# Patient Record
Sex: FEMALE | Race: BLACK OR AFRICAN AMERICAN | NOT HISPANIC OR LATINO | Employment: OTHER | URBAN - METROPOLITAN AREA
[De-identification: names, ages, dates, MRNs, and addresses within clinical notes are randomized per-mention and may not be internally consistent; named-entity substitution may affect disease eponyms.]

---

## 2017-01-06 ENCOUNTER — OFFICE VISIT (OUTPATIENT)
Dept: ORTHOPEDICS | Facility: CLINIC | Age: 67
End: 2017-01-06
Payer: MEDICARE

## 2017-01-06 ENCOUNTER — HOSPITAL ENCOUNTER (OUTPATIENT)
Dept: RADIOLOGY | Facility: HOSPITAL | Age: 67
Discharge: HOME | End: 2017-01-06
Attending: ORTHOPAEDIC SURGERY
Payer: MEDICARE

## 2017-01-06 VITALS
SYSTOLIC BLOOD PRESSURE: 131 MMHG | WEIGHT: 198 LBS | BODY MASS INDEX: 31.82 KG/M2 | HEIGHT: 66 IN | DIASTOLIC BLOOD PRESSURE: 78 MMHG | HEART RATE: 78 BPM | RESPIRATION RATE: 16 BRPM

## 2017-01-06 DIAGNOSIS — M25.562 ACUTE PAIN OF LEFT KNEE: ICD-10-CM

## 2017-01-06 DIAGNOSIS — M25.562 ACUTE PAIN OF LEFT KNEE: Primary | ICD-10-CM

## 2017-01-06 PROCEDURE — 1159F MED LIST DOCD IN RCRD: CPT | Mod: S$GLB,,, | Performed by: PHYSICIAN ASSISTANT

## 2017-01-06 PROCEDURE — 73560 X-RAY EXAM OF KNEE 1 OR 2: CPT | Mod: TC,59,RT

## 2017-01-06 PROCEDURE — 73562 X-RAY EXAM OF KNEE 3: CPT | Mod: 26,LT,, | Performed by: RADIOLOGY

## 2017-01-06 PROCEDURE — 99213 OFFICE O/P EST LOW 20 MIN: CPT | Mod: S$GLB,,, | Performed by: PHYSICIAN ASSISTANT

## 2017-01-06 PROCEDURE — 99999 PR PBB SHADOW E&M-EST. PATIENT-LVL IV: CPT | Mod: PBBFAC,,, | Performed by: PHYSICIAN ASSISTANT

## 2017-01-06 PROCEDURE — 73560 X-RAY EXAM OF KNEE 1 OR 2: CPT | Mod: 26,59,, | Performed by: RADIOLOGY

## 2017-01-06 PROCEDURE — 3078F DIAST BP <80 MM HG: CPT | Mod: S$GLB,,, | Performed by: PHYSICIAN ASSISTANT

## 2017-01-06 PROCEDURE — 3075F SYST BP GE 130 - 139MM HG: CPT | Mod: S$GLB,,, | Performed by: PHYSICIAN ASSISTANT

## 2017-01-08 NOTE — PROGRESS NOTES
Subjective:      Patient ID: Soila Chávez is a 66 y.o. female.    Chief Complaint: Pain of the Left Knee and Follow-up (right knee)    Pain   Pertinent negatives include no chest pain, chills, coughing, fever or rash.       Patient is a 66 year old female who presents to clinic with chief complaint of posterior left knee pain x 2-3 weeks. Pain increased at night and with twisting a certain way. Pateint has tried rest ice Aleve, Mobic knee brace, OTC rubs with out complete relief of symptoms. Patient denied locking catching popping or cracking.     Review of Systems   Constitution: Negative for chills and fever.   Cardiovascular: Negative for chest pain.   Respiratory: Negative for cough and shortness of breath.    Skin: Negative for color change, dry skin, itching, nail changes, poor wound healing and rash.   Musculoskeletal:        Left knee pain   Neurological: Negative for dizziness.   Psychiatric/Behavioral: Negative for altered mental status. The patient is not nervous/anxious.    All other systems reviewed and are negative.        Objective:      General    Constitutional: She is oriented to person, place, and time. She appears well-developed and well-nourished. No distress.   HENT:   Head: Atraumatic.   Eyes: Conjunctivae are normal.   Cardiovascular: Normal rate.    Pulmonary/Chest: Effort normal.   Neurological: She is alert and oriented to person, place, and time.   Psychiatric: She has a normal mood and affect. Her behavior is normal.         Right Ankle/Foot Exam     Inspection   Erythema: absent  Bruising: Foot - absent        Left Knee Exam     Inspection   Swelling: absent  Bruising: absent    Tenderness   The patient is experiencing no tenderness.         Range of Motion   The patient has normal left knee ROM.    Tests   Meniscus   Demetria:  Medial - negative Lateral - negative  Stability Lachman: normal (-1 to 2mm) PCL-Posterior Drawer: normal (0 to 2mm)  MCL - Valgus: normal (0 to 2mm)  LCL  - Varus: normal (0 to 2mm)    Muscle Strength   Left Lower Extremity   Quadriceps:  5/5   Hamstrin/5       RADS: no fracture or dislocation, degenerative changes noted.     Assessment:       Encounter Diagnosis   Name Primary?    Acute pain of left knee Yes          Plan:       Discussed treatment options with patient. Patient would like to try Aleve x 2 weeks then return to her daily Mobic rest and ice . She is to return to clinic as needed,

## 2017-01-27 ENCOUNTER — DOCUMENTATION ONLY (OUTPATIENT)
Dept: ORTHOPEDICS | Facility: CLINIC | Age: 67
End: 2017-01-27

## 2017-01-27 ENCOUNTER — OFFICE VISIT (OUTPATIENT)
Dept: ORTHOPEDICS | Facility: CLINIC | Age: 67
End: 2017-01-27
Payer: MEDICARE

## 2017-01-27 VITALS
DIASTOLIC BLOOD PRESSURE: 71 MMHG | SYSTOLIC BLOOD PRESSURE: 112 MMHG | RESPIRATION RATE: 18 BRPM | HEART RATE: 76 BPM | WEIGHT: 195.63 LBS | BODY MASS INDEX: 31.44 KG/M2 | HEIGHT: 66 IN

## 2017-01-27 DIAGNOSIS — S86.892A SHIN SPLINTS, LEFT, INITIAL ENCOUNTER: Primary | ICD-10-CM

## 2017-01-27 PROCEDURE — 3074F SYST BP LT 130 MM HG: CPT | Mod: S$GLB,,, | Performed by: PHYSICIAN ASSISTANT

## 2017-01-27 PROCEDURE — 3078F DIAST BP <80 MM HG: CPT | Mod: S$GLB,,, | Performed by: PHYSICIAN ASSISTANT

## 2017-01-27 PROCEDURE — 99213 OFFICE O/P EST LOW 20 MIN: CPT | Mod: S$GLB,,, | Performed by: PHYSICIAN ASSISTANT

## 2017-01-27 PROCEDURE — 1159F MED LIST DOCD IN RCRD: CPT | Mod: S$GLB,,, | Performed by: PHYSICIAN ASSISTANT

## 2017-01-27 PROCEDURE — 99999 PR PBB SHADOW E&M-EST. PATIENT-LVL IV: CPT | Mod: PBBFAC,,, | Performed by: PHYSICIAN ASSISTANT

## 2017-01-30 ENCOUNTER — TELEPHONE (OUTPATIENT)
Dept: OTOLARYNGOLOGY | Facility: CLINIC | Age: 67
End: 2017-01-30

## 2017-01-30 NOTE — TELEPHONE ENCOUNTER
----- Message from Isela Savage sent at 1/30/2017  8:47 AM CST -----  Pt would like to schedule an appointment with Dr. Puente. She was here on Friday to schedule but Ms. Harris was at lunch, so she told me to just let you know. Thanks

## 2017-01-30 NOTE — PROGRESS NOTES
Subjective:      Patient ID: Soila Chávez is a 66 y.o. female.    Chief Complaint: Follow-up (left knee)    HPI    Patient is a 66 year old female who presents to clinic with chief complaint of a-traumatic intermittent anterior left shin pain, burning, x 1 week.  Pain increased after day of standing.  Patient has taken NSAID without relief. Patient denied numbness or tenderness or increased pain with walking or weightbearing.       Review of Systems   Constitution: Negative for chills and fever.   Cardiovascular: Negative for chest pain.   Respiratory: Negative for cough and shortness of breath.    Skin: Negative for color change, dry skin, itching, nail changes, poor wound healing and rash.   Musculoskeletal:        Left shin pain.    Neurological: Negative for dizziness.   Psychiatric/Behavioral: Negative for altered mental status. The patient is not nervous/anxious.    All other systems reviewed and are negative.        Objective:      General    Constitutional: She is oriented to person, place, and time. She appears well-developed and well-nourished. No distress.   HENT:   Head: Atraumatic.   Eyes: Conjunctivae are normal.   Cardiovascular: Normal rate.    Pulmonary/Chest: Effort normal.   Neurological: She is alert and oriented to person, place, and time.   Psychiatric: She has a normal mood and affect. Her behavior is normal.         Left Ankle/Foot Exam     Inspection  Deformity: absent  Erythema: absent  Bruising: Ankle - absent Foot - absent  Effusion: Foot - absent    Range of Motion   The patient has normal left ankle ROM.   Ankle Joint  Left ankle dorsiflexion: increased pain.     Muscle Strength   The patient has normal left ankle strength.    Comments:  Full range of motion of knee.    Compartments are soft.                  Assessment:       Encounter Diagnosis   Name Primary?    Shin splints, left, initial encounter Yes          Plan:       Discussed treatment plan with patient. Order for PT  placed. Patient is to make appointment herself, She is to return to clinic as needed.

## 2017-01-30 NOTE — TELEPHONE ENCOUNTER
Patient will see DR. LOPEZ for ear clean ing only. She was scheduled incorrectly for Cochlear implant, patient does not wear hearing aid

## 2017-02-07 ENCOUNTER — CLINICAL SUPPORT (OUTPATIENT)
Dept: AUDIOLOGY | Facility: CLINIC | Age: 67
End: 2017-02-07
Payer: MEDICARE

## 2017-02-07 ENCOUNTER — OFFICE VISIT (OUTPATIENT)
Dept: OTOLARYNGOLOGY | Facility: CLINIC | Age: 67
End: 2017-02-07
Payer: MEDICARE

## 2017-02-07 VITALS — BODY MASS INDEX: 31.31 KG/M2 | DIASTOLIC BLOOD PRESSURE: 65 MMHG | SYSTOLIC BLOOD PRESSURE: 127 MMHG | WEIGHT: 194 LBS

## 2017-02-07 DIAGNOSIS — H90.5 UNILATERAL SENSORINEURAL HEARING LOSS: Primary | ICD-10-CM

## 2017-02-07 DIAGNOSIS — H90.3 SENSORINEURAL HEARING LOSS (SNHL) OF BOTH EARS: ICD-10-CM

## 2017-02-07 DIAGNOSIS — H61.23 BILATERAL IMPACTED CERUMEN: ICD-10-CM

## 2017-02-07 DIAGNOSIS — H93.8X3 SENSATION OF FULLNESS IN BOTH EARS: Primary | ICD-10-CM

## 2017-02-07 PROCEDURE — 99213 OFFICE O/P EST LOW 20 MIN: CPT | Mod: 25,S$GLB,, | Performed by: OTOLARYNGOLOGY

## 2017-02-07 PROCEDURE — G0268 REMOVAL OF IMPACTED WAX MD: HCPCS | Mod: S$GLB,,, | Performed by: OTOLARYNGOLOGY

## 2017-02-07 PROCEDURE — 3074F SYST BP LT 130 MM HG: CPT | Mod: S$GLB,,, | Performed by: OTOLARYNGOLOGY

## 2017-02-07 PROCEDURE — 92557 COMPREHENSIVE HEARING TEST: CPT | Mod: S$GLB,,, | Performed by: AUDIOLOGIST

## 2017-02-07 PROCEDURE — 99999 PR PBB SHADOW E&M-EST. PATIENT-LVL I: CPT | Mod: PBBFAC,,,

## 2017-02-07 PROCEDURE — 99999 PR PBB SHADOW E&M-EST. PATIENT-LVL III: CPT | Mod: PBBFAC,,, | Performed by: OTOLARYNGOLOGY

## 2017-02-07 PROCEDURE — 3078F DIAST BP <80 MM HG: CPT | Mod: S$GLB,,, | Performed by: OTOLARYNGOLOGY

## 2017-02-07 RX ORDER — MULTIVITAMIN
1 TABLET ORAL DAILY
COMMUNITY
End: 2022-04-20

## 2017-02-07 RX ORDER — CETIRIZINE HYDROCHLORIDE 10 MG/1
10 TABLET ORAL
COMMUNITY
End: 2018-04-18

## 2017-02-07 RX ORDER — LOSARTAN POTASSIUM 100 MG/1
100 TABLET ORAL
COMMUNITY
Start: 2014-12-30 | End: 2017-03-07 | Stop reason: ALTCHOICE

## 2017-02-07 RX ORDER — OMEPRAZOLE 20 MG/1
20 CAPSULE, DELAYED RELEASE ORAL
COMMUNITY
End: 2017-03-07 | Stop reason: ALTCHOICE

## 2017-02-07 RX ORDER — SERTRALINE HYDROCHLORIDE 50 MG/1
50 TABLET, FILM COATED ORAL
COMMUNITY
End: 2017-02-13

## 2017-02-07 RX ORDER — ALPRAZOLAM 0.25 MG/1
0.25 TABLET ORAL
COMMUNITY
Start: 2014-12-30 | End: 2017-03-07 | Stop reason: ALTCHOICE

## 2017-02-07 RX ORDER — METAXALONE 800 MG/1
800 TABLET ORAL
COMMUNITY
End: 2017-02-13

## 2017-02-07 RX ORDER — HYDROQUINONE 40 MG/G
CREAM TOPICAL
COMMUNITY
Start: 2014-06-17 | End: 2017-08-23

## 2017-02-07 RX ORDER — IBUPROFEN 200 MG
1 CAPSULE ORAL
COMMUNITY
Start: 2014-06-17 | End: 2020-02-12

## 2017-02-07 RX ORDER — ASPIRIN 81 MG/1
81 TABLET ORAL NIGHTLY
COMMUNITY

## 2017-02-07 RX ORDER — OMEGA-3 FATTY ACIDS 1000 MG
1 CAPSULE ORAL NIGHTLY
COMMUNITY
End: 2022-04-20

## 2017-02-07 RX ORDER — ASCORBIC ACID 1000 MG
2 TABLET ORAL
COMMUNITY
Start: 2009-07-20 | End: 2017-08-23

## 2017-02-07 RX ORDER — POLYETHYLENE GLYCOL 3350 17 G/17G
17 POWDER, FOR SOLUTION ORAL
COMMUNITY
End: 2017-08-23

## 2017-02-07 NOTE — PROGRESS NOTES
Subjective:       Patient ID: Soila Chávez is a 66 y.o. female.    Chief Complaint: Ear Fullness and Hearing Loss    Ear Fullness    There is pain in both ears. This is a recurrent problem. The current episode started 1 to 4 weeks ago. The problem occurs constantly. The problem has been unchanged. There has been no fever. The patient is experiencing no pain. Associated symptoms include hearing loss. Pertinent negatives include no coughing, diarrhea, ear discharge, headaches, neck pain, rash, rhinorrhea or vomiting. Her past medical history is significant for hearing loss. There is no history of a chronic ear infection or a tympanostomy tube. chronic allergic rhinitis   Hearing Loss:   Chronicity:  New  Onset:  More than 1 year ago  Progression since onset:  Unchanged  Frequency:  Constantly  Severity:  Mild  Hearing loss characteristics:  Mild, muffled, trouble hearing TV, worse background noise and ear fullness/pressureNo dizziness, no ear pain, no fever, no headaches and no rhinorrhea.  Aggravated by:  Noise  Treatments tried:  NothingNo dizziness, no ear surgery, no ear tubes, no ear infections and no recent URI.   chronic allergic rhinitis    Review of Systems   Constitutional: Negative for activity change, appetite change and fever.   HENT: Positive for hearing loss. Negative for congestion, ear discharge, ear pain, nosebleeds, postnasal drip, rhinorrhea and sneezing.         New onset Bilateral ear fullness.   Eyes: Negative for redness and visual disturbance.   Respiratory: Negative for apnea, cough, shortness of breath and wheezing.    Cardiovascular: Negative for chest pain and palpitations.   Gastrointestinal: Negative for diarrhea and vomiting.   Genitourinary: Negative for difficulty urinating and frequency.   Musculoskeletal: Negative for arthralgias, back pain, gait problem and neck pain.   Skin: Negative for color change and rash.   Neurological: Negative for dizziness, speech difficulty,  weakness and headaches.   Hematological: Negative for adenopathy. Does not bruise/bleed easily.   Psychiatric/Behavioral: Negative for agitation and behavioral problems.       Objective:        Constitutional:   Vital signs are normal. She appears well-developed and well-nourished. She is active. Normal speech.      Head:  Normocephalic and atraumatic. Salivary glands normal.  Facial strength is normal.      Ears:    Right Ear: Decreased hearing is noted.   Left Ear: Decreased hearing is noted.   PROCEDURE NOTE:  Ceruminosis is noted in both EACs.  Wax was removed by manual debridement and suctioning utilizing the assistance of binocular microscopy revealing EACs and TMs WNL. The patient tolerated this procedure without difficulty. The subjective decrease noted in hearing pre-cleaning was resolved post-cleaning.      Nose:  Nose normal including turbinates, nasal mucosa, sinuses and nasal septum.     Mouth/Throat  Oropharynx clear and moist without lesions or asymmetry and normal uvula midline.     Neck:  Neck normal without thyromegaly masses, asymmetry, normal tracheal structure, crepitus, and tenderness, thyroid normal, trachea normal, phonation normal and full range of motion with neck supple.     Psychiatric:   She has a normal mood and affect. Her speech is normal and behavior is normal.     Neurological:   She has neurological normal, alert and oriented.     Skin:   No abrasions, lacerations, lesions, or rashes.       As a result of this patients history and examination findings, a comprehensive audiogram was ordered to determine the level of hearing/hearing loss.          Assessment:       1. Sensation of fullness in both ears    2. Bilateral impacted cerumen    3. Sensorineural hearing loss (SNHL) of both ears        Plan:       1. Hearing conservation strongly recommended.  2. Trial of amplification is not currently indicated.  3. Re-check of hearing after 71 years of age.  4. F/U with PCP as per schedule.

## 2017-02-07 NOTE — MR AVS SNAPSHOT
[facility] - Otorhinolaryngology  [address]  [facility] [address]  Phone: [phone]  Fax: [phone]                  Soila Chávez   2017 3:30 PM   Office Visit    Description:  Female : 1950   Provider:  Adriano Guzmán MD   Department:  [facility] - Otorhin[facility]ology           Reason for Visit     Ear Fullness     Hearing Loss           Diagnoses this Visit        Comments    Sensation of fullness in both ears    -  Primary     Bilateral impacted cerumen         Sensorineural hearing loss (SNHL) of both ears                To Do List           Future Appointments        Provider Department Dept Phone    2017 3:30 PM MD Jag Ramirez [facility] Otorhinolaryngology [phone]    2017 8:00 AM Alphonse Richard OD [facility] - Optometry [phone]      Goals (5 Years of Data)     None      [facility] On Call     [facility] On Call Nurse [facility] -  Assistance  Registered nurses in the [facility] On Call Center provide clinical advisement, health education, appointment booking, and other advisory services.  Call for this free service at [phone].             Medications           Message regarding Medications     Verify the changes and/or additions to your medication regime listed below are the same as discussed with your clinician today.  If any of these changes or additions are incorrect, please notify your healthcare provider.             Verify that the below list of medications is an accurate representation of the medications you are currently taking.  If none reported, the list may be blank. If incorrect, please contact your healthcare provider. Carry this list with you in case of emergency.           Current Medications     alprazolam (XANAX) 0.25 MG tablet Take 1 tablet (0.25 mg total) by mouth 3 (three) times daily as needed for Anxiety.    alprazolam (XANAX) 0.25 MG tablet Take 0.25 mg by mouth.    alprazolam (XANAX) 0.5 MG tablet      antiox#[id number] 280-10-2 mg Cap Take 2 capsules by mouth.    ascorbic acid (VITAMIN C) 100 MG tablet Take 100 mg by mouth once daily.    aspirin (ECOTRIN) 81 MG EC tablet Take 81 mg by mouth.    b complex vitamins tablet Take 1 tablet by mouth once daily.    benzonatate (TESSALON PERLES) 100 MG capsule Take by mouth. 1 Capsule Oral Three times a day    calcium carbonate-vitamin D3 500 mg(1,250mg) -125 unit per tablet Take 1 tablet by mouth.    cetirizine (ZYRTEC) 10 MG tablet TAKE 1 TABLET BY MOUTH DAILY    cetirizine (ZYRTEC) 10 MG tablet Take 10 mg by mouth.    fluticasone (FLONASE) 50 mcg/actuation nasal spray 2 sprays by Each Nare route once daily.    GAVILYTE-G 236-22.74-6.74 gram suspension     ginkgo biloba 40 mg Tab Take 1 tablet by mouth once daily.    ginkgo biloba 40 mg Tab Take 2 capsules by mouth.    hydroquinone 4 % Crea Apple 2 x day to darkened lesions.    hydroquinone 4 % cream 4 %. 1 Cream Topical Twice a day     ibuprofen (ADVIL,MOTRIN) 800 MG tablet Take 1 tablet (800 mg total) by mouth every 6 (six) hours as needed for Pain.    losartan (COZAAR) 100 MG tablet Take 100 mg by mouth.    losartan-hydrochlorothiazide 100-25 mg (HYZAAR) 100-25 mg per tablet Take 1 tablet by mouth every morning.    metaxalone (SKELAXIN) 800 MG tablet Take 800 mg by mouth.    multivitamin (THERAGRAN) per tablet Take 1 tablet by mouth.    omega-3 fatty acids 1,000 mg Cap Take 1 g by mouth.    omeprazole (PRILOSEC) 20 MG capsule Take by mouth. 1 - 2 Capsule, Delayed Release(E.C.) Oral OTC PRN    omeprazole (PRILOSEC) 20 MG capsule Take 20 mg by mouth.    polyethylene glycol (GLYCOLAX) 17 gram PwPk Take 17 g by mouth.    sertraline (ZOLOFT) 50 MG tablet Take 50 mg by mouth.           Clinical Reference Information           Your Vitals Were     BP Weight BMI          127/65 (BP Location: Right arm, Patient Position: Sitting, BP Method: Automatic) 88 kg (194 lb 0.1 oz) 31.31 kg/m2        Blood Pressure           Most Recent Value    BP  127/65      Allergies as of 2/7/2017     Lotensin [Benazepril]    Penicillins      Immunizations Administered on Date of Encounter - 2/7/2017     None      [facility] Sign-Up     Activating your [facility] account is as easy as 1-2-3!     1) Visit [email or web address], select Sign Up Now, enter this activation code and your date of birth, then select Next.  [email or web address]  Expires: 3/24/2017  2:06 PM      2) Create a username and password to use when you visit [facility] in the future and select a security question in case you lose your password and select Next.    3) Enter your e-mail address and click Sign Up!    Additional Information  If you have questions, please e-mail [email or web address] or call [phone] to talk to our [facility] staff. Remember, [facility] is NOT to be used for urgent needs. For medical emergencies, dial 911.         Language Assistance Services     ATTENTION: Language assistance services are available, free of charge. Please call [phone].      ATENCIÓN: Si habla español, tiene a rubio disposición servicios gratuitos de asistencia lingüística. Llame al [phone].     DOROTHY Ý: N?u b?n nói Ti?ng Vi?t, có các d?ch v? h? tr? ngôn ng? mi?n phí dành cho b?n. G?i s? [phone].         [facility] complies with applicable Federal civil rights laws and does not discriminate on the basis of race, color, national origin, age, disability, or sex.

## 2017-02-13 ENCOUNTER — OFFICE VISIT (OUTPATIENT)
Dept: OPTOMETRY | Facility: CLINIC | Age: 67
End: 2017-02-13
Payer: MEDICARE

## 2017-02-13 DIAGNOSIS — H02.9 EYELID LESION: Primary | ICD-10-CM

## 2017-02-13 PROCEDURE — 99999 PR PBB SHADOW E&M-EST. PATIENT-LVL III: CPT | Mod: PBBFAC,,, | Performed by: OPTOMETRIST

## 2017-02-13 PROCEDURE — 99499 UNLISTED E&M SERVICE: CPT | Mod: S$GLB,,, | Performed by: OPTOMETRIST

## 2017-02-13 RX ORDER — CHOLECALCIFEROL (VITAMIN D3) 125 MCG
CAPSULE ORAL
COMMUNITY
End: 2017-03-07 | Stop reason: ALTCHOICE

## 2017-02-13 NOTE — PROGRESS NOTES
HPI     Eye Problem    Additional comments: Cyst OS           Comments   Cyst/bump OS   PARRISH 07/18/2016 Dr. Pardo  Ms. Cm is here today to have consult for cyst OS.  Referral from Dr. Hua in dermatology  Last notes:  Affected locations: left eye  Duration: 5 years  Signs / symptoms: irritated and burning  Timing: constant  Treatments tried: Another physician attempted surgical removal several   years ago@ [facility], which helped, but lesion recurerd.  Improvement on treatment: mild  (+)Itch, (-)tear, (+)burn, (+)Dryness.  (-) OTC Drops   Pt sts very itchy and irritated tries hot compresses with little relief   and would like it removed.         Last edited by Inez Bang MA on 2/13/2017  8:45 AM. (History)            Assessment /Plan     For exam results, see Encounter Report.    Eyelid lesion  -Pt not examined, only here for excision of lesion  -Referral [facility]    RTC as directed OMD?

## 2017-02-16 ENCOUNTER — HOSPITAL ENCOUNTER (EMERGENCY)
Facility: HOSPITAL | Age: 67
Discharge: HOME | End: 2017-02-16
Attending: EMERGENCY MEDICINE
Payer: MEDICARE

## 2017-02-16 VITALS
OXYGEN SATURATION: 99 % | HEIGHT: 66 IN | BODY MASS INDEX: 30.53 KG/M2 | SYSTOLIC BLOOD PRESSURE: 127 MMHG | DIASTOLIC BLOOD PRESSURE: 79 MMHG | HEART RATE: 90 BPM | RESPIRATION RATE: 18 BRPM | WEIGHT: 190 LBS | TEMPERATURE: 99 F

## 2017-02-16 DIAGNOSIS — M25.569 KNEE PAIN: Primary | ICD-10-CM

## 2017-02-16 DIAGNOSIS — M54.50 ACUTE LEFT-SIDED LOW BACK PAIN WITHOUT SCIATICA: ICD-10-CM
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
## 2017-02-16 PROCEDURE — 99284 EMERGENCY DEPT VISIT MOD MDM: CPT

## 2017-02-16 PROCEDURE — 99283 EMERGENCY DEPT VISIT LOW MDM: CPT | Mod: ,,, | Performed by: PHYSICIAN ASSISTANT

## 2017-02-16 PROCEDURE — 25000003 PHARM REV CODE 250: Performed by: PHYSICIAN ASSISTANT

## 2017-02-16 RX ORDER — NAPROXEN 500 MG/1
500 TABLET ORAL
Status: COMPLETED | OUTPATIENT
Start: 2017-02-16 | End: 2017-02-16

## 2017-02-16 RX ADMIN — NAPROXEN 500 MG: 500 TABLET ORAL at 01:02

## 2017-02-16 NOTE — ED TRIAGE NOTES
+fall this morning around 9am, denies LOC. Reports slip and fall while walking up stairs. CC of left knee pain and lower back pain. No swelling or deformities noted

## 2017-02-16 NOTE — ED PROVIDER NOTES
Encounter Date: 2/16/2017    SCRIBE #1 NOTE: I, Kal Manriquez, am scribing for, and in the presence of,  Dr. Perze. I have scribed the following portions of the note - the APC attestation.       History     Chief Complaint   Patient presents with    Fall     trip and fall going up stair, c/o head pain, no LOC, back pain and left leg pain. c-collar noted per EMS, pt denies neck pain at this time.      Review of patient's allergies indicates:   Allergen Reactions    Lotensin [benazepril] Swelling and Other (See Comments)    Penicillins Rash and Other (See Comments)     HPI Comments: 66-year-old -American female with past medical history of hypertension, anxiety, allergies presents to the ED complaining of left knee pain and lower back pain after a fall at 9:00 this morning.  She was walking up the stairs when she fell backwards into a door and then landed on the ground.  She did strike her head but denies any loss of consciousness and denies any current headache.  She's complaining of a 9/10 left knee pain and 7/10 left-sided lower back pain.  She has not taken any over-the-counter pain medication prior to arrival.  She is able to ambulate unassisted.  She denies any past surgical history of the back or left knee.  She denies fever, chills, chest pain, shortness breath, abdominal pain, dysuria, hematuria, numbness, weakness, changes in vision, changes in speech, changes in gait, bowel or bladder incontinence, saddle paresthesias.  She socially drinks alcohol and denies tobacco or drug use.    The history is provided by the patient.     Past Medical History   Diagnosis Date    Allergy     Anxiety     Cataract     Hypertension     Joint pain      Past Medical History Pertinent Negatives   Diagnosis Date Noted    Amblyopia 7/18/2016    Arthritis 7/18/2016    Diabetes mellitus 7/18/2016    Diabetic retinopathy 7/18/2016    Glaucoma 7/18/2016    Macular degeneration 7/18/2016    Retinal detachment  7/18/2016    Sickle cell anemia 7/18/2016    Sickle cell trait 7/18/2016    Strabismus 7/18/2016    Uveitis 7/18/2016     Past Surgical History   Procedure Laterality Date    Hysterectomy       PERIPARTUM CHITO    Cyst removal Left      cyst removal OS lid      Family History   Problem Relation Age of Onset    Breast cancer Sister     Colon cancer Neg Hx     Ovarian cancer Neg Hx     Melanoma Neg Hx      Social History   Substance Use Topics    Smoking status: Never Smoker    Smokeless tobacco: Never Used    Alcohol use Yes     Review of Systems   Constitutional: Negative for chills and fever.   HENT: Negative for congestion, rhinorrhea and sore throat.    Eyes: Negative for photophobia and visual disturbance.   Respiratory: Negative for shortness of breath.    Cardiovascular: Negative for chest pain.   Gastrointestinal: Negative for abdominal pain, constipation, diarrhea, nausea and vomiting.        No bowel incontinence   Genitourinary: Negative for dysuria, hematuria, vaginal bleeding and vaginal discharge.        No urinary incontinence   Musculoskeletal: Positive for arthralgias, back pain and joint swelling. Negative for neck pain and neck stiffness.   Skin: Negative for rash and wound.   Neurological: Negative for dizziness, syncope, weakness, light-headedness, numbness and headaches.        No saddle paresthesias.    Psychiatric/Behavioral: Negative for confusion.       Physical Exam   Initial Vitals   BP Pulse Resp Temp SpO2   02/16/17 1108 02/16/17 1108 02/16/17 1108 02/16/17 1108 02/16/17 1108   127/79 90 18 99.4 °F (37.4 °C) 99 %     Physical Exam    Nursing note and vitals reviewed.  Constitutional: She appears well-developed and well-nourished. She is not diaphoretic. No distress.   HENT:   Head: Normocephalic and atraumatic.   Neck: Normal range of motion. Neck supple. No spinous process tenderness and no muscular tenderness present. Normal range of motion present. No rigidity.    Cardiovascular: Normal rate, regular rhythm and normal heart sounds. Exam reveals no gallop and no friction rub.    No murmur heard.  Pulmonary/Chest: Breath sounds normal. She has no wheezes. She has no rhonchi. She has no rales.   Abdominal: Soft. Bowel sounds are normal. There is no tenderness. There is no rebound and no guarding.   Musculoskeletal:        Left hip: She exhibits normal range of motion, normal strength, no tenderness and no bony tenderness.        Left knee: She exhibits decreased range of motion, swelling and bony tenderness. She exhibits no effusion, no ecchymosis, no deformity and no laceration. Tenderness found.        Left ankle: She exhibits normal range of motion and no swelling. No tenderness.        Cervical back: She exhibits no tenderness and no bony tenderness.        Thoracic back: She exhibits no tenderness and no bony tenderness.        Lumbar back: She exhibits tenderness and bony tenderness.        Left upper leg: She exhibits no tenderness and no bony tenderness.        Left lower leg: She exhibits no tenderness and no bony tenderness.        Left foot: There is normal range of motion, no tenderness and no bony tenderness.   Bilateral pedal pulses 2+. Midline L spine tenderness. No bruising, rashes, abrasions noted. Normal sensation of bilateral LE.    Neurological: She is alert and oriented to person, place, and time. She has normal strength. No sensory deficit. Gait normal. GCS eye subscore is 4. GCS verbal subscore is 5. GCS motor subscore is 6.   Skin: Skin is warm and dry. No rash noted. No erythema.   Psychiatric: She has a normal mood and affect.         ED Course   Procedures  Labs Reviewed - No data to display     Imaging Results         X-Ray Lumbar Spine Ap And Lateral (Final result) Result time:  02/16/17 13:59:05    Final result by Stan Chamorro MD (02/16/17 13:59:05)    Impression:     Unremarkable two view examination of lumbar spine.      Electronically  signed by: Dr. Stan Chamorro MD  Date:     02/16/17  Time:    13:59     Narrative:    Two view examination of the lumbar spine demonstrates  that all of the lumbar vertebral bodies are of normal size and stature with maintenance of intervertebral disk space height .  Pedicles are intact.  Paravertebral soft tissues are unremarkable.            X-Ray Knee 3 View Left (Final result) Result time:  02/16/17 13:59:35    Final result by Stan Chamorro MD (02/16/17 13:59:35)    Impression:     No acute fracture identified.Mild patellofemoral degenerative change      Electronically signed by: Dr. Stan Chamorro MD  Date:     02/16/17  Time:    13:59     Narrative:    Comparison: None.    Findings:3 view examination.Patellofemoral degenerative change The alignment is within normal limits.  No displaced fractures identified.  No evidence of lytic or blastic lesions.Soft tissues are unremarkable. Joint spaces are unremarkable.                 Medical Decision Making:   History:   Old Medical Records: I decided to obtain old medical records.  Clinical Tests:   Radiological Study: Ordered and Reviewed       APC / Resident Notes:   66-year-old -American female with past medical history of hypertension, anxiety, allergies presents to the ED complaining of left knee pain and lower back pain after a fall at 9:00 this morning.  Vital signs stable.  Regular rate and rhythm without murmurs.  Lungs are clear to auscultation bilaterally without wheezes.  Abdomen is soft and nontender to palpation.  There is midline L-spine tenderness noted.  No rashes, bruising, abrasions.  Left-sided knee tenderness to palpation with decreased range of motion.  No obvious deformities.  No bony tenderness of the left femur, left hip, left tib-fib.  Bilateral pedal pulses 2+.  Normal strength and sensation of bilateral lower extremities.  I do not suspect cauda equina.  Will obtain x-rays of the left knee and L-spine for further  evaluation.    She was given naproxen in the ED.    X-ray of the left knee does not show any acute fracture or dislocation.  Mild patellofemoral degenerative changes noted.  X-ray of the L-spine with no fractures or dislocations identified.    I do not feel patient needs any further labs or imaging at this time.  Stable for discharge.    Left knee placed in Ace wrap prior to discharge.  She was discharged without any prescriptions and will continue her at home naproxen.  She will follow up with her PCP and orthopedist.  All of the patient's questions were answered.  I reviewed the patient's chart and imaging and discussed the case with my supervising physician.          Scribe Attestation:   Scribe #1: I performed the above scribed service and the documentation accurately describes the services I performed. I attest to the accuracy of the note.    Attending Attestation:     Physician Attestation Statement for NP/PA:   I discussed this assessment and plan of this patient with the NP/PA, but I did not personally examine the patient. The face to face encounter was performed by the NP/PA.    Other NP/PA Attestation Additions:    History of Present Illness: Fall         Physician Attestation for Scribe:  Physician Attestation Statement for Scribe #1: I, Dr. Perez, reviewed documentation, as scribed by Kal Manriquez in my presence, and it is both accurate and complete.                 ED Course     Clinical Impression:   The primary encounter diagnosis was Knee pain. Diagnoses of Fall and Acute left-sided low back pain without sciatica were also pertinent to this visit.    Disposition:   Disposition: Discharged  Condition: Stable       Tessie Anderson PA-C  02/16/17 [id number]

## 2017-02-16 NOTE — ED AVS SNAPSHOT
[facility]  [address]  [facility] [address]               Soila Chávez   2017  1:04 PM   ED    Description:  Female : 1950   Department:  [facility]           Your Care was Coordinated By:     Provider Role From To    Janee Perez MD Attending Provider [phone] --    Tessie Anderson PA-C Physician Assistant [phone] --      Reason for Visit     Fall           Diagnoses this Visit        Comments    Knee pain    -  Primary     Fall         Acute left-sided low back pain without sciatica           ED Disposition     ED Disposition Condition Comment    Discharge             To Do List           Follow-up Information     Schedule an appointment as soon as possible for a visit with Ingris Nj MD.    Specialty:  Internal Medicine    Contact information:    [address]  [facility] [address]  [phone]        [facility]     [facility] -  Assistance  Registered nurses in the [facility] provide clinical advisement, health education, appointment booking, and other advisory services.  Call for this free service at [phone].             Medications           Message regarding Medications     Verify the changes and/or additions to your medication regime listed below are the same as discussed with your clinician today.  If any of these changes or additions are incorrect, please notify your healthcare provider.        These medications were administered today        Dose Freq    naproxen tablet 500 mg 500 mg ED 1 Time    Sig: Take 1 tablet (500 mg total) by mouth ED 1 Time.    Class: Normal    Route: Oral    Cosign for Ordering: Required by Janee Perez MD           Verify that the below list of medications is an accurate representation of the medications you are currently taking.  If none reported, the list may be blank. If incorrect, please contact your healthcare  provider. Carry this list with you in case of emergency.           Current Medications     alprazolam (XANAX) 0.25 MG tablet Take 1 tablet (0.25 mg total) by mouth 3 (three) times daily as needed for Anxiety.    alprazolam (XANAX) 0.25 MG tablet Take 0.25 mg by mouth.    cetirizine (ZYRTEC) 10 MG tablet TAKE 1 TABLET BY MOUTH DAILY    fluticasone (FLONASE) 50 mcg/actuation nasal spray 2 sprays by Each Nare route once daily.    losartan-hydrochlorothiazide 100-25 mg (HYZAAR) 100-25 mg per tablet Take 1 tablet by mouth every morning.    alprazolam (XANAX) 0.5 MG tablet     antiox#10-om3-dha-epa-lut-zeax 280-10-2 mg Cap Take 2 capsules by mouth.    ascorbic acid (VITAMIN C) 100 MG tablet Take 100 mg by mouth once daily.    aspirin (ECOTRIN) 81 MG EC tablet Take 81 mg by mouth.    b complex vitamins tablet Take 1 tablet by mouth once daily.    benzonatate (TESSALON PERLES) 100 MG capsule Take by mouth. 1 Capsule Oral Three times a day    calcium carbonate-vitamin D3 500 mg(1,250mg) -125 unit per tablet Take 1 tablet by mouth.    cetirizine (ZYRTEC) 10 MG tablet Take 10 mg by mouth.    GAVILYTE-G 236-22.74-6.74 gram suspension     ginkgo biloba 40 mg Tab Take 1 tablet by mouth once daily.    ginkgo biloba 40 mg Tab Take 2 capsules by mouth.    hydroquinone 4 % Crea Apple 2 x day to darkened lesions.    hydroquinone 4 % cream 4 %. 1 Cream Topical Twice a day     losartan (COZAAR) 100 MG tablet Take 100 mg by mouth.    multivitamin (THERAGRAN) per tablet Take 1 tablet by mouth.    naproxen sodium (ALEVE) 220 mg Cap Take by mouth.    omega-3 fatty acids 1,000 mg Cap Take 1 g by mouth.    omeprazole (PRILOSEC) 20 MG capsule Take by mouth. 1 - 2 Capsule, Delayed Release(E.C.) Oral OTC PRN    omeprazole (PRILOSEC) 20 MG capsule Take 20 mg by mouth.    polyethylene glycol (GLYCOLAX) 17 gram PwPk Take 17 g by mouth.           Clinical Reference Information           Your Vitals Were     BP Pulse Temp Resp Height Weight    127/79  "90 99.4 °F (37.4 °C) 18 5' 6" (1.676 m) 86.2 kg (190 lb)    SpO2 BMI             99% 30.67 kg/m2         Allergies as of 2/16/2017        Reactions    Lotensin [Benazepril] Swelling, Other (See Comments)    Penicillins Rash, Other (See Comments)      Immunizations Administered on Date of Encounter - 2/16/2017     None      ED Micro, Lab, POCT     None      ED Imaging Orders     Start Ordered       Status Ordering Provider    02/16/17 1330 02/16/17 1329  X-Ray Knee 3 View Left  1 time imaging      Final result     02/16/17 1329 02/16/17 1329  X-Ray Lumbar Spine Ap And Lateral  1 time imaging      Final result         Discharge Instructions       Follow up with your PCP. Continue at home aleve as needed for pain. Return to the ED for any new or worsening symptoms, including those listed below.        Back Pain (Acute or Chronic)    Back pain is one of the most common problems. The good news is that most people feel better in 1 to 2 weeks, and most of the rest in 1 to 2 months. Most people can remain active.  People experience and describe pain differently; not everyone is the same.  · The pain can be sharp, stabbing, shooting, aching, cramping or burning.  · Movement, standing, bending, lifting, sitting, or walking may worsen pain.  · It can be localized to one spot or area, or it can be more generalized.  · It can spread or radiate upwards, to the front, or go down your arms or legs (sciatica).  · It can cause muscle spasm.  Most of the time, mechanical problems with the muscles or spine cause the pain. Mechanical problems are usually caused by an injury to the muscles or ligaments. While illness can cause back pain, it is usually not caused by a serious illness. Mechanical problems include:   · Physical activity such as sports, exercise, work, or normal activity  · Overexertion, lifting, pushing, pulling incorrectly or too aggressively  · Sudden twisting, bending, or stretching from an accident, or accidental " movement  · Poor posture  · Stretching or moving wrong, without noticing pain at the time  · Poor coordination, lack of regular exercise (check with your doctor about this)  · Spinal disc disease or arthritis  · Stress  Pain can also be related to pregnancy, or illness like appendicitis, bladder or kidney infections, pelvic infections, and many other things.  Acute back pain usually gets better in 1 to 2 weeks. Back pain related to disk disease, arthritis in the spinal joints or spinal stenosis (narrowing of the spinal canal) can become chronic and last for months or years.  Unless you had a physical injury (for example, a car accident or fall) X-rays are usually not needed for the initial evaluation of back pain. If pain continues and does not respond to medical treatment, X-rays and other tests may be needed.  Home care  Try these home care recommendations:  · When in bed, try to find a position of comfort. A firm mattress is best. Try lying flat on your back with pillows under your knees. You can also try lying on your side with your knees bent up towards your chest and a pillow between your knees.  · At first, do not try to stretch out the sore spots. If there is a strain, it is not like the good soreness you get after exercising without an injury. In this case, stretching may make it worse.  · Avoid prolong sitting, long car rides, or travel. This puts more stress on the lower back than standing or walking.  · During the first 24 to 72 hours after an acute injury or flare up of chronic back pain, apply an ice pack to the painful area for 20 minutes and then remove it for 20 minutes. Do this over a period of 60 to 90 minutes or several times a day. This will reduce swelling and pain. Wrap the ice pack in a thin towel or plastic to protect your skin.  · You can start with ice, then switch to heat. Heat (hot shower, hot bath, or heating pad) reduces pain and works well for muscle spasms. Heat can be applied to the  painful area for 20 minutes then remove it for 20 minutes. Do this over a period of 60 to 90 minutes or several times a day. Do not sleep on a heating pad. It can lead to skin burns or tissue damage.  · You can alternate ice and heat therapy. Talk with your doctor about the best treatment for your back pain.  · Therapeutic massage can help relax the back muscles without stretching them.  · Be aware of safe lifting methods and do not lift anything without stretching first.  Medicines  Talk to your doctor before using medicine, especially if you have other medical problems or are taking other medicines.  · You may use over-the-counter medicine as directed on the bottle to control pain, unless another pain medicine was prescribed. If you have chronic conditions like diabetes, liver or kidney disease, stomach ulcers, or gastrointestinal bleeding, or are taking blood thinners, talk to your doctor before taking any medicine.  · Be careful if you are given a prescription medicines, narcotics, or medicine for muscle spasms. They can cause drowsiness, affect your coordination, reflexes, and judgement. Do not drive or operate heavy machinery.  Follow-up care  Follow up with your healthcare provider, or as advised.   A radiologist will review any X-rays that were taken. Your provide will notify you of any new findings that may affect your care.  Call 911  Call emergency services if any of the following occur:  · Trouble breathing  · Confusion  · Very drowsy or trouble awakening  · Fainting or loss of consciousness  · Rapid or very slow heart rate  · Loss of bowel or bladder control  When to seek medical advice  Call your healthcare provider right away if any of these occur:   · Pain becomes worse or spreads to your legs  · Weakness or numbness in one or both legs  · Numbness in the groin or genital area  Date Last Reviewed: 7/1/2016  © 3837-5678 [employer]. [address] [place] [address], [place] [address]. All rights  reserved. This information is not intended as a substitute for professional medical care. Always follow your healthcare professional's instructions.        Mechanical Fall  You have had a fall today. It appears that the cause is what we call mechanical. That means that you slipped, tripped or lost your balance. If your fall had been due to fainting or a seizure, other tests might be required.  It is normal to feel sore and tight in your muscles and back the next day, and not just the muscles you injured at first. Remember, all the parts of your body are connected, so while initially one area hurts, the next day another may hurt. Also, when you injure yourself, it causes inflammation, which then causes the muscles to tighten up and hurt more. After the initial worsening, it should gradually improve over the next few days. However, report more severe pain.  Even without a definite head injury, you can still get a concussion from your head suddenly jerking forward, backward or sideways when falling. Concussions and even bleeding can still occur, especially if you have had a recent injury or take blood thinner medicine. It is not unusual to have a mild headache and feel tired and even nauseous or dizzy.   Home care  1. Rest today and resume your normal activities when you are feeling back to normal.  2. If you were injured during the fall, follow the advice from your doctor regarding care of your injury.  3. At first, do not try to stretch out the sore spots. If there is a strain, stretching may make it worse.  Massage may help relax the muscles without stretching them.  4. You can use an ice pack or cold compress on and off to the sore spots 10 to 20 at a time, as often as you feel comfortable. This may help reduce the inflammation, swelling and pain.  5. If you have any scrapes or abrasions, they usually heal within 10 days. It is important to keep the abrasions clean while they initially start to heal. However, an  infection may occur even with proper care, so watch for early signs of infection.  Medications  · Talk to your doctor before taking new medicines, especially if you have other medical problems or are taking other medicines.  · If you need anything for pain, you can take acetaminophen or ibuprofen, unless you were given a different pain medicine to use. Talk with your doctor before using these medicines if you have chronic liver or kidney disease, or ever had a stomach ulcer or gastrointestinal bleeding, or are taking blood thinner medicines.  · Be careful if you are given prescription pain medicines, narcotics, or medicine for muscle spasm. They can make you sleepy, dizzy and can affect your coordination, reflexes and judgment. Do not drive or do work where you can injure yourself when taking them.  Fall prevention  · Was there anything that caused your fall that can be fixed, removed, or replaced?  · Make your home safe by keeping walkways clear of objects you may trip over.  · Use non-slip pads under rugs. Don't use small area rugs or throw rugs.  · Do not walk in poorly lit areas.  · Do not stand on chairs or wobbly ladders.  · Use caution when reaching overhead or looking upward. This position can cause a loss of balance.  · Be sure your shoes fit properly, have non-slip bottoms and are in good condition.  · Be cautious when going up and down curbs, and walking on uneven sidewalks.  · If your balance is poor, consider using a cane or walker.  · Stay as active as you can. Balance, flexibility, strength, and endurance all come from exercise. They all play a role in preventing falls.  · If you have pets, know where they are before you stand up or walk so you don't trip over them.  · Limit alcohol intake. Alcohol can cause balance problems and increase the risk of falls.  · Use night lights.  Follow-up  Follow up with your doctor or as advised by our staff. If X-rays or CT scans were done, you will be notified if  there is a change in the reading, especially if it affects treatment.  Call 911  Call 911 if any of these occur:  · Trouble breathing  · Confused or difficulty arousing  · Fainting or loss of consciousness  · Rapid or very slow heart rate  · Seizure  · Difficulty with speech or vision, weakness of an arm or leg  · Difficulty walking or talking, loss of balance, numbness or weakness in one side of your body, facial droop  When to seek medical advice  Call your healthcare provider right away if any of these occur:  · Repeated mechanical falls, or unexplained falls  · Dizziness  · Severe headache  · Blood in vomit, stools (black or red color)  Date Last Reviewed: 11/5/2015  © 5153-2730 [employer]. [address] [place] [address], [place] [address]. All rights reserved. This information is not intended as a substitute for professional medical care. Always follow your healthcare professional's instructions.        Reducing Knee Pain and Swelling    Many treatments can help reduce pain and swelling in your knee. Your healthcare provider or physical therapist may suggest one or more of the following treatments:  · Icing your knee helps reduce swelling. You may be asked to ice your knee once a day or more. Apply ice for about 15 to 20 minutes at a time, with at least 40 minutes between sessions. Always keep a towel between the ice and your skin.   · Keeping your leg raised above your heart helps excess fluid flow out of your knee joint. This reduces swelling.  · Compression means wrapping an elastic bandage or neoprene sleeve snugly around your knees. This keeps fluid from collecting in your knee joint.  · Electrical stimulation, done by a physical therapist or , can help reduce excess fluid in your knee joint.  · Anti-inflammatory medicines may be prescribed by your healthcare provider. You may take pills or receive injections in your knee.  · Isometric (ruth ann) exercises strengthen the muscles  that support your knee joint. They also help reduce excess fluid in your knee.  · Massage helps fluid drain away from your knee.  Date Last Reviewed: 10/13/2015  © 1602-8885 [employer]. [address] [place] [address], [place] [address]. All rights reserved. This information is not intended as a substitute for professional medical care. Always follow your healthcare professional's instructions.            Your Scheduled Appointments     Mar 20, 2017  1:00 PM CDT   Consult with Jazzmine Kaminski MD   [facility] - Ophthalmology ([facility] )    [address]  [place] [address]   [phone]              [facility] Sign-Up     Activating your [facility] account is as easy as 1-2-3!     1) Visit [email or web address], select Sign Up Now, enter this activation code and your date of birth, then select Next.  [email or web address]  Expires: 3/24/2017  2:06 PM      2) Create a username and password to use when you visit [facility] in the future and select a security question in case you lose your password and select Next.    3) Enter your e-mail address and click Sign Up!    Additional Information  If you have questions, please e-mail [email or web address] or call [phone] to talk to our [facility] staff. Remember, [facility] is NOT to be used for urgent needs. For medical emergencies, dial 911.          [facility] complies with applicable Federal civil rights laws and does not discriminate on the basis of race, color, national origin, age, disability, or sex.        Language Assistance Services     ATTENTION: Language assistance services are available, free of charge. Please call [phone].      ATENCIÓN: Si lucitala meharn, tiene a rubio disposición servicios gratuitos de asistencia lingüística. Llame al [phone].     CHÚ Ý: N?u b?n nói Ti?ng Vi?t, có các d?ch v? h? tr? ngôn ng? mi?n phí dành cho b?n. G?i s? [phone].

## 2017-03-07 ENCOUNTER — OFFICE VISIT (OUTPATIENT)
Dept: INTERNAL MEDICINE | Facility: CLINIC | Age: 67
End: 2017-03-07
Payer: MEDICARE

## 2017-03-07 VITALS
HEIGHT: 66 IN | DIASTOLIC BLOOD PRESSURE: 80 MMHG | SYSTOLIC BLOOD PRESSURE: 122 MMHG | BODY MASS INDEX: 31.29 KG/M2 | WEIGHT: 194.69 LBS | HEART RATE: 82 BPM

## 2017-03-07 DIAGNOSIS — M25.562 ACUTE PAIN OF LEFT KNEE: ICD-10-CM

## 2017-03-07 DIAGNOSIS — Z13.9 SCREENING: ICD-10-CM

## 2017-03-07 DIAGNOSIS — I10 ESSENTIAL HYPERTENSION: ICD-10-CM

## 2017-03-07 DIAGNOSIS — F41.9 ANXIETY: Primary | ICD-10-CM

## 2017-03-07 PROCEDURE — 3079F DIAST BP 80-89 MM HG: CPT | Mod: S$GLB,,, | Performed by: INTERNAL MEDICINE

## 2017-03-07 PROCEDURE — 1160F RVW MEDS BY RX/DR IN RCRD: CPT | Mod: S$GLB,,, | Performed by: INTERNAL MEDICINE

## 2017-03-07 PROCEDURE — 3074F SYST BP LT 130 MM HG: CPT | Mod: S$GLB,,, | Performed by: INTERNAL MEDICINE

## 2017-03-07 PROCEDURE — 99999 PR PBB SHADOW E&M-EST. PATIENT-LVL III: CPT | Mod: PBBFAC,,, | Performed by: INTERNAL MEDICINE

## 2017-03-07 PROCEDURE — 99499 UNLISTED E&M SERVICE: CPT | Mod: S$PBB,,, | Performed by: INTERNAL MEDICINE

## 2017-03-07 PROCEDURE — 99214 OFFICE O/P EST MOD 30 MIN: CPT | Mod: S$GLB,,, | Performed by: INTERNAL MEDICINE

## 2017-03-07 RX ORDER — LOSARTAN POTASSIUM AND HYDROCHLOROTHIAZIDE 25; 100 MG/1; MG/1
1 TABLET ORAL EVERY MORNING
Qty: 90 TABLET | Refills: 3 | Status: SHIPPED | OUTPATIENT
Start: 2017-03-07 | End: 2017-08-23

## 2017-03-07 RX ORDER — ALPRAZOLAM 0.25 MG/1
0.25 TABLET ORAL 3 TIMES DAILY PRN
Qty: 90 TABLET | Refills: 0 | Status: SHIPPED | OUTPATIENT
Start: 2017-03-07 | End: 2017-09-20 | Stop reason: SDUPTHER

## 2017-03-07 NOTE — MR AVS SNAPSHOT
[facility]  [address]  [place] [address]  Phone: [phone]  Fax: [phone]                  Soila Chávez   3/7/2017 3:00 PM   Office Visit    Description:  Female : 1950   Provider:  Ingris Francisco MD   Department:  [facility]           Reason for Visit     Fall           Diagnoses this Visit        Comments    Anxiety    -  Primary     Essential hypertension         Screening         Acute pain of left knee                To Do List           Future Appointments        Provider Department Dept Phone    3/20/2017 1:00 PM Jazzmine Kaminski MD [facility] - Ophthalmology [phone]      Goals (5 Years of Data)     None      Follow-Up and Disposition     Return in about 6 months (around 2017).       These Medications        Disp Refills Start End    losartan-hydrochlorothiazide 100-25 mg (HYZAAR) 100-25 mg per tablet 90 tablet 3 3/7/2017     Take 1 tablet by mouth every morning. - Oral    [facility] [address] [place], [place] [address]ADRIAN ACUNA [facility]  AT [facility] [address] Ph #: [phone]       alprazolam (XANAX) 0.25 MG tablet 90 tablet 0 3/7/2017 2017    Take 1 tablet (0.25 mg total) by mouth 3 (three) times daily as needed for Anxiety. - Oral    Pharmacy: [facility] [employer] [facility] [id number]  [address]  [address]  AT [facility] [address] Ph #: [phone]         [facility]     [facility] On Call Nurse Care Line -  Assistance  Registered nurses in the [facility] On Call Center provide clinical advisement, health education, appointment booking, and other advisory services.  Call for this free service at [phone].             Medications           Message regarding Medications     Verify the changes and/or additions to your medication regime listed below are the same as discussed with your clinician today.  If any of these changes or additions are incorrect, please notify your healthcare  provider.        START taking these NEW medications        Refills    alprazolam (XANAX) 0.25 MG tablet 0    Sig: Take 1 tablet (0.25 mg total) by mouth 3 (three) times daily as needed for Anxiety.    Class: Print    Route: Oral      STOP taking these medications     naproxen sodium (ALEVE) 220 mg Cap Take by mouth.    losartan (COZAAR) 100 MG tablet Take 100 mg by mouth.    omeprazole (PRILOSEC) 20 MG capsule Take by mouth. 1 - 2 Capsule, Delayed Release(E.C.) Oral OTC PRN    omeprazole (PRILOSEC) 20 MG capsule Take 20 mg by mouth.           Verify that the below list of medications is an accurate representation of the medications you are currently taking.  If none reported, the list may be blank. If incorrect, please contact your healthcare provider. Carry this list with you in case of emergency.           Current Medications     antiox#10-om3-dha-epa-lut-zeax 280-10-2 mg Cap Take 2 capsules by mouth.    ascorbic acid (VITAMIN C) 100 MG tablet Take 100 mg by mouth once daily.    aspirin (ECOTRIN) 81 MG EC tablet Take 81 mg by mouth.    b complex vitamins tablet Take 1 tablet by mouth once daily.    benzonatate (TESSALON PERLES) 100 MG capsule Take by mouth. 1 Capsule Oral Three times a day    calcium carbonate-vitamin D3 500 mg(1,250mg) -125 unit per tablet Take 1 tablet by mouth.    cetirizine (ZYRTEC) 10 MG tablet TAKE 1 TABLET BY MOUTH DAILY    cetirizine (ZYRTEC) 10 MG tablet Take 10 mg by mouth.    fluticasone (FLONASE) 50 mcg/actuation nasal spray 2 sprays by Each Nare route once daily.    GAVILYTE-G 236-22.74-6.74 gram suspension     ginkgo biloba 40 mg Tab Take 1 tablet by mouth once daily.    ginkgo biloba 40 mg Tab Take 2 capsules by mouth.    hydroquinone 4 % Crea Apple 2 x day to darkened lesions.    hydroquinone 4 % cream 4 %. 1 Cream Topical Twice a day     losartan-hydrochlorothiazide 100-25 mg (HYZAAR) 100-25 mg per tablet Take 1 tablet by mouth every morning.    multivitamin (THERAGRAN) per tablet  "Take 1 tablet by mouth.    omega-3 fatty acids 1,000 mg Cap Take 1 g by mouth.    polyethylene glycol (GLYCOLAX) 17 gram PwPk Take 17 g by mouth.    alprazolam (XANAX) 0.25 MG tablet Take 1 tablet (0.25 mg total) by mouth 3 (three) times daily as needed for Anxiety.           Clinical Reference Information           Your Vitals Were     BP Pulse Height Weight BMI    122/80 82 5' 6" (1.676 m) 88.3 kg (194 lb 10.7 oz) 31.42 kg/m2      Blood Pressure          Most Recent Value    BP  122/80      Allergies as of 3/7/2017     Lotensin [Benazepril]    Penicillins      Immunizations Administered on Date of Encounter - 3/7/2017     None      Orders Placed During Today's Visit     Future Labs/Procedures Expected by Expires    CBC auto differential  3/7/2017 3/7/2018    Comprehensive metabolic panel  3/7/2017 5/6/2018    Hepatitis C antibody  3/7/2017 3/7/2018    Lipid panel  3/7/2017 5/6/2018    TSH  3/7/2017 5/6/2018    Uric acid  3/7/2017 5/6/2018      [facility] Sign-Up     Activating your [facility] account is as easy as 1-2-3!     1) Visit [email or web address], select Sign Up Now, enter this activation code and your date of birth, then select Next.  [email or web address]  Expires: 3/24/2017  2:06 PM      2) Create a username and password to use when you visit [facility] in the future and select a security question in case you lose your password and select Next.    3) Enter your e-mail address and click Sign Up!    Additional Information  If you have questions, please e-mail [email or web address] or call [phone] to talk to our [facility] staff. Remember, [facility] is NOT to be used for urgent needs. For medical emergencies, dial 911.         Language Assistance Services     ATTENTION: Language assistance services are available, free of charge. Please call [phone].      ATENCIÓN: Si habla español, tiene a rubio disposición servicios gratuitos de asistencia lingüística. Llame al [phone].     DOROTHY Ý: N?u b?n nói Ti?ng " Vi?t, có các d?ch v? h? tr? ngôn ng? mi?n phí dành cho b?n. G?i s? [phone].         [facility] complies with applicable Federal civil rights laws and does not discriminate on the basis of race, color, national origin, age, disability, or sex.

## 2017-03-16 ENCOUNTER — LAB VISIT (OUTPATIENT)
Dept: LAB | Facility: HOSPITAL | Age: 67
End: 2017-03-16
Attending: INTERNAL MEDICINE
Payer: MEDICARE

## 2017-03-16 ENCOUNTER — TELEPHONE (OUTPATIENT)
Dept: OPHTHALMOLOGY | Facility: CLINIC | Age: 67
End: 2017-03-16

## 2017-03-16 DIAGNOSIS — Z13.9 SCREENING: ICD-10-CM

## 2017-03-16 DIAGNOSIS — I10 ESSENTIAL HYPERTENSION: ICD-10-CM

## 2017-03-16 LAB
ALBUMIN SERPL BCP-MCNC: 3.7 G/DL
ALP SERPL-CCNC: 72 U/L
ALT SERPL W/O P-5'-P-CCNC: 16 U/L
ANION GAP SERPL CALC-SCNC: 10 MMOL/L
AST SERPL-CCNC: 27 U/L
BASOPHILS # BLD AUTO: 0.03 K/UL
BASOPHILS NFR BLD: 0.4 %
BILIRUB SERPL-MCNC: 0.3 MG/DL
BUN SERPL-MCNC: 16 MG/DL
CALCIUM SERPL-MCNC: 9.7 MG/DL
CHLORIDE SERPL-SCNC: 106 MMOL/L
CHOLEST/HDLC SERPL: 3.5 {RATIO}
CO2 SERPL-SCNC: 27 MMOL/L
CREAT SERPL-MCNC: 0.8 MG/DL
DIFFERENTIAL METHOD: ABNORMAL
EOSINOPHIL # BLD AUTO: 0.2 K/UL
EOSINOPHIL NFR BLD: 3 %
ERYTHROCYTE [DISTWIDTH] IN BLOOD BY AUTOMATED COUNT: 16.5 %
EST. GFR  (AFRICAN AMERICAN): >60 ML/MIN/1.73 M^2
EST. GFR  (NON AFRICAN AMERICAN): >60 ML/MIN/1.73 M^2
GLUCOSE SERPL-MCNC: 83 MG/DL
HCT VFR BLD AUTO: 39.5 %
HCV AB SERPL QL IA: NEGATIVE
HDL/CHOLESTEROL RATIO: 28.6 %
HDLC SERPL-MCNC: 213 MG/DL
HDLC SERPL-MCNC: 61 MG/DL
HGB BLD-MCNC: 12.9 G/DL
LDLC SERPL CALC-MCNC: 136.8 MG/DL
LYMPHOCYTES # BLD AUTO: 2.2 K/UL
LYMPHOCYTES NFR BLD: 31.8 %
MCH RBC QN AUTO: 26.1 PG
MCHC RBC AUTO-ENTMCNC: 32.7 %
MCV RBC AUTO: 80 FL
MONOCYTES # BLD AUTO: 0.5 K/UL
MONOCYTES NFR BLD: 6.6 %
NEUTROPHILS # BLD AUTO: 4 K/UL
NEUTROPHILS NFR BLD: 57.9 %
NONHDLC SERPL-MCNC: 152 MG/DL
PLATELET # BLD AUTO: 230 K/UL
PMV BLD AUTO: 11.1 FL
POTASSIUM SERPL-SCNC: 3.8 MMOL/L
PROT SERPL-MCNC: 7.7 G/DL
RBC # BLD AUTO: 4.94 M/UL
SODIUM SERPL-SCNC: 143 MMOL/L
TRIGL SERPL-MCNC: 76 MG/DL
TSH SERPL DL<=0.005 MIU/L-ACNC: 1.58 UIU/ML
URATE SERPL-MCNC: 5.2 MG/DL
WBC # BLD AUTO: 6.94 K/UL

## 2017-03-16 PROCEDURE — 80061 LIPID PANEL: CPT

## 2017-03-16 PROCEDURE — 86803 HEPATITIS C AB TEST: CPT

## 2017-03-16 PROCEDURE — 85025 COMPLETE CBC W/AUTO DIFF WBC: CPT

## 2017-03-16 PROCEDURE — 80053 COMPREHEN METABOLIC PANEL: CPT

## 2017-03-16 PROCEDURE — 36415 COLL VENOUS BLD VENIPUNCTURE: CPT

## 2017-03-16 PROCEDURE — 84550 ASSAY OF BLOOD/URIC ACID: CPT

## 2017-03-16 PROCEDURE — 84443 ASSAY THYROID STIM HORMONE: CPT

## 2017-04-18 ENCOUNTER — INITIAL CONSULT (OUTPATIENT)
Dept: OPHTHALMOLOGY | Facility: CLINIC | Age: 67
End: 2017-04-18
Payer: MEDICARE

## 2017-04-18 DIAGNOSIS — H02.9 LESION OF EYELID: Primary | ICD-10-CM

## 2017-04-18 PROCEDURE — 92014 COMPRE OPH EXAM EST PT 1/>: CPT | Mod: S$GLB,,, | Performed by: OPHTHALMOLOGY

## 2017-04-18 PROCEDURE — 92285 EXTERNAL OCULAR PHOTOGRAPHY: CPT | Mod: S$GLB,,, | Performed by: OPHTHALMOLOGY

## 2017-04-18 PROCEDURE — 99999 PR PBB SHADOW E&M-EST. PATIENT-LVL II: CPT | Mod: PBBFAC,,, | Performed by: OPHTHALMOLOGY

## 2017-04-18 NOTE — PROGRESS NOTES
HPI     Referred by   Patient here for Cyst on corner of eye. Pt states the cyst itchy and   burning sometimes.  Pt states had cyst [place] in [place].  No pain. No vision problems.  Warm compress.         Last edited by Lorraine Echeverria MA on 4/18/2017  9:11 AM.         Assessment /Plan     For exam results, see Encounter Report.    Lesion of eyelid  -     External Photography - OU - Both Eyes      Patient with left lateral canthal cyst growing over last several years. Patient feels occasional pain within the lesion.      Informed consent obtained after extensive risks/benefits/alternatives were discussed with the patient including but not limited to pain, bleeding, infection, ocular injury, loss of the eye, asymmetry, need for revision in future, scarring.  Alternatives such as waiting were discussed.  All questions were answered.      Return for surgery

## 2017-06-02 ENCOUNTER — TELEPHONE (OUTPATIENT)
Dept: OPHTHALMOLOGY | Facility: CLINIC | Age: 67
End: 2017-06-02

## 2017-06-12 ENCOUNTER — TELEPHONE (OUTPATIENT)
Dept: INTERNAL MEDICINE | Facility: CLINIC | Age: 67
End: 2017-06-12

## 2017-06-12 NOTE — TELEPHONE ENCOUNTER
----- Message from Shima Germain sent at 6/12/2017 10:41 AM CDT -----  Contact: self/[phone]  Pt called in regards to getting epp orders put into the system. Her appointment is on 9-11-17.        Please advise

## 2017-07-26 ENCOUNTER — PROCEDURE VISIT (OUTPATIENT)
Dept: OPHTHALMOLOGY | Facility: CLINIC | Age: 67
End: 2017-07-26
Payer: MEDICARE

## 2017-07-26 VITALS — SYSTOLIC BLOOD PRESSURE: 134 MMHG | HEART RATE: 77 BPM | DIASTOLIC BLOOD PRESSURE: 84 MMHG

## 2017-07-26 DIAGNOSIS — H02.9 LESION OF LEFT EYELID: Primary | ICD-10-CM

## 2017-07-26 PROCEDURE — 11440 EXC FACE-MM B9+MARG 0.5 CM/<: CPT | Mod: LT,S$GLB,, | Performed by: OPHTHALMOLOGY

## 2017-07-26 PROCEDURE — 99499 UNLISTED E&M SERVICE: CPT | Mod: S$GLB,,, | Performed by: OPHTHALMOLOGY

## 2017-07-26 PROCEDURE — 88305 TISSUE EXAM BY PATHOLOGIST: CPT | Performed by: PATHOLOGY

## 2017-07-26 NOTE — PROGRESS NOTES
HPI     Eye Problem    Additional comments: Cyst removal OS            Comments   Mrs. Chávez is here for a left lateral canthal cyst removal which has   been growing over last several years. Having some discomfort today, from   the lesion. Uses AT occasionally.   No pain.        Last edited by WADE Choe on 7/26/2017 11:17 AM. (History)            Assessment /Plan     For exam results, see Encounter Report.    Lesion of left eyelid  -     Tissue Specimen To Pathology, Ophthalmology    Other orders  -     tobramycin-dexamethasone 0.3-0.1% (TOBRADEX) 0.3-0.1 % Oint; Place into the left eye every evening. Place on wound at night  Dispense: 3.5 g; Refill: 0        Procedure Note    Attending: Jazzmine Kaminski  Resident: Lisa Carlos  Pre-op Dx: Eyelid lesion left  Post-op Dx: same  Local: 2% lidocaine with epinephrine with 4% NaHCO3 and 0.5% marcaine with vitrase  Specimens:  Left lid lesion  Complications: None  Blood Loss: minimal    The patient was prepped and draped in the usual sterile manner for ophthalmic plastic surgery.  A corneal shield was placed in the left palpebral fissure. 1 cc of local anesthesia was given to the left eyelid.  Hafsa scissors were used to unroof the skin and wall of the cyst. The tissue was sent to pathology.  High-temp cautery was used to obtain hemostasis at the base of the lesion. The corneal shield  was removed. Tobradex ointment was applied  to the wound. The patient tolerated the procedure well. There were no complications.     Return to clinic PRN    Post op instructions were given to patient both written and verbally.    I have reviewed and concur with the resident's history, physical, assessment, and plan.  I have personally interviewed and examined the patient.

## 2017-08-11 ENCOUNTER — TELEPHONE (OUTPATIENT)
Dept: OBSTETRICS AND GYNECOLOGY | Facility: CLINIC | Age: 67
End: 2017-08-11

## 2017-08-11 NOTE — TELEPHONE ENCOUNTER
----- Message from Thi Echeverria sent at 8/11/2017 10:38 AM CDT -----  Contact: self  Pt needing a call back, she have a possible yeast infection, pt use [employer] [facility] [employer] [facility] [email or web address], she can be reached at [phone].

## 2017-08-23 ENCOUNTER — OFFICE VISIT (OUTPATIENT)
Dept: OBSTETRICS AND GYNECOLOGY | Facility: CLINIC | Age: 67
End: 2017-08-23
Payer: MEDICARE

## 2017-08-23 VITALS
SYSTOLIC BLOOD PRESSURE: 140 MMHG | DIASTOLIC BLOOD PRESSURE: 70 MMHG | BODY MASS INDEX: 30.07 KG/M2 | WEIGHT: 186.31 LBS

## 2017-08-23 DIAGNOSIS — N95.9 MENOPAUSAL AND PERIMENOPAUSAL DISORDER: ICD-10-CM

## 2017-08-23 DIAGNOSIS — B37.31 YEAST VAGINITIS: Primary | ICD-10-CM

## 2017-08-23 PROCEDURE — 99999 PR PBB SHADOW E&M-EST. PATIENT-LVL II: CPT | Mod: PBBFAC,,, | Performed by: OBSTETRICS & GYNECOLOGY

## 2017-08-23 PROCEDURE — 87660 TRICHOMONAS VAGIN DIR PROBE: CPT

## 2017-08-23 PROCEDURE — 3008F BODY MASS INDEX DOCD: CPT | Mod: S$GLB,,, | Performed by: OBSTETRICS & GYNECOLOGY

## 2017-08-23 PROCEDURE — 3077F SYST BP >= 140 MM HG: CPT | Mod: S$GLB,,, | Performed by: OBSTETRICS & GYNECOLOGY

## 2017-08-23 PROCEDURE — 3078F DIAST BP <80 MM HG: CPT | Mod: S$GLB,,, | Performed by: OBSTETRICS & GYNECOLOGY

## 2017-08-23 PROCEDURE — 87480 CANDIDA DNA DIR PROBE: CPT

## 2017-08-23 PROCEDURE — 99213 OFFICE O/P EST LOW 20 MIN: CPT | Mod: S$GLB,,, | Performed by: OBSTETRICS & GYNECOLOGY

## 2017-08-23 PROCEDURE — 1159F MED LIST DOCD IN RCRD: CPT | Mod: S$GLB,,, | Performed by: OBSTETRICS & GYNECOLOGY

## 2017-08-23 PROCEDURE — 1126F AMNT PAIN NOTED NONE PRSNT: CPT | Mod: S$GLB,,, | Performed by: OBSTETRICS & GYNECOLOGY

## 2017-08-23 RX ORDER — IBUPROFEN 800 MG/1
TABLET ORAL
COMMUNITY
Start: 2017-08-07 | End: 2017-08-23

## 2017-08-23 RX ORDER — CLOTRIMAZOLE AND BETAMETHASONE DIPROPIONATE 10; .64 MG/G; MG/G
CREAM TOPICAL 2 TIMES DAILY
Qty: 15 G | Refills: 3 | Status: SHIPPED | OUTPATIENT
Start: 2017-08-23 | End: 2017-08-30

## 2017-08-23 NOTE — PROGRESS NOTES
"HISTORY OF PRESENT ILLNESS:    Soila Chávez is a 67 y.o. female, , No LMP recorded. Patient has had a hysterectomy.,  presents for a problem visit, complaining of  VULVAR ITCHING AND PERIANAL ITCHING. USED MONISTAT CREAM EXTERNALLY VULVA WITH IMPROVEMENT AND ADVISED SHE CAN ALSO USE PERIANALLY.  IN EVENT OF RECURRENCE LOTRISONE MAY GIVE FASTER RELIEF;  AFFIRM SUBMITTED TO VERIFY YEAST INFECTION.   DUE ROUTINE VISIT AND MAMMO NEXT YEAR    LAST VISIT 2016:   NEW PATIENT, ESTAB CARE.  PAP NOT INDICATED.  RECOMMENDATIONS.  IN PAST, PAP SMEARS NL  MAMMO HAS BEEN ORDERED AND NEEDS SCHEDULING.  NO HOT FLUSHES OR GYN C/O.   HAS "BUMP" ON LEFT BUTTOCK PAST YEAR, NOT SYMPTOMATIC OR CHANGING.  REASSURED RE SKIN TAG.  COUNSELED RE VAGINAL MOISTURIZERS AND LUBRICANTS. REMOTE HX OF TRICHOMONAS.    RECENT NL BMD      Past Medical History:   Diagnosis Date    Allergy     Anxiety     Cataract     Hypertension     Joint pain        Past Surgical History:   Procedure Laterality Date    CYST REMOVAL Left     cyst removal OS lid     HYSTERECTOMY      PERIPARTUM CHITO       MEDICATIONS AND ALLERGIES:      Current Outpatient Prescriptions:     antiox#10-om3-dha-epa-lut-zeax 280-10-2 mg Cap, Take 2 capsules by mouth., Disp: , Rfl:     ascorbic acid (VITAMIN C) 100 MG tablet, Take 100 mg by mouth once daily., Disp: , Rfl:     aspirin (ECOTRIN) 81 MG EC tablet, Take 81 mg by mouth., Disp: , Rfl:     b complex vitamins tablet, Take 1 tablet by mouth once daily., Disp: , Rfl:     calcium carbonate-vitamin D3 500 mg(1,250mg) -125 unit per tablet, Take 1 tablet by mouth., Disp: , Rfl:     cetirizine (ZYRTEC) 10 MG tablet, TAKE 1 TABLET BY MOUTH DAILY, Disp: 30 tablet, Rfl: 11    cetirizine (ZYRTEC) 10 MG tablet, Take 10 mg by mouth., Disp: , Rfl:     fluticasone (FLONASE) 50 mcg/actuation nasal spray, 2 sprays by Each Nare route once daily., Disp: 3 Bottle, Rfl: 4    multivitamin (THERAGRAN) per tablet, Take 1 tablet by " mouth., Disp: , Rfl:     omega-3 fatty acids 1,000 mg Cap, Take 1 g by mouth., Disp: , Rfl:     alprazolam (XANAX) 0.25 MG tablet, Take 1 tablet (0.25 mg total) by mouth 3 (three) times daily as needed for Anxiety., Disp: 90 tablet, Rfl: 0    clotrimazole-betamethasone 1-0.05% (LOTRISONE) cream, Apply topically 2 (two) times daily., Disp: 15 g, Rfl: 3    Review of patient's allergies indicates:   Allergen Reactions    Lotensin [benazepril] Swelling and Other (See Comments)    Penicillins Rash and Other (See Comments)       Family History   Problem Relation Age of Onset    Breast cancer Sister     Colon cancer Neg Hx     Ovarian cancer Neg Hx     Melanoma Neg Hx        Social History     Social History    Marital status:      Spouse name: N/A    Number of children: N/A    Years of education: N/A     Occupational History    Not on file.     Social History Main Topics    Smoking status: Never Smoker    Smokeless tobacco: Never Used    Alcohol use Yes    Drug use: No    Sexual activity: Not on file     Other Topics Concern    Not on file     Social History Narrative    June 2016    The patient reports that she lives alone normally, however her daughter recently moved in with her    She has a 43-year-old daughter, Saima    And a 26-year-old daughterChris, who is a schoolteacher for 6 in seventh grade in [employer]    She is retired from working as a  in the school system    She now works about 5-15 hours a week for [employer], which she enjoys.       COMPREHENSIVE GYN HISTORY:  PAP History: Denies abnormal Paps.  Infection History: Denies STDs. Denies PID.  Benign History: Denies uterine fibroids. Denies ovarian cysts. Denies endometriosis. Denies other conditions.  Cancer History: Denies cervical cancer. Denies uterine cancer or hyperplasia. Denies ovarian cancer. Denies vulvar cancer or pre-cancer. Denies vaginal cancer or pre-cancer. Denies breast cancer. Denies colon  cancer.    ROS:  GENERAL: No weight changes. No swelling. No fatigue. No fever.  CARDIOVASCULAR: No chest pain. No shortness of breath. No leg cramps.   NEUROLOGICAL: No headaches. No vision changes.  BREASTS: No pain. No lumps. No discharge.  ABDOMEN: No pain. No nausea. No vomiting. No diarrhea. No constipation.  REPRODUCTIVE: No abnormal bleeding.   VULVA: No pain. No lesions. No itching.  VAGINA: No relaxation. No itching. No odor. No discharge. No lesions.  URINARY: No incontinence. No nocturia. No frequency. No dysuria.    BP (!) 140/70   Wt 84.5 kg (186 lb 4.6 oz)   BMI 30.07 kg/m²     PE:  APPEARANCE: Well nourished, well developed, in no acute distress.  ABDOMEN: Soft. No tenderness or masses. No hepatosplenomegaly. No hernias.  BREASTS, FUNDOSCOPIC, RECTAL DEFERRED  PELVIC: External female genitalia without lesions.  PERIANAL ERYTHEMA, APPEARS YEAST.  Female hair distribution. Adequate perineal body, Normal urethral meatus. Vagina moist and well rugated without lesions or discharge.  No significant cystocele or rectocele present. CervixAND UTERUS SURGICALLY ABSENT. Adnexa without masses or tenderness.  EXTREMITIES: No edema    PROCEDURES:    DIAGNOSIS:  1. Yeast vaginitis  Vaginosis Screen by DNA Probe   2. Menopausal and perimenopausal disorder         LABS AND TESTS ORDERED:    MEDICATIONS PRESCRIBED:    COUNSELING:  The patient was counseled on steps that she can take to avoid vulvar irritation and reduce contact with topical allergens:    Clothing and Laundry  ? Wear all-white cotton underwear.   ? Do not wear pantyhose (wear thigh high or knee high hose instead).   ? Wear loose-fitting pants or skirts.   ? Remove wet bathing suits and exercise clothing promptly.   ? Use hypoallergenic, dermatologically approved detergent such as Tide Free, or All Free and Clear.  ? Double-rinse underwear and any other clothing that comes into contact with the vulva.   ? Do not use fabric softener on  undergarments.  Hygiene  ? Use soft, white, unscented toilet paper.   ? Use lukewarm or cool sitz baths to relieve burning and irritation.   ? Avoid getting shampoo on the vulvar area.   ? Do not use bubble bath, feminine hygiene products, or any perfumed creams or soaps.   ? Wash the vulva with cool to lukewarm water only.  ? Rinse the vulva with water after urination.  ? Urinate before the bladder is full.   ? Prevent constipation by adding fiber to your diet (if necessary, use a psyllium product such as Metamucil) and drinking at least 8 glasses of water daily.  ? Use unscented menstrual pads and tampons. Do not wear panty liners daily.  Sexual intercourse  ? Use a lubricant that is water soluble, e.g., Astroglide or KY and unscented.  ? Ask your physician for a prescription for a topical anesthestic, e.g., Lidocaine gel 5%. (This may sting for the first 3-5 minutes after application.)  ? Apply ice or a frozen blue gel pack (lunch box size) wrapped in one layer of a hand towel to relieve burning after intercourse.   ? Urinate (to prevent infection) and rinse vulva with cool water after sexual intercourse.  ? Do not use contraceptive creams or spermicides.  Physical Activities  ? Avoid exercises that put direct pressure on the vulva such as bicycle riding and horseback riding.  ? Limit intense exercises that create a lot of friction in the vulvar area (try lower intensity exercises such as walking).  ? Use a frozen gel pack wrapped in a towel to relieve symptoms after exercise.   ? Enroll in an exercise class such as yoga to learn stretching and relaxation exercises.  ? Don't swim in highly chlorinated pools.   ? Avoid the use of hot tubs.      FOLLOW-UP with me routine visit.

## 2017-08-24 LAB
CANDIDA RRNA VAG QL PROBE: NEGATIVE
G VAGINALIS RRNA GENITAL QL PROBE: NEGATIVE
T VAGINALIS RRNA GENITAL QL PROBE: NEGATIVE

## 2017-09-02 ENCOUNTER — HOSPITAL ENCOUNTER (EMERGENCY)
Facility: HOSPITAL | Age: 67
Discharge: HOME | End: 2017-09-02
Attending: FAMILY MEDICINE | Admitting: FAMILY MEDICINE
Payer: MEDICARE

## 2017-09-02 VITALS
SYSTOLIC BLOOD PRESSURE: 176 MMHG | BODY MASS INDEX: 29.73 KG/M2 | HEART RATE: 70 BPM | TEMPERATURE: 98 F | RESPIRATION RATE: 18 BRPM | WEIGHT: 185 LBS | HEIGHT: 66 IN | DIASTOLIC BLOOD PRESSURE: 90 MMHG | OXYGEN SATURATION: 100 %

## 2017-09-02 DIAGNOSIS — S09.8XXA BLUNT HEAD INJURY, INITIAL ENCOUNTER: ICD-10-CM

## 2017-09-02 PROCEDURE — 99284 EMERGENCY DEPT VISIT MOD MDM: CPT

## 2017-09-02 PROCEDURE — 99285 EMERGENCY DEPT VISIT HI MDM: CPT | Mod: ,,, | Performed by: FAMILY MEDICINE

## 2017-09-02 RX ORDER — BUTALBITAL, ACETAMINOPHEN AND CAFFEINE 50; 325; 40 MG/1; MG/1; MG/1
2 TABLET ORAL EVERY 6 HOURS PRN
Qty: 20 TABLET | Refills: 0 | Status: SHIPPED | OUTPATIENT
Start: 2017-09-02 | End: 2018-04-18

## 2017-09-02 NOTE — ED NOTES
"Pt stated "i got hit in the forehead with a door when I went to go get a MRI on Thursday aftrenoon. Pt denies loc, n/v, dizziness "i didn't have any of that but it got worser when I got home". And now having headaches. No swelling or deformity or signs of trauma noted to site. Pt aaox4, perrla, neuro intact.  Pt also stated "i called my  who sent me for the mris and he told me to come to the er and have it all documented, so im doing what my  wants me to do. I had 3 mris done that day my legs, my hand and my head and they put this collar on my neck and that's got me hurting too, its causing me headaches too".   "

## 2017-09-02 NOTE — ED PROVIDER NOTES
Encounter Date: 9/2/2017    SCRIBE #1 NOTE: I, Shae Galan, am scribing for, and in the presence of,  Dr. Rogers. I have scribed the following portions of the note - Other sections scribed: HPI,ROS,Physical Exam.       History     Chief Complaint   Patient presents with    Head Injury     thrus hit in head with door while at mri, having pain, my  told me to come check     Time patient was seen by the provider: 11:29 AM      The patient is a 67 y.o. female with hx of: HTN that presents to the ED with a complaint of headache and neck pain. Pt is s/p a head injury two days ago where she was hit in the head with a door but she did not fall. Immediately after the injury she felt dizzy and developed a knot on her forehead. She is now complaining of multiple pounding headaches and neck pain. Pt has taken aleve for the pain. She drove herself to the ED and denies problems with coordination and depth perception. Pt denies hx of bleeding in her brain, LOC and vomiting.      The history is provided by the patient and medical records.     Review of patient's allergies indicates:   Allergen Reactions    Lotensin [benazepril] Swelling and Other (See Comments)    Penicillins Rash and Other (See Comments)     Past Medical History:   Diagnosis Date    Allergy     Anxiety     Cataract     Hypertension     Joint pain      Past Surgical History:   Procedure Laterality Date    CYST REMOVAL Left     cyst removal OS lid     HYSTERECTOMY      PERIPARTUM CHITO     Family History   Problem Relation Age of Onset    Breast cancer Sister     Colon cancer Neg Hx     Ovarian cancer Neg Hx     Melanoma Neg Hx      Social History   Substance Use Topics    Smoking status: Never Smoker    Smokeless tobacco: Never Used    Alcohol use Yes     Review of Systems   Constitutional: Negative for chills and fever.   Gastrointestinal: Negative for nausea and vomiting.   Musculoskeletal: Positive for neck pain.   Neurological: Positive  for dizziness and headaches.       Physical Exam     Initial Vitals [09/02/17 1037]   BP Pulse Resp Temp SpO2   (!) 157/79 99 18 98.1 °F (36.7 °C) 100 %      MAP       105         Physical Exam    Nursing note and vitals reviewed.  Constitutional: She appears well-developed and well-nourished. She is not diaphoretic. No distress.   HENT:   Small ecchymosis and soft tissue swelling of her forehead to just right of the midline consistent with area of trauma.    Mouth/Throat: no intraoral lesion noted   Eyes: EOM are normal.   Fundi benign   Neck: Neck supple.   General posterior tenderness which is at pt's baseline with no acute deformities   Cardiovascular: Normal rate, regular rhythm and normal heart sounds. Exam reveals no gallop and no friction rub.    No murmur heard.  Pulmonary/Chest: Breath sounds normal. No respiratory distress. She has no wheezes. She has no rhonchi. She has no rales.   Abdominal: Soft. She exhibits no distension. There is no tenderness. There is no rebound and no guarding.   Neurological: She is alert and oriented to person, place, and time. She has normal strength. No cranial nerve deficit or sensory deficit.         ED Course   Procedures  Labs Reviewed - No data to display          Medical Decision Making:   History:   Old Medical Records: I decided to obtain old medical records.  Clinical Tests:   Radiological Study: Reviewed and Ordered  ED Management:  Normal head CT.  Patient stable for discharge with Fioricet for pain control            Scribe Attestation:   Scribe #1: I performed the above scribed service and the documentation accurately describes the services I performed. I attest to the accuracy of the note.    Attending Attestation:           Physician Attestation for Scribe:  Physician Attestation Statement for Scribe #1: I, Dr. Rogers , reviewed documentation, as scribed by Shae Galan in my presence, and it is both accurate and complete.                 ED Course      Clinical  Impression:   The encounter diagnosis was Blunt head injury, initial encounter.    Disposition:   Disposition: Discharged  Condition: Stable                        Albino Rogers MD  09/02/17 0224

## 2017-09-02 NOTE — DISCHARGE INSTRUCTIONS
Your brain scan is normal.  No damage to your skull or brain related to your recent head trauma.      We will write you a prescription for Fioricet to help control your headaches.  See your PCP for recheck if symptoms not resolving w/n one week.

## 2017-09-06 ENCOUNTER — TELEPHONE (OUTPATIENT)
Dept: INTERNAL MEDICINE | Facility: CLINIC | Age: 67
End: 2017-09-06

## 2017-09-06 PROBLEM — Z13.9 SCREENING: Status: RESOLVED | Noted: 2017-03-07 | Resolved: 2017-09-06

## 2017-09-06 NOTE — TELEPHONE ENCOUNTER
----- Message from Shima Germain sent at 9/6/2017  2:03 PM CDT -----  Contact: self/[phone]  Pt called in regards to getting epp orders put into the system. Her appointment is on 9-11-17.        Please advise

## 2017-09-06 NOTE — TELEPHONE ENCOUNTER
Please call patient  Is she taking losartan-HCTZ 100-25 mg tablet once daily?  I do not think she needs labs before appointment

## 2017-09-06 NOTE — TELEPHONE ENCOUNTER
Left message for patient to call office.  See message from Dr. Francisco asking if patient is taking Losartan and also stated that she does not believe patient will need labs at this time.

## 2017-09-07 NOTE — TELEPHONE ENCOUNTER
----- Message from Balaji Shipman sent at 9/7/2017 10:01 AM CDT -----  Contact: self/ [phone] cell  Type: Returning a call    Who left a message? Shawna    When did the practice call? 09/06/2017    Comments: Pt returned call and is waiting on a call back.  Pt states that she does take the losartan-HCTZ 100-25 mg tablet once daily.  Please call and advise.    Thank you

## 2017-09-11 ENCOUNTER — TELEPHONE (OUTPATIENT)
Dept: INTERNAL MEDICINE | Facility: CLINIC | Age: 67
End: 2017-09-11

## 2017-09-11 RX ORDER — LOSARTAN POTASSIUM AND HYDROCHLOROTHIAZIDE 25; 100 MG/1; MG/1
1 TABLET ORAL DAILY
Qty: 90 TABLET | Refills: 3 | Status: SHIPPED | OUTPATIENT
Start: 2017-09-11 | End: 2018-09-27 | Stop reason: SDUPTHER

## 2017-09-12 ENCOUNTER — NURSE TRIAGE (OUTPATIENT)
Dept: ADMINISTRATIVE | Facility: CLINIC | Age: 67
End: 2017-09-12

## 2017-09-12 NOTE — TELEPHONE ENCOUNTER
"    Reason for Disposition   SEVERE chest pain    Answer Assessment - Initial Assessment Questions  1. LOCATION: "Where does it hurt?"       CP - strap placed in area for test 8/30  2. RADIATION: "Does the pain go anywhere else?" (e.g., into neck, jaw, arms, back)     No   3. ONSET: "When did the chest pain begin?" (Minutes, hours or days)       Off and on - getting worse   4. PATTERN "Does the pain come and go, or has it been constant since it started?"  "Does it get worse with exertion?"      Getting more severe   5. DURATION: "How long does it last" (e.g., seconds, minutes, hours)     Lasting every so often   6. SEVERITY: "How bad is the pain?"  (e.g., Scale 1-10; mild, moderate, or severe)     - MILD (1-3): doesn't interfere with normal activities      - MODERATE (4-7): interferes with normal activities or awakens from sleep     - SEVERE (8-10): excruciating pain, unable to do any normal activities       9  7. CARDIAC RISK FACTORS: "Do you have any history of heart problems or risk factors for heart disease?" (e.g., prior heart attack, angina; high blood pressure, diabetes, being overweight, high cholesterol, smoking, or strong family history of heart disease)      heart dz   8. PULMONARY RISK FACTORS: "Do you have any history of lung disease?"  (e.g., blood clots in lung, asthma, emphysema, birth control pills)     No   9. CAUSE: "What do you think is causing the chest pain?"     Strap too tight   10. OTHER SYMPTOMS: "Do you have any other symptoms?" (e.g., dizziness, nausea, vomiting, sweating, fever, difficulty breathing, cough)   sinus pblm    Protocols used: ST CHEST PAIN-A-AH  declined ambulance for CP. rec ED for pain rated 9 increasing in intensity. Pt states that she wants to get in to see her PCP sooner. office message sent to PCP. Call back with questions.     "

## 2017-09-13 ENCOUNTER — TELEPHONE (OUTPATIENT)
Dept: INTERNAL MEDICINE | Facility: CLINIC | Age: 67
End: 2017-09-13

## 2017-09-13 NOTE — TELEPHONE ENCOUNTER
I am unable to reach the patient.  She has an appointment scheduled to see me on September 20.  Can you try to get a hold of her to ask if that's okay?

## 2017-09-13 NOTE — TELEPHONE ENCOUNTER
----- Message from Larry Camilo sent at 9/13/2017  1:09 PM CDT -----  Contact: Self [phone]  The above pt is requesting a call back regarding the message the Triage nurse send to you on yesterday concerning her chest pain, advice    Thanks

## 2017-09-14 NOTE — TELEPHONE ENCOUNTER
"Spoke to patient  Wants a sooner appointment  Her "physical" is 09-  She is having a problem  Doesn't want to have a UC  Wants to keep 09-20 appointment and complain about it.  "

## 2017-09-20 ENCOUNTER — OFFICE VISIT (OUTPATIENT)
Dept: INTERNAL MEDICINE | Facility: CLINIC | Age: 67
End: 2017-09-20
Payer: MEDICARE

## 2017-09-20 VITALS
BODY MASS INDEX: 29.97 KG/M2 | DIASTOLIC BLOOD PRESSURE: 71 MMHG | WEIGHT: 186.5 LBS | HEIGHT: 66 IN | SYSTOLIC BLOOD PRESSURE: 142 MMHG | HEART RATE: 79 BPM

## 2017-09-20 DIAGNOSIS — I10 ESSENTIAL HYPERTENSION: ICD-10-CM

## 2017-09-20 DIAGNOSIS — Z01.419 ROUTINE GYNECOLOGICAL EXAMINATION: ICD-10-CM

## 2017-09-20 DIAGNOSIS — Z12.31 ENCOUNTER FOR SCREENING MAMMOGRAM FOR BREAST CANCER: ICD-10-CM

## 2017-09-20 DIAGNOSIS — Z23 NEEDS FLU SHOT: ICD-10-CM

## 2017-09-20 DIAGNOSIS — K21.9 GASTROESOPHAGEAL REFLUX DISEASE WITHOUT ESOPHAGITIS: ICD-10-CM

## 2017-09-20 DIAGNOSIS — Z23 NEED FOR VACCINATION WITH 13-POLYVALENT PNEUMOCOCCAL CONJUGATE VACCINE: ICD-10-CM

## 2017-09-20 DIAGNOSIS — Z00.00 ANNUAL PHYSICAL EXAM: Primary | ICD-10-CM

## 2017-09-20 PROCEDURE — 90670 PCV13 VACCINE IM: CPT | Mod: S$GLB,,, | Performed by: INTERNAL MEDICINE

## 2017-09-20 PROCEDURE — 99397 PER PM REEVAL EST PAT 65+ YR: CPT | Mod: S$GLB,,, | Performed by: INTERNAL MEDICINE

## 2017-09-20 PROCEDURE — 99499 UNLISTED E&M SERVICE: CPT | Mod: S$PBB,,, | Performed by: INTERNAL MEDICINE

## 2017-09-20 PROCEDURE — 99999 PR PBB SHADOW E&M-EST. PATIENT-LVL IV: CPT | Mod: PBBFAC,,, | Performed by: INTERNAL MEDICINE

## 2017-09-20 PROCEDURE — G0009 ADMIN PNEUMOCOCCAL VACCINE: HCPCS | Mod: S$GLB,,, | Performed by: INTERNAL MEDICINE

## 2017-09-20 RX ORDER — ALPRAZOLAM 0.25 MG/1
0.25 TABLET ORAL 3 TIMES DAILY PRN
Qty: 90 TABLET | Refills: 0 | Status: SHIPPED | OUTPATIENT
Start: 2017-09-20 | End: 2018-02-10

## 2017-09-20 RX ORDER — FLUTICASONE PROPIONATE 50 MCG
2 SPRAY, SUSPENSION (ML) NASAL DAILY
Qty: 3 BOTTLE | Refills: 4 | Status: SHIPPED | OUTPATIENT
Start: 2017-09-20 | End: 2018-10-17 | Stop reason: SDUPTHER

## 2017-09-20 RX ORDER — OMEPRAZOLE 20 MG/1
20 CAPSULE, DELAYED RELEASE ORAL 2 TIMES DAILY PRN
Qty: 180 CAPSULE | Refills: 3 | Status: SHIPPED | OUTPATIENT
Start: 2017-09-20 | End: 2020-02-12

## 2017-09-20 RX ORDER — LOSARTAN POTASSIUM AND HYDROCHLOROTHIAZIDE 25; 100 MG/1; MG/1
1 TABLET ORAL DAILY
Qty: 90 TABLET | Refills: 3 | Status: SHIPPED | OUTPATIENT
Start: 2017-09-20 | End: 2018-09-20

## 2017-09-20 NOTE — PROGRESS NOTES
Pt. refused flu shot today after 15 minute discussion on safety and need for this vaccine. She states that she will think about it.

## 2017-09-20 NOTE — PATIENT INSTRUCTIONS
Health Maintenance       Date Due Completion Date    TETANUS VACCINE 04/10/1969 ---    Zoster Vaccine 04/10/2011 ---    Pneumococcal (65+) (1 of 2 - PCV13) 04/10/2016 ---    Influenza Vaccine 08/01/2017 11/4/2014    Mammogram 09/14/2018 9/14/2016    Colonoscopy 04/14/2019 4/14/2009    DEXA SCAN 07/07/2019 7/7/2016    Lipid Panel 03/16/2022 3/16/2017

## 2017-09-22 NOTE — PROGRESS NOTES
Subjective:       Patient ID: Soila Chávez is a 67 y.o. female.    Chief Complaint: Annual Exam   wellness  Entire chart reviewed, PMx, FHx, and Social History updated and reviewed.    HPI  Review of Systems   Constitutional: Negative for activity change, appetite change, chills, fatigue, fever and unexpected weight change.   HENT: Negative for dental problem, hearing loss, tinnitus, trouble swallowing and voice change.    Eyes: Negative for visual disturbance.   Respiratory: Negative for cough, chest tightness, shortness of breath and wheezing.    Cardiovascular: Negative for chest pain, palpitations and leg swelling.   Gastrointestinal: Negative for abdominal pain, blood in stool, constipation, diarrhea and nausea.   Genitourinary: Negative for difficulty urinating, dysuria, frequency and urgency.   Musculoskeletal: Negative for arthralgias, back pain, gait problem, joint swelling, myalgias, neck pain and neck stiffness.   Skin: Negative for rash.   Neurological: Negative for dizziness, tremors, speech difficulty, weakness, light-headedness and headaches.   Psychiatric/Behavioral: Negative for dysphoric mood and sleep disturbance. The patient is not nervous/anxious.        Objective:      Physical Exam   Constitutional: She is oriented to person, place, and time. She appears well-developed and well-nourished. No distress.   HENT:   Head: Normocephalic and atraumatic.   Right Ear: External ear normal.   Left Ear: External ear normal.   Nose: Nose normal.   Mouth/Throat: Oropharynx is clear and moist. No oropharyngeal exudate.   Eyes: Conjunctivae and EOM are normal. Pupils are equal, round, and reactive to light. Right eye exhibits no discharge. No scleral icterus.   Neck: Normal range of motion and full passive range of motion without pain. Neck supple. No spinous process tenderness and no muscular tenderness present. Carotid bruit is not present. No thyromegaly present.   Cardiovascular: Normal rate,  regular rhythm, S1 normal, S2 normal, normal heart sounds and intact distal pulses.  Exam reveals no gallop and no friction rub.    No murmur heard.  Pulmonary/Chest: Effort normal and breath sounds normal. No respiratory distress. She has no wheezes. She has no rales. She exhibits no tenderness.   Abdominal: Soft. Bowel sounds are normal. She exhibits no distension and no mass. There is no tenderness. There is no rebound and no guarding.   Genitourinary: Pelvic exam was performed with patient supine. Uterus is not deviated, not enlarged, not fixed and not tender. Cervix exhibits no motion tenderness, no discharge and no friability. Right adnexum displays no mass, no tenderness and no fullness. Left adnexum displays no mass, no tenderness and no fullness.   Musculoskeletal: Normal range of motion. She exhibits no edema or tenderness.   Lymphadenopathy:        Head (right side): No submental and no submandibular adenopathy present.        Head (left side): No submental and no submandibular adenopathy present.     She has no cervical adenopathy.        Right cervical: No superficial cervical, no deep cervical and no posterior cervical adenopathy present.       Left cervical: No superficial cervical, no deep cervical and no posterior cervical adenopathy present.        Right axillary: No pectoral and no lateral adenopathy present.        Left axillary: No pectoral and no lateral adenopathy present.       Right: No supraclavicular adenopathy present.        Left: No supraclavicular adenopathy present.   Neurological: She is alert and oriented to person, place, and time. She has normal reflexes. No cranial nerve deficit. She exhibits normal muscle tone. Coordination normal.   Skin: Skin is warm and dry. No rash noted.   Psychiatric: She has a normal mood and affect. Her behavior is normal. Her mood appears not anxious. Her speech is not rapid and/or pressured. She is not agitated. She does not exhibit a depressed mood.    Normal behavior, thought content, insight and judgement.       Assessment:       1. Annual physical exam    2. Needs flu shot    3. Essential hypertension    4. Gastroesophageal reflux disease without esophagitis    5. Routine gynecological examination    6. Encounter for screening mammogram for breast cancer    7. Need for vaccination with 13-polyvalent pneumococcal conjugate vaccine    8. BMI 30.0-30.9,adult        Plan:   Soila was seen today for annual exam.    Diagnoses and all orders for this visit:    Annual physical exam  -     CBC auto differential; Future  -     Comprehensive metabolic panel; Future  -     Uric acid; Future  -     Lipid panel; Future  -     TSH; Future  -     Hemoglobin A1c; Future    Needs flu shot    Essential hypertension.  Her blood pressure today was 142/71.  She is encouraged to monitor her blood pressure at least once weekly.    Gastroesophageal reflux disease without esophagitis.  She is taking a PPI.    Routine gynecological examination  -     Ambulatory Referral to Obstetrics / Gynecology    Encounter for screening mammogram for breast cancer  -     Mammo Digital Screening Bilat with CAD; Future    Need for vaccination with 13-polyvalent pneumococcal conjugate vaccine  -     (In Office Administered) Pneumococcal Conjugate Vaccine (13 Valent) (IM)    BMI 30.0-30.9,adult  -     Hemoglobin A1c; Future    Other orders  -     omeprazole (PRILOSEC) 20 MG capsule; Take 1 capsule (20 mg total) by mouth 2 (two) times daily as needed.  -     losartan-hydrochlorothiazide 100-25 mg (HYZAAR) 100-25 mg per tablet; Take 1 tablet by mouth once daily.  -     alprazolam (XANAX) 0.25 MG tablet; Take 1 tablet (0.25 mg total) by mouth 3 (three) times daily as needed for Anxiety.  -     fluticasone (FLONASE) 50 mcg/actuation nasal spray; 2 sprays by Each Nare route once daily.    Return in about 1 year (around 9/20/2018) for for PE.

## 2017-09-28 ENCOUNTER — LAB VISIT (OUTPATIENT)
Dept: LAB | Facility: HOSPITAL | Age: 67
End: 2017-09-28
Attending: INTERNAL MEDICINE
Payer: MEDICARE

## 2017-09-28 DIAGNOSIS — Z00.00 ANNUAL PHYSICAL EXAM: ICD-10-CM

## 2017-09-28 LAB
ALBUMIN SERPL BCP-MCNC: 3.4 G/DL
ALP SERPL-CCNC: 62 U/L
ALT SERPL W/O P-5'-P-CCNC: 13 U/L
ANION GAP SERPL CALC-SCNC: 7 MMOL/L
AST SERPL-CCNC: 25 U/L
BASOPHILS # BLD AUTO: 0.1 K/UL
BASOPHILS NFR BLD: 1.1 %
BILIRUB SERPL-MCNC: 0.3 MG/DL
BUN SERPL-MCNC: 11 MG/DL
CALCIUM SERPL-MCNC: 9.5 MG/DL
CHLORIDE SERPL-SCNC: 105 MMOL/L
CHOLEST SERPL-MCNC: 206 MG/DL
CHOLEST/HDLC SERPL: 4.2 {RATIO}
CO2 SERPL-SCNC: 31 MMOL/L
CREAT SERPL-MCNC: 0.8 MG/DL
DIFFERENTIAL METHOD: ABNORMAL
EOSINOPHIL # BLD AUTO: 0.2 K/UL
EOSINOPHIL NFR BLD: 2.6 %
ERYTHROCYTE [DISTWIDTH] IN BLOOD BY AUTOMATED COUNT: 14.5 %
EST. GFR  (AFRICAN AMERICAN): >60 ML/MIN/1.73 M^2
EST. GFR  (NON AFRICAN AMERICAN): >60 ML/MIN/1.73 M^2
ESTIMATED AVG GLUCOSE: 105 MG/DL
GLUCOSE SERPL-MCNC: 84 MG/DL
HBA1C MFR BLD HPLC: 5.3 %
HCT VFR BLD AUTO: 39.7 %
HDLC SERPL-MCNC: 49 MG/DL
HDLC SERPL: 23.8 %
HGB BLD-MCNC: 13 G/DL
LDLC SERPL CALC-MCNC: 139.4 MG/DL
LYMPHOCYTES # BLD AUTO: 5 K/UL
LYMPHOCYTES NFR BLD: 53.8 %
MCH RBC QN AUTO: 27.5 PG
MCHC RBC AUTO-ENTMCNC: 32.7 G/DL
MCV RBC AUTO: 84 FL
MONOCYTES # BLD AUTO: 0.7 K/UL
MONOCYTES NFR BLD: 7.2 %
NEUTROPHILS # BLD AUTO: 3.3 K/UL
NEUTROPHILS NFR BLD: 35.3 %
NONHDLC SERPL-MCNC: 157 MG/DL
PLATELET # BLD AUTO: 237 K/UL
PMV BLD AUTO: 9.6 FL
POTASSIUM SERPL-SCNC: 3.7 MMOL/L
PROT SERPL-MCNC: 7.7 G/DL
RBC # BLD AUTO: 4.73 M/UL
SODIUM SERPL-SCNC: 143 MMOL/L
TRIGL SERPL-MCNC: 88 MG/DL
TSH SERPL DL<=0.005 MIU/L-ACNC: 1.76 UIU/ML
URATE SERPL-MCNC: 6.4 MG/DL
WBC # BLD AUTO: 9.27 K/UL

## 2017-09-28 PROCEDURE — 84550 ASSAY OF BLOOD/URIC ACID: CPT

## 2017-09-28 PROCEDURE — 85025 COMPLETE CBC W/AUTO DIFF WBC: CPT

## 2017-09-28 PROCEDURE — 83036 HEMOGLOBIN GLYCOSYLATED A1C: CPT

## 2017-09-28 PROCEDURE — 80061 LIPID PANEL: CPT

## 2017-09-28 PROCEDURE — 36415 COLL VENOUS BLD VENIPUNCTURE: CPT

## 2017-09-28 PROCEDURE — 80053 COMPREHEN METABOLIC PANEL: CPT

## 2017-09-28 PROCEDURE — 84443 ASSAY THYROID STIM HORMONE: CPT

## 2017-10-26 ENCOUNTER — HOSPITAL ENCOUNTER (OUTPATIENT)
Dept: RADIOLOGY | Facility: HOSPITAL | Age: 67
Discharge: HOME | End: 2017-10-26
Attending: INTERNAL MEDICINE
Payer: MEDICARE

## 2017-10-26 VITALS — WEIGHT: 186 LBS | HEIGHT: 66 IN | BODY MASS INDEX: 29.89 KG/M2

## 2017-10-26 DIAGNOSIS — Z12.31 ENCOUNTER FOR SCREENING MAMMOGRAM FOR BREAST CANCER: ICD-10-CM

## 2017-10-26 PROCEDURE — 77067 SCR MAMMO BI INCL CAD: CPT | Mod: TC

## 2017-10-26 PROCEDURE — 77063 BREAST TOMOSYNTHESIS BI: CPT | Mod: 26,,, | Performed by: RADIOLOGY

## 2017-10-26 PROCEDURE — 77067 SCR MAMMO BI INCL CAD: CPT | Mod: 26,,, | Performed by: RADIOLOGY

## 2018-01-10 RX ORDER — CETIRIZINE HYDROCHLORIDE 10 MG/1
TABLET ORAL
Qty: 30 TABLET | Refills: 0 | Status: SHIPPED | OUTPATIENT
Start: 2018-01-10 | End: 2018-03-30 | Stop reason: SDUPTHER

## 2018-01-24 ENCOUNTER — TELEPHONE (OUTPATIENT)
Dept: INTERNAL MEDICINE | Facility: CLINIC | Age: 68
End: 2018-01-24

## 2018-01-24 NOTE — TELEPHONE ENCOUNTER
----- Message from Lupe Tellez sent at 1/24/2018  8:55 AM CST -----  Contact: Self  X   1st Request  _  2nd Request  _  3rd Request        Who: CARY LINDSAY [[id number]]    Why: Requesting a call back in regards to being seen as a new pt. Pt was a former pt of Dr. Parson and wants to establish care with her.    What Number to Call Back: [phone]    When to Expect a call back: (Within 24 hours)    Please return the call at earliest convenience. Thanks!

## 2018-01-24 NOTE — TELEPHONE ENCOUNTER
appt has been scheduled. Pt demonstrated verbal understanding of information and had no further questions or concerns at this time.

## 2018-02-09 ENCOUNTER — NURSE TRIAGE (OUTPATIENT)
Dept: ADMINISTRATIVE | Facility: CLINIC | Age: 68
End: 2018-02-09

## 2018-02-09 NOTE — TELEPHONE ENCOUNTER
Reason for Disposition   Patient wants to be seen    Protocols used: ST COMMON COLD-A-[facility]    Soila was calling for an appointment and was cold transferred by scheduling to OOC line.  Soila has multiple symptoms to include sob after taking her zoloft only.  Right upper chest pain that comes and goes.  This is mild and what she terms sinus.  Attempted to find appointment for today.  None available.  Offered appointment for tomorrow.  States she wants to be seen today.  Please contact Soila to advise.  She can be reached at [phone].

## 2018-02-10 ENCOUNTER — OFFICE VISIT (OUTPATIENT)
Dept: URGENT CARE | Facility: CLINIC | Age: 68
End: 2018-02-10
Payer: MEDICARE

## 2018-02-10 VITALS
TEMPERATURE: 99 F | SYSTOLIC BLOOD PRESSURE: 127 MMHG | OXYGEN SATURATION: 99 % | HEART RATE: 69 BPM | WEIGHT: 174 LBS | BODY MASS INDEX: 27.97 KG/M2 | RESPIRATION RATE: 18 BRPM | DIASTOLIC BLOOD PRESSURE: 78 MMHG | HEIGHT: 66 IN

## 2018-02-10 DIAGNOSIS — M94.0 COSTOCHONDRITIS, ACUTE: Primary | ICD-10-CM

## 2018-02-10 DIAGNOSIS — M94.0 COSTOCHONDRITIS, ACUTE: ICD-10-CM

## 2018-02-10 PROCEDURE — 3008F BODY MASS INDEX DOCD: CPT | Mod: S$GLB,,, | Performed by: INTERNAL MEDICINE

## 2018-02-10 PROCEDURE — 96372 THER/PROPH/DIAG INJ SC/IM: CPT | Mod: S$GLB,,, | Performed by: INTERNAL MEDICINE

## 2018-02-10 PROCEDURE — 1159F MED LIST DOCD IN RCRD: CPT | Mod: S$GLB,,, | Performed by: INTERNAL MEDICINE

## 2018-02-10 PROCEDURE — 99213 OFFICE O/P EST LOW 20 MIN: CPT | Mod: 25,S$GLB,, | Performed by: INTERNAL MEDICINE

## 2018-02-10 RX ORDER — NAPROXEN 500 MG/1
500 TABLET ORAL 2 TIMES DAILY WITH MEALS
Qty: 20 TABLET | Refills: 0 | Status: SHIPPED | OUTPATIENT
Start: 2018-02-11 | End: 2018-02-10 | Stop reason: SDUPTHER

## 2018-02-10 RX ORDER — NAPROXEN 500 MG/1
TABLET ORAL
Qty: 20 TABLET | Refills: 0 | Status: SHIPPED | OUTPATIENT
Start: 2018-02-10 | End: 2018-10-30

## 2018-02-10 RX ORDER — ALPRAZOLAM 0.25 MG/1
0.5 TABLET ORAL 2 TIMES DAILY PRN
COMMUNITY
End: 2019-07-25

## 2018-02-10 RX ORDER — SERTRALINE HYDROCHLORIDE 50 MG/1
50 TABLET, FILM COATED ORAL DAILY
COMMUNITY
End: 2018-11-21

## 2018-02-10 RX ORDER — KETOROLAC TROMETHAMINE 30 MG/ML
30 INJECTION, SOLUTION INTRAMUSCULAR; INTRAVENOUS
Status: COMPLETED | OUTPATIENT
Start: 2018-02-10 | End: 2018-02-10

## 2018-02-10 RX ADMIN — KETOROLAC TROMETHAMINE 30 MG: 30 INJECTION, SOLUTION INTRAMUSCULAR; INTRAVENOUS at 11:02

## 2018-02-10 NOTE — PROGRESS NOTES
"Subjective:       Patient ID: Soila Chávez is a 67 y.o. female.    Vitals:  height is 5' 6" (1.676 m) and weight is 78.9 kg (174 lb). Her temperature is 98.8 °F (37.1 °C). Her blood pressure is 127/78 and her pulse is 69. Her respiration is 18 and oxygen saturation is 99%.     Chief Complaint: URI    URI    This is a new problem. The current episode started in the past 7 days. The problem has been unchanged. There has been no fever. Associated symptoms include congestion, coughing, rhinorrhea, sinus pain and a sore throat. Pertinent negatives include no abdominal pain, chest pain, ear pain, headaches, nausea or wheezing.     Review of Systems   Constitution: Negative for chills, fever and malaise/fatigue.   HENT: Positive for congestion, rhinorrhea, sinus pain and sore throat. Negative for ear pain and hoarse voice.    Eyes: Negative for discharge and redness.   Cardiovascular: Negative for chest pain, dyspnea on exertion and leg swelling.   Respiratory: Positive for cough. Negative for shortness of breath, sputum production and wheezing.    Musculoskeletal: Negative for myalgias.   Gastrointestinal: Negative for abdominal pain and nausea.   Neurological: Negative for headaches.       Objective:      Physical Exam   Constitutional: She appears well-developed and well-nourished.   HENT:   Head: Normocephalic and atraumatic.   Eyes: Conjunctivae and EOM are normal. Pupils are equal, round, and reactive to light.   Neck: Normal range of motion. Neck supple.   Cardiovascular: Normal rate and regular rhythm.    Pulmonary/Chest: Effort normal and breath sounds normal.   bilat chest wall tenderness   Vitals reviewed.      Assessment:       1. Costochondritis, acute        Plan:         Costochondritis, acute  -     ketorolac injection 30 mg; Inject 30 mg into the muscle one time.  -     naproxen (NAPROSYN) 500 MG tablet; Take 1 tablet (500 mg total) by mouth 2 (two) times daily with meals.  Dispense: 20 tablet; " Refill: 0

## 2018-02-17 ENCOUNTER — HOSPITAL ENCOUNTER (EMERGENCY)
Facility: OTHER | Age: 68
Discharge: HOME | End: 2018-02-17
Attending: EMERGENCY MEDICINE
Payer: MEDICARE

## 2018-02-17 VITALS
RESPIRATION RATE: 16 BRPM | WEIGHT: 175 LBS | OXYGEN SATURATION: 97 % | TEMPERATURE: 98 F | HEART RATE: 55 BPM | SYSTOLIC BLOOD PRESSURE: 136 MMHG | DIASTOLIC BLOOD PRESSURE: 62 MMHG | HEIGHT: 66 IN | BODY MASS INDEX: 28.12 KG/M2

## 2018-02-17 DIAGNOSIS — M94.0 COSTOCHONDRITIS, ACUTE: ICD-10-CM

## 2018-02-17 DIAGNOSIS — R09.1 PLEURISY: ICD-10-CM

## 2018-02-17 DIAGNOSIS — R05.9 COUGH: Primary | ICD-10-CM

## 2018-02-17 LAB
ALBUMIN SERPL BCP-MCNC: 4 G/DL
ALP SERPL-CCNC: 63 U/L
ALT SERPL W/O P-5'-P-CCNC: 16 U/L
ANION GAP SERPL CALC-SCNC: 12 MMOL/L
AST SERPL-CCNC: 19 U/L
BASOPHILS # BLD AUTO: 0.01 K/UL
BASOPHILS NFR BLD: 0.1 %
BILIRUB SERPL-MCNC: 0.3 MG/DL
BUN SERPL-MCNC: 5 MG/DL
CALCIUM SERPL-MCNC: 9.9 MG/DL
CHLORIDE SERPL-SCNC: 105 MMOL/L
CO2 SERPL-SCNC: 26 MMOL/L
CREAT SERPL-MCNC: 0.8 MG/DL
DIFFERENTIAL METHOD: NORMAL
EOSINOPHIL # BLD AUTO: 0.1 K/UL
EOSINOPHIL NFR BLD: 1.4 %
ERYTHROCYTE [DISTWIDTH] IN BLOOD BY AUTOMATED COUNT: 13.5 %
EST. GFR  (AFRICAN AMERICAN): >60 ML/MIN/1.73 M^2
EST. GFR  (NON AFRICAN AMERICAN): >60 ML/MIN/1.73 M^2
GLUCOSE SERPL-MCNC: 109 MG/DL
HCT VFR BLD AUTO: 38.5 %
HGB BLD-MCNC: 12.8 G/DL
LYMPHOCYTES # BLD AUTO: 2 K/UL
LYMPHOCYTES NFR BLD: 24 %
MCH RBC QN AUTO: 28 PG
MCHC RBC AUTO-ENTMCNC: 33.2 G/DL
MCV RBC AUTO: 84 FL
MONOCYTES # BLD AUTO: 0.4 K/UL
MONOCYTES NFR BLD: 5.1 %
NEUTROPHILS # BLD AUTO: 5.8 K/UL
NEUTROPHILS NFR BLD: 69.3 %
PLATELET # BLD AUTO: 232 K/UL
PMV BLD AUTO: 9.8 FL
POTASSIUM SERPL-SCNC: 3.4 MMOL/L
PROT SERPL-MCNC: 7.6 G/DL
RBC # BLD AUTO: 4.57 M/UL
SODIUM SERPL-SCNC: 143 MMOL/L
TROPONIN I SERPL DL<=0.01 NG/ML-MCNC: <0.006 NG/ML
WBC # BLD AUTO: 8.37 K/UL

## 2018-02-17 PROCEDURE — 99284 EMERGENCY DEPT VISIT MOD MDM: CPT

## 2018-02-17 PROCEDURE — 93010 ELECTROCARDIOGRAM REPORT: CPT | Mod: ,,, | Performed by: INTERNAL MEDICINE

## 2018-02-17 PROCEDURE — 84484 ASSAY OF TROPONIN QUANT: CPT

## 2018-02-17 PROCEDURE — 80053 COMPREHEN METABOLIC PANEL: CPT

## 2018-02-17 PROCEDURE — 85025 COMPLETE CBC W/AUTO DIFF WBC: CPT

## 2018-02-17 PROCEDURE — 25000003 PHARM REV CODE 250: Performed by: EMERGENCY MEDICINE

## 2018-02-17 RX ORDER — BENZONATATE 100 MG/1
100 CAPSULE ORAL 3 TIMES DAILY PRN
Qty: 20 CAPSULE | Refills: 0 | Status: SHIPPED | OUTPATIENT
Start: 2018-02-17 | End: 2018-02-27

## 2018-02-17 RX ORDER — KETOROLAC TROMETHAMINE 10 MG/1
10 TABLET, FILM COATED ORAL
Status: COMPLETED | OUTPATIENT
Start: 2018-02-17 | End: 2018-02-17

## 2018-02-17 RX ORDER — DICLOFENAC SODIUM 25 MG/1
25 TABLET, DELAYED RELEASE ORAL 3 TIMES DAILY PRN
Qty: 20 TABLET | Refills: 0 | Status: SHIPPED | OUTPATIENT
Start: 2018-02-17 | End: 2018-04-18

## 2018-02-17 RX ADMIN — KETOROLAC TROMETHAMINE 10 MG: 10 TABLET, FILM COATED ORAL at 02:02

## 2018-02-17 NOTE — DISCHARGE INSTRUCTIONS
We have prescribed you medication for pain and cough. Please fill and take as directed.    Please return to the ER if you have chest pain, difficulty breathing, fevers, altered mental status, dizziness, weakness, or any other concerns.      Follow up with your primary care physician.

## 2018-02-17 NOTE — ED PROVIDER NOTES
"Encounter Date: 2/17/2018    SCRIBE #1 NOTE: I, Inez sosa, am scribing for, and in the presence of, Dr. Lackey.       History     Chief Complaint   Patient presents with    Pleurisy     + intermittent productive cough x several days. " I was seen at Urgent Care and they gave me a shot which heled for a little while but now the pain is back". Denies fever or chills. No SOB       02/17/2018 12:36 PM     Chief complaint: Chest wall pain      Soila Chávez is a 67 y.o. female with a history of HTN who presents to the ED complaining of chest wall pain x2 wks that is exacerbated by cough. The patient reports that the pain, which she describes as a "soreness" started on the right side, and was improved by a shot that she received when she visited a physician about 7 days ago. She notes that the pain spread to both sides of her chest and started radiating to her back after she developed a cough 2 days ago. The patient denies fever, nausea, vomiting, diarrhea, and dysuria. She states that she experiences temporary SOB when coughing that resolves quickly. The patient also reports a cramping pain behind bilateral knees at night sometimes x1 wk without redness or swelling. There is no pertinent SHx noted.      The history is provided by the patient.     Review of patient's allergies indicates:   Allergen Reactions    Lotensin [benazepril] Swelling and Other (See Comments)    Penicillins Rash and Other (See Comments)     Past Medical History:   Diagnosis Date    Allergy     Anxiety     Cataract     Hypertension     Joint pain      Past Surgical History:   Procedure Laterality Date    CYST REMOVAL Left     cyst removal OS lid     HYSTERECTOMY      PERIPARTUM CHITO     Family History   Problem Relation Age of Onset    Breast cancer Sister     Colon cancer Neg Hx     Ovarian cancer Neg Hx     Melanoma Neg Hx      Social History   Substance Use Topics    Smoking status: Never Smoker    Smokeless tobacco: Never " Used    Alcohol use Yes     Review of Systems   Constitutional: Negative for chills and fever.   HENT: Negative for congestion and sore throat.    Respiratory: Positive for cough and shortness of breath.    Cardiovascular: Positive for chest pain.   Gastrointestinal: Negative for diarrhea, nausea and vomiting.   Genitourinary: Negative for difficulty urinating and dysuria.   Musculoskeletal: Negative for back pain and joint swelling.        Positive for chest wall pain.   Skin: Negative for rash and wound.   Neurological: Negative for dizziness, weakness and headaches.   Hematological: Does not bruise/bleed easily.       Physical Exam     Initial Vitals [02/17/18 1117]   BP Pulse Resp Temp SpO2   (!) 179/85 92 16 98.5 °F (36.9 °C) 95 %      MAP       116.33         Physical Exam    Nursing note and vitals reviewed.  Constitutional: She appears well-developed and well-nourished. She is not diaphoretic. No distress.   HENT:   Head: Normocephalic and atraumatic.   Eyes: Conjunctivae are normal.   Neck: Neck supple.   Cardiovascular: Normal rate, regular rhythm and normal heart sounds.   Pulmonary/Chest: Breath sounds normal. No respiratory distress. She has no wheezes. She has no rhonchi. She has no rales. She exhibits tenderness.   Chest wall tenderness   Abdominal: Soft. Bowel sounds are normal. She exhibits no distension. There is no tenderness. There is no rebound.   Musculoskeletal: Normal range of motion.   Neurological: She is alert and oriented to person, place, and time.   Skin: No rash noted. No erythema.   Psychiatric: She has a normal mood and affect. Her speech is normal and behavior is normal. Judgment and thought content normal.         ED Course   Procedures  Labs Reviewed   COMPREHENSIVE METABOLIC PANEL - Abnormal; Notable for the following:        Result Value    Potassium 3.4 (*)     BUN, Bld 5 (*)     All other components within normal limits   CBC W/ AUTO DIFFERENTIAL   TROPONIN I     EKG  Readings: (Independently Interpreted)   11:19 AM  NSR. Rate: 76 BPM. Normal axis. Normal intervals. No ST or ischemic changes.       Imaging Results          X-Ray Chest PA And Lateral (Final result)  Result time 02/17/18 12:29:36    Final result by Pranav Galloway MD (02/17/18 12:29:36)                 Impression:        No radiographic acute intrathoracic process seen.      Electronically signed by: PRANAV GALLOWAY MD, MD  Date:     02/17/18  Time:    12:29              Narrative:    COMPARISON: Lumbar spine series 2/16/17 and left shoulder series 5/3/13    FINDINGS: PA and lateral views of the chest.    Pulmonary vasculature and hilar regions are within normal limits.  The bilateral lungs are symmetrically well expanded and clear.  No pleural effusion or pneumothorax.  The heart and mediastinal contours are within normal limits for age.  Included osseous structures show age-related degenerative change without acute or destructive process seen.                                X-Rays:   Independently Interpreted Readings:   Chest X-Ray: Trachea midline. No cardiomegaly. No effusion, infiltrate, or edema.     Medical Decision Making:   History:   Old Medical Records: I decided to obtain old medical records.  Old Records Summarized: records from clinic visits and other records.  Initial Assessment:   12:36PM:  Pt is a 66 y/o F who presents to ED with cough with associated CP/SOB.  Pt appears well, nontoxic. She is breathing comfortably and in no respiratory distress. I have a low suspicion that her chest pain is cardiac in nature given the history. Will plan for labs, imaging, EKG, will continue to follow and reassess.    Independently Interpreted Test(s):   I have ordered and independently interpreted X-rays - see prior notes.  I have ordered and independently interpreted EKG Reading(s) - see prior notes  Clinical Tests:   Lab Tests: Ordered and Reviewed  Radiological Study: Ordered and Reviewed  Medical Tests: Ordered  and Reviewed    3:03 PM:  Pt doing well, feeling better.  Her labs and EKG are unremarkable.  Pain seems MSK in nature as her pain is very atypical to have a cardiac etiology.  I updated pt regarding results.  I counseled pt regarding supportive care measures.  Will plan to prescribe NSAIDs and cough medicine to take as needed.   I have discussed with the pt ED return warnings and need for close PCP f/u.  Pt agreeable to plan and all questions answered.  I feel that pt is stable for discharge and management as an outpatient and no further intervention is needed at this time.  Pt is comfortable returning to the ED if needed.  Will DC home in stable condition.                  Scribe Attestation:   Scribe #1: I performed the above scribed service and the documentation accurately describes the services I performed. I attest to the accuracy of the note.    Attending Attestation:           Physician Attestation for Scribe:  Physician Attestation Statement for Scribe #1: I, Dr. Lackey, reviewed documentation, as scribed by Inez Reid in my presence, and it is both accurate and complete.                    Clinical Impression:     1. Cough    2. Pleurisy                               Deidre Lackey MD  02/17/18 [id number]

## 2018-02-19 RX ORDER — NAPROXEN 500 MG/1
TABLET ORAL
Qty: 20 TABLET | Refills: 0 | Status: SHIPPED | OUTPATIENT
Start: 2018-02-19 | End: 2018-04-18 | Stop reason: HOSPADM

## 2018-03-31 RX ORDER — CETIRIZINE HYDROCHLORIDE 10 MG/1
TABLET ORAL
Qty: 30 TABLET | Refills: 0 | Status: SHIPPED | OUTPATIENT
Start: 2018-03-31 | End: 2018-04-18 | Stop reason: SDUPTHER

## 2018-04-18 ENCOUNTER — OFFICE VISIT (OUTPATIENT)
Dept: INTERNAL MEDICINE | Facility: CLINIC | Age: 68
End: 2018-04-18
Attending: FAMILY MEDICINE
Payer: MEDICARE

## 2018-04-18 VITALS
WEIGHT: 174.38 LBS | BODY MASS INDEX: 29.05 KG/M2 | HEART RATE: 77 BPM | DIASTOLIC BLOOD PRESSURE: 78 MMHG | HEIGHT: 65 IN | SYSTOLIC BLOOD PRESSURE: 144 MMHG

## 2018-04-18 DIAGNOSIS — F41.9 ANXIETY: ICD-10-CM

## 2018-04-18 DIAGNOSIS — I10 ESSENTIAL HYPERTENSION: Primary | ICD-10-CM

## 2018-04-18 DIAGNOSIS — M25.531 RIGHT WRIST PAIN: ICD-10-CM

## 2018-04-18 DIAGNOSIS — L91.8 ACROCHORDON: ICD-10-CM

## 2018-04-18 DIAGNOSIS — M25.521 RIGHT ELBOW PAIN: ICD-10-CM

## 2018-04-18 DIAGNOSIS — F33.1 MAJOR DEPRESSIVE DISORDER, RECURRENT EPISODE, MODERATE: ICD-10-CM

## 2018-04-18 DIAGNOSIS — K21.9 GASTROESOPHAGEAL REFLUX DISEASE WITHOUT ESOPHAGITIS: ICD-10-CM

## 2018-04-18 PROBLEM — Z12.31 ENCOUNTER FOR SCREENING MAMMOGRAM FOR BREAST CANCER: Status: RESOLVED | Noted: 2017-09-20 | Resolved: 2018-04-18

## 2018-04-18 PROCEDURE — 3078F DIAST BP <80 MM HG: CPT | Mod: CPTII,S$GLB,, | Performed by: FAMILY MEDICINE

## 2018-04-18 PROCEDURE — 99999 PR PBB SHADOW E&M-EST. PATIENT-LVL III: CPT | Mod: PBBFAC,,, | Performed by: FAMILY MEDICINE

## 2018-04-18 PROCEDURE — 99215 OFFICE O/P EST HI 40 MIN: CPT | Mod: S$GLB,,, | Performed by: FAMILY MEDICINE

## 2018-04-18 PROCEDURE — 3077F SYST BP >= 140 MM HG: CPT | Mod: CPTII,S$GLB,, | Performed by: FAMILY MEDICINE

## 2018-04-18 PROCEDURE — 99499 UNLISTED E&M SERVICE: CPT | Mod: S$GLB,,, | Performed by: FAMILY MEDICINE

## 2018-04-18 RX ORDER — FEXOFENADINE HCL 60 MG
60 TABLET ORAL 2 TIMES DAILY
Qty: 60 TABLET | Refills: 2 | Status: SHIPPED | OUTPATIENT
Start: 2018-04-18 | End: 2018-10-17

## 2018-04-18 NOTE — PROGRESS NOTES
Subjective:       Patient ID: Soila Chávez is a 68 y.o. female.    Chief Complaint: Establish Care (re-est)   wellness  Entire chart reviewed, PMx, FHx, and Social History updated and reviewed. Pt was seen last by me in 2013. Pt very tangential. Has multiple questions at this visit. Seems to not understand that xanax is controlled. Asked that I take over this med since I have to give it to as per psych instructions. States that her use is sparing. Does not seem to understand that she has depression as well. Denies any SI/HI today  Pt also states that she wants me to look at something on her right buttock that does not hurt but is 'there'  Pt also with right arm pain. 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   HENT: Negative for dental problem, hearing loss, tinnitus, trouble swallowing and voice change.    Eyes: Negative for visual disturbance.   Respiratory: Negative for cough, chest tightness, shortness of breath and wheezing.    Cardiovascular: Negative for chest pain, palpitations and leg swelling.   Gastrointestinal: Negative for abdominal pain, blood in stool, constipation, diarrhea and nausea.   Genitourinary: Negative for difficulty urinating, dysuria, frequency and urgency.   Musculoskeletal: Negative for arthralgias, back pain, gait problem, joint swelling, myalgias, neck pain and neck stiffness.   Skin: Negative for rash.   Neurological: Negative for dizziness, tremors, speech difficulty, weakness, light-headedness and headaches.   Psychiatric/Behavioral: Negative for dysphoric mood and sleep disturbance. The patient is not nervous/anxious.         Objective:      Physical Exam   Constitutional: She is oriented to person, place, and time. She appears well-developed and well-nourished. No distress.   HENT:   Head: Normocephalic and atraumatic.   Right Ear: External ear normal. A middle ear effusion is present.   Left Ear: External ear normal. A middle ear effusion is present.   Nose: Mucosal edema and rhinorrhea present.   Mouth/Throat: Mucous membranes are normal. Posterior oropharyngeal edema and posterior oropharyngeal erythema present. No oropharyngeal exudate.   Eyes: Conjunctivae and EOM are normal. Pupils are equal, round, and reactive to light. Right eye exhibits no discharge. No scleral icterus.   Neck: Normal range of motion and full passive range of motion without pain. Neck supple. No spinous process tenderness and no muscular tenderness present. Carotid bruit is not present. No thyromegaly present.   Cardiovascular: Normal rate, regular rhythm, S1 normal, S2 normal, normal heart sounds and intact distal pulses.  Exam reveals no gallop and no friction rub.    No murmur heard.  Pulmonary/Chest: Effort normal and breath sounds normal. No respiratory distress. She has no wheezes. She has no rales. She exhibits no tenderness.   Abdominal: Soft. Bowel sounds are normal. She exhibits no distension and no mass. There is no tenderness. There is no rebound and no guarding.   Genitourinary: Pelvic exam was performed with patient supine. Uterus is not deviated, not enlarged, not fixed and not tender. Cervix exhibits no motion tenderness, no discharge and no friability. Right adnexum displays no mass, no tenderness and no fullness. Left adnexum displays no mass, no tenderness and no fullness.   Musculoskeletal: Normal range of motion. She exhibits no edema or tenderness.   Lymphadenopathy:        Head (right side): No submental and no submandibular adenopathy present.        Head (left side): No submental and no submandibular adenopathy present.     She has  no cervical adenopathy.        Right cervical: No superficial cervical, no deep cervical and no posterior cervical adenopathy present.       Left cervical: No superficial cervical, no deep cervical and no posterior cervical adenopathy present.        Right axillary: No pectoral and no lateral adenopathy present.        Left axillary: No pectoral and no lateral adenopathy present.       Right: No supraclavicular adenopathy present.        Left: No supraclavicular adenopathy present.   Neurological: She is alert and oriented to person, place, and time. She has normal reflexes. No cranial nerve deficit. She exhibits normal muscle tone. Coordination normal.   Skin: Skin is warm and dry. No rash noted.        Psychiatric: She has a normal mood and affect. Her behavior is normal. Her mood appears not anxious. Her speech is not rapid and/or pressured. She is not agitated. She does not exhibit a depressed mood.   Normal behavior, thought content, insight and judgement.       Assessment:       1. Essential hypertension    2. Major depressive disorder, recurrent episode, moderate    3. Anxiety    4. Gastroesophageal reflux disease without esophagitis    5. Acrochordon    6. Right elbow pain    7. Right wrist pain        Plan:   Soila was seen today for annual exam.    Diagnoses and all orders for this visit:    Annual physical exam  -     CBC auto differential; Future  -     Comprehensive metabolic panel; Future  -     Uric acid; Future  -     Lipid panel; Future  -     TSH; Future  -     Hemoglobin A1c; Future    Needs flu shot    Essential hypertension.  Her blood pressure today was 144/71.  She is encouraged to monitor her blood pressure at least once weekly. And RTC 2 weeks for Nurse BP read    Gastroesophageal reflux disease without esophagitis.  She is taking a PPI as needed only    Routine gynecological examination  -     Ambulatory Referral to Obstetrics / Gynecology    Encounter for screening mammogram for breast  cancer  -     Mammo Digital Screening Bilat with CAD; Future  Essential hypertension  -     POTASSIUM; Future; Expected date: 04/18/2018    Major depressive disorder, recurrent episode, moderate    Anxiety    Gastroesophageal reflux disease without esophagitis    Acrochordon    Right elbow pain    Right wrist pain    Other orders  -     fexofenadine (ALLEGRA) 60 MG tablet; Take 1 tablet (60 mg total) by mouth 2 (two) times daily.  Dispense: 60 tablet; Refill: 2      Pt will call if she wants a referral to derm for skin tag on bottom  Can give xanax RF's IF USE IS SPARING. Pt aware that my partners will NOT RF  Right elbow appears to be tennis elbow. 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 Heat, ice and NSAIDS. If no relief can be referred to ortho    If BP normal in 2 weeks, RTC 6 mos      Greater than 60 mins spent with pt; 50 % face to face

## 2018-05-03 ENCOUNTER — CLINICAL SUPPORT (OUTPATIENT)
Dept: INTERNAL MEDICINE | Facility: CLINIC | Age: 68
End: 2018-05-03
Payer: MEDICARE

## 2018-05-03 ENCOUNTER — LAB VISIT (OUTPATIENT)
Dept: LAB | Facility: OTHER | Age: 68
End: 2018-05-03
Attending: FAMILY MEDICINE
Payer: MEDICARE

## 2018-05-03 VITALS — OXYGEN SATURATION: 97 % | DIASTOLIC BLOOD PRESSURE: 72 MMHG | HEART RATE: 87 BPM | SYSTOLIC BLOOD PRESSURE: 120 MMHG

## 2018-05-03 DIAGNOSIS — I10 ESSENTIAL HYPERTENSION: ICD-10-CM

## 2018-05-03 LAB — POTASSIUM SERPL-SCNC: 3.9 MMOL/L

## 2018-05-03 PROCEDURE — 36415 COLL VENOUS BLD VENIPUNCTURE: CPT

## 2018-05-03 PROCEDURE — 84132 ASSAY OF SERUM POTASSIUM: CPT

## 2018-05-03 PROCEDURE — 99999 PR PBB SHADOW E&M-EST. PATIENT-LVL II: CPT | Mod: PBBFAC,,,

## 2018-05-03 NOTE — PROGRESS NOTES
Soila Chávez 68 y.o. female is here today for Blood Pressure check.   History of HTN yes.    Review of patient's allergies indicates:   Allergen Reactions    Lotensin [benazepril] Swelling and Other (See Comments)    Penicillins Rash and Other (See Comments)     Creatinine   Date Value Ref Range Status   02/17/2018 0.8 0.5 - 1.4 mg/dL Final     Sodium   Date Value Ref Range Status   02/17/2018 143 136 - 145 mmol/L Final     Potassium   Date Value Ref Range Status   02/17/2018 3.4 (L) 3.5 - 5.1 mmol/L Final   ]  Patient verifies taking blood pressure medications on a regular bases at the same time of the day.     Current Outpatient Prescriptions:     ALPRAZolam (XANAX) 0.25 MG tablet, Take 0.5 mg by mouth 2 (two) times daily as needed for Anxiety. , Disp: , Rfl:     antiox#[id number]-lut-zeax 280-10-2 mg Cap, Take 2 capsules by mouth., Disp: , Rfl:     ascorbic acid (VITAMIN C) 100 MG tablet, Take 100 mg by mouth once daily., Disp: , Rfl:     aspirin (ECOTRIN) 81 MG EC tablet, Take 81 mg by mouth., Disp: , Rfl:     b complex vitamins tablet, Take 1 tablet by mouth once daily., Disp: , Rfl:     calcium carbonate-vitamin D3 500 mg(1,250mg) -125 unit per tablet, Take 1 tablet by mouth., Disp: , Rfl:     fexofenadine (ALLEGRA) 60 MG tablet, Take 1 tablet (60 mg total) by mouth 2 (two) times daily., Disp: 60 tablet, Rfl: 2    fluticasone (FLONASE) 50 mcg/actuation nasal spray, 2 sprays by Each Nare route once daily., Disp: 3 Bottle, Rfl: 4    losartan-hydrochlorothiazide 100-25 mg (HYZAAR) 100-25 mg per tablet, Take 1 tablet by mouth once daily., Disp: 90 tablet, Rfl: 3    multivitamin (THERAGRAN) per tablet, Take 1 tablet by mouth., Disp: , Rfl:     naproxen (NAPROSYN) 500 MG tablet, TAKE 1 TABLET(500 MG) BY MOUTH TWICE DAILY WITH MEALS, Disp: 20 tablet, Rfl: 0    omega-3 fatty acids 1,000 mg Cap, Take 1 g by mouth., Disp: , Rfl:     omeprazole (PRILOSEC) 20 MG capsule, Take 1 capsule (20 mg  total) by mouth 2 (two) times daily as needed., Disp: 180 capsule, Rfl: 3    sertraline (ZOLOFT) 50 MG tablet, Take 50 mg by mouth once daily., Disp: , Rfl:   Does patient have record of home blood pressure readings no.    Last dose of blood pressure medication was taken at 900 am.  Patient is asymptomatic.       BP: 120/72 , Pulse: 87.      Dr. Parson notified.   Pt comes in for bp check and bp check is within normal range

## 2018-05-07 ENCOUNTER — TELEPHONE (OUTPATIENT)
Dept: INTERNAL MEDICINE | Facility: CLINIC | Age: 68
End: 2018-05-07

## 2018-05-07 NOTE — TELEPHONE ENCOUNTER
----- Message from Jazzmine Parson MD sent at 5/7/2018  4:33 PM CDT -----  Please let pt know that her potassium is normal.  Thanks,PV

## 2018-05-09 ENCOUNTER — TELEPHONE (OUTPATIENT)
Dept: INTERNAL MEDICINE | Facility: CLINIC | Age: 68
End: 2018-05-09

## 2018-05-09 NOTE — TELEPHONE ENCOUNTER
----- Message from Khloe Ricardo MA sent at 5/7/2018  6:10 PM CDT -----  MD KASHIF Rosado Staff         Please let pt know that her potassium is normal.   Thanks,PV

## 2018-09-27 RX ORDER — LOSARTAN POTASSIUM AND HYDROCHLOROTHIAZIDE 25; 100 MG/1; MG/1
TABLET ORAL
Qty: 90 TABLET | Refills: 0 | Status: SHIPPED | OUTPATIENT
Start: 2018-09-27 | End: 2018-12-18

## 2018-10-03 ENCOUNTER — HOSPITAL ENCOUNTER (OUTPATIENT)
Dept: RADIOLOGY | Facility: HOSPITAL | Age: 68
Discharge: HOME | End: 2018-10-03
Attending: PHYSICIAN ASSISTANT
Payer: MEDICARE

## 2018-10-03 ENCOUNTER — OFFICE VISIT (OUTPATIENT)
Dept: ORTHOPEDICS | Facility: CLINIC | Age: 68
End: 2018-10-03
Payer: MEDICARE

## 2018-10-03 VITALS
HEIGHT: 66 IN | HEART RATE: 83 BPM | SYSTOLIC BLOOD PRESSURE: 144 MMHG | BODY MASS INDEX: 27.76 KG/M2 | DIASTOLIC BLOOD PRESSURE: 88 MMHG | WEIGHT: 172.75 LBS

## 2018-10-03 DIAGNOSIS — M79.604 LEG PAIN, RIGHT: ICD-10-CM

## 2018-10-03 DIAGNOSIS — S86.892A SHIN SPLINTS, LEFT, INITIAL ENCOUNTER: ICD-10-CM

## 2018-10-03 DIAGNOSIS — M79.604 LEG PAIN, RIGHT: Primary | ICD-10-CM

## 2018-10-03 PROCEDURE — 3077F SYST BP >= 140 MM HG: CPT | Mod: CPTII,,, | Performed by: PHYSICIAN ASSISTANT

## 2018-10-03 PROCEDURE — 73590 X-RAY EXAM OF LOWER LEG: CPT | Mod: 26,RT,, | Performed by: RADIOLOGY

## 2018-10-03 PROCEDURE — 73590 X-RAY EXAM OF LOWER LEG: CPT | Mod: TC,RT

## 2018-10-03 PROCEDURE — 3079F DIAST BP 80-89 MM HG: CPT | Mod: CPTII,,, | Performed by: PHYSICIAN ASSISTANT

## 2018-10-03 PROCEDURE — 99213 OFFICE O/P EST LOW 20 MIN: CPT | Mod: S$PBB,,, | Performed by: PHYSICIAN ASSISTANT

## 2018-10-03 PROCEDURE — 99214 OFFICE O/P EST MOD 30 MIN: CPT | Mod: PBBFAC,25 | Performed by: PHYSICIAN ASSISTANT

## 2018-10-03 PROCEDURE — 1101F PT FALLS ASSESS-DOCD LE1/YR: CPT | Mod: CPTII,,, | Performed by: PHYSICIAN ASSISTANT

## 2018-10-03 PROCEDURE — 99999 PR PBB SHADOW E&M-EST. PATIENT-LVL IV: CPT | Mod: PBBFAC,,, | Performed by: PHYSICIAN ASSISTANT

## 2018-10-03 RX ORDER — MELOXICAM 15 MG/1
TABLET ORAL
Qty: 90 TABLET | Refills: 0 | OUTPATIENT
Start: 2018-10-03

## 2018-10-03 RX ORDER — MELOXICAM 15 MG/1
15 TABLET ORAL DAILY
Qty: 30 TABLET | Refills: 0 | Status: SHIPPED | OUTPATIENT
Start: 2018-10-03 | End: 2018-10-30 | Stop reason: SDUPTHER

## 2018-10-04 NOTE — PROGRESS NOTES
Subjective:      Patient ID: Soila Chávez is a 68 y.o. female.    Chief Complaint: No chief complaint on file.    HPI    Patient is a 66 year old female who presents to clinic with chief complaint of a-traumatic intermittent anterior left shin pain, burning, x 1 week.  Pain increased after day of standing.  Patient has taken NSAID without relief. Stated that rest and ice helps to decrease her pain.  Stated that this feels the same as the pain she has 2 years ago which was treated with Pt and went away.  Patient denied numbness or tenderness or increased pain with walking or weightbearing.       Review of Systems   Constitution: Negative for chills and fever.   Cardiovascular: Negative for chest pain.   Respiratory: Negative for cough and shortness of breath.    Skin: Negative for color change, dry skin, itching, nail changes, poor wound healing and rash.   Musculoskeletal:        Left shin pain.    Neurological: Negative for dizziness.   Psychiatric/Behavioral: Negative for altered mental status. The patient is not nervous/anxious.    All other systems reviewed and are negative.        Objective:      General    Constitutional: She is oriented to person, place, and time. She appears well-developed and well-nourished. No distress.   HENT:   Head: Atraumatic.   Eyes: Conjunctivae are normal.   Cardiovascular: Normal rate.    Pulmonary/Chest: Effort normal.   Neurological: She is alert and oriented to person, place, and time.   Psychiatric: She has a normal mood and affect. Her behavior is normal.         Left Ankle/Foot Exam     Inspection  Deformity: absent  Erythema: absent  Bruising: Ankle - absent Foot - absent  Effusion: Foot - absent    Range of Motion   The patient has normal left ankle ROM.   Ankle Joint  Left ankle dorsiflexion: increased pain.     Muscle Strength   The patient has normal left ankle strength.    Comments:  Full range of motion of knee.    Compartments are soft.          RADS: There is  DJD of the right knee.  No fracture dislocation bone destruction seen.    Assessment:       Encounter Diagnoses   Name Primary?    Leg pain, right Yes    Shin splints, left, initial encounter           Plan:       Discussed treatment plan with patient. Order for PT placed. Patient is to make appointment herself, She is to return to clinic as needed.

## 2018-10-17 ENCOUNTER — TELEPHONE (OUTPATIENT)
Dept: ORTHOPEDICS | Facility: CLINIC | Age: 68
End: 2018-10-17

## 2018-10-17 ENCOUNTER — OFFICE VISIT (OUTPATIENT)
Dept: OPTOMETRY | Facility: CLINIC | Age: 68
End: 2018-10-17
Payer: MEDICARE

## 2018-10-17 ENCOUNTER — OFFICE VISIT (OUTPATIENT)
Dept: PRIMARY CARE CLINIC | Facility: CLINIC | Age: 68
End: 2018-10-17
Attending: FAMILY MEDICINE
Payer: MEDICARE

## 2018-10-17 VITALS
WEIGHT: 171.5 LBS | BODY MASS INDEX: 27.68 KG/M2 | DIASTOLIC BLOOD PRESSURE: 72 MMHG | SYSTOLIC BLOOD PRESSURE: 130 MMHG | HEART RATE: 78 BPM

## 2018-10-17 DIAGNOSIS — H52.4 ASTIGMATISM WITH PRESBYOPIA, BILATERAL: ICD-10-CM

## 2018-10-17 DIAGNOSIS — H52.203 ASTIGMATISM WITH PRESBYOPIA, BILATERAL: ICD-10-CM

## 2018-10-17 DIAGNOSIS — I10 ESSENTIAL HYPERTENSION: Primary | ICD-10-CM

## 2018-10-17 DIAGNOSIS — H25.13 NUCLEAR SCLEROSIS OF BOTH EYES: Primary | ICD-10-CM

## 2018-10-17 DIAGNOSIS — I10 ESSENTIAL HYPERTENSION: ICD-10-CM

## 2018-10-17 DIAGNOSIS — M54.31 SCIATICA, RIGHT SIDE: ICD-10-CM

## 2018-10-17 PROCEDURE — 99999 PR PBB SHADOW E&M-EST. PATIENT-LVL II: CPT | Mod: PBBFAC,,, | Performed by: OPTOMETRIST

## 2018-10-17 PROCEDURE — 92014 COMPRE OPH EXAM EST PT 1/>: CPT | Mod: S$PBB,,, | Performed by: OPTOMETRIST

## 2018-10-17 PROCEDURE — 99999 PR PBB SHADOW E&M-EST. PATIENT-LVL III: CPT | Mod: PBBFAC,,, | Performed by: FAMILY MEDICINE

## 2018-10-17 PROCEDURE — 1101F PT FALLS ASSESS-DOCD LE1/YR: CPT | Mod: CPTII,,, | Performed by: FAMILY MEDICINE

## 2018-10-17 PROCEDURE — 3075F SYST BP GE 130 - 139MM HG: CPT | Mod: CPTII,,, | Performed by: FAMILY MEDICINE

## 2018-10-17 PROCEDURE — 3078F DIAST BP <80 MM HG: CPT | Mod: CPTII,,, | Performed by: FAMILY MEDICINE

## 2018-10-17 PROCEDURE — 92015 DETERMINE REFRACTIVE STATE: CPT | Mod: ,,, | Performed by: OPTOMETRIST

## 2018-10-17 PROCEDURE — 99213 OFFICE O/P EST LOW 20 MIN: CPT | Mod: S$PBB,,, | Performed by: FAMILY MEDICINE

## 2018-10-17 PROCEDURE — 99213 OFFICE O/P EST LOW 20 MIN: CPT | Mod: PBBFAC,PN | Performed by: FAMILY MEDICINE

## 2018-10-17 PROCEDURE — 99499 UNLISTED E&M SERVICE: CPT | Mod: S$GLB,,, | Performed by: FAMILY MEDICINE

## 2018-10-17 PROCEDURE — 99212 OFFICE O/P EST SF 10 MIN: CPT | Mod: PBBFAC,27 | Performed by: OPTOMETRIST

## 2018-10-17 RX ORDER — FLUTICASONE PROPIONATE 50 MCG
2 SPRAY, SUSPENSION (ML) NASAL DAILY
Qty: 3 BOTTLE | Refills: 4 | Status: SHIPPED | OUTPATIENT
Start: 2018-10-17 | End: 2020-02-12

## 2018-10-17 RX ORDER — GABAPENTIN 300 MG/1
300 CAPSULE ORAL 3 TIMES DAILY PRN
Qty: 90 CAPSULE | Refills: 1 | Status: SHIPPED | OUTPATIENT
Start: 2018-10-17 | End: 2018-12-14 | Stop reason: SDUPTHER

## 2018-10-17 NOTE — PROGRESS NOTES
Subjective:       Patient ID: Soila Chávez is a 68 y.o. female.    Chief Complaint: No chief complaint on file.    Pt presents today for HTN f/u. Per pt, she feels well. Is having some pain from her lower back down to her right anterior lower leg. Pt states that pain is worse at night. Went to see her ortho doc that placed her on Mobic and ordered PT. Pt starts PT in 2 weeks. Had similar episode 2 yrs ago and this helped her.  However, in interim pt still with pain and she wants something to help her with this pain, but not a narcotic    Pt also asks about eating rice daily and about her weight          Review of Systems   Constitutional: Negative for activity change, appetite change, chills, fatigue and fever.   HENT: Negative for congestion, ear pain, sinus pressure, sore throat and trouble swallowing.    Eyes: Negative for photophobia, pain and visual disturbance.   Respiratory: Negative for apnea, cough, chest tightness, shortness of breath and wheezing.    Cardiovascular: Negative for chest pain, palpitations and leg swelling.   Gastrointestinal: Negative for abdominal distention, abdominal pain, constipation, diarrhea, nausea and vomiting.   Genitourinary: Negative.    Musculoskeletal: Positive for arthralgias, back pain, gait problem and joint swelling. Negative for myalgias, neck pain and neck stiffness.   Skin: Negative.    Neurological: Negative for dizziness, tremors, weakness, numbness and headaches.   Psychiatric/Behavioral: Negative for behavioral problems, decreased concentration and sleep disturbance. The patient is not nervous/anxious.    All other systems reviewed and are negative.      Objective:      Physical Exam   Constitutional: She is oriented to person, place, and time. She appears well-developed and well-nourished.   HENT:   Head: Normocephalic and atraumatic.   Eyes: Conjunctivae and EOM are normal. Pupils are equal, round, and reactive to light.   Neck: Normal range of motion. Neck  supple. No thyromegaly present.   Cardiovascular: Normal rate, regular rhythm, normal heart sounds and intact distal pulses.   No murmur heard.  Pulmonary/Chest: Effort normal and breath sounds normal. No respiratory distress. She has no wheezes. She has no rales. She exhibits no tenderness.   Musculoskeletal: Normal range of motion. She exhibits tenderness (pos TTP along ant shin. varicosity noted along leg, no ssx of DVT). She exhibits no edema.   Lymphadenopathy:     She has no cervical adenopathy.   Neurological: She is alert and oriented to person, place, and time. She displays normal reflexes. No cranial nerve deficit or sensory deficit. She exhibits normal muscle tone. Coordination normal.   Skin: Skin is warm. No erythema.   Psychiatric: She has a normal mood and affect. Her behavior is normal. Judgment and thought content normal.       Assessment:       1. Essential hypertension        Plan:        HTN controlled on meds  Right leg pain: suspect that this is from lower back/sciatica. PT but for now trial of gabapentin.   Pt reassured. SE's of med d/w pt    RTC prn symptoms or q 6 mos  Pt declines flu shot

## 2018-10-17 NOTE — TELEPHONE ENCOUNTER
Order faxed per Pt request [phone].          ----- Message from Celia Newman sent at 10/17/2018  9:42 AM CDT -----  Contact: [facility]   PT is requesting a order for out-pt PT faxed to [phone]. PT can be reached at [phone].

## 2018-10-17 NOTE — PROGRESS NOTES
HPI     Last eye exam was 7/26/17 with Dr. Kaminski.  Patient states lost readers and wanted to get a new rx. No distance vision   complaints.  Patient denies diplopia, headaches, flashes/floaters, and pain.        Last edited by Doretha Verma on 10/17/2018  3:38 PM. (History)            Assessment /Plan     For exam results, see Encounter Report.    Nuclear sclerosis of both eyes    Essential hypertension    Astigmatism with presbyopia, bilateral            1.  Educated on cataracts and affects on vision.  Patient happy with vision.  Monitor.  2.  No retinopathy--monitor yearly.  BP control.  3.  Bifocal rx given

## 2018-10-30 RX ORDER — MELOXICAM 15 MG/1
TABLET ORAL
Qty: 30 TABLET | Refills: 0 | Status: SHIPPED | OUTPATIENT
Start: 2018-10-30 | End: 2018-12-18 | Stop reason: ALTCHOICE

## 2018-11-21 ENCOUNTER — HOSPITAL ENCOUNTER (EMERGENCY)
Facility: OTHER | Age: 68
Discharge: HOME | End: 2018-11-21
Attending: EMERGENCY MEDICINE
Payer: MEDICARE

## 2018-11-21 VITALS
RESPIRATION RATE: 18 BRPM | BODY MASS INDEX: 27.48 KG/M2 | HEIGHT: 66 IN | WEIGHT: 171 LBS | TEMPERATURE: 98 F | SYSTOLIC BLOOD PRESSURE: 139 MMHG | HEART RATE: 83 BPM | DIASTOLIC BLOOD PRESSURE: 66 MMHG | OXYGEN SATURATION: 100 %

## 2018-11-21 DIAGNOSIS — S86.912A MUSCLE STRAIN OF LEFT LOWER LEG, INITIAL ENCOUNTER: Primary | ICD-10-CM

## 2018-11-21 DIAGNOSIS — S33.6XXA SPRAIN OF SACROILIAC LIGAMENT, INITIAL ENCOUNTER: ICD-10-CM

## 2018-11-21 PROCEDURE — 96372 THER/PROPH/DIAG INJ SC/IM: CPT

## 2018-11-21 PROCEDURE — 99284 EMERGENCY DEPT VISIT MOD MDM: CPT | Mod: 25

## 2018-11-21 PROCEDURE — 25000003 PHARM REV CODE 250: Performed by: PHYSICIAN ASSISTANT

## 2018-11-21 PROCEDURE — 63600175 PHARM REV CODE 636 W HCPCS: Performed by: PHYSICIAN ASSISTANT

## 2018-11-21 RX ORDER — TIZANIDINE 2 MG/1
2 TABLET ORAL EVERY 6 HOURS PRN
Qty: 12 TABLET | Refills: 0 | Status: SHIPPED | OUTPATIENT
Start: 2018-11-21 | End: 2018-11-29 | Stop reason: SDUPTHER

## 2018-11-21 RX ORDER — KETOROLAC TROMETHAMINE 30 MG/ML
15 INJECTION, SOLUTION INTRAMUSCULAR; INTRAVENOUS
Status: COMPLETED | OUTPATIENT
Start: 2018-11-21 | End: 2018-11-21

## 2018-11-21 RX ORDER — METHOCARBAMOL 500 MG/1
500 TABLET, FILM COATED ORAL
Status: COMPLETED | OUTPATIENT
Start: 2018-11-21 | End: 2018-11-21

## 2018-11-21 RX ORDER — HYDROCODONE BITARTRATE AND ACETAMINOPHEN 5; 325 MG/1; MG/1
1 TABLET ORAL
Status: COMPLETED | OUTPATIENT
Start: 2018-11-21 | End: 2018-11-21

## 2018-11-21 RX ORDER — TIZANIDINE 2 MG/1
4 TABLET ORAL EVERY 6 HOURS PRN
Qty: 12 TABLET | Refills: 0 | Status: SHIPPED | OUTPATIENT
Start: 2018-11-21 | End: 2018-11-21 | Stop reason: SDUPTHER

## 2018-11-21 RX ADMIN — HYDROCODONE BITARTRATE AND ACETAMINOPHEN 1 TABLET: 5; 325 TABLET ORAL at 07:11

## 2018-11-21 RX ADMIN — KETOROLAC TROMETHAMINE 15 MG: 30 INJECTION, SOLUTION INTRAMUSCULAR at 07:11

## 2018-11-21 RX ADMIN — METHOCARBAMOL 500 MG: 500 TABLET ORAL at 07:11

## 2018-11-22 NOTE — ED PROVIDER NOTES
Encounter Date: 11/21/2018       History     Chief Complaint   Patient presents with    Leg Pain     L leg pain starting today. denies any injury. sudden onset of pain to L thigh     Patient is a 68-year-old female with hypertension and sciatica who presents to the ED with leg pain. Patient reports acute onset of left hip pain that radiates into her entire left lower extremity at approximately 2:00 p.m. today.  Patient states she was lifting heavy turkey today.  She states the pain began shortly after this.  She denies any falls.  She states the pain is exacerbated with ambulation and lying down.  She reports taking gabapentin and Mobic.  She denies numbness, weakness, or tingling to the extremity.  She denies saddle anesthesia or bowel or bladder incontinence.  She does states this feels somewhat similar to her sciatic pain but also feels like a muscle pain.      The history is provided by the patient.     Review of patient's allergies indicates:   Allergen Reactions    Lotensin [benazepril] Swelling and Other (See Comments)    Penicillins Rash and Other (See Comments)     Past Medical History:   Diagnosis Date    Allergy     Anxiety     Cataract     Hypertension     Joint pain      Past Surgical History:   Procedure Laterality Date    HYSTERECTOMY      PERIPARTUM CHITO     Family History   Problem Relation Age of Onset    Breast cancer Sister     Colon cancer Neg Hx     Ovarian cancer Neg Hx     Melanoma Neg Hx      Social History     Tobacco Use    Smoking status: Never Smoker    Smokeless tobacco: Never Used   Substance Use Topics    Alcohol use: Yes    Drug use: No     Review of Systems   Constitutional: Negative for chills and fever.   HENT: Negative for congestion and sore throat.    Eyes: Negative for pain.   Respiratory: Negative for shortness of breath.    Cardiovascular: Negative for chest pain.   Gastrointestinal: Negative for abdominal pain, diarrhea, nausea and vomiting.   Genitourinary:  Negative for dysuria.   Musculoskeletal:        Left hip and left leg pain   Skin: Negative for rash.   Neurological: Negative for weakness, numbness and headaches.       Physical Exam     Initial Vitals [11/21/18 1820]   BP Pulse Resp Temp SpO2   136/83 97 18 98.7 °F (37.1 °C) 99 %      MAP       --         Physical Exam    Constitutional: Vital signs are normal. She is cooperative.   Patient ambulates with an antalgic gait   HENT:   Head: Normocephalic and atraumatic.   Eyes: EOM are normal. Pupils are equal, round, and reactive to light.   Neck: Normal range of motion. Neck supple.   Cardiovascular: Normal rate, regular rhythm and intact distal pulses.   Pulmonary/Chest: Breath sounds normal. She has no wheezes. She has no rhonchi. She has no rales.   Abdominal: Soft. Bowel sounds are normal.   Musculoskeletal:   Unable to lie patient down perform straight leg raise secondary to pain.  No CT L midline tenderness, crepitus, step-off, or deformities.  There is pain overlying the left SI joint.  No left lower extremity bony tenderness or deformities. No crepitus noted.  Left lower extremity compartments are soft and compressible   Neurological: She is alert and oriented to person, place, and time. GCS eye subscore is 4. GCS verbal subscore is 5. GCS motor subscore is 6.   Strength 5/5 to bilateral lower extremities   Skin: Skin is warm and dry. No rash noted.         ED Course   Procedures  Labs Reviewed - No data to display       Imaging Results    None          Medical Decision Making:   Initial Assessment:   Urgent evaluation of a 68 y.o. Female presenting to the emergency department complaining of left hip/leg pain. Patient is afebrile, nontoxic appearing and hemodynamically stable.  Patient with nontraumatic leg pain.  Worse with ambulation.  Patient does admit this occurred a few hours after lifting heavy turkeys.  No midline spine tenderness on exam patient does have some overlying tenderness to the left SI  joint.  Will obtain x-ray, provide analgesics and reassess.  No signs of cellulitis.  I do not suspect DVT.  Patient's back pain is likely a musculoskeletal strain.  There are no signs of saddle anesthesia, incontinence, neurologic deficits, fevers, trauma or midline tenderness on history or physical to suggest cauda equina, infectious process, fracture or subluxation.   ED Management:  X-ray reveals no acute displaced fracture or dislocation.  There are age-related mild degenerative changes.  Patient reports some improvement after receiving Norco, Robaxin and IM Toradol.  Patient will be sent home with a muscle relaxer.  Patient has gabapentin and Mobic at home.  She is advised on symptomatic care, to massage the area and apply heat.  She is advised follow up with primary care provider return here for new worsening symptoms.  She has no other complaints this time is stable for discharge.                      Clinical Impression:     1. Muscle strain of left lower leg, initial encounter    2. Sprain of sacroiliac ligament, initial encounter                               Prakash Ramos PA-C  11/21/18 2049

## 2018-11-22 NOTE — ED NOTES
"Left leg pain from hip down into thigh muscle and upper calf muscle. Pt noticed the pain after walking around the grocery store. She was carrying 2 turkeys and states it just came on her. She is in therapy for the other leg, and this just "came on her"  LOC: Pt is awake alert and aware of environment, oriented X3 and speaking appropriately  Appearance: Pt is in no acute distress, Pt is well groomed and clean  Skin: skin is warm and dry with normal turgor, mucus membranes are moist and pink, skin is intact with no bruising or breakdown  Muskuloskeletal: Pt moves all extremities well, there is no obvious swelling or deformities noted, pulses are intact. Pain to left leg from hip to mid calf. Describes pain as sore  Respiratory: Airway is open and patent, respirations are spontaneous and even.  Cardiac:no edema and cap refill is <3sec  Neuro: Pt follows commands easily and has no obvious deficits  "

## 2018-11-26 ENCOUNTER — TELEPHONE (OUTPATIENT)
Dept: ORTHOPEDICS | Facility: CLINIC | Age: 68
End: 2018-11-26

## 2018-11-26 NOTE — TELEPHONE ENCOUNTER
Date of initial AF diagnosis: 2007  Paroxsymal, persistent OR chronic: paroxsymal  Symptoms, frequency and duration: palpitations, REICH, occ'l dizziness  LA size (date): 24 ml/m2 (9/2014 ECHO)  LVEF (date): 66% (ECHO 9/2014)  CHADSVASCs score: 3 (age,gender,HTN)  Dates of ablations: none  Past chest and abdominal surgeries /dates: none  Prior AA and reason for discontinuing:  Sotalol - current  Currently on anticoagulation?: warfarin    Maintaining SR on sotalol  Continue sotalol 120 mg bid  Follow up in 6 mos   Notified pt. Called and Informed patient of appointment on 11/29/18 at 3:30 pm. Patient states verbal understanding and has no further questions.

## 2018-11-26 NOTE — TELEPHONE ENCOUNTER
----- Message from Irving Allen sent at 11/26/2018  3:37 PM CST -----  Contact: patient  Please call pt at [phone]    Patient is having severe left leg pain and swelling  Requesting an appt with this provider only    Thank you

## 2018-11-28 ENCOUNTER — TELEPHONE (OUTPATIENT)
Dept: ORTHOPEDICS | Facility: CLINIC | Age: 68
End: 2018-11-28

## 2018-11-28 NOTE — TELEPHONE ENCOUNTER
Called and Informed patient of appointment on 11/29/18 at 7:00 am. Per EDWAR Cruz. Patient states verbal understanding and has no further questions.

## 2018-11-28 NOTE — TELEPHONE ENCOUNTER
Saw that this patient was put on Dr Hernandez's schedule. Called the patient to confirm that she needed to see a hand specialist and she states that she needs to see someone for her leg pain sooner than the available appt for Adrianna Solares.     I informed the patient that she was put on Dr Beck Hernandez's schedule but he does not see leg cases at all, just upper extremity (elbow to fingertip). Due to the scheduling error, I told the patient that I would search for a solution for her and then myself or someone else would call her back sometime today with a potential solution.

## 2018-11-29 ENCOUNTER — OFFICE VISIT (OUTPATIENT)
Dept: ORTHOPEDICS | Facility: CLINIC | Age: 68
End: 2018-11-29
Payer: MEDICARE

## 2018-11-29 VITALS — HEIGHT: 66 IN | BODY MASS INDEX: 27.49 KG/M2 | WEIGHT: 171.06 LBS

## 2018-11-29 DIAGNOSIS — M54.50 ACUTE LEFT-SIDED LOW BACK PAIN WITHOUT SCIATICA: Primary | ICD-10-CM

## 2018-11-29 PROCEDURE — 99999 PR PBB SHADOW E&M-EST. PATIENT-LVL III: CPT | Mod: PBBFAC,,, | Performed by: PHYSICIAN ASSISTANT

## 2018-11-29 PROCEDURE — 99213 OFFICE O/P EST LOW 20 MIN: CPT | Mod: S$GLB,,, | Performed by: PHYSICIAN ASSISTANT

## 2018-11-29 PROCEDURE — 1101F PT FALLS ASSESS-DOCD LE1/YR: CPT | Mod: CPTII,S$GLB,, | Performed by: PHYSICIAN ASSISTANT

## 2018-11-29 RX ORDER — TIZANIDINE 2 MG/1
TABLET ORAL
Qty: 270 TABLET | Refills: 0 | OUTPATIENT
Start: 2018-11-29

## 2018-11-29 RX ORDER — METHYLPREDNISOLONE 4 MG/1
TABLET ORAL
Qty: 1 TABLET | Refills: 0 | Status: SHIPPED | OUTPATIENT
Start: 2018-11-29 | End: 2018-12-18 | Stop reason: ALTCHOICE

## 2018-11-29 RX ORDER — TIZANIDINE 2 MG/1
2 TABLET ORAL EVERY 8 HOURS PRN
Qty: 12 TABLET | Refills: 0 | Status: SHIPPED | OUTPATIENT
Start: 2018-11-29 | End: 2018-11-30 | Stop reason: SDUPTHER

## 2018-11-29 NOTE — TELEPHONE ENCOUNTER
Therapy orders fax to [facility] [phone] and to orthopedic sports therapy [phone]. Done.

## 2018-11-30 RX ORDER — TIZANIDINE 2 MG/1
TABLET ORAL
Qty: 12 TABLET | Refills: 0 | Status: SHIPPED | OUTPATIENT
Start: 2018-11-30 | End: 2018-12-10 | Stop reason: SDUPTHER

## 2018-11-30 NOTE — PROGRESS NOTES
Subjective:      Patient ID: Soila Chávez is a 68 y.o. female.    Chief Complaint: Pain of the Left Leg    HPI    Patient is a 68 year old female who presents to clinic with chief complaint of left hip, thigh and lower back pain since 11/21/2018. Patient stated that she was lifting a heavy turkey when pain began. She stated that she has a intermittent shooting pain into her thigh. Pain stops at there knee. Patient has been treated with Mobic which is not helping as well as gabapentin as needed which helps when she takes it. She denied saddle anesthesia changes in bowel or bladder or leg weakness. She does states this feels somewhat similar to her sciatic pain but also feels like a muscle pain.    Review of Systems   Constitution: Negative for chills and fever.   Cardiovascular: Negative for chest pain.   Respiratory: Negative for cough and shortness of breath.    Skin: Negative for color change, dry skin, itching, nail changes, poor wound healing and rash.   Musculoskeletal: Positive for back pain and muscle cramps.        Left leg pain   Neurological: Negative for dizziness.   Psychiatric/Behavioral: Negative for altered mental status. The patient is not nervous/anxious.    All other systems reviewed and are negative.        Objective:      General    Constitutional: She is oriented to person, place, and time. She appears well-developed and well-nourished. No distress.   HENT:   Head: Atraumatic.   Eyes: Conjunctivae are normal.   Cardiovascular: Normal rate.    Pulmonary/Chest: Effort normal.   Neurological: She is alert and oriented to person, place, and time.   Psychiatric: She has a normal mood and affect. Her behavior is normal.         Left Hip Exam     Range of Motion   The patient has normal left hip ROM.    Tests   Pain w/ forced internal rotation (AVRIL): absent  Pain w/ forced external rotation (FADIR): absent  Log Roll: negative      Back (L-Spine & T-Spine) / Neck (C-Spine) Exam     Tenderness  Left paramedian tenderness of the Lower L-Spine and Upper L-Spine.               RADS: done previously: Bones are well mineralized. Overall alignment is within normal limits. No displaced fracture, dislocation or destructive osseous process.  Age-related mild degenerative change.  Pelvic phleboliths noted.  No subcutaneous emphysema or radiodense retained foreign body  Assessment:       Encounter Diagnosis   Name Primary?    Acute left-sided low back pain without sciatica Yes          Plan:       Discussed plan with patient. At this time she will do functional rest. Alternate ice and heat to the area. She will take gabapentin on a regular schedule x 1 week then may return to as needed. Prescription written for medrol dose bobbi. Order written for PT. She is to return to clinic as needed.

## 2018-12-01 NOTE — DISCHARGE INSTRUCTIONS
Follow up with your PCP. Continue at home aleve as needed for pain. Return to the ED for any new or worsening symptoms, including those listed below.        Back Pain (Acute or Chronic)    Back pain is one of the most common problems. The good news is that most people feel better in 1 to 2 weeks, and most of the rest in 1 to 2 months. Most people can remain active.  People experience and describe pain differently; not everyone is the same.  · The pain can be sharp, stabbing, shooting, aching, cramping or burning.  · Movement, standing, bending, lifting, sitting, or walking may worsen pain.  · It can be localized to one spot or area, or it can be more generalized.  · It can spread or radiate upwards, to the front, or go down your arms or legs (sciatica).  · It can cause muscle spasm.  Most of the time, mechanical problems with the muscles or spine cause the pain. Mechanical problems are usually caused by an injury to the muscles or ligaments. While illness can cause back pain, it is usually not caused by a serious illness. Mechanical problems include:   · Physical activity such as sports, exercise, work, or normal activity  · Overexertion, lifting, pushing, pulling incorrectly or too aggressively  · Sudden twisting, bending, or stretching from an accident, or accidental movement  · Poor posture  · Stretching or moving wrong, without noticing pain at the time  · Poor coordination, lack of regular exercise (check with your doctor about this)  · Spinal disc disease or arthritis  · Stress  Pain can also be related to pregnancy, or illness like appendicitis, bladder or kidney infections, pelvic infections, and many other things.  Acute back pain usually gets better in 1 to 2 weeks. Back pain related to disk disease, arthritis in the spinal joints or spinal stenosis (narrowing of the spinal canal) can become chronic and last for months or years.  Unless you had a physical injury (for example, a car accident or fall) X-rays  PT treatment note:  Patient was not available for their therapy session at this time.  Reason not seen: Sleeping soundly/unarrousable (12/01/18 1345).     Re-Attempt Plan: Will re-attempt later today(time permitting) (12/01/18 1345).   are usually not needed for the initial evaluation of back pain. If pain continues and does not respond to medical treatment, X-rays and other tests may be needed.  Home care  Try these home care recommendations:  · When in bed, try to find a position of comfort. A firm mattress is best. Try lying flat on your back with pillows under your knees. You can also try lying on your side with your knees bent up towards your chest and a pillow between your knees.  · At first, do not try to stretch out the sore spots. If there is a strain, it is not like the good soreness you get after exercising without an injury. In this case, stretching may make it worse.  · Avoid prolong sitting, long car rides, or travel. This puts more stress on the lower back than standing or walking.  · During the first 24 to 72 hours after an acute injury or flare up of chronic back pain, apply an ice pack to the painful area for 20 minutes and then remove it for 20 minutes. Do this over a period of 60 to 90 minutes or several times a day. This will reduce swelling and pain. Wrap the ice pack in a thin towel or plastic to protect your skin.  · You can start with ice, then switch to heat. Heat (hot shower, hot bath, or heating pad) reduces pain and works well for muscle spasms. Heat can be applied to the painful area for 20 minutes then remove it for 20 minutes. Do this over a period of 60 to 90 minutes or several times a day. Do not sleep on a heating pad. It can lead to skin burns or tissue damage.  · You can alternate ice and heat therapy. Talk with your doctor about the best treatment for your back pain.  · Therapeutic massage can help relax the back muscles without stretching them.  · Be aware of safe lifting methods and do not lift anything without stretching first.  Medicines  Talk to your doctor before using medicine, especially if you have other medical problems or are taking other medicines.  · You may use over-the-counter medicine as  directed on the bottle to control pain, unless another pain medicine was prescribed. If you have chronic conditions like diabetes, liver or kidney disease, stomach ulcers, or gastrointestinal bleeding, or are taking blood thinners, talk to your doctor before taking any medicine.  · Be careful if you are given a prescription medicines, narcotics, or medicine for muscle spasms. They can cause drowsiness, affect your coordination, reflexes, and judgement. Do not drive or operate heavy machinery.  Follow-up care  Follow up with your healthcare provider, or as advised.   A radiologist will review any X-rays that were taken. Your provide will notify you of any new findings that may affect your care.  Call 911  Call emergency services if any of the following occur:  · Trouble breathing  · Confusion  · Very drowsy or trouble awakening  · Fainting or loss of consciousness  · Rapid or very slow heart rate  · Loss of bowel or bladder control  When to seek medical advice  Call your healthcare provider right away if any of these occur:   · Pain becomes worse or spreads to your legs  · Weakness or numbness in one or both legs  · Numbness in the groin or genital area  Date Last Reviewed: 7/1/2016  © 0261-3069 [employer]. [address] [place] [address]. All rights reserved. This information is not intended as a substitute for professional medical care. Always follow your healthcare professional's instructions.        Mechanical Fall  You have had a fall today. It appears that the cause is what we call mechanical. That means that you slipped, tripped or lost your balance. If your fall had been due to fainting or a seizure, other tests might be required.  It is normal to feel sore and tight in your muscles and back the next day, and not just the muscles you injured at first. Remember, all the parts of your body are connected, so while initially one area hurts, the next day another may hurt. Also, when you  injure yourself, it causes inflammation, which then causes the muscles to tighten up and hurt more. After the initial worsening, it should gradually improve over the next few days. However, report more severe pain.  Even without a definite head injury, you can still get a concussion from your head suddenly jerking forward, backward or sideways when falling. Concussions and even bleeding can still occur, especially if you have had a recent injury or take blood thinner medicine. It is not unusual to have a mild headache and feel tired and even nauseous or dizzy.   Home care  1. Rest today and resume your normal activities when you are feeling back to normal.  2. If you were injured during the fall, follow the advice from your doctor regarding care of your injury.  3. At first, do not try to stretch out the sore spots. If there is a strain, stretching may make it worse.  Massage may help relax the muscles without stretching them.  4. You can use an ice pack or cold compress on and off to the sore spots 10 to 20 at a time, as often as you feel comfortable. This may help reduce the inflammation, swelling and pain.  5. If you have any scrapes or abrasions, they usually heal within 10 days. It is important to keep the abrasions clean while they initially start to heal. However, an infection may occur even with proper care, so watch for early signs of infection.  Medications  · Talk to your doctor before taking new medicines, especially if you have other medical problems or are taking other medicines.  · If you need anything for pain, you can take acetaminophen or ibuprofen, unless you were given a different pain medicine to use. Talk with your doctor before using these medicines if you have chronic liver or kidney disease, or ever had a stomach ulcer or gastrointestinal bleeding, or are taking blood thinner medicines.  · Be careful if you are given prescription pain medicines, narcotics, or medicine for muscle spasm. They  can make you sleepy, dizzy and can affect your coordination, reflexes and judgment. Do not drive or do work where you can injure yourself when taking them.  Fall prevention  · Was there anything that caused your fall that can be fixed, removed, or replaced?  · Make your home safe by keeping walkways clear of objects you may trip over.  · Use non-slip pads under rugs. Don't use small area rugs or throw rugs.  · Do not walk in poorly lit areas.  · Do not stand on chairs or wobbly ladders.  · Use caution when reaching overhead or looking upward. This position can cause a loss of balance.  · Be sure your shoes fit properly, have non-slip bottoms and are in good condition.  · Be cautious when going up and down curbs, and walking on uneven sidewalks.  · If your balance is poor, consider using a cane or walker.  · Stay as active as you can. Balance, flexibility, strength, and endurance all come from exercise. They all play a role in preventing falls.  · If you have pets, know where they are before you stand up or walk so you don't trip over them.  · Limit alcohol intake. Alcohol can cause balance problems and increase the risk of falls.  · Use night lights.  Follow-up  Follow up with your doctor or as advised by our staff. If X-rays or CT scans were done, you will be notified if there is a change in the reading, especially if it affects treatment.  Call 911  Call 911 if any of these occur:  · Trouble breathing  · Confused or difficulty arousing  · Fainting or loss of consciousness  · Rapid or very slow heart rate  · Seizure  · Difficulty with speech or vision, weakness of an arm or leg  · Difficulty walking or talking, loss of balance, numbness or weakness in one side of your body, facial droop  When to seek medical advice  Call your healthcare provider right away if any of these occur:  · Repeated mechanical falls, or unexplained falls  · Dizziness  · Severe headache  · Blood in vomit, stools (black or red color)  Date  Last Reviewed: 11/5/2015  © 2449-8303 [employer]. [address] [place] [address]. All rights reserved. This information is not intended as a substitute for professional medical care. Always follow your healthcare professional's instructions.        Reducing Knee Pain and Swelling    Many treatments can help reduce pain and swelling in your knee. Your healthcare provider or physical therapist may suggest one or more of the following treatments:  · Icing your knee helps reduce swelling. You may be asked to ice your knee once a day or more. Apply ice for about 15 to 20 minutes at a time, with at least 40 minutes between sessions. Always keep a towel between the ice and your skin.   · Keeping your leg raised above your heart helps excess fluid flow out of your knee joint. This reduces swelling.  · Compression means wrapping an elastic bandage or neoprene sleeve snugly around your knees. This keeps fluid from collecting in your knee joint.  · Electrical stimulation, done by a physical therapist or , can help reduce excess fluid in your knee joint.  · Anti-inflammatory medicines may be prescribed by your healthcare provider. You may take pills or receive injections in your knee.  · Isometric (ruth ann) exercises strengthen the muscles that support your knee joint. They also help reduce excess fluid in your knee.  · Massage helps fluid drain away from your knee.  Date Last Reviewed: 10/13/2015  © 2103-3688 [employer]. [address] [place] [address]. All rights reserved. This information is not intended as a substitute for professional medical care. Always follow your healthcare professional's instructions.

## 2018-12-10 RX ORDER — TIZANIDINE 2 MG/1
TABLET ORAL
Qty: 12 TABLET | Refills: 0 | Status: SHIPPED | OUTPATIENT
Start: 2018-12-10 | End: 2018-12-21 | Stop reason: SDUPTHER

## 2018-12-11 RX ORDER — TIZANIDINE 2 MG/1
TABLET ORAL
Qty: 12 TABLET | Refills: 0 | OUTPATIENT
Start: 2018-12-11

## 2018-12-14 RX ORDER — GABAPENTIN 300 MG/1
CAPSULE ORAL
Qty: 90 CAPSULE | Refills: 0 | Status: SHIPPED | OUTPATIENT
Start: 2018-12-14 | End: 2018-12-18

## 2018-12-17 ENCOUNTER — OFFICE VISIT (OUTPATIENT)
Dept: OBSTETRICS AND GYNECOLOGY | Facility: CLINIC | Age: 68
End: 2018-12-17
Payer: MEDICARE

## 2018-12-17 VITALS — BODY MASS INDEX: 27.63 KG/M2 | WEIGHT: 171.94 LBS | HEIGHT: 66 IN

## 2018-12-17 DIAGNOSIS — N95.9 MENOPAUSAL AND PERIMENOPAUSAL DISORDER: ICD-10-CM

## 2018-12-17 DIAGNOSIS — Z01.419 VISIT FOR GYNECOLOGIC EXAMINATION: Primary | ICD-10-CM

## 2018-12-17 PROCEDURE — 99999 PR PBB SHADOW E&M-EST. PATIENT-LVL II: CPT | Mod: PBBFAC,,, | Performed by: OBSTETRICS & GYNECOLOGY

## 2018-12-17 PROCEDURE — G0101 CA SCREEN;PELVIC/BREAST EXAM: HCPCS | Mod: S$GLB,,, | Performed by: OBSTETRICS & GYNECOLOGY

## 2018-12-17 NOTE — PROGRESS NOTES
"HISTORY OF PRESENT ILLNESS:    Soila Chávez is a 68 y.o. female,   presents today for her routine visit and has no complaints.  PAP NOT INDICATED AND MAMMO DUE NEXT YEAR.  REASSURED NL GYN EXAM AND I DO NOT FEEL THAT HER RECENT BACK PAIN DUE TO GYN PROBLEM - MORE LIKELY MUSCULOSKELETAL.  NO GYN C/O  RETIRED BUT STILL WORKING FOR [employer].  ANTICIPATING SECOND GRANDDAUGHTER SOON    LAST VISIT 2017:  complaining of  VULVAR ITCHING AND PERIANAL ITCHING. USED MONISTAT CREAM EXTERNALLY VULVA WITH IMPROVEMENT AND ADVISED SHE CAN ALSO USE PERIANALLY.  IN EVENT OF RECURRENCE LOTRISONE MAY GIVE FASTER RELIEF;  AFFIRM SUBMITTED TO VERIFY YEAST INFECTION.   DUE ROUTINE VISIT AND MAMMO NEXT YEAR   LAST VISIT 2016:   NEW PATIENT, ESTAB CARE.  PAP NOT INDICATED.  RECOMMENDATIONS.  IN PAST, PAP SMEARS NL  MAMMO HAS BEEN ORDERED AND NEEDS SCHEDULING.  NO HOT FLUSHES OR GYN C/O.   HAS "BUMP" ON LEFT BUTTOCK PAST YEAR, NOT SYMPTOMATIC OR CHANGING.  REASSURED RE SKIN TAG.  COUNSELED RE VAGINAL MOISTURIZERS AND LUBRICANTS. REMOTE HX OF TRICHOMONAS.    RECENT NL BMD    Past Medical History:   Diagnosis Date    Allergy     Anxiety     Cataract     Hypertension     Joint pain        Past Surgical History:   Procedure Laterality Date    HYSTERECTOMY      PERIPARTUM CHITO       MEDICATIONS AND ALLERGIES:      Current Outpatient Medications:     ALPRAZolam (XANAX) 0.25 MG tablet, Take 0.5 mg by mouth 2 (two) times daily as needed for Anxiety. , Disp: , Rfl:     antiox#[id number]-lut-zeax 280-10-2 mg Cap, Take 2 capsules by mouth., Disp: , Rfl:     ascorbic acid (VITAMIN C) 100 MG tablet, Take 100 mg by mouth once daily., Disp: , Rfl:     aspirin (ECOTRIN) 81 MG EC tablet, Take 81 mg by mouth., Disp: , Rfl:     b complex vitamins tablet, Take 1 tablet by mouth once daily., Disp: , Rfl:     calcium carbonate-vitamin D3 500 mg(1,250mg) -125 unit per tablet, Take 1 tablet by mouth., Disp: , Rfl:     " fluticasone (FLONASE) 50 mcg/actuation nasal spray, 2 sprays (100 mcg total) by Each Nare route once daily., Disp: 3 Bottle, Rfl: 4    gabapentin (NEURONTIN) 300 MG capsule, TAKE 1 CAPSULE(300 MG) BY MOUTH THREE TIMES DAILY AS NEEDED, Disp: 90 capsule, Rfl: 0    losartan-hydrochlorothiazide 100-25 mg (HYZAAR) 100-25 mg per tablet, TAKE 1 TABLET BY MOUTH EVERY DAY, Disp: 90 tablet, Rfl: 0    meloxicam (MOBIC) 15 MG tablet, TAKE 1 TABLET(15 MG) BY MOUTH EVERY DAY, Disp: 30 tablet, Rfl: 0    methylPREDNISolone (MEDROL DOSEPACK) 4 mg tablet, use as directed, Disp: 1 tablet, Rfl: 0    multivitamin (THERAGRAN) per tablet, Take 1 tablet by mouth., Disp: , Rfl:     omega-3 fatty acids 1,000 mg Cap, Take 1 g by mouth., Disp: , Rfl:     omeprazole (PRILOSEC) 20 MG capsule, Take 1 capsule (20 mg total) by mouth 2 (two) times daily as needed., Disp: 180 capsule, Rfl: 3    tiZANidine (ZANAFLEX) 2 MG tablet, TAKE 1 TABLET(2 MG) BY MOUTH EVERY 8 HOURS AS NEEDED FOR MUSCLE SPASM, Disp: 12 tablet, Rfl: 0    Review of patient's allergies indicates:   Allergen Reactions    Lotensin [benazepril] Swelling and Other (See Comments)    Penicillins Rash and Other (See Comments)       Family History   Problem Relation Age of Onset    Breast cancer Sister     Colon cancer Neg Hx     Ovarian cancer Neg Hx     Melanoma Neg Hx        Social History     Socioeconomic History    Marital status:      Spouse name: Not on file    Number of children: Not on file    Years of education: Not on file    Highest education level: Not on file   Social Needs    Financial resource strain: Not on file    Food insecurity - worry: Not on file    Food insecurity - inability: Not on file    Transportation needs - medical: Not on file    Transportation needs - non-medical: Not on file   Occupational History    Not on file   Tobacco Use    Smoking status: Never Smoker    Smokeless tobacco: Never Used   Substance and Sexual Activity  "   Alcohol use: Yes    Drug use: No    Sexual activity: Not on file   Other Topics Concern    Are you pregnant or think you may be? Not Asked    Breast-feeding Not Asked   Social History Narrative    June 2016    The patient reports that she lives alone normally, however her daughter recently moved in with her    She has a 43-year-old daughter, Saima    And a 26-year-old daughterChris, who is a schoolteacher for 6 in seventh grade in [employer]    She is retired from working as a  in the school system    She now works about 5-15 hours a week for [employer], which she enjoys.    Enjoys working at [employer] couple days a week       COMPREHENSIVE GYN HISTORY:  PAP History: Denies abnormal Paps except as noted above.  Infection History: Denies STDs. Denies PID.  Benign History: Denies uterine fibroids. Denies ovarian cysts. Denies endometriosis. Denies other conditions.  Cancer History: Denies cervical cancer. Denies uterine cancer or hyperplasia. Denies ovarian cancer. Denies vulvar cancer or pre-cancer. Denies vaginal cancer or pre-cancer. Denies breast cancer. Denies colon cancer.  Menstrual History: Denies menses.     ROS:  GENERAL: No weight changes. No swelling. No fatigue. No fever.  CARDIOVASCULAR: No chest pain. No shortness of breath. No leg cramps.   NEUROLOGICAL: No headaches. No vision changes.  BREASTS: No pain. No lumps. No discharge.  ABDOMEN: No pain. No nausea. No vomiting. No diarrhea. No constipation.  REPRODUCTIVE: No abnormal bleeding.  VULVA: No pain. No lesions. No itching.  VAGINA: No relaxation. No itching. No odor. No discharge. No lesions.  URINARY: No incontinence. No nocturia. No frequency. No dysuria.    Ht 5' 6" (1.676 m)   Wt 78 kg (171 lb 15.3 oz)   BMI 27.75 kg/m²     PE:  APPEARANCE: Well nourished, well developed, in no acute distress.  AFFECT: WNL, alert and oriented x 3.  SKIN: No acne or hirsutism.  NECK: Neck symmetric without masses or " thyromegaly.  NODES: No inguinal, cervical, axillary or femoral lymph node enlargement.  CHEST: Good respiratory effort.   ABDOMEN: Soft. No tenderness or masses. No hepatosplenomegaly. No hernias.  BREASTS: Symmetrical, no skin changes or visible lesions. No palpable masses, nipple discharge bilaterally.  PELVIC: ATROPHIC EXTERNAL FEMALE GENITALIA without lesions. Normal hair distribution. Adequate perineal body, normal urethral meatus. VAGINA DRY without lesions or discharge. No significant cystocele or rectocele. Bimanual exam shows CERVIX and UTERUS to be SURGICALLY ABSENT. Adnexa without masses or tenderness.  EXTREMITIES: No edema.    DIAGNOSIS:  1. Visit for gynecologic examination     2. Menopausal and perimenopausal disorder         LABS AND TESTS ORDERED:  Mammogram    MEDICATIONS PRESCRIBED:    COUNSELING:  The patient was counseled today on osteoporosis prevention, calcium supplementation, and regular weight bearing exercise. The patient was also counseled today on ACS PAP guidelines, with recommendations for yearly pelvic exams unless their uterus, cervix, and ovaries were removed for benign reasons; in that case, examinations every 3-5 years are recommended.  The patient was also counseled regarding monthly breast self-examination, routine STD screening for at-risk populations, prophylactic immunizations for transmitted infections such as  HPV, Pertussis, or Influenza as appropriate, and yearly mammograms when indicated by ACS guidelines.  She was advised to see her primary care physician for all other health maintenance.  FOLLOW-UP with me for next routine visit.

## 2018-12-18 ENCOUNTER — OFFICE VISIT (OUTPATIENT)
Dept: PRIMARY CARE CLINIC | Facility: CLINIC | Age: 68
End: 2018-12-18
Attending: FAMILY MEDICINE
Payer: MEDICARE

## 2018-12-18 VITALS
BODY MASS INDEX: 28.76 KG/M2 | DIASTOLIC BLOOD PRESSURE: 90 MMHG | HEART RATE: 88 BPM | SYSTOLIC BLOOD PRESSURE: 136 MMHG | TEMPERATURE: 99 F | HEIGHT: 65 IN | WEIGHT: 172.63 LBS

## 2018-12-18 DIAGNOSIS — J30.2 SEASONAL ALLERGIC RHINITIS, UNSPECIFIED TRIGGER: ICD-10-CM

## 2018-12-18 DIAGNOSIS — M54.50 LUMBOSACRAL PAIN: Primary | ICD-10-CM

## 2018-12-18 DIAGNOSIS — F33.1 MAJOR DEPRESSIVE DISORDER, RECURRENT EPISODE, MODERATE: ICD-10-CM

## 2018-12-18 PROCEDURE — 99999 PR PBB SHADOW E&M-EST. PATIENT-LVL III: CPT | Mod: PBBFAC,,, | Performed by: FAMILY MEDICINE

## 2018-12-18 PROCEDURE — 3075F SYST BP GE 130 - 139MM HG: CPT | Mod: CPTII,S$GLB,, | Performed by: FAMILY MEDICINE

## 2018-12-18 PROCEDURE — 3080F DIAST BP >= 90 MM HG: CPT | Mod: CPTII,S$GLB,, | Performed by: FAMILY MEDICINE

## 2018-12-18 PROCEDURE — 99214 OFFICE O/P EST MOD 30 MIN: CPT | Mod: S$GLB,,, | Performed by: FAMILY MEDICINE

## 2018-12-18 PROCEDURE — 1101F PT FALLS ASSESS-DOCD LE1/YR: CPT | Mod: CPTII,S$GLB,, | Performed by: FAMILY MEDICINE

## 2018-12-18 PROCEDURE — 99499 UNLISTED E&M SERVICE: CPT | Mod: S$GLB,,, | Performed by: FAMILY MEDICINE

## 2018-12-18 RX ORDER — CETIRIZINE HYDROCHLORIDE 10 MG/1
10 TABLET ORAL DAILY
Qty: 90 TABLET | Refills: 0 | COMMUNITY
Start: 2018-12-18 | End: 2019-05-08

## 2018-12-18 RX ORDER — CYCLOBENZAPRINE HCL 5 MG
5 TABLET ORAL 2 TIMES DAILY PRN
Qty: 30 TABLET | Refills: 0 | Status: SHIPPED | OUTPATIENT
Start: 2018-12-18 | End: 2019-01-02

## 2018-12-18 RX ORDER — OFLOXACIN 3 MG/ML
5 SOLUTION AURICULAR (OTIC) DAILY
Qty: 5 ML | Refills: 0 | Status: SHIPPED | OUTPATIENT
Start: 2018-12-18 | End: 2019-05-08

## 2018-12-18 RX ORDER — GABAPENTIN 600 MG/1
600 TABLET ORAL 3 TIMES DAILY
Qty: 90 TABLET | Refills: 3 | Status: SHIPPED | OUTPATIENT
Start: 2018-12-18 | End: 2020-02-12

## 2018-12-18 NOTE — PROGRESS NOTES
Subjective:       Patient ID: Soila Chávez is a 68 y.o. female.    Chief Complaint: No chief complaint on file.    Pt presents today for congestion and allergies from the cold weather. Pt states that she just finished a course of abx and still has some congestion although it is better. Pt also states that she is having left neck pain but that this is not new. Pain worsens when she turns her neck or when she bends her head down.  Is still having some pain from her lower back down to her right anterior lower leg. Pt states that pain is worse at night. Went to see her ortho doc that placed her on Mobic and ordered PT. Is presently in PT. Had similar episode 2 yrs ago and this helped her. Pt does want to try a different muscle relaxant as well as increase in her gabapentin.    Pt very tangential and hard to follow what symptoms relate to her areas of concern during this visit  Greater than 60 mins spent with pt; 50 % face to face          Review of Systems   Constitutional: Negative for activity change, appetite change, chills, fatigue and fever.   HENT: Positive for congestion, postnasal drip and sinus pressure. Negative for ear pain, sore throat and trouble swallowing.    Eyes: Negative for photophobia, pain and visual disturbance.   Respiratory: Negative for apnea, cough, chest tightness, shortness of breath and wheezing.    Cardiovascular: Negative for chest pain, palpitations and leg swelling.   Gastrointestinal: Negative for abdominal distention, abdominal pain, constipation, diarrhea, nausea and vomiting.   Genitourinary: Negative.    Musculoskeletal: Positive for arthralgias, back pain, gait problem and joint swelling. Negative for myalgias, neck pain and neck stiffness.   Skin: Negative.    Neurological: Negative for dizziness, tremors, weakness, numbness and headaches.   Psychiatric/Behavioral: Negative for behavioral problems, decreased concentration and sleep disturbance. The patient is not  nervous/anxious.    All other systems reviewed and are negative.      Objective:      Physical Exam   Constitutional: She is oriented to person, place, and time. She appears well-developed and well-nourished.   HENT:   Head: Normocephalic and atraumatic.   Left Ear: A middle ear effusion is present.   Nose: Mucosal edema present.   Mouth/Throat: Uvula is midline and mucous membranes are normal. Posterior oropharyngeal erythema present. No oropharyngeal exudate, posterior oropharyngeal edema or tonsillar abscesses.   Eyes: Conjunctivae and EOM are normal. Pupils are equal, round, and reactive to light.   Neck: Normal range of motion. Neck supple. No thyromegaly present.   Cardiovascular: Normal rate, regular rhythm, normal heart sounds and intact distal pulses.   No murmur heard.  Pulmonary/Chest: Effort normal and breath sounds normal. No respiratory distress. She has no wheezes. She has no rales. She exhibits no tenderness.   Musculoskeletal: Normal range of motion. She exhibits tenderness (pos TTP along ant shin. varicosity noted along leg, no ssx of DVT). She exhibits no edema.   Lymphadenopathy:     She has no cervical adenopathy.   Neurological: She is alert and oriented to person, place, and time. She displays normal reflexes. No cranial nerve deficit or sensory deficit. She exhibits normal muscle tone. Coordination normal.   Skin: Skin is warm. No erythema.   Psychiatric: She has a normal mood and affect. Her behavior is normal. Judgment and thought content normal.       Assessment:       Common cold  Right leg pain  Neck sprain  htn  Plan:        HTN controlled on meds  Neck sprain and right leg pain: will do an increase in gabapentin. Also, heat ice and rest. PT. F/u with ortho.  A trial of flexeril hopefully will help. SE's of meds d/w pt in depth  Pt reassured. SE's of med d/w pt    RTC prn symptoms or q 6 mos  Pt declines flu shot

## 2018-12-21 RX ORDER — TIZANIDINE 2 MG/1
TABLET ORAL
Qty: 12 TABLET | Refills: 0 | Status: SHIPPED | OUTPATIENT
Start: 2018-12-21 | End: 2018-12-22 | Stop reason: SDUPTHER

## 2018-12-26 RX ORDER — LOSARTAN POTASSIUM AND HYDROCHLOROTHIAZIDE 25; 100 MG/1; MG/1
TABLET ORAL
Qty: 90 TABLET | Refills: 0 | Status: SHIPPED | OUTPATIENT
Start: 2018-12-26 | End: 2019-03-28 | Stop reason: SDUPTHER

## 2018-12-27 ENCOUNTER — TELEPHONE (OUTPATIENT)
Dept: OTOLARYNGOLOGY | Facility: CLINIC | Age: 68
End: 2018-12-27

## 2018-12-27 RX ORDER — TIZANIDINE 2 MG/1
TABLET ORAL
Qty: 12 TABLET | Refills: 0 | OUTPATIENT
Start: 2018-12-27

## 2018-12-27 RX ORDER — TIZANIDINE 2 MG/1
TABLET ORAL
Qty: 12 TABLET | Refills: 0 | Status: SHIPPED | OUTPATIENT
Start: 2018-12-27 | End: 2019-07-25

## 2018-12-27 NOTE — TELEPHONE ENCOUNTER
----- Message from Kal Smith sent at 12/27/2018 10:49 AM CST -----  Contact: [phone]  Needs Advice    Reason for call: Patient requesting to speak to staff regarding ear pain and possible appt, please contact         Communication Preference: [phone]    Additional Information:

## 2019-01-02 ENCOUNTER — TELEPHONE (OUTPATIENT)
Dept: PRIMARY CARE CLINIC | Facility: CLINIC | Age: 69
End: 2019-01-02

## 2019-01-02 NOTE — TELEPHONE ENCOUNTER
----- Message from Claudia Capellanin sent at 12/31/2018  9:41 AM CST -----  Contact: CARY LINDSAY [[id number]]            Name of Who is Calling:   CARY LINDSAY [[id number]]      What is the request in detail:  Patient called requesting a call. Patient refused to disclose as to what her call pertains to.     Please give a call back at your earliest convenience.    THANKS!      Can the clinic reply by [facility]: NO      What Number to Call Back: CARY LINDSAY / # [phone]

## 2019-01-10 ENCOUNTER — OFFICE VISIT (OUTPATIENT)
Dept: OTOLARYNGOLOGY | Facility: CLINIC | Age: 69
End: 2019-01-10
Payer: MEDICARE

## 2019-01-10 VITALS
SYSTOLIC BLOOD PRESSURE: 131 MMHG | DIASTOLIC BLOOD PRESSURE: 81 MMHG | HEART RATE: 79 BPM | BODY MASS INDEX: 29.06 KG/M2 | WEIGHT: 171.94 LBS

## 2019-01-10 DIAGNOSIS — H93.8X3 SENSATION OF FULLNESS IN BOTH EARS: Primary | ICD-10-CM

## 2019-01-10 PROCEDURE — 99999 PR PBB SHADOW E&M-EST. PATIENT-LVL III: CPT | Mod: PBBFAC,,, | Performed by: OTOLARYNGOLOGY

## 2019-01-10 PROCEDURE — 99999 PR PBB SHADOW E&M-EST. PATIENT-LVL III: ICD-10-PCS | Mod: PBBFAC,,, | Performed by: OTOLARYNGOLOGY

## 2019-01-10 PROCEDURE — 1101F PT FALLS ASSESS-DOCD LE1/YR: CPT | Mod: CPTII,S$GLB,, | Performed by: OTOLARYNGOLOGY

## 2019-01-10 PROCEDURE — 69210 REMOVE IMPACTED EAR WAX UNI: CPT | Mod: S$GLB,,, | Performed by: OTOLARYNGOLOGY

## 2019-01-10 PROCEDURE — 69210 PR REMOVAL IMPACTED CERUMEN REQUIRING INSTRUMENTATION, UNILATERAL: ICD-10-PCS | Mod: S$GLB,,, | Performed by: OTOLARYNGOLOGY

## 2019-01-10 PROCEDURE — 99213 OFFICE O/P EST LOW 20 MIN: CPT | Mod: 25,S$GLB,, | Performed by: OTOLARYNGOLOGY

## 2019-01-10 PROCEDURE — 99213 PR OFFICE/OUTPT VISIT, EST, LEVL III, 20-29 MIN: ICD-10-PCS | Mod: 25,S$GLB,, | Performed by: OTOLARYNGOLOGY

## 2019-01-10 PROCEDURE — 1101F PR PT FALLS ASSESS DOC 0-1 FALLS W/OUT INJ PAST YR: ICD-10-PCS | Mod: CPTII,S$GLB,, | Performed by: OTOLARYNGOLOGY

## 2019-01-10 NOTE — PROGRESS NOTES
Subjective:       Patient ID: Soila Chávez is a 68 y.o. female.    Chief Complaint: Ear Fullness    Ear Fullness    There is pain in both ears. This is a recurrent problem. The current episode started 1 to 4 weeks ago. The problem occurs constantly. The problem has been waxing and waning. There has been no fever. The patient is experiencing no pain. Associated symptoms include hearing loss. Pertinent negatives include no coughing, diarrhea, ear discharge, headaches, neck pain, rash, rhinorrhea or vomiting. She has tried nothing for the symptoms. Her past medical history is significant for hearing loss.     Review of Systems   Constitutional: Negative for activity change, appetite change and fever.   HENT: Positive for hearing loss. Negative for congestion, ear discharge, ear pain, nosebleeds, postnasal drip, rhinorrhea and sneezing.    Eyes: Negative for redness and visual disturbance.   Respiratory: Negative for apnea, cough, shortness of breath and wheezing.    Cardiovascular: Negative for chest pain and palpitations.   Gastrointestinal: Negative for diarrhea and vomiting.   Genitourinary: Negative for difficulty urinating and frequency.   Musculoskeletal: Negative for arthralgias, back pain, gait problem and neck pain.   Skin: Negative for color change and rash.   Neurological: Negative for dizziness, speech difficulty, weakness and headaches.   Hematological: Negative for adenopathy. Does not bruise/bleed easily.   Psychiatric/Behavioral: Negative for agitation and behavioral problems.       Objective:        Constitutional:   Vital signs are normal. She appears well-developed and well-nourished. She is active. Normal speech.      Head:  Normocephalic and atraumatic. Salivary glands normal.  Facial strength is normal.      Nose:  Nose normal including turbinates, nasal mucosa, sinuses and nasal septum.     Mouth/Throat  Oropharynx clear and moist without lesions or asymmetry and normal uvula midline.      Neck:  Neck normal without thyromegaly masses, asymmetry, normal tracheal structure, crepitus, and tenderness, thyroid normal, trachea normal, phonation normal and full range of motion with neck supple.     Psychiatric:   She has a normal mood and affect. Her speech is normal and behavior is normal.     Neurological:   She has neurological normal, alert and oriented.     Skin:   No abrasions, lacerations, lesions, or rashes.       PROCEDURE NOTE:  Ceruminosis is noted in both EACs.  Wax was removed by manual debridement and suctioning utilizing the assistance of binocular microscopy revealing EACs and TMs WNL. The patient tolerated this procedure without difficulty. The subjective decrease noted in hearing pre-cleaning was resolved post-cleaning.       Assessment:       1. Sensation of fullness in both ears        Plan:       1. Hearing conservation strongly recommended.  2. Trial of amplification is not currently indicated.  3. Re-check of hearing after 71 years of age.  4. F/U with PCP as per schedule.

## 2019-03-28 RX ORDER — LOSARTAN POTASSIUM AND HYDROCHLOROTHIAZIDE 25; 100 MG/1; MG/1
TABLET ORAL
Qty: 30 TABLET | Refills: 0 | Status: SHIPPED | OUTPATIENT
Start: 2019-03-28 | End: 2019-04-29 | Stop reason: SDUPTHER

## 2019-03-28 RX ORDER — LOSARTAN POTASSIUM AND HYDROCHLOROTHIAZIDE 25; 100 MG/1; MG/1
TABLET ORAL
Qty: 90 TABLET | Refills: 0 | OUTPATIENT
Start: 2019-03-28

## 2019-04-29 RX ORDER — LOSARTAN POTASSIUM AND HYDROCHLOROTHIAZIDE 25; 100 MG/1; MG/1
TABLET ORAL
Qty: 90 TABLET | Refills: 1 | Status: SHIPPED | OUTPATIENT
Start: 2019-04-29 | End: 2019-10-28 | Stop reason: SDUPTHER

## 2019-05-08 ENCOUNTER — HOSPITAL ENCOUNTER (OUTPATIENT)
Dept: RADIOLOGY | Facility: HOSPITAL | Age: 69
Discharge: HOME | End: 2019-05-08
Attending: PHYSICIAN ASSISTANT
Payer: MEDICARE

## 2019-05-08 ENCOUNTER — OFFICE VISIT (OUTPATIENT)
Dept: ORTHOPEDICS | Facility: CLINIC | Age: 69
End: 2019-05-08
Payer: MEDICARE

## 2019-05-08 VITALS
BODY MASS INDEX: 28.98 KG/M2 | DIASTOLIC BLOOD PRESSURE: 79 MMHG | HEART RATE: 68 BPM | RESPIRATION RATE: 18 BRPM | SYSTOLIC BLOOD PRESSURE: 128 MMHG | TEMPERATURE: 98 F | HEIGHT: 65 IN | WEIGHT: 173.94 LBS

## 2019-05-08 DIAGNOSIS — M25.562 ACUTE PAIN OF BOTH KNEES: ICD-10-CM

## 2019-05-08 DIAGNOSIS — M25.561 ACUTE PAIN OF BOTH KNEES: ICD-10-CM

## 2019-05-08 DIAGNOSIS — M17.0 PRIMARY OSTEOARTHRITIS OF BOTH KNEES: ICD-10-CM

## 2019-05-08 DIAGNOSIS — M25.561 ACUTE PAIN OF BOTH KNEES: Primary | ICD-10-CM

## 2019-05-08 DIAGNOSIS — M25.562 ACUTE PAIN OF BOTH KNEES: Primary | ICD-10-CM

## 2019-05-08 PROCEDURE — 3078F PR MOST RECENT DIASTOLIC BLOOD PRESSURE < 80 MM HG: ICD-10-PCS | Mod: CPTII,S$GLB,, | Performed by: PHYSICIAN ASSISTANT

## 2019-05-08 PROCEDURE — 99212 PR OFFICE/OUTPT VISIT, EST, LEVL II, 10-19 MIN: ICD-10-PCS | Mod: S$GLB,,, | Performed by: PHYSICIAN ASSISTANT

## 2019-05-08 PROCEDURE — 3074F PR MOST RECENT SYSTOLIC BLOOD PRESSURE < 130 MM HG: ICD-10-PCS | Mod: CPTII,S$GLB,, | Performed by: PHYSICIAN ASSISTANT

## 2019-05-08 PROCEDURE — 99999 PR PBB SHADOW E&M-EST. PATIENT-LVL IV: CPT | Mod: PBBFAC,,, | Performed by: PHYSICIAN ASSISTANT

## 2019-05-08 PROCEDURE — 1101F PT FALLS ASSESS-DOCD LE1/YR: CPT | Mod: CPTII,S$GLB,, | Performed by: PHYSICIAN ASSISTANT

## 2019-05-08 PROCEDURE — 99212 OFFICE O/P EST SF 10 MIN: CPT | Mod: S$GLB,,, | Performed by: PHYSICIAN ASSISTANT

## 2019-05-08 PROCEDURE — 3078F DIAST BP <80 MM HG: CPT | Mod: CPTII,S$GLB,, | Performed by: PHYSICIAN ASSISTANT

## 2019-05-08 PROCEDURE — 99999 PR PBB SHADOW E&M-EST. PATIENT-LVL IV: ICD-10-PCS | Mod: PBBFAC,,, | Performed by: PHYSICIAN ASSISTANT

## 2019-05-08 PROCEDURE — 1101F PR PT FALLS ASSESS DOC 0-1 FALLS W/OUT INJ PAST YR: ICD-10-PCS | Mod: CPTII,S$GLB,, | Performed by: PHYSICIAN ASSISTANT

## 2019-05-08 PROCEDURE — 3074F SYST BP LT 130 MM HG: CPT | Mod: CPTII,S$GLB,, | Performed by: PHYSICIAN ASSISTANT

## 2019-05-08 RX ORDER — MELOXICAM 15 MG/1
15 TABLET ORAL DAILY
Qty: 30 TABLET | Refills: 0 | Status: SHIPPED | OUTPATIENT
Start: 2019-05-08 | End: 2019-06-07

## 2019-05-09 ENCOUNTER — TELEPHONE (OUTPATIENT)
Dept: OTOLARYNGOLOGY | Facility: CLINIC | Age: 69
End: 2019-05-09

## 2019-05-09 RX ORDER — MELOXICAM 15 MG/1
TABLET ORAL
Qty: 90 TABLET | Refills: 0 | OUTPATIENT
Start: 2019-05-09

## 2019-05-09 NOTE — TELEPHONE ENCOUNTER
Spoke with patient  Concerning ear soreness DR. LOPEZ not available until June 4th 2019 patient stated she will see her primary care and follow up with ENT

## 2019-05-09 NOTE — TELEPHONE ENCOUNTER
----- Message from Rocío Paredes MA sent at 5/8/2019  4:48 PM CDT -----      ----- Message -----  From: Keyona Smalls MA  Sent: 5/8/2019   4:05 PM  To: Ramirez MALDONADO Staff    Please call pt. Pt would like to schedule an appointment for her itchy sore right ear. Thank you

## 2019-05-10 NOTE — PROGRESS NOTES
Subjective:      Patient ID: Soila Chávez is a 69 y.o. female.    Chief Complaint: Pain of the Left Knee and Pain of the Right Knee    HPI    Patient is a 69 year old female with known history of OA bilateral knees who presents to clinic with chief complaint of a-traumatic intermittent posterior knee pain. Pain feels like a achy throbbing. Decreased with NSAID well as with walking. Patient denied numbness or tingling, locking catching popping cracking.     Review of Systems   Constitution: Negative for chills and fever.   Cardiovascular: Negative for chest pain.   Respiratory: Negative for cough and shortness of breath.    Skin: Negative for color change, dry skin, itching, nail changes, poor wound healing and rash.   Musculoskeletal:        Bilateral knee pain.    Neurological: Negative for dizziness.   Psychiatric/Behavioral: Negative for altered mental status. The patient is not nervous/anxious.    All other systems reviewed and are negative.        Objective:      General    Constitutional: She is oriented to person, place, and time. She appears well-developed and well-nourished. No distress.   HENT:   Head: Atraumatic.   Eyes: Conjunctivae are normal.   Cardiovascular: Normal rate.    Pulmonary/Chest: Effort normal.   Neurological: She is alert and oriented to person, place, and time.   Psychiatric: She has a normal mood and affect. Her behavior is normal.           Right Knee Exam   Right knee exam is normal.    Tenderness   The patient is experiencing no tenderness.     Range of Motion   The patient has normal right knee ROM.    Tests   Ligament Examination Lachman: normal (-1 to 2mm) PCL-Posterior Drawer: normal (0 to 2mm)     MCL - Valgus: normal (0 to 2mm)  LCL - Varus: normal    Other   Sensation: normal    Left Knee Exam   Left knee exam is normal.    Tenderness   The patient is experiencing no tenderness.     Range of Motion   The patient has normal left knee ROM.    Tests   Stability Lachman:  normal (-1 to 2mm) PCL-Posterior Drawer: normal (0 to 2mm)  MCL - Valgus: normal (0 to 2mm)  LCL - Varus: normal (0 to 2mm)    Other   Sensation: normal    Muscle Strength   Right Lower Extremity   Quadriceps:  5/5   Hamstrin/5   Left Lower Extremity   Quadriceps:  5/5   Hamstrin/5               Assessment:       Encounter Diagnoses   Name Primary?    Acute pain of both knees Yes    Primary osteoarthritis of both knees           Plan:       Patient will continued use of NSAID as needed. She will return to clinic as needed.

## 2019-07-02 ENCOUNTER — TELEPHONE (OUTPATIENT)
Dept: ORTHOPEDICS | Facility: CLINIC | Age: 69
End: 2019-07-02

## 2019-07-02 NOTE — TELEPHONE ENCOUNTER
Spoke with pt to inform her that RR is out of town per RR  And to make  her an appt with DR Spencer. Pt agreed on date and time with Dr Hernandez on 7/8/19 @ 2:30pm.      ----- Message from Fouzia Lawrence MA sent at 7/2/2019  2:08 PM CDT -----  Contact: self      ----- Message -----  From: Nuno Santacruz  Sent: 7/2/2019   9:51 AM  To: Beck Rodas Staff    Needs Advice    Reason for call: Pt ask if Adrianna Solares take a look at her left pinky finger please. Pt states that her pinky finger is turned inward and it hurts really bad, cannot write or  on things. Everything fall out of her hand Pt is aware that Adrianna do not see for the hand however really would like for Adrianna to look at it for her        Communication Preference:  Phone     Additional Information: n/a

## 2019-07-03 ENCOUNTER — TELEPHONE (OUTPATIENT)
Dept: ORTHOPEDICS | Facility: CLINIC | Age: 69
End: 2019-07-03

## 2019-07-03 DIAGNOSIS — M79.645 FINGER PAIN, LEFT: Primary | ICD-10-CM

## 2019-07-08 ENCOUNTER — OFFICE VISIT (OUTPATIENT)
Dept: ORTHOPEDICS | Facility: CLINIC | Age: 69
End: 2019-07-08
Payer: MEDICARE

## 2019-07-08 ENCOUNTER — HOSPITAL ENCOUNTER (OUTPATIENT)
Dept: RADIOLOGY | Facility: OTHER | Age: 69
Discharge: HOME | End: 2019-07-08
Attending: ORTHOPAEDIC SURGERY
Payer: MEDICARE

## 2019-07-08 VITALS — WEIGHT: 173.94 LBS | BODY MASS INDEX: 28.98 KG/M2 | HEIGHT: 65 IN

## 2019-07-08 DIAGNOSIS — M79.644 PAIN OF FINGER OF RIGHT HAND: Primary | ICD-10-CM

## 2019-07-08 DIAGNOSIS — M79.644 PAIN OF FINGER OF RIGHT HAND: ICD-10-CM

## 2019-07-08 DIAGNOSIS — M79.645 FINGER PAIN, LEFT: ICD-10-CM

## 2019-07-08 DIAGNOSIS — G56.21 CUBITAL TUNNEL SYNDROME ON RIGHT: Primary | ICD-10-CM

## 2019-07-08 PROCEDURE — 99213 OFFICE O/P EST LOW 20 MIN: CPT | Mod: S$GLB,,, | Performed by: ORTHOPAEDIC SURGERY

## 2019-07-08 PROCEDURE — 99999 PR PBB SHADOW E&M-EST. PATIENT-LVL III: ICD-10-PCS | Mod: PBBFAC,,, | Performed by: ORTHOPAEDIC SURGERY

## 2019-07-08 PROCEDURE — 1101F PR PT FALLS ASSESS DOC 0-1 FALLS W/OUT INJ PAST YR: ICD-10-PCS | Mod: CPTII,S$GLB,, | Performed by: ORTHOPAEDIC SURGERY

## 2019-07-08 PROCEDURE — 73140 X-RAY EXAM OF FINGER(S): CPT | Mod: TC,FY,RT

## 2019-07-08 PROCEDURE — 73140 X-RAY EXAM OF FINGER(S): CPT | Mod: 26,RT,, | Performed by: RADIOLOGY

## 2019-07-08 PROCEDURE — 73140 XR FINGER 2 OR MORE VIEWS: ICD-10-PCS | Mod: 26,RT,, | Performed by: RADIOLOGY

## 2019-07-08 PROCEDURE — 99999 PR PBB SHADOW E&M-EST. PATIENT-LVL III: CPT | Mod: PBBFAC,,, | Performed by: ORTHOPAEDIC SURGERY

## 2019-07-08 PROCEDURE — 99213 PR OFFICE/OUTPT VISIT, EST, LEVL III, 20-29 MIN: ICD-10-PCS | Mod: S$GLB,,, | Performed by: ORTHOPAEDIC SURGERY

## 2019-07-08 PROCEDURE — 1101F PT FALLS ASSESS-DOCD LE1/YR: CPT | Mod: CPTII,S$GLB,, | Performed by: ORTHOPAEDIC SURGERY

## 2019-07-08 NOTE — PROGRESS NOTES
[facility]  History & Physical  Orthopedics    SUBJECTIVE:      Chief Complaint: right small finger decreased ROM    Referring Provider: Adrianna Solares PA-C     History of Present Illness:  Patient is a 69 y.o. right hand dominant female who presents today with complaints of decreased extension of her right small finger. She states that it is not painful but makes some of her daily activities difficult. She denies any injury in the past to this finger (even though xrays are suggestive of a healed mallet finger) or recent trauma.      The patient is a/an  .    Onset of symptoms/DOI was 2 months ago.    Symptoms are aggravated by during the day.    Symptoms are alleviated by nothing.    Symptoms consist of decreased ROM.    The patient rates their pain as a 0/10.    Attempted treatment(s) and/or interventions include activity modification.     The patient denies any fevers, chills, N/V, D/C and presents for evaluation.       Past Medical History:   Diagnosis Date    Allergy     Anxiety     Cataract     Hypertension     Joint pain      Past Surgical History:   Procedure Laterality Date    HYSTERECTOMY      PERIPARTUM CHITO     Review of patient's allergies indicates:   Allergen Reactions    Lotensin [benazepril] Swelling and Other (See Comments)    Penicillins Rash and Other (See Comments)     Social History     Social History Narrative    June 2016    The patient reports that she lives alone normally, however her daughter recently moved in with her    She has a 43-year-old daughter, Saima    And a 26-year-old daughterChris, who is a schoolteacher for 6 in seventh grade in [employer]    She is retired from working as a  in the school system    She now works about 5-15 hours a week for [employer], which she enjoys.    Enjoys working at "[employer] couple days a week     Family History   Problem Relation Age of Onset    Breast cancer Sister     Colon cancer Neg  Hx     Ovarian cancer Neg Hx     Melanoma Neg Hx          Current Outpatient Medications:     ALPRAZolam (XANAX) 0.25 MG tablet, Take 0.5 mg by mouth 2 (two) times daily as needed for Anxiety. , Disp: , Rfl:     antiox#10-om3-dha-epa-lut-zeax 280-10-2 mg Cap, Take 2 capsules by mouth., Disp: , Rfl:     ascorbic acid (VITAMIN C) 100 MG tablet, Take 100 mg by mouth once daily., Disp: , Rfl:     aspirin (ECOTRIN) 81 MG EC tablet, Take 81 mg by mouth., Disp: , Rfl:     b complex vitamins tablet, Take 1 tablet by mouth once daily., Disp: , Rfl:     calcium carbonate-vitamin D3 500 mg(1,250mg) -125 unit per tablet, Take 1 tablet by mouth., Disp: , Rfl:     fluticasone (FLONASE) 50 mcg/actuation nasal spray, 2 sprays (100 mcg total) by Each Nare route once daily., Disp: 3 Bottle, Rfl: 4    gabapentin (NEURONTIN) 600 MG tablet, Take 1 tablet (600 mg total) by mouth 3 (three) times daily., Disp: 90 tablet, Rfl: 3    losartan-hydrochlorothiazide 100-25 mg (HYZAAR) 100-25 mg per tablet, TAKE 1 TABLET BY MOUTH EVERY DAY, Disp: 90 tablet, Rfl: 1    multivitamin (THERAGRAN) per tablet, Take 1 tablet by mouth., Disp: , Rfl:     omega-3 fatty acids 1,000 mg Cap, Take 1 g by mouth., Disp: , Rfl:     omeprazole (PRILOSEC) 20 MG capsule, Take 1 capsule (20 mg total) by mouth 2 (two) times daily as needed., Disp: 180 capsule, Rfl: 3    tiZANidine (ZANAFLEX) 2 MG tablet, TAKE 1 TABLET(2 MG) BY MOUTH EVERY 8 HOURS AS NEEDED FOR MUSCLE SPASM, Disp: 12 tablet, Rfl: 0      Review of Systems:  Constitutional: no fever or chills  Eyes: no visual changes  ENT: no nasal congestion or sore throat  Respiratory: no cough or shortness of breath  Cardiovascular: no chest pain  Gastrointestinal: no nausea or vomiting, tolerating diet  Musculoskeletal: decreased ROM    OBJECTIVE:      Vital Signs (Most Recent):  There were no vitals filed for this visit.  There is no height or weight on file to calculate BMI.      Physical  Exam:  Constitutional: The patient appears well-developed and well-nourished. No distress.   Head: Normocephalic and atraumatic.   Nose: Nose normal.   Eyes: Conjunctivae and EOM are normal.   Neck: No tracheal deviation present.   Cardiovascular: Normal rate and intact distal pulses.    Pulmonary/Chest: Effort normal. No respiratory distress.   Abdominal: There is no guarding.   Neurological: The patient is alert.   Psychiatric: The patient has a normal mood and affect.     Right Hand/Wrist Examination:    Observation/Inspection:  Swelling  none    Deformity  Easily reducible flexion contracture at right small finger PIP  Discoloration  none     Scars   none    Atrophy  none    HAND/WRIST EXAMINATION:  Finkelstein's Test   Neg  WHAT Test    Neg  Snuff box tenderness   Neg  Harman's Test    Neg  Hook of Hamate Tenderness  Neg  CMC grind    Neg  Circumduction test   Neg    Neurovascular Exam:  Digits WWP, brisk CR < 3s throughout  NVI motor/LTS to M/R/U nerves, radial pulse 2+  Tinel's Test - Carpal Tunnel  Neg  Tinel's Test - Cubital Tunnel  Neg  Phalen's Test    Neg  Median Nerve Compression Test Neg    ROM hand/wrist/elbow full, painless      Diagnostic Results:     Xray - R small finger showing healed fracture of the distal phalanx, DJD of DIP    ASSESSMENT/PLAN:      69 y.o. yo female with decreased ability to extending at R small finger DIP  Plan: patient has weakness throughout the R ulnar nerve distribution with some intrinsic wasting in the R hand. She does not have a true claw hand, but cannot extend at the PIP of the small finger. She is a very poor historian, but denies pain. There is questionable numbness in the small finger. Will obtain an EMG and see back in clinic with results.         Beck Hernandez M.D.     Please be aware that this note has been generated with the assistance of [employer] voice-to-text.  Please excuse any spelling or grammatical errors.

## 2019-07-08 NOTE — LETTER
July 8, 2019      Adrianna Solares PA-C  [address]  [facility] [address]           [facility] [address] [facility] [address]  [facility], [address]  [facility] [address]  Phone: [phone]          Patient: Soila Chávez   MR Number: [id number]   YOB: 1950   Date of Visit: 7/8/2019       Dear Adrianna Solares:    Thank you for referring Soila Chávez to me for evaluation. Attached you will find relevant portions of my assessment and plan of care.    If you have questions, please do not hesitate to call me. I look forward to following Soila Chávez along with you.    Sincerely,    Beck Hernandez MD    Enclosure  CC:  No Recipients    If you would like to receive this communication electronically, please contact [email or web address] or [phone] to request more information on [employer] Link access.    For providers and/or their staff who would like to refer a patient to [facility], please contact us through our one-stop-shop provider referral line, [facility], at [phone].    If you feel you have received this communication in error or would no longer like to receive these types of communications, please e-mail [email or web address]

## 2019-07-25 ENCOUNTER — OFFICE VISIT (OUTPATIENT)
Dept: PRIMARY CARE CLINIC | Facility: CLINIC | Age: 69
End: 2019-07-25
Attending: FAMILY MEDICINE
Payer: MEDICARE

## 2019-07-25 VITALS
SYSTOLIC BLOOD PRESSURE: 126 MMHG | WEIGHT: 174.94 LBS | BODY MASS INDEX: 29.15 KG/M2 | HEIGHT: 65 IN | HEART RATE: 72 BPM | DIASTOLIC BLOOD PRESSURE: 76 MMHG

## 2019-07-25 DIAGNOSIS — L30.8 OTHER ECZEMA: ICD-10-CM

## 2019-07-25 DIAGNOSIS — F41.9 ANXIETY: ICD-10-CM

## 2019-07-25 DIAGNOSIS — I10 ESSENTIAL HYPERTENSION: ICD-10-CM

## 2019-07-25 DIAGNOSIS — H92.03 OTALGIA OF BOTH EARS: Primary | ICD-10-CM

## 2019-07-25 DIAGNOSIS — J30.1 SEASONAL ALLERGIC RHINITIS DUE TO POLLEN: ICD-10-CM

## 2019-07-25 DIAGNOSIS — M54.50 LUMBOSACRAL PAIN: ICD-10-CM

## 2019-07-25 PROCEDURE — 99214 OFFICE O/P EST MOD 30 MIN: CPT | Mod: S$GLB,,, | Performed by: FAMILY MEDICINE

## 2019-07-25 PROCEDURE — 3074F SYST BP LT 130 MM HG: CPT | Mod: CPTII,S$GLB,, | Performed by: FAMILY MEDICINE

## 2019-07-25 PROCEDURE — 99999 PR PBB SHADOW E&M-EST. PATIENT-LVL III: CPT | Mod: PBBFAC,,, | Performed by: FAMILY MEDICINE

## 2019-07-25 PROCEDURE — 99999 PR PBB SHADOW E&M-EST. PATIENT-LVL III: ICD-10-PCS | Mod: PBBFAC,,, | Performed by: FAMILY MEDICINE

## 2019-07-25 PROCEDURE — 3074F PR MOST RECENT SYSTOLIC BLOOD PRESSURE < 130 MM HG: ICD-10-PCS | Mod: CPTII,S$GLB,, | Performed by: FAMILY MEDICINE

## 2019-07-25 PROCEDURE — 1101F PT FALLS ASSESS-DOCD LE1/YR: CPT | Mod: CPTII,S$GLB,, | Performed by: FAMILY MEDICINE

## 2019-07-25 PROCEDURE — 3078F PR MOST RECENT DIASTOLIC BLOOD PRESSURE < 80 MM HG: ICD-10-PCS | Mod: CPTII,S$GLB,, | Performed by: FAMILY MEDICINE

## 2019-07-25 PROCEDURE — 3078F DIAST BP <80 MM HG: CPT | Mod: CPTII,S$GLB,, | Performed by: FAMILY MEDICINE

## 2019-07-25 PROCEDURE — 99214 PR OFFICE/OUTPT VISIT, EST, LEVL IV, 30-39 MIN: ICD-10-PCS | Mod: S$GLB,,, | Performed by: FAMILY MEDICINE

## 2019-07-25 PROCEDURE — 1101F PR PT FALLS ASSESS DOC 0-1 FALLS W/OUT INJ PAST YR: ICD-10-PCS | Mod: CPTII,S$GLB,, | Performed by: FAMILY MEDICINE

## 2019-07-25 RX ORDER — HYDROCORTISONE AND ACETIC ACID 20.75; 10.375 MG/ML; MG/ML
5 SOLUTION AURICULAR (OTIC) 2 TIMES DAILY
Qty: 10 ML | Refills: 0 | Status: SHIPPED | OUTPATIENT
Start: 2019-07-25 | End: 2019-07-29

## 2019-07-25 RX ORDER — CETIRIZINE HYDROCHLORIDE 10 MG/1
10 TABLET ORAL DAILY PRN
COMMUNITY
Start: 2015-05-11 | End: 2022-05-17 | Stop reason: SDUPTHER

## 2019-07-25 RX ORDER — IBUPROFEN 200 MG
1 CAPSULE ORAL NIGHTLY
COMMUNITY
Start: 2014-06-17 | End: 2023-03-08

## 2019-07-25 RX ORDER — ALPRAZOLAM 1 MG/1
TABLET ORAL
Refills: 4 | COMMUNITY
Start: 2019-07-03 | End: 2019-10-16

## 2019-07-25 RX ORDER — ASCORBIC ACID 1000 MG
TABLET ORAL DAILY PRN
COMMUNITY
Start: 2009-07-20 | End: 2022-04-20

## 2019-07-25 NOTE — PROGRESS NOTES
Subjective:       Patient ID: Soila Chávez is a 69 y.o. female.    Chief Complaint: Hand Pain (right finger extension already seen by hand )    Pt presents today for questions re: whether she should have an EMG done on her left hand for weakness in her fingers. Pt did see Dr Hernandez and was told that this is the next step. Pt is scared that it will be painful.  Pt very tangential and while discussing her hand asks about ant thigh pain left leg(not new) as well as knee pain( not new) as well as sinus concerns(also not new)    Pt has ENT and ortho general as well as hand that manage all of this for the patient    Pt also tells me that she has a new granddaughter names Esme    Pt also c/p pain in both her ears, not new. Wants to have a RF of her drops again    Review of Systems   Constitutional: Negative.    HENT: Positive for congestion and ear pain.    Eyes: Negative.    Respiratory: Negative for cough, chest tightness and shortness of breath.    Cardiovascular: Negative for chest pain, palpitations and leg swelling.   Gastrointestinal: Negative.    Musculoskeletal: Positive for arthralgias, back pain and joint swelling.   Skin: Negative.    Neurological: Positive for numbness. Negative for dizziness, tremors, seizures, syncope, facial asymmetry, speech difficulty, weakness, light-headedness and headaches.   All other systems reviewed and are negative.      Objective:      Physical Exam   Constitutional: She is oriented to person, place, and time. She appears well-developed and well-nourished.   HENT:   Head: Normocephalic and atraumatic.   Right Ear: External ear normal.   Left Ear: External ear normal.   Nose: Nose normal.   Mouth/Throat: Oropharynx is clear and moist. No oropharyngeal exudate.   Eyes: Pupils are equal, round, and reactive to light. EOM are normal.   Neck: Normal range of motion. Neck supple. No thyromegaly present.   Cardiovascular: Normal rate, regular rhythm, normal heart sounds and  intact distal pulses.   No murmur heard.  Pulmonary/Chest: Effort normal and breath sounds normal. No stridor. No respiratory distress. She has no wheezes. She has no rales. She exhibits no tenderness.   Abdominal: Soft. Bowel sounds are normal. She exhibits no distension and no mass. There is no tenderness. There is no rebound and no guarding.   Musculoskeletal: Normal range of motion. She exhibits tenderness (b/l TTP around patella; left hand pinky finger unable to extend all of the way. pos radial pulses,  4/5 left hand). She exhibits no edema.   Lymphadenopathy:     She has no cervical adenopathy.   Neurological: She is alert and oriented to person, place, and time. She has normal reflexes. She displays normal reflexes. No cranial nerve deficit or sensory deficit. She exhibits normal muscle tone. Coordination normal.   Skin: Skin is warm and dry. No erythema.   Psychiatric: She has a normal mood and affect. Her behavior is normal. Judgment and thought content normal.       Assessment:       1. Otalgia of both ears        Plan:     Otalgia of both ears  -     acetic acid-hydrocortisone (VOSOL-HC) otic solution; Place 5 drops into both ears 2 (two) times daily. for 10 days  Dispense: 10 mL; Refill: 0      Hand pain: refer back to Dr BRYCE Bonilla pt to have EMG done  B/l knee pain: ref  Back to ortho    HTN Bp stable    Anxiety managed by psych      RTC prn for annual

## 2019-07-26 ENCOUNTER — TELEPHONE (OUTPATIENT)
Dept: PRIMARY CARE CLINIC | Facility: CLINIC | Age: 69
End: 2019-07-26

## 2019-07-26 NOTE — TELEPHONE ENCOUNTER
----- Message from Siomara Monae sent at 7/26/2019  9:54 AM CDT -----  Contact: [facility]  Name of Who is Calling: [facility]      What is the request in detail: requesting to change the (acetic acid-hydrocortisone (VOSOL-HC) otic solution to a different medication due to it not being covered by the pt's insurance.,Please call to advise      Can the clinic reply by [facility]: no      What Number to Call Back if not in [facility]: [id number] - [place], [place] - [address]  [address] [facility] [address] [phone] (Phone)  [phone] (Fax)

## 2019-07-26 NOTE — TELEPHONE ENCOUNTER
was notified per [facility] that the ear drops were not covered by her insurance . Advised to call her insurance provider and see what is covered and call [facility] to find out how much ar the drops.  will call back with the new name of rx ear drops per insurance advise. Conversation was understood and it ended.

## 2019-07-29 ENCOUNTER — TELEPHONE (OUTPATIENT)
Dept: PRIMARY CARE CLINIC | Facility: CLINIC | Age: 69
End: 2019-07-29

## 2019-07-29 NOTE — TELEPHONE ENCOUNTER
----- Message from Lupe Tellez sent at 7/29/2019 12:56 PM CDT -----  Contact: Self            Name of Who is Calling:  CARY LINDSAY [[id number]]      What is the request in detail:  Pt states she [facility] at [phone] fax [phone]  for the tobrex,elestrin, and clrtsprin. Pt received these medication names from her insurance. Please contact to further discuss and advise.         Can the clinic reply by [facility]: N      What Number to Call Back if not [facility]: [phone]

## 2019-07-30 RX ORDER — NEOMYCIN SULFATE, POLYMYXIN B SULFATE AND HYDROCORTISONE 10; 3.5; 1 MG/ML; MG/ML; [USP'U]/ML
3 SUSPENSION/ DROPS AURICULAR (OTIC) 4 TIMES DAILY
Qty: 10 ML | Refills: 0 | Status: SHIPPED | OUTPATIENT
Start: 2019-07-30 | End: 2020-08-11

## 2019-07-31 ENCOUNTER — TELEPHONE (OUTPATIENT)
Dept: PRIMARY CARE CLINIC | Facility: CLINIC | Age: 69
End: 2019-07-31

## 2019-08-15 ENCOUNTER — OFFICE VISIT (OUTPATIENT)
Dept: OTOLARYNGOLOGY | Facility: CLINIC | Age: 69
End: 2019-08-15
Payer: MEDICARE

## 2019-08-15 VITALS
BODY MASS INDEX: 29.43 KG/M2 | WEIGHT: 174.19 LBS | SYSTOLIC BLOOD PRESSURE: 134 MMHG | DIASTOLIC BLOOD PRESSURE: 81 MMHG | HEART RATE: 74 BPM

## 2019-08-15 DIAGNOSIS — H93.8X3 SENSATION OF FULLNESS IN BOTH EARS: Primary | ICD-10-CM

## 2019-08-15 PROCEDURE — 1101F PR PT FALLS ASSESS DOC 0-1 FALLS W/OUT INJ PAST YR: ICD-10-PCS | Mod: CPTII,S$GLB,, | Performed by: OTOLARYNGOLOGY

## 2019-08-15 PROCEDURE — 3079F DIAST BP 80-89 MM HG: CPT | Mod: CPTII,S$GLB,, | Performed by: OTOLARYNGOLOGY

## 2019-08-15 PROCEDURE — 69210 PR REMOVAL IMPACTED CERUMEN REQUIRING INSTRUMENTATION, UNILATERAL: ICD-10-PCS | Mod: S$GLB,,, | Performed by: OTOLARYNGOLOGY

## 2019-08-15 PROCEDURE — 1101F PT FALLS ASSESS-DOCD LE1/YR: CPT | Mod: CPTII,S$GLB,, | Performed by: OTOLARYNGOLOGY

## 2019-08-15 PROCEDURE — 3075F SYST BP GE 130 - 139MM HG: CPT | Mod: CPTII,S$GLB,, | Performed by: OTOLARYNGOLOGY

## 2019-08-15 PROCEDURE — 99999 PR PBB SHADOW E&M-EST. PATIENT-LVL III: CPT | Mod: PBBFAC,,, | Performed by: OTOLARYNGOLOGY

## 2019-08-15 PROCEDURE — 69210 REMOVE IMPACTED EAR WAX UNI: CPT | Mod: S$GLB,,, | Performed by: OTOLARYNGOLOGY

## 2019-08-15 PROCEDURE — 3075F PR MOST RECENT SYSTOLIC BLOOD PRESS GE 130-139MM HG: ICD-10-PCS | Mod: CPTII,S$GLB,, | Performed by: OTOLARYNGOLOGY

## 2019-08-15 PROCEDURE — 99999 PR PBB SHADOW E&M-EST. PATIENT-LVL III: ICD-10-PCS | Mod: PBBFAC,,, | Performed by: OTOLARYNGOLOGY

## 2019-08-15 PROCEDURE — 99213 PR OFFICE/OUTPT VISIT, EST, LEVL III, 20-29 MIN: ICD-10-PCS | Mod: 25,S$GLB,, | Performed by: OTOLARYNGOLOGY

## 2019-08-15 PROCEDURE — 99213 OFFICE O/P EST LOW 20 MIN: CPT | Mod: 25,S$GLB,, | Performed by: OTOLARYNGOLOGY

## 2019-08-15 PROCEDURE — 3079F PR MOST RECENT DIASTOLIC BLOOD PRESSURE 80-89 MM HG: ICD-10-PCS | Mod: CPTII,S$GLB,, | Performed by: OTOLARYNGOLOGY

## 2019-08-15 NOTE — PROGRESS NOTES
Subjective:       Patient ID: Soila Chávez is a 69 y.o. female.    Chief Complaint: Ear Fullness    Ear Fullness    This is a recurrent problem. The current episode started 1 to 4 weeks ago. The problem occurs constantly. The problem has been unchanged. There has been no fever. The patient is experiencing no pain. Associated symptoms include hearing loss. Pertinent negatives include no coughing, diarrhea, ear discharge, headaches, neck pain, rash, rhinorrhea or vomiting. She has tried nothing for the symptoms. Her past medical history is significant for hearing loss.     Review of Systems   Constitutional: Negative for activity change, appetite change and fever.   HENT: Positive for hearing loss. Negative for congestion, ear discharge, ear pain, nosebleeds, postnasal drip, rhinorrhea and sneezing.    Eyes: Negative for redness and visual disturbance.   Respiratory: Negative for apnea, cough, shortness of breath and wheezing.    Cardiovascular: Negative for chest pain and palpitations.   Gastrointestinal: Negative for diarrhea and vomiting.   Genitourinary: Negative for difficulty urinating and frequency.   Musculoskeletal: Negative for arthralgias, back pain, gait problem and neck pain.   Skin: Negative for color change and rash.   Neurological: Negative for dizziness, speech difficulty, weakness and headaches.   Hematological: Negative for adenopathy. Does not bruise/bleed easily.   Psychiatric/Behavioral: Negative for agitation and behavioral problems.       Objective:        Constitutional:   Vital signs are normal. She appears well-developed and well-nourished. She is active. Normal speech.      Head:  Normocephalic and atraumatic. Salivary glands normal.  Facial strength is normal.      Nose:  Nose normal including turbinates, nasal mucosa, sinuses and nasal septum.     Mouth/Throat  Oropharynx clear and moist without lesions or asymmetry and normal uvula midline.     Neck:  Neck normal without  thyromegaly masses, asymmetry, normal tracheal structure, crepitus, and tenderness, thyroid normal, trachea normal, phonation normal and full range of motion with neck supple.     Psychiatric:   She has a normal mood and affect. Her speech is normal and behavior is normal.     Neurological:   She has neurological normal, alert and oriented.     Skin:   No abrasions, lacerations, lesions, or rashes.         PROCEDURE NOTE:  Ceruminosis is noted in both EACs.  Wax was removed by manual debridement and suctioning utilizing the assistance of binocular microscopy revealing EACs and TMs WNL. The patient tolerated this procedure without difficulty. The subjective decrease noted in hearing pre-cleaning was resolved post-cleaning.       Assessment:       1. Sensation of fullness in both ears        Plan:       1. Hearing conservation strongly recommended.  2. Trial of amplification is not currently indicated.  3. Re-check of hearing after 71 years of age.  4. F/U with PCP as per schedule.

## 2019-08-30 ENCOUNTER — PROCEDURE VISIT (OUTPATIENT)
Dept: NEUROLOGY | Facility: CLINIC | Age: 69
End: 2019-08-30
Payer: MEDICARE

## 2019-08-30 DIAGNOSIS — G56.21 CUBITAL TUNNEL SYNDROME ON RIGHT: ICD-10-CM

## 2019-08-30 PROCEDURE — 95913 PR NERVE CONDUCTION STUDY; 13 OR MORE STUDIES: ICD-10-PCS | Mod: S$GLB,,, | Performed by: PSYCHIATRY & NEUROLOGY

## 2019-08-30 PROCEDURE — 95913 NRV CNDJ TEST 13/> STUDIES: CPT | Mod: S$GLB,,, | Performed by: PSYCHIATRY & NEUROLOGY

## 2019-08-30 PROCEDURE — 95886 PR EMG COMPLETE, W/ NERVE CONDUCTION STUDIES, 5+ MUSCLES: ICD-10-PCS | Mod: S$GLB,,, | Performed by: PSYCHIATRY & NEUROLOGY

## 2019-08-30 PROCEDURE — 95886 MUSC TEST DONE W/N TEST COMP: CPT | Mod: S$GLB,,, | Performed by: PSYCHIATRY & NEUROLOGY

## 2019-08-30 NOTE — PROCEDURES
Procedures     Please see NCS/EMG procedure report    Miguel Angel Polk MD  [facility]

## 2019-09-05 ENCOUNTER — PATIENT OUTREACH (OUTPATIENT)
Dept: ADMINISTRATIVE | Facility: OTHER | Age: 69
End: 2019-09-05

## 2019-09-05 DIAGNOSIS — Z12.11 ENCOUNTER FOR FECAL IMMUNOCHEMICAL TEST SCREENING: Primary | ICD-10-CM

## 2019-09-09 ENCOUNTER — OFFICE VISIT (OUTPATIENT)
Dept: ORTHOPEDICS | Facility: CLINIC | Age: 69
End: 2019-09-09
Payer: MEDICARE

## 2019-09-09 VITALS — BODY MASS INDEX: 29.02 KG/M2 | HEIGHT: 65 IN | WEIGHT: 174.19 LBS

## 2019-09-09 DIAGNOSIS — G56.21 CUBITAL TUNNEL SYNDROME ON RIGHT: Primary | ICD-10-CM

## 2019-09-09 PROCEDURE — 1101F PT FALLS ASSESS-DOCD LE1/YR: CPT | Mod: CPTII,S$GLB,, | Performed by: ORTHOPAEDIC SURGERY

## 2019-09-09 PROCEDURE — 99999 PR PBB SHADOW E&M-EST. PATIENT-LVL III: ICD-10-PCS | Mod: PBBFAC,,, | Performed by: ORTHOPAEDIC SURGERY

## 2019-09-09 PROCEDURE — 99999 PR PBB SHADOW E&M-EST. PATIENT-LVL III: CPT | Mod: PBBFAC,,, | Performed by: ORTHOPAEDIC SURGERY

## 2019-09-09 PROCEDURE — 99213 PR OFFICE/OUTPT VISIT, EST, LEVL III, 20-29 MIN: ICD-10-PCS | Mod: S$GLB,,, | Performed by: ORTHOPAEDIC SURGERY

## 2019-09-09 PROCEDURE — 99213 OFFICE O/P EST LOW 20 MIN: CPT | Mod: S$GLB,,, | Performed by: ORTHOPAEDIC SURGERY

## 2019-09-09 PROCEDURE — 1101F PR PT FALLS ASSESS DOC 0-1 FALLS W/OUT INJ PAST YR: ICD-10-PCS | Mod: CPTII,S$GLB,, | Performed by: ORTHOPAEDIC SURGERY

## 2019-09-09 NOTE — PROGRESS NOTES
[facility]  History & Physical  Orthopedics    SUBJECTIVE:      Chief Complaint: right small finger decreased ROM    Referring Provider: No ref. provider found     History of Present Illness:  Patient is a 69 y.o. right hand dominant female who presents today with complaints of decreased extension of her right small finger. She states that it is not painful but makes some of her daily activities difficult. She denies any injury in the past to this finger (even though xrays are suggestive of a healed mallet finger) or recent trauma.      Interval Hx: 9/9/19 she is here for EMG follow-up states that she is still having pretty severe and weakness on the right is dropping things, knee. She also has decreased sensation in the hand.  Denies any kind of pain at the elbow or distal to it does not have any kind of active tingling.  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    The patient is a/an  .    Onset of symptoms/DOI was 2 months ago.    Symptoms are aggravated by during the day.    Symptoms are alleviated by nothing.    Symptoms consist of decreased ROM.    The patient rates their pain as a 0/10.    Attempted treatment(s) and/or interventions include activity modification.     The patient denies any fevers, chills, N/V, D/C and presents for evaluation.       Past Medical History:   Diagnosis Date    Allergy     Anxiety     Cataract     Hypertension     Joint pain      Past Surgical History:   Procedure Laterality Date    HYSTERECTOMY      PERIPARTUM CHITO     Review of patient's allergies indicates:   Allergen Reactions    Lotensin [benazepril] Swelling and Other (See Comments)    Penicillins Rash and Other (See Comments)     Social History     Social History Narrative    June 2016    The patient reports that she lives alone normally, however her daughter recently moved in with her    She has a 43-year-old daughter,  Saima    And a 26-year-old daughterChris, who is a schoolteacher for 6 in seventh grade in [employer]    She is retired from working as a  in the school system    She now works about 5-15 hours a week for [employer], which she enjoys.    Enjoys working at [employer] couple days a week     Family History   Problem Relation Age of Onset    Breast cancer Sister     Colon cancer Neg Hx     Ovarian cancer Neg Hx     Melanoma Neg Hx          Current Outpatient Medications:     ALPRAZolam (XANAX) 1 MG tablet, TK 1 T PO BID - QD, Disp: , Rfl: 4    antiox#10-om3-dha-epa-lut-zeax 280-10-2 mg Cap, Take 2 capsules by mouth., Disp: , Rfl:     ascorbic acid (VITAMIN C) 100 MG tablet, Take 100 mg by mouth once daily., Disp: , Rfl:     aspirin (ECOTRIN) 81 MG EC tablet, Take 81 mg by mouth., Disp: , Rfl:     b complex vitamins tablet, Take 1 tablet by mouth once daily., Disp: , Rfl:     calcium carbonate-vitamin D3 500 mg(1,250mg) -125 unit per tablet, Take 1 tablet by mouth., Disp: , Rfl:     calcium carbonate-vitamin D3 500 mg(1,250mg) -125 unit per tablet, Take 1 tablet by mouth., Disp: , Rfl:     cetirizine (ZYRTEC) 10 MG tablet, Take 10 mg by mouth., Disp: , Rfl:     fluticasone (FLONASE) 50 mcg/actuation nasal spray, 2 sprays (100 mcg total) by Each Nare route once daily., Disp: 3 Bottle, Rfl: 4    gabapentin (NEURONTIN) 600 MG tablet, Take 1 tablet (600 mg total) by mouth 3 (three) times daily., Disp: 90 tablet, Rfl: 3    ginkgo biloba 40 mg Tab, Take 2 capsules by mouth., Disp: , Rfl:     losartan-hydrochlorothiazide 100-25 mg (HYZAAR) 100-25 mg per tablet, TAKE 1 TABLET BY MOUTH EVERY DAY, Disp: 90 tablet, Rfl: 1    multivitamin (THERAGRAN) per tablet, Take 1 tablet by mouth., Disp: , Rfl:     neomycin-polymyxin-hydrocortisone (CORTISPORIN) 3.5-10,000-1 mg/mL-unit/mL-% otic suspension, Place 3 drops into both ears 4 (four) times daily., Disp: 10 mL, Rfl: 0    omega-3 fatty acids  1,000 mg Cap, Take 1 g by mouth., Disp: , Rfl:     omeprazole (PRILOSEC) 20 MG capsule, Take 1 capsule (20 mg total) by mouth 2 (two) times daily as needed., Disp: 180 capsule, Rfl: 3      Review of Systems:  Constitutional: no fever or chills  Eyes: no visual changes  ENT: no nasal congestion or sore throat  Respiratory: no cough or shortness of breath  Cardiovascular: no chest pain  Gastrointestinal: no nausea or vomiting, tolerating diet  Musculoskeletal: decreased ROM    OBJECTIVE:      Vital Signs (Most Recent):  There were no vitals filed for this visit.  There is no height or weight on file to calculate BMI.      Physical Exam:  Constitutional: The patient appears well-developed and well-nourished. No distress.   Head: Normocephalic and atraumatic.   Nose: Nose normal.   Eyes: Conjunctivae and EOM are normal.   Neck: No tracheal deviation present.   Cardiovascular: Normal rate and intact distal pulses.    Pulmonary/Chest: Effort normal. No respiratory distress.   Abdominal: There is no guarding.   Neurological: The patient is alert.   Psychiatric: The patient has a normal mood and affect.     Right Hand/Wrist Examination:    Observation/Inspection:  Swelling  none    Deformity  Easily reducible flexion contracture at right small finger PIP  Discoloration  none     Scars   none    Atrophy  none    HAND/WRIST EXAMINATION:  Finkelstein's Test   Neg  WHAT Test    Neg  Snuff box tenderness   Neg  Harman's Test    Neg  Hook of Hamate Tenderness  Neg  CMC grind    Neg  Circumduction test   Neg    Neurovascular Exam:  Digits WWP, brisk CR < 3s throughout  NVI motor/LTS to M/R/U nerves, radial pulse 2+  Tinel's Test - Carpal Tunnel  Neg  Tinel's Test - Cubital Tunnel  Neg  Phalen's Test    Neg  Median Nerve Compression Test Neg    ROM hand/wrist/elbow full, painless      Diagnostic Results:     Xray - R small finger showing healed fracture of the distal phalanx, DJD of DIP  EMG- showing mild carpal tunnel on the  right, severe ulnar nerve neuropathy proximal to the takeoff of the dorsal sensory nerve. Suggestive of cubital tunnel.    ASSESSMENT/PLAN:      69 y.o. yo female with a right severe cubital tunnel, mild carpal tunnel.  She has severe hand wasting is beginning to develop a claw hand suggestive of severe ulnar nerve neuropathy.    Discussed with her that she would most likely benefit from cubital tunnel release, carpal tunnel release.  Discussed with patient that given her severe symptoms she may not ever have full return of motor and sensory function of the ulnar nerve. However we discussed that if she wishes to proceed with conservative treatment this may become even worse.  Non operative intervention with this is unlikely to give her much relief symptoms. After discussing options patient wishes to proceed with conservative management, she will call us if she would like it to schedule surgery.    The risks, benefits and alternatives to surgery were discussed with the patient at great length.  These include bleeding, infection, vessel/nerve damage, pain, numbness, tingling, complex regional pain syndrome, recurrent infection need for further surgery, need for amputation cartilage damage, DVT, PE, arthritis and death.  Patient states an understanding and wishes to proceed with surgery.   All questions were answered.  No guarantees were implied or stated.  Informed consent was obtained.    Attending attestation:  Agree with above. Patient with significant peripheral nerve compressive in as per EMG above.  Discussed that long standing unaddressed compression can lead to permanent nerve damage and significant dysfunction throughout the upper extremity and hand.  The patient wishes to continue observation of her symptoms now which I recommend against but will respect.  She will follow up should she wish to further consider surgical intervention and I would be happy to re-evaluate her at any time.      Beck Hernandez,  M.D.     Please be aware that this note has been generated with the assistance of [employer] voice-to-text.  Please excuse any spelling or grammatical errors.

## 2019-10-07 ENCOUNTER — TELEPHONE (OUTPATIENT)
Dept: PRIMARY CARE CLINIC | Facility: CLINIC | Age: 69
End: 2019-10-07

## 2019-10-07 DIAGNOSIS — K92.1 BLOODY STOOL: Primary | ICD-10-CM

## 2019-10-07 NOTE — TELEPHONE ENCOUNTER
----- Message from Theresa Madison sent at 10/7/2019  1:20 PM CDT -----  Contact: CARY LINDSAY [[id number]]  Name of Who is Calling: CARY LINDSAY [[id number]]    What is the request in detail: Patient is requesting a call back from Khloe....Please contact to further discuss and advise      Can the clinic reply by [facility]:     [facility] [phone].   none

## 2019-10-07 NOTE — TELEPHONE ENCOUNTER
Patient states have several bowel movements and x 3 days and each time bright red blood and each day is less blood and some bowel incontinence. Please advise

## 2019-10-07 NOTE — TELEPHONE ENCOUNTER
Can be observed since less bleeding. However if it resumes she must proceed to ED. In interim should have GI consult in place so she can have followup if it is still intermittent. Please let her know. Consult placed. Should also stop all motrins, ibuprofens ets. ONLY TYLENOL if needed  Thanks,  SUKI

## 2019-10-08 NOTE — TELEPHONE ENCOUNTER
Ms Chávez was notified per 's note below:  Can be observed since less bleeding. However if it resumes she must proceed to ED. In interim should have GI consult in place so she can have followup if it is still intermittent. Please let her know. Consult placed. Should also stop all motrins, ibuprofens ets. ONLY TYLENOL if needed  Thanks,  PV         Documentation      Patient still wants to follow up with Dr. Parson , appointment  scheduled 10/16/19 9:00 am. I will mail appointment reminder.

## 2019-10-10 ENCOUNTER — TELEPHONE (OUTPATIENT)
Dept: ADMINISTRATIVE | Facility: OTHER | Age: 69
End: 2019-10-10

## 2019-10-10 NOTE — TELEPHONE ENCOUNTER
Left voice message for patient to return call to schedule appointment from referral to Gastroenterology department.  Angelica -[phone]

## 2019-10-16 ENCOUNTER — OFFICE VISIT (OUTPATIENT)
Dept: PRIMARY CARE CLINIC | Facility: CLINIC | Age: 69
End: 2019-10-16
Attending: FAMILY MEDICINE
Payer: MEDICARE

## 2019-10-16 VITALS
WEIGHT: 171.5 LBS | HEART RATE: 93 BPM | SYSTOLIC BLOOD PRESSURE: 128 MMHG | BODY MASS INDEX: 28.57 KG/M2 | HEIGHT: 65 IN | DIASTOLIC BLOOD PRESSURE: 74 MMHG

## 2019-10-16 DIAGNOSIS — R10.9 LEFT FLANK PAIN: ICD-10-CM

## 2019-10-16 DIAGNOSIS — K92.1 MELENA: ICD-10-CM

## 2019-10-16 DIAGNOSIS — M79.675 TOE PAIN, LEFT: ICD-10-CM

## 2019-10-16 DIAGNOSIS — M79.641 RIGHT HAND PAIN: Primary | ICD-10-CM

## 2019-10-16 PROCEDURE — 1101F PT FALLS ASSESS-DOCD LE1/YR: CPT | Mod: CPTII,S$GLB,, | Performed by: FAMILY MEDICINE

## 2019-10-16 PROCEDURE — 99499 UNLISTED E&M SERVICE: CPT | Mod: S$GLB,,, | Performed by: FAMILY MEDICINE

## 2019-10-16 PROCEDURE — 99215 OFFICE O/P EST HI 40 MIN: CPT | Mod: S$GLB,,, | Performed by: FAMILY MEDICINE

## 2019-10-16 PROCEDURE — 99499 RISK ADDL DX/OHS AUDIT: ICD-10-PCS | Mod: S$GLB,,, | Performed by: FAMILY MEDICINE

## 2019-10-16 PROCEDURE — 1101F PR PT FALLS ASSESS DOC 0-1 FALLS W/OUT INJ PAST YR: ICD-10-PCS | Mod: CPTII,S$GLB,, | Performed by: FAMILY MEDICINE

## 2019-10-16 PROCEDURE — 3078F DIAST BP <80 MM HG: CPT | Mod: CPTII,S$GLB,, | Performed by: FAMILY MEDICINE

## 2019-10-16 PROCEDURE — 3074F SYST BP LT 130 MM HG: CPT | Mod: CPTII,S$GLB,, | Performed by: FAMILY MEDICINE

## 2019-10-16 PROCEDURE — 99999 PR PBB SHADOW E&M-EST. PATIENT-LVL IV: CPT | Mod: PBBFAC,,, | Performed by: FAMILY MEDICINE

## 2019-10-16 PROCEDURE — 3074F PR MOST RECENT SYSTOLIC BLOOD PRESSURE < 130 MM HG: ICD-10-PCS | Mod: CPTII,S$GLB,, | Performed by: FAMILY MEDICINE

## 2019-10-16 PROCEDURE — 99215 PR OFFICE/OUTPT VISIT, EST, LEVL V, 40-54 MIN: ICD-10-PCS | Mod: S$GLB,,, | Performed by: FAMILY MEDICINE

## 2019-10-16 PROCEDURE — 3078F PR MOST RECENT DIASTOLIC BLOOD PRESSURE < 80 MM HG: ICD-10-PCS | Mod: CPTII,S$GLB,, | Performed by: FAMILY MEDICINE

## 2019-10-16 PROCEDURE — 99999 PR PBB SHADOW E&M-EST. PATIENT-LVL IV: ICD-10-PCS | Mod: PBBFAC,,, | Performed by: FAMILY MEDICINE

## 2019-10-16 RX ORDER — HYDROQUINONE 40 MG/G
CREAM TOPICAL
COMMUNITY
Start: 2014-06-17 | End: 2020-08-11

## 2019-10-16 RX ORDER — ALPRAZOLAM 0.5 MG/1
TABLET ORAL
Refills: 4 | COMMUNITY
Start: 2019-10-08 | End: 2020-02-10 | Stop reason: SDUPTHER

## 2019-10-16 RX ORDER — CYCLOBENZAPRINE HCL 5 MG
5 TABLET ORAL 3 TIMES DAILY PRN
Qty: 30 TABLET | Refills: 0 | Status: SHIPPED | OUTPATIENT
Start: 2019-10-16 | End: 2019-11-13 | Stop reason: SDUPTHER

## 2019-10-16 NOTE — PROGRESS NOTES
Subjective:       Patient ID: Soila Chávez is a 69 y.o. female.    Chief Complaint: Flank Pain (left )    Pt presents today for questions re: whether she should have an surgery done on herright hand for weakness in her fingers. Pt did see Dr Hernandez and was told that this is the next step. Pt is scared that it will be painful. And, now wants a second opinion  Pt very tangential and while discussing her hand asks about ant thigh pain left leg(not new) as well as knee pain( not new) as well as sinus concerns(also not new)    Pt has ENT and ortho general as well as hand that manage all of this for the patient    Pt also tells me that she has a new granddaughter names Esme    Pt also c/p pain in both her ears, not new. Wants to have a RF of her drops again    Flank Pain   Associated symptoms include numbness. Pertinent negatives include no chest pain, headaches or weakness.     Review of Systems   Constitutional: Negative.    HENT: Positive for congestion and ear pain.    Eyes: Negative.    Respiratory: Negative for cough, chest tightness and shortness of breath.    Cardiovascular: Negative for chest pain, palpitations and leg swelling.   Gastrointestinal: Negative.    Genitourinary: Positive for flank pain.   Musculoskeletal: Positive for arthralgias, back pain and joint swelling.   Skin: Negative.    Neurological: Positive for numbness. Negative for dizziness, tremors, seizures, syncope, facial asymmetry, speech difficulty, weakness, light-headedness and headaches.   All other systems reviewed and are negative.      Objective:      Physical Exam   Constitutional: She is oriented to person, place, and time. She appears well-developed and well-nourished.   HENT:   Head: Normocephalic and atraumatic.   Right Ear: External ear normal.   Left Ear: External ear normal.   Nose: Nose normal.   Mouth/Throat: Oropharynx is clear and moist. No oropharyngeal exudate.   Eyes: Pupils are equal, round, and reactive to light.  EOM are normal.   Neck: Normal range of motion. Neck supple. No thyromegaly present.   Cardiovascular: Normal rate, regular rhythm, normal heart sounds and intact distal pulses.   No murmur heard.  Pulmonary/Chest: Effort normal and breath sounds normal. No stridor. No respiratory distress. She has no wheezes. She has no rales. She exhibits no tenderness.   Abdominal: Soft. Bowel sounds are normal. She exhibits no distension and no mass. There is no tenderness. There is no rebound and no guarding.   Musculoskeletal: Normal range of motion. She exhibits tenderness (b/l TTP around patella; left hand pinky finger unable to extend all of the way. pos radial pulses,  4/5 left hand). She exhibits no edema.   Lymphadenopathy:     She has no cervical adenopathy.   Neurological: She is alert and oriented to person, place, and time. She has normal reflexes. She displays normal reflexes. No cranial nerve deficit or sensory deficit. She exhibits normal muscle tone. Coordination normal.   Skin: Skin is warm and dry. No erythema.   Psychiatric: She has a normal mood and affect. Her behavior is normal. Judgment and thought content normal.       Assessment:       1. Right hand pain    2. Left flank pain    3. Toe pain, left        Plan:     Right hand pain  -     Ambulatory referral to Hand Surgery    Left flank pain  -     cyclobenzaprine (FLEXERIL) 5 MG tablet; Take 1 tablet (5 mg total) by mouth 3 (three) times daily as needed for Muscle spasms.  Dispense: 30 tablet; Refill: 0    Toe pain, left  -     Ambulatory Referral to Podiatry      Hand pain: refer back to Dr BRYCE Bonilla pt to have surgery done but will refer for second opinion  B/l knee pain: ref  Back to ortho    HTN Bp stable    Anxiety managed by psych   greater than 60 mins spent with pt; 50 % face to face  End of visit pt c/o toe pain. Referred to podiatry  Pt also states that her one time melena episode has since resolved and pt denies any GI symptoms. UTD on  cscope  RTC prn for annual

## 2019-10-17 ENCOUNTER — PATIENT OUTREACH (OUTPATIENT)
Dept: ADMINISTRATIVE | Facility: OTHER | Age: 69
End: 2019-10-17

## 2019-10-28 ENCOUNTER — TELEPHONE (OUTPATIENT)
Dept: INTERNAL MEDICINE | Facility: CLINIC | Age: 69
End: 2019-10-28

## 2019-10-28 RX ORDER — LOSARTAN POTASSIUM AND HYDROCHLOROTHIAZIDE 25; 100 MG/1; MG/1
TABLET ORAL
Qty: 90 TABLET | Refills: 1 | Status: SHIPPED | OUTPATIENT
Start: 2019-10-28 | End: 2019-10-29 | Stop reason: SDUPTHER

## 2019-10-29 ENCOUNTER — PATIENT OUTREACH (OUTPATIENT)
Dept: ADMINISTRATIVE | Facility: OTHER | Age: 69
End: 2019-10-29

## 2019-10-29 DIAGNOSIS — Z12.31 ENCOUNTER FOR SCREENING MAMMOGRAM FOR MALIGNANT NEOPLASM OF BREAST: Primary | ICD-10-CM

## 2019-10-29 RX ORDER — LOSARTAN POTASSIUM AND HYDROCHLOROTHIAZIDE 25; 100 MG/1; MG/1
1 TABLET ORAL DAILY
Qty: 90 TABLET | Refills: 1 | Status: SHIPPED | OUTPATIENT
Start: 2019-10-29 | End: 2019-12-29

## 2019-10-29 NOTE — TELEPHONE ENCOUNTER
----- Message from Nathalia Diamond sent at 10/29/2019 10:01 AM CDT -----  Contact: [facility]  Type: RX Refill Request    Who Called: [facility]    RX Name and Strength: losartan-hydrochlorothiazide 100-25 mg (HYZAAR) 100-25 mg per tablet    Preferred Pharmacy with phone number: [id number] - [facility] - [address]  [address] [facility] [address] [facility] [address]    Best Call Back Number: [phone]    Additional Information: Medication on back order. Can the medication be split. Please advise.

## 2019-10-30 ENCOUNTER — OFFICE VISIT (OUTPATIENT)
Dept: PODIATRY | Facility: CLINIC | Age: 69
End: 2019-10-30
Attending: FAMILY MEDICINE
Payer: MEDICARE

## 2019-10-30 ENCOUNTER — TELEPHONE (OUTPATIENT)
Dept: PRIMARY CARE CLINIC | Facility: CLINIC | Age: 69
End: 2019-10-30

## 2019-10-30 VITALS — BODY MASS INDEX: 29.19 KG/M2 | WEIGHT: 171 LBS | HEIGHT: 64 IN

## 2019-10-30 DIAGNOSIS — L84 CORN OR CALLUS: ICD-10-CM

## 2019-10-30 DIAGNOSIS — M20.42 HAMMER TOE OF LEFT FOOT: Primary | ICD-10-CM

## 2019-10-30 PROCEDURE — 99203 OFFICE O/P NEW LOW 30 MIN: CPT | Mod: S$GLB,,, | Performed by: PODIATRIST

## 2019-10-30 PROCEDURE — 99203 PR OFFICE/OUTPT VISIT, NEW, LEVL III, 30-44 MIN: ICD-10-PCS | Mod: S$GLB,,, | Performed by: PODIATRIST

## 2019-10-30 PROCEDURE — 1101F PR PT FALLS ASSESS DOC 0-1 FALLS W/OUT INJ PAST YR: ICD-10-PCS | Mod: CPTII,S$GLB,, | Performed by: PODIATRIST

## 2019-10-30 PROCEDURE — 1101F PT FALLS ASSESS-DOCD LE1/YR: CPT | Mod: CPTII,S$GLB,, | Performed by: PODIATRIST

## 2019-10-30 PROCEDURE — 99999 PR PBB SHADOW E&M-EST. PATIENT-LVL III: CPT | Mod: PBBFAC,,, | Performed by: PODIATRIST

## 2019-10-30 PROCEDURE — 99999 PR PBB SHADOW E&M-EST. PATIENT-LVL III: ICD-10-PCS | Mod: PBBFAC,,, | Performed by: PODIATRIST

## 2019-10-30 RX ORDER — LOSARTAN POTASSIUM 100 MG/1
TABLET ORAL
Qty: 90 TABLET | Refills: 1 | Status: SHIPPED | OUTPATIENT
Start: 2019-10-30 | End: 2019-12-29

## 2019-10-30 RX ORDER — HYDROCHLOROTHIAZIDE 25 MG/1
TABLET ORAL
Qty: 90 TABLET | Refills: 1 | Status: SHIPPED | OUTPATIENT
Start: 2019-10-30 | End: 2019-12-29

## 2019-10-30 NOTE — PROGRESS NOTES
Chief Complaint   Patient presents with    Nail Problem     3rd digit, Left foot              HPI:   Soila Chávez is a 69 y.o. female who presents to clinic with complaints of left 3rd toe pain x 2 weeks,  Has a callus that usually gets trimmed at the nail salon.  No acute trauma otherwise.         Patient Active Problem List   Diagnosis    Lumbosacral pain    Eczema    Acrochordon    Allergic rhinitis, seasonal    Essential hypertension    Anxiety    Major depressive disorder, recurrent episode, moderate    Gallstones    Sensorineural hearing loss (SNHL) of both ears    Gastroesophageal reflux disease without esophagitis       Current Outpatient Medications on File Prior to Visit   Medication Sig Dispense Refill    ALPRAZolam (XANAX) 0.5 MG tablet TK 1 T PO  BID  4    antiox#10-om3-dha-epa-lut-zeax 280-10-2 mg Cap Take 2 capsules by mouth.      ascorbic acid (VITAMIN C) 100 MG tablet Take 100 mg by mouth once daily.      aspirin (ECOTRIN) 81 MG EC tablet Take 81 mg by mouth.      b complex vitamins tablet Take 1 tablet by mouth once daily.      calcium carbonate-vitamin D3 500 mg(1,250mg) -125 unit per tablet Take 1 tablet by mouth.      calcium carbonate-vitamin D3 500 mg(1,250mg) -125 unit per tablet Take 1 tablet by mouth.      cetirizine (ZYRTEC) 10 MG tablet Take 10 mg by mouth.      fluticasone (FLONASE) 50 mcg/actuation nasal spray 2 sprays (100 mcg total) by Each Nare route once daily. 3 Bottle 4    gabapentin (NEURONTIN) 600 MG tablet Take 1 tablet (600 mg total) by mouth 3 (three) times daily. 90 tablet 3    ginkgo biloba 40 mg Tab Take 2 capsules by mouth.      hydroquinone 4 % Crea Apple 2 x day to darkened lesions.      losartan-hydrochlorothiazide 100-25 mg (HYZAAR) 100-25 mg per tablet Take 1 tablet by mouth once daily. 90 tablet 1    multivitamin (THERAGRAN) per tablet Take 1 tablet by mouth.      neomycin-polymyxin-hydrocortisone (CORTISPORIN) 3.5-10,000-1  mg/mL-unit/mL-% otic suspension Place 3 drops into both ears 4 (four) times daily. 10 mL 0    omega-3 fatty acids 1,000 mg Cap Take 1 g by mouth.      omeprazole (PRILOSEC) 20 MG capsule Take 1 capsule (20 mg total) by mouth 2 (two) times daily as needed. 180 capsule 3     No current facility-administered medications on file prior to visit.        Review of patient's allergies indicates:   Allergen Reactions    Lotensin [benazepril] Swelling and Other (See Comments)    Penicillins Rash and Other (See Comments)         Social History     Socioeconomic History    Marital status:      Spouse name: Not on file    Number of children: Not on file    Years of education: Not on file    Highest education level: Not on file   Occupational History    Not on file   Social Needs    Financial resource strain: Not on file    Food insecurity:     Worry: Not on file     Inability: Not on file    Transportation needs:     Medical: Not on file     Non-medical: Not on file   Tobacco Use    Smoking status: Never Smoker    Smokeless tobacco: Never Used   Substance and Sexual Activity    Alcohol use: Yes    Drug use: No    Sexual activity: Not on file   Lifestyle    Physical activity:     Days per week: Not on file     Minutes per session: Not on file    Stress: Not on file   Relationships    Social connections:     Talks on phone: Not on file     Gets together: Not on file     Attends Judaism service: Not on file     Active member of club or organization: Not on file     Attends meetings of clubs or organizations: Not on file     Relationship status: Not on file   Other Topics Concern    Are you pregnant or think you may be? Not Asked    Breast-feeding Not Asked   Social History Narrative    June 2016    The patient reports that she lives alone normally, however her daughter recently moved in with her    She has a 43-year-old daughter, Saima    And a 26-year-old daughterChris, who is a schoolteacher for  "6 in seventh grade in [employer]    She is retired from working as a  in the school system    She now works about 5-15 hours a week for [employer], which she enjoys.    Enjoys working at [employer] couple days a week        Review of Systems -   General ROS: negative  Respiratory ROS: no cough, shortness of breath, or wheezing  Cardiovascular ROS: no chest pain or dyspnea on exertion  Musculoskeletal ROS: positive for - joint stiffness  negative for - muscle pain or muscular weakness  Neurological ROS: no TIA or stroke symptoms  Dermatological ROS: negative            EXAM:  Vitals:    10/30/19 1337   Weight: 77.6 kg (171 lb)   Height: 5' 4" (1.626 m)         Gen: Alert and orient x 3, no apparent distress. Cooperative.       Left   Foot:      Vascular:     Dorsalis pedis pulse 2/4   posterior tibial pulse 2/4 .   3 seconds capillary refill time and toes are warm to touch.  There is  presence of digital hair.  There is no edema.  no varicosities.    Neurological:   no sensory deficits noted, normal light touch sensation, normal position sensation and normal cortical sensation    Derm:   Callus distal plantar left 3rd toe;  No underlying wound  No bruising  No drainage  No redness.       MSK:  left 3rd rigid hammertoe.    range of motion intact, limited   No swelling or crepitus at joints.  5/5 muscle strength          ASSESSMENT / PLAN:    Problem List Items Addressed This Visit     None      Visit Diagnoses     Hammer toe of left foot    -  Primary    3rd toe    Corn or callus                · I counseled the patient on the patient's conditions, their implications and medical management.   · Conservative treatments discussed include padding, hammertoe crest  Pads, extra-depth shoes   Shoe and activity modification as needed for relief.   Moisturize feet daily.  Never walk barefoot.   "

## 2019-10-30 NOTE — TELEPHONE ENCOUNTER
----- Message from Edith Cervantes sent at 10/30/2019 10:23 AM CDT -----  Contact: elissa  Name of Who is Calling: elissa      What is the request in detail: elissa would like to okay to separate the pt losartan-hydrochlorothiazide 100-25 mg (HYZAAR) 100-25 mg per tablet. Please contact to further discuss and advise.       Can the clinic reply by [facility]: N       What [facility] Back [facility]: [phone]

## 2019-10-30 NOTE — LETTER
October 30, 2019      Jazzmine Parson MD  [address]  [address]  [facility] [address]           [facility]  [address]", [facility] [address]  [facility] [address]  Phone: [phone]  Fax: [phone]          Patient: Soila Chávez   MR Number: [id number]   YOB: 1950   Date of Visit: 10/30/2019       Dear Dr. Jazzmine Parson:    Thank you for referring Soila Chávez to me for evaluation. Attached you will find relevant portions of my assessment and plan of care.    If you have questions, please do not hesitate to call me. I look forward to following Soila Chávez along with you.    Sincerely,    Zina Agudelo, SO    Enclosure  CC:  No Recipients    If you would like to receive this communication electronically, please contact [email or web address] or [phone] to request more information on [employer] Link access.    For providers and/or their staff who would like to refer a patient to [facility], please contact us through our one-stop-shop provider referral line, [facility], at [phone].    If you feel you have received this communication in error or would no longer like to receive these types of communications, please e-mail [email or web address]

## 2019-10-30 NOTE — TELEPHONE ENCOUNTER
Called new Losartan 100 mg one by mouth daily . Qty 30 . Refill 1, Hctz 25 one by mouth daily . Qty 30. Refill 1 left a message on the pharm voice mail.

## 2019-11-09 ENCOUNTER — PATIENT OUTREACH (OUTPATIENT)
Dept: ADMINISTRATIVE | Facility: OTHER | Age: 69
End: 2019-11-09

## 2019-11-13 DIAGNOSIS — R10.9 LEFT FLANK PAIN: Primary | ICD-10-CM

## 2019-11-13 DIAGNOSIS — R10.9 LEFT FLANK PAIN: ICD-10-CM

## 2019-11-13 RX ORDER — CYCLOBENZAPRINE HCL 5 MG
5 TABLET ORAL 3 TIMES DAILY PRN
Qty: 30 TABLET | Refills: 3 | Status: SHIPPED | OUTPATIENT
Start: 2019-11-13 | End: 2019-11-23

## 2019-11-13 RX ORDER — CYCLOBENZAPRINE HCL 5 MG
5 TABLET ORAL 3 TIMES DAILY PRN
Qty: 30 TABLET | Refills: 0 | Status: CANCELLED | OUTPATIENT
Start: 2019-11-13 | End: 2019-11-23

## 2019-11-19 ENCOUNTER — TELEPHONE (OUTPATIENT)
Dept: ENDOSCOPY | Facility: HOSPITAL | Age: 69
End: 2019-11-19

## 2019-11-19 DIAGNOSIS — Z12.11 SPECIAL SCREENING FOR MALIGNANT NEOPLASMS, COLON: Primary | ICD-10-CM

## 2019-11-19 RX ORDER — POLYETHYLENE GLYCOL 3350, SODIUM SULFATE ANHYDROUS, SODIUM BICARBONATE, SODIUM CHLORIDE, POTASSIUM CHLORIDE 236; 22.74; 6.74; 5.86; 2.97 G/4L; G/4L; G/4L; G/4L; G/4L
4 POWDER, FOR SOLUTION ORAL ONCE
Qty: 4000 ML | Refills: 0 | Status: SHIPPED | OUTPATIENT
Start: 2019-11-19 | End: 2019-11-19

## 2019-12-08 ENCOUNTER — PATIENT OUTREACH (OUTPATIENT)
Dept: ADMINISTRATIVE | Facility: OTHER | Age: 69
End: 2019-12-08

## 2019-12-11 ENCOUNTER — PATIENT OUTREACH (OUTPATIENT)
Dept: ADMINISTRATIVE | Facility: HOSPITAL | Age: 69
End: 2019-12-11

## 2019-12-11 DIAGNOSIS — M89.9 DISORDER OF BONE AND CARTILAGE: Primary | ICD-10-CM

## 2019-12-11 DIAGNOSIS — M94.9 DISORDER OF BONE AND CARTILAGE: Primary | ICD-10-CM

## 2019-12-20 ENCOUNTER — PATIENT OUTREACH (OUTPATIENT)
Dept: ADMINISTRATIVE | Facility: OTHER | Age: 69
End: 2019-12-20

## 2019-12-24 ENCOUNTER — CLINICAL SUPPORT (OUTPATIENT)
Dept: AUDIOLOGY | Facility: CLINIC | Age: 69
End: 2019-12-24
Payer: MEDICARE

## 2019-12-24 ENCOUNTER — TELEPHONE (OUTPATIENT)
Dept: OTOLARYNGOLOGY | Facility: CLINIC | Age: 69
End: 2019-12-24

## 2019-12-24 ENCOUNTER — OFFICE VISIT (OUTPATIENT)
Dept: OTOLARYNGOLOGY | Facility: CLINIC | Age: 69
End: 2019-12-24
Payer: MEDICARE

## 2019-12-24 VITALS
SYSTOLIC BLOOD PRESSURE: 125 MMHG | DIASTOLIC BLOOD PRESSURE: 72 MMHG | BODY MASS INDEX: 29.14 KG/M2 | WEIGHT: 169.75 LBS | HEART RATE: 81 BPM

## 2019-12-24 DIAGNOSIS — L29.9 EAR ITCHING: ICD-10-CM

## 2019-12-24 DIAGNOSIS — H90.41 SENSORINEURAL HEARING LOSS (SNHL) OF RIGHT EAR WITH UNRESTRICTED HEARING OF LEFT EAR: Primary | ICD-10-CM

## 2019-12-24 PROCEDURE — 1159F PR MEDICATION LIST DOCUMENTED IN MEDICAL RECORD: ICD-10-PCS | Mod: S$GLB,,, | Performed by: OTOLARYNGOLOGY

## 2019-12-24 PROCEDURE — 92557 COMPREHENSIVE HEARING TEST: CPT | Mod: S$GLB,,, | Performed by: AUDIOLOGIST-HEARING AID FITTER

## 2019-12-24 PROCEDURE — 3074F SYST BP LT 130 MM HG: CPT | Mod: CPTII,S$GLB,, | Performed by: OTOLARYNGOLOGY

## 2019-12-24 PROCEDURE — 3078F PR MOST RECENT DIASTOLIC BLOOD PRESSURE < 80 MM HG: ICD-10-PCS | Mod: CPTII,S$GLB,, | Performed by: OTOLARYNGOLOGY

## 2019-12-24 PROCEDURE — 92557 PR COMPREHENSIVE HEARING TEST: ICD-10-PCS | Mod: S$GLB,,, | Performed by: AUDIOLOGIST-HEARING AID FITTER

## 2019-12-24 PROCEDURE — 99999 PR PBB SHADOW E&M-EST. PATIENT-LVL IV: ICD-10-PCS | Mod: PBBFAC,,, | Performed by: OTOLARYNGOLOGY

## 2019-12-24 PROCEDURE — 99213 OFFICE O/P EST LOW 20 MIN: CPT | Mod: S$GLB,,, | Performed by: OTOLARYNGOLOGY

## 2019-12-24 PROCEDURE — 92567 PR TYMPA2METRY: ICD-10-PCS | Mod: S$GLB,,, | Performed by: AUDIOLOGIST-HEARING AID FITTER

## 2019-12-24 PROCEDURE — 1159F MED LIST DOCD IN RCRD: CPT | Mod: S$GLB,,, | Performed by: OTOLARYNGOLOGY

## 2019-12-24 PROCEDURE — 99999 PR PBB SHADOW E&M-EST. PATIENT-LVL IV: CPT | Mod: PBBFAC,,, | Performed by: OTOLARYNGOLOGY

## 2019-12-24 PROCEDURE — 3074F PR MOST RECENT SYSTOLIC BLOOD PRESSURE < 130 MM HG: ICD-10-PCS | Mod: CPTII,S$GLB,, | Performed by: OTOLARYNGOLOGY

## 2019-12-24 PROCEDURE — 1101F PT FALLS ASSESS-DOCD LE1/YR: CPT | Mod: CPTII,S$GLB,, | Performed by: OTOLARYNGOLOGY

## 2019-12-24 PROCEDURE — 3078F DIAST BP <80 MM HG: CPT | Mod: CPTII,S$GLB,, | Performed by: OTOLARYNGOLOGY

## 2019-12-24 PROCEDURE — 92567 TYMPANOMETRY: CPT | Mod: S$GLB,,, | Performed by: AUDIOLOGIST-HEARING AID FITTER

## 2019-12-24 PROCEDURE — 1101F PR PT FALLS ASSESS DOC 0-1 FALLS W/OUT INJ PAST YR: ICD-10-PCS | Mod: CPTII,S$GLB,, | Performed by: OTOLARYNGOLOGY

## 2019-12-24 PROCEDURE — 99213 PR OFFICE/OUTPT VISIT, EST, LEVL III, 20-29 MIN: ICD-10-PCS | Mod: S$GLB,,, | Performed by: OTOLARYNGOLOGY

## 2019-12-24 RX ORDER — BETAMETHASONE VALERATE 1.2 MG/G
CREAM TOPICAL 2 TIMES DAILY
Qty: 1 TUBE | Refills: 1 | Status: SHIPPED | OUTPATIENT
Start: 2019-12-24 | End: 2020-08-11

## 2019-12-24 NOTE — TELEPHONE ENCOUNTER
Per DR LOPEZ ask patient to come now before her appointment time  Stated she will put on her clothes and be in route

## 2019-12-24 NOTE — PROGRESS NOTES
Soila Chávez was seen in the clinic today for a hearing evaluation. Ms. Chávez reported hearing loss in her right ear.     Otoscopy was unremarkable. Audiological testing revealed normal to borderline normal hearing in the left ear and a profound sensorineural hearing loss in the right ear. A speech reception threshold was obtained at 10 dBHL in the left ear and no response was obtained at 105 dBHL in the right ear. Speech recognition was 88% in the left ear and could not be tested in the right ear due to the severity of the hearing loss.    Tympanometry revealed normal Type A tympanograms, bilaterally.    Recommendations:  1. Otologic evaluation  2. Annual hearing evaluation  3. CROS vs [facility] consult

## 2019-12-24 NOTE — PROGRESS NOTES
Subjective:       Patient ID: Soila Chávez is a 69 y.o. female.    Chief Complaint: Otalgia    Otalgia    There is pain in the right ear. This is a chronic (Goes back to at least 2006.) problem. The problem occurs constantly. The problem has been unchanged. There has been no fever. The patient is experiencing no pain. Pertinent negatives include no coughing, diarrhea, ear discharge, headaches, hearing loss, neck pain, rash, rhinorrhea or vomiting. Associated symptoms comments: Itchy ears.. She has tried nothing for the symptoms. Her past medical history is significant for hearing loss.     Review of Systems   Constitutional: Negative for activity change, appetite change and fever.   HENT: Positive for ear pain. Negative for congestion, ear discharge, hearing loss, nosebleeds, postnasal drip, rhinorrhea and sneezing.    Eyes: Negative for redness and visual disturbance.   Respiratory: Negative for apnea, cough, shortness of breath and wheezing.    Cardiovascular: Negative for chest pain and palpitations.   Gastrointestinal: Negative for diarrhea and vomiting.   Genitourinary: Negative for difficulty urinating and frequency.   Musculoskeletal: Negative for arthralgias, back pain, gait problem and neck pain.   Skin: Negative for color change and rash.   Neurological: Negative for dizziness, speech difficulty, weakness and headaches.   Hematological: Negative for adenopathy. Does not bruise/bleed easily.   Psychiatric/Behavioral: Negative for agitation and behavioral problems.       Objective:        Constitutional:   Vital signs are normal. She appears well-developed and well-nourished. She is active. Normal speech.      Head:  Normocephalic and atraumatic. Salivary glands normal.  Facial strength is normal.      Ears:  Hearing normal to normal and whispered voice; external ear normal without scars, lesions, or masses; ear canal, tympanic membrane, and middle ear normal..     Nose:  Nose normal including  turbinates, nasal mucosa, sinuses and nasal septum.     Mouth/Throat  Oropharynx clear and moist without lesions or asymmetry and normal uvula midline.     Neck:  Neck normal without thyromegaly masses, asymmetry, normal tracheal structure, crepitus, and tenderness, thyroid normal, trachea normal, phonation normal and full range of motion with neck supple.     Psychiatric:   She has a normal mood and affect. Her speech is normal and behavior is normal.     Neurological:   She has neurological normal, alert and oriented.     Skin:   No abrasions, lacerations, lesions, or rashes.         As a result of this patients history and examination findings, a comprehensive audiogram was ordered to determine the level of hearing/hearing loss.                    Basically no difference in audio's from 2006, 2009, 2017, and 2019 (today).  CT Scan of head in 9/2017 without intracranial abnormalities.       Assessment:       1. Sensorineural hearing loss (SNHL) of right ear with unrestricted hearing of left ear        Plan:       1. Hearing conservation strongly recommended.  2. Trial of amplification also recommended for unilateral deafness.  3. Re-check of hearing in 18-24 months or sooner if subjective change noted.  4. F/U with PCP as per schedule.  5. Valisone Cream as needed for itchy ears.

## 2019-12-26 ENCOUNTER — TELEPHONE (OUTPATIENT)
Dept: OBSTETRICS AND GYNECOLOGY | Facility: CLINIC | Age: 69
End: 2019-12-26

## 2019-12-26 NOTE — TELEPHONE ENCOUNTER
----- Message from Claudia Capellanin sent at 12/26/2019  1:40 PM CST -----  Contact: CARY LINDSAY [[id number]]  Name of Who is Calling: CARY LINDSAY [[id number]]      What is the request in detail:   Patient called requesting a call. Patient refused to disclose as to what her call pertains to.   Please give a call back at your earliest convenience and further advise.      THANKS!      Reply by [facility]: no      Call Back: CARY LINDSAY / # [phone]

## 2019-12-29 RX ORDER — LOSARTAN POTASSIUM 100 MG/1
TABLET ORAL
Qty: 30 TABLET | Refills: 3 | Status: SHIPPED | OUTPATIENT
Start: 2019-12-29 | End: 2020-08-11

## 2019-12-29 RX ORDER — HYDROCHLOROTHIAZIDE 25 MG/1
TABLET ORAL
Qty: 30 TABLET | Refills: 3 | Status: SHIPPED | OUTPATIENT
Start: 2019-12-29 | End: 2020-05-12 | Stop reason: SDUPTHER

## 2019-12-31 ENCOUNTER — TELEPHONE (OUTPATIENT)
Dept: OBSTETRICS AND GYNECOLOGY | Facility: CLINIC | Age: 69
End: 2019-12-31

## 2019-12-31 NOTE — TELEPHONE ENCOUNTER
----- Message from Nathalia Dara sent at 12/31/2019  9:31 AM CST -----  Contact: CARY LINDSAY   Type: Patient Call Back    Who called: CARY LINDSAY     What is the request in detail: Patient is requesting a call back. She states that she would like to speak with the nurse. She states that it is a personal matter.   Please contact to further advise.    Can the clinic reply by [facility]? No    Best call back number: [phone]    Additional Information: N/A

## 2020-01-05 ENCOUNTER — PATIENT OUTREACH (OUTPATIENT)
Dept: ADMINISTRATIVE | Facility: OTHER | Age: 70
End: 2020-01-05

## 2020-01-06 ENCOUNTER — OFFICE VISIT (OUTPATIENT)
Dept: OBSTETRICS AND GYNECOLOGY | Facility: CLINIC | Age: 70
End: 2020-01-06
Payer: MEDICARE

## 2020-01-06 VITALS — WEIGHT: 171.5 LBS | BODY MASS INDEX: 29.44 KG/M2 | SYSTOLIC BLOOD PRESSURE: 134 MMHG | DIASTOLIC BLOOD PRESSURE: 76 MMHG

## 2020-01-06 DIAGNOSIS — Z12.39 BREAST CANCER SCREENING: ICD-10-CM

## 2020-01-06 DIAGNOSIS — Z01.419 VISIT FOR GYNECOLOGIC EXAMINATION: Primary | ICD-10-CM

## 2020-01-06 DIAGNOSIS — Z12.31 ENCOUNTER FOR SCREENING MAMMOGRAM FOR MALIGNANT NEOPLASM OF BREAST: ICD-10-CM

## 2020-01-06 DIAGNOSIS — N95.9 MENOPAUSAL AND PERIMENOPAUSAL DISORDER: ICD-10-CM

## 2020-01-06 DIAGNOSIS — N90.89 VULVAR LESION: ICD-10-CM

## 2020-01-06 PROCEDURE — 87077 CULTURE AEROBIC IDENTIFY: CPT | Mod: 59

## 2020-01-06 PROCEDURE — 87070 CULTURE OTHR SPECIMN AEROBIC: CPT

## 2020-01-06 PROCEDURE — G0101 CA SCREEN;PELVIC/BREAST EXAM: HCPCS | Mod: S$GLB,,, | Performed by: OBSTETRICS & GYNECOLOGY

## 2020-01-06 PROCEDURE — 87186 SC STD MICRODIL/AGAR DIL: CPT | Mod: 59

## 2020-01-06 PROCEDURE — G0101 PR CA SCREEN;PELVIC/BREAST EXAM: ICD-10-PCS | Mod: S$GLB,,, | Performed by: OBSTETRICS & GYNECOLOGY

## 2020-01-06 PROCEDURE — 99999 PR PBB SHADOW E&M-EST. PATIENT-LVL II: ICD-10-PCS | Mod: PBBFAC,,, | Performed by: OBSTETRICS & GYNECOLOGY

## 2020-01-06 PROCEDURE — 99999 PR PBB SHADOW E&M-EST. PATIENT-LVL II: CPT | Mod: PBBFAC,,, | Performed by: OBSTETRICS & GYNECOLOGY

## 2020-01-06 NOTE — PROGRESS NOTES
"HISTORY OF PRESENT ILLNESS:    Soila Chávez is a 69 y.o. female,   presents today for her routine visit and has no complaints OTHER THAN ABOUT 2 WEEKS TENDER AREA LEFT VULVA - OCCURRED WHEN STARTED USING A NEW SOAP AND NEW SCENTED BUBBLE BATHS.  COUNSELED REGARDING TOPICAL ALLERGENS.  HAS GROUP OF 3 ULCERATIONS RIGHT LABIUM MAJUS.  VIRAL CULTURE AND AEROBIC CULTURE, BUT MOST SUSPICIOUS OF CONTACT DERMATITIS.  PENDING RESULTS OKAY TO CONTINUE TOPICAL LOTRISONE    LAST VISIT 2018:   PAP NOT INDICATED AND MAMMO DUE NEXT YEAR.  REASSURED NL GYN EXAM AND I DO NOT FEEL THAT HER RECENT BACK PAIN DUE TO GYN PROBLEM - MORE LIKELY MUSCULOSKELETAL.  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  ANTICIPATING SECOND GRANDDAUGHTER SOON  LAST VISIT 2017:  complaining of  VULVAR ITCHING AND PERIANAL ITCHING. USED MONISTAT CREAM EXTERNALLY VULVA WITH IMPROVEMENT AND ADVISED SHE CAN ALSO USE PERIANALLY.  IN EVENT OF RECURRENCE LOTRISONE MAY GIVE FASTER RELIEF;  AFFIRM SUBMITTED TO VERIFY YEAST INFECTION.   DUE ROUTINE VISIT AND MAMMO NEXT YEAR   LAST VISIT :   NEW PATIENT, ESTAB CARE.  PAP NOT INDICATED.  RECOMMENDATIONS.  IN PAST, PAP SMEARS NL  MAMMO HAS BEEN ORDERED AND NEEDS SCHEDULING.  NO HOT FLUSHES OR GYN C/O.   HAS "BUMP" ON LEFT BUTTOCK PAST YEAR, NOT SYMPTOMATIC OR CHANGING.  REASSURED RE SKIN TAG.  COUNSELED RE VAGINAL MOISTURIZERS AND LUBRICANTS. REMOTE HX OF TRICHOMONAS.    RECENT NL BMD    Past Medical History:   Diagnosis Date    Allergy     Anxiety     Cataract     Colon polyps     Hypertension     Joint pain        Past Surgical History:   Procedure Laterality Date    HYSTERECTOMY      PERIPARTUM CHITO       MEDICATIONS AND ALLERGIES:      Current Outpatient Medications:     ALPRAZolam (XANAX) 0.5 MG tablet, TK 1 T PO  BID, Disp: , Rfl: 4    antiox#10-om3-dha-epa-lut-zeax 280-10-2 mg Cap, Take 2 capsules by mouth., Disp: , Rfl:     ascorbic acid (VITAMIN " C) 100 MG tablet, Take 100 mg by mouth once daily., Disp: , Rfl:     aspirin (ECOTRIN) 81 MG EC tablet, Take 81 mg by mouth., Disp: , Rfl:     b complex vitamins tablet, Take 1 tablet by mouth once daily., Disp: , Rfl:     betamethasone valerate 0.1% (VALISONE) 0.1 % Crea, Apply topically 2 (two) times daily., Disp: 1 Tube, Rfl: 1    calcium carbonate-vitamin D3 500 mg(1,250mg) -125 unit per tablet, Take 1 tablet by mouth., Disp: , Rfl:     calcium carbonate-vitamin D3 500 mg(1,250mg) -125 unit per tablet, Take 1 tablet by mouth., Disp: , Rfl:     cetirizine (ZYRTEC) 10 MG tablet, Take 10 mg by mouth., Disp: , Rfl:     fluticasone (FLONASE) 50 mcg/actuation nasal spray, 2 sprays (100 mcg total) by Each Nare route once daily., Disp: 3 Bottle, Rfl: 4    gabapentin (NEURONTIN) 600 MG tablet, Take 1 tablet (600 mg total) by mouth 3 (three) times daily., Disp: 90 tablet, Rfl: 3    ginkgo biloba 40 mg Tab, Take 2 capsules by mouth., Disp: , Rfl:     hydroCHLOROthiazide (HYDRODIURIL) 25 MG tablet, TAKE 1 TABLET BY MOUTH EVERY DAY, Disp: 30 tablet, Rfl: 3    hydroquinone 4 % Crea, Apple 2 x day to darkened lesions., Disp: , Rfl:     losartan (COZAAR) 100 MG tablet, TAKE 1 TABLET BY MOUTH EVERY DAY, Disp: 30 tablet, Rfl: 3    multivitamin (THERAGRAN) per tablet, Take 1 tablet by mouth., Disp: , Rfl:     neomycin-polymyxin-hydrocortisone (CORTISPORIN) 3.5-10,000-1 mg/mL-unit/mL-% otic suspension, Place 3 drops into both ears 4 (four) times daily., Disp: 10 mL, Rfl: 0    omega-3 fatty acids 1,000 mg Cap, Take 1 g by mouth., Disp: , Rfl:     omeprazole (PRILOSEC) 20 MG capsule, Take 1 capsule (20 mg total) by mouth 2 (two) times daily as needed., Disp: 180 capsule, Rfl: 3    Review of patient's allergies indicates:   Allergen Reactions    Lotensin [benazepril] Swelling and Other (See Comments)    Penicillins Rash and Other (See Comments)       Family History   Problem Relation Age of Onset    Breast cancer  Sister     Colon cancer Neg Hx     Ovarian cancer Neg Hx     Melanoma Neg Hx        Social History     Socioeconomic History    Marital status:      Spouse name: Not on file    Number of children: Not on file    Years of education: Not on file    Highest education level: Not on file   Occupational History    Not on file   Social Needs    Financial resource strain: Not on file    Food insecurity:     Worry: Not on file     Inability: Not on file    Transportation needs:     Medical: Not on file     Non-medical: Not on file   Tobacco Use    Smoking status: Never Smoker    Smokeless tobacco: Never Used   Substance and Sexual Activity    Alcohol use: Yes    Drug use: No    Sexual activity: Not on file   Lifestyle    Physical activity:     Days per week: Not on file     Minutes per session: Not on file    Stress: Not on file   Relationships    Social connections:     Talks on phone: Not on file     Gets together: Not on file     Attends [employer] service: Not on file     Active member of club or organization: Not on file     Attends meetings of clubs or organizations: Not on file     Relationship status: Not on file   Other Topics Concern    Are you pregnant or think you may be? Not Asked    Breast-feeding Not Asked   Social History Narrative    June 2016    The patient reports that she lives alone normally, however her daughter recently moved in with her    She has a 43-year-old daughter, Saima    And a 26-year-old daughterChris, who is a schoolteacher for 6 in seventh grade in [employer]    She is retired from working as a  in the school system    She now works about 5-15 hours a week for [employer], which she enjoys.    Enjoys working at [employer] couple days a week       COMPREHENSIVE GYN HISTORY:  PAP History: Denies abnormal Paps except as noted above.  Infection History: Denies STDs. Denies PID.  Benign History: Denies uterine fibroids. Denies ovarian cysts.  Denies endometriosis. Denies other conditions.  Cancer History: Denies cervical cancer. Denies uterine cancer or hyperplasia. Denies ovarian cancer. Denies vulvar cancer or pre-cancer. Denies vaginal cancer or pre-cancer. Denies breast cancer. Denies colon cancer.  Menstrual History: Denies menses.     ROS:  GENERAL: No weight changes. No swelling. No fatigue. No fever.  CARDIOVASCULAR: No chest pain. No shortness of breath. No leg cramps.   NEUROLOGICAL: No headaches. No vision changes.  BREASTS: No pain. No lumps. No discharge.  ABDOMEN: No pain. No nausea. No vomiting. No diarrhea. No constipation.  REPRODUCTIVE: No abnormal bleeding.  VULVA: No pain. No lesions. No itching.  VAGINA: No relaxation. No itching. No odor. No discharge. No lesions.  URINARY: No incontinence. No nocturia. No frequency. No dysuria.    /76   Wt 77.8 kg (171 lb 8.3 oz)   BMI 29.44 kg/m²     PE:  APPEARANCE: Well nourished, well developed, in no acute distress.  AFFECT: WNL, alert and oriented x 3.  SKIN: No acne or hirsutism.  NECK: Neck symmetric without masses or thyromegaly.  NODES: No inguinal, cervical, axillary or femoral lymph node enlargement.  CHEST: Good respiratory effort.   ABDOMEN: Soft. No tenderness or masses. No hepatosplenomegaly. No hernias.  BREASTS: Symmetrical, no skin changes or visible lesions. No palpable masses, nipple discharge bilaterally.  PELVIC: ATROPHIC EXTERNAL FEMALE GENITALIA without lesions. Normal hair distribution. Adequate perineal body, normal urethral meatus. VAGINA DRY without lesions or discharge. No significant cystocele or rectocele. Bimanual exam shows CERVIX and UTERUS to be SURGICALLY ABSENT. Adnexa without masses or tenderness.  EXTREMITIES: No edema.    DIAGNOSIS:  1. Visit for gynecologic examination     2. Menopausal and perimenopausal disorder     3. Breast cancer screening  Mammo Digital Screening Bilat w/ Matty   4. Encounter for screening mammogram for malignant neoplasm of  breast   Mammo Digital Screening Bilat w/ Matty   5. Vulvar lesion  Herpes simplex Virus (HSV) Type 1 & 2 DNA by PCR    Herpes simplex virus culture [facility]; Other (Specify) (VULVA)    Aerobic culture       LABS AND TESTS ORDERED:  Mammogram    MEDICATIONS PRESCRIBED:    COUNSELING:  The patient was counseled today on osteoporosis prevention, calcium supplementation, and regular weight bearing exercise. The patient was also counseled today on ACS PAP guidelines, with recommendations for yearly pelvic exams unless their uterus, cervix, and ovaries were removed for benign reasons; in that case, examinations every 3-5 years are recommended.  The patient was also counseled regarding monthly breast self-examination, routine STD screening for at-risk populations, prophylactic immunizations for transmitted infections such as  HPV, Pertussis, or Influenza as appropriate, and yearly mammograms when indicated by ACS guidelines.  She was advised to see her primary care physician for all other health maintenance.  FOLLOW-UP with me for next routine visit.

## 2020-01-08 ENCOUNTER — LAB VISIT (OUTPATIENT)
Dept: LAB | Facility: HOSPITAL | Age: 70
End: 2020-01-08
Attending: OBSTETRICS & GYNECOLOGY
Payer: MEDICARE

## 2020-01-08 DIAGNOSIS — N90.89 VULVAR LESION: ICD-10-CM

## 2020-01-08 PROCEDURE — 36415 COLL VENOUS BLD VENIPUNCTURE: CPT

## 2020-01-08 PROCEDURE — 87529 HSV DNA AMP PROBE: CPT

## 2020-01-09 ENCOUNTER — ANESTHESIA EVENT (OUTPATIENT)
Dept: ENDOSCOPY | Facility: HOSPITAL | Age: 70
End: 2020-01-09
Payer: MEDICARE

## 2020-01-09 ENCOUNTER — ANESTHESIA (OUTPATIENT)
Dept: ENDOSCOPY | Facility: HOSPITAL | Age: 70
End: 2020-01-09
Payer: MEDICARE

## 2020-01-09 DIAGNOSIS — Z12.11 SPECIAL SCREENING FOR MALIGNANT NEOPLASMS, COLON: Primary | ICD-10-CM

## 2020-01-09 LAB
HSV-1 DNA BY PCR: NORMAL
HSV-2 DNA BY PCR: NORMAL

## 2020-01-09 RX ORDER — POLYETHYLENE GLYCOL 3350, SODIUM SULFATE ANHYDROUS, SODIUM BICARBONATE, SODIUM CHLORIDE, POTASSIUM CHLORIDE 236; 22.74; 6.74; 5.86; 2.97 G/4L; G/4L; G/4L; G/4L; G/4L
4 POWDER, FOR SOLUTION ORAL ONCE
Qty: 4000 ML | Refills: 0 | Status: SHIPPED | OUTPATIENT
Start: 2020-01-09 | End: 2020-01-09

## 2020-01-10 LAB
BACTERIA SPEC AEROBE CULT: ABNORMAL
BACTERIA SPEC AEROBE CULT: ABNORMAL
HSV1 DNA SPEC QL NAA+PROBE: NEGATIVE
HSV2 DNA SPEC QL NAA+PROBE: POSITIVE
SPECIMEN SOURCE: ABNORMAL

## 2020-01-11 ENCOUNTER — PATIENT MESSAGE (OUTPATIENT)
Dept: OBSTETRICS AND GYNECOLOGY | Facility: CLINIC | Age: 70
End: 2020-01-11

## 2020-01-11 RX ORDER — CIPROFLOXACIN 500 MG/1
500 TABLET ORAL 2 TIMES DAILY
Qty: 14 TABLET | Refills: 0 | Status: SHIPPED | OUTPATIENT
Start: 2020-01-11 | End: 2020-02-12

## 2020-01-14 ENCOUNTER — TELEPHONE (OUTPATIENT)
Dept: OBSTETRICS AND GYNECOLOGY | Facility: CLINIC | Age: 70
End: 2020-01-14

## 2020-01-14 NOTE — TELEPHONE ENCOUNTER
Called patient back, results discussed, advised patient NEW RX is waiting for her at her pharmacy.  Patient would need to complete dose - patient stated she is feeling better now,  but will complete new RX .

## 2020-01-16 ENCOUNTER — HOSPITAL ENCOUNTER (OUTPATIENT)
Dept: RADIOLOGY | Facility: OTHER | Age: 70
Discharge: HOME | End: 2020-01-16
Attending: OBSTETRICS & GYNECOLOGY
Payer: MEDICARE

## 2020-01-16 DIAGNOSIS — Z12.31 ENCOUNTER FOR SCREENING MAMMOGRAM FOR MALIGNANT NEOPLASM OF BREAST: ICD-10-CM

## 2020-01-16 DIAGNOSIS — Z12.39 BREAST CANCER SCREENING: ICD-10-CM

## 2020-01-16 PROCEDURE — 77067 SCR MAMMO BI INCL CAD: CPT | Mod: TC

## 2020-01-16 PROCEDURE — 77067 SCR MAMMO BI INCL CAD: CPT | Mod: 26,,, | Performed by: RADIOLOGY

## 2020-01-16 PROCEDURE — 77067 MAMMO DIGITAL SCREENING BILAT WITH TOMOSYNTHESIS_CAD: ICD-10-PCS | Mod: 26,,, | Performed by: RADIOLOGY

## 2020-01-16 PROCEDURE — 77063 MAMMO DIGITAL SCREENING BILAT WITH TOMOSYNTHESIS_CAD: ICD-10-PCS | Mod: 26,,, | Performed by: RADIOLOGY

## 2020-01-16 PROCEDURE — 77063 BREAST TOMOSYNTHESIS BI: CPT | Mod: 26,,, | Performed by: RADIOLOGY

## 2020-01-19 ENCOUNTER — PATIENT MESSAGE (OUTPATIENT)
Dept: OBSTETRICS AND GYNECOLOGY | Facility: CLINIC | Age: 70
End: 2020-01-19

## 2020-01-20 ENCOUNTER — TELEPHONE (OUTPATIENT)
Dept: OBSTETRICS AND GYNECOLOGY | Facility: CLINIC | Age: 70
End: 2020-01-20

## 2020-01-20 RX ORDER — VALACYCLOVIR HYDROCHLORIDE 500 MG/1
500 TABLET, FILM COATED ORAL 2 TIMES DAILY
Qty: 10 TABLET | Refills: 3 | Status: SHIPPED | OUTPATIENT
Start: 2020-01-20 | End: 2020-02-12

## 2020-01-20 NOTE — TELEPHONE ENCOUNTER
----- Message from Addie Burgess MD sent at 1/19/2020  9:01 PM CST -----  Jennifer - would you pls call her tomorrow to discuss test results?  I would be happy to speak with her if she has questions as well.  She hasn't checked any of her results that I can see - - - -

## 2020-01-21 NOTE — TELEPHONE ENCOUNTER
PHONED PT, DISCUSSED HSV FLARE AND BACT SUPERINFECTION; SHE DOES NOT CHECK PORTAL AND HAS NOT RECEIVED MESSAGES (DAUGHTER WILL CHECK FOR HER AT TIMES)  INSIGHT IS NOT GOOD INTO INFECTION, THIS PROCESS    TOLD IF RECURRENT IRRITATION/OUTBREAK CAN TAKE VALTREX TO SHORTEN COURSE    FEELS MUCH BETTER, ADVISED NO NEED TO FINISH COURSE OF CIPRO.

## 2020-01-23 ENCOUNTER — OFFICE VISIT (OUTPATIENT)
Dept: ORTHOPEDICS | Facility: CLINIC | Age: 70
End: 2020-01-23
Attending: FAMILY MEDICINE
Payer: MEDICARE

## 2020-01-23 VITALS
WEIGHT: 171 LBS | DIASTOLIC BLOOD PRESSURE: 79 MMHG | HEIGHT: 64 IN | BODY MASS INDEX: 29.19 KG/M2 | SYSTOLIC BLOOD PRESSURE: 146 MMHG | HEART RATE: 86 BPM

## 2020-01-23 DIAGNOSIS — G56.21 CUBITAL TUNNEL SYNDROME ON RIGHT: Primary | ICD-10-CM

## 2020-01-23 DIAGNOSIS — G56.01 RIGHT CARPAL TUNNEL SYNDROME: ICD-10-CM

## 2020-01-23 PROCEDURE — 1159F MED LIST DOCD IN RCRD: CPT | Mod: S$GLB,,, | Performed by: ORTHOPAEDIC SURGERY

## 2020-01-23 PROCEDURE — 1159F PR MEDICATION LIST DOCUMENTED IN MEDICAL RECORD: ICD-10-PCS | Mod: S$GLB,,, | Performed by: ORTHOPAEDIC SURGERY

## 2020-01-23 PROCEDURE — 99999 PR PBB SHADOW E&M-EST. PATIENT-LVL III: CPT | Mod: PBBFAC,,, | Performed by: ORTHOPAEDIC SURGERY

## 2020-01-23 PROCEDURE — 3078F PR MOST RECENT DIASTOLIC BLOOD PRESSURE < 80 MM HG: ICD-10-PCS | Mod: CPTII,S$GLB,, | Performed by: ORTHOPAEDIC SURGERY

## 2020-01-23 PROCEDURE — 99999 PR PBB SHADOW E&M-EST. PATIENT-LVL III: ICD-10-PCS | Mod: PBBFAC,,, | Performed by: ORTHOPAEDIC SURGERY

## 2020-01-23 PROCEDURE — 3077F SYST BP >= 140 MM HG: CPT | Mod: CPTII,S$GLB,, | Performed by: ORTHOPAEDIC SURGERY

## 2020-01-23 PROCEDURE — 1101F PR PT FALLS ASSESS DOC 0-1 FALLS W/OUT INJ PAST YR: ICD-10-PCS | Mod: CPTII,S$GLB,, | Performed by: ORTHOPAEDIC SURGERY

## 2020-01-23 PROCEDURE — 99214 PR OFFICE/OUTPT VISIT, EST, LEVL IV, 30-39 MIN: ICD-10-PCS | Mod: S$GLB,,, | Performed by: ORTHOPAEDIC SURGERY

## 2020-01-23 PROCEDURE — 1125F AMNT PAIN NOTED PAIN PRSNT: CPT | Mod: S$GLB,,, | Performed by: ORTHOPAEDIC SURGERY

## 2020-01-23 PROCEDURE — 99214 OFFICE O/P EST MOD 30 MIN: CPT | Mod: S$GLB,,, | Performed by: ORTHOPAEDIC SURGERY

## 2020-01-23 PROCEDURE — 3078F DIAST BP <80 MM HG: CPT | Mod: CPTII,S$GLB,, | Performed by: ORTHOPAEDIC SURGERY

## 2020-01-23 PROCEDURE — 1101F PT FALLS ASSESS-DOCD LE1/YR: CPT | Mod: CPTII,S$GLB,, | Performed by: ORTHOPAEDIC SURGERY

## 2020-01-23 PROCEDURE — 1125F PR PAIN SEVERITY QUANTIFIED, PAIN PRESENT: ICD-10-PCS | Mod: S$GLB,,, | Performed by: ORTHOPAEDIC SURGERY

## 2020-01-23 PROCEDURE — 3077F PR MOST RECENT SYSTOLIC BLOOD PRESSURE >= 140 MM HG: ICD-10-PCS | Mod: CPTII,S$GLB,, | Performed by: ORTHOPAEDIC SURGERY

## 2020-01-23 NOTE — PROGRESS NOTES
Subjective:      Patient ID: Soila Chávez is a 69 y.o. female.    Chief Complaint: Pain of the Right Hand      HPI  Soila Chávez is a  69 y.o. female presenting today for a second opinion on right hand weakness. She reports onset months ago. She complains of weakness in the right hand along with decreased extension of the fingers. She has previously been seen by Dr. Hernandez. She has had recent EMG which revealed severe ulnar nerve compression at the right elbow and mild right carpal tunnel. Surgical decompression was recommended by Dr. Hernandez however pt wishes to monitor her symptoms.     She works at [employer], her job is light duty and does not require any lifting.     Review of patient's allergies indicates:   Allergen Reactions    Lotensin [benazepril] Swelling and Other (See Comments)    Penicillins Rash and Other (See Comments)         Current Outpatient Medications   Medication Sig Dispense Refill    ALPRAZolam (XANAX) 0.5 MG tablet TK 1 T PO  BID  4    antiox#10-om3-dha-epa-lut-zeax 280-10-2 mg Cap Take 2 capsules by mouth.      ascorbic acid (VITAMIN C) 100 MG tablet Take 100 mg by mouth once daily.      aspirin (ECOTRIN) 81 MG EC tablet Take 81 mg by mouth.      b complex vitamins tablet Take 1 tablet by mouth once daily.      betamethasone valerate 0.1% (VALISONE) 0.1 % Crea Apply topically 2 (two) times daily. 1 Tube 1    calcium carbonate-vitamin D3 500 mg(1,250mg) -125 unit per tablet Take 1 tablet by mouth.      calcium carbonate-vitamin D3 500 mg(1,250mg) -125 unit per tablet Take 1 tablet by mouth.      cetirizine (ZYRTEC) 10 MG tablet Take 10 mg by mouth.      ciprofloxacin HCl (CIPRO) 500 MG tablet Take 1 tablet (500 mg total) by mouth 2 (two) times daily. 14 tablet 0    fluticasone (FLONASE) 50 mcg/actuation nasal spray 2 sprays (100 mcg total) by Each Nare route once daily. 3 Bottle 4    ginkgo biloba 40 mg Tab Take 2 capsules by mouth.      hydroCHLOROthiazide  "(HYDRODIURIL) 25 MG tablet TAKE 1 TABLET BY MOUTH EVERY DAY 30 tablet 3    hydroquinone 4 % Crea Apple 2 x day to darkened lesions.      losartan (COZAAR) 100 MG tablet TAKE 1 TABLET BY MOUTH EVERY DAY 30 tablet 3    multivitamin (THERAGRAN) per tablet Take 1 tablet by mouth.      neomycin-polymyxin-hydrocortisone (CORTISPORIN) 3.5-10,000-1 mg/mL-unit/mL-% otic suspension Place 3 drops into both ears 4 (four) times daily. 10 mL 0    omega-3 fatty acids 1,000 mg Cap Take 1 g by mouth.      valACYclovir (VALTREX) 500 MG tablet Take 1 tablet (500 mg total) by mouth 2 (two) times daily. for 5 days 10 tablet 3    gabapentin (NEURONTIN) 600 MG tablet Take 1 tablet (600 mg total) by mouth 3 (three) times daily. 90 tablet 3    omeprazole (PRILOSEC) 20 MG capsule Take 1 capsule (20 mg total) by mouth 2 (two) times daily as needed. 180 capsule 3     No current facility-administered medications for this visit.        Past Medical History:   Diagnosis Date    Allergy     Anxiety     Cataract     Colon polyps 2010    Hypertension     Joint pain        Past Surgical History:   Procedure Laterality Date    HYSTERECTOMY      PERIPARTUM CHITO       Review of Systems:  Constitutional: Negative for chills and fever.   Respiratory: Negative for cough and shortness of breath.    Gastrointestinal: Negative for nausea and vomiting.   Skin: Negative for rash.   Neurological: Negative for dizziness and headaches.   Psychiatric/Behavioral: Negative for depression.   MSK as in HPI       OBJECTIVE:     PHYSICAL EXAM:  BP (!) 146/79   Pulse 86   Ht 5' 4" (1.626 m)   Wt 77.6 kg (171 lb)   BMI 29.35 kg/m²     GEN:  NAD, well-developed, well-groomed.  NEURO: Awake, alert, and oriented. Normal attention and concentration.    PSYCH: Normal mood and affect. Behavior is normal.  HEENT: No cervical lymphadenopathy noted.  CARDIOVASCULAR: Radial pulses 2+ bilaterally. No LE edema noted.  PULMONARY: Breath sounds normal. No respiratory " distress.  SKIN: Intact, no rashes.      MSK:   RUE:  She has clawing of the right ring and small fingers. There is good passive extension. She has difficulty fully extending the index finger at the MCP joint. She has negative tinels at the wrist, positive tinels at the elbow. She has notable weakness of the intrinsic muscles, unable to spread or cross fingers. AIN/PIN/Radial/Median/Ulnar Nerves assessed in isolation without deficit. Radial & Ulnar arteries palpated 2+. Capillary Refill <3s.    RADIOGRAPHS:  Xray right small finger 7/8/19  Impression     No evidence for acute fracture, bone destruction, or dislocation.  Mild-to-moderate degenerative changes most pronounced at the DIP joint.   Comments: I have personally reviewed the imaging and I agree with the above radiologist's report.    EMG 8/30/19  Impression   This is an abnormal study. There is electrophysiologic evidence of:   1. A severe right ulnar neuropathy proximal to the take off of the right dorsal ulnar cutaneous nerve, most likely at the right cubital tunnel. There is no obvious CMAP amplitude drop in the ulnar nerve across the elbow (the CMAPs are diminished at all recording sites, including the distal site), but it is not uncommon for there to be Wallerian degeneration in the setting of severe nerve compression, which would account for the low CMAPs at the distal recording site;   2. Mild bilateral carpal tunnel syndrome;   3. A mild and primarily axonal left ulnar sensory neuropathy distal to the take off of the left dorsal ulnar cutaneous nerve.       ASSESSMENT/PLAN:       ICD-10-CM ICD-9-CM   1. Cubital tunnel syndrome on right G56.21 354.2   2. Right carpal tunnel syndrome G56.01 354.0     Plan:   -EMG reviewed and treatment options discussed. Plan for ulnar nerve decompression at the right elbow. Consents reviewed and signed in clinic today. All questions answered. Discussed she may have persistent weakness of the hand following surgery.    -RTC post op     The patient indicates understanding of these issues and agrees to the plan.    Kailey Becerra PA-C  Hand Clinic   [facility]

## 2020-01-23 NOTE — LETTER
January 23, 2020      Jazzmine Parson MD  [address]   [address]  [facility] [address]           [facility] [place] [address] [facility] [address]  [address], [address]  [facility] [address]  Phone: [phone]          Patient: Soila Chávez   MR Number: [id number]   YOB: 1950   Date of Visit: 1/23/2020       Dear Dr. Jazzmine Parson:    Thank you for referring Soila Chávez to me for evaluation. Attached you will find relevant portions of my assessment and plan of care.    If you have questions, please do not hesitate to call me. I look forward to following Soila Chávez along with you.    Sincerely,    Kailey Becerra PA-C    Enclosure  CC:  No Recipients    If you would like to receive this communication electronically, please contact [email or web address] or [phone] to request more information on [employer] Link access.    For providers and/or their staff who would like to refer a patient to [facility], please contact us through our one-stop-shop provider referral line, [facility], at [phone].    If you feel you have received this communication in error or would no longer like to receive these types of communications, please e-mail [email or web address]

## 2020-01-23 NOTE — PROGRESS NOTES
I have personally taken the history and examined the patient. I agree with the Hand Surgery PA's note. The plan will be right elbow ulnar nerve decompression.  I have explained the risks, benefits, and alternatives of the procedure to the patient in great detail. The patient voices understanding and all questions have been answered. The patient agrees with to proceed as planned. Consents were performed in clinic. Pt has clawing and unable to spread fingers or cross fingers- very weak.  Explained to pt that after surgery the muscle may not return due to the chronicity of the ulnar nerve injury. Pt may have trouble understanding injury and outcome. Pt also seems to have a little spasticity of flexor muscles.  Tried my best to educate pt.

## 2020-02-10 DIAGNOSIS — F41.9 ANXIETY: Primary | ICD-10-CM

## 2020-02-10 RX ORDER — ALPRAZOLAM 0.5 MG/1
TABLET ORAL
Qty: 60 TABLET | Refills: 2 | Status: SHIPPED | OUTPATIENT
Start: 2020-02-10 | End: 2020-05-18 | Stop reason: SDUPTHER

## 2020-02-10 NOTE — TELEPHONE ENCOUNTER
Pt is requesting a refill on xanax 0.5 mg. Pt would like to talk with Dr. Parson about refill the xanax.

## 2020-02-11 ENCOUNTER — TELEPHONE (OUTPATIENT)
Dept: PRIMARY CARE CLINIC | Facility: CLINIC | Age: 70
End: 2020-02-11

## 2020-02-11 NOTE — TELEPHONE ENCOUNTER
----- Message from Livia Adams sent at 2/10/2020  5:08 PM CST -----  Contact: Pt  Pt requesting to speak with someone in regards to her Prescription, ALPRAZolam (XANAX) 0.5 MG tablet    Pt states that Pharmacy did not receive Prescription, I do see where the prescription was Dated and Sign, But not,  Receipt confirmed by Pharmacy  , Pt's waiting At Pharmacy now  Please call and advise    Phone [phone]

## 2020-02-12 ENCOUNTER — OFFICE VISIT (OUTPATIENT)
Dept: PRIMARY CARE CLINIC | Facility: CLINIC | Age: 70
End: 2020-02-12
Attending: FAMILY MEDICINE
Payer: MEDICARE

## 2020-02-12 VITALS
HEART RATE: 86 BPM | SYSTOLIC BLOOD PRESSURE: 138 MMHG | BODY MASS INDEX: 30.08 KG/M2 | WEIGHT: 169.75 LBS | OXYGEN SATURATION: 99 % | DIASTOLIC BLOOD PRESSURE: 80 MMHG | HEIGHT: 63 IN

## 2020-02-12 DIAGNOSIS — F33.1 MAJOR DEPRESSIVE DISORDER, RECURRENT EPISODE, MODERATE: ICD-10-CM

## 2020-02-12 DIAGNOSIS — F41.9 ANXIETY: ICD-10-CM

## 2020-02-12 DIAGNOSIS — M79.676 PAIN OF TOE, UNSPECIFIED LATERALITY: ICD-10-CM

## 2020-02-12 DIAGNOSIS — J30.2 SEASONAL ALLERGIC RHINITIS, UNSPECIFIED TRIGGER: ICD-10-CM

## 2020-02-12 DIAGNOSIS — M25.512 LEFT SHOULDER PAIN, UNSPECIFIED CHRONICITY: Primary | ICD-10-CM

## 2020-02-12 DIAGNOSIS — I10 ESSENTIAL HYPERTENSION: ICD-10-CM

## 2020-02-12 DIAGNOSIS — S80.10XA CONTUSION OF LOWER LEG, UNSPECIFIED LATERALITY, INITIAL ENCOUNTER: ICD-10-CM

## 2020-02-12 PROCEDURE — 99999 PR PBB SHADOW E&M-EST. PATIENT-LVL III: ICD-10-PCS | Mod: PBBFAC,,, | Performed by: FAMILY MEDICINE

## 2020-02-12 PROCEDURE — 1125F PR PAIN SEVERITY QUANTIFIED, PAIN PRESENT: ICD-10-PCS | Mod: S$GLB,,, | Performed by: FAMILY MEDICINE

## 2020-02-12 PROCEDURE — 99215 OFFICE O/P EST HI 40 MIN: CPT | Mod: S$GLB,,, | Performed by: FAMILY MEDICINE

## 2020-02-12 PROCEDURE — 1101F PR PT FALLS ASSESS DOC 0-1 FALLS W/OUT INJ PAST YR: ICD-10-PCS | Mod: CPTII,S$GLB,, | Performed by: FAMILY MEDICINE

## 2020-02-12 PROCEDURE — 99999 PR PBB SHADOW E&M-EST. PATIENT-LVL III: CPT | Mod: PBBFAC,,, | Performed by: FAMILY MEDICINE

## 2020-02-12 PROCEDURE — 3079F PR MOST RECENT DIASTOLIC BLOOD PRESSURE 80-89 MM HG: ICD-10-PCS | Mod: CPTII,S$GLB,, | Performed by: FAMILY MEDICINE

## 2020-02-12 PROCEDURE — 1159F PR MEDICATION LIST DOCUMENTED IN MEDICAL RECORD: ICD-10-PCS | Mod: S$GLB,,, | Performed by: FAMILY MEDICINE

## 2020-02-12 PROCEDURE — 1125F AMNT PAIN NOTED PAIN PRSNT: CPT | Mod: S$GLB,,, | Performed by: FAMILY MEDICINE

## 2020-02-12 PROCEDURE — 3075F SYST BP GE 130 - 139MM HG: CPT | Mod: CPTII,S$GLB,, | Performed by: FAMILY MEDICINE

## 2020-02-12 PROCEDURE — 1159F MED LIST DOCD IN RCRD: CPT | Mod: S$GLB,,, | Performed by: FAMILY MEDICINE

## 2020-02-12 PROCEDURE — 99215 PR OFFICE/OUTPT VISIT, EST, LEVL V, 40-54 MIN: ICD-10-PCS | Mod: S$GLB,,, | Performed by: FAMILY MEDICINE

## 2020-02-12 PROCEDURE — 1101F PT FALLS ASSESS-DOCD LE1/YR: CPT | Mod: CPTII,S$GLB,, | Performed by: FAMILY MEDICINE

## 2020-02-12 PROCEDURE — 3075F PR MOST RECENT SYSTOLIC BLOOD PRESS GE 130-139MM HG: ICD-10-PCS | Mod: CPTII,S$GLB,, | Performed by: FAMILY MEDICINE

## 2020-02-12 PROCEDURE — 3079F DIAST BP 80-89 MM HG: CPT | Mod: CPTII,S$GLB,, | Performed by: FAMILY MEDICINE

## 2020-02-12 RX ORDER — FERROUS SULFATE 325(65) MG
325 TABLET ORAL
COMMUNITY
End: 2021-12-08 | Stop reason: SDUPTHER

## 2020-02-12 NOTE — PROGRESS NOTES
Subjective:       Patient ID: Soila Chávez is a 69 y.o. female.    Chief Complaint: Sore Throat and Foot Pain    Pt presents today with numerous issues:  1. Toe pain after seeing podiatry. Pt wants referral to another podiatrist  2. Sore throat not new. Afebrile. Occurring for months  3. Bruise left thigh not new. Hit door, using an OTC cream Vicks vapor rub      Review of Systems   Constitutional: Negative for activity change, appetite change, chills, fatigue and fever.   HENT: Negative for congestion, ear pain, sinus pressure, sore throat and trouble swallowing.    Eyes: Negative for photophobia, pain and visual disturbance.   Respiratory: Negative for apnea, cough, chest tightness, shortness of breath and wheezing.    Cardiovascular: Negative for chest pain, palpitations and leg swelling.   Gastrointestinal: Negative for abdominal distention, abdominal pain, constipation, diarrhea, nausea and vomiting.   Genitourinary: Negative.    Musculoskeletal: Negative for back pain, joint swelling and neck pain.   Skin: Negative.    Neurological: Negative for dizziness, tremors, weakness, numbness and headaches.   Psychiatric/Behavioral: Negative for behavioral problems, decreased concentration and sleep disturbance. The patient is not nervous/anxious.    All other systems reviewed and are negative.      Objective:      Physical Exam   Constitutional: She is oriented to person, place, and time. She appears well-developed and well-nourished.   HENT:   Head: Normocephalic and atraumatic.   Right Ear: External ear normal. A middle ear effusion is present.   Left Ear: External ear normal. A middle ear effusion is present.   Nose: Nose normal.   Mouth/Throat: Uvula is midline and mucous membranes are normal. Posterior oropharyngeal edema and posterior oropharyngeal erythema present. No oropharyngeal exudate or tonsillar abscesses.   Eyes: Pupils are equal, round, and reactive to light. EOM are normal.   Neck: Normal range  of motion. Neck supple. No thyromegaly present.   Cardiovascular: Normal rate, regular rhythm, normal heart sounds and intact distal pulses. Exam reveals no gallop and no friction rub.   No murmur heard.  Pulmonary/Chest: Effort normal and breath sounds normal. No stridor. No respiratory distress. She has no wheezes. She has no rales. She exhibits no tenderness.   Abdominal: Soft. Bowel sounds are normal. She exhibits no distension and no mass. There is no tenderness. There is no rebound and no guarding.   Musculoskeletal: Normal range of motion. She exhibits no edema or tenderness.        Arms:  Lymphadenopathy:     She has no cervical adenopathy.   Neurological: She is alert and oriented to person, place, and time. She has normal reflexes. She displays normal reflexes. No cranial nerve deficit or sensory deficit. She exhibits normal muscle tone. Coordination normal.   Skin: Skin is warm and dry. No rash noted. No erythema. No pallor.        Psychiatric: She has a normal mood and affect. Her behavior is normal. Judgment and thought content normal.       Assessment:       1. Left shoulder pain, unspecified chronicity    2. Pain of toe, unspecified laterality        Plan:       Left shoulder pain: suspect that this is due to rotator cuff sprain since pt does carry heavy items such as water bottles at work  Toe pain: not new and per pt since she went to podiatry. Wants to see a different podiatrist  Anxiety: dr Guzmán is retiring per pt and wants me to take over xanax script. Will fill if use is sparing only. Pt amenable. If overuse or abuse- WILL NEED TO BE REFERRED TO PSYCH  Sore throat: no ssx of infection. Pt with allergies and PND. Flonase, zyrtec and nedipot d/w pt again. This is not new    Greater than 60 mins spent with pt; 50 % face to face due to exam, and reassurance  RTC prn or for annual in 6 mos

## 2020-02-13 ENCOUNTER — OFFICE VISIT (OUTPATIENT)
Dept: PODIATRY | Facility: CLINIC | Age: 70
End: 2020-02-13
Payer: MEDICARE

## 2020-02-13 VITALS — HEART RATE: 88 BPM | SYSTOLIC BLOOD PRESSURE: 134 MMHG | DIASTOLIC BLOOD PRESSURE: 78 MMHG

## 2020-02-13 DIAGNOSIS — L84 CORN OF TOE: Primary | ICD-10-CM

## 2020-02-13 DIAGNOSIS — M79.676 PAIN OF TOE, UNSPECIFIED LATERALITY: ICD-10-CM

## 2020-02-13 PROCEDURE — 1101F PT FALLS ASSESS-DOCD LE1/YR: CPT | Mod: CPTII,S$GLB,, | Performed by: PODIATRIST

## 2020-02-13 PROCEDURE — 1101F PR PT FALLS ASSESS DOC 0-1 FALLS W/OUT INJ PAST YR: ICD-10-PCS | Mod: CPTII,S$GLB,, | Performed by: PODIATRIST

## 2020-02-13 PROCEDURE — 99999 PR PBB SHADOW E&M-EST. PATIENT-LVL IV: ICD-10-PCS | Mod: PBBFAC,,, | Performed by: PODIATRIST

## 2020-02-13 PROCEDURE — 1159F MED LIST DOCD IN RCRD: CPT | Mod: S$GLB,,, | Performed by: PODIATRIST

## 2020-02-13 PROCEDURE — 99999 PR PBB SHADOW E&M-EST. PATIENT-LVL IV: CPT | Mod: PBBFAC,,, | Performed by: PODIATRIST

## 2020-02-13 PROCEDURE — 3078F DIAST BP <80 MM HG: CPT | Mod: CPTII,S$GLB,, | Performed by: PODIATRIST

## 2020-02-13 PROCEDURE — 1126F AMNT PAIN NOTED NONE PRSNT: CPT | Mod: S$GLB,,, | Performed by: PODIATRIST

## 2020-02-13 PROCEDURE — 3075F SYST BP GE 130 - 139MM HG: CPT | Mod: CPTII,S$GLB,, | Performed by: PODIATRIST

## 2020-02-13 PROCEDURE — 99213 OFFICE O/P EST LOW 20 MIN: CPT | Mod: S$GLB,,, | Performed by: PODIATRIST

## 2020-02-13 PROCEDURE — 3078F PR MOST RECENT DIASTOLIC BLOOD PRESSURE < 80 MM HG: ICD-10-PCS | Mod: CPTII,S$GLB,, | Performed by: PODIATRIST

## 2020-02-13 PROCEDURE — 99213 PR OFFICE/OUTPT VISIT, EST, LEVL III, 20-29 MIN: ICD-10-PCS | Mod: S$GLB,,, | Performed by: PODIATRIST

## 2020-02-13 PROCEDURE — 1126F PR PAIN SEVERITY QUANTIFIED, NO PAIN PRESENT: ICD-10-PCS | Mod: S$GLB,,, | Performed by: PODIATRIST

## 2020-02-13 PROCEDURE — 1159F PR MEDICATION LIST DOCUMENTED IN MEDICAL RECORD: ICD-10-PCS | Mod: S$GLB,,, | Performed by: PODIATRIST

## 2020-02-13 PROCEDURE — 3075F PR MOST RECENT SYSTOLIC BLOOD PRESS GE 130-139MM HG: ICD-10-PCS | Mod: CPTII,S$GLB,, | Performed by: PODIATRIST

## 2020-02-13 RX ORDER — AMMONIUM LACTATE 12 G/100G
CREAM TOPICAL
Qty: 140 G | Refills: 11 | Status: SHIPPED | OUTPATIENT
Start: 2020-02-13 | End: 2020-08-11

## 2020-02-13 NOTE — PROGRESS NOTES
Subjective:      Patient ID: Soila Chávez is a 69 y.o. female.    Chief Complaint: Foot Pain (left 2nd MT)    Sharp throbbing pain tip 3rd toe left.   Gradual onset, worsening over past several weeks, aggravated by increased weight bearing, shoe gear, pressure.  Trimming / padding in OCT. 2019 helped short time .  Denies trauma, surgery.  No self treatment.     Review of Systems   Constitution: Negative for chills, diaphoresis, fever, malaise/fatigue and night sweats.   Cardiovascular: Negative for claudication, cyanosis, leg swelling and syncope.   Skin: Positive for suspicious lesions. Negative for color change, dry skin, nail changes, rash and unusual hair distribution.   Musculoskeletal: Negative for falls, joint pain, joint swelling, muscle cramps, muscle weakness and stiffness.   Gastrointestinal: Negative for constipation, diarrhea, nausea and vomiting.   Neurological: Negative for brief paralysis, disturbances in coordination, focal weakness, numbness, paresthesias, sensory change and tremors.           Objective:      Physical Exam   Constitutional: She is oriented to person, place, and time. She appears well-developed and well-nourished. She is cooperative. No distress.   Cardiovascular:   Pulses:       Popliteal pulses are 2+ on the right side, and 2+ on the left side.        Dorsalis pedis pulses are 2+ on the right side, and 2+ on the left side.        Posterior tibial pulses are 2+ on the right side, and 2+ on the left side.   Capillary refill 3 seconds all toes/distal feet, all toes/both feet warm to touch.      Negative lymphadenopathy bilateral popliteal fossa and tarsal tunnel.      Negavie lower extremity edema bilateral.     Musculoskeletal:        Right ankle: She exhibits normal range of motion, no swelling, no ecchymosis, no deformity, no laceration and normal pulse. Achilles tendon normal. Achilles tendon exhibits no pain, no defect and normal Luther's test results.   Patient has  hammertoes of digits   2-5 bilateral                partially reducible without symptom today.    Otherwise, Normal angle, base, station of gait. All ten toes without clubbing, cyanosis, or signs of ischemia.  No pain to palpation bilateral lower extremities.  Range of motion, stability, muscle strength, and muscle tone normal bilateral feet and legs.      Lymphadenopathy: No inguinal adenopathy noted on the right or left side.   Negative lymphadenopathy bilateral popliteal fossa and tarsal tunnel.    Negative lymphangitic streaking bilateral feet/ankles/legs.   Neurological: She is alert and oriented to person, place, and time. She has normal strength. She displays no atrophy and no tremor. No sensory deficit. She exhibits normal muscle tone. Gait normal.   Reflex Scores:       Patellar reflexes are 2+ on the right side and 2+ on the left side.       Achilles reflexes are 2+ on the right side and 2+ on the left side.  Negative tinel sign to percussion sural, superficial peroneal, deep peroneal, saphenous, and posterior tibial nerves right and left ankles and feet.     Skin: Skin is warm, dry and intact. Capillary refill takes 2 to 3 seconds. No abrasion, no bruising, no burn, no ecchymosis, no laceration, no lesion and no rash noted. She is not diaphoretic. No cyanosis or erythema. No pallor. Nails show no clubbing.     Focal hyperkeratotic lesion consisting entirely of hyperkeratotic tissue without open skin, drainage, pus, fluctuance, malodor, or signs of infection tip 3rd toe left.    Otherwise, Skin is normal age and health appropriate color, turgor, texture, and temperature bilateral lower extremities without ulceration, hyperpigmentation, discoloration, masses nodules or cords palpated.  No ecchymosis, erythema, edema, or cardinal signs of infection bilateral lower extremities.     Psychiatric: She has a normal mood and affect.             Assessment:       Encounter Diagnoses   Name Primary?    Pain of toe,  unspecified laterality     Corn of toe Yes         Plan:       Soila was seen today for foot pain.    Diagnoses and all orders for this visit:    Corn of toe  -     X-Ray Foot Complete Left; Future    Pain of toe, unspecified laterality  -     Ambulatory referral/consult to Podiatry  -     X-Ray Foot Complete Left; Future    Other orders  -     ammonium lactate 12 % Crea; Apply twice daily to affected parts both feet as needed.      I counseled the patient on her conditions, their implications and medical management.    Discussed conservative treatment with shoes of adequate dimensions, material, and style to alleviate symptoms and delay or prevent surgical intervention.    Recommend daily maintenance with pumice stone/nail file.    xrays left foot.    Declines non covered foot care.          No follow-ups on file.

## 2020-02-13 NOTE — LETTER
February 13, 2020      Jazzmine Parson MD  [address]  [address]  [facility] [address]           [facility]  [address]  [facility] [address]  Phone: [phone]  Fax: [phone]          Patient: Soila Chávez   MR Number: [id number]   YOB: 1950   Date of Visit: 2/13/2020       Dear Dr. Jazzmine Parson:    Thank you for referring Soila Chávez to me for evaluation. Attached you will find relevant portions of my assessment and plan of care.    If you have questions, please do not hesitate to call me. I look forward to following Soila Chávez along with you.    Sincerely,    Richard Melendez, DPSABINO    Enclosure  CC:  No Recipients    If you would like to receive this communication electronically, please contact [email or web address] or [phone] to request more information on [facility] Link access.    For providers and/or their staff who would like to refer a patient to [facility], please contact us through our one-stop-shop provider referral line, [facility], at [phone].    If you feel you have received this communication in error or would no longer like to receive these types of communications, please e-mail [email or web address]

## 2020-02-17 ENCOUNTER — TELEPHONE (OUTPATIENT)
Dept: OTOLARYNGOLOGY | Facility: CLINIC | Age: 70
End: 2020-02-17

## 2020-02-17 NOTE — TELEPHONE ENCOUNTER
----- Message from Adriano Guzmán MD sent at 2/17/2020  2:40 PM CST -----  Contact: patient      ----- Message -----  From: Ophelia Kraus  Sent: 2/17/2020   2:26 PM CST  To: [facility] Staff    Please call above patient at [phone] need to speak with the nurse for soon waiting on a call from the nurse thanks.

## 2020-02-17 NOTE — TELEPHONE ENCOUNTER
----- Message from Ophelia Kraus sent at 2/17/2020  2:26 PM CST -----  Contact: patient  Please call above patient at [phone] need to speak with the nurse for soon waiting on a call from the nurse thanks.

## 2020-02-17 NOTE — TELEPHONE ENCOUNTER
Informed patient there is a cancel for 11 am 2/20/20 Thursday patient  State she coming , itchy ear and right ear soreness

## 2020-02-17 NOTE — TELEPHONE ENCOUNTER
Informed patient she needs to come Thursday 11am 2/2020 right ear soreness , itchy ears  Due to cancellation appointment .

## 2020-02-20 ENCOUNTER — OFFICE VISIT (OUTPATIENT)
Dept: OTOLARYNGOLOGY | Facility: CLINIC | Age: 70
End: 2020-02-20
Payer: MEDICARE

## 2020-02-20 VITALS
BODY MASS INDEX: 30.47 KG/M2 | SYSTOLIC BLOOD PRESSURE: 157 MMHG | HEART RATE: 91 BPM | DIASTOLIC BLOOD PRESSURE: 77 MMHG | WEIGHT: 172 LBS

## 2020-02-20 DIAGNOSIS — H92.01 OTALGIA OF RIGHT EAR: Primary | ICD-10-CM

## 2020-02-20 PROCEDURE — 1101F PT FALLS ASSESS-DOCD LE1/YR: CPT | Mod: CPTII,S$GLB,, | Performed by: OTOLARYNGOLOGY

## 2020-02-20 PROCEDURE — 1125F PR PAIN SEVERITY QUANTIFIED, PAIN PRESENT: ICD-10-PCS | Mod: S$GLB,,, | Performed by: OTOLARYNGOLOGY

## 2020-02-20 PROCEDURE — 3078F PR MOST RECENT DIASTOLIC BLOOD PRESSURE < 80 MM HG: ICD-10-PCS | Mod: CPTII,S$GLB,, | Performed by: OTOLARYNGOLOGY

## 2020-02-20 PROCEDURE — 99999 PR PBB SHADOW E&M-EST. PATIENT-LVL III: ICD-10-PCS | Mod: PBBFAC,,, | Performed by: OTOLARYNGOLOGY

## 2020-02-20 PROCEDURE — 99213 OFFICE O/P EST LOW 20 MIN: CPT | Mod: S$GLB,,, | Performed by: OTOLARYNGOLOGY

## 2020-02-20 PROCEDURE — 1159F PR MEDICATION LIST DOCUMENTED IN MEDICAL RECORD: ICD-10-PCS | Mod: S$GLB,,, | Performed by: OTOLARYNGOLOGY

## 2020-02-20 PROCEDURE — 99213 PR OFFICE/OUTPT VISIT, EST, LEVL III, 20-29 MIN: ICD-10-PCS | Mod: S$GLB,,, | Performed by: OTOLARYNGOLOGY

## 2020-02-20 PROCEDURE — 1101F PR PT FALLS ASSESS DOC 0-1 FALLS W/OUT INJ PAST YR: ICD-10-PCS | Mod: CPTII,S$GLB,, | Performed by: OTOLARYNGOLOGY

## 2020-02-20 PROCEDURE — 3077F PR MOST RECENT SYSTOLIC BLOOD PRESSURE >= 140 MM HG: ICD-10-PCS | Mod: CPTII,S$GLB,, | Performed by: OTOLARYNGOLOGY

## 2020-02-20 PROCEDURE — 99999 PR PBB SHADOW E&M-EST. PATIENT-LVL III: CPT | Mod: PBBFAC,,, | Performed by: OTOLARYNGOLOGY

## 2020-02-20 PROCEDURE — 1159F MED LIST DOCD IN RCRD: CPT | Mod: S$GLB,,, | Performed by: OTOLARYNGOLOGY

## 2020-02-20 PROCEDURE — 3077F SYST BP >= 140 MM HG: CPT | Mod: CPTII,S$GLB,, | Performed by: OTOLARYNGOLOGY

## 2020-02-20 PROCEDURE — 3078F DIAST BP <80 MM HG: CPT | Mod: CPTII,S$GLB,, | Performed by: OTOLARYNGOLOGY

## 2020-02-20 PROCEDURE — 1125F AMNT PAIN NOTED PAIN PRSNT: CPT | Mod: S$GLB,,, | Performed by: OTOLARYNGOLOGY

## 2020-02-20 NOTE — PROGRESS NOTES
Subjective:       Patient ID: Soila Chávez is a 69 y.o. female.    Chief Complaint: ear sore (right )    Otalgia    There is pain in the right ear. This is a chronic (a few weeks now. recently seen by PCP with other numerous complaints of.) problem. The problem occurs constantly. The problem has been waxing and waning. There has been no fever. The pain is at a severity of 2/10. The pain is mild. Associated symptoms include hearing loss. Pertinent negatives include no coughing, diarrhea, ear discharge, headaches, neck pain, rash, rhinorrhea or vomiting. She has tried NSAIDs (Aleve prn) for the symptoms. The treatment provided mild relief. Her past medical history is significant for hearing loss. Dead right ear for years with MRI WNL.     Review of Systems   Constitutional: Negative for activity change, appetite change and fever.   HENT: Positive for ear pain and hearing loss. Negative for congestion, ear discharge, nosebleeds, postnasal drip, rhinorrhea and sneezing.    Eyes: Negative for redness and visual disturbance.   Respiratory: Negative for apnea, cough, shortness of breath and wheezing.    Cardiovascular: Negative for chest pain and palpitations.   Gastrointestinal: Negative for diarrhea and vomiting.   Genitourinary: Negative for difficulty urinating and frequency.   Musculoskeletal: Negative for arthralgias, back pain, gait problem and neck pain.   Skin: Negative for color change and rash.   Neurological: Negative for dizziness, speech difficulty, weakness and headaches.   Hematological: Negative for adenopathy. Does not bruise/bleed easily.   Psychiatric/Behavioral: Negative for agitation and behavioral problems.       Objective:        Constitutional:   Vital signs are normal. She appears well-developed and well-nourished. She is active. Normal speech.      Head:  Normocephalic and atraumatic. Salivary glands normal.  Facial strength is normal.      Ears:  Hearing normal to normal and whispered  voice; external ear normal without scars, lesions, or masses; ear canal, tympanic membrane, and middle ear normal., right ear hearing normal to normal and whispered voice; external ear normal without scars, lesions, or masses; ear canal, tympanic membrane, and middle ear normal. and left ear hearing normal to normal and whispered voice; external ear normal without scars, lesions, or masses; ear canal, tympanic membrane, and middle ear normal..   No obvious signs of lesions/infection/trauma on exam of either ear. The patient points to her right ear lobule but no abnormalities noted.    Nose:  Nose normal including turbinates, nasal mucosa, sinuses and nasal septum.     Mouth/Throat  Oropharynx clear and moist without lesions or asymmetry and normal uvula midline.     Neck:  Neck normal without thyromegaly masses, asymmetry, normal tracheal structure, crepitus, and tenderness, thyroid normal, trachea normal, phonation normal and full range of motion with neck supple.     Psychiatric:   She has a normal mood and affect. Her speech is normal and behavior is normal.     Neurological:   She has neurological normal, alert and oriented.     Skin:   No abrasions, lacerations, lesions, or rashes.       Assessment:       1. Otalgia of right ear        Plan:       1. Hearing conservation strongly recommended.  2. Trial of amplification BAHA for unilateral SNHL right. (patient not interested).  3. Re-check of hearing in 18-24 months or sooner if subjective change noted.  4. F/U with PCP as per schedule.

## 2020-03-02 ENCOUNTER — OFFICE VISIT (OUTPATIENT)
Dept: PODIATRY | Facility: CLINIC | Age: 70
End: 2020-03-02
Payer: MEDICARE

## 2020-03-02 VITALS — HEIGHT: 63 IN | WEIGHT: 172 LBS | BODY MASS INDEX: 30.48 KG/M2

## 2020-03-02 DIAGNOSIS — L84 CORN OF TOE: ICD-10-CM

## 2020-03-02 DIAGNOSIS — M20.42 HAMMER TOE OF LEFT FOOT: ICD-10-CM

## 2020-03-02 DIAGNOSIS — M79.675 PAIN OF TOE OF LEFT FOOT: Primary | ICD-10-CM

## 2020-03-02 PROCEDURE — 1125F PR PAIN SEVERITY QUANTIFIED, PAIN PRESENT: ICD-10-PCS | Mod: S$GLB,,, | Performed by: PODIATRIST

## 2020-03-02 PROCEDURE — 1159F MED LIST DOCD IN RCRD: CPT | Mod: S$GLB,,, | Performed by: PODIATRIST

## 2020-03-02 PROCEDURE — 1125F AMNT PAIN NOTED PAIN PRSNT: CPT | Mod: S$GLB,,, | Performed by: PODIATRIST

## 2020-03-02 PROCEDURE — 99213 PR OFFICE/OUTPT VISIT, EST, LEVL III, 20-29 MIN: ICD-10-PCS | Mod: S$GLB,,, | Performed by: PODIATRIST

## 2020-03-02 PROCEDURE — 99999 PR PBB SHADOW E&M-EST. PATIENT-LVL III: CPT | Mod: PBBFAC,,, | Performed by: PODIATRIST

## 2020-03-02 PROCEDURE — 99213 OFFICE O/P EST LOW 20 MIN: CPT | Mod: S$GLB,,, | Performed by: PODIATRIST

## 2020-03-02 PROCEDURE — 99999 PR PBB SHADOW E&M-EST. PATIENT-LVL III: ICD-10-PCS | Mod: PBBFAC,,, | Performed by: PODIATRIST

## 2020-03-02 PROCEDURE — 1101F PR PT FALLS ASSESS DOC 0-1 FALLS W/OUT INJ PAST YR: ICD-10-PCS | Mod: CPTII,S$GLB,, | Performed by: PODIATRIST

## 2020-03-02 PROCEDURE — 1159F PR MEDICATION LIST DOCUMENTED IN MEDICAL RECORD: ICD-10-PCS | Mod: S$GLB,,, | Performed by: PODIATRIST

## 2020-03-02 PROCEDURE — 1101F PT FALLS ASSESS-DOCD LE1/YR: CPT | Mod: CPTII,S$GLB,, | Performed by: PODIATRIST

## 2020-03-02 NOTE — PROGRESS NOTES
Chief Complaint   Patient presents with    Foot Problem     3rd Left toe pain             HPI:   Soila Chávez is a 69 y.o. female who presents to clinic with complaints of left 3rd hammertoe pain.  There is a callus at the distal aspect.  She was prescribed Amlactin about two weeks ago.  She hasn't used the lotion yet.   Pain is better in more supportive, open shoes.  She does not walk barefoot.  She has ceramic floors.           Patient Active Problem List   Diagnosis    Lumbosacral pain    Eczema    Acrochordon    Allergic rhinitis, seasonal    Essential hypertension    Anxiety    Major depressive disorder, recurrent episode, moderate    Gallstones    Sensorineural hearing loss (SNHL) of both ears    Gastroesophageal reflux disease without esophagitis    Hidrocystoma    Hypokalemia    Postinflammatory hyperpigmentation    Viral warts    Skin tag         Current Outpatient Medications on File Prior to Visit   Medication Sig Dispense Refill    ALPRAZolam (XANAX) 0.5 MG tablet TK 1 T PO  BID 60 tablet 2    ammonium lactate 12 % Crea Apply twice daily to affected parts both feet as needed. 140 g 11    ascorbic acid (VITAMIN C) 100 MG tablet Take 100 mg by mouth once daily.      aspirin (ECOTRIN) 81 MG EC tablet Take 81 mg by mouth.      b complex vitamins tablet Take 1 tablet by mouth once daily.      betamethasone valerate 0.1% (VALISONE) 0.1 % Crea Apply topically 2 (two) times daily. (Patient taking differently: Apply topically 2 (two) times daily as needed. ) 1 Tube 1    calcium carbonate-vitamin D3 500 mg(1,250mg) -125 unit per tablet Take 1 tablet by mouth.      cetirizine (ZYRTEC) 10 MG tablet Take 10 mg by mouth daily as needed.       ergocalciferol, vitamin D2, (VITAMIN D ORAL) Take 400 Units by mouth.      ferrous sulfate (FEOSOL) 325 mg (65 mg iron) Tab tablet Take 325 mg by mouth daily with breakfast.      ginkgo biloba 40 mg Tab Take 2 capsules by mouth.       hydroCHLOROthiazide (HYDRODIURIL) 25 MG tablet TAKE 1 TABLET BY MOUTH EVERY DAY 30 tablet 3    hydroquinone 4 % Crea Apple 2 x day to darkened lesions.      losartan (COZAAR) 100 MG tablet TAKE 1 TABLET BY MOUTH EVERY DAY 30 tablet 3    multivitamin (THERAGRAN) per tablet Take 1 tablet by mouth.      neomycin-polymyxin-hydrocortisone (CORTISPORIN) 3.5-10,000-1 mg/mL-unit/mL-% otic suspension Place 3 drops into both ears 4 (four) times daily. 10 mL 0    omega-3 fatty acids 1,000 mg Cap Take 1 g by mouth.       No current facility-administered medications on file prior to visit.          Review of patient's allergies indicates:   Allergen Reactions    Lotensin [benazepril] Swelling and Other (See Comments)    Penicillins Rash and Other (See Comments)         Social History     Socioeconomic History    Marital status:      Spouse name: Not on file    Number of children: Not on file    Years of education: Not on file    Highest education level: Not on file   Occupational History    Not on file   Social Needs    Financial resource strain: Not on file    Food insecurity:     Worry: Not on file     Inability: Not on file    Transportation needs:     Medical: Not on file     Non-medical: Not on file   Tobacco Use    Smoking status: Never Smoker    Smokeless tobacco: Never Used   Substance and Sexual Activity    Alcohol use: Yes    Drug use: No    Sexual activity: Not Currently     Birth control/protection: None   Lifestyle    Physical activity:     Days per week: Not on file     Minutes per session: Not on file    Stress: Not on file   Relationships    Social connections:     Talks on phone: Not on file     Gets together: Not on file     Attends [employer] service: Not on file     Active member of club or organization: Not on file     Attends meetings of clubs or organizations: Not on file     Relationship status: Not on file   Other Topics Concern    Are you pregnant or think you may be? Not  "Asked    Breast-feeding Not Asked   Social History Narrative    June 2016    The patient reports that she lives alone normally, however her daughter recently moved in with her    She has a 43-year-old daughter, Saima    And a 26-year-old daughterChris, who is a schoolteacher for 6 in seventh grade in [employer]    She is retired from working as a  in the school system    She now works about 5-15 hours a week for [employer], which she enjoys.    Enjoys working at [employer] couple days a week        Review of Systems -   General ROS: negative  Respiratory ROS: no cough, shortness of breath, or wheezing  Cardiovascular ROS: no chest pain or dyspnea on exertion  Musculoskeletal ROS: positive for - joint stiffness  negative for - muscle pain or muscular weakness  Neurological ROS: no TIA or stroke symptoms  Dermatological ROS: negative            EXAM:  Vitals:    03/02/20 1438   Weight: 78 kg (172 lb)   Height: 5' 3" (1.6 m)         Gen: Alert and orient x 3, no apparent distress. Cooperative.       Left   Foot:      Vascular:     Dorsalis pedis pulse 2/4   posterior tibial pulse 2/4 .   3 seconds capillary refill time and toes are warm to touch.  There is  presence of digital hair.  There is no edema.  no varicosities.    Neurological:   no sensory deficits noted, normal light touch sensation,   normal position sensation   normal cortical sensation    Derm:   Callus distal plantar left 3rd toe;  No underlying wound  No bruising  No drainage  No redness.       MSK:  left 3rd rigid hammertoe.    range of motion intact, limited   No swelling or crepitus at joints.  5/5 muscle strength          ASSESSMENT / PLAN:    Problem List Items Addressed This Visit     None      Visit Diagnoses     Pain of toe of left foot    -  Primary    Hammer toe of left foot        Corn of toe                · I counseled the patient on the patient's conditions, their implications and medical management.   · Conservative " treatments discussed include padding, hammertoe crest  Pads, extra-depth shoes.  Toe sleeve provided.   Shoe and activity modification as needed for relief.   Moisturize feet daily.  Use Amlactin as prescribed.  Never walk barefoot.   Proc B $42 if callus needs to be trimmed.

## 2020-03-02 NOTE — PATIENT INSTRUCTIONS
What Are Mallet, Hammer, and Claw Toes?  Mallet, hammer, and claw toes are most often caused by wearing shoes that are too short or heels that are too high. This jams the toes against the front of the shoe and causes one or more joints to bend. Rarely, disease can cause the joints in the toes to bend. Mallet, hammer, and claw toes are among the most common toe problems. They occur most often in the longest of the four smaller toes.    Inside your toes  There are 3 bones in each of your 4 smaller toes. Where 2 bones connect is called a joint. Normally the toes lie flat. But pressure on the toes or the front of the foot can cause one or more joints to bend. This curls the toe. Toes that stay curled are called mallet toes, hammer toes, or claw toes, depending on which joints are bent.    Symptoms  You may feel pain in the toe or in the ball of your foot. A corn (a hard growth of skin on the top of the toe) may form where the toe rubs against the top of the shoe. Or a callus (a hard growth of skin on the bottom of the foot) may form under the tip of the toe or on the ball of the foot. Corns and calluses can also be painful.   Date Last Reviewed: 10/18/2015  © 3520-5407 [employer] [address] [place] [address], [place] [address]. All rights reserved. This information is not intended as a substitute for professional medical care. Always follow your healthcare professional's instructions.

## 2020-03-05 ENCOUNTER — HOSPITAL ENCOUNTER (OUTPATIENT)
Facility: HOSPITAL | Age: 70
Discharge: HOME | End: 2020-03-05
Attending: INTERNAL MEDICINE | Admitting: INTERNAL MEDICINE
Payer: MEDICARE

## 2020-03-05 VITALS
HEART RATE: 67 BPM | HEIGHT: 64 IN | DIASTOLIC BLOOD PRESSURE: 77 MMHG | RESPIRATION RATE: 16 BRPM | OXYGEN SATURATION: 100 % | WEIGHT: 179.88 LBS | BODY MASS INDEX: 30.71 KG/M2 | SYSTOLIC BLOOD PRESSURE: 123 MMHG | TEMPERATURE: 98 F

## 2020-03-05 DIAGNOSIS — Z12.11 SCREENING FOR MALIGNANT NEOPLASM OF COLON: Primary | ICD-10-CM

## 2020-03-05 PROCEDURE — G0105 COLORECTAL SCRN; HI RISK IND: HCPCS | Performed by: COLON & RECTAL SURGERY

## 2020-03-05 PROCEDURE — 37000008 HC ANESTHESIA 1ST 15 MINUTES: Performed by: COLON & RECTAL SURGERY

## 2020-03-05 PROCEDURE — 63600175 PHARM REV CODE 636 W HCPCS: Performed by: NURSE ANESTHETIST, CERTIFIED REGISTERED

## 2020-03-05 PROCEDURE — G0105 COLORECTAL SCRN; HI RISK IND: HCPCS | Mod: ,,, | Performed by: COLON & RECTAL SURGERY

## 2020-03-05 PROCEDURE — E9220 PRA ENDO ANESTHESIA: HCPCS | Mod: ,,, | Performed by: NURSE ANESTHETIST, CERTIFIED REGISTERED

## 2020-03-05 PROCEDURE — E9220 PRA ENDO ANESTHESIA: ICD-10-PCS | Mod: ,,, | Performed by: NURSE ANESTHETIST, CERTIFIED REGISTERED

## 2020-03-05 PROCEDURE — 37000009 HC ANESTHESIA EA ADD 15 MINS: Performed by: COLON & RECTAL SURGERY

## 2020-03-05 PROCEDURE — G0105 COLORECTAL SCRN; HI RISK IND: ICD-10-PCS | Mod: ,,, | Performed by: COLON & RECTAL SURGERY

## 2020-03-05 PROCEDURE — 63600175 PHARM REV CODE 636 W HCPCS: Performed by: COLON & RECTAL SURGERY

## 2020-03-05 RX ORDER — LIDOCAINE HYDROCHLORIDE 20 MG/ML
INJECTION INTRAVENOUS
Status: DISCONTINUED | OUTPATIENT
Start: 2020-03-05 | End: 2020-03-05

## 2020-03-05 RX ORDER — PROPOFOL 10 MG/ML
VIAL (ML) INTRAVENOUS CONTINUOUS PRN
Status: DISCONTINUED | OUTPATIENT
Start: 2020-03-05 | End: 2020-03-05

## 2020-03-05 RX ORDER — SODIUM CHLORIDE 9 MG/ML
INJECTION, SOLUTION INTRAVENOUS CONTINUOUS
Status: DISCONTINUED | OUTPATIENT
Start: 2020-03-05 | End: 2020-03-05 | Stop reason: HOSPADM

## 2020-03-05 RX ORDER — PROPOFOL 10 MG/ML
VIAL (ML) INTRAVENOUS
Status: DISCONTINUED | OUTPATIENT
Start: 2020-03-05 | End: 2020-03-05

## 2020-03-05 RX ADMIN — SODIUM CHLORIDE: 0.9 INJECTION, SOLUTION INTRAVENOUS at 09:03

## 2020-03-05 RX ADMIN — PROPOFOL 50 MG: 10 INJECTION, EMULSION INTRAVENOUS at 09:03

## 2020-03-05 RX ADMIN — PROPOFOL 150 MCG/KG/MIN: 10 INJECTION, EMULSION INTRAVENOUS at 09:03

## 2020-03-05 RX ADMIN — LIDOCAINE HYDROCHLORIDE 40 MG: 20 INJECTION, SOLUTION INTRAVENOUS at 09:03

## 2020-03-05 NOTE — TRANSFER OF CARE
"Anesthesia Transfer of Care Note    Patient: Soila Chávez    Procedure(s) Performed: Procedure(s) (LRB):  COLONOSCOPY (N/A)    Patient location: GI    Anesthesia Type: general    Transport from OR: Transported from OR on room air with adequate spontaneous ventilation    Post pain: adequate analgesia    Post assessment: tolerated procedure well and no apparent anesthetic complications    Post vital signs: stable    Level of consciousness: awake    Nausea/Vomiting: no nausea/vomiting    Complications: none    Transfer of care protocol was followed      Last vitals:   Visit Vitals  BP (!) 90/53   Pulse 67   Temp 36.6 °C (97.9 °F)   Resp 16   Ht 5' 4" (1.626 m)   Wt 81.6 kg (179 lb 14.3 oz)   SpO2 97%   Breastfeeding? No   BMI 30.88 kg/m²     "

## 2020-03-05 NOTE — DISCHARGE INSTRUCTIONS
Colorectal Cancer Screening    Colorectal cancer (cancer in the colon or rectum) is a leading cause of cancer deaths in the [place] But it doesnt have to be. When this cancer is found and removed early, the chances of a full recovery are very good. Because colorectal cancer rarely causes symptoms in its early stages, screening for the disease is important. Its even more crucial if you have risk factors for the disease. Learn more about colorectal cancer and its risk factors. Then talk to your healthcare provider about being screened. You could be saving your own life.  Risk factors for colorectal cancer  Your risk of having colorectal cancer increases if you:  · Are 50 years of age or older  · Have a family history or personal history of colorectal cancer or polyps  · Have a personal history of type 2 diabetes, Crohns disease, or ulcerative colitis  · Have an inherited genetic syndrome like Nagy syndrome (also known as HNPCC) or familial adenomatous polyposis (FAP)  · Are very overweight  · Are not physically active  · Smoke  · Drink a lot of alcohol  · Eat a lot of red or processed meat  The colon and rectum  Waste from food you eat enters the colon from the small intestine. As it travels through the colon, the waste (stool) loses water and becomes more solid. Intestinal muscles push it toward the sigmoid--the last section of the colon. Stool then moves into the rectum, where its stored until its ready to leave the body during a bowel movement.  How cancer develops  Polyps are growths that form on the inner lining of the colon or rectum. Most are benign, which means they arent cancerous. But over time, some polyps can become cancer (malignant). This happens when cells in these polyps begin growing abnormally. In time, malignant cells invade more and more of the colon and rectum. The cancer may also spread to nearby organs or lymph nodes or to other parts of the body. Finding and removing polyps can help  prevent cancer from ever forming.  Your screening  Screening means looking for a health problem before you have symptoms. During screening for colorectal cancer, your healthcare provider will ask about your health history, examine you, and do one or more tests.  History and exam  The history and exam involve the following:  · Health history. Your healthcare provider will ask about your health history. Mention if a family member has had colon cancer or polyps. Also mention any health problems you have had in the past.  · Digital rectal exam (JOHN). During a JOHN, the healthcare provider inserts a lubricated gloved finger into the rectum. The test is painless and takes less than a minute. Healthcare providers agree that this test alone is not enough to screen for colorectal cancer.  Screening test choices  Fecal occult blood test (FOBT) or fecal immunochemical test (FIT)  These tests check for occult blood in stool (blood you cant see). Hidden blood may be a sign of colon polyps or cancer. A small sample of stool is tested for blood in a laboratory. Most often, you collect this sample at home using a kit your healthcare provider gives you. Follow the instructions carefully for using this kit. You might need to avoid certain foods and medicines before the test, as directed.  Barium enema with contrast (double-contrast barium enema)  This test uses X-rays to provide images of the entire colon and rectum. The day before this test, you will need to do a bowel prep to clean out the colon and rectum. A bowel prep is a liquid diet plus strong laxatives or enemas. You will be awake for the test, but you may be given medicine to help you relax. At the start of the test, a radiologist (a healthcare provider who specializes in imaging tests) places a soft tube into the rectum. The tube is used to fill the colon with a contrast liquid (barium) and air. This can be uncomfortable for some people. The liquid helps the colon show up  clearly on the X-rays. Because the test uses X-rays, it exposes you to a small amount of radiation.  Virtual colonoscopy  This exam is also called a CT colonography. It uses a series of X-ray photographs to create a 3-D view of the colon and rectum. The day before the test, you will need to do a bowel prep to clean out your colon. Your healthcare provider will give you instructions on how to do this. During the procedure, you will lie on a table that is part of a special X-ray machine called a CT scanner. A small tube will be placed into your rectum to fill the colon and rectum with air. This can be uncomfortable for some people. Then, the table will move into the machine and pictures will be taken of your colon and rectum. A computer will combine these photos to create a 3-D picture. Because the test uses X-rays, it exposes you to a small amount of radiation.  Scope exams  Here are two types of scope exams:  · Colonoscopy. This test can be used to find and remove polyps anywhere in the colon or rectum. The day before the test, you will do a bowel prep. This is a liquid diet plus a strong laxative solution or an enema. The bowel prep will cleanse your colon. You will be given instructions for this. Just before the test, you are given a medicine to make you sleepy. Then, a long, flexible, lighted tube called a colonoscope is gently inserted into the rectum and guided through the entire colon. Images of the colon are viewed on a video screen. Any polyps that are found are removed and sent to a lab for testing. If a polyp cant be removed, a sample of tissue is taken and the polyp might be removed later during surgery. You will need to bring someone with you to drive you home after this test.   · Sigmoidoscopy. This test is similar to colonoscopy, but focuses only on the sigmoid colon and rectum. As with colonoscopy, bowel prep must be done the day before this test. It might not need to be as complete as the bowel prep  for a colonoscopy. You are awake during the procedure, but you may be given medicine to help you relax. During the test, the healthcare provider guides a thin, flexible, lighted tube called a sigmoidoscope through your rectum and lower colon. The images are displayed on a video screen. Polyps are removed, if possible, and sent to a lab for testing.  Colonoscopy is the only screening test that lets your healthcare provider see the entire colon and rectum. This test also lets your healthcare provider remove any pieces of tissue that need to be looked at by a lab. If something suspicious is found using any other tests, you will likely need a colonoscopy.     When to call your healthcare provider after a test  Call your healthcare provider if you have any of the following after any screening test:  · Bleeding  · Fever of 100.4°F (38°C) or higher, or as directed by your healthcare provider  · Abdominal pain  · Vomiting   Date Last Reviewed: 11/4/2015  © 0223-8171 [employer]. [address] [place] [address]. All rights reserved. This information is not intended as a substitute for professional medical care. Always follow your healthcare professional's instructions.        Colonoscopy     A camera attached to a flexible tube with a viewing lens is used to take video pictures.     Colonoscopy is a test to view the inside of your lower digestive tract (colon and rectum). Sometimes it can show the last part of the small intestine (ileum). During the test, small pieces of tissue may be removed for testing. This is called a biopsy. Small growths, such as polyps, may also be removed.   Why is colonoscopy done?  The test is done to help look for colon cancer. And it can help find the source of abdominal pain, bleeding, and changes in bowel habits. It may be needed once a year, depending on factors such as your:  · Age  · Health history  · Family health history  · Symptoms  · Results from any prior  colonoscopy  Risks and possible complications  These include:  · Bleeding               · A puncture or tear in the colon   · Risks of anesthesia  · A cancer lesion not being seen  Getting ready   To prepare for the test:  · Talk with your healthcare provider about the risks of the test (see below). Also ask your healthcare provider about alternatives to the test.  · Tell your healthcare provider about any medicines you take. Also tell him or her about any health conditions you may have.  · Make sure your rectum and colon are empty for the test. Follow the diet and bowel prep instructions exactly. If you dont, the test may need to be rescheduled.  · Plan for a friend or family member to drive you home after the test.     Colonoscopy provides an inside view of the entire colon.     You may discuss the results with your doctor right away or at a future visit.  During the test   The test is usually done in the hospital on an outpatient basis. This means you go home the same day. The procedure takes about 30 minutes. During that time:  · You are given relaxing (sedating) medicine through an IV line. You may be drowsy, or fully asleep.  · The healthcare provider will first give you a physical exam to check for anal and rectal problems.  · Then the anus is lubricated and the scope inserted.  · If you are awake, you may have a feeling similar to needing to have a bowel movement. You may also feel pressure as air is pumped into the colon. Its OK to pass gas during the procedure.  · Biopsy, polyp removal, or other treatments may be done during the test.  After the test   You may have gas right after the test. It can help to try to pass it to help prevent later bloating. Your healthcare provider may discuss the results with you right away. Or you may need to schedule a follow-up visit to talk about the results. After the test, you can go back to your normal eating and other activities. You may be tired from the sedation and  need to rest for a few hours.  Date Last Reviewed: 11/1/2016  © 9736-3958 [employer]. [address] [place] [address], [place] [address]. All rights reserved. This information is not intended as a substitute for professional medical care. Always follow your healthcare professional's instructions.

## 2020-03-05 NOTE — ANESTHESIA POSTPROCEDURE EVALUATION
Anesthesia Post Evaluation    Patient: Soila Chávez    Procedure(s) Performed: Procedure(s) (LRB):  COLONOSCOPY (N/A)    Final Anesthesia Type: general    Patient location during evaluation: GI PACU  Patient participation: Yes- Able to Participate  Level of consciousness: awake and alert  Post-procedure vital signs: reviewed and stable  Pain management: adequate  Airway patency: patent    PONV status at discharge: No PONV  Anesthetic complications: no      Cardiovascular status: blood pressure returned to baseline and stable  Respiratory status: unassisted, spontaneous ventilation and room air  Hydration status: euvolemic  Follow-up not needed.          Vitals Value Taken Time   /77 3/5/2020 11:10 AM   Temp 36.6 °C (97.9 °F) 3/5/2020 10:36 AM   Pulse 67 3/5/2020 11:10 AM   Resp 16 3/5/2020 11:10 AM   SpO2 100 % 3/5/2020 11:10 AM         Event Time     Out of Recovery 11:24:43          Pain/Yoly Score: Yoly Score: 10 (3/5/2020 10:54 AM)

## 2020-03-05 NOTE — [employer] PATIENT INSTRUCTIONS
Discharge Summary/Instructions after an Endoscopic Procedure  Patient Name: Soila Chávez  Patient MRN: [id number]  Patient YOB: 1950  Thursday, March 05, 2020  Nathalia Kemp MD  RESTRICTIONS:  During your procedure today, you received medications for sedation.  These   medications may affect your judgment, balance and coordination.  Therefore,   for 24 hours, you have the following restrictions:   - DO NOT drive a car, operate machinery, make legal/financial decisions,   sign important papers or drink alcohol.    ACTIVITY:  Today: no heavy lifting, straining or running due to procedural   sedation/anesthesia.  The following day: return to full activity including work.  DIET:  Eat and drink normally unless instructed otherwise.     TREATMENT FOR COMMON SIDE EFFECTS:  - Mild abdominal pain, nausea, belching, bloating or excessive gas:  rest,   eat lightly and use a heating pad.  - Sore Throat: treat with throat lozenges and/or gargle with warm salt   water.  - Because air was used during the procedure, expelling large amounts of air   from your rectum or belching is normal.  - If a bowel prep was taken, you may not have a bowel movement for 1-3 days.    This is normal.  SYMPTOMS TO WATCH FOR AND REPORT TO YOUR PHYSICIAN:  1. Abdominal pain or bloating, other than gas cramps.  2. Chest pain.  3. Back pain.  4. Signs of infection such as: chills or fever occurring within 24 hours   after the procedure.  5. Rectal bleeding, which would show as bright red, maroon, or black stools.   (A tablespoon of blood from the rectum is not serious, especially if   hemorrhoids are present.)  6. Vomiting.  7. Weakness or dizziness.  GO DIRECTLY TO THE NEAREST EMERGENCY ROOM IF YOU HAVE ANY OF THE FOLLOWING:      Difficulty breathing              Chills and/or fever over 101 F   Persistent vomiting and/or vomiting blood   Severe abdominal pain   Severe chest pain   Black, tarry stools   Bleeding- more than one  tablespoon   Any other symptom or condition that you feel may need urgent attention  Your doctor recommends these additional instructions:  If any biopsies were taken, your doctors clinic will contact you in 1 to 2   weeks with any results.  - Discharge patient to home.   - Resume previous diet.   - Continue present medications.   - Repeat colonoscopy in 5 years for surveillance.   - Return to primary care physician.   - The signs and symptoms of potential delayed complications were discussed   with the patient.   - Patient has a contact number available for emergencies.   - Return to normal activities tomorrow.  For questions, problems or results please call your physician - Nathalia Kemp MD at Work:  [phone].  [facility] [place], EMERGENCY ROOM PHONE NUMBER: [phone]  IF A COMPLICATION OR EMERGENCY SITUATION ARISES AND YOU ARE UNABLE TO REACH   YOUR PHYSICIAN - GO DIRECTLY TO THE EMERGENCY ROOM.  Nathalia Kemp MD  3/5/2020 10:30:16 AM  This report has been verified and signed electronically.  [employer]

## 2020-03-05 NOTE — H&P
COLONOSCOPY HISTORY AND PHYSICAL EXAM    Procedure : Colonoscopy      INDICATIONS: asymptomatic screening exam      Last Colonoscopy:  2010  Findings: Adenoma       Past Medical History:   Diagnosis Date    Allergy     Anxiety     Cataract     Colon polyps 2010    Hypertension     Joint pain      Sedation Problems: NO  Family History   Problem Relation Age of Onset    Breast cancer Sister     Colon cancer Neg Hx     Ovarian cancer Neg Hx     Melanoma Neg Hx      Fam Hx of Sedation Problems: NO  Social History     Socioeconomic History    Marital status:      Spouse name: Not on file    Number of children: Not on file    Years of education: Not on file    Highest education level: Not on file   Occupational History    Not on file   Social Needs    Financial resource strain: Not on file    Food insecurity:     Worry: Not on file     Inability: Not on file    Transportation needs:     Medical: Not on file     Non-medical: Not on file   Tobacco Use    Smoking status: Never Smoker    Smokeless tobacco: Never Used   Substance and Sexual Activity    Alcohol use: Yes    Drug use: No    Sexual activity: Not Currently     Birth control/protection: None   Lifestyle    Physical activity:     Days per week: Not on file     Minutes per session: Not on file    Stress: Not on file   Relationships    Social connections:     Talks on phone: Not on file     Gets together: Not on file     Attends Latter day service: Not on file     Active member of club or organization: Not on file     Attends meetings of clubs or organizations: Not on file     Relationship status: Not on file   Other Topics Concern    Are you pregnant or think you may be? Not Asked    Breast-feeding Not Asked   Social History Narrative    June 2016    The patient reports that she lives alone normally, however her daughter recently moved in with her    She has a 43-year-old daughter, Saima    And a 26-year-old daughterChris, who is  a schoolteacher for 6 in seventh grade in [employer]    She is retired from working as a  in the school system    She now works about 5-15 hours a week for [employer], which she enjoys.    Enjoys working at [employer] couple days a week       Review of Systems - Negative except   Respiratory ROS: no dyspnea  Cardiovascular ROS: no exertional chest pain  Gastrointestinal ROS: NO abdominal discomfort,  NO rectal bleeding  Musculoskeletal ROS: no muscular pain  Neurological ROS: no recent stroke    Physical Exam:  Breastfeeding? No   General: no distress  Head: normocephalic  Mallampati Score   Neck: supple, symmetrical, trachea midline  Lungs:  clear to auscultation bilaterally and normal respiratory effort  Heart: regular rate and rhythm and no murmur  Abdomen: soft, non-tender non-distented; bowel sounds normal; no masses,  no organomegaly  Extremities: no cyanosis or edema, or clubbing    ASA:  II    PLAN  COLONOSCOPY.    SedationPlan :MAC    The details of the procedure, the possible need for biopsy or polypectomy and the potential risks including bleeding, perforation, missed polyps were discussed in detail.

## 2020-03-05 NOTE — ANESTHESIA PREPROCEDURE EVALUATION
03/05/2020  Soila Chávez is a 69 y.o., female.    Patient Active Problem List   Diagnosis    Lumbosacral pain    Eczema    Acrochordon    Allergic rhinitis, seasonal    Essential hypertension    Anxiety    Major depressive disorder, recurrent episode, moderate    Gallstones    Sensorineural hearing loss (SNHL) of both ears    Gastroesophageal reflux disease without esophagitis    Hidrocystoma    Hypokalemia    Postinflammatory hyperpigmentation    Viral warts    Skin tag     Past Medical History:   Diagnosis Date    Allergy     Anxiety     Cataract     Colon polyps 2010    Hypertension     Joint pain      Past Surgical History:   Procedure Laterality Date    HYSTERECTOMY      PERIPARTUM CHITO         Anesthesia Evaluation    I have reviewed the Patient Summary Reports.     I have reviewed the Medications.     Review of Systems  Anesthesia Hx:   Denies Personal Hx of Anesthesia complications.       Physical Exam  General:  Well nourished    Airway/Jaw/Neck:  Airway Findings: Mouth Opening: Normal Tongue: Normal  General Airway Assessment: Adult  Mallampati: II  TM Distance: Normal, at least 6 cm      Dental:  Dental Findings: In tact   Chest/Lungs:  Chest/Lungs Findings: Clear to auscultation, Normal Respiratory Rate     Heart/Vascular:  Heart Findings: Rate: Normal  Rhythm: Regular Rhythm  Sounds: Normal        Mental Status:  Mental Status Findings:  Cooperative, Alert and Oriented         Anesthesia Plan  Type of Anesthesia, risks & benefits discussed:  Anesthesia Type:  general  Patient's Preference:   Intra-op Monitoring Plan: standard ASA monitors  Intra-op Monitoring Plan Comments:   Post Op Pain Control Plan: per primary service following discharge from PACU  Post Op Pain Control Plan Comments:   Induction:   IV  Beta Blocker:  Patient is not currently on a Beta-Blocker (No  further documentation required).       Informed Consent: Patient understands risks and agrees with Anesthesia plan.  Questions answered. Anesthesia consent signed with patient.  ASA Score: 2     Day of Surgery Review of History & Physical: I have interviewed and examined the patient. I have reviewed the patient's H&P dated:  There are no significant changes.  H&P update referred to the provider.         Ready For Surgery From Anesthesia Perspective.

## 2020-03-12 ENCOUNTER — TELEPHONE (OUTPATIENT)
Dept: ENDOSCOPY | Facility: HOSPITAL | Age: 70
End: 2020-03-12

## 2020-04-08 DIAGNOSIS — S80.10XA CONTUSION OF LOWER LEG, UNSPECIFIED LATERALITY, INITIAL ENCOUNTER: ICD-10-CM

## 2020-04-08 DIAGNOSIS — M25.512 LEFT SHOULDER PAIN, UNSPECIFIED CHRONICITY: Primary | ICD-10-CM

## 2020-04-08 NOTE — TELEPHONE ENCOUNTER
Pt c/o sever leg pain and shoulder pain. Pt would like to know if she can go back on her gabapentin 600 mg to help with her leg pain and shoulder pain. Pt would also like to know if she can take Aleve. Informed pt that she should not take her Aleve and gabapentin together. Pt states she would also like to take muscle relaxer. Please advise what medications pt could take for leg and should pain.

## 2020-04-08 NOTE — TELEPHONE ENCOUNTER
----- Message from Dash Fried sent at 4/8/2020  2:55 PM CDT -----  Contact: CARY LINDSAY [[id number]]  Name of Who is Calling: CARY LINDSAY [[id number]]      What is the request in detail: Would like to speak with staff in regards to pain in extremities. Please advise, no other information provided.       Can the clinic reply by [facility]: no      [facility] [phone]

## 2020-04-09 RX ORDER — GABAPENTIN 600 MG/1
600 TABLET ORAL 3 TIMES DAILY
Qty: 90 TABLET | Refills: 1 | Status: SHIPPED | OUTPATIENT
Start: 2020-04-09 | End: 2020-06-10

## 2020-04-13 ENCOUNTER — PATIENT MESSAGE (OUTPATIENT)
Dept: PRIMARY CARE CLINIC | Facility: CLINIC | Age: 70
End: 2020-04-13

## 2020-04-13 NOTE — TELEPHONE ENCOUNTER
----- Message from Maria Guadalupe Kraus sent at 4/13/2020  2:23 PM CDT -----  Contact: CARY LINDSAY    Type:  Patient Returning Call    Who Called: CARY LINDSAY     Who Left Message for Patient:unknown    Does the patient know what this is regarding?unknown    Best Call Back Number:[phone]    Additional Information:

## 2020-04-13 NOTE — TELEPHONE ENCOUNTER
Informed pt that she can take gabapentin with her other medications. Pt verbalized understanding.

## 2020-05-12 DIAGNOSIS — I10 ESSENTIAL HYPERTENSION: Primary | ICD-10-CM

## 2020-05-12 RX ORDER — HYDROCHLOROTHIAZIDE 25 MG/1
25 TABLET ORAL DAILY
Qty: 30 TABLET | Refills: 3 | Status: SHIPPED | OUTPATIENT
Start: 2020-05-12 | End: 2020-08-11

## 2020-05-18 DIAGNOSIS — F41.9 ANXIETY: ICD-10-CM

## 2020-05-18 RX ORDER — ALPRAZOLAM 0.5 MG/1
TABLET ORAL
Qty: 60 TABLET | Refills: 2 | Status: SHIPPED | OUTPATIENT
Start: 2020-05-18 | End: 2021-11-15 | Stop reason: SDUPTHER

## 2020-07-02 ENCOUNTER — OFFICE VISIT (OUTPATIENT)
Dept: OTOLARYNGOLOGY | Facility: CLINIC | Age: 70
End: 2020-07-02
Payer: MEDICARE

## 2020-07-02 VITALS — HEART RATE: 90 BPM | DIASTOLIC BLOOD PRESSURE: 89 MMHG | SYSTOLIC BLOOD PRESSURE: 155 MMHG

## 2020-07-02 DIAGNOSIS — H93.8X3 SENSATION OF FULLNESS IN BOTH EARS: Primary | ICD-10-CM

## 2020-07-02 PROCEDURE — 99213 PR OFFICE/OUTPT VISIT, EST, LEVL III, 20-29 MIN: ICD-10-PCS | Mod: 25,S$GLB,, | Performed by: OTOLARYNGOLOGY

## 2020-07-02 PROCEDURE — 1101F PR PT FALLS ASSESS DOC 0-1 FALLS W/OUT INJ PAST YR: ICD-10-PCS | Mod: CPTII,S$GLB,, | Performed by: OTOLARYNGOLOGY

## 2020-07-02 PROCEDURE — 99213 OFFICE O/P EST LOW 20 MIN: CPT | Mod: 25,S$GLB,, | Performed by: OTOLARYNGOLOGY

## 2020-07-02 PROCEDURE — 3079F PR MOST RECENT DIASTOLIC BLOOD PRESSURE 80-89 MM HG: ICD-10-PCS | Mod: CPTII,S$GLB,, | Performed by: OTOLARYNGOLOGY

## 2020-07-02 PROCEDURE — 69210 PR REMOVAL IMPACTED CERUMEN REQUIRING INSTRUMENTATION, UNILATERAL: ICD-10-PCS | Mod: S$GLB,,, | Performed by: OTOLARYNGOLOGY

## 2020-07-02 PROCEDURE — 99999 PR PBB SHADOW E&M-EST. PATIENT-LVL III: CPT | Mod: PBBFAC,,, | Performed by: OTOLARYNGOLOGY

## 2020-07-02 PROCEDURE — 1125F AMNT PAIN NOTED PAIN PRSNT: CPT | Mod: S$GLB,,, | Performed by: OTOLARYNGOLOGY

## 2020-07-02 PROCEDURE — 1101F PT FALLS ASSESS-DOCD LE1/YR: CPT | Mod: CPTII,S$GLB,, | Performed by: OTOLARYNGOLOGY

## 2020-07-02 PROCEDURE — 69210 REMOVE IMPACTED EAR WAX UNI: CPT | Mod: S$GLB,,, | Performed by: OTOLARYNGOLOGY

## 2020-07-02 PROCEDURE — 3077F PR MOST RECENT SYSTOLIC BLOOD PRESSURE >= 140 MM HG: ICD-10-PCS | Mod: CPTII,S$GLB,, | Performed by: OTOLARYNGOLOGY

## 2020-07-02 PROCEDURE — 3077F SYST BP >= 140 MM HG: CPT | Mod: CPTII,S$GLB,, | Performed by: OTOLARYNGOLOGY

## 2020-07-02 PROCEDURE — 1159F MED LIST DOCD IN RCRD: CPT | Mod: S$GLB,,, | Performed by: OTOLARYNGOLOGY

## 2020-07-02 PROCEDURE — 99999 PR PBB SHADOW E&M-EST. PATIENT-LVL III: ICD-10-PCS | Mod: PBBFAC,,, | Performed by: OTOLARYNGOLOGY

## 2020-07-02 PROCEDURE — 3079F DIAST BP 80-89 MM HG: CPT | Mod: CPTII,S$GLB,, | Performed by: OTOLARYNGOLOGY

## 2020-07-02 PROCEDURE — 1125F PR PAIN SEVERITY QUANTIFIED, PAIN PRESENT: ICD-10-PCS | Mod: S$GLB,,, | Performed by: OTOLARYNGOLOGY

## 2020-07-02 PROCEDURE — 1159F PR MEDICATION LIST DOCUMENTED IN MEDICAL RECORD: ICD-10-PCS | Mod: S$GLB,,, | Performed by: OTOLARYNGOLOGY

## 2020-07-02 NOTE — PROGRESS NOTES
Subjective:       Patient ID: Soila Chávez is a 70 y.o. female.    Chief Complaint: Ear Fullness    Ear Fullness   There is pain in both ears. This is a recurrent problem. The current episode started more than 1 month ago. The problem occurs constantly. The problem has been waxing and waning. There has been no fever. The patient is experiencing no pain. Associated symptoms include hearing loss. Pertinent negatives include no coughing, diarrhea, ear discharge, headaches, neck pain, rash, rhinorrhea or vomiting. She has tried nothing for the symptoms. Her past medical history is significant for hearing loss.     Review of Systems   Constitutional: Negative for activity change, appetite change and fever.   HENT: Positive for hearing loss. Negative for congestion, ear discharge, ear pain, nosebleeds, postnasal drip, rhinorrhea and sneezing.    Eyes: Negative for redness and visual disturbance.   Respiratory: Negative for apnea, cough, shortness of breath and wheezing.    Cardiovascular: Negative for chest pain and palpitations.   Gastrointestinal: Negative for diarrhea and vomiting.   Genitourinary: Negative for difficulty urinating and frequency.   Musculoskeletal: Negative for arthralgias, back pain, gait problem and neck pain.   Skin: Negative for color change and rash.   Neurological: Negative for dizziness, speech difficulty, weakness and headaches.   Hematological: Negative for adenopathy. Does not bruise/bleed easily.   Psychiatric/Behavioral: Negative for agitation and behavioral problems.       Objective:        Constitutional:   Vital signs are normal. She appears well-developed and well-nourished. She is active. Normal speech.      Head:  Normocephalic and atraumatic. Salivary glands normal.  Facial strength is normal.      Nose:  Nose normal including turbinates, nasal mucosa, sinuses and nasal septum.     Mouth/Throat  Oropharynx clear and moist without lesions or asymmetry and normal uvula midline.      Neck:  Neck normal without thyromegaly masses, asymmetry, normal tracheal structure, crepitus, and tenderness, thyroid normal, trachea normal, phonation normal and full range of motion with neck supple.     Psychiatric:   She has a normal mood and affect. Her speech is normal and behavior is normal.     Neurological:   She has neurological normal, alert and oriented.     Skin:   No abrasions, lacerations, lesions, or rashes.         PROCEDURE NOTE:  Ceruminosis is noted in both EACs.  Wax was removed by manual debridement and suctioning utilizing the assistance of binocular microscopy revealing EACs and TMs WNL. The patient tolerated this procedure without difficulty. The subjective decrease noted in hearing pre-cleaning was resolved post-cleaning.       Assessment:       1. Sensation of fullness in both ears        Plan:       1. Hearing conservation strongly recommended.  2. Trial of amplification bilaterally also recommended (patient not interested).  3. Re-check of hearing in 18-24 months or sooner if subjective change noted.  4. F/U with PCP as per schedule.

## 2020-07-06 ENCOUNTER — HOSPITAL ENCOUNTER (OUTPATIENT)
Dept: RADIOLOGY | Facility: HOSPITAL | Age: 70
Discharge: HOME | End: 2020-07-06
Attending: PHYSICIAN ASSISTANT
Payer: MEDICARE

## 2020-07-06 ENCOUNTER — OFFICE VISIT (OUTPATIENT)
Dept: ORTHOPEDICS | Facility: CLINIC | Age: 70
End: 2020-07-06
Payer: MEDICARE

## 2020-07-06 VITALS — WEIGHT: 171.19 LBS | BODY MASS INDEX: 29.23 KG/M2 | HEIGHT: 64 IN

## 2020-07-06 DIAGNOSIS — M25.561 ACUTE PAIN OF RIGHT KNEE: Primary | ICD-10-CM

## 2020-07-06 DIAGNOSIS — M25.561 ACUTE PAIN OF RIGHT KNEE: ICD-10-CM

## 2020-07-06 DIAGNOSIS — M17.11 PRIMARY OSTEOARTHRITIS OF RIGHT KNEE: ICD-10-CM

## 2020-07-06 PROCEDURE — 99214 PR OFFICE/OUTPT VISIT, EST, LEVL IV, 30-39 MIN: ICD-10-PCS | Mod: S$GLB,,, | Performed by: PHYSICIAN ASSISTANT

## 2020-07-06 PROCEDURE — 99999 PR PBB SHADOW E&M-EST. PATIENT-LVL IV: CPT | Mod: PBBFAC,,, | Performed by: PHYSICIAN ASSISTANT

## 2020-07-06 PROCEDURE — 73560 X-RAY EXAM OF KNEE 1 OR 2: CPT | Mod: TC,LT,59

## 2020-07-06 PROCEDURE — 1101F PR PT FALLS ASSESS DOC 0-1 FALLS W/OUT INJ PAST YR: ICD-10-PCS | Mod: CPTII,S$GLB,, | Performed by: PHYSICIAN ASSISTANT

## 2020-07-06 PROCEDURE — 73562 XR KNEE ORTHO RIGHT: ICD-10-PCS | Mod: 26,RT,, | Performed by: RADIOLOGY

## 2020-07-06 PROCEDURE — 1125F AMNT PAIN NOTED PAIN PRSNT: CPT | Mod: S$GLB,,, | Performed by: PHYSICIAN ASSISTANT

## 2020-07-06 PROCEDURE — 3008F PR BODY MASS INDEX (BMI) DOCUMENTED: ICD-10-PCS | Mod: CPTII,S$GLB,, | Performed by: PHYSICIAN ASSISTANT

## 2020-07-06 PROCEDURE — 73560 X-RAY EXAM OF KNEE 1 OR 2: CPT | Mod: 26,59,LT, | Performed by: RADIOLOGY

## 2020-07-06 PROCEDURE — 3008F BODY MASS INDEX DOCD: CPT | Mod: CPTII,S$GLB,, | Performed by: PHYSICIAN ASSISTANT

## 2020-07-06 PROCEDURE — 1159F PR MEDICATION LIST DOCUMENTED IN MEDICAL RECORD: ICD-10-PCS | Mod: S$GLB,,, | Performed by: PHYSICIAN ASSISTANT

## 2020-07-06 PROCEDURE — 73562 X-RAY EXAM OF KNEE 3: CPT | Mod: 26,RT,, | Performed by: RADIOLOGY

## 2020-07-06 PROCEDURE — 1159F MED LIST DOCD IN RCRD: CPT | Mod: S$GLB,,, | Performed by: PHYSICIAN ASSISTANT

## 2020-07-06 PROCEDURE — 1125F PR PAIN SEVERITY QUANTIFIED, PAIN PRESENT: ICD-10-PCS | Mod: S$GLB,,, | Performed by: PHYSICIAN ASSISTANT

## 2020-07-06 PROCEDURE — 73560 XR KNEE ORTHO RIGHT: ICD-10-PCS | Mod: 26,59,LT, | Performed by: RADIOLOGY

## 2020-07-06 PROCEDURE — 73562 X-RAY EXAM OF KNEE 3: CPT | Mod: TC,RT

## 2020-07-06 PROCEDURE — 1101F PT FALLS ASSESS-DOCD LE1/YR: CPT | Mod: CPTII,S$GLB,, | Performed by: PHYSICIAN ASSISTANT

## 2020-07-06 PROCEDURE — 99999 PR PBB SHADOW E&M-EST. PATIENT-LVL IV: ICD-10-PCS | Mod: PBBFAC,,, | Performed by: PHYSICIAN ASSISTANT

## 2020-07-06 PROCEDURE — 99214 OFFICE O/P EST MOD 30 MIN: CPT | Mod: S$GLB,,, | Performed by: PHYSICIAN ASSISTANT

## 2020-07-06 NOTE — PROGRESS NOTES
SUBJECTIVE:     Chief Complaint   Patient presents with    Right Knee - Pain       History of Present Illness:  Soila Chávez is a 70 y.o. year old female here with a history of constant right knee pain which started 4 days ago.  There is not a history of trauma.  The pain is located in the medial, anterior aspect of the knee.  The pain is described as achy, 10/10.  There is not radiation.  There is not catching or locking.  Aggravating factors include squatting and walking.  Associated symptoms include knee giving out, popping sensation, swelling.  The pain began after she kneeled onto concrete.  There is not numbness or tingling of the lower extremity. Previous treatments include brace, ice and rest which have provided minimal relief.  There is not a history of previous injury or surgery to the knee.  The patient does not use an assistive device.    Review of patient's allergies indicates:   Allergen Reactions    Lotensin [benazepril] Swelling and Other (See Comments)    Penicillins Rash and Other (See Comments)         Current Outpatient Medications   Medication Sig Dispense Refill    ALPRAZolam (XANAX) 0.5 MG tablet TK 1 T PO  BID 60 tablet 2    ammonium lactate 12 % Crea Apply twice daily to affected parts both feet as needed. 140 g 11    ascorbic acid (VITAMIN C) 100 MG tablet Take 100 mg by mouth once daily.      aspirin (ECOTRIN) 81 MG EC tablet Take 81 mg by mouth.      b complex vitamins tablet Take 1 tablet by mouth once daily.      betamethasone valerate 0.1% (VALISONE) 0.1 % Crea Apply topically 2 (two) times daily. (Patient taking differently: Apply topically 2 (two) times daily as needed. ) 1 Tube 1    calcium carbonate-vitamin D3 500 mg(1,250mg) -125 unit per tablet Take 1 tablet by mouth.      cetirizine (ZYRTEC) 10 MG tablet Take 10 mg by mouth daily as needed.       ergocalciferol, vitamin D2, (VITAMIN D ORAL) Take 400 Units by mouth.      ferrous sulfate (FEOSOL) 325 mg (65 mg  iron) Tab tablet Take 325 mg by mouth daily with breakfast.      gabapentin (NEURONTIN) 600 MG tablet TAKE 1 TABLET(600 MG) BY MOUTH THREE TIMES DAILY 90 tablet 1    ginkgo biloba 40 mg Tab Take 2 capsules by mouth.      hydroCHLOROthiazide (HYDRODIURIL) 25 MG tablet Take 1 tablet (25 mg total) by mouth once daily. 30 tablet 3    hydroquinone 4 % Crea Apple 2 x day to darkened lesions.      losartan (COZAAR) 100 MG tablet TAKE 1 TABLET BY MOUTH EVERY DAY 30 tablet 3    multivitamin (THERAGRAN) per tablet Take 1 tablet by mouth.      neomycin-polymyxin-hydrocortisone (CORTISPORIN) 3.5-10,000-1 mg/mL-unit/mL-% otic suspension Place 3 drops into both ears 4 (four) times daily. 10 mL 0    omega-3 fatty acids 1,000 mg Cap Take 1 g by mouth.       No current facility-administered medications for this visit.        Past Medical History:   Diagnosis Date    Allergy     Anxiety     Cataract     Colon polyps 2010    Hypertension     Joint pain        Past Surgical History:   Procedure Laterality Date    COLONOSCOPY N/A 3/5/2020    Procedure: COLONOSCOPY;  Surgeon: Nathalia Kemp MD;  Location: [facility] ([address] [place] [address]);  Service: Colon and Rectal;  Laterality: N/A;  requests later appt time if available - Pt open to any MD- Pt informed arrival time may be adjusted, Pt verbalized understanding- ERW2/24/20@1403    HYSTERECTOMY      PERIPARTUM CHITO       Vital Signs (Most Recent)  There were no vitals filed for this visit.        Review of Systems:  ROS:  Constitutional: no fever or chills  Eyes: no visual changes  ENT: no nasal congestion or sore throat  Respiratory: no cough or shortness of breath  Cardiovascular: no chest pain or palpitations  Gastrointestinal: no nausea or vomiting, tolerating diet  Genitourinary: no hematuria or dysuria  Integument/Breast: no rash or pruritis  Hematologic/Lymphatic: no easy bruising or lymphadenopathy  Musculoskeletal: no arthralgias or myalgias  Neurological: no seizures or  "tremors  Behavioral/Psych: no auditory or visual hallucinations  Endocrine: no heat or cold intolerance      OBJECTIVE:     PHYSICAL EXAM:  Height: 5' 4" (162.6 cm) Weight: 77.7 kg (171 lb 3 oz)   General: Well developed, well nourished, in no acute distress.  Neurological: Mood & affect are normal.  HEENT: NCAT, sclera nonicteric   Lungs: Respirations are equal and unlabored.   CV: 2+ bilateral upper and lower extremity pulses.   Skin: Intact throughout with no rashes, erythema, or lesions  Extremities: No LE edema, no erythema or warmth of the skin in either lower extremity.    right  Knee Exam:  Knee Range of Motion: 5-110   Effusion: none  Condition of skin: intact and no scars or abrasions  Location of tenderness:Medial joint line   Strength: 5 of 5 quadriceps strength and 5 of 5 hamstring strength  Stability:  stable to testing  Varus /Valgus stress:  normal    Hip Examination:  full painless range of motion, without tenderness    IMAGING:    X-rays of the right knee, personally reviewed by me, demonstrate moderate degenerative changes with osteophyte formation, subchondral sclerosis and joint space narrowing.  No fracture or dislocation.    ASSESSMENT/PLAN:   70 y.o. year old female with right knee osteoarthritis    Plan: We discussed with the patient at length all the different treatment options available for the knee including anti-inflammatories, acetaminophen, rest, ice, knee strengthening exercise, occasional cortisone injections for temporary relief  - Recommend continue brace, rest, ice. She will try OTC aleve for 1-2 weeks.    - Follow up if symptoms worsen or fail to improve.  We discussed possible steroid injection.      "

## 2020-07-09 ENCOUNTER — TELEPHONE (OUTPATIENT)
Dept: ORTHOPEDICS | Facility: CLINIC | Age: 70
End: 2020-07-09

## 2020-07-09 ENCOUNTER — TELEPHONE (OUTPATIENT)
Dept: PRIMARY CARE CLINIC | Facility: CLINIC | Age: 70
End: 2020-07-09

## 2020-07-09 ENCOUNTER — HOSPITAL ENCOUNTER (EMERGENCY)
Facility: OTHER | Age: 70
Discharge: HOME | End: 2020-07-09
Attending: EMERGENCY MEDICINE
Payer: MEDICARE

## 2020-07-09 VITALS
TEMPERATURE: 98 F | SYSTOLIC BLOOD PRESSURE: 137 MMHG | RESPIRATION RATE: 16 BRPM | DIASTOLIC BLOOD PRESSURE: 75 MMHG | OXYGEN SATURATION: 99 % | BODY MASS INDEX: 29.02 KG/M2 | HEIGHT: 64 IN | WEIGHT: 170 LBS | HEART RATE: 75 BPM

## 2020-07-09 DIAGNOSIS — M25.569 KNEE PAIN: ICD-10-CM

## 2020-07-09 PROCEDURE — 99283 EMERGENCY DEPT VISIT LOW MDM: CPT | Mod: 25

## 2020-07-09 RX ORDER — MELOXICAM 7.5 MG/1
7.5 TABLET ORAL DAILY
Qty: 10 TABLET | Refills: 0 | Status: SHIPPED | OUTPATIENT
Start: 2020-07-09 | End: 2020-08-11

## 2020-07-09 NOTE — TELEPHONE ENCOUNTER
Left a detailed message informing the patient that  is not in the office on Thursday or Friday and that she may call back and speak with anyone in scheduling to  schedule a same day visit or go to urgent care .

## 2020-07-09 NOTE — ED TRIAGE NOTES
"Pt endorsing R knee pain/swelling. Reports she kneeled on cement a few days ago and "felt a pain". Denies any other complaints.   "

## 2020-07-09 NOTE — TELEPHONE ENCOUNTER
----- Message from Erinn Grossman sent at 7/9/2020  2:44 PM CDT -----  Contact: CARY LINDSAY [[id number]]  Name of Who is Calling: CARY LINDSAY [[id number]]      What is the request in detail: pt would like schedule appt to be seen as soon as possible for knee pain. Patient would like to be seen today.Please call    Can the clinic reply by [facility]: no      [facility] [phone] (home)

## 2020-07-09 NOTE — TELEPHONE ENCOUNTER
----- Message from Kaylee Gordon sent at 7/9/2020  3:24 PM CDT -----    Name of Who is Calling:CARY LINDSAY [[id number]]      What is the request in detail: Pt would like a call back regarding a bruise on her leg , pt would like to be seen today . Please contact to further discuss and advise    Can the clinic reply by [facility]: no      What [facility] Back [facility] not [facility] [phone]

## 2020-07-09 NOTE — TELEPHONE ENCOUNTER
Spoke with pt have her scheduled to see Eli on tomorrow. Pt. Verbalized understanding.            ----- Message from Drew Waddell sent at 7/9/2020  3:29 PM CDT -----  Contact: pt  Please call pt at [phone]    Patient is requesting a same day appt for right knee pain due to a recent fall with a open wound    Patient is requesting the same provider    Thank you

## 2020-07-10 ENCOUNTER — OFFICE VISIT (OUTPATIENT)
Dept: ORTHOPEDICS | Facility: CLINIC | Age: 70
End: 2020-07-10
Payer: MEDICARE

## 2020-07-10 ENCOUNTER — TELEPHONE (OUTPATIENT)
Dept: PRIMARY CARE CLINIC | Facility: CLINIC | Age: 70
End: 2020-07-10

## 2020-07-10 VITALS
HEIGHT: 64 IN | HEART RATE: 86 BPM | BODY MASS INDEX: 29.34 KG/M2 | RESPIRATION RATE: 20 BRPM | WEIGHT: 171.88 LBS | DIASTOLIC BLOOD PRESSURE: 74 MMHG | SYSTOLIC BLOOD PRESSURE: 129 MMHG

## 2020-07-10 DIAGNOSIS — M17.11 PRIMARY OSTEOARTHRITIS OF RIGHT KNEE: Primary | ICD-10-CM

## 2020-07-10 PROCEDURE — 3078F PR MOST RECENT DIASTOLIC BLOOD PRESSURE < 80 MM HG: ICD-10-PCS | Mod: CPTII,S$GLB,, | Performed by: PHYSICIAN ASSISTANT

## 2020-07-10 PROCEDURE — 99999 PR PBB SHADOW E&M-EST. PATIENT-LVL IV: CPT | Mod: PBBFAC,,, | Performed by: PHYSICIAN ASSISTANT

## 2020-07-10 PROCEDURE — 1159F MED LIST DOCD IN RCRD: CPT | Mod: S$GLB,,, | Performed by: PHYSICIAN ASSISTANT

## 2020-07-10 PROCEDURE — 99213 PR OFFICE/OUTPT VISIT, EST, LEVL III, 20-29 MIN: ICD-10-PCS | Mod: 25,S$GLB,, | Performed by: PHYSICIAN ASSISTANT

## 2020-07-10 PROCEDURE — 3078F DIAST BP <80 MM HG: CPT | Mod: CPTII,S$GLB,, | Performed by: PHYSICIAN ASSISTANT

## 2020-07-10 PROCEDURE — 3008F BODY MASS INDEX DOCD: CPT | Mod: CPTII,S$GLB,, | Performed by: PHYSICIAN ASSISTANT

## 2020-07-10 PROCEDURE — 3074F PR MOST RECENT SYSTOLIC BLOOD PRESSURE < 130 MM HG: ICD-10-PCS | Mod: CPTII,S$GLB,, | Performed by: PHYSICIAN ASSISTANT

## 2020-07-10 PROCEDURE — 99999 PR PBB SHADOW E&M-EST. PATIENT-LVL IV: ICD-10-PCS | Mod: PBBFAC,,, | Performed by: PHYSICIAN ASSISTANT

## 2020-07-10 PROCEDURE — 1159F PR MEDICATION LIST DOCUMENTED IN MEDICAL RECORD: ICD-10-PCS | Mod: S$GLB,,, | Performed by: PHYSICIAN ASSISTANT

## 2020-07-10 PROCEDURE — 1125F PR PAIN SEVERITY QUANTIFIED, PAIN PRESENT: ICD-10-PCS | Mod: S$GLB,,, | Performed by: PHYSICIAN ASSISTANT

## 2020-07-10 PROCEDURE — 20610 DRAIN/INJ JOINT/BURSA W/O US: CPT | Mod: RT,S$GLB,, | Performed by: PHYSICIAN ASSISTANT

## 2020-07-10 PROCEDURE — 3008F PR BODY MASS INDEX (BMI) DOCUMENTED: ICD-10-PCS | Mod: CPTII,S$GLB,, | Performed by: PHYSICIAN ASSISTANT

## 2020-07-10 PROCEDURE — 1125F AMNT PAIN NOTED PAIN PRSNT: CPT | Mod: S$GLB,,, | Performed by: PHYSICIAN ASSISTANT

## 2020-07-10 PROCEDURE — 99213 OFFICE O/P EST LOW 20 MIN: CPT | Mod: 25,S$GLB,, | Performed by: PHYSICIAN ASSISTANT

## 2020-07-10 PROCEDURE — 1101F PT FALLS ASSESS-DOCD LE1/YR: CPT | Mod: CPTII,S$GLB,, | Performed by: PHYSICIAN ASSISTANT

## 2020-07-10 PROCEDURE — 1101F PR PT FALLS ASSESS DOC 0-1 FALLS W/OUT INJ PAST YR: ICD-10-PCS | Mod: CPTII,S$GLB,, | Performed by: PHYSICIAN ASSISTANT

## 2020-07-10 PROCEDURE — 20610 LARGE JOINT ASPIRATION/INJECTION: R KNEE: ICD-10-PCS | Mod: RT,S$GLB,, | Performed by: PHYSICIAN ASSISTANT

## 2020-07-10 PROCEDURE — 3074F SYST BP LT 130 MM HG: CPT | Mod: CPTII,S$GLB,, | Performed by: PHYSICIAN ASSISTANT

## 2020-07-10 RX ORDER — TRIAMCINOLONE ACETONIDE 40 MG/ML
40 INJECTION, SUSPENSION INTRA-ARTICULAR; INTRAMUSCULAR
Status: DISCONTINUED | OUTPATIENT
Start: 2020-07-10 | End: 2020-07-10 | Stop reason: HOSPADM

## 2020-07-10 RX ADMIN — TRIAMCINOLONE ACETONIDE 40 MG: 40 INJECTION, SUSPENSION INTRA-ARTICULAR; INTRAMUSCULAR at 08:07

## 2020-07-10 NOTE — PROCEDURES
Large Joint Aspiration/Injection: R knee    Date/Time: 7/10/2020 8:30 AM  Performed by: Tabby Smith PA-C  Authorized by: Tabby Smith PA-C     Consent Done?:  Yes (Verbal)  Indications:  Pain  Prep: patient was prepped and draped in usual sterile fashion    Local anesthetic:  Topical anesthetic    Details:  Needle Size:  22 G  Approach:  Anterolateral  Location:  Knee  Site:  R knee  Medications:  40 mg triamcinolone acetonide 40 mg/mL  Patient tolerance:  Patient tolerated the procedure well with no immediate complications

## 2020-07-10 NOTE — ED PROVIDER NOTES
Encounter Date: 7/9/2020       History     Chief Complaint   Patient presents with    Joint Swelling     knee has swollen and painful since yesterday.  saw her doctor and was told to come to the ER      Patient is 70-year-old female history of hypertension, anxiety, osteoarthritis who presents with complaints of right knee pain and bruising that developed this morning upon waking.  She reports that she was recently diagnosed with osteoarthritis by her orthopedic AP P about 2 weeks ago.  She reports that subsequently she was evaluated by an acupuncturist at [facility].  She admits that yesterday morning she received acupuncture to the right knee and admits that she felt well after the procedure.  She admits she went home, rested and then felt well enough to walk and to her garden.  She reports that she squatted down to  a flower and felt sharp pain to the right knee.  She reports she has a catching sensation in pain with walking ever since.  She reports upon waking this morning there was an area of redness to her skin around the right knee.  She reports no skin pain no warmth to touch and no falls this morning.  She was re-evaluated by her acupuncturist this morning was redirected to the emergency department for evaluation of possible septicemia.  Patient reports no fever, chills, nausea, vomiting, overall feeling of unwell.  She has not taken any medications today PTA. She is currently unaccompanied in the ER.        Review of patient's allergies indicates:   Allergen Reactions    Lotensin [benazepril] Swelling and Other (See Comments)    Penicillins Rash and Other (See Comments)     Past Medical History:   Diagnosis Date    Allergy     Anxiety     Cataract     Colon polyps 2010    Hypertension     Joint pain      Past Surgical History:   Procedure Laterality Date    COLONOSCOPY N/A 3/5/2020    Procedure: COLONOSCOPY;  Surgeon: Nathalia Kemp MD;  Location: [facility] ([address] [place] [address]);   Service: Colon and Rectal;  Laterality: N/A;  requests later appt time if available - Pt open to any MD- Pt informed arrival time may be adjusted, Pt verbalized understanding- ERW2/24/20@5202    HYSTERECTOMY      PERIPARTUM CHITO     Family History   Problem Relation Age of Onset    Breast cancer Sister     Colon cancer Neg Hx     Ovarian cancer Neg Hx     Melanoma Neg Hx      Social History     Tobacco Use    Smoking status: Never Smoker    Smokeless tobacco: Never Used   Substance Use Topics    Alcohol use: Yes     Comment: beer on weekends    Drug use: No     Review of Systems   Constitutional: Negative for fever.   HENT: Negative for sore throat.    Respiratory: Negative for shortness of breath.    Cardiovascular: Negative for chest pain.   Gastrointestinal: Negative for nausea.   Genitourinary: Negative for dysuria.   Musculoskeletal: Negative for back pain.        Right knee pain    Skin: Negative for rash.   Neurological: Negative for weakness.   Hematological: Does not bruise/bleed easily.       Physical Exam     Initial Vitals [07/09/20 1810]   BP Pulse Resp Temp SpO2   (!) 176/81 94 16 98.1 °F (36.7 °C) 99 %      MAP       --         Physical Exam        ED Course   Procedures  Labs Reviewed - No data to display        Imaging Results          X-Ray Knee 3 View Right (Final result)  Result time 07/09/20 20:25:10    Final result by Ba Calles MD (07/09/20 20:25:10)                 Impression:      1. Grossly stable appearing degenerative changes throughout the right knee, no convincing acute displaced fracture or dislocation.  There may be a small right suprapatellar effusion.      Electronically signed by: Ba Calles MD  Date:    07/09/2020  Time:    20:25             Narrative:    EXAMINATION:  XR KNEE 3 VIEW RIGHT    CLINICAL HISTORY:  Pain in unspecified knee    TECHNIQUE:  AP, lateral, and Merchant views of the right knee were  performed.    COMPARISON:  07/06/2020    FINDINGS:  Three views right knee.    Degenerative changes are noted of the knee, not significantly changed as compared to examination 07/06/2020.  There may be a small suprapatellar effusion.  No findings to suggest interval acute displaced fracture or dislocation.                                   Medical Decision Making:   ED Management:  Urgent evaluation a 70-year-old female who presents with complaints of right-sided knee discomfort with ambulation and bruising.  She is afebrile, nontoxic appearing, hemodynamically stable.  Physical exam outlined above and reveals normal range of motion with point localized medial joint line tenderness with slight edema.  No ballotable effusion.  There is evidence of bruising see attached photography which was obtained with verbal consent from patient.  There is 3 small areas of superficial blistering with dry skin no active bleeding or purulence.  No evidence of fluctuance or abscess.  I have considered but I do not suspect septic joint given patient's good range of motion and ability to ambulate.  Repeat x-ray obtained today and compared with previous recent images and reveals DJD.  I suspect that the bruising on the outside of the knee could be related to acupuncture I have considered but do not suspect thrombophlebitis, phlebitis, cellulitis, contact dermatitis, chemical skin burn, vesicular eruption 2nd to HSV.  Patient is encouraged to monitor skin closely for any signs of worsening.  She is encouraged to wear the knee brace that she has previously been provided for support and comfort and I will discharge with instructions to follow-up with orthopedics in the outpatient setting for symptom recheck.  She verbalized understanding and is amenable to plan.  She is stable for discharge.                                 Clinical Impression:       ICD-10-CM ICD-9-CM   1. Knee pain  M25.569 719.46                                Loyda DE LA VEGA  EDWAR Mata  07/10/20 1243

## 2020-07-10 NOTE — TELEPHONE ENCOUNTER
Left another detailed message informing  that  doesn't work on Thursday or Friday and that her next avail appointment isn't until the end of August but that there are other provider she can be scheduled to see at anyone of the [facility] locations. Or advised that she can go to urgent care 7 days a week and she doesn't need an appointment.  can call back and speak with anyone in the scheduling department to schedule an appointment.

## 2020-07-10 NOTE — TELEPHONE ENCOUNTER
----- Message from Dash Fried sent at 7/10/2020  4:14 PM CDT -----  Regarding: Appointment  Contact: CARY LINDSAY [[id number]]  Name of Who is Calling: CARY LINDSAY [[id number]]      What is the request in detail: Would like to speak with staff in regards to an appointment asap. Patient would like to see her for her leg and refused another provider. Patient states she does not want to wait until August. Please advise      Can the clinic reply by [facility]: no      [facility] [phone]

## 2020-07-10 NOTE — PROGRESS NOTES
"Patient ID: Soila Chávez is a 70 y.o. female.    Chief Complaint: Pain of the Right Knee      HISTORY:  Soila Chávez is a 70 y.o. female who returns to me today for follow up of right knee pain.  She was last seen by me 7/6/2020.  Today she is doing well however was seen in the ED last night due to concern for bruising along the medial aspect of her knee.  She was seen by a chiropractor on 7/7/20 and had acupuncture done along the medial aspect of the knee.  She states she woke up up with bruising yesterday. There is no bleeding or open wound.  No erythema.  No pain with ROM of the knee.  She is able to bear weight.       PMH/PSH/FamHx/SocHx:    Unchanged from prior visit.    ROS:  Constitution: Negative for chills, fever and weakness.   Cardiovascular: Negative for chest pain.   Respiratory: Negative for cough and shortness of breath.   Hematologic/Lymphatic: Negative for bleeding problem. Does not bruise/bleed easily.   Skin: Negative for color change, itching and poor wound healing.   Musculoskeletal: Positive for right knee pain  Gastrointestinal: Negative for heartburn.   Neurological: Negative for dizziness, focal weakness, numbness, paresthesias and sensory change.   Psychiatric/Behavioral: The patient is not nervous/anxious.   Allergic/Immunologic: Negative for environmental allergies and persistent infections.     PHYSICAL EXAM:   /74 (BP Location: Left arm, Patient Position: Sitting, BP Method: Medium (Automatic))   Pulse 86   Resp 20   Ht 5' 4" (1.626 m)   Wt 78 kg (171 lb 13.6 oz)   BMI 29.50 kg/m²   Right knee  Skin intact, no laceration  Small superficial skin tear, 0.5 cm medial distal thigh with ecchymosis   There is no drainage or signs of infection  TTP medial joint line  ROM 0-120  5/5 quad, 5/5 hamstring      IMAGING: Reviewed right knee imaging from ED last night.  No acute abnormality- moderate DJD.    ASSESSMENT/PLAN:    Soila was seen today for pain.    Diagnoses and all " orders for this visit:    Primary osteoarthritis of right knee    - There is superficial ecchymosis from acupuncture procedure- no concern for septic joint or superficial infection.  - She was given right knee CSI today, see procedure note.  Recommend rest, ice activity modification.    - She will continue OTC aleve prn pain  - Follow up if symptoms worsen or fail to improve

## 2020-07-16 ENCOUNTER — TELEPHONE (OUTPATIENT)
Dept: PRIMARY CARE CLINIC | Facility: CLINIC | Age: 70
End: 2020-07-16

## 2020-07-16 NOTE — TELEPHONE ENCOUNTER
Left another detailed message for the patient in regards to no avail appointment sooner than what she have. And that if she feels the needs for a sooner appointment she can call back and speak with the scheduling department for an appointment at any other location or there is urgent care Monday through Sunday except for the urgent care on [facility] because it's a testing site.

## 2020-07-16 NOTE — TELEPHONE ENCOUNTER
----- Message from Kaylee Gordon sent at 7/16/2020  1:23 PM CDT -----    Name of Who is Calling:CARY LINDSAY [[id number]]    What is the request in detail: Patient Would like to speak with staff in regards to a sooner appointment. Patient would like to be seen for her leg and refused another provider. Patient states she can not  wait until August. Pt want to be soon by next week Please contact to further discuss and advise     Can the clinic reply by [facility]: no    What Number to Call Back [facility] not [facility]: [phone]

## 2020-07-20 ENCOUNTER — TELEPHONE (OUTPATIENT)
Dept: PRIMARY CARE CLINIC | Facility: CLINIC | Age: 70
End: 2020-07-20

## 2020-07-20 RX ORDER — LOSARTAN POTASSIUM AND HYDROCHLOROTHIAZIDE 25; 100 MG/1; MG/1
TABLET ORAL
COMMUNITY
Start: 2020-05-14 | End: 2020-08-11

## 2020-07-20 NOTE — TELEPHONE ENCOUNTER
----- Message from Miguel Nettles sent at 7/20/2020  4:04 PM CDT -----      Name of Who is Calling: CARY LINDSAY [[id number]]      What is the request in detail: Pt called said she's been waiting for her Virtual Audio call since 3:20pm.Please contact to further discuss and advise.          Can the clinic reply by [facility]: N      What [facility] [phone]

## 2020-07-22 ENCOUNTER — OFFICE VISIT (OUTPATIENT)
Dept: PRIMARY CARE CLINIC | Facility: CLINIC | Age: 70
End: 2020-07-22
Attending: FAMILY MEDICINE
Payer: MEDICARE

## 2020-07-22 DIAGNOSIS — M17.0 PRIMARY OSTEOARTHRITIS OF BOTH KNEES: Primary | ICD-10-CM

## 2020-07-22 PROCEDURE — 1159F MED LIST DOCD IN RCRD: CPT | Mod: 95,,, | Performed by: FAMILY MEDICINE

## 2020-07-22 PROCEDURE — 1159F PR MEDICATION LIST DOCUMENTED IN MEDICAL RECORD: ICD-10-PCS | Mod: 95,,, | Performed by: FAMILY MEDICINE

## 2020-07-22 PROCEDURE — 1101F PT FALLS ASSESS-DOCD LE1/YR: CPT | Mod: CPTII,95,, | Performed by: FAMILY MEDICINE

## 2020-07-22 PROCEDURE — 1101F PR PT FALLS ASSESS DOC 0-1 FALLS W/OUT INJ PAST YR: ICD-10-PCS | Mod: CPTII,95,, | Performed by: FAMILY MEDICINE

## 2020-07-22 PROCEDURE — 99441 PR PHYSICIAN TELEPHONE EVALUATION 5-10 MIN: ICD-10-PCS | Mod: 95,,, | Performed by: FAMILY MEDICINE

## 2020-07-22 PROCEDURE — 99441 PR PHYSICIAN TELEPHONE EVALUATION 5-10 MIN: CPT | Mod: 95,,, | Performed by: FAMILY MEDICINE

## 2020-07-29 NOTE — PROGRESS NOTES
Established Patient - Audio Only Telehealth Visit     The patient location is: HOME  The chief complaint leading to consultation is: leg pain  Visit type: Virtual visit with audio only (telephone)  Total time spent with patient: 10 mins       The reason for the audio only service rather than synchronous audio and video virtual visit was related to technical difficulties or patient preference/necessity.     Each patient to whom I provide medical services by telemedicine is:  (1) informed of the relationship between the physician and patient and the respective role of any other health care provider with respect to management of the patient; and (2) notified that they may decline to receive medical services by telemedicine and may withdraw from such care at any time. Patient verbally consented to receive this service via voice-only telephone call.       HPI: pt calls re; chronic leg pain. Pt very paranoid that she fell in the yard abruptly onto her knees. Denies any trauma. Family present.  Pt said that when she stood up she had pain in back of her knee that brought her down. Pt is seen by ortho for knee OA, NOT NEW. Pt very tangential. More than 8 mins spent re-directing her     Assessment and plan:  Pt with ongoing knee OA. Needs to see her otho doc. Continue with her tylenol and ice, heat, rest.  RTC prn                        This service was not originating from a related E/M service provided within the previous 7 days nor will  to an E/M service or procedure within the next 24 hours or my soonest available appointment.  Prevailing standard of care was able to be met in this audio-only visit.

## 2020-08-03 ENCOUNTER — HOSPITAL ENCOUNTER (OUTPATIENT)
Dept: RADIOLOGY | Facility: HOSPITAL | Age: 70
Discharge: HOME | End: 2020-08-03
Attending: PHYSICIAN ASSISTANT
Payer: MEDICARE

## 2020-08-03 ENCOUNTER — OFFICE VISIT (OUTPATIENT)
Dept: ORTHOPEDICS | Facility: CLINIC | Age: 70
End: 2020-08-03
Payer: MEDICARE

## 2020-08-03 VITALS
SYSTOLIC BLOOD PRESSURE: 139 MMHG | WEIGHT: 175.69 LBS | DIASTOLIC BLOOD PRESSURE: 76 MMHG | HEART RATE: 85 BPM | HEIGHT: 64 IN | BODY MASS INDEX: 29.99 KG/M2

## 2020-08-03 DIAGNOSIS — M25.512 ACUTE PAIN OF LEFT SHOULDER: ICD-10-CM

## 2020-08-03 DIAGNOSIS — M79.652 PAIN OF LEFT THIGH: Primary | ICD-10-CM

## 2020-08-03 DIAGNOSIS — M17.11 PRIMARY OSTEOARTHRITIS OF RIGHT KNEE: ICD-10-CM

## 2020-08-03 DIAGNOSIS — M75.42 IMPINGEMENT SYNDROME OF LEFT SHOULDER: ICD-10-CM

## 2020-08-03 DIAGNOSIS — M79.652 PAIN OF LEFT THIGH: ICD-10-CM

## 2020-08-03 PROCEDURE — 1125F PR PAIN SEVERITY QUANTIFIED, PAIN PRESENT: ICD-10-PCS | Mod: S$GLB,,, | Performed by: PHYSICIAN ASSISTANT

## 2020-08-03 PROCEDURE — 3078F PR MOST RECENT DIASTOLIC BLOOD PRESSURE < 80 MM HG: ICD-10-PCS | Mod: CPTII,S$GLB,, | Performed by: PHYSICIAN ASSISTANT

## 2020-08-03 PROCEDURE — 99214 PR OFFICE/OUTPT VISIT, EST, LEVL IV, 30-39 MIN: ICD-10-PCS | Mod: S$GLB,,, | Performed by: PHYSICIAN ASSISTANT

## 2020-08-03 PROCEDURE — 99999 PR PBB SHADOW E&M-EST. PATIENT-LVL V: ICD-10-PCS | Mod: PBBFAC,,, | Performed by: PHYSICIAN ASSISTANT

## 2020-08-03 PROCEDURE — 73030 XR SHOULDER TRAUMA 3 VIEW LEFT: ICD-10-PCS | Mod: 26,LT,, | Performed by: RADIOLOGY

## 2020-08-03 PROCEDURE — 1101F PR PT FALLS ASSESS DOC 0-1 FALLS W/OUT INJ PAST YR: ICD-10-PCS | Mod: CPTII,S$GLB,, | Performed by: PHYSICIAN ASSISTANT

## 2020-08-03 PROCEDURE — 1125F AMNT PAIN NOTED PAIN PRSNT: CPT | Mod: S$GLB,,, | Performed by: PHYSICIAN ASSISTANT

## 2020-08-03 PROCEDURE — 73552 X-RAY EXAM OF FEMUR 2/>: CPT | Mod: TC,LT

## 2020-08-03 PROCEDURE — 99214 OFFICE O/P EST MOD 30 MIN: CPT | Mod: S$GLB,,, | Performed by: PHYSICIAN ASSISTANT

## 2020-08-03 PROCEDURE — 1159F MED LIST DOCD IN RCRD: CPT | Mod: S$GLB,,, | Performed by: PHYSICIAN ASSISTANT

## 2020-08-03 PROCEDURE — 73030 X-RAY EXAM OF SHOULDER: CPT | Mod: TC,LT

## 2020-08-03 PROCEDURE — 73030 X-RAY EXAM OF SHOULDER: CPT | Mod: 26,LT,, | Performed by: RADIOLOGY

## 2020-08-03 PROCEDURE — 73552 X-RAY EXAM OF FEMUR 2/>: CPT | Mod: 26,LT,, | Performed by: RADIOLOGY

## 2020-08-03 PROCEDURE — 3008F PR BODY MASS INDEX (BMI) DOCUMENTED: ICD-10-PCS | Mod: CPTII,S$GLB,, | Performed by: PHYSICIAN ASSISTANT

## 2020-08-03 PROCEDURE — 3008F BODY MASS INDEX DOCD: CPT | Mod: CPTII,S$GLB,, | Performed by: PHYSICIAN ASSISTANT

## 2020-08-03 PROCEDURE — 73552 XR FEMUR 2 VIEW LEFT: ICD-10-PCS | Mod: 26,LT,, | Performed by: RADIOLOGY

## 2020-08-03 PROCEDURE — 3078F DIAST BP <80 MM HG: CPT | Mod: CPTII,S$GLB,, | Performed by: PHYSICIAN ASSISTANT

## 2020-08-03 PROCEDURE — 99999 PR PBB SHADOW E&M-EST. PATIENT-LVL V: CPT | Mod: PBBFAC,,, | Performed by: PHYSICIAN ASSISTANT

## 2020-08-03 PROCEDURE — 1159F PR MEDICATION LIST DOCUMENTED IN MEDICAL RECORD: ICD-10-PCS | Mod: S$GLB,,, | Performed by: PHYSICIAN ASSISTANT

## 2020-08-03 PROCEDURE — 1101F PT FALLS ASSESS-DOCD LE1/YR: CPT | Mod: CPTII,S$GLB,, | Performed by: PHYSICIAN ASSISTANT

## 2020-08-03 PROCEDURE — 3075F SYST BP GE 130 - 139MM HG: CPT | Mod: CPTII,S$GLB,, | Performed by: PHYSICIAN ASSISTANT

## 2020-08-03 PROCEDURE — 3075F PR MOST RECENT SYSTOLIC BLOOD PRESS GE 130-139MM HG: ICD-10-PCS | Mod: CPTII,S$GLB,, | Performed by: PHYSICIAN ASSISTANT

## 2020-08-03 NOTE — PROGRESS NOTES
"Patient ID: Soila Chávez is a 70 y.o. female.    Chief Complaint:  Right knee, left shoulder and left thigh pain    HISTORY:  Soila Chávez is a 70 y.o. female who returns to me today for follow up of right knee pain.  She was last seen by me 7/10/2020.  Today she is doing well, and notes significant improvement since her steroid injection 3 weeks ago.  She states that now she has some discomfort behind her knee.  The pain is worse with extension of the knee.  She has been using ice and taking otc aleve.  She also complains of some mid thigh pain and itching of her skin.  She has had this discomfort for a few weeks.  She states this is the area where she also had some acupuncture done.  She denies any trauma to the left leg.  She also states she has had shoulder pain for a few months.  The pain is worse with lifting and reaching out to the side.  The pain is along the lateral aspect of the shoulder.  The pain is intermittent.       PMH/PSH/FamHx/SocHx:    Unchanged from prior visit.    ROS:  Constitution: Negative for chills, fever and weakness.   Cardiovascular: Negative for chest pain.   Respiratory: Negative for cough and shortness of breath.   Hematologic/Lymphatic: Negative for bleeding problem. Does not bruise/bleed easily.   Skin: Negative for color change, itching and poor wound healing.   Musculoskeletal: Positive for right knee, left thigh and left shoulder pain  Gastrointestinal: Negative for heartburn.   Neurological: Negative for dizziness, focal weakness, numbness, paresthesias and sensory change.   Psychiatric/Behavioral: The patient is not nervous/anxious.   Allergic/Immunologic: Negative for environmental allergies and persistent infections.     PHYSICAL EXAM:   /76 (BP Location: Left arm, Patient Position: Sitting, BP Method: Medium (Automatic))   Pulse 85   Ht 5' 4" (1.626 m)   Wt 79.7 kg (175 lb 11.3 oz)   BMI 30.16 kg/m²   Right knee  Skin intact  Ecchymosis right medial " thigh  ROM 0-120  Mild tenderness popliteal fossa with some fullness consistent with baker's cyst  5/5 quad, 5/5 hamstring    Left knee/thigh  Skin intact  No swelling or effusion  TTP left lateral quad musculature  No palpable mass  No hip pain with ROM  Knee ROM 0-120    Left shoulder  Skin intact, no swelling or effusion  No TTP  FROM  Normal strength testing  +crandall  +impingement  +Omar's    IMAGING: X-rays of the left shoulder, personally reviewed by me, demonstrate mild degenerative chagnes.  No fracture or dislocation.    X-rays left femur show no fracture or dislocation    ASSESSMENT/PLAN:    Diagnoses and all orders for this visit:    Pain of left thigh  -     X-Ray Femur 2 View Left; Future    Impingement syndrome of left shoulder  -     X-Ray Shoulder Trauma 3 view Left; Future    Primary osteoarthritis of right knee      -  Left thigh pain- appears to be muscular.  This may be related to acupuncture as is it is in the same area as her previous treatment with chiropractor.  Recommend heat, massage, topical creams  - Right knee- continue rest, ice, compression and OTC aleve.  She may have Baker's cyst.  Explanation and reassurance provided.    - Left shoulder- subacromial bursitis.  If symptoms persist, consider CSI.  She would like to hold off on PT for now.  Will consider if symptoms fail to improve.    No follow-ups on file.  If there are any questions prior to this, the patient was instructed to contact the office.

## 2020-08-11 ENCOUNTER — TELEPHONE (OUTPATIENT)
Dept: PRIMARY CARE CLINIC | Facility: CLINIC | Age: 70
End: 2020-08-11

## 2020-08-11 ENCOUNTER — OFFICE VISIT (OUTPATIENT)
Dept: PRIMARY CARE CLINIC | Facility: CLINIC | Age: 70
End: 2020-08-11
Attending: FAMILY MEDICINE
Payer: MEDICARE

## 2020-08-11 VITALS
HEART RATE: 100 BPM | OXYGEN SATURATION: 98 % | BODY MASS INDEX: 29.9 KG/M2 | DIASTOLIC BLOOD PRESSURE: 60 MMHG | SYSTOLIC BLOOD PRESSURE: 118 MMHG | HEIGHT: 64 IN | WEIGHT: 175.13 LBS

## 2020-08-11 DIAGNOSIS — E87.6 HYPOKALEMIA: ICD-10-CM

## 2020-08-11 DIAGNOSIS — R79.9 ABNORMAL FINDING OF BLOOD CHEMISTRY, UNSPECIFIED: ICD-10-CM

## 2020-08-11 DIAGNOSIS — L20.84 INTRINSIC ECZEMA: ICD-10-CM

## 2020-08-11 DIAGNOSIS — Z78.0 POSTMENOPAUSAL: ICD-10-CM

## 2020-08-11 DIAGNOSIS — Z13.820 SCREENING FOR OSTEOPOROSIS: ICD-10-CM

## 2020-08-11 DIAGNOSIS — Z23 NEED FOR PNEUMOCOCCAL VACCINATION: ICD-10-CM

## 2020-08-11 DIAGNOSIS — F41.9 ANXIETY: Primary | ICD-10-CM

## 2020-08-11 DIAGNOSIS — F33.1 MAJOR DEPRESSIVE DISORDER, RECURRENT EPISODE, MODERATE: ICD-10-CM

## 2020-08-11 DIAGNOSIS — M15.9 PRIMARY OSTEOARTHRITIS INVOLVING MULTIPLE JOINTS: ICD-10-CM

## 2020-08-11 DIAGNOSIS — J30.1 SEASONAL ALLERGIC RHINITIS DUE TO POLLEN: ICD-10-CM

## 2020-08-11 DIAGNOSIS — I10 ESSENTIAL HYPERTENSION: ICD-10-CM

## 2020-08-11 PROCEDURE — 3078F PR MOST RECENT DIASTOLIC BLOOD PRESSURE < 80 MM HG: ICD-10-PCS | Mod: CPTII,S$GLB,, | Performed by: FAMILY MEDICINE

## 2020-08-11 PROCEDURE — 3008F PR BODY MASS INDEX (BMI) DOCUMENTED: ICD-10-PCS | Mod: CPTII,S$GLB,, | Performed by: FAMILY MEDICINE

## 2020-08-11 PROCEDURE — 3074F PR MOST RECENT SYSTOLIC BLOOD PRESSURE < 130 MM HG: ICD-10-PCS | Mod: CPTII,S$GLB,, | Performed by: FAMILY MEDICINE

## 2020-08-11 PROCEDURE — 99999 PR PBB SHADOW E&M-EST. PATIENT-LVL V: ICD-10-PCS | Mod: PBBFAC,,, | Performed by: FAMILY MEDICINE

## 2020-08-11 PROCEDURE — 90732 PNEUMOCOCCAL POLYSACCHARIDE VACCINE 23-VALENT =>2YO SQ IM: ICD-10-PCS | Mod: S$GLB,,, | Performed by: FAMILY MEDICINE

## 2020-08-11 PROCEDURE — 1125F PR PAIN SEVERITY QUANTIFIED, PAIN PRESENT: ICD-10-PCS | Mod: S$GLB,,, | Performed by: FAMILY MEDICINE

## 2020-08-11 PROCEDURE — 1101F PR PT FALLS ASSESS DOC 0-1 FALLS W/OUT INJ PAST YR: ICD-10-PCS | Mod: CPTII,S$GLB,, | Performed by: FAMILY MEDICINE

## 2020-08-11 PROCEDURE — 99999 PR PBB SHADOW E&M-EST. PATIENT-LVL V: CPT | Mod: PBBFAC,,, | Performed by: FAMILY MEDICINE

## 2020-08-11 PROCEDURE — 1125F AMNT PAIN NOTED PAIN PRSNT: CPT | Mod: S$GLB,,, | Performed by: FAMILY MEDICINE

## 2020-08-11 PROCEDURE — G0009 ADMIN PNEUMOCOCCAL VACCINE: HCPCS | Mod: S$GLB,,, | Performed by: FAMILY MEDICINE

## 2020-08-11 PROCEDURE — 1159F MED LIST DOCD IN RCRD: CPT | Mod: S$GLB,,, | Performed by: FAMILY MEDICINE

## 2020-08-11 PROCEDURE — 90732 PPSV23 VACC 2 YRS+ SUBQ/IM: CPT | Mod: S$GLB,,, | Performed by: FAMILY MEDICINE

## 2020-08-11 PROCEDURE — 1101F PT FALLS ASSESS-DOCD LE1/YR: CPT | Mod: CPTII,S$GLB,, | Performed by: FAMILY MEDICINE

## 2020-08-11 PROCEDURE — 3078F DIAST BP <80 MM HG: CPT | Mod: CPTII,S$GLB,, | Performed by: FAMILY MEDICINE

## 2020-08-11 PROCEDURE — G0009 PNEUMOCOCCAL POLYSACCHARIDE VACCINE 23-VALENT =>2YO SQ IM: ICD-10-PCS | Mod: S$GLB,,, | Performed by: FAMILY MEDICINE

## 2020-08-11 PROCEDURE — 1159F PR MEDICATION LIST DOCUMENTED IN MEDICAL RECORD: ICD-10-PCS | Mod: S$GLB,,, | Performed by: FAMILY MEDICINE

## 2020-08-11 PROCEDURE — 3008F BODY MASS INDEX DOCD: CPT | Mod: CPTII,S$GLB,, | Performed by: FAMILY MEDICINE

## 2020-08-11 PROCEDURE — 99215 PR OFFICE/OUTPT VISIT, EST, LEVL V, 40-54 MIN: ICD-10-PCS | Mod: 25,S$GLB,, | Performed by: FAMILY MEDICINE

## 2020-08-11 PROCEDURE — 99215 OFFICE O/P EST HI 40 MIN: CPT | Mod: 25,S$GLB,, | Performed by: FAMILY MEDICINE

## 2020-08-11 PROCEDURE — 3074F SYST BP LT 130 MM HG: CPT | Mod: CPTII,S$GLB,, | Performed by: FAMILY MEDICINE

## 2020-08-11 RX ORDER — DICLOFENAC SODIUM 10 MG/G
2 GEL TOPICAL 2 TIMES DAILY
Qty: 100 G | Refills: 1 | Status: SHIPPED | OUTPATIENT
Start: 2020-08-11 | End: 2021-03-29

## 2020-08-11 RX ORDER — LOSARTAN POTASSIUM 100 MG/1
100 TABLET ORAL DAILY
Qty: 90 TABLET | Refills: 1 | Status: SHIPPED | OUTPATIENT
Start: 2020-08-11 | End: 2021-03-22 | Stop reason: SDUPTHER

## 2020-08-11 NOTE — PROGRESS NOTES
After obtaining consent, and per orders of Dr. Parson, injection of pneumonia 23 Lot [id number] 6/29/21 given in the RD by DINESH ELLINGTON. Patient tolerated well and band aid applied. Patient instructed to remain in clinic for 15 minutes afterwards, and to report any adverse reaction to me immediately.

## 2020-08-11 NOTE — PROGRESS NOTES
Subjective:       Patient ID: Soila Chávez is a 70 y.o. female.    Chief Complaint: Follow-up   wellness  Entire chart reviewed, PMx, FHx, and Social History updated and reviewed. Pt very tangential. Has multiple questions at this visit. Seems to not understand that xanax is controlled. States that she uses it twice a day. States that she was told to also take it for her various muscular ailments. In 2018 she asked ssked that I take over this med since I have to give it to as per psych instructions. States that her use is sparing. Does not seem to understand that she has depression as well. Denies any SI/HI today  Pt also states that she wants me to look at something on her right buttock that does not hurt but is 'there'  Pt also with right arm pain. 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     Review of Systems   Constitutional: Negative for activity change, appetite change, chills, fatigue, fever and unexpected weight change.   HENT: Negative for dental problem, hearing loss, tinnitus, trouble swallowing and voice change.    Eyes: Negative for visual disturbance.   Respiratory: Negative for cough, chest tightness, shortness of breath and wheezing.    Cardiovascular: Negative for chest pain, palpitations and leg swelling.   Gastrointestinal: Negative for abdominal pain, blood in stool, constipation, diarrhea and nausea.   Genitourinary: Negative for difficulty urinating, dysuria, frequency and urgency.   Musculoskeletal: Negative for arthralgias, back pain, gait problem, joint swelling, myalgias, neck pain and  neck stiffness.   Skin: Negative for rash.   Neurological: Negative for dizziness, tremors, speech difficulty, weakness, light-headedness and headaches.   Psychiatric/Behavioral: Negative for dysphoric mood and sleep disturbance. The patient is not nervous/anxious.        Objective:      Physical Exam  Constitutional:       General: She is not in acute distress.     Appearance: She is well-developed.   HENT:      Head: Normocephalic and atraumatic.      Right Ear: External ear normal. A middle ear effusion is present.      Left Ear: External ear normal. A middle ear effusion is present.      Nose: Mucosal edema and rhinorrhea present.      Mouth/Throat:      Pharynx: No oropharyngeal exudate or posterior oropharyngeal erythema.   Eyes:      General: No scleral icterus.        Right eye: No discharge.      Conjunctiva/sclera: Conjunctivae normal.      Pupils: Pupils are equal, round, and reactive to light.   Neck:      Musculoskeletal: Full passive range of motion without pain, normal range of motion and neck supple. No spinous process tenderness or muscular tenderness.      Thyroid: No thyromegaly.      Vascular: No carotid bruit.   Cardiovascular:      Rate and Rhythm: Normal rate and regular rhythm.      Heart sounds: Normal heart sounds, S1 normal and S2 normal. No murmur. No friction rub. No gallop.    Pulmonary:      Effort: Pulmonary effort is normal. No respiratory distress.      Breath sounds: Normal breath sounds. No wheezing or rales.   Chest:      Chest wall: No tenderness.   Abdominal:      General: Bowel sounds are normal. There is no distension.      Palpations: Abdomen is soft. There is no mass.      Tenderness: There is no abdominal tenderness. There is no guarding or rebound.   Genitourinary:     Exam position: Supine.      Cervix: No cervical motion tenderness, discharge or friability.      Uterus: Not deviated, not enlarged, not fixed and not tender.       Adnexa:         Right: No mass,  tenderness or fullness.          Left: No mass, tenderness or fullness.     Musculoskeletal: Normal range of motion.         General: No tenderness.   Lymphadenopathy:      Head:      Right side of head: No submental or submandibular adenopathy.      Left side of head: No submental or submandibular adenopathy.      Cervical: No cervical adenopathy.      Right cervical: No superficial, deep or posterior cervical adenopathy.     Left cervical: No superficial, deep or posterior cervical adenopathy.      Upper Body:      Right upper body: No supraclavicular or pectoral adenopathy.      Left upper body: No supraclavicular or pectoral adenopathy.   Skin:     General: Skin is warm and dry.      Findings: No rash.          Neurological:      Mental Status: She is alert and oriented to person, place, and time.      Cranial Nerves: No cranial nerve deficit.      Motor: No abnormal muscle tone.      Coordination: Coordination normal.      Deep Tendon Reflexes: Reflexes are normal and symmetric.   Psychiatric:         Mood and Affect: Mood is not anxious or depressed.         Speech: Speech is not rapid and pressured.         Behavior: Behavior is not agitated.      Comments: Very erratic insight and judgement and thoughts. Very difficult to follow           Assessment:       1. Postmenopausal    2. Screening for osteoporosis    3. Anxiety    4. Intrinsic eczema    5. Essential hypertension    6. Hypokalemia    7. Abnormal finding of blood chemistry, unspecified     8. Primary osteoarthritis involving multiple joints    9. Need for pneumococcal vaccination        Plan:   Soila was seen today for annual exam.    Diagnoses and all orders for this visit:    Annual physical exam  -     CBC auto differential; Future  -     Comprehensive metabolic panel; Future  -     Uric acid; Future  -     Lipid panel; Future  -     TSH; Future  -     Hemoglobin A1c; Future    Needs flu shot    Essential hypertension.  Her blood pressure today was  144/71.  She is encouraged to monitor her blood pressure at least once weekly. And RTC 2 weeks for Nurse BP read    Gastroesophageal reflux disease without esophagitis.  She is taking a PPI as needed only    Routine gynecological examination  -     Ambulatory Referral to Obstetrics / Gynecology    Encounter for screening mammogram for breast cancer  -     Mammo Digital Screening Bilat with CAD; Future  Postmenopausal  -     DXA Bone Density Spine And Hip; Future; Expected date: 08/11/2020  -     CBC auto differential; Future; Expected date: 08/11/2020  -     Comprehensive metabolic panel; Future; Expected date: 08/11/2020  -     Lipid Panel; Future; Expected date: 08/11/2020  -     Hemoglobin A1C; Future; Expected date: 08/11/2020  -     TSH; Future; Expected date: 08/11/2020    Screening for osteoporosis  -     DXA Bone Density Spine And Hip; Future; Expected date: 08/11/2020  -     Comprehensive metabolic panel; Future; Expected date: 08/11/2020  -     Lipid Panel; Future; Expected date: 08/11/2020  -     Hemoglobin A1C; Future; Expected date: 08/11/2020  -     TSH; Future; Expected date: 08/11/2020    Anxiety  -     CBC auto differential; Future; Expected date: 08/11/2020  -     Comprehensive metabolic panel; Future; Expected date: 08/11/2020  -     Lipid Panel; Future; Expected date: 08/11/2020  -     Hemoglobin A1C; Future; Expected date: 08/11/2020  -     TSH; Future; Expected date: 08/11/2020    Intrinsic eczema  -     CBC auto differential; Future; Expected date: 08/11/2020  -     Comprehensive metabolic panel; Future; Expected date: 08/11/2020  -     Lipid Panel; Future; Expected date: 08/11/2020  -     Hemoglobin A1C; Future; Expected date: 08/11/2020  -     TSH; Future; Expected date: 08/11/2020    Essential hypertension  -     CBC auto differential; Future; Expected date: 08/11/2020  -     Comprehensive metabolic panel; Future; Expected date: 08/11/2020  -     Lipid Panel; Future; Expected date:  08/11/2020  -     Hemoglobin A1C; Future; Expected date: 08/11/2020  -     TSH; Future; Expected date: 08/11/2020    Hypokalemia  -     CBC auto differential; Future; Expected date: 08/11/2020  -     Comprehensive metabolic panel; Future; Expected date: 08/11/2020  -     Lipid Panel; Future; Expected date: 08/11/2020  -     Hemoglobin A1C; Future; Expected date: 08/11/2020  -     TSH; Future; Expected date: 08/11/2020    Abnormal finding of blood chemistry, unspecified   -     Hemoglobin A1C; Future; Expected date: 08/11/2020    Primary osteoarthritis involving multiple joints  -     diclofenac sodium (VOLTAREN) 1 % Gel; Apply 2 g topically 2 (two) times daily.  Dispense: 100 g; Refill: 1    Need for pneumococcal vaccination  -     Pneumococcal Polysaccharide Vaccine (23 Valent) (SQ/IM)    Other orders  -     losartan (COZAAR) 100 MG tablet; Take 1 tablet (100 mg total) by mouth once daily.  Dispense: 90 tablet; Refill: 1      Pt will call if she wants a referral to derm for eczema.   Can give xanax RF's IF USE IS SPARING. Pt aware that my partners will NOT RF. Pt state that she is not aware that she should take xanax only as prn, although this has been told to her multiple times  OA: aware that she has OA in knees and ankles. Pt seems to believe that there is more going on. Explained that this is what it is and should not be taking xanax ro help with this pain.     At every visit when I am about to leave the room, pt begins to cry. States that she misses her mom. When asked to see her psych, pt states that she really is doing well and does not need too. Pt Is very tangential and at this time the visit now runs an extra 20 mins to reassure her and listen to her varying concerns, most all that have been a repeat of the above          RTC 6 mos  Greater than 60 mins spent with pt; 50 % face to face

## 2020-08-11 NOTE — TELEPHONE ENCOUNTER
----- Message from Nathalia Diamond sent at 8/11/2020 10:37 AM CDT -----  Contact: CARY LINDSAY [[id number]]  Type:  Patient Returning Call    Who Called: CARY LINDSAY [[id number]]    Who Left Message for Patient: unknown    Does the patient know what this is regarding?: yes    Can the clinic reply in [facility]: No    Best Call Back Number: [phone]    Additional Information: N/A

## 2020-08-18 ENCOUNTER — OFFICE VISIT (OUTPATIENT)
Dept: OTOLARYNGOLOGY | Facility: CLINIC | Age: 70
End: 2020-08-18
Payer: MEDICARE

## 2020-08-18 DIAGNOSIS — H61.23 BILATERAL IMPACTED CERUMEN: Primary | ICD-10-CM

## 2020-08-18 PROCEDURE — 69210 EAR CERUMEN REMOVAL: ICD-10-PCS | Mod: S$GLB,,, | Performed by: NURSE PRACTITIONER

## 2020-08-18 PROCEDURE — 99499 UNLISTED E&M SERVICE: CPT | Mod: S$GLB,,, | Performed by: NURSE PRACTITIONER

## 2020-08-18 PROCEDURE — 99499 NO LOS: ICD-10-PCS | Mod: S$GLB,,, | Performed by: NURSE PRACTITIONER

## 2020-08-18 PROCEDURE — 99999 PR PBB SHADOW E&M-EST. PATIENT-LVL III: ICD-10-PCS | Mod: PBBFAC,,, | Performed by: NURSE PRACTITIONER

## 2020-08-18 PROCEDURE — 99999 PR PBB SHADOW E&M-EST. PATIENT-LVL III: CPT | Mod: PBBFAC,,, | Performed by: NURSE PRACTITIONER

## 2020-08-18 PROCEDURE — 69210 REMOVE IMPACTED EAR WAX UNI: CPT | Mod: S$GLB,,, | Performed by: NURSE PRACTITIONER

## 2020-08-18 NOTE — PROCEDURES
Ear Cerumen Removal    Date/Time: 8/18/2020 1:00 PM  Performed by: Freddy Mondragon NP  Authorized by: Freddy Mondragon NP     Consent Done?:  Yes (Verbal)  Location details:  Both ears  Procedure type: curette    Cerumen  Removal Results:  Cerumen completely removed  Patient tolerance:  Patient tolerated the procedure well with no immediate complications     Procedure Note:    The patient was brought to the minor procedure room and placed under the operating microscope of the left ear canal which was cleaned of ceruminous debris. Using a combination of suction, curettes and cup forceps the patient's cerumen impaction was removed. The tympanic membrane was evaluated and was unremarkable. The patient tolerated the procedure well. There were no complications.  Procedure Note:    Patient was brought to the minor procedure room and using the operating microscope of the right ear canal which was cleaned of ceruminous debris. There was a significant cerumen impaction.  Using a combination of suction, curettes and cup forceps the patient's cerumen impaction was removed. Tympanic membrane intact. Pt tolerated well. There were no complications.

## 2020-08-25 ENCOUNTER — HOSPITAL ENCOUNTER (OUTPATIENT)
Dept: RADIOLOGY | Facility: OTHER | Age: 70
Discharge: HOME | End: 2020-08-25
Attending: FAMILY MEDICINE
Payer: MEDICARE

## 2020-08-25 DIAGNOSIS — Z78.0 POSTMENOPAUSAL: ICD-10-CM

## 2020-08-25 DIAGNOSIS — Z13.820 SCREENING FOR OSTEOPOROSIS: ICD-10-CM

## 2020-08-25 PROCEDURE — 77080 DXA BONE DENSITY AXIAL: CPT | Mod: 26,,, | Performed by: RADIOLOGY

## 2020-08-25 PROCEDURE — 77080 DXA BONE DENSITY AXIAL: CPT | Mod: TC

## 2020-08-25 PROCEDURE — 77080 DEXA BONE DENSITY SPINE HIP: ICD-10-PCS | Mod: 26,,, | Performed by: RADIOLOGY

## 2020-08-26 ENCOUNTER — OFFICE VISIT (OUTPATIENT)
Dept: PRIMARY CARE CLINIC | Facility: CLINIC | Age: 70
End: 2020-08-26
Attending: FAMILY MEDICINE
Payer: MEDICARE

## 2020-08-26 DIAGNOSIS — F41.9 ANXIETY: ICD-10-CM

## 2020-08-26 DIAGNOSIS — R10.13 EPIGASTRIC PAIN: Primary | ICD-10-CM

## 2020-08-26 DIAGNOSIS — F33.1 MAJOR DEPRESSIVE DISORDER, RECURRENT EPISODE, MODERATE: ICD-10-CM

## 2020-08-26 PROCEDURE — 3288F FALL RISK ASSESSMENT DOCD: CPT | Mod: CPTII,95,, | Performed by: FAMILY MEDICINE

## 2020-08-26 PROCEDURE — 1159F MED LIST DOCD IN RCRD: CPT | Mod: 95,,, | Performed by: FAMILY MEDICINE

## 2020-08-26 PROCEDURE — 99443 PR PHYSICIAN TELEPHONE EVALUATION 21-30 MIN: CPT | Mod: 95,,, | Performed by: FAMILY MEDICINE

## 2020-08-26 PROCEDURE — 1100F PTFALLS ASSESS-DOCD GE2>/YR: CPT | Mod: CPTII,95,, | Performed by: FAMILY MEDICINE

## 2020-08-26 PROCEDURE — 3288F PR FALLS RISK ASSESSMENT DOCUMENTED: ICD-10-PCS | Mod: CPTII,95,, | Performed by: FAMILY MEDICINE

## 2020-08-26 PROCEDURE — 1100F PR PT FALLS ASSESS DOC 2+ FALLS/FALL W/INJURY/YR: ICD-10-PCS | Mod: CPTII,95,, | Performed by: FAMILY MEDICINE

## 2020-08-26 PROCEDURE — 1159F PR MEDICATION LIST DOCUMENTED IN MEDICAL RECORD: ICD-10-PCS | Mod: 95,,, | Performed by: FAMILY MEDICINE

## 2020-08-26 PROCEDURE — 99443 PR PHYSICIAN TELEPHONE EVALUATION 21-30 MIN: ICD-10-PCS | Mod: 95,,, | Performed by: FAMILY MEDICINE

## 2020-08-26 NOTE — PROGRESS NOTES
Established Patient - Audio Only Telehealth Visit     The patient location is: HOME  The chief complaint leading to consultation is: abdominal pain periumbilical, anxiety, OA, test results  Visit type: Virtual visit with audio only (telephone)  Total time spent with patient: 25 mins       The reason for the audio only service rather than synchronous audio and video virtual visit was related to technical difficulties or patient preference/necessity.     Each patient to whom I provide medical services by telemedicine is:  (1) informed of the relationship between the physician and patient and the respective role of any other health care provider with respect to management of the patient; and (2) notified that they may decline to receive medical services by telemedicine and may withdraw from such care at any time. Patient verbally consented to receive this service via voice-only telephone call.       HPI: pt presents via telemed. Was supposed to have in office visit but pt is being encouraged to be seen via telemed since behavior in clinic leads to issues of patient crying and having ssx of separation anxiety from myself and ALL staff in clinic. This can last over an hour and pt does not seem to understand her social boundaries and time constraints when it comes to an in office appt.  Pt was seen in past by psych extensively but does NOT seem to feel she needs to go back,  although she has been encouraged to do so on multiple visits.    Today, pt states that she does have periumbilical abd pain noted mostly after eating. Pt denies any n/v/d/sob/cp and blood in stool. Was told in past to see GI when she thought she had blood in stool but pt did NOT follow up with GI as instructed. Today denies any bleeding. Pt very poor historian and when explaining plan/deifferential, pt jumps to discuss her anxiety and anax. Asks me numerous times whether she should see her psych whom she does have an appt with tomorrow. Seems very  confused by statement to use xanax AS NEEDED, and asks about every possible scenario that she would need a xana. REDIRECTED multiple times to discuss her abd pain. However pt states that her ab pain is NOT the real reason she is calling. Has repetitive thoughts and then sadness about her mom. Pt cries intermitently. After crying denies that she is depressed or sad and states that she wants to see if I can help her. This is repeated NUMEROUS times throughout the encounter.    Pt also states she had labs done yesterday. Is upset when I state that they are not in the chart. Pt states that she did come and have tests. When reviewing chart it appears to be her bone density test only and pt has forgotten to have her lab tests done. The DEXA result is then reviewed with patient for nearly 5 mins since she does NOT seem to comprehend why this test was ordered and what the result indicates. Pt then states after reviewing DEXA results that she did NOT have labs done. Pt seems to be very tangential in thought and appears to have some emotional instability throughout encounter. Denies SI/HI however. Pt also states over and over again that she 'loves me'     Assessment and plan:    After redirecting pt multiple times, I am finally able to address the plan with pt: we will check her labs and also do an US abd to r/o GB. If neg then consider referral to GI after trial of PPI. ED prompts d/w pt  Pt also advised to KEEP her appt with psych. Reassured multiple times but also told that repetitively that I AM NOT A PSYCHIATRIST.  Pt also aware that I AM PART TIME hence avail to see me last min is not always the case. Pt might be better served to see a FT physician and also to maintain appts with her psychiatrist as well.     Greater than 45 mins spent on audio call as well as reviewing chart.  RTC 6 months                            This service was not originating from a related E/M service provided within the previous 7 days nor will   to an E/M service or procedure within the next 24 hours or my soonest available appointment.  Prevailing standard of care was able to be met in this audio-only visit.

## 2020-09-01 ENCOUNTER — PATIENT MESSAGE (OUTPATIENT)
Dept: PRIMARY CARE CLINIC | Facility: CLINIC | Age: 70
End: 2020-09-01

## 2020-09-01 ENCOUNTER — HOSPITAL ENCOUNTER (OUTPATIENT)
Dept: RADIOLOGY | Facility: OTHER | Age: 70
Discharge: HOME | End: 2020-09-01
Attending: FAMILY MEDICINE
Payer: MEDICARE

## 2020-09-01 DIAGNOSIS — K80.20 MULTIPLE GALLSTONES: Primary | ICD-10-CM

## 2020-09-01 DIAGNOSIS — R10.13 EPIGASTRIC PAIN: ICD-10-CM

## 2020-09-01 PROCEDURE — 76700 US EXAM ABDOM COMPLETE: CPT | Mod: TC

## 2020-09-01 PROCEDURE — 76700 US EXAM ABDOM COMPLETE: CPT | Mod: 26,,, | Performed by: RADIOLOGY

## 2020-09-01 PROCEDURE — 76700 US ABDOMEN COMPLETE: ICD-10-PCS | Mod: 26,,, | Performed by: RADIOLOGY

## 2020-09-01 NOTE — TELEPHONE ENCOUNTER
Please let pt know that US shows gallstones but at this time no acute inflammation of GB inflammation or obstruction. If pain continue to worsen, pt needs to see a gen surg to see if they need to address the gallbladder.  Thanks,  PV

## 2020-09-01 NOTE — TELEPHONE ENCOUNTER
Informed pt that the US shows gallstones. Pt verbalized understanding. Pt is schedule with general surgery tomorrow. Pt has no further questions or concerns.

## 2020-09-09 ENCOUNTER — OFFICE VISIT (OUTPATIENT)
Dept: SURGERY | Facility: CLINIC | Age: 70
End: 2020-09-09
Payer: MEDICARE

## 2020-09-09 VITALS
SYSTOLIC BLOOD PRESSURE: 149 MMHG | HEART RATE: 97 BPM | DIASTOLIC BLOOD PRESSURE: 75 MMHG | WEIGHT: 172.31 LBS | BODY MASS INDEX: 29.42 KG/M2 | HEIGHT: 64 IN

## 2020-09-09 DIAGNOSIS — R10.13 EPIGASTRIC PAIN: Primary | ICD-10-CM

## 2020-09-09 DIAGNOSIS — K80.20 MULTIPLE GALLSTONES: ICD-10-CM

## 2020-09-09 PROCEDURE — 99999 PR PBB SHADOW E&M-EST. PATIENT-LVL IV: CPT | Mod: PBBFAC,,, | Performed by: SURGERY

## 2020-09-09 PROCEDURE — 1126F AMNT PAIN NOTED NONE PRSNT: CPT | Mod: S$GLB,,, | Performed by: SURGERY

## 2020-09-09 PROCEDURE — 1100F PTFALLS ASSESS-DOCD GE2>/YR: CPT | Mod: CPTII,S$GLB,, | Performed by: SURGERY

## 2020-09-09 PROCEDURE — 1159F MED LIST DOCD IN RCRD: CPT | Mod: S$GLB,,, | Performed by: SURGERY

## 2020-09-09 PROCEDURE — 3078F PR MOST RECENT DIASTOLIC BLOOD PRESSURE < 80 MM HG: ICD-10-PCS | Mod: CPTII,S$GLB,, | Performed by: SURGERY

## 2020-09-09 PROCEDURE — 3008F PR BODY MASS INDEX (BMI) DOCUMENTED: ICD-10-PCS | Mod: CPTII,S$GLB,, | Performed by: SURGERY

## 2020-09-09 PROCEDURE — 99999 PR PBB SHADOW E&M-EST. PATIENT-LVL IV: ICD-10-PCS | Mod: PBBFAC,,, | Performed by: SURGERY

## 2020-09-09 PROCEDURE — 3077F SYST BP >= 140 MM HG: CPT | Mod: CPTII,S$GLB,, | Performed by: SURGERY

## 2020-09-09 PROCEDURE — 3008F BODY MASS INDEX DOCD: CPT | Mod: CPTII,S$GLB,, | Performed by: SURGERY

## 2020-09-09 PROCEDURE — 1126F PR PAIN SEVERITY QUANTIFIED, NO PAIN PRESENT: ICD-10-PCS | Mod: S$GLB,,, | Performed by: SURGERY

## 2020-09-09 PROCEDURE — 99203 OFFICE O/P NEW LOW 30 MIN: CPT | Mod: S$GLB,,, | Performed by: SURGERY

## 2020-09-09 PROCEDURE — 1159F PR MEDICATION LIST DOCUMENTED IN MEDICAL RECORD: ICD-10-PCS | Mod: S$GLB,,, | Performed by: SURGERY

## 2020-09-09 PROCEDURE — 3288F PR FALLS RISK ASSESSMENT DOCUMENTED: ICD-10-PCS | Mod: CPTII,S$GLB,, | Performed by: SURGERY

## 2020-09-09 PROCEDURE — 99203 PR OFFICE/OUTPT VISIT, NEW, LEVL III, 30-44 MIN: ICD-10-PCS | Mod: S$GLB,,, | Performed by: SURGERY

## 2020-09-09 PROCEDURE — 3288F FALL RISK ASSESSMENT DOCD: CPT | Mod: CPTII,S$GLB,, | Performed by: SURGERY

## 2020-09-09 PROCEDURE — 3077F PR MOST RECENT SYSTOLIC BLOOD PRESSURE >= 140 MM HG: ICD-10-PCS | Mod: CPTII,S$GLB,, | Performed by: SURGERY

## 2020-09-09 PROCEDURE — 3078F DIAST BP <80 MM HG: CPT | Mod: CPTII,S$GLB,, | Performed by: SURGERY

## 2020-09-09 PROCEDURE — 1100F PR PT FALLS ASSESS DOC 2+ FALLS/FALL W/INJURY/YR: ICD-10-PCS | Mod: CPTII,S$GLB,, | Performed by: SURGERY

## 2020-09-09 RX ORDER — OMEPRAZOLE 40 MG/1
40 CAPSULE, DELAYED RELEASE ORAL DAILY PRN
COMMUNITY
End: 2021-09-23 | Stop reason: SDUPTHER

## 2020-09-09 RX ORDER — LOSARTAN POTASSIUM AND HYDROCHLOROTHIAZIDE 25; 100 MG/1; MG/1
TABLET ORAL
COMMUNITY
Start: 2020-08-28 | End: 2020-09-09 | Stop reason: ALTCHOICE

## 2020-09-09 NOTE — H&P (VIEW-ONLY)
GENERAL SURGERY CLINIC  HISTORY AND PHYSICAL    CC:  Epigastric pain     HPI:  Soila Chávez is a 70 y.o.  female with Hx of HTN who presents to clinic for epigastric pain that started approximately one month ago. Patient only experiences pain after consuming certain foods such as sweet potatoes or gravy. She states the pain lasts for approximately thirty minutes and occurs four times a week. She has started taking one Omeprazole 20 mg after she notices the pain and this alleviates her pain.  Patient denies consuming a lot of fatty food. Patient denies fever, chills, and the pain does not migrate. She notes that she also experiences abdominal pain related to her constipation, but this resolves after taking MetaMucil.       ROS:   Review of Systems   Constitutional: Negative for chills and fever.   HENT: Negative for ear pain, sinus pain and sore throat.    Eyes: Negative for blurred vision and redness.   Respiratory: Negative for cough and shortness of breath.    Cardiovascular: Negative for chest pain and palpitations.   Gastrointestinal: Positive for constipation. Negative for diarrhea, heartburn, nausea and vomiting.   Genitourinary: Negative for dysuria and frequency.   Musculoskeletal: Negative for back pain and neck pain.   Skin: Negative for itching and rash.   Neurological: Negative for dizziness, seizures and headaches.        Past Medical History:   Diagnosis Date    Allergy     Anxiety     Cataract     Colon polyps 2010    Hypertension     Joint pain        Past Surgical History:   Procedure Laterality Date    COLONOSCOPY N/A 3/5/2020    Procedure: COLONOSCOPY;  Surgeon: Nathalia Kemp MD;  Location: [address];  Service: Colon and Rectal;  Laterality: N/A;  requests later appt time if available - Pt open to any MD- Pt informed arrival time may be adjusted, Pt verbalized understanding- ERW2/24/20@1409    HYSTERECTOMY      PERIPARTUM CHITO       Social History      Socioeconomic History    Marital status:      Spouse name: Not on file    Number of children: Not on file    Years of education: Not on file    Highest education level: Not on file   Occupational History    Not on file   Social Needs    Financial resource strain: Not on file    Food insecurity     Worry: Not on file     Inability: Not on file    Transportation needs     Medical: Not on file     Non-medical: Not on file   Tobacco Use    Smoking status: Never Smoker    Smokeless tobacco: Never Used   Substance and Sexual Activity    Alcohol use: Yes     Comment: beer on weekends    Drug use: No    Sexual activity: Not Currently     Birth control/protection: None   Lifestyle    Physical activity     Days per week: Not on file     Minutes per session: Not on file    Stress: Not on file   Relationships    Social connections     Talks on phone: Not on file     Gets together: Not on file     Attends Alevism service: Not on file     Active member of club or organization: Not on file     Attends meetings of clubs or organizations: Not on file     Relationship status: Not on file   Other Topics Concern    Are you pregnant or think you may be? Not Asked    Breast-feeding Not Asked   Social History Narrative    June 2016    The patient reports that she lives alone normally, however her daughter recently moved in with her    She has a 43-year-old daughter, Saima    And a 26-year-old daughterChris, who is a schoolteacher for 6 in seventh grade in [employer]    She is retired from working as a  in the school system    She now works about 5-15 hours a week for [employer], which she enjoys.    Enjoys working at [employer] couple days a week       Review of patient's allergies indicates:   Allergen Reactions    Lotensin [benazepril] Swelling    Penicillins Rash         PHYSICAL EXAM:  Vitals:    09/09/20 1013   BP: (!) 149/75   Pulse: 97       General: NAD  Neuro: AAOx3  Cardio:  RRR  Resp: Breathing even and unlabored  Abd: Soft, ND, no palpable mass, BS+  Ext: Warm and well perfused      PERTINENT LABS:  Reviewed.      PERTINENT IMAGING:  Abdominal ultrasound 9/1/2020   FINDINGS:   Pancreas aorta and IVC are unremarkable.  The gallbladder demonstrates 3 gallstones the largest measuring 2.2 cm.  These are mobile.  There is no Gordon sign or wall  thickening in the wall thickness is 2.8 mm.  Bile duct measures 5 mm there liver measures 12.5 cm the right kidney 11.4 the left kidney 10.9 cm and the spleen 8 cm.      Impression:   Cholelithiasis with no evidence of acute cholecystitis.      ASSESSMENT/PLAN:  Soila Chávez is a 70 y.o. female with episodes of epigastric pain lasting thirty minutes at most.     Plan for an endoscopy to assess for an ulcer and   advised the patient to take MetaMucil daily to help regulate her bowel movements.   Will contact with results

## 2020-09-09 NOTE — PROGRESS NOTES
GENERAL SURGERY CLINIC  HISTORY AND PHYSICAL    CC:  Epigastric pain     HPI:  Soila Chávez is a 70 y.o.  female with Hx of HTN who presents to clinic for epigastric pain that started approximately one month ago. Patient only experiences pain after consuming certain foods such as sweet potatoes or gravy. She states the pain lasts for approximately thirty minutes and occurs four times a week. She has started taking one Omeprazole 20 mg after she notices the pain and this alleviates her pain.  Patient denies consuming a lot of fatty food. Patient denies fever, chills, and the pain does not migrate. She notes that she also experiences abdominal pain related to her constipation, but this resolves after taking MetaMucil.       ROS:   Review of Systems   Constitutional: Negative for chills and fever.   HENT: Negative for ear pain, sinus pain and sore throat.    Eyes: Negative for blurred vision and redness.   Respiratory: Negative for cough and shortness of breath.    Cardiovascular: Negative for chest pain and palpitations.   Gastrointestinal: Positive for constipation. Negative for diarrhea, heartburn, nausea and vomiting.   Genitourinary: Negative for dysuria and frequency.   Musculoskeletal: Negative for back pain and neck pain.   Skin: Negative for itching and rash.   Neurological: Negative for dizziness, seizures and headaches.        Past Medical History:   Diagnosis Date    Allergy     Anxiety     Cataract     Colon polyps 2010    Hypertension     Joint pain        Past Surgical History:   Procedure Laterality Date    COLONOSCOPY N/A 3/5/2020    Procedure: COLONOSCOPY;  Surgeon: Nathalia Kemp MD;  Location: [address];  Service: Colon and Rectal;  Laterality: N/A;  requests later appt time if available - Pt open to any MD- Pt informed arrival time may be adjusted, Pt verbalized understanding- ERW2/24/20@1409    HYSTERECTOMY      PERIPARTUM CHITO       Social History      Socioeconomic History    Marital status:      Spouse name: Not on file    Number of children: Not on file    Years of education: Not on file    Highest education level: Not on file   Occupational History    Not on file   Social Needs    Financial resource strain: Not on file    Food insecurity     Worry: Not on file     Inability: Not on file    Transportation needs     Medical: Not on file     Non-medical: Not on file   Tobacco Use    Smoking status: Never Smoker    Smokeless tobacco: Never Used   Substance and Sexual Activity    Alcohol use: Yes     Comment: beer on weekends    Drug use: No    Sexual activity: Not Currently     Birth control/protection: None   Lifestyle    Physical activity     Days per week: Not on file     Minutes per session: Not on file    Stress: Not on file   Relationships    Social connections     Talks on phone: Not on file     Gets together: Not on file     Attends Sikhism service: Not on file     Active member of club or organization: Not on file     Attends meetings of clubs or organizations: Not on file     Relationship status: Not on file   Other Topics Concern    Are you pregnant or think you may be? Not Asked    Breast-feeding Not Asked   Social History Narrative    June 2016    The patient reports that she lives alone normally, however her daughter recently moved in with her    She has a 43-year-old daughter, Saima    And a 26-year-old daughterChris, who is a schoolteacher for 6 in seventh grade in [employer]    She is retired from working as a  in the school system    She now works about 5-15 hours a week for [employer], which she enjoys.    Enjoys working at [employer] couple days a week       Review of patient's allergies indicates:   Allergen Reactions    Lotensin [benazepril] Swelling    Penicillins Rash         PHYSICAL EXAM:  Vitals:    09/09/20 1013   BP: (!) 149/75   Pulse: 97       General: NAD  Neuro: AAOx3  Cardio:  RRR  Resp: Breathing even and unlabored  Abd: Soft, ND, no palpable mass, BS+  Ext: Warm and well perfused      PERTINENT LABS:  Reviewed.      PERTINENT IMAGING:  Abdominal ultrasound 9/1/2020   FINDINGS:   Pancreas aorta and IVC are unremarkable.  The gallbladder demonstrates 3 gallstones the largest measuring 2.2 cm.  These are mobile.  There is no Gordon sign or wall  thickening in the wall thickness is 2.8 mm.  Bile duct measures 5 mm there liver measures 12.5 cm the right kidney 11.4 the left kidney 10.9 cm and the spleen 8 cm.      Impression:   Cholelithiasis with no evidence of acute cholecystitis.      ASSESSMENT/PLAN:  Soila Chávez is a 70 y.o. female with episodes of epigastric pain lasting thirty minutes at most.     Plan for an endoscopy to assess for an ulcer and   advised the patient to take MetaMucil daily to help regulate her bowel movements.   Will contact with results

## 2020-09-09 NOTE — LETTER
September 9, 2020      Jazzmine Parson MD  [address]  [address]  [facility] [address]           [facility]  [address]  [facility] [address]  Phone: [phone]          Patient: Soila Chávez   MR Number: [id number]   YOB: 1950   Date of Visit: 9/9/2020       Dear Dr. Jazzmine Parson:    Thank you for referring Soila Chávez to me for evaluation. Attached you will find relevant portions of my assessment and plan of care.    If you have questions, please do not hesitate to call me. I look forward to following Soila Chávez along with you.    Sincerely,    Billy Gordon Jr., MD    Enclosure  CC:  No Recipients    If you would like to receive this communication electronically, please contact [email or web address] or [phone] to request more information on [employer] access.    For providers and/or their staff who would like to refer a patient to [facility], please contact us through our one-stop-shop provider referral line, [facility], [place], at [phone].    If you feel you have received this communication in error or would no longer like to receive these types of communications, please e-mail [email or web address]

## 2020-09-14 ENCOUNTER — TELEPHONE (OUTPATIENT)
Dept: INTERNAL MEDICINE | Facility: CLINIC | Age: 70
End: 2020-09-14

## 2020-09-14 DIAGNOSIS — Z01.818 PRE-OP TESTING: ICD-10-CM

## 2020-09-14 NOTE — TELEPHONE ENCOUNTER
LM to answer pt's questions about labs.     Informed pt that she should f/u with endoscopy to assess for an ulcer and   take the MetaMucil daily to help regulate her bowel movements. Pt verbalized understanding.

## 2020-09-14 NOTE — TELEPHONE ENCOUNTER
----- Message from Marie Rivers sent at 9/14/2020 10:05 AM CDT -----  Name of Who is Calling: CARY LINDSAY       What is the request in detail: Patient would like to speak to staff in regards to previous tests that she had done and having questions. Please advise.       Can the clinic reply by [facility]: No      What Number to Call Back if not in [facility]: [phone]

## 2020-09-28 ENCOUNTER — LAB VISIT (OUTPATIENT)
Dept: SURGERY | Facility: CLINIC | Age: 70
End: 2020-09-28
Payer: MEDICARE

## 2020-09-28 DIAGNOSIS — Z01.818 PRE-OP TESTING: ICD-10-CM

## 2020-09-28 LAB — SARS-COV-2 RNA RESP QL NAA+PROBE: NOT DETECTED

## 2020-09-28 PROCEDURE — U0003 INFECTIOUS AGENT DETECTION BY NUCLEIC ACID (DNA OR RNA); SEVERE ACUTE RESPIRATORY SYNDROME CORONAVIRUS 2 (SARS-COV-2) (CORONAVIRUS DISEASE [COVID-19]), AMPLIFIED PROBE TECHNIQUE, MAKING USE OF HIGH THROUGHPUT TECHNOLOGIES AS DESCRIBED BY CMS-2020-01-R: HCPCS

## 2020-09-29 ENCOUNTER — OFFICE VISIT (OUTPATIENT)
Dept: PRIMARY CARE CLINIC | Facility: CLINIC | Age: 70
End: 2020-09-29
Attending: FAMILY MEDICINE
Payer: MEDICARE

## 2020-09-29 VITALS
WEIGHT: 175.63 LBS | BODY MASS INDEX: 29.98 KG/M2 | HEIGHT: 64 IN | OXYGEN SATURATION: 100 % | SYSTOLIC BLOOD PRESSURE: 128 MMHG | DIASTOLIC BLOOD PRESSURE: 70 MMHG | HEART RATE: 79 BPM

## 2020-09-29 DIAGNOSIS — I10 ESSENTIAL HYPERTENSION: ICD-10-CM

## 2020-09-29 DIAGNOSIS — F41.9 ANXIETY: ICD-10-CM

## 2020-09-29 DIAGNOSIS — R21 RASH AND NONSPECIFIC SKIN ERUPTION: Primary | ICD-10-CM

## 2020-09-29 DIAGNOSIS — F33.1 MAJOR DEPRESSIVE DISORDER, RECURRENT EPISODE, MODERATE: ICD-10-CM

## 2020-09-29 PROCEDURE — 1101F PT FALLS ASSESS-DOCD LE1/YR: CPT | Mod: CPTII,S$GLB,, | Performed by: FAMILY MEDICINE

## 2020-09-29 PROCEDURE — 3074F PR MOST RECENT SYSTOLIC BLOOD PRESSURE < 130 MM HG: ICD-10-PCS | Mod: CPTII,S$GLB,, | Performed by: FAMILY MEDICINE

## 2020-09-29 PROCEDURE — 99214 PR OFFICE/OUTPT VISIT, EST, LEVL IV, 30-39 MIN: ICD-10-PCS | Mod: S$GLB,,, | Performed by: FAMILY MEDICINE

## 2020-09-29 PROCEDURE — 3074F SYST BP LT 130 MM HG: CPT | Mod: CPTII,S$GLB,, | Performed by: FAMILY MEDICINE

## 2020-09-29 PROCEDURE — 3078F PR MOST RECENT DIASTOLIC BLOOD PRESSURE < 80 MM HG: ICD-10-PCS | Mod: CPTII,S$GLB,, | Performed by: FAMILY MEDICINE

## 2020-09-29 PROCEDURE — 3008F BODY MASS INDEX DOCD: CPT | Mod: CPTII,S$GLB,, | Performed by: FAMILY MEDICINE

## 2020-09-29 PROCEDURE — 1125F AMNT PAIN NOTED PAIN PRSNT: CPT | Mod: S$GLB,,, | Performed by: FAMILY MEDICINE

## 2020-09-29 PROCEDURE — 1159F MED LIST DOCD IN RCRD: CPT | Mod: S$GLB,,, | Performed by: FAMILY MEDICINE

## 2020-09-29 PROCEDURE — 99214 OFFICE O/P EST MOD 30 MIN: CPT | Mod: S$GLB,,, | Performed by: FAMILY MEDICINE

## 2020-09-29 PROCEDURE — 99999 PR PBB SHADOW E&M-EST. PATIENT-LVL IV: ICD-10-PCS | Mod: PBBFAC,,, | Performed by: FAMILY MEDICINE

## 2020-09-29 PROCEDURE — 1125F PR PAIN SEVERITY QUANTIFIED, PAIN PRESENT: ICD-10-PCS | Mod: S$GLB,,, | Performed by: FAMILY MEDICINE

## 2020-09-29 PROCEDURE — 3078F DIAST BP <80 MM HG: CPT | Mod: CPTII,S$GLB,, | Performed by: FAMILY MEDICINE

## 2020-09-29 PROCEDURE — 3008F PR BODY MASS INDEX (BMI) DOCUMENTED: ICD-10-PCS | Mod: CPTII,S$GLB,, | Performed by: FAMILY MEDICINE

## 2020-09-29 PROCEDURE — 99499 UNLISTED E&M SERVICE: CPT | Mod: S$GLB,,, | Performed by: FAMILY MEDICINE

## 2020-09-29 PROCEDURE — 99499 RISK ADDL DX/OHS AUDIT: ICD-10-PCS | Mod: S$GLB,,, | Performed by: FAMILY MEDICINE

## 2020-09-29 PROCEDURE — 99999 PR PBB SHADOW E&M-EST. PATIENT-LVL IV: CPT | Mod: PBBFAC,,, | Performed by: FAMILY MEDICINE

## 2020-09-29 PROCEDURE — 1159F PR MEDICATION LIST DOCUMENTED IN MEDICAL RECORD: ICD-10-PCS | Mod: S$GLB,,, | Performed by: FAMILY MEDICINE

## 2020-09-29 PROCEDURE — 1101F PR PT FALLS ASSESS DOC 0-1 FALLS W/OUT INJ PAST YR: ICD-10-PCS | Mod: CPTII,S$GLB,, | Performed by: FAMILY MEDICINE

## 2020-09-30 NOTE — PROGRESS NOTES
Subjective:       Patient ID: Soila Chávez is a 70 y.o. female.    Chief Complaint: Headache    Pt presents today with numerous issues:  1. Toe pain after seeing podiatry. Not new complaint  2. Sore throat not new. Afebrile. Occurring for months  3. Bruise left thigh not new. Hit door, using an OTC cream Vicks vapor rub. Also has area that is itchy and is using the HC ointment that I have given  4. Pt VERY TANGENTIAL and also VERY EMOTIONAL    5. Occasional HA's but states that BP's are normal. Does admit that she drinks very little water          Headache   Pertinent negatives include no abdominal pain, back pain, coughing, dizziness, ear pain, eye pain, fever, nausea, neck pain, numbness, photophobia, sinus pressure, sore throat, vomiting or weakness.     Review of Systems   Constitutional: Negative for activity change, appetite change, chills, fatigue and fever.   HENT: Negative for congestion, ear pain, sinus pressure, sore throat and trouble swallowing.    Eyes: Negative for photophobia, pain and visual disturbance.   Respiratory: Negative for apnea, cough, chest tightness, shortness of breath and wheezing.    Cardiovascular: Negative for chest pain, palpitations and leg swelling.   Gastrointestinal: Negative for abdominal distention, abdominal pain, constipation, diarrhea, nausea and vomiting.   Genitourinary: Negative.    Musculoskeletal: Negative for back pain, joint swelling and neck pain.   Skin: Negative.    Neurological: Positive for headaches. Negative for dizziness, tremors, weakness and numbness.   Psychiatric/Behavioral: Negative for behavioral problems, decreased concentration and sleep disturbance. The patient is not nervous/anxious.    All other systems reviewed and are negative.      Objective:      Physical Exam  Constitutional:       General: She is not in acute distress.     Appearance: Normal appearance. She is well-developed and normal weight. She is not ill-appearing, toxic-appearing  or diaphoretic.   HENT:      Head: Normocephalic and atraumatic.      Right Ear: External ear normal. A middle ear effusion is present.      Left Ear: External ear normal. A middle ear effusion is present.      Nose: Nose normal.      Mouth/Throat:      Pharynx: Uvula midline. Posterior oropharyngeal erythema present. No oropharyngeal exudate.      Tonsils: No tonsillar abscesses.   Eyes:      Pupils: Pupils are equal, round, and reactive to light.   Neck:      Musculoskeletal: Normal range of motion and neck supple.      Thyroid: No thyromegaly.   Cardiovascular:      Rate and Rhythm: Normal rate and regular rhythm.      Heart sounds: Normal heart sounds. No murmur. No friction rub. No gallop.    Pulmonary:      Effort: Pulmonary effort is normal. No respiratory distress.      Breath sounds: Normal breath sounds. No stridor. No wheezing or rales.   Chest:      Chest wall: No tenderness.   Abdominal:      General: Bowel sounds are normal. There is no distension.      Palpations: Abdomen is soft. There is no mass.      Tenderness: There is no abdominal tenderness. There is no guarding or rebound.   Musculoskeletal: Normal range of motion.         General: No tenderness.        Arms:    Lymphadenopathy:      Cervical: No cervical adenopathy.   Skin:     General: Skin is warm and dry.      Coloration: Skin is not pale.      Findings: No erythema or rash.          Neurological:      Mental Status: She is alert and oriented to person, place, and time.      Cranial Nerves: No cranial nerve deficit.      Sensory: No sensory deficit.      Motor: No abnormal muscle tone.      Coordination: Coordination normal.      Deep Tendon Reflexes: Reflexes are normal and symmetric. Reflexes normal.   Psychiatric:         Behavior: Behavior normal.         Thought Content: Thought content normal.         Judgment: Judgment normal.         Assessment:       1. Rash and nonspecific skin eruption    2. Major depressive disorder, recurrent  episode, moderate    3. Anxiety    4. Essential hypertension        Plan:       Left shoulder pain: suspect that this is due to rotator cuff sprain since pt does carry heavy items such as water bottles at work  Toe pain: not new and per pt since she went to podiatry  Anxiety: per pt is better. Unclear why she refuses to go back to psych for her anxiety    Greater than 60 mins spent with pt; 50 % face to face due to exam, and reassurance  RTC prn or for annual in 6 mos    Rash and nonspecific skin eruption  -     Ambulatory referral/consult to Dermatology; Future; Expected date: 10/06/2020    Major depressive disorder, recurrent episode, moderate    Anxiety    Essential hypertension

## 2020-10-01 ENCOUNTER — HOSPITAL ENCOUNTER (OUTPATIENT)
Facility: HOSPITAL | Age: 70
Discharge: HOME | End: 2020-10-01
Attending: INTERNAL MEDICINE | Admitting: INTERNAL MEDICINE
Payer: MEDICARE

## 2020-10-01 ENCOUNTER — ANESTHESIA EVENT (OUTPATIENT)
Dept: ENDOSCOPY | Facility: HOSPITAL | Age: 70
End: 2020-10-01
Payer: MEDICARE

## 2020-10-01 ENCOUNTER — ANESTHESIA (OUTPATIENT)
Dept: ENDOSCOPY | Facility: HOSPITAL | Age: 70
End: 2020-10-01
Payer: MEDICARE

## 2020-10-01 VITALS
BODY MASS INDEX: 29.88 KG/M2 | HEIGHT: 64 IN | SYSTOLIC BLOOD PRESSURE: 139 MMHG | HEART RATE: 66 BPM | WEIGHT: 175 LBS | DIASTOLIC BLOOD PRESSURE: 77 MMHG | RESPIRATION RATE: 16 BRPM | OXYGEN SATURATION: 97 % | TEMPERATURE: 98 F

## 2020-10-01 DIAGNOSIS — R10.13 EPIGASTRIC ABDOMINAL PAIN: ICD-10-CM

## 2020-10-01 DIAGNOSIS — R10.13 ABDOMINAL PAIN, EPIGASTRIC: Primary | ICD-10-CM

## 2020-10-01 PROCEDURE — 63600175 PHARM REV CODE 636 W HCPCS: Performed by: NURSE ANESTHETIST, CERTIFIED REGISTERED

## 2020-10-01 PROCEDURE — 88305 TISSUE EXAM BY PATHOLOGIST: CPT | Performed by: PATHOLOGY

## 2020-10-01 PROCEDURE — 37000009 HC ANESTHESIA EA ADD 15 MINS: Performed by: INTERNAL MEDICINE

## 2020-10-01 PROCEDURE — 37000008 HC ANESTHESIA 1ST 15 MINUTES: Performed by: INTERNAL MEDICINE

## 2020-10-01 PROCEDURE — 43239 EGD BIOPSY SINGLE/MULTIPLE: CPT | Performed by: INTERNAL MEDICINE

## 2020-10-01 PROCEDURE — 27201012 HC FORCEPS, HOT/COLD, DISP: Performed by: INTERNAL MEDICINE

## 2020-10-01 PROCEDURE — 88305 TISSUE EXAM BY PATHOLOGIST: CPT | Mod: 26,,, | Performed by: PATHOLOGY

## 2020-10-01 PROCEDURE — 88342 CHG IMMUNOCYTOCHEMISTRY: ICD-10-PCS | Mod: 26,,, | Performed by: PATHOLOGY

## 2020-10-01 PROCEDURE — E9220 PRA ENDO ANESTHESIA: HCPCS | Mod: ,,, | Performed by: NURSE ANESTHETIST, CERTIFIED REGISTERED

## 2020-10-01 PROCEDURE — E9220 PRA ENDO ANESTHESIA: ICD-10-PCS | Mod: ,,, | Performed by: NURSE ANESTHETIST, CERTIFIED REGISTERED

## 2020-10-01 PROCEDURE — 88342 IMHCHEM/IMCYTCHM 1ST ANTB: CPT | Mod: 26,,, | Performed by: PATHOLOGY

## 2020-10-01 PROCEDURE — 88305 TISSUE EXAM BY PATHOLOGIST: ICD-10-PCS | Mod: 26,,, | Performed by: PATHOLOGY

## 2020-10-01 PROCEDURE — 43239 EGD BIOPSY SINGLE/MULTIPLE: CPT | Mod: ,,, | Performed by: INTERNAL MEDICINE

## 2020-10-01 PROCEDURE — 88342 IMHCHEM/IMCYTCHM 1ST ANTB: CPT | Performed by: PATHOLOGY

## 2020-10-01 PROCEDURE — 43239 PR EGD, FLEX, W/BIOPSY, SGL/MULTI: ICD-10-PCS | Mod: ,,, | Performed by: INTERNAL MEDICINE

## 2020-10-01 PROCEDURE — 25000003 PHARM REV CODE 250: Performed by: INTERNAL MEDICINE

## 2020-10-01 RX ORDER — LIDOCAINE HCL/PF 100 MG/5ML
SYRINGE (ML) INTRAVENOUS
Status: DISCONTINUED | OUTPATIENT
Start: 2020-10-01 | End: 2020-10-01

## 2020-10-01 RX ORDER — SODIUM CHLORIDE 9 MG/ML
INJECTION, SOLUTION INTRAVENOUS CONTINUOUS
Status: DISCONTINUED | OUTPATIENT
Start: 2020-10-01 | End: 2020-10-01 | Stop reason: HOSPADM

## 2020-10-01 RX ORDER — PROPOFOL 10 MG/ML
VIAL (ML) INTRAVENOUS
Status: DISCONTINUED | OUTPATIENT
Start: 2020-10-01 | End: 2020-10-01

## 2020-10-01 RX ADMIN — PROPOFOL 20 MG: 10 INJECTION, EMULSION INTRAVENOUS at 01:10

## 2020-10-01 RX ADMIN — Medication 100 MG: at 01:10

## 2020-10-01 RX ADMIN — SODIUM CHLORIDE: 0.9 INJECTION, SOLUTION INTRAVENOUS at 12:10

## 2020-10-01 RX ADMIN — PROPOFOL 80 MG: 10 INJECTION, EMULSION INTRAVENOUS at 01:10

## 2020-10-01 NOTE — INTERVAL H&P NOTE
The patient has been examined and the H&P has been reviewed:    I concur with the findings and no changes have occurred since H&P was written.    Anesthesia risks, benefits and alternative options discussed and understood by patient/family.    History and Exam unchanged from visit.    Procedure - EGD  Neck - supple  Plan of anesthesia - General  ASA - per anesthesia  Mallampati - per anesthesia  Anesthesia problems - no  Family history of anesthesia problems - no        Active Hospital Problems    Diagnosis  POA    Epigastric abdominal pain [R10.13]  Yes      Resolved Hospital Problems   No resolved problems to display.

## 2020-10-01 NOTE — ANESTHESIA PREPROCEDURE EVALUATION
10/01/2020  Soila Chávez is a 70 y.o., female here for EGD for epigastric pain.    Past Medical History:   Diagnosis Date    Allergy     Anxiety     Cataract     Colon polyps 2010    Hypertension     Joint pain      Past Surgical History:   Procedure Laterality Date    COLONOSCOPY N/A 3/5/2020    Procedure: COLONOSCOPY;  Surgeon: Nathalia Kepm MD;  Location: [address]);  Service: Colon and Rectal;  Laterality: N/A;  requests later appt time if available - Pt open to any MD- Pt informed arrival time may be adjusted, Pt verbalized understanding- ERW2/24/20@1409    HYSTERECTOMY      PERIPARTUM CHITO         Anesthesia Evaluation    I have reviewed the Patient Summary Reports.   I have reviewed the NPO Status.   I have reviewed the Medications.     Review of Systems  Anesthesia Hx:   Denies Personal Hx of Anesthesia complications.   Social:  Non-Smoker    Cardiovascular:   Hypertension    Hepatic/GI:   GERD    Psych:   depression          Physical Exam  General:  Well nourished    Airway/Jaw/Neck:  Airway Findings: Mouth Opening: Normal Tongue: Normal  General Airway Assessment: Adult  Mallampati: II  TM Distance: Normal, at least 6 cm  Jaw/Neck Findings:  Neck ROM: Normal ROM  Neck Findings:     Eyes/Ears/Nose:  EYES/EARS/NOSE FINDINGS: Normal   Dental:  Dental Findings: Periodontal disease, Severe, Upper Dentures    Chest/Lungs:  Chest/Lungs Findings: Clear to auscultation, Normal Respiratory Rate     Heart/Vascular:  Heart Findings: Rate: Normal  Rhythm: Regular Rhythm  Sounds: Normal        Mental Status:  Mental Status Findings:  Cooperative, Alert and Oriented         Anesthesia Plan  Type of Anesthesia, risks & benefits discussed:  Anesthesia Type:  general  Patient's Preference: GA  Intra-op Monitoring Plan: standard ASA monitors  Intra-op Monitoring Plan Comments:   Post Op  Pain Control Plan: IV/PO Opioids PRN  Post Op Pain Control Plan Comments:   Induction:   IV  Beta Blocker:  Patient is not currently on a Beta-Blocker (No further documentation required).       Informed Consent: Patient understands risks and agrees with Anesthesia plan.  Questions answered. Anesthesia consent signed with patient.  ASA Score: 2     Day of Surgery Review of History & Physical: I have interviewed and examined the patient. I have reviewed the patient's H&P dated: 9/29/20. There are no significant changes.  H&P update referred to the provider.         Ready For Surgery From Anesthesia Perspective.

## 2020-10-01 NOTE — TRANSFER OF CARE
"Anesthesia Transfer of Care Note    Patient: Soila Chávez    Procedure(s) Performed: Procedure(s) (LRB):  EGD (ESOPHAGOGASTRODUODENOSCOPY) (N/A)    Patient location: GI    Anesthesia Type: general    Transport from OR: Transported from OR on room air with adequate spontaneous ventilation    Post pain: adequate analgesia    Post assessment: no apparent anesthetic complications and tolerated procedure well    Post vital signs: stable    Level of consciousness: awake and alert    Nausea/Vomiting: no nausea/vomiting    Complications: none    Transfer of care protocol was followed      Last vitals:   Visit Vitals  BP (!) 116/58   Pulse 80   Temp 36.5 °C (97.7 °F)   Resp 20   Ht 5' 4" (1.626 m)   Wt 79.4 kg (175 lb)   SpO2 98%   Breastfeeding No   BMI 30.04 kg/m²     "

## 2020-10-01 NOTE — [employer] PATIENT INSTRUCTIONS
Discharge Summary/Instructions after an Endoscopic Procedure  Patient Name: Soila Chávez  Patient MRN: [id number]  Patient YOB: 1950  Thursday, October 1, 2020  Casey Guerrero MD  RESTRICTIONS:  During your procedure today, you received medications for sedation.  These   medications may affect your judgment, balance and coordination.  Therefore,   for 24 hours, you have the following restrictions:   - DO NOT drive a car, operate machinery, make legal/financial decisions,   sign important papers or drink alcohol.    ACTIVITY:  Today: no heavy lifting, straining or running due to procedural   sedation/anesthesia.  The following day: return to full activity including work.  DIET:  Eat and drink normally unless instructed otherwise.     TREATMENT FOR COMMON SIDE EFFECTS:  - Mild abdominal pain, nausea, belching, bloating or excessive gas:  rest,   eat lightly and use a heating pad.  - Sore Throat: treat with throat lozenges and/or gargle with warm salt   water.  - Because air was used during the procedure, expelling large amounts of air   from your rectum or belching is normal.  - If a bowel prep was taken, you may not have a bowel movement for 1-3 days.    This is normal.  SYMPTOMS TO WATCH FOR AND REPORT TO YOUR PHYSICIAN:  1. Abdominal pain or bloating, other than gas cramps.  2. Chest pain.  3. Back pain.  4. Signs of infection such as: chills or fever occurring within 24 hours   after the procedure.  5. Rectal bleeding, which would show as bright red, maroon, or black stools.   (A tablespoon of blood from the rectum is not serious, especially if   hemorrhoids are present.)  6. Vomiting.  7. Weakness or dizziness.  GO DIRECTLY TO THE NEAREST EMERGENCY ROOM IF YOU HAVE ANY OF THE FOLLOWING:      Difficulty breathing              Chills and/or fever over 101 F   Persistent vomiting and/or vomiting blood   Severe abdominal pain   Severe chest pain   Black, tarry stools   Bleeding- more than one tablespoon   Any  other symptom or condition that you feel may need urgent attention  Your doctor recommends these additional instructions:  If any biopsies were taken, your doctors clinic will contact you in 1 to 2   weeks with any results.  - Patient has a contact number available for emergencies.  The signs and   symptoms of potential delayed complications were discussed with the   patient.  Return to normal activities tomorrow.  Written discharge   instructions were provided to the patient.   - Discharge patient to home.   - Await pathology results.   - The findings and recommendations were discussed with the designated   responsible adult.  For questions, problems or results please call your physician - Casey Guerrero MD at Work:  [phone].  [facility] [place], EMERGENCY ROOM PHONE NUMBER: [phone]  IF A COMPLICATION OR EMERGENCY SITUATION ARISES AND YOU ARE UNABLE TO REACH   YOUR PHYSICIAN - GO DIRECTLY TO THE EMERGENCY ROOM.  Casey Guerrero MD  10/1/2020 1:27:52 PM  This report has been verified and signed electronically.  [employer]

## 2020-10-06 LAB
FINAL PATHOLOGIC DIAGNOSIS: NORMAL
GROSS: NORMAL
Lab: NORMAL

## 2020-10-11 ENCOUNTER — PATIENT MESSAGE (OUTPATIENT)
Dept: GASTROENTEROLOGY | Facility: CLINIC | Age: 70
End: 2020-10-11

## 2020-10-11 RX ORDER — CLARITHROMYCIN 500 MG/1
500 TABLET, FILM COATED ORAL 2 TIMES DAILY
Qty: 28 TABLET | Refills: 0 | Status: SHIPPED | OUTPATIENT
Start: 2020-10-11 | End: 2020-10-25

## 2020-10-11 RX ORDER — METRONIDAZOLE 500 MG/1
500 TABLET ORAL 2 TIMES DAILY
Qty: 28 TABLET | Refills: 0 | Status: SHIPPED | OUTPATIENT
Start: 2020-10-11 | End: 2020-10-25

## 2020-10-13 NOTE — TELEPHONE ENCOUNTER
MA informed pt per Dr. Guerrero - Inform patient H pylori was positive from stomach biopsies   Letter sent with info   Antibiotics sent to pharmacy   Schedule 1 month clinic followup with NP/PA   Apt scheduled with SUSI Wei on 11/2/20 at 1:40 pm. Apt letter mailed.    Pt verbalize understanding, confirmed apt and thank MA

## 2020-10-14 ENCOUNTER — TELEPHONE (OUTPATIENT)
Dept: SURGERY | Facility: CLINIC | Age: 70
End: 2020-10-14

## 2020-10-14 NOTE — TELEPHONE ENCOUNTER
----- Message from Billy Gordon Jr., MD sent at 10/9/2020  3:17 PM CDT -----  TIFFANIE looksok.  If she wants to come back to talk about Lauren castorena we can.

## 2020-10-26 ENCOUNTER — OFFICE VISIT (OUTPATIENT)
Dept: DERMATOLOGY | Facility: CLINIC | Age: 70
End: 2020-10-26
Payer: MEDICARE

## 2020-10-26 VITALS — BODY MASS INDEX: 30.04 KG/M2 | WEIGHT: 175 LBS

## 2020-10-26 DIAGNOSIS — R21 RASH AND NONSPECIFIC SKIN ERUPTION: ICD-10-CM

## 2020-10-26 DIAGNOSIS — L81.0 POST-INFLAMMATORY HYPERPIGMENTATION: Primary | ICD-10-CM

## 2020-10-26 DIAGNOSIS — L29.9 PRURITUS: ICD-10-CM

## 2020-10-26 PROCEDURE — 3008F BODY MASS INDEX DOCD: CPT | Mod: CPTII,S$GLB,, | Performed by: DERMATOLOGY

## 2020-10-26 PROCEDURE — 1159F PR MEDICATION LIST DOCUMENTED IN MEDICAL RECORD: ICD-10-PCS | Mod: S$GLB,,, | Performed by: DERMATOLOGY

## 2020-10-26 PROCEDURE — 1101F PR PT FALLS ASSESS DOC 0-1 FALLS W/OUT INJ PAST YR: ICD-10-PCS | Mod: CPTII,S$GLB,, | Performed by: DERMATOLOGY

## 2020-10-26 PROCEDURE — 3008F PR BODY MASS INDEX (BMI) DOCUMENTED: ICD-10-PCS | Mod: CPTII,S$GLB,, | Performed by: DERMATOLOGY

## 2020-10-26 PROCEDURE — 1126F PR PAIN SEVERITY QUANTIFIED, NO PAIN PRESENT: ICD-10-PCS | Mod: S$GLB,,, | Performed by: DERMATOLOGY

## 2020-10-26 PROCEDURE — 99202 OFFICE O/P NEW SF 15 MIN: CPT | Mod: S$GLB,,, | Performed by: DERMATOLOGY

## 2020-10-26 PROCEDURE — 99999 PR PBB SHADOW E&M-EST. PATIENT-LVL IV: CPT | Mod: PBBFAC,,, | Performed by: DERMATOLOGY

## 2020-10-26 PROCEDURE — 99202 PR OFFICE/OUTPT VISIT, NEW, LEVL II, 15-29 MIN: ICD-10-PCS | Mod: S$GLB,,, | Performed by: DERMATOLOGY

## 2020-10-26 PROCEDURE — 1101F PT FALLS ASSESS-DOCD LE1/YR: CPT | Mod: CPTII,S$GLB,, | Performed by: DERMATOLOGY

## 2020-10-26 PROCEDURE — 99999 PR PBB SHADOW E&M-EST. PATIENT-LVL IV: ICD-10-PCS | Mod: PBBFAC,,, | Performed by: DERMATOLOGY

## 2020-10-26 PROCEDURE — 1159F MED LIST DOCD IN RCRD: CPT | Mod: S$GLB,,, | Performed by: DERMATOLOGY

## 2020-10-26 PROCEDURE — 1126F AMNT PAIN NOTED NONE PRSNT: CPT | Mod: S$GLB,,, | Performed by: DERMATOLOGY

## 2020-10-26 RX ORDER — MOMETASONE FUROATE 1 MG/G
CREAM TOPICAL
Qty: 50 G | Refills: 3 | Status: SHIPPED | OUTPATIENT
Start: 2020-10-26 | End: 2021-03-03 | Stop reason: SDUPTHER

## 2020-10-26 NOTE — LETTER
October 26, 2020      Jazzmine Parson MD  [address]  [address]  [facility] [address]           [facility] - Derm 5th Fl  [address]  [place] [address]  Phone: [phone]  Fax: [phone]          Patient: Soila Chávez   MR Number: [id number]   YOB: 1950   Date of Visit: 10/26/2020       Dear Dr. Jazzmine Parson:    Thank you for referring Soila Chávez to me for evaluation. Attached you will find relevant portions of my assessment and plan of care.    If you have questions, please do not hesitate to call me. I look forward to following Soila Chávez along with you.    Sincerely,    Kamla Guerrero MD    Enclosure  CC:  No Recipients    If you would like to receive this communication electronically, please contact [email or web address] or [phone] to request more information on [facility] Link access.    For providers and/or their staff who would like to refer a patient to [facility], please contact us through our one-stop-shop provider referral line, [facility], at [phone].    If you feel you have received this communication in error or would no longer like to receive these types of communications, please e-mail [email or web address]

## 2020-10-26 NOTE — PROGRESS NOTES
"  Subjective:       Patient ID:  Soila Chávez is a 70 y.o. female who presents for   Chief Complaint   Patient presents with    Rash     left upper leg, itching      9/29:"  . Bruise left thigh not new. Hit door, using an OTC cream Vicks vapor rub. Also has area that is itchy and is using the HC ointment that I have given  . Pt VERY TANGENTIAL and also VERY EMOTIONAL  1. Rash and nonspecific skin eruption   2. Major depressive disorder, recurrent episode, moderate   3. Anxiety   Rash and nonspecific skin eruption  -     Ambulatory referral/consult to Dermatology; Future; Expected date: 10/06/2020     Major depressive disorder, recurrent episode, moderate     Anxiety"    Has been rubbing volaren on at night an putting leg on pillow, now states leg itches.  No rash.           Review of Systems   Constitutional: Negative for fever, chills, weight loss, weight gain, fatigue and malaise.   Skin: Positive for itching. Negative for daily sunscreen use, activity-related sunscreen use and wears hat.   Hematologic/Lymphatic: Does not bruise/bleed easily.        Objective:    Physical Exam   Constitutional: She appears well-developed and well-nourished.   Neurological: She is alert and oriented to person, place, and time.   Psychiatric: She has a normal mood and affect.   Skin:   Areas Examined (abnormalities noted in diagram):   Head / Face Inspection Performed  Neck Inspection Performed  RUE Inspected  LUE Inspection Performed  RLE Inspected  LLE Inspection Performed              Diagram Legend     Erythematous scaling macule/papule c/w actinic keratosis       Vascular papule c/w angioma      Pigmented verrucoid papule/plaque c/w seborrheic keratosis      Yellow umbilicated papule c/w sebaceous hyperplasia      Irregularly shaped tan macule c/w lentigo     1-2 mm smooth white papules consistent with Milia      Movable subcutaneous cyst with punctum c/w epidermal inclusion cyst      Subcutaneous movable cyst c/w pilar " cyst      Firm pink to brown papule c/w dermatofibroma      Pedunculated fleshy papule(s) c/w skin tag(s)      Evenly pigmented macule c/w junctional nevus     Mildly variegated pigmented, slightly irregular-bordered macule c/w mildly atypical nevus      Flesh colored to evenly pigmented papule c/w intradermal nevus       Pink pearly papule/plaque c/w basal cell carcinoma      Erythematous hyperkeratotic cursted plaque c/w SCC      Surgical scar with no sign of skin cancer recurrence      Open and closed comedones      Inflammatory papules and pustules      Verrucoid papule consistent consistent with wart     Erythematous eczematous patches and plaques     Dystrophic onycholytic nail with subungual debris c/w onychomycosis     Umbilicated papule    Erythematous-base heme-crusted tan verrucoid plaque consistent with inflamed seborrheic keratosis     Erythematous Silvery Scaling Plaque c/w Psoriasis     See annotation      Assessment / Plan:        Rash and nonspecific skin eruption  -     Ambulatory referral/consult to Dermatology  Postinflammatory pigment/pruritus  -     mometasone 0.1% (ELOCON) 0.1 % cream; Use daily  Dispense: 50 g; Refill: 3             Follow up if symptoms worsen or fail to improve.

## 2020-10-29 ENCOUNTER — OFFICE VISIT (OUTPATIENT)
Dept: ORTHOPEDICS | Facility: CLINIC | Age: 70
End: 2020-10-29
Payer: MEDICARE

## 2020-10-29 VITALS — WEIGHT: 190 LBS | HEIGHT: 64 IN | BODY MASS INDEX: 32.44 KG/M2

## 2020-10-29 DIAGNOSIS — M75.42 IMPINGEMENT SYNDROME OF LEFT SHOULDER: Primary | ICD-10-CM

## 2020-10-29 PROCEDURE — 99999 PR PBB SHADOW E&M-EST. PATIENT-LVL III: ICD-10-PCS | Mod: PBBFAC,,, | Performed by: PHYSICIAN ASSISTANT

## 2020-10-29 PROCEDURE — 3008F PR BODY MASS INDEX (BMI) DOCUMENTED: ICD-10-PCS | Mod: CPTII,S$GLB,, | Performed by: PHYSICIAN ASSISTANT

## 2020-10-29 PROCEDURE — 1101F PT FALLS ASSESS-DOCD LE1/YR: CPT | Mod: CPTII,S$GLB,, | Performed by: PHYSICIAN ASSISTANT

## 2020-10-29 PROCEDURE — 1101F PR PT FALLS ASSESS DOC 0-1 FALLS W/OUT INJ PAST YR: ICD-10-PCS | Mod: CPTII,S$GLB,, | Performed by: PHYSICIAN ASSISTANT

## 2020-10-29 PROCEDURE — 99214 PR OFFICE/OUTPT VISIT, EST, LEVL IV, 30-39 MIN: ICD-10-PCS | Mod: S$GLB,,, | Performed by: PHYSICIAN ASSISTANT

## 2020-10-29 PROCEDURE — 99214 OFFICE O/P EST MOD 30 MIN: CPT | Mod: S$GLB,,, | Performed by: PHYSICIAN ASSISTANT

## 2020-10-29 PROCEDURE — 3008F BODY MASS INDEX DOCD: CPT | Mod: CPTII,S$GLB,, | Performed by: PHYSICIAN ASSISTANT

## 2020-10-29 PROCEDURE — 1159F PR MEDICATION LIST DOCUMENTED IN MEDICAL RECORD: ICD-10-PCS | Mod: S$GLB,,, | Performed by: PHYSICIAN ASSISTANT

## 2020-10-29 PROCEDURE — 1159F MED LIST DOCD IN RCRD: CPT | Mod: S$GLB,,, | Performed by: PHYSICIAN ASSISTANT

## 2020-10-29 PROCEDURE — 99999 PR PBB SHADOW E&M-EST. PATIENT-LVL III: CPT | Mod: PBBFAC,,, | Performed by: PHYSICIAN ASSISTANT

## 2020-10-29 NOTE — PROGRESS NOTES
"Patient ID: Soila Chávez is a 70 y.o. female.    Chief Complaint:  left shoulder and left thigh pain    HISTORY:  Soila Chávez is a 70 y.o. female who returns to me today for  some left mid thigh pain and right shoulder pain. She has had this discomfort for a few weeks.  She states this is the area is somewhat hard.  She denies any trauma to the left leg. She saw dermatology earlier this week who called in a steroid cream for her but she has not started.  She also states she has had shoulder pain for a few months.  The pain is worse with lifting and reaching out to the side.  The pain is along the lateral aspect of the shoulder.  The pain is intermittent.     PMH/PSH/FamHx/SocHx:    Unchanged from prior visit.    ROS:  Constitution: Negative for chills, fever and weakness.   Cardiovascular: Negative for chest pain.   Respiratory: Negative for cough and shortness of breath.   Hematologic/Lymphatic: Negative for bleeding problem. Does not bruise/bleed easily.   Skin: Negative for color change, itching and poor wound healing.   Musculoskeletal: Positive for right knee, left thigh and left shoulder pain  Gastrointestinal: Negative for heartburn.   Neurological: Negative for dizziness, focal weakness, numbness, paresthesias and sensory change.   Psychiatric/Behavioral: The patient is not nervous/anxious.   Allergic/Immunologic: Negative for environmental allergies and persistent infections.     PHYSICAL EXAM:   Ht 5' 4" (1.626 m)   Wt 86.2 kg (190 lb)   BMI 32.61 kg/m²   Left knee/thigh  Skin intact  No swelling or effusion  TTP left lateral quad musculature  No palpable mass  No hip pain with ROM  Knee ROM 0-120    Left shoulder  Skin intact, no swelling or effusion  No TTP  FROM  Normal strength testing  +crandall  +impingement  +Omar's    IMAGING: X-rays of the left shoulder, personally reviewed by me, demonstrate mild degenerative chagnes.  No fracture or dislocation.    X-rays left femur show no " fracture or dislocation    ASSESSMENT/PLAN:    Diagnoses and all orders for this visit:    Impingement syndrome of left shoulder      -  Left thigh pain- appears to be muscular.   Recommend heat, massage, topical cream as prescribed by dermatology   - Left shoulder- subacromial bursitis.  If symptoms persist, consider CSI.  She would like to hold off on PT for now.  She will use cream as provided by dermatology on her shoulder if does nto improve consider injection.    No follow-ups on file.  If there are any questions prior to this, the patient was instructed to contact the office.

## 2020-11-02 ENCOUNTER — OFFICE VISIT (OUTPATIENT)
Dept: GASTROENTEROLOGY | Facility: CLINIC | Age: 70
End: 2020-11-02
Payer: MEDICARE

## 2020-11-02 VITALS
DIASTOLIC BLOOD PRESSURE: 79 MMHG | BODY MASS INDEX: 29.28 KG/M2 | SYSTOLIC BLOOD PRESSURE: 133 MMHG | HEIGHT: 64 IN | HEART RATE: 70 BPM | WEIGHT: 171.5 LBS

## 2020-11-02 DIAGNOSIS — R10.13 EPIGASTRIC PAIN: ICD-10-CM

## 2020-11-02 DIAGNOSIS — A04.8 H. PYLORI INFECTION: Primary | ICD-10-CM

## 2020-11-02 PROCEDURE — 1159F PR MEDICATION LIST DOCUMENTED IN MEDICAL RECORD: ICD-10-PCS | Mod: S$GLB,,, | Performed by: FAMILY MEDICINE

## 2020-11-02 PROCEDURE — 1101F PR PT FALLS ASSESS DOC 0-1 FALLS W/OUT INJ PAST YR: ICD-10-PCS | Mod: CPTII,S$GLB,, | Performed by: FAMILY MEDICINE

## 2020-11-02 PROCEDURE — 1159F MED LIST DOCD IN RCRD: CPT | Mod: S$GLB,,, | Performed by: FAMILY MEDICINE

## 2020-11-02 PROCEDURE — 3078F PR MOST RECENT DIASTOLIC BLOOD PRESSURE < 80 MM HG: ICD-10-PCS | Mod: CPTII,S$GLB,, | Performed by: FAMILY MEDICINE

## 2020-11-02 PROCEDURE — 99999 PR PBB SHADOW E&M-EST. PATIENT-LVL V: CPT | Mod: PBBFAC,,, | Performed by: FAMILY MEDICINE

## 2020-11-02 PROCEDURE — 1126F PR PAIN SEVERITY QUANTIFIED, NO PAIN PRESENT: ICD-10-PCS | Mod: S$GLB,,, | Performed by: FAMILY MEDICINE

## 2020-11-02 PROCEDURE — 3008F PR BODY MASS INDEX (BMI) DOCUMENTED: ICD-10-PCS | Mod: CPTII,S$GLB,, | Performed by: FAMILY MEDICINE

## 2020-11-02 PROCEDURE — 3078F DIAST BP <80 MM HG: CPT | Mod: CPTII,S$GLB,, | Performed by: FAMILY MEDICINE

## 2020-11-02 PROCEDURE — 1101F PT FALLS ASSESS-DOCD LE1/YR: CPT | Mod: CPTII,S$GLB,, | Performed by: FAMILY MEDICINE

## 2020-11-02 PROCEDURE — 3075F SYST BP GE 130 - 139MM HG: CPT | Mod: CPTII,S$GLB,, | Performed by: FAMILY MEDICINE

## 2020-11-02 PROCEDURE — 99213 PR OFFICE/OUTPT VISIT, EST, LEVL III, 20-29 MIN: ICD-10-PCS | Mod: S$GLB,,, | Performed by: FAMILY MEDICINE

## 2020-11-02 PROCEDURE — 3008F BODY MASS INDEX DOCD: CPT | Mod: CPTII,S$GLB,, | Performed by: FAMILY MEDICINE

## 2020-11-02 PROCEDURE — 3075F PR MOST RECENT SYSTOLIC BLOOD PRESS GE 130-139MM HG: ICD-10-PCS | Mod: CPTII,S$GLB,, | Performed by: FAMILY MEDICINE

## 2020-11-02 PROCEDURE — 99999 PR PBB SHADOW E&M-EST. PATIENT-LVL V: ICD-10-PCS | Mod: PBBFAC,,, | Performed by: FAMILY MEDICINE

## 2020-11-02 PROCEDURE — 1126F AMNT PAIN NOTED NONE PRSNT: CPT | Mod: S$GLB,,, | Performed by: FAMILY MEDICINE

## 2020-11-02 PROCEDURE — 99213 OFFICE O/P EST LOW 20 MIN: CPT | Mod: S$GLB,,, | Performed by: FAMILY MEDICINE

## 2020-11-02 NOTE — PROGRESS NOTES
[facility] Consultation Note    Reason for Consult:  The primary encounter diagnosis was H. pylori infection. A diagnosis of Epigastric pain was also pertinent to this visit.    PCP:   Jazzmine Parson       Referring MD:  No referring provider defined for this encounter.    HPI:  This is a 70 y.o. female here for f/u of h pylori infection. She is established via scopes with Dr Guerrero, new to me.      EGD on 10/1/2020 per Dr Gordon for epigastric pain. Biopsied positive for H pylori. Dr Guerrero prescribed amoxicillin and clarithromycin 14 day treatment.  She states she finished prescriptions on 10/28/2020, but reports she had pills leftover. Unclear if there was a misunderstanding of the dosage or if she was incorrectly instructed by her pharmacy.  Reports epigastric pain has subsided and she feels her bowel habits have improved (suffered with constipation prior to this).  Reports darker colored stools, but also would take Maalox frequently.       ROS:  Constitutional: No fevers, chills, No unintentional weight loss  CV: No chest pain  Pulm: No cough, No shortness of breath  GI: see HPI  : No dysuria, No hematuria, +Frequency  Psych: + anxiety, No depression    Medical History:  has a past medical history of Allergy, Anxiety, Cataract, Colon polyps (2010), Hypertension, and Joint pain.    Surgical History:  has a past surgical history that includes Hysterectomy; Colonoscopy (N/A, 3/5/2020); and Esophagogastroduodenoscopy (N/A, 10/1/2020).    Family History: family history includes Breast cancer in her sister..     Social History:  reports that she has never smoked. She has never used smokeless tobacco. She reports current alcohol use. She reports that she does not use drugs.    Review of patient's allergies indicates:   Allergen Reactions    Lotensin [benazepril] Swelling    Penicillins Rash       Current Outpatient Medications on File Prior to Visit   Medication Sig Dispense Refill     "ALPRAZolam (XANAX) 0.5 MG tablet TK 1 T PO  BID 60 tablet 2    ascorbic acid (VITAMIN C) 100 MG tablet Take 100 mg by mouth once daily.      aspirin (ECOTRIN) 81 MG EC tablet Take 81 mg by mouth nightly.       b complex vitamins tablet Take 1 tablet by mouth once daily.      calcium carbonate-vitamin D3 500 mg(1,250mg) -125 unit per tablet Take 1 tablet by mouth nightly.       cetirizine (ZYRTEC) 10 MG tablet Take 10 mg by mouth daily as needed.       diclofenac sodium (VOLTAREN) 1 % Gel Apply 2 g topically 2 (two) times daily. (Patient taking differently: Apply 2 g topically 2 (two) times daily as needed. ) 100 g 1    ergocalciferol, vitamin D2, (VITAMIN D ORAL) Take 400 Units by mouth nightly.       ferrous sulfate (FEOSOL) 325 mg (65 mg iron) Tab tablet Take 325 mg by mouth daily with breakfast.      gabapentin (NEURONTIN) 600 MG tablet TAKE 1 TABLET(600 MG) BY MOUTH THREE TIMES DAILY (Patient taking differently: Take 600 mg by mouth 3 (three) times daily as needed. ) 90 tablet 1    ginkgo biloba 40 mg Tab Take 1 capsule by mouth daily as needed.       losartan (COZAAR) 100 MG tablet Take 1 tablet (100 mg total) by mouth once daily. (Patient taking differently: Take 100 mg by mouth every morning. ) 90 tablet 1    mometasone 0.1% (ELOCON) 0.1 % cream Use daily 50 g 3    multivitamin (THERAGRAN) per tablet Take 1 tablet by mouth once daily. Takes Centrum Silver      omega-3 fatty acids 1,000 mg Cap Take 1 g by mouth once daily.       omeprazole (PRILOSEC) 40 MG capsule Take 40 mg by mouth daily as needed.       No current facility-administered medications on file prior to visit.          Objective Findings:    Vital Signs Reviewed:  /79   Pulse 70   Ht 5' 4" (1.626 m)   Wt 77.8 kg (171 lb 8.3 oz)   BMI 29.44 kg/m²   Body mass index is 29.44 kg/m².    Physical Exam:  General Appearance: Well appearing in no acute distress  Head:   Normocephalic, without obvious abnormality  Eyes:    No " scleral icterus  Lungs: respirations even and unlabored, no increased work of breathing.  Skin: No rash to exposed skin surfaces  Neurologic: AAO x 3      Labs Reviewed:  Lab Results   Component Value Date    WBC 6.70 09/01/2020    HGB 12.3 09/01/2020    HCT 39.2 09/01/2020     09/01/2020    CHOL 210 (H) 09/01/2020    TRIG 62 09/01/2020    HDL 63 09/01/2020    ALT 13 09/01/2020    AST 17 09/01/2020     09/01/2020    K 3.5 09/01/2020     09/01/2020    CREATININE 0.8 09/01/2020    BUN 14 09/01/2020    CO2 28 09/01/2020    HGBA1C 5.3 09/01/2020       No results found for: FERRITIN, TIBC, TRANSFERRIN       Imaging reviewed:  9/1/2020 US abd complete for epigastric pain  1. Cholelithiasis with no evidence of acute cholecystitis.    Endoscopy reviewed:  10/1/2020 EGD for epigastric pain with Dr Guerrero  1. Normal esophagus.   2. Normal stomach. Biopsied. H pylori positive  3. Normal examined duodenum    3/5/2020 Colonoscopy for personal hx of colon polyps with Dr Kepm  1. Good prep, to cecum  2. Diverticulosis in the entire examined colon.   3. The examination was otherwise normal on direct and retroflexion views.   4. No specimens collected.       70 y.o. female here for evaluation of:    Assessment:  1. H. pylori infection    2. Epigastric pain         Miscommunication regarding antibiotic regimen. We will obtain an h pylori stool sample in 4 weeks (since she just recently finished antibiotics). If this is positive, we will re-treat and clarify with her the proper dosage regimen for the treatment.     Recommendations:  1. H pylori stool test on/after Nov 30th (info in AVS)      F/U pending stool test. I have placed a reminder in the epic system for us to call her to remind her about her stool sample.      Order summary:  Orders Placed This Encounter    H. pylori antigen, stool         Thank you so much for allowing me to participate in the care of Soila Mo JIN Tapia

## 2020-11-02 NOTE — PATIENT INSTRUCTIONS
1. On November 30th, turn in your stool sample.  - if this is POSITIVE, we will re-treat you with different antibiotics.     2. Do not take any acid-reducing medicines 2 weeks prior to your stool sample.            A stool test for H pylori needs to be done to check for bacterial eradication. You need to be off the following medications for the following amount of time:    1. Antibiotics: off for 4 weeks prior to stool collection    2. All proton-pump inhibitors for 2 weeks prior to stool collection  - Nexium (esomeprazole)  - Prilosec (omeprazole)  - Protonix (pantoprazole)  - Prevacid (lansoprazole)  - Aciphex (raberprazole)  - Dexilant (dexlansoprazole)  - Zegrid    3. Off all H2 blocker medications for 2 weeks prior to stool collection  - Zantac (ranitidine)  - Tagamet (cimedtadine)  - Axid (nizatidine)  - Pepcid (famotidine)    4. Off Pepto-bismol for 4 weeks prior to stool collection

## 2020-11-12 ENCOUNTER — OFFICE VISIT (OUTPATIENT)
Dept: INTERNAL MEDICINE | Facility: CLINIC | Age: 70
End: 2020-11-12
Payer: MEDICARE

## 2020-11-12 VITALS
BODY MASS INDEX: 29.77 KG/M2 | HEART RATE: 68 BPM | HEIGHT: 64 IN | SYSTOLIC BLOOD PRESSURE: 112 MMHG | DIASTOLIC BLOOD PRESSURE: 72 MMHG | TEMPERATURE: 98 F | WEIGHT: 174.38 LBS | OXYGEN SATURATION: 99 %

## 2020-11-12 DIAGNOSIS — R09.82 POST-NASAL DRIP: ICD-10-CM

## 2020-11-12 PROCEDURE — 3078F PR MOST RECENT DIASTOLIC BLOOD PRESSURE < 80 MM HG: ICD-10-PCS | Mod: CPTII,GC,S$GLB, | Performed by: STUDENT IN AN ORGANIZED HEALTH CARE EDUCATION/TRAINING PROGRAM

## 2020-11-12 PROCEDURE — 3074F PR MOST RECENT SYSTOLIC BLOOD PRESSURE < 130 MM HG: ICD-10-PCS | Mod: CPTII,GC,S$GLB, | Performed by: STUDENT IN AN ORGANIZED HEALTH CARE EDUCATION/TRAINING PROGRAM

## 2020-11-12 PROCEDURE — 1159F MED LIST DOCD IN RCRD: CPT | Mod: GC,S$GLB,, | Performed by: STUDENT IN AN ORGANIZED HEALTH CARE EDUCATION/TRAINING PROGRAM

## 2020-11-12 PROCEDURE — 3078F DIAST BP <80 MM HG: CPT | Mod: CPTII,GC,S$GLB, | Performed by: STUDENT IN AN ORGANIZED HEALTH CARE EDUCATION/TRAINING PROGRAM

## 2020-11-12 PROCEDURE — 1159F PR MEDICATION LIST DOCUMENTED IN MEDICAL RECORD: ICD-10-PCS | Mod: GC,S$GLB,, | Performed by: STUDENT IN AN ORGANIZED HEALTH CARE EDUCATION/TRAINING PROGRAM

## 2020-11-12 PROCEDURE — 99213 PR OFFICE/OUTPT VISIT, EST, LEVL III, 20-29 MIN: ICD-10-PCS | Mod: GC,S$GLB,, | Performed by: STUDENT IN AN ORGANIZED HEALTH CARE EDUCATION/TRAINING PROGRAM

## 2020-11-12 PROCEDURE — 99999 PR PBB SHADOW E&M-EST. PATIENT-LVL V: ICD-10-PCS | Mod: PBBFAC,GC,, | Performed by: STUDENT IN AN ORGANIZED HEALTH CARE EDUCATION/TRAINING PROGRAM

## 2020-11-12 PROCEDURE — 99999 PR PBB SHADOW E&M-EST. PATIENT-LVL V: CPT | Mod: PBBFAC,GC,, | Performed by: STUDENT IN AN ORGANIZED HEALTH CARE EDUCATION/TRAINING PROGRAM

## 2020-11-12 PROCEDURE — 3074F SYST BP LT 130 MM HG: CPT | Mod: CPTII,GC,S$GLB, | Performed by: STUDENT IN AN ORGANIZED HEALTH CARE EDUCATION/TRAINING PROGRAM

## 2020-11-12 PROCEDURE — 99213 OFFICE O/P EST LOW 20 MIN: CPT | Mod: GC,S$GLB,, | Performed by: STUDENT IN AN ORGANIZED HEALTH CARE EDUCATION/TRAINING PROGRAM

## 2020-11-12 RX ORDER — IPRATROPIUM BROMIDE 21 UG/1
2 SPRAY, METERED NASAL 2 TIMES DAILY
Qty: 30 ML | Refills: 0 | Status: SHIPPED | OUTPATIENT
Start: 2020-11-12 | End: 2021-03-10
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
## 2020-11-12 NOTE — PATIENT INSTRUCTIONS
- If symptoms do not improve in 3-5 days please contact PCP for follow-up appointment  - Trial Atrovent twice daily for 14 days  - Zyrtec once daily for the next 14 days  - Flonase for the next 14 days    - See psychiatrist this afternoon. If any thoughts of suicidal thoughts or harming others please call 911 and seek immediate medical attention.
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
## 2020-11-16 ENCOUNTER — TELEPHONE (OUTPATIENT)
Dept: INTERNAL MEDICINE | Facility: CLINIC | Age: 70
End: 2020-11-16

## 2020-11-19 NOTE — PROGRESS NOTES
I have personally reviewed the results and orders, reviewed pertinent labs and imaging, and discussed the plan of care with the resident/intern. I have reviewed the note attached in its entirety and agree with the documented findings and proposed plan.                Luigi Alfredo MD  Internal Medicine   Assistant Staff, Chief Resident

## 2020-11-30 ENCOUNTER — LAB VISIT (OUTPATIENT)
Dept: LAB | Facility: HOSPITAL | Age: 70
End: 2020-11-30
Payer: MEDICARE

## 2020-11-30 DIAGNOSIS — R10.13 EPIGASTRIC PAIN: ICD-10-CM

## 2020-11-30 DIAGNOSIS — A04.8 H. PYLORI INFECTION: ICD-10-CM

## 2020-11-30 PROCEDURE — 87338 HPYLORI STOOL AG IA: CPT

## 2020-12-04 ENCOUNTER — TELEPHONE (OUTPATIENT)
Dept: ORTHOPEDICS | Facility: CLINIC | Age: 70
End: 2020-12-04

## 2020-12-04 NOTE — TELEPHONE ENCOUNTER
Spoke with pt; regarding appointment 12/8/20 @ 7:30 am. Patient states verbal understanding and has no further questions.

## 2020-12-04 NOTE — TELEPHONE ENCOUNTER
----- Message from Eleni Junior sent at 12/4/2020  3:08 PM CST -----  Regarding: pt advice  Contact: pt @ [phone]  Pt returning missed call from Dr. Solares' office, please call.

## 2020-12-04 NOTE — TELEPHONE ENCOUNTER
----- Message from Nicole King sent at 12/4/2020  2:33 PM CST -----  Contact: CARY LINDSAY [[id number]]  Type: Patient Call Back    Who called:ACRY LINDSAY [[id number]]    What is the request in detail: Patient is requesting a call back. CARY LINDSAY [[id number]] states she was told by Adrianna Solares that she needed to be seen sooner. I was unable to schedule the appt due to the next available date being  12/31/2020.  Please advise.    Can the clinic reply by [facility]? No    Best call back number: ..[phone]    Additional Information: N/A

## 2020-12-06 LAB — H PYLORI AG STL QL IA: DETECTED

## 2020-12-08 ENCOUNTER — OFFICE VISIT (OUTPATIENT)
Dept: ORTHOPEDICS | Facility: CLINIC | Age: 70
End: 2020-12-08
Payer: MEDICARE

## 2020-12-08 ENCOUNTER — HOSPITAL ENCOUNTER (OUTPATIENT)
Dept: RADIOLOGY | Facility: HOSPITAL | Age: 70
Discharge: HOME | End: 2020-12-08
Attending: PHYSICIAN ASSISTANT
Payer: MEDICARE

## 2020-12-08 ENCOUNTER — OFFICE VISIT (OUTPATIENT)
Dept: OTOLARYNGOLOGY | Facility: CLINIC | Age: 70
End: 2020-12-08
Payer: MEDICARE

## 2020-12-08 VITALS
BODY MASS INDEX: 29.77 KG/M2 | WEIGHT: 174.38 LBS | TEMPERATURE: 97 F | RESPIRATION RATE: 18 BRPM | DIASTOLIC BLOOD PRESSURE: 76 MMHG | HEART RATE: 85 BPM | SYSTOLIC BLOOD PRESSURE: 135 MMHG | HEIGHT: 64 IN

## 2020-12-08 VITALS
HEART RATE: 68 BPM | BODY MASS INDEX: 29.87 KG/M2 | DIASTOLIC BLOOD PRESSURE: 71 MMHG | WEIGHT: 174 LBS | SYSTOLIC BLOOD PRESSURE: 130 MMHG

## 2020-12-08 DIAGNOSIS — H93.8X3 SENSATION OF FULLNESS IN BOTH EARS: Primary | ICD-10-CM

## 2020-12-08 DIAGNOSIS — M75.52 BURSITIS OF BOTH SHOULDERS: ICD-10-CM

## 2020-12-08 DIAGNOSIS — M79.652 PAIN OF LEFT THIGH: ICD-10-CM

## 2020-12-08 DIAGNOSIS — M75.51 BURSITIS OF BOTH SHOULDERS: ICD-10-CM

## 2020-12-08 DIAGNOSIS — M25.511 ACUTE PAIN OF BOTH SHOULDERS: ICD-10-CM

## 2020-12-08 DIAGNOSIS — M25.512 ACUTE PAIN OF BOTH SHOULDERS: ICD-10-CM

## 2020-12-08 DIAGNOSIS — M19.012 OSTEOARTHRITIS OF BOTH SHOULDERS, UNSPECIFIED OSTEOARTHRITIS TYPE: Primary | ICD-10-CM

## 2020-12-08 DIAGNOSIS — M19.011 OSTEOARTHRITIS OF BOTH SHOULDERS, UNSPECIFIED OSTEOARTHRITIS TYPE: Primary | ICD-10-CM

## 2020-12-08 PROCEDURE — 3075F SYST BP GE 130 - 139MM HG: CPT | Mod: CPTII,S$GLB,, | Performed by: OTOLARYNGOLOGY

## 2020-12-08 PROCEDURE — 1159F PR MEDICATION LIST DOCUMENTED IN MEDICAL RECORD: ICD-10-PCS | Mod: S$GLB,,, | Performed by: OTOLARYNGOLOGY

## 2020-12-08 PROCEDURE — 3078F DIAST BP <80 MM HG: CPT | Mod: CPTII,S$GLB,, | Performed by: OTOLARYNGOLOGY

## 2020-12-08 PROCEDURE — 99214 PR OFFICE/OUTPT VISIT, EST, LEVL IV, 30-39 MIN: ICD-10-PCS | Mod: S$GLB,,, | Performed by: PHYSICIAN ASSISTANT

## 2020-12-08 PROCEDURE — 3075F PR MOST RECENT SYSTOLIC BLOOD PRESS GE 130-139MM HG: ICD-10-PCS | Mod: CPTII,S$GLB,, | Performed by: PHYSICIAN ASSISTANT

## 2020-12-08 PROCEDURE — 3078F DIAST BP <80 MM HG: CPT | Mod: CPTII,S$GLB,, | Performed by: PHYSICIAN ASSISTANT

## 2020-12-08 PROCEDURE — 3008F BODY MASS INDEX DOCD: CPT | Mod: CPTII,S$GLB,, | Performed by: OTOLARYNGOLOGY

## 2020-12-08 PROCEDURE — 1159F MED LIST DOCD IN RCRD: CPT | Mod: S$GLB,,, | Performed by: OTOLARYNGOLOGY

## 2020-12-08 PROCEDURE — 1159F PR MEDICATION LIST DOCUMENTED IN MEDICAL RECORD: ICD-10-PCS | Mod: S$GLB,,, | Performed by: PHYSICIAN ASSISTANT

## 2020-12-08 PROCEDURE — 73030 X-RAY EXAM OF SHOULDER: CPT | Mod: 26,50,, | Performed by: RADIOLOGY

## 2020-12-08 PROCEDURE — 99999 PR PBB SHADOW E&M-EST. PATIENT-LVL IV: ICD-10-PCS | Mod: PBBFAC,,, | Performed by: PHYSICIAN ASSISTANT

## 2020-12-08 PROCEDURE — 3078F PR MOST RECENT DIASTOLIC BLOOD PRESSURE < 80 MM HG: ICD-10-PCS | Mod: CPTII,S$GLB,, | Performed by: PHYSICIAN ASSISTANT

## 2020-12-08 PROCEDURE — 73030 X-RAY EXAM OF SHOULDER: CPT | Mod: TC,50

## 2020-12-08 PROCEDURE — 99213 OFFICE O/P EST LOW 20 MIN: CPT | Mod: 25,S$GLB,, | Performed by: OTOLARYNGOLOGY

## 2020-12-08 PROCEDURE — 99999 PR PBB SHADOW E&M-EST. PATIENT-LVL IV: CPT | Mod: PBBFAC,,, | Performed by: PHYSICIAN ASSISTANT

## 2020-12-08 PROCEDURE — 1159F MED LIST DOCD IN RCRD: CPT | Mod: S$GLB,,, | Performed by: PHYSICIAN ASSISTANT

## 2020-12-08 PROCEDURE — 69210 REMOVE IMPACTED EAR WAX UNI: CPT | Mod: S$GLB,,, | Performed by: OTOLARYNGOLOGY

## 2020-12-08 PROCEDURE — 3078F PR MOST RECENT DIASTOLIC BLOOD PRESSURE < 80 MM HG: ICD-10-PCS | Mod: CPTII,S$GLB,, | Performed by: OTOLARYNGOLOGY

## 2020-12-08 PROCEDURE — 99999 PR PBB SHADOW E&M-EST. PATIENT-LVL III: ICD-10-PCS | Mod: PBBFAC,,, | Performed by: OTOLARYNGOLOGY

## 2020-12-08 PROCEDURE — 3075F SYST BP GE 130 - 139MM HG: CPT | Mod: CPTII,S$GLB,, | Performed by: PHYSICIAN ASSISTANT

## 2020-12-08 PROCEDURE — 1126F PR PAIN SEVERITY QUANTIFIED, NO PAIN PRESENT: ICD-10-PCS | Mod: S$GLB,,, | Performed by: OTOLARYNGOLOGY

## 2020-12-08 PROCEDURE — 69210 PR REMOVAL IMPACTED CERUMEN REQUIRING INSTRUMENTATION, UNILATERAL: ICD-10-PCS | Mod: S$GLB,,, | Performed by: OTOLARYNGOLOGY

## 2020-12-08 PROCEDURE — 1125F PR PAIN SEVERITY QUANTIFIED, PAIN PRESENT: ICD-10-PCS | Mod: S$GLB,,, | Performed by: PHYSICIAN ASSISTANT

## 2020-12-08 PROCEDURE — 3008F BODY MASS INDEX DOCD: CPT | Mod: CPTII,S$GLB,, | Performed by: PHYSICIAN ASSISTANT

## 2020-12-08 PROCEDURE — 99213 PR OFFICE/OUTPT VISIT, EST, LEVL III, 20-29 MIN: ICD-10-PCS | Mod: 25,S$GLB,, | Performed by: OTOLARYNGOLOGY

## 2020-12-08 PROCEDURE — 3008F PR BODY MASS INDEX (BMI) DOCUMENTED: ICD-10-PCS | Mod: CPTII,S$GLB,, | Performed by: OTOLARYNGOLOGY

## 2020-12-08 PROCEDURE — 1125F AMNT PAIN NOTED PAIN PRSNT: CPT | Mod: S$GLB,,, | Performed by: PHYSICIAN ASSISTANT

## 2020-12-08 PROCEDURE — 73030 XR SHOULDER COMPLETE 2 OR MORE VIEWS BILATERAL: ICD-10-PCS | Mod: 26,50,, | Performed by: RADIOLOGY

## 2020-12-08 PROCEDURE — 3008F PR BODY MASS INDEX (BMI) DOCUMENTED: ICD-10-PCS | Mod: CPTII,S$GLB,, | Performed by: PHYSICIAN ASSISTANT

## 2020-12-08 PROCEDURE — 99999 PR PBB SHADOW E&M-EST. PATIENT-LVL III: CPT | Mod: PBBFAC,,, | Performed by: OTOLARYNGOLOGY

## 2020-12-08 PROCEDURE — 3075F PR MOST RECENT SYSTOLIC BLOOD PRESS GE 130-139MM HG: ICD-10-PCS | Mod: CPTII,S$GLB,, | Performed by: OTOLARYNGOLOGY

## 2020-12-08 PROCEDURE — 1126F AMNT PAIN NOTED NONE PRSNT: CPT | Mod: S$GLB,,, | Performed by: OTOLARYNGOLOGY

## 2020-12-08 PROCEDURE — 99214 OFFICE O/P EST MOD 30 MIN: CPT | Mod: S$GLB,,, | Performed by: PHYSICIAN ASSISTANT

## 2020-12-08 RX ORDER — FLUTICASONE PROPIONATE 50 MCG
SPRAY, SUSPENSION (ML) NASAL
Status: ON HOLD | COMMUNITY
Start: 2020-11-12 | End: 2021-10-28

## 2020-12-08 RX ORDER — METHYLPREDNISOLONE 4 MG/1
TABLET ORAL
Qty: 1 TABLET | Refills: 0 | Status: SHIPPED | OUTPATIENT
Start: 2020-12-08 | End: 2021-01-14 | Stop reason: CLARIF

## 2020-12-08 NOTE — PROGRESS NOTES
Subjective:       Patient ID: Soila Chávez is a 70 y.o. female.    Chief Complaint: Ear Fullness    Ear Fullness   There is pain in both ears. This is a recurrent problem. The current episode started more than 1 month ago (Last seen for same complaint in August of this year.). The problem occurs constantly. The problem has been waxing and waning. There has been no fever. The patient is experiencing no pain. Associated symptoms include hearing loss. Pertinent negatives include no coughing, diarrhea, ear discharge, headaches, neck pain, rash, rhinorrhea or vomiting. She has tried nothing for the symptoms. Her past medical history is significant for hearing loss.     Review of Systems   Constitutional: Negative for activity change, appetite change and fever.   HENT: Positive for hearing loss. Negative for congestion, ear discharge, ear pain, nosebleeds, postnasal drip, rhinorrhea and sneezing.    Eyes: Negative for redness and visual disturbance.   Respiratory: Negative for apnea, cough, shortness of breath and wheezing.    Cardiovascular: Negative for chest pain and palpitations.   Gastrointestinal: Negative for diarrhea and vomiting.   Genitourinary: Negative for difficulty urinating and frequency.   Musculoskeletal: Negative for arthralgias, back pain, gait problem and neck pain.   Skin: Negative for color change and rash.   Neurological: Negative for dizziness, speech difficulty, weakness and headaches.   Hematological: Negative for adenopathy. Does not bruise/bleed easily.   Psychiatric/Behavioral: Negative for agitation and behavioral problems.       Objective:        Constitutional:   Vital signs are normal. She appears well-developed and well-nourished. She is active. Normal speech.      Head:  Normocephalic and atraumatic. Salivary glands normal.  Facial strength is normal.      Nose:  Nose normal including turbinates, nasal mucosa, sinuses and nasal septum.     Mouth/Throat  Oropharynx clear and moist  without lesions or asymmetry and normal uvula midline.     Neck:  Neck normal without thyromegaly masses, asymmetry, normal tracheal structure, crepitus, and tenderness, thyroid normal, trachea normal, phonation normal and full range of motion with neck supple.     Psychiatric:   She has a normal mood and affect. Her speech is normal and behavior is normal.     Neurological:   She has neurological normal, alert and oriented.     Skin:   No abrasions, lacerations, lesions, or rashes.         PROCEDURE NOTE:  Ceruminosis is noted in both EACs.  Wax was removed by manual debridement and suctioning utilizing the assistance of binocular microscopy revealing EACs and TMs WNL. The patient tolerated this procedure without difficulty. The subjective decrease noted in hearing pre-cleaning was resolved post-cleaning.       Assessment:       1. Sensation of fullness in both ears        Plan:       1. Hearing conservation strongly recommended.  2. Trial of amplification is not currently indicated.  3. Re-check of hearing after 71 years of age.  4. F/U with PCP as per schedule.

## 2020-12-09 ENCOUNTER — TELEPHONE (OUTPATIENT)
Dept: GASTROENTEROLOGY | Facility: CLINIC | Age: 70
End: 2020-12-09

## 2020-12-09 DIAGNOSIS — A04.8 H. PYLORI INFECTION: Primary | ICD-10-CM

## 2020-12-09 RX ORDER — LEVOFLOXACIN 500 MG/1
500 TABLET, FILM COATED ORAL DAILY
Qty: 14 TABLET | Refills: 0 | Status: SHIPPED | OUTPATIENT
Start: 2020-12-09 | End: 2020-12-23

## 2020-12-09 RX ORDER — METRONIDAZOLE 500 MG/1
500 TABLET ORAL EVERY 12 HOURS
Qty: 28 TABLET | Refills: 0 | Status: SHIPPED | OUTPATIENT
Start: 2020-12-09 | End: 2020-12-23

## 2020-12-09 NOTE — TELEPHONE ENCOUNTER
Called to discuss positive H pylori stool test and new antibiotics for treatment. Will send letter with clear instructions.

## 2020-12-09 NOTE — PROGRESS NOTES
"Patient ID: Soila Chávez is a 70 y.o. female.    Chief Complaint:  left shoulder and left thigh pain    HISTORY:  Soila Chávez is a 70 y.o. female who returns to me today for  some left mid thigh pain and bilateral shoulder pain.   Thigh: She has had this discomfort for a few weeks.  She states this is the area is somewhat hard.  She denies any trauma to the left leg. She saw dermatology earlier this week who called in a steroid cream which she has used. She stated that iot is better but is taking a long time to resolve.   She also states she has had shoulder pain for a few months.  The pain is worse with lifting and reaching out to the side.  The pain is along the anterior and lateral aspect of the shoulder.  The pain is intermittent and is mainly at night.     PMH/PSH/FamHx/SocHx:    Unchanged from prior visit.    ROS:  Constitution: Negative for chills, fever and weakness.   Cardiovascular: Negative for chest pain.   Respiratory: Negative for cough and shortness of breath.   Hematologic/Lymphatic: Negative for bleeding problem. Does not bruise/bleed easily.   Skin: Negative for color change, itching and poor wound healing.   Musculoskeletal: Positive for right knee, left thigh and left shoulder pain  Gastrointestinal: Negative for heartburn.   Neurological: Negative for dizziness, focal weakness, numbness, paresthesias and sensory change.   Psychiatric/Behavioral: The patient is not nervous/anxious.   Allergic/Immunologic: Negative for environmental allergies and persistent infections.     PHYSICAL EXAM:   /76   Pulse 85   Temp 97.2 °F (36.2 °C) (Tympanic)   Resp 18   Ht 5' 4" (1.626 m)   Wt 79.1 kg (174 lb 6.1 oz)   BMI 29.93 kg/m²   Left knee/thigh  Skin intact  No swelling or effusion  TTP left lateral quad musculature  No palpable mass  No hip pain with ROM  Knee ROM 0-120    bilateral shoulder  Skin intact, no swelling or effusion  No TTP  FROM  Normal strength " testing  +crandall  +impingement  +Omar's- pain no weakness    IMAGING: X-rays of the left shoulder, personally reviewed by me, demonstrate mild degenerative chagnes.  No fracture or dislocation.    X-rays left femur show no fracture or dislocation    ASSESSMENT/PLAN:  -  Left thigh pain- appears to be muscular and is resolving.   Recommend heat, massage, topical cream as prescribed by dermatology   - Left shoulder- subacromial bursitis.  Had long discussed of injection into her shoulders. Patient decided that she would prefer a medrol dose bobbi. If symptoms persist, consider CSI.  She would like to hold off on PT for now.      return to clinic as needed. If there are any questions prior to this, the patient was instructed to contact the office.

## 2020-12-09 NOTE — LETTER
December 9, 2020    Soila Chávez  [address] [facility] [address]             [facility] [address]  Gastroenterology  [address]  [place] [address]  Phone: [phone]  Fax: [phone]   Dear Ms. Soila Chávez:    Please see the instructions below regarding the treatment for the H. Pylori:    1. Omeprazole (Prilosec) twice daily, Over-the-counter dose is fine:  - take once in the morning 30-45 minutes prior to breakfast on an empty stomach with water  - take once in the evening 30-45 minutes prior to supper on an empty stomach with water    2. Levofloxacin (Levaquin) 500 mg ONCE daily for 14 days (total of 14 pills) with breakfast    3. Metronidazole (Flagyl) 500 mg TWICE daily for 14 days (total of 28 pills) with breakfast and supper.    **You should not have any antibiotic pills left after the 14 days. All pills should be finished. We will follow-up in clinic in 8-10 weeks for another stool sample.    Please contact our clinic if you have any further questions by calling [phone].      Sincerely,          Jenna Wei, DNP

## 2020-12-10 ENCOUNTER — HOSPITAL ENCOUNTER (EMERGENCY)
Facility: OTHER | Age: 70
Discharge: HOME | End: 2020-12-10
Attending: EMERGENCY MEDICINE
Payer: MEDICARE

## 2020-12-10 VITALS
RESPIRATION RATE: 18 BRPM | HEART RATE: 89 BPM | BODY MASS INDEX: 29.71 KG/M2 | TEMPERATURE: 98 F | DIASTOLIC BLOOD PRESSURE: 66 MMHG | HEIGHT: 64 IN | SYSTOLIC BLOOD PRESSURE: 130 MMHG | OXYGEN SATURATION: 100 % | WEIGHT: 174 LBS

## 2020-12-10 DIAGNOSIS — M79.672 BILATERAL FOOT PAIN: Primary | ICD-10-CM

## 2020-12-10 DIAGNOSIS — M79.671 BILATERAL FOOT PAIN: Primary | ICD-10-CM

## 2020-12-10 DIAGNOSIS — R51.9 ACUTE NONINTRACTABLE HEADACHE, UNSPECIFIED HEADACHE TYPE: ICD-10-CM

## 2020-12-10 PROCEDURE — 99284 EMERGENCY DEPT VISIT MOD MDM: CPT | Mod: 25

## 2020-12-10 RX ORDER — TRAMADOL HYDROCHLORIDE 50 MG/1
50 TABLET ORAL
Status: DISCONTINUED | OUTPATIENT
Start: 2020-12-10 | End: 2020-12-10 | Stop reason: HOSPADM
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
## 2020-12-11 ENCOUNTER — PES CALL (OUTPATIENT)
Dept: ADMINISTRATIVE | Facility: CLINIC | Age: 70
End: 2020-12-11

## 2020-12-31 NOTE — PROGRESS NOTES
Subjective:       Patient ID: Soila Chávez is a 66 y.o. female.    Chief Complaint: Fall (02/16, injured back and knee)   she fell February 16 walking up steps and tripped over a rug.  She hurt the left side of her head, neck, back and she bent her left knee and could not get up.  The EMS brought her to the [facility] emergency room.  She is slowly getting better but still bothered with headache, neck pain, back pain, and pain in her left knee.    She's been so anxious she wants something for anxiety to take if needed.  She does not wish to take a medicine daily for the management of anxiety.  She has had a major depressive episode in the past but denies depression.  Fall   Associated symptoms include headaches. Pertinent negatives include no abdominal pain, fever, nausea or vomiting.   Hypertension   This is a chronic problem. The current episode started more than 1 year ago. The problem is controlled. Associated symptoms include anxiety, headaches and neck pain. Pertinent negatives include no blurred vision, chest pain, orthopnea, palpitations, peripheral edema, PND, shortness of breath or sweats. There are no associated agents to hypertension. The current treatment provides significant improvement. There are no compliance problems.  There is no history of angina, kidney disease, CAD/MI, CVA, heart failure, left ventricular hypertrophy, PVD, renovascular disease, retinopathy or a thyroid problem. There is no history of chronic renal disease, coarctation of the aorta, hyperaldosteronism, hypercortisolism, hyperparathyroidism, a hypertension causing med, pheochromocytoma or sleep apnea.     Review of Systems   Constitutional: Negative for activity change, appetite change, chills, fatigue, fever and unexpected weight change.   HENT: Negative for hearing loss.    Eyes: Negative for blurred vision and visual disturbance.   Respiratory: Negative for cough, chest tightness, shortness of breath and wheezing.     Cardiovascular: Negative for chest pain, palpitations, orthopnea, leg swelling and PND.   Gastrointestinal: Negative for abdominal pain, constipation, nausea and vomiting.   Genitourinary: Negative for dysuria, frequency and urgency.   Musculoskeletal: Positive for arthralgias, back pain and neck pain. Negative for gait problem, joint swelling and myalgias.   Skin: Negative for rash.   Neurological: Positive for headaches. Negative for light-headedness.   Psychiatric/Behavioral: Negative for dysphoric mood and sleep disturbance. The patient is nervous/anxious.        Objective:      Physical Exam   Constitutional: She is oriented to person, place, and time. She appears well-developed and well-nourished. No distress.   HENT:   Head: Normocephalic and atraumatic.   Right Ear: External ear normal.   Left Ear: External ear normal.   Nose: Nose normal.   Mouth/Throat: Oropharynx is clear and moist. No oropharyngeal exudate.   Eyes: Conjunctivae and EOM are normal. Pupils are equal, round, and reactive to light. Right eye exhibits no discharge. No scleral icterus.   Neck: Normal range of motion and full passive range of motion without pain. Neck supple. No spinous process tenderness and no muscular tenderness present. Carotid bruit is not present. No thyromegaly present.   Cardiovascular: Normal rate, regular rhythm, S1 normal, S2 normal, normal heart sounds and intact distal pulses.  Exam reveals no gallop and no friction rub.    No murmur heard.  Pulmonary/Chest: Effort normal and breath sounds normal. No respiratory distress. She has no wheezes. She has no rales. She exhibits no tenderness.   Abdominal: Soft. Bowel sounds are normal. She exhibits no distension and no mass. There is no tenderness. There is no rebound and no guarding.   Genitourinary: Pelvic exam was performed with patient supine. Uterus is not deviated, not enlarged, not fixed and not tender. Cervix exhibits no motion tenderness, no discharge and no  friability. Right adnexum displays no mass, no tenderness and no fullness. Left adnexum displays no mass, no tenderness and no fullness.   Musculoskeletal: Normal range of motion. She exhibits no edema or tenderness.   Lymphadenopathy:        Head (right side): No submental and no submandibular adenopathy present.        Head (left side): No submental and no submandibular adenopathy present.     She has no cervical adenopathy.        Right cervical: No superficial cervical, no deep cervical and no posterior cervical adenopathy present.       Left cervical: No superficial cervical, no deep cervical and no posterior cervical adenopathy present.        Right axillary: No pectoral and no lateral adenopathy present.        Left axillary: No pectoral and no lateral adenopathy present.       Right: No supraclavicular adenopathy present.        Left: No supraclavicular adenopathy present.   Neurological: She is alert and oriented to person, place, and time. She has normal reflexes. No cranial nerve deficit. She exhibits normal muscle tone. Coordination normal.   Skin: Skin is warm and dry. No rash noted.   Psychiatric: She has a normal mood and affect. Her behavior is normal. Her mood appears not anxious. Her speech is not rapid and/or pressured. She is not agitated. She does not exhibit a depressed mood.   Normal behavior, thought content, insight and judgement.   Nursing note and vitals reviewed.      Assessment:       1. Anxiety    2. Essential hypertension    3. Screening    4. Acute pain of left knee        Plan:   Soila was seen today for fall.    Diagnoses and all orders for this visit:    Anxiety    Essential hypertension  -     CBC auto differential; Future  -     Comprehensive metabolic panel; Future  -     Uric acid; Future  -     Lipid panel; Future  -     TSH; Future    Screening  -     Hepatitis C antibody; Future    Acute pain of left knee    Other orders  -     losartan-hydrochlorothiazide 100-25 mg (HYZAAR)  100-25 mg per tablet; Take 1 tablet by mouth every morning.  -     alprazolam (XANAX) 0.25 MG tablet; Take 1 tablet (0.25 mg total) by mouth 3 (three) times daily as needed for Anxiety.      Medication List with Changes/Refills   New Medications    ALPRAZOLAM (XANAX) 0.25 MG TABLET    Take 1 tablet (0.25 mg total) by mouth 3 (three) times daily as needed for Anxiety.   Current Medications    ANTIOX#[id number]-LUT-ZEAX 280-10-2 MG CAP    Take 2 capsules by mouth.    ASCORBIC ACID (VITAMIN C) 100 MG TABLET    Take 100 mg by mouth once daily.    ASPIRIN (ECOTRIN) 81 MG EC TABLET    Take 81 mg by mouth.    B COMPLEX VITAMINS TABLET    Take 1 tablet by mouth once daily.    BENZONATATE (TESSALON PERLES) 100 MG CAPSULE    Take by mouth. 1 Capsule Oral Three times a day    CALCIUM CARBONATE-VITAMIN D3 500 MG(1,250MG) -125 UNIT PER TABLET    Take 1 tablet by mouth.    CETIRIZINE (ZYRTEC) 10 MG TABLET    TAKE 1 TABLET BY MOUTH DAILY    CETIRIZINE (ZYRTEC) 10 MG TABLET    Take 10 mg by mouth.    FLUTICASONE (FLONASE) 50 MCG/ACTUATION NASAL SPRAY    2 sprays by Each Nare route once daily.    GAVILYTE-G 236-22.74-6.74 GRAM SUSPENSION        GINKGO BILOBA 40 MG TAB    Take 1 tablet by mouth once daily.    GINKGO BILOBA 40 MG TAB    Take 2 capsules by mouth.    HYDROQUINONE 4 % CREA    Apple 2 x day to darkened lesions.    HYDROQUINONE 4 % CREAM    4 %. 1 Cream Topical Twice a day     MULTIVITAMIN (THERAGRAN) PER TABLET    Take 1 tablet by mouth.    OMEGA-3 FATTY ACIDS 1,000 MG CAP    Take 1 g by mouth.    POLYETHYLENE GLYCOL (GLYCOLAX) 17 GRAM PWPK    Take 17 g by mouth.   Changed and/or Refilled Medications    Modified Medication Previous Medication    LOSARTAN-HYDROCHLOROTHIAZIDE 100-25 MG (HYZAAR) 100-25 MG PER TABLET losartan-hydrochlorothiazide 100-25 mg (HYZAAR) 100-25 mg per tablet       Take 1 tablet by mouth every morning.    Take 1 tablet by mouth every morning.   Discontinued Medications    ALPRAZOLAM (XANAX) 0.25  MG TABLET    Take 1 tablet (0.25 mg total) by mouth 3 (three) times daily as needed for Anxiety.    ALPRAZOLAM (XANAX) 0.25 MG TABLET    Take 0.25 mg by mouth.    ALPRAZOLAM (XANAX) 0.5 MG TABLET        LOSARTAN (COZAAR) 100 MG TABLET    Take 100 mg by mouth.    NAPROXEN SODIUM (ALEVE) 220 MG CAP    Take by mouth.    OMEPRAZOLE (PRILOSEC) 20 MG CAPSULE    Take by mouth. 1 - 2 Capsule, Delayed Release(E.C.) Oral OTC PRN    OMEPRAZOLE (PRILOSEC) 20 MG CAPSULE    Take 20 mg by mouth.      - Continue amlodipine 2.5mg BID

## 2021-01-07 ENCOUNTER — OFFICE VISIT (OUTPATIENT)
Dept: OBSTETRICS AND GYNECOLOGY | Facility: CLINIC | Age: 71
End: 2021-01-07
Payer: MEDICARE

## 2021-01-07 VITALS
DIASTOLIC BLOOD PRESSURE: 72 MMHG | WEIGHT: 167.75 LBS | BODY MASS INDEX: 28.64 KG/M2 | HEIGHT: 64 IN | SYSTOLIC BLOOD PRESSURE: 130 MMHG

## 2021-01-07 DIAGNOSIS — N89.8 VAGINA ITCHING: ICD-10-CM

## 2021-01-07 DIAGNOSIS — Z01.419 WOMEN'S ANNUAL ROUTINE GYNECOLOGICAL EXAMINATION: Primary | ICD-10-CM

## 2021-01-07 DIAGNOSIS — Z12.31 ENCOUNTER FOR SCREENING MAMMOGRAM FOR BREAST CANCER: ICD-10-CM

## 2021-01-07 PROCEDURE — G0101 CA SCREEN;PELVIC/BREAST EXAM: HCPCS | Mod: S$GLB,,, | Performed by: NURSE PRACTITIONER

## 2021-01-07 PROCEDURE — 3288F PR FALLS RISK ASSESSMENT DOCUMENTED: ICD-10-PCS | Mod: CPTII,S$GLB,, | Performed by: NURSE PRACTITIONER

## 2021-01-07 PROCEDURE — 99999 PR PBB SHADOW E&M-EST. PATIENT-LVL III: CPT | Mod: PBBFAC,,, | Performed by: NURSE PRACTITIONER

## 2021-01-07 PROCEDURE — 3288F FALL RISK ASSESSMENT DOCD: CPT | Mod: CPTII,S$GLB,, | Performed by: NURSE PRACTITIONER

## 2021-01-07 PROCEDURE — 1126F AMNT PAIN NOTED NONE PRSNT: CPT | Mod: S$GLB,,, | Performed by: NURSE PRACTITIONER

## 2021-01-07 PROCEDURE — 99999 PR PBB SHADOW E&M-EST. PATIENT-LVL III: ICD-10-PCS | Mod: PBBFAC,,, | Performed by: NURSE PRACTITIONER

## 2021-01-07 PROCEDURE — 3008F BODY MASS INDEX DOCD: CPT | Mod: CPTII,S$GLB,, | Performed by: NURSE PRACTITIONER

## 2021-01-07 PROCEDURE — G0101 PR CA SCREEN;PELVIC/BREAST EXAM: ICD-10-PCS | Mod: S$GLB,,, | Performed by: NURSE PRACTITIONER

## 2021-01-07 PROCEDURE — 1101F PT FALLS ASSESS-DOCD LE1/YR: CPT | Mod: CPTII,S$GLB,, | Performed by: NURSE PRACTITIONER

## 2021-01-07 PROCEDURE — 87510 GARDNER VAG DNA DIR PROBE: CPT

## 2021-01-07 PROCEDURE — 87660 TRICHOMONAS VAGIN DIR PROBE: CPT

## 2021-01-07 PROCEDURE — 1101F PR PT FALLS ASSESS DOC 0-1 FALLS W/OUT INJ PAST YR: ICD-10-PCS | Mod: CPTII,S$GLB,, | Performed by: NURSE PRACTITIONER

## 2021-01-07 PROCEDURE — 1126F PR PAIN SEVERITY QUANTIFIED, NO PAIN PRESENT: ICD-10-PCS | Mod: S$GLB,,, | Performed by: NURSE PRACTITIONER

## 2021-01-07 PROCEDURE — 3008F PR BODY MASS INDEX (BMI) DOCUMENTED: ICD-10-PCS | Mod: CPTII,S$GLB,, | Performed by: NURSE PRACTITIONER

## 2021-01-07 RX ORDER — FLUCONAZOLE 150 MG/1
150 TABLET ORAL ONCE
Qty: 2 TABLET | Refills: 1 | Status: SHIPPED | OUTPATIENT
Start: 2021-01-07 | End: 2021-01-07

## 2021-01-07 RX ORDER — CLOTRIMAZOLE AND BETAMETHASONE DIPROPIONATE 10; .64 MG/G; MG/G
CREAM TOPICAL
Qty: 15 G | Refills: 1 | Status: SHIPPED | OUTPATIENT
Start: 2021-01-07 | End: 2022-01-07

## 2021-01-13 ENCOUNTER — OFFICE VISIT (OUTPATIENT)
Dept: OPTOMETRY | Facility: CLINIC | Age: 71
End: 2021-01-13
Payer: MEDICARE

## 2021-01-13 DIAGNOSIS — I10 ESSENTIAL HYPERTENSION: ICD-10-CM

## 2021-01-13 DIAGNOSIS — H25.13 NS (NUCLEAR SCLEROSIS), BILATERAL: Primary | ICD-10-CM

## 2021-01-13 PROCEDURE — 1126F AMNT PAIN NOTED NONE PRSNT: CPT | Mod: S$GLB,,, | Performed by: OPTOMETRIST

## 2021-01-13 PROCEDURE — 92014 PR EYE EXAM, EST PATIENT,COMPREHESV: ICD-10-PCS | Mod: S$GLB,,, | Performed by: OPTOMETRIST

## 2021-01-13 PROCEDURE — 3288F PR FALLS RISK ASSESSMENT DOCUMENTED: ICD-10-PCS | Mod: CPTII,S$GLB,, | Performed by: OPTOMETRIST

## 2021-01-13 PROCEDURE — 1101F PT FALLS ASSESS-DOCD LE1/YR: CPT | Mod: CPTII,S$GLB,, | Performed by: OPTOMETRIST

## 2021-01-13 PROCEDURE — 1101F PR PT FALLS ASSESS DOC 0-1 FALLS W/OUT INJ PAST YR: ICD-10-PCS | Mod: CPTII,S$GLB,, | Performed by: OPTOMETRIST

## 2021-01-13 PROCEDURE — 99999 PR PBB SHADOW E&M-EST. PATIENT-LVL III: ICD-10-PCS | Mod: PBBFAC,,, | Performed by: OPTOMETRIST

## 2021-01-13 PROCEDURE — 1126F PR PAIN SEVERITY QUANTIFIED, NO PAIN PRESENT: ICD-10-PCS | Mod: S$GLB,,, | Performed by: OPTOMETRIST

## 2021-01-13 PROCEDURE — 3288F FALL RISK ASSESSMENT DOCD: CPT | Mod: CPTII,S$GLB,, | Performed by: OPTOMETRIST

## 2021-01-13 PROCEDURE — 99999 PR PBB SHADOW E&M-EST. PATIENT-LVL III: CPT | Mod: PBBFAC,,, | Performed by: OPTOMETRIST

## 2021-01-13 PROCEDURE — 92014 COMPRE OPH EXAM EST PT 1/>: CPT | Mod: S$GLB,,, | Performed by: OPTOMETRIST

## 2021-01-14 ENCOUNTER — TELEPHONE (OUTPATIENT)
Dept: OTOLARYNGOLOGY | Facility: CLINIC | Age: 71
End: 2021-01-14

## 2021-01-14 ENCOUNTER — HOSPITAL ENCOUNTER (EMERGENCY)
Facility: HOSPITAL | Age: 71
Discharge: HOME | End: 2021-01-14
Attending: EMERGENCY MEDICINE
Payer: MEDICARE

## 2021-01-14 VITALS
RESPIRATION RATE: 16 BRPM | HEART RATE: 79 BPM | SYSTOLIC BLOOD PRESSURE: 133 MMHG | DIASTOLIC BLOOD PRESSURE: 74 MMHG | OXYGEN SATURATION: 100 % | TEMPERATURE: 99 F

## 2021-01-14 DIAGNOSIS — M43.16 SPONDYLOLISTHESIS AT L4-L5 LEVEL: Primary | ICD-10-CM

## 2021-01-14 DIAGNOSIS — S70.02XA CONTUSION OF LEFT HIP, INITIAL ENCOUNTER: ICD-10-CM
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
## 2021-01-14 DIAGNOSIS — K80.20 CALCULUS OF GALLBLADDER WITHOUT CHOLECYSTITIS WITHOUT OBSTRUCTION: ICD-10-CM

## 2021-01-14 PROCEDURE — 99284 PR EMERGENCY DEPT VISIT,LEVEL IV: ICD-10-PCS | Mod: ,,, | Performed by: PHYSICIAN ASSISTANT

## 2021-01-14 PROCEDURE — 99284 EMERGENCY DEPT VISIT MOD MDM: CPT | Mod: ,,, | Performed by: PHYSICIAN ASSISTANT

## 2021-01-14 PROCEDURE — 99284 EMERGENCY DEPT VISIT MOD MDM: CPT | Mod: 25

## 2021-01-14 PROCEDURE — 25000003 PHARM REV CODE 250: Performed by: PHYSICIAN ASSISTANT

## 2021-01-14 RX ORDER — NAPROXEN 500 MG/1
500 TABLET ORAL 2 TIMES DAILY WITH MEALS
Qty: 10 TABLET | Refills: 0 | Status: SHIPPED | OUTPATIENT
Start: 2021-01-14 | End: 2021-05-04

## 2021-01-14 RX ORDER — METHOCARBAMOL 500 MG/1
500 TABLET, FILM COATED ORAL 3 TIMES DAILY
Qty: 15 TABLET | Refills: 0 | Status: SHIPPED | OUTPATIENT
Start: 2021-01-14 | End: 2021-01-19

## 2021-01-14 RX ORDER — ACETAMINOPHEN 500 MG
1000 TABLET ORAL
Status: COMPLETED | OUTPATIENT
Start: 2021-01-14 | End: 2021-01-14

## 2021-01-14 RX ORDER — LIDOCAINE 50 MG/G
1 PATCH TOPICAL
Status: DISCONTINUED | OUTPATIENT
Start: 2021-01-14 | End: 2021-01-14 | Stop reason: HOSPADM

## 2021-01-14 RX ADMIN — LIDOCAINE 1 PATCH: 50 PATCH TOPICAL at 04:01

## 2021-01-14 RX ADMIN — ACETAMINOPHEN 1000 MG: 500 TABLET ORAL at 04:01

## 2021-01-15 ENCOUNTER — PES CALL (OUTPATIENT)
Dept: ADMINISTRATIVE | Facility: CLINIC | Age: 71
End: 2021-01-15

## 2021-01-19 ENCOUNTER — TELEPHONE (OUTPATIENT)
Dept: PRIMARY CARE CLINIC | Facility: CLINIC | Age: 71
End: 2021-01-19

## 2021-01-26 ENCOUNTER — OFFICE VISIT (OUTPATIENT)
Dept: OTOLARYNGOLOGY | Facility: CLINIC | Age: 71
End: 2021-01-26
Payer: MEDICARE

## 2021-01-26 ENCOUNTER — TELEPHONE (OUTPATIENT)
Dept: OTOLARYNGOLOGY | Facility: CLINIC | Age: 71
End: 2021-01-26

## 2021-01-26 ENCOUNTER — TELEPHONE (OUTPATIENT)
Dept: PRIMARY CARE CLINIC | Facility: CLINIC | Age: 71
End: 2021-01-26

## 2021-01-26 VITALS
DIASTOLIC BLOOD PRESSURE: 91 MMHG | BODY MASS INDEX: 28.67 KG/M2 | SYSTOLIC BLOOD PRESSURE: 164 MMHG | HEART RATE: 50 BPM | WEIGHT: 167 LBS

## 2021-01-26 DIAGNOSIS — H93.8X3 SENSATION OF FULLNESS IN BOTH EARS: Primary | ICD-10-CM

## 2021-01-26 PROCEDURE — 3008F BODY MASS INDEX DOCD: CPT | Mod: CPTII,S$GLB,, | Performed by: OTOLARYNGOLOGY

## 2021-01-26 PROCEDURE — 69210 REMOVE IMPACTED EAR WAX UNI: CPT | Mod: S$GLB,,, | Performed by: OTOLARYNGOLOGY

## 2021-01-26 PROCEDURE — 99213 PR OFFICE/OUTPT VISIT, EST, LEVL III, 20-29 MIN: ICD-10-PCS | Mod: 25,S$GLB,, | Performed by: OTOLARYNGOLOGY

## 2021-01-26 PROCEDURE — 99213 OFFICE O/P EST LOW 20 MIN: CPT | Mod: 25,S$GLB,, | Performed by: OTOLARYNGOLOGY

## 2021-01-26 PROCEDURE — 3080F PR MOST RECENT DIASTOLIC BLOOD PRESSURE >= 90 MM HG: ICD-10-PCS | Mod: CPTII,S$GLB,, | Performed by: OTOLARYNGOLOGY

## 2021-01-26 PROCEDURE — 69210 PR REMOVAL IMPACTED CERUMEN REQUIRING INSTRUMENTATION, UNILATERAL: ICD-10-PCS | Mod: S$GLB,,, | Performed by: OTOLARYNGOLOGY

## 2021-01-26 PROCEDURE — 3077F PR MOST RECENT SYSTOLIC BLOOD PRESSURE >= 140 MM HG: ICD-10-PCS | Mod: CPTII,S$GLB,, | Performed by: OTOLARYNGOLOGY

## 2021-01-26 PROCEDURE — 1159F MED LIST DOCD IN RCRD: CPT | Mod: S$GLB,,, | Performed by: OTOLARYNGOLOGY

## 2021-01-26 PROCEDURE — 1159F PR MEDICATION LIST DOCUMENTED IN MEDICAL RECORD: ICD-10-PCS | Mod: S$GLB,,, | Performed by: OTOLARYNGOLOGY

## 2021-01-26 PROCEDURE — 3077F SYST BP >= 140 MM HG: CPT | Mod: CPTII,S$GLB,, | Performed by: OTOLARYNGOLOGY

## 2021-01-26 PROCEDURE — 99999 PR PBB SHADOW E&M-EST. PATIENT-LVL III: CPT | Mod: PBBFAC,,, | Performed by: OTOLARYNGOLOGY

## 2021-01-26 PROCEDURE — 3008F PR BODY MASS INDEX (BMI) DOCUMENTED: ICD-10-PCS | Mod: CPTII,S$GLB,, | Performed by: OTOLARYNGOLOGY

## 2021-01-26 PROCEDURE — 99999 PR PBB SHADOW E&M-EST. PATIENT-LVL III: ICD-10-PCS | Mod: PBBFAC,,, | Performed by: OTOLARYNGOLOGY

## 2021-01-26 PROCEDURE — 3080F DIAST BP >= 90 MM HG: CPT | Mod: CPTII,S$GLB,, | Performed by: OTOLARYNGOLOGY

## 2021-01-27 ENCOUNTER — TELEPHONE (OUTPATIENT)
Dept: OTOLARYNGOLOGY | Facility: CLINIC | Age: 71
End: 2021-01-27

## 2021-01-27 ENCOUNTER — TELEPHONE (OUTPATIENT)
Dept: ORTHOPEDICS | Facility: CLINIC | Age: 71
End: 2021-01-27

## 2021-01-28 ENCOUNTER — TELEPHONE (OUTPATIENT)
Dept: OTOLARYNGOLOGY | Facility: CLINIC | Age: 71
End: 2021-01-28

## 2021-01-28 ENCOUNTER — TELEPHONE (OUTPATIENT)
Dept: PRIMARY CARE CLINIC | Facility: CLINIC | Age: 71
End: 2021-01-28

## 2021-02-04 ENCOUNTER — OFFICE VISIT (OUTPATIENT)
Dept: OTOLARYNGOLOGY | Facility: CLINIC | Age: 71
End: 2021-02-04
Payer: MEDICARE

## 2021-02-04 VITALS — HEART RATE: 76 BPM | SYSTOLIC BLOOD PRESSURE: 155 MMHG | DIASTOLIC BLOOD PRESSURE: 82 MMHG

## 2021-02-04 DIAGNOSIS — J00 ACUTE NONINFECTIVE RHINITIS: Primary | ICD-10-CM

## 2021-02-04 PROCEDURE — 3079F DIAST BP 80-89 MM HG: CPT | Mod: CPTII,S$GLB,, | Performed by: OTOLARYNGOLOGY

## 2021-02-04 PROCEDURE — 1101F PT FALLS ASSESS-DOCD LE1/YR: CPT | Mod: CPTII,S$GLB,, | Performed by: OTOLARYNGOLOGY

## 2021-02-04 PROCEDURE — 99213 OFFICE O/P EST LOW 20 MIN: CPT | Mod: S$GLB,,, | Performed by: OTOLARYNGOLOGY

## 2021-02-04 PROCEDURE — 3077F SYST BP >= 140 MM HG: CPT | Mod: CPTII,S$GLB,, | Performed by: OTOLARYNGOLOGY

## 2021-02-04 PROCEDURE — 1126F PR PAIN SEVERITY QUANTIFIED, NO PAIN PRESENT: ICD-10-PCS | Mod: S$GLB,,, | Performed by: OTOLARYNGOLOGY

## 2021-02-04 PROCEDURE — 99999 PR PBB SHADOW E&M-EST. PATIENT-LVL IV: ICD-10-PCS | Mod: PBBFAC,,, | Performed by: OTOLARYNGOLOGY

## 2021-02-04 PROCEDURE — 3288F PR FALLS RISK ASSESSMENT DOCUMENTED: ICD-10-PCS | Mod: CPTII,S$GLB,, | Performed by: OTOLARYNGOLOGY

## 2021-02-04 PROCEDURE — 1126F AMNT PAIN NOTED NONE PRSNT: CPT | Mod: S$GLB,,, | Performed by: OTOLARYNGOLOGY

## 2021-02-04 PROCEDURE — 3077F PR MOST RECENT SYSTOLIC BLOOD PRESSURE >= 140 MM HG: ICD-10-PCS | Mod: CPTII,S$GLB,, | Performed by: OTOLARYNGOLOGY

## 2021-02-04 PROCEDURE — 3079F PR MOST RECENT DIASTOLIC BLOOD PRESSURE 80-89 MM HG: ICD-10-PCS | Mod: CPTII,S$GLB,, | Performed by: OTOLARYNGOLOGY

## 2021-02-04 PROCEDURE — 99999 PR PBB SHADOW E&M-EST. PATIENT-LVL IV: CPT | Mod: PBBFAC,,, | Performed by: OTOLARYNGOLOGY

## 2021-02-04 PROCEDURE — 1159F MED LIST DOCD IN RCRD: CPT | Mod: S$GLB,,, | Performed by: OTOLARYNGOLOGY

## 2021-02-04 PROCEDURE — 99213 PR OFFICE/OUTPT VISIT, EST, LEVL III, 20-29 MIN: ICD-10-PCS | Mod: S$GLB,,, | Performed by: OTOLARYNGOLOGY

## 2021-02-04 PROCEDURE — 1101F PR PT FALLS ASSESS DOC 0-1 FALLS W/OUT INJ PAST YR: ICD-10-PCS | Mod: CPTII,S$GLB,, | Performed by: OTOLARYNGOLOGY

## 2021-02-04 PROCEDURE — 3288F FALL RISK ASSESSMENT DOCD: CPT | Mod: CPTII,S$GLB,, | Performed by: OTOLARYNGOLOGY

## 2021-02-04 PROCEDURE — 1159F PR MEDICATION LIST DOCUMENTED IN MEDICAL RECORD: ICD-10-PCS | Mod: S$GLB,,, | Performed by: OTOLARYNGOLOGY

## 2021-02-05 ENCOUNTER — TELEPHONE (OUTPATIENT)
Dept: ORTHOPEDICS | Facility: CLINIC | Age: 71
End: 2021-02-05

## 2021-02-05 ENCOUNTER — PATIENT OUTREACH (OUTPATIENT)
Dept: ADMINISTRATIVE | Facility: OTHER | Age: 71
End: 2021-02-05

## 2021-02-08 ENCOUNTER — OFFICE VISIT (OUTPATIENT)
Dept: PRIMARY CARE CLINIC | Facility: CLINIC | Age: 71
End: 2021-02-08
Attending: FAMILY MEDICINE
Payer: MEDICARE

## 2021-02-08 DIAGNOSIS — R10.13 EPIGASTRIC ABDOMINAL PAIN: ICD-10-CM

## 2021-02-08 DIAGNOSIS — J30.1 SEASONAL ALLERGIC RHINITIS DUE TO POLLEN: ICD-10-CM

## 2021-02-08 DIAGNOSIS — F33.1 MAJOR DEPRESSIVE DISORDER, RECURRENT EPISODE, MODERATE: Primary | ICD-10-CM

## 2021-02-08 DIAGNOSIS — I10 ESSENTIAL HYPERTENSION: ICD-10-CM

## 2021-02-08 DIAGNOSIS — M54.50 LUMBOSACRAL PAIN: ICD-10-CM

## 2021-02-08 DIAGNOSIS — F41.9 ANXIETY: ICD-10-CM

## 2021-02-08 PROBLEM — Z12.11 SCREENING FOR MALIGNANT NEOPLASM OF COLON: Status: RESOLVED | Noted: 2020-03-05 | Resolved: 2021-02-08

## 2021-02-08 PROCEDURE — 1159F MED LIST DOCD IN RCRD: CPT | Mod: 95,,, | Performed by: FAMILY MEDICINE

## 2021-02-08 PROCEDURE — 99442 PR PHYSICIAN TELEPHONE EVALUATION 11-20 MIN: CPT | Mod: 95,,, | Performed by: FAMILY MEDICINE

## 2021-02-08 PROCEDURE — 1159F PR MEDICATION LIST DOCUMENTED IN MEDICAL RECORD: ICD-10-PCS | Mod: 95,,, | Performed by: FAMILY MEDICINE

## 2021-02-08 PROCEDURE — 99442 PR PHYSICIAN TELEPHONE EVALUATION 11-20 MIN: ICD-10-PCS | Mod: 95,,, | Performed by: FAMILY MEDICINE

## 2021-02-10 ENCOUNTER — TELEPHONE (OUTPATIENT)
Dept: PRIMARY CARE CLINIC | Facility: CLINIC | Age: 71
End: 2021-02-10

## 2021-02-12 ENCOUNTER — TELEPHONE (OUTPATIENT)
Dept: OTOLARYNGOLOGY | Facility: CLINIC | Age: 71
End: 2021-02-12

## 2021-02-17 ENCOUNTER — HOSPITAL ENCOUNTER (OUTPATIENT)
Dept: RADIOLOGY | Facility: OTHER | Age: 71
Discharge: HOME | End: 2021-02-17
Attending: NURSE PRACTITIONER
Payer: MEDICARE

## 2021-02-17 DIAGNOSIS — Z12.31 ENCOUNTER FOR SCREENING MAMMOGRAM FOR BREAST CANCER: ICD-10-CM

## 2021-02-17 PROCEDURE — 77067 MAMMO DIGITAL SCREENING BILAT WITH TOMO: ICD-10-PCS | Mod: 26,,, | Performed by: RADIOLOGY

## 2021-02-17 PROCEDURE — 77063 MAMMO DIGITAL SCREENING BILAT WITH TOMO: ICD-10-PCS | Mod: 26,,, | Performed by: RADIOLOGY

## 2021-02-17 PROCEDURE — 77067 SCR MAMMO BI INCL CAD: CPT | Mod: TC

## 2021-02-17 PROCEDURE — 77067 SCR MAMMO BI INCL CAD: CPT | Mod: 26,,, | Performed by: RADIOLOGY

## 2021-02-17 PROCEDURE — 77063 BREAST TOMOSYNTHESIS BI: CPT | Mod: 26,,, | Performed by: RADIOLOGY

## 2021-02-26 ENCOUNTER — IMMUNIZATION (OUTPATIENT)
Dept: INTERNAL MEDICINE | Facility: CLINIC | Age: 71
End: 2021-02-26
Payer: MEDICARE

## 2021-02-26 DIAGNOSIS — Z23 NEED FOR VACCINATION: Primary | ICD-10-CM

## 2021-02-26 PROCEDURE — 91300 COVID-19, MRNA, LNP-S, PF, 30 MCG/0.3 ML DOSE VACCINE: CPT | Mod: PBBFAC | Performed by: INTERNAL MEDICINE

## 2021-03-03 ENCOUNTER — OFFICE VISIT (OUTPATIENT)
Dept: PRIMARY CARE CLINIC | Facility: CLINIC | Age: 71
End: 2021-03-03
Attending: FAMILY MEDICINE
Payer: MEDICARE

## 2021-03-03 DIAGNOSIS — F33.1 MAJOR DEPRESSIVE DISORDER, RECURRENT EPISODE, MODERATE: ICD-10-CM

## 2021-03-03 DIAGNOSIS — G89.29 CHRONIC MIDLINE LOW BACK PAIN WITH SCIATICA, SCIATICA LATERALITY UNSPECIFIED: Primary | ICD-10-CM

## 2021-03-03 DIAGNOSIS — I10 ESSENTIAL HYPERTENSION: ICD-10-CM

## 2021-03-03 DIAGNOSIS — M54.40 CHRONIC MIDLINE LOW BACK PAIN WITH SCIATICA, SCIATICA LATERALITY UNSPECIFIED: Primary | ICD-10-CM

## 2021-03-03 DIAGNOSIS — R21 RASH AND NONSPECIFIC SKIN ERUPTION: ICD-10-CM

## 2021-03-03 DIAGNOSIS — F41.9 ANXIETY: ICD-10-CM

## 2021-03-03 PROCEDURE — 1159F MED LIST DOCD IN RCRD: CPT | Mod: 95,,, | Performed by: FAMILY MEDICINE

## 2021-03-03 PROCEDURE — 99442 PR PHYSICIAN TELEPHONE EVALUATION 11-20 MIN: CPT | Mod: 95,,, | Performed by: FAMILY MEDICINE

## 2021-03-03 PROCEDURE — 1159F PR MEDICATION LIST DOCUMENTED IN MEDICAL RECORD: ICD-10-PCS | Mod: 95,,, | Performed by: FAMILY MEDICINE

## 2021-03-03 PROCEDURE — 99442 PR PHYSICIAN TELEPHONE EVALUATION 11-20 MIN: ICD-10-PCS | Mod: 95,,, | Performed by: FAMILY MEDICINE

## 2021-03-03 RX ORDER — MOMETASONE FUROATE 1 MG/G
CREAM TOPICAL
Qty: 50 G | Refills: 3 | Status: SHIPPED | OUTPATIENT
Start: 2021-03-03 | End: 2021-10-20 | Stop reason: SDUPTHER

## 2021-03-03 RX ORDER — HYDROCORTISONE 25 MG/G
OINTMENT TOPICAL 2 TIMES DAILY
Qty: 20 G | Refills: 2 | Status: SHIPPED | OUTPATIENT
Start: 2021-03-03 | End: 2021-03-10

## 2021-03-10 ENCOUNTER — OFFICE VISIT (OUTPATIENT)
Dept: PRIMARY CARE CLINIC | Facility: CLINIC | Age: 71
End: 2021-03-10
Attending: FAMILY MEDICINE
Payer: MEDICARE

## 2021-03-10 VITALS
HEIGHT: 64 IN | OXYGEN SATURATION: 97 % | BODY MASS INDEX: 28.6 KG/M2 | WEIGHT: 167.56 LBS | HEART RATE: 79 BPM | DIASTOLIC BLOOD PRESSURE: 80 MMHG | SYSTOLIC BLOOD PRESSURE: 138 MMHG

## 2021-03-10 DIAGNOSIS — M79.18 LEFT BUTTOCK PAIN: ICD-10-CM

## 2021-03-10 DIAGNOSIS — J30.1 SEASONAL ALLERGIC RHINITIS DUE TO POLLEN: ICD-10-CM

## 2021-03-10 DIAGNOSIS — K21.00 GASTROESOPHAGEAL REFLUX DISEASE WITH ESOPHAGITIS, UNSPECIFIED WHETHER HEMORRHAGE: ICD-10-CM

## 2021-03-10 DIAGNOSIS — R09.82 POST-NASAL DRIP: ICD-10-CM

## 2021-03-10 DIAGNOSIS — F33.1 MAJOR DEPRESSIVE DISORDER, RECURRENT EPISODE, MODERATE: Primary | ICD-10-CM

## 2021-03-10 DIAGNOSIS — F41.9 ANXIETY: ICD-10-CM

## 2021-03-10 DIAGNOSIS — I10 ESSENTIAL HYPERTENSION: ICD-10-CM

## 2021-03-10 PROCEDURE — 3075F SYST BP GE 130 - 139MM HG: CPT | Mod: CPTII,S$GLB,, | Performed by: FAMILY MEDICINE

## 2021-03-10 PROCEDURE — 3079F DIAST BP 80-89 MM HG: CPT | Mod: CPTII,S$GLB,, | Performed by: FAMILY MEDICINE

## 2021-03-10 PROCEDURE — 3008F BODY MASS INDEX DOCD: CPT | Mod: CPTII,S$GLB,, | Performed by: FAMILY MEDICINE

## 2021-03-10 PROCEDURE — 3075F PR MOST RECENT SYSTOLIC BLOOD PRESS GE 130-139MM HG: ICD-10-PCS | Mod: CPTII,S$GLB,, | Performed by: FAMILY MEDICINE

## 2021-03-10 PROCEDURE — 99499 UNLISTED E&M SERVICE: CPT | Mod: S$GLB,,, | Performed by: FAMILY MEDICINE

## 2021-03-10 PROCEDURE — 1101F PR PT FALLS ASSESS DOC 0-1 FALLS W/OUT INJ PAST YR: ICD-10-PCS | Mod: CPTII,S$GLB,, | Performed by: FAMILY MEDICINE

## 2021-03-10 PROCEDURE — 99499 RISK ADDL DX/OHS AUDIT: ICD-10-PCS | Mod: S$GLB,,, | Performed by: FAMILY MEDICINE

## 2021-03-10 PROCEDURE — 3288F FALL RISK ASSESSMENT DOCD: CPT | Mod: CPTII,S$GLB,, | Performed by: FAMILY MEDICINE

## 2021-03-10 PROCEDURE — 99999 PR PBB SHADOW E&M-EST. PATIENT-LVL V: ICD-10-PCS | Mod: PBBFAC,,, | Performed by: FAMILY MEDICINE

## 2021-03-10 PROCEDURE — 3079F PR MOST RECENT DIASTOLIC BLOOD PRESSURE 80-89 MM HG: ICD-10-PCS | Mod: CPTII,S$GLB,, | Performed by: FAMILY MEDICINE

## 2021-03-10 PROCEDURE — 1101F PT FALLS ASSESS-DOCD LE1/YR: CPT | Mod: CPTII,S$GLB,, | Performed by: FAMILY MEDICINE

## 2021-03-10 PROCEDURE — 1159F MED LIST DOCD IN RCRD: CPT | Mod: S$GLB,,, | Performed by: FAMILY MEDICINE

## 2021-03-10 PROCEDURE — 99215 OFFICE O/P EST HI 40 MIN: CPT | Mod: S$GLB,,, | Performed by: FAMILY MEDICINE

## 2021-03-10 PROCEDURE — 1159F PR MEDICATION LIST DOCUMENTED IN MEDICAL RECORD: ICD-10-PCS | Mod: S$GLB,,, | Performed by: FAMILY MEDICINE

## 2021-03-10 PROCEDURE — 3288F PR FALLS RISK ASSESSMENT DOCUMENTED: ICD-10-PCS | Mod: CPTII,S$GLB,, | Performed by: FAMILY MEDICINE

## 2021-03-10 PROCEDURE — 1126F PR PAIN SEVERITY QUANTIFIED, NO PAIN PRESENT: ICD-10-PCS | Mod: S$GLB,,, | Performed by: FAMILY MEDICINE

## 2021-03-10 PROCEDURE — 99999 PR PBB SHADOW E&M-EST. PATIENT-LVL V: CPT | Mod: PBBFAC,,, | Performed by: FAMILY MEDICINE

## 2021-03-10 PROCEDURE — 99215 PR OFFICE/OUTPT VISIT, EST, LEVL V, 40-54 MIN: ICD-10-PCS | Mod: S$GLB,,, | Performed by: FAMILY MEDICINE

## 2021-03-10 PROCEDURE — 3008F PR BODY MASS INDEX (BMI) DOCUMENTED: ICD-10-PCS | Mod: CPTII,S$GLB,, | Performed by: FAMILY MEDICINE

## 2021-03-10 PROCEDURE — 1126F AMNT PAIN NOTED NONE PRSNT: CPT | Mod: S$GLB,,, | Performed by: FAMILY MEDICINE

## 2021-03-10 RX ORDER — CIMETIDINE 300 MG/1
300 TABLET, FILM COATED ORAL 2 TIMES DAILY
Qty: 180 TABLET | Refills: 1 | OUTPATIENT
Start: 2021-03-10 | End: 2021-09-23

## 2021-03-19 ENCOUNTER — IMMUNIZATION (OUTPATIENT)
Dept: INTERNAL MEDICINE | Facility: CLINIC | Age: 71
End: 2021-03-19
Payer: MEDICARE

## 2021-03-19 DIAGNOSIS — Z23 NEED FOR VACCINATION: Primary | ICD-10-CM

## 2021-03-19 PROCEDURE — 91300 COVID-19, MRNA, LNP-S, PF, 30 MCG/0.3 ML DOSE VACCINE: CPT | Mod: PBBFAC | Performed by: INTERNAL MEDICINE

## 2021-03-19 PROCEDURE — 0002A COVID-19, MRNA, LNP-S, PF, 30 MCG/0.3 ML DOSE VACCINE: CPT | Mod: PBBFAC | Performed by: INTERNAL MEDICINE

## 2021-03-22 DIAGNOSIS — I10 ESSENTIAL HYPERTENSION: Primary | ICD-10-CM

## 2021-03-22 RX ORDER — LOSARTAN POTASSIUM 100 MG/1
100 TABLET ORAL DAILY
Qty: 90 TABLET | Refills: 1 | Status: SHIPPED | OUTPATIENT
Start: 2021-03-22 | End: 2021-09-14 | Stop reason: SDUPTHER

## 2021-03-23 ENCOUNTER — TELEPHONE (OUTPATIENT)
Dept: PRIMARY CARE CLINIC | Facility: CLINIC | Age: 71
End: 2021-03-23

## 2021-03-29 ENCOUNTER — HOSPITAL ENCOUNTER (OUTPATIENT)
Dept: RADIOLOGY | Facility: HOSPITAL | Age: 71
Discharge: HOME | End: 2021-03-29
Attending: PHYSICIAN ASSISTANT
Payer: MEDICARE

## 2021-03-29 ENCOUNTER — OFFICE VISIT (OUTPATIENT)
Dept: ORTHOPEDICS | Facility: CLINIC | Age: 71
End: 2021-03-29
Payer: MEDICARE

## 2021-03-29 VITALS — BODY MASS INDEX: 28.6 KG/M2 | WEIGHT: 167.56 LBS | TEMPERATURE: 97 F | HEIGHT: 64 IN

## 2021-03-29 DIAGNOSIS — M25.512 LEFT SHOULDER PAIN, UNSPECIFIED CHRONICITY: ICD-10-CM

## 2021-03-29 DIAGNOSIS — M25.562 ACUTE PAIN OF LEFT KNEE: ICD-10-CM

## 2021-03-29 DIAGNOSIS — S80.10XA CONTUSION OF LOWER LEG, UNSPECIFIED LATERALITY, INITIAL ENCOUNTER: ICD-10-CM

## 2021-03-29 DIAGNOSIS — M25.562 ACUTE PAIN OF LEFT KNEE: Primary | ICD-10-CM

## 2021-03-29 PROCEDURE — 1125F AMNT PAIN NOTED PAIN PRSNT: CPT | Mod: S$GLB,,, | Performed by: PHYSICIAN ASSISTANT

## 2021-03-29 PROCEDURE — 99213 OFFICE O/P EST LOW 20 MIN: CPT | Mod: S$GLB,,, | Performed by: PHYSICIAN ASSISTANT

## 2021-03-29 PROCEDURE — 73564 X-RAY EXAM KNEE 4 OR MORE: CPT | Mod: 26,LT,, | Performed by: RADIOLOGY

## 2021-03-29 PROCEDURE — 99999 PR PBB SHADOW E&M-EST. PATIENT-LVL III: ICD-10-PCS | Mod: PBBFAC,,, | Performed by: PHYSICIAN ASSISTANT

## 2021-03-29 PROCEDURE — 1159F MED LIST DOCD IN RCRD: CPT | Mod: S$GLB,,, | Performed by: PHYSICIAN ASSISTANT

## 2021-03-29 PROCEDURE — 99999 PR PBB SHADOW E&M-EST. PATIENT-LVL III: CPT | Mod: PBBFAC,,, | Performed by: PHYSICIAN ASSISTANT

## 2021-03-29 PROCEDURE — 1125F PR PAIN SEVERITY QUANTIFIED, PAIN PRESENT: ICD-10-PCS | Mod: S$GLB,,, | Performed by: PHYSICIAN ASSISTANT

## 2021-03-29 PROCEDURE — 73564 X-RAY EXAM KNEE 4 OR MORE: CPT | Mod: TC,LT

## 2021-03-29 PROCEDURE — 3008F PR BODY MASS INDEX (BMI) DOCUMENTED: ICD-10-PCS | Mod: CPTII,S$GLB,, | Performed by: PHYSICIAN ASSISTANT

## 2021-03-29 PROCEDURE — 73564 XR KNEE ORTHO LEFT WITH FLEXION: ICD-10-PCS | Mod: 26,LT,, | Performed by: RADIOLOGY

## 2021-03-29 PROCEDURE — 3008F BODY MASS INDEX DOCD: CPT | Mod: CPTII,S$GLB,, | Performed by: PHYSICIAN ASSISTANT

## 2021-03-29 PROCEDURE — 1159F PR MEDICATION LIST DOCUMENTED IN MEDICAL RECORD: ICD-10-PCS | Mod: S$GLB,,, | Performed by: PHYSICIAN ASSISTANT

## 2021-03-29 PROCEDURE — 99213 PR OFFICE/OUTPT VISIT, EST, LEVL III, 20-29 MIN: ICD-10-PCS | Mod: S$GLB,,, | Performed by: PHYSICIAN ASSISTANT

## 2021-03-29 RX ORDER — DICLOFENAC SODIUM 10 MG/G
GEL TOPICAL
Qty: 1 TUBE | Refills: 3 | Status: SHIPPED | OUTPATIENT
Start: 2021-03-29 | End: 2021-05-04

## 2021-03-29 RX ORDER — GABAPENTIN 600 MG/1
600 TABLET ORAL 3 TIMES DAILY
Qty: 90 TABLET | Refills: 1 | Status: SHIPPED | OUTPATIENT
Start: 2021-03-29 | End: 2022-03-27 | Stop reason: SDUPTHER

## 2021-04-21 ENCOUNTER — TELEPHONE (OUTPATIENT)
Dept: PRIMARY CARE CLINIC | Facility: CLINIC | Age: 71
End: 2021-04-21

## 2021-04-27 ENCOUNTER — OFFICE VISIT (OUTPATIENT)
Dept: OTOLARYNGOLOGY | Facility: CLINIC | Age: 71
End: 2021-04-27
Payer: MEDICARE

## 2021-04-27 VITALS
DIASTOLIC BLOOD PRESSURE: 93 MMHG | WEIGHT: 167 LBS | HEART RATE: 85 BPM | SYSTOLIC BLOOD PRESSURE: 172 MMHG | BODY MASS INDEX: 28.67 KG/M2

## 2021-04-27 DIAGNOSIS — H93.8X3 SENSATION OF FULLNESS IN BOTH EARS: Primary | ICD-10-CM

## 2021-04-27 PROCEDURE — 99213 OFFICE O/P EST LOW 20 MIN: CPT | Mod: 25,S$GLB,, | Performed by: OTOLARYNGOLOGY

## 2021-04-27 PROCEDURE — 69210 PR REMOVAL IMPACTED CERUMEN REQUIRING INSTRUMENTATION, UNILATERAL: ICD-10-PCS | Mod: S$GLB,,, | Performed by: OTOLARYNGOLOGY

## 2021-04-27 PROCEDURE — 99213 PR OFFICE/OUTPT VISIT, EST, LEVL III, 20-29 MIN: ICD-10-PCS | Mod: 25,S$GLB,, | Performed by: OTOLARYNGOLOGY

## 2021-04-27 PROCEDURE — 1125F PR PAIN SEVERITY QUANTIFIED, PAIN PRESENT: ICD-10-PCS | Mod: S$GLB,,, | Performed by: OTOLARYNGOLOGY

## 2021-04-27 PROCEDURE — 3008F PR BODY MASS INDEX (BMI) DOCUMENTED: ICD-10-PCS | Mod: CPTII,S$GLB,, | Performed by: OTOLARYNGOLOGY

## 2021-04-27 PROCEDURE — 99999 PR PBB SHADOW E&M-EST. PATIENT-LVL IV: ICD-10-PCS | Mod: PBBFAC,,, | Performed by: OTOLARYNGOLOGY

## 2021-04-27 PROCEDURE — 69210 REMOVE IMPACTED EAR WAX UNI: CPT | Mod: S$GLB,,, | Performed by: OTOLARYNGOLOGY

## 2021-04-27 PROCEDURE — 1159F MED LIST DOCD IN RCRD: CPT | Mod: S$GLB,,, | Performed by: OTOLARYNGOLOGY

## 2021-04-27 PROCEDURE — 99999 PR PBB SHADOW E&M-EST. PATIENT-LVL IV: CPT | Mod: PBBFAC,,, | Performed by: OTOLARYNGOLOGY

## 2021-04-27 PROCEDURE — 3008F BODY MASS INDEX DOCD: CPT | Mod: CPTII,S$GLB,, | Performed by: OTOLARYNGOLOGY

## 2021-04-27 PROCEDURE — 1159F PR MEDICATION LIST DOCUMENTED IN MEDICAL RECORD: ICD-10-PCS | Mod: S$GLB,,, | Performed by: OTOLARYNGOLOGY

## 2021-04-27 PROCEDURE — 1125F AMNT PAIN NOTED PAIN PRSNT: CPT | Mod: S$GLB,,, | Performed by: OTOLARYNGOLOGY

## 2021-05-04 ENCOUNTER — HOSPITAL ENCOUNTER (OUTPATIENT)
Dept: RADIOLOGY | Facility: HOSPITAL | Age: 71
Discharge: HOME | End: 2021-05-04
Attending: PHYSICIAN ASSISTANT
Payer: MEDICARE

## 2021-05-04 ENCOUNTER — OFFICE VISIT (OUTPATIENT)
Dept: ORTHOPEDICS | Facility: CLINIC | Age: 71
End: 2021-05-04
Payer: MEDICARE

## 2021-05-04 VITALS — HEIGHT: 64 IN | BODY MASS INDEX: 28.53 KG/M2 | WEIGHT: 167.13 LBS

## 2021-05-04 DIAGNOSIS — M79.641 RIGHT HAND PAIN: ICD-10-CM
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
## 2021-05-04 DIAGNOSIS — M54.50 LUMBAR PAIN: ICD-10-CM

## 2021-05-04 DIAGNOSIS — M54.50 LUMBAR PAIN: Primary | ICD-10-CM

## 2021-05-04 PROCEDURE — 1159F PR MEDICATION LIST DOCUMENTED IN MEDICAL RECORD: ICD-10-PCS | Mod: S$GLB,,, | Performed by: PHYSICIAN ASSISTANT

## 2021-05-04 PROCEDURE — 72190 X-RAY EXAM OF PELVIS: CPT | Mod: TC

## 2021-05-04 PROCEDURE — 72110 X-RAY EXAM L-2 SPINE 4/>VWS: CPT | Mod: TC

## 2021-05-04 PROCEDURE — 1125F PR PAIN SEVERITY QUANTIFIED, PAIN PRESENT: ICD-10-PCS | Mod: S$GLB,,, | Performed by: PHYSICIAN ASSISTANT

## 2021-05-04 PROCEDURE — 72190 XR PELVIS AP INLET AND OUTLET: ICD-10-PCS | Mod: 26,,, | Performed by: RADIOLOGY

## 2021-05-04 PROCEDURE — 73130 XR HAND COMPLETE 3 VIEW RIGHT: ICD-10-PCS | Mod: 26,RT,, | Performed by: RADIOLOGY

## 2021-05-04 PROCEDURE — 1125F AMNT PAIN NOTED PAIN PRSNT: CPT | Mod: S$GLB,,, | Performed by: PHYSICIAN ASSISTANT

## 2021-05-04 PROCEDURE — 99214 PR OFFICE/OUTPT VISIT, EST, LEVL IV, 30-39 MIN: ICD-10-PCS | Mod: S$GLB,,, | Performed by: PHYSICIAN ASSISTANT

## 2021-05-04 PROCEDURE — 99214 OFFICE O/P EST MOD 30 MIN: CPT | Mod: S$GLB,,, | Performed by: PHYSICIAN ASSISTANT

## 2021-05-04 PROCEDURE — 3008F BODY MASS INDEX DOCD: CPT | Mod: CPTII,S$GLB,, | Performed by: PHYSICIAN ASSISTANT

## 2021-05-04 PROCEDURE — 72190 X-RAY EXAM OF PELVIS: CPT | Mod: 26,,, | Performed by: RADIOLOGY

## 2021-05-04 PROCEDURE — 73130 X-RAY EXAM OF HAND: CPT | Mod: 26,RT,, | Performed by: RADIOLOGY

## 2021-05-04 PROCEDURE — 73130 X-RAY EXAM OF HAND: CPT | Mod: TC,RT

## 2021-05-04 PROCEDURE — 3008F PR BODY MASS INDEX (BMI) DOCUMENTED: ICD-10-PCS | Mod: CPTII,S$GLB,, | Performed by: PHYSICIAN ASSISTANT

## 2021-05-04 PROCEDURE — 99999 PR PBB SHADOW E&M-EST. PATIENT-LVL IV: ICD-10-PCS | Mod: PBBFAC,,, | Performed by: PHYSICIAN ASSISTANT

## 2021-05-04 PROCEDURE — 1159F MED LIST DOCD IN RCRD: CPT | Mod: S$GLB,,, | Performed by: PHYSICIAN ASSISTANT

## 2021-05-04 PROCEDURE — 72110 X-RAY EXAM L-2 SPINE 4/>VWS: CPT | Mod: 26,,, | Performed by: RADIOLOGY

## 2021-05-04 PROCEDURE — 72110 XR LUMBAR SPINE AP AND LAT WITH FLEX/EXT: ICD-10-PCS | Mod: 26,,, | Performed by: RADIOLOGY

## 2021-05-04 PROCEDURE — 99999 PR PBB SHADOW E&M-EST. PATIENT-LVL IV: CPT | Mod: PBBFAC,,, | Performed by: PHYSICIAN ASSISTANT

## 2021-05-04 RX ORDER — NAPROXEN 500 MG/1
500 TABLET ORAL 2 TIMES DAILY WITH MEALS
Qty: 60 TABLET | Refills: 0 | Status: SHIPPED | OUTPATIENT
Start: 2021-05-04 | End: 2021-06-03

## 2021-05-11 ENCOUNTER — TELEPHONE (OUTPATIENT)
Dept: ORTHOPEDICS | Facility: CLINIC | Age: 71
End: 2021-05-11

## 2021-06-10 ENCOUNTER — OFFICE VISIT (OUTPATIENT)
Dept: OTOLARYNGOLOGY | Facility: CLINIC | Age: 71
End: 2021-06-10
Payer: MEDICARE

## 2021-06-10 VITALS
SYSTOLIC BLOOD PRESSURE: 148 MMHG | HEART RATE: 81 BPM | BODY MASS INDEX: 28.61 KG/M2 | WEIGHT: 166.69 LBS | DIASTOLIC BLOOD PRESSURE: 85 MMHG

## 2021-06-10 DIAGNOSIS — H93.8X3 SENSATION OF FULLNESS IN BOTH EARS: ICD-10-CM

## 2021-06-10 DIAGNOSIS — R41.89 SIGNS AND SYMPTOMS INVOLVING COGNITION: Primary | ICD-10-CM

## 2021-06-10 PROCEDURE — 1101F PT FALLS ASSESS-DOCD LE1/YR: CPT | Mod: CPTII,S$GLB,, | Performed by: OTOLARYNGOLOGY

## 2021-06-10 PROCEDURE — 3008F BODY MASS INDEX DOCD: CPT | Mod: CPTII,S$GLB,, | Performed by: OTOLARYNGOLOGY

## 2021-06-10 PROCEDURE — 69210 PR REMOVAL IMPACTED CERUMEN REQUIRING INSTRUMENTATION, UNILATERAL: ICD-10-PCS | Mod: S$GLB,,, | Performed by: OTOLARYNGOLOGY

## 2021-06-10 PROCEDURE — 99999 PR PBB SHADOW E&M-EST. PATIENT-LVL IV: ICD-10-PCS | Mod: PBBFAC,,, | Performed by: OTOLARYNGOLOGY

## 2021-06-10 PROCEDURE — 99213 OFFICE O/P EST LOW 20 MIN: CPT | Mod: 25,S$GLB,, | Performed by: OTOLARYNGOLOGY

## 2021-06-10 PROCEDURE — 99213 PR OFFICE/OUTPT VISIT, EST, LEVL III, 20-29 MIN: ICD-10-PCS | Mod: 25,S$GLB,, | Performed by: OTOLARYNGOLOGY

## 2021-06-10 PROCEDURE — 1101F PR PT FALLS ASSESS DOC 0-1 FALLS W/OUT INJ PAST YR: ICD-10-PCS | Mod: CPTII,S$GLB,, | Performed by: OTOLARYNGOLOGY

## 2021-06-10 PROCEDURE — 1159F MED LIST DOCD IN RCRD: CPT | Mod: S$GLB,,, | Performed by: OTOLARYNGOLOGY

## 2021-06-10 PROCEDURE — 3008F PR BODY MASS INDEX (BMI) DOCUMENTED: ICD-10-PCS | Mod: CPTII,S$GLB,, | Performed by: OTOLARYNGOLOGY

## 2021-06-10 PROCEDURE — 1159F PR MEDICATION LIST DOCUMENTED IN MEDICAL RECORD: ICD-10-PCS | Mod: S$GLB,,, | Performed by: OTOLARYNGOLOGY

## 2021-06-10 PROCEDURE — 3288F PR FALLS RISK ASSESSMENT DOCUMENTED: ICD-10-PCS | Mod: CPTII,S$GLB,, | Performed by: OTOLARYNGOLOGY

## 2021-06-10 PROCEDURE — 1126F PR PAIN SEVERITY QUANTIFIED, NO PAIN PRESENT: ICD-10-PCS | Mod: S$GLB,,, | Performed by: OTOLARYNGOLOGY

## 2021-06-10 PROCEDURE — 1126F AMNT PAIN NOTED NONE PRSNT: CPT | Mod: S$GLB,,, | Performed by: OTOLARYNGOLOGY

## 2021-06-10 PROCEDURE — 69210 REMOVE IMPACTED EAR WAX UNI: CPT | Mod: S$GLB,,, | Performed by: OTOLARYNGOLOGY

## 2021-06-10 PROCEDURE — 3288F FALL RISK ASSESSMENT DOCD: CPT | Mod: CPTII,S$GLB,, | Performed by: OTOLARYNGOLOGY

## 2021-06-10 PROCEDURE — 99999 PR PBB SHADOW E&M-EST. PATIENT-LVL IV: CPT | Mod: PBBFAC,,, | Performed by: OTOLARYNGOLOGY

## 2021-06-14 ENCOUNTER — TELEPHONE (OUTPATIENT)
Dept: PRIMARY CARE CLINIC | Facility: CLINIC | Age: 71
End: 2021-06-14

## 2021-06-16 ENCOUNTER — OFFICE VISIT (OUTPATIENT)
Dept: PRIMARY CARE CLINIC | Facility: CLINIC | Age: 71
End: 2021-06-16
Attending: FAMILY MEDICINE
Payer: MEDICARE

## 2021-06-16 DIAGNOSIS — G89.29 CHRONIC LOW BACK PAIN WITH SCIATICA, SCIATICA LATERALITY UNSPECIFIED, UNSPECIFIED BACK PAIN LATERALITY: Primary | ICD-10-CM

## 2021-06-16 DIAGNOSIS — M54.40 CHRONIC LOW BACK PAIN WITH SCIATICA, SCIATICA LATERALITY UNSPECIFIED, UNSPECIFIED BACK PAIN LATERALITY: Primary | ICD-10-CM

## 2021-06-16 DIAGNOSIS — Z01.419 WELL WOMAN EXAM: ICD-10-CM

## 2021-06-16 DIAGNOSIS — E87.6 HYPOKALEMIA: ICD-10-CM

## 2021-06-16 DIAGNOSIS — K80.20 GALLSTONES: ICD-10-CM

## 2021-06-16 DIAGNOSIS — R10.13 EPIGASTRIC ABDOMINAL PAIN: ICD-10-CM

## 2021-06-16 DIAGNOSIS — F41.9 ANXIETY: ICD-10-CM

## 2021-06-16 DIAGNOSIS — I10 ESSENTIAL HYPERTENSION: ICD-10-CM

## 2021-06-16 DIAGNOSIS — F33.1 MAJOR DEPRESSIVE DISORDER, RECURRENT EPISODE, MODERATE: ICD-10-CM

## 2021-06-16 PROCEDURE — 99443 PR PHYSICIAN TELEPHONE EVALUATION 21-30 MIN: ICD-10-PCS | Mod: 95,,, | Performed by: FAMILY MEDICINE

## 2021-06-16 PROCEDURE — 1159F MED LIST DOCD IN RCRD: CPT | Mod: 95,,, | Performed by: FAMILY MEDICINE

## 2021-06-16 PROCEDURE — 1159F PR MEDICATION LIST DOCUMENTED IN MEDICAL RECORD: ICD-10-PCS | Mod: 95,,, | Performed by: FAMILY MEDICINE

## 2021-06-16 PROCEDURE — 99443 PR PHYSICIAN TELEPHONE EVALUATION 21-30 MIN: CPT | Mod: 95,,, | Performed by: FAMILY MEDICINE

## 2021-06-23 ENCOUNTER — OFFICE VISIT (OUTPATIENT)
Dept: ORTHOPEDICS | Facility: CLINIC | Age: 71
End: 2021-06-23
Payer: MEDICARE

## 2021-06-23 VITALS — WEIGHT: 167.44 LBS | BODY MASS INDEX: 28.59 KG/M2 | HEIGHT: 64 IN

## 2021-06-23 DIAGNOSIS — G95.9 MYELOPATHY: Primary | ICD-10-CM

## 2021-06-23 PROCEDURE — 3008F PR BODY MASS INDEX (BMI) DOCUMENTED: ICD-10-PCS | Mod: CPTII,S$GLB,, | Performed by: ORTHOPAEDIC SURGERY

## 2021-06-23 PROCEDURE — 3288F PR FALLS RISK ASSESSMENT DOCUMENTED: ICD-10-PCS | Mod: CPTII,S$GLB,, | Performed by: ORTHOPAEDIC SURGERY

## 2021-06-23 PROCEDURE — 99214 OFFICE O/P EST MOD 30 MIN: CPT | Mod: S$GLB,,, | Performed by: ORTHOPAEDIC SURGERY

## 2021-06-23 PROCEDURE — 1159F MED LIST DOCD IN RCRD: CPT | Mod: S$GLB,,, | Performed by: ORTHOPAEDIC SURGERY

## 2021-06-23 PROCEDURE — 99999 PR PBB SHADOW E&M-EST. PATIENT-LVL III: CPT | Mod: PBBFAC,,, | Performed by: ORTHOPAEDIC SURGERY

## 2021-06-23 PROCEDURE — 1125F PR PAIN SEVERITY QUANTIFIED, PAIN PRESENT: ICD-10-PCS | Mod: S$GLB,,, | Performed by: ORTHOPAEDIC SURGERY

## 2021-06-23 PROCEDURE — 99214 PR OFFICE/OUTPT VISIT, EST, LEVL IV, 30-39 MIN: ICD-10-PCS | Mod: S$GLB,,, | Performed by: ORTHOPAEDIC SURGERY

## 2021-06-23 PROCEDURE — 1159F PR MEDICATION LIST DOCUMENTED IN MEDICAL RECORD: ICD-10-PCS | Mod: S$GLB,,, | Performed by: ORTHOPAEDIC SURGERY

## 2021-06-23 PROCEDURE — 1100F PTFALLS ASSESS-DOCD GE2>/YR: CPT | Mod: CPTII,S$GLB,, | Performed by: ORTHOPAEDIC SURGERY

## 2021-06-23 PROCEDURE — 1100F PR PT FALLS ASSESS DOC 2+ FALLS/FALL W/INJURY/YR: ICD-10-PCS | Mod: CPTII,S$GLB,, | Performed by: ORTHOPAEDIC SURGERY

## 2021-06-23 PROCEDURE — 1125F AMNT PAIN NOTED PAIN PRSNT: CPT | Mod: S$GLB,,, | Performed by: ORTHOPAEDIC SURGERY

## 2021-06-23 PROCEDURE — 99999 PR PBB SHADOW E&M-EST. PATIENT-LVL III: ICD-10-PCS | Mod: PBBFAC,,, | Performed by: ORTHOPAEDIC SURGERY

## 2021-06-23 PROCEDURE — 3288F FALL RISK ASSESSMENT DOCD: CPT | Mod: CPTII,S$GLB,, | Performed by: ORTHOPAEDIC SURGERY

## 2021-06-23 PROCEDURE — 3008F BODY MASS INDEX DOCD: CPT | Mod: CPTII,S$GLB,, | Performed by: ORTHOPAEDIC SURGERY

## 2021-06-24 ENCOUNTER — TELEPHONE (OUTPATIENT)
Dept: ORTHOPEDICS | Facility: CLINIC | Age: 71
End: 2021-06-24

## 2021-06-30 ENCOUNTER — TELEPHONE (OUTPATIENT)
Dept: OTOLARYNGOLOGY | Facility: CLINIC | Age: 71
End: 2021-06-30

## 2021-07-16 ENCOUNTER — HOSPITAL ENCOUNTER (EMERGENCY)
Facility: OTHER | Age: 71
Discharge: HOME | End: 2021-07-16
Attending: EMERGENCY MEDICINE
Payer: MEDICARE

## 2021-07-16 VITALS
SYSTOLIC BLOOD PRESSURE: 174 MMHG | RESPIRATION RATE: 17 BRPM | HEIGHT: 64 IN | BODY MASS INDEX: 32.44 KG/M2 | DIASTOLIC BLOOD PRESSURE: 98 MMHG | WEIGHT: 190 LBS | HEART RATE: 69 BPM | TEMPERATURE: 99 F | OXYGEN SATURATION: 100 %

## 2021-07-16 DIAGNOSIS — Z77.098 CHEMICAL EXPOSURE OF EYE: Primary | ICD-10-CM

## 2021-07-16 PROCEDURE — 99283 EMERGENCY DEPT VISIT LOW MDM: CPT

## 2021-07-16 PROCEDURE — 25000003 PHARM REV CODE 250: Performed by: EMERGENCY MEDICINE

## 2021-07-16 RX ORDER — LOSARTAN POTASSIUM 50 MG/1
100 TABLET ORAL
Status: COMPLETED | OUTPATIENT
Start: 2021-07-16 | End: 2021-07-16

## 2021-07-16 RX ORDER — LOSARTAN POTASSIUM 50 MG/1
100 TABLET ORAL DAILY
Status: DISCONTINUED | OUTPATIENT
Start: 2021-07-17 | End: 2021-07-16

## 2021-07-16 RX ADMIN — LOSARTAN POTASSIUM 100 MG: 50 TABLET, FILM COATED ORAL at 05:07

## 2021-07-16 RX ADMIN — HYPROMELLOSE 2910 1 DROP: 5 SOLUTION OPHTHALMIC at 06:07

## 2021-07-16 RX ADMIN — FLUORESCEIN SODIUM 1 EACH: 1 STRIP OPHTHALMIC at 05:07

## 2021-07-20 ENCOUNTER — OFFICE VISIT (OUTPATIENT)
Dept: OTOLARYNGOLOGY | Facility: CLINIC | Age: 71
End: 2021-07-20
Payer: MEDICARE

## 2021-07-20 ENCOUNTER — CLINICAL SUPPORT (OUTPATIENT)
Dept: AUDIOLOGY | Facility: CLINIC | Age: 71
End: 2021-07-20
Payer: MEDICARE

## 2021-07-20 ENCOUNTER — PATIENT MESSAGE (OUTPATIENT)
Dept: NEUROLOGY | Facility: CLINIC | Age: 71
End: 2021-07-20

## 2021-07-20 VITALS — DIASTOLIC BLOOD PRESSURE: 88 MMHG | HEART RATE: 75 BPM | SYSTOLIC BLOOD PRESSURE: 153 MMHG

## 2021-07-20 DIAGNOSIS — H90.41 SENSORINEURAL HEARING LOSS (SNHL) OF RIGHT EAR WITH UNRESTRICTED HEARING OF LEFT EAR: Primary | ICD-10-CM

## 2021-07-20 DIAGNOSIS — H93.8X3 SENSATION OF FULLNESS IN BOTH EARS: Primary | ICD-10-CM

## 2021-07-20 PROCEDURE — 1159F PR MEDICATION LIST DOCUMENTED IN MEDICAL RECORD: ICD-10-PCS | Mod: CPTII,S$GLB,, | Performed by: OTOLARYNGOLOGY

## 2021-07-20 PROCEDURE — 99213 PR OFFICE/OUTPT VISIT, EST, LEVL III, 20-29 MIN: ICD-10-PCS | Mod: 25,S$GLB,, | Performed by: OTOLARYNGOLOGY

## 2021-07-20 PROCEDURE — 3079F PR MOST RECENT DIASTOLIC BLOOD PRESSURE 80-89 MM HG: ICD-10-PCS | Mod: CPTII,S$GLB,, | Performed by: OTOLARYNGOLOGY

## 2021-07-20 PROCEDURE — 92567 TYMPANOMETRY: CPT | Mod: S$GLB,,, | Performed by: AUDIOLOGIST

## 2021-07-20 PROCEDURE — 69210 REMOVE IMPACTED EAR WAX UNI: CPT | Mod: S$GLB,,, | Performed by: OTOLARYNGOLOGY

## 2021-07-20 PROCEDURE — 99213 OFFICE O/P EST LOW 20 MIN: CPT | Mod: 25,S$GLB,, | Performed by: OTOLARYNGOLOGY

## 2021-07-20 PROCEDURE — 92557 PR COMPREHENSIVE HEARING TEST: ICD-10-PCS | Mod: S$GLB,,, | Performed by: AUDIOLOGIST

## 2021-07-20 PROCEDURE — 92557 COMPREHENSIVE HEARING TEST: CPT | Mod: S$GLB,,, | Performed by: AUDIOLOGIST

## 2021-07-20 PROCEDURE — 3288F PR FALLS RISK ASSESSMENT DOCUMENTED: ICD-10-PCS | Mod: CPTII,S$GLB,, | Performed by: OTOLARYNGOLOGY

## 2021-07-20 PROCEDURE — 99999 PR PBB SHADOW E&M-EST. PATIENT-LVL III: ICD-10-PCS | Mod: PBBFAC,,, | Performed by: OTOLARYNGOLOGY

## 2021-07-20 PROCEDURE — 1101F PT FALLS ASSESS-DOCD LE1/YR: CPT | Mod: CPTII,S$GLB,, | Performed by: OTOLARYNGOLOGY

## 2021-07-20 PROCEDURE — 3288F FALL RISK ASSESSMENT DOCD: CPT | Mod: CPTII,S$GLB,, | Performed by: OTOLARYNGOLOGY

## 2021-07-20 PROCEDURE — 1101F PR PT FALLS ASSESS DOC 0-1 FALLS W/OUT INJ PAST YR: ICD-10-PCS | Mod: CPTII,S$GLB,, | Performed by: OTOLARYNGOLOGY

## 2021-07-20 PROCEDURE — 3077F SYST BP >= 140 MM HG: CPT | Mod: CPTII,S$GLB,, | Performed by: OTOLARYNGOLOGY

## 2021-07-20 PROCEDURE — 99999 PR PBB SHADOW E&M-EST. PATIENT-LVL III: CPT | Mod: PBBFAC,,, | Performed by: OTOLARYNGOLOGY

## 2021-07-20 PROCEDURE — 1126F PR PAIN SEVERITY QUANTIFIED, NO PAIN PRESENT: ICD-10-PCS | Mod: CPTII,S$GLB,, | Performed by: OTOLARYNGOLOGY

## 2021-07-20 PROCEDURE — 1126F AMNT PAIN NOTED NONE PRSNT: CPT | Mod: CPTII,S$GLB,, | Performed by: OTOLARYNGOLOGY

## 2021-07-20 PROCEDURE — 3077F PR MOST RECENT SYSTOLIC BLOOD PRESSURE >= 140 MM HG: ICD-10-PCS | Mod: CPTII,S$GLB,, | Performed by: OTOLARYNGOLOGY

## 2021-07-20 PROCEDURE — 3079F DIAST BP 80-89 MM HG: CPT | Mod: CPTII,S$GLB,, | Performed by: OTOLARYNGOLOGY

## 2021-07-20 PROCEDURE — 92567 PR TYMPA2METRY: ICD-10-PCS | Mod: S$GLB,,, | Performed by: AUDIOLOGIST

## 2021-07-20 PROCEDURE — 69210 PR REMOVAL IMPACTED CERUMEN REQUIRING INSTRUMENTATION, UNILATERAL: ICD-10-PCS | Mod: S$GLB,,, | Performed by: OTOLARYNGOLOGY

## 2021-07-20 PROCEDURE — 1159F MED LIST DOCD IN RCRD: CPT | Mod: CPTII,S$GLB,, | Performed by: OTOLARYNGOLOGY

## 2021-07-22 ENCOUNTER — LAB VISIT (OUTPATIENT)
Dept: LAB | Facility: HOSPITAL | Age: 71
End: 2021-07-22
Attending: PSYCHIATRY & NEUROLOGY
Payer: MEDICARE

## 2021-07-22 ENCOUNTER — OFFICE VISIT (OUTPATIENT)
Dept: NEUROLOGY | Facility: CLINIC | Age: 71
End: 2021-07-22
Payer: MEDICARE

## 2021-07-22 VITALS
BODY MASS INDEX: 28.7 KG/M2 | HEART RATE: 83 BPM | SYSTOLIC BLOOD PRESSURE: 165 MMHG | WEIGHT: 168.13 LBS | DIASTOLIC BLOOD PRESSURE: 77 MMHG | HEIGHT: 64 IN

## 2021-07-22 DIAGNOSIS — G30.1 LATE ONSET ALZHEIMER'S DISEASE WITHOUT BEHAVIORAL DISTURBANCE: Primary | ICD-10-CM

## 2021-07-22 DIAGNOSIS — R41.89 SIGNS AND SYMPTOMS INVOLVING COGNITION: ICD-10-CM

## 2021-07-22 DIAGNOSIS — F02.80 LATE ONSET ALZHEIMER'S DISEASE WITHOUT BEHAVIORAL DISTURBANCE: Primary | ICD-10-CM

## 2021-07-22 PROCEDURE — 3078F DIAST BP <80 MM HG: CPT | Mod: CPTII,S$GLB,, | Performed by: PSYCHIATRY & NEUROLOGY

## 2021-07-22 PROCEDURE — 3077F PR MOST RECENT SYSTOLIC BLOOD PRESSURE >= 140 MM HG: ICD-10-PCS | Mod: CPTII,S$GLB,, | Performed by: PSYCHIATRY & NEUROLOGY

## 2021-07-22 PROCEDURE — 3288F FALL RISK ASSESSMENT DOCD: CPT | Mod: CPTII,S$GLB,, | Performed by: PSYCHIATRY & NEUROLOGY

## 2021-07-22 PROCEDURE — 1101F PR PT FALLS ASSESS DOC 0-1 FALLS W/OUT INJ PAST YR: ICD-10-PCS | Mod: CPTII,S$GLB,, | Performed by: PSYCHIATRY & NEUROLOGY

## 2021-07-22 PROCEDURE — 82607 VITAMIN B-12: CPT | Performed by: PSYCHIATRY & NEUROLOGY

## 2021-07-22 PROCEDURE — 84425 ASSAY OF VITAMIN B-1: CPT | Performed by: PSYCHIATRY & NEUROLOGY

## 2021-07-22 PROCEDURE — 3077F SYST BP >= 140 MM HG: CPT | Mod: CPTII,S$GLB,, | Performed by: PSYCHIATRY & NEUROLOGY

## 2021-07-22 PROCEDURE — 1101F PT FALLS ASSESS-DOCD LE1/YR: CPT | Mod: CPTII,S$GLB,, | Performed by: PSYCHIATRY & NEUROLOGY

## 2021-07-22 PROCEDURE — 99999 PR PBB SHADOW E&M-EST. PATIENT-LVL IV: CPT | Mod: PBBFAC,,, | Performed by: PSYCHIATRY & NEUROLOGY

## 2021-07-22 PROCEDURE — 3078F PR MOST RECENT DIASTOLIC BLOOD PRESSURE < 80 MM HG: ICD-10-PCS | Mod: CPTII,S$GLB,, | Performed by: PSYCHIATRY & NEUROLOGY

## 2021-07-22 PROCEDURE — 82746 ASSAY OF FOLIC ACID SERUM: CPT | Performed by: PSYCHIATRY & NEUROLOGY

## 2021-07-22 PROCEDURE — 1126F AMNT PAIN NOTED NONE PRSNT: CPT | Mod: CPTII,S$GLB,, | Performed by: PSYCHIATRY & NEUROLOGY

## 2021-07-22 PROCEDURE — 99204 OFFICE O/P NEW MOD 45 MIN: CPT | Mod: S$GLB,,, | Performed by: PSYCHIATRY & NEUROLOGY

## 2021-07-22 PROCEDURE — 3008F PR BODY MASS INDEX (BMI) DOCUMENTED: ICD-10-PCS | Mod: CPTII,S$GLB,, | Performed by: PSYCHIATRY & NEUROLOGY

## 2021-07-22 PROCEDURE — 1159F MED LIST DOCD IN RCRD: CPT | Mod: CPTII,S$GLB,, | Performed by: PSYCHIATRY & NEUROLOGY

## 2021-07-22 PROCEDURE — 86592 SYPHILIS TEST NON-TREP QUAL: CPT | Performed by: PSYCHIATRY & NEUROLOGY

## 2021-07-22 PROCEDURE — 3288F PR FALLS RISK ASSESSMENT DOCUMENTED: ICD-10-PCS | Mod: CPTII,S$GLB,, | Performed by: PSYCHIATRY & NEUROLOGY

## 2021-07-22 PROCEDURE — 1159F PR MEDICATION LIST DOCUMENTED IN MEDICAL RECORD: ICD-10-PCS | Mod: CPTII,S$GLB,, | Performed by: PSYCHIATRY & NEUROLOGY

## 2021-07-22 PROCEDURE — 99999 PR PBB SHADOW E&M-EST. PATIENT-LVL IV: ICD-10-PCS | Mod: PBBFAC,,, | Performed by: PSYCHIATRY & NEUROLOGY

## 2021-07-22 PROCEDURE — 99204 PR OFFICE/OUTPT VISIT, NEW, LEVL IV, 45-59 MIN: ICD-10-PCS | Mod: S$GLB,,, | Performed by: PSYCHIATRY & NEUROLOGY

## 2021-07-22 PROCEDURE — 3008F BODY MASS INDEX DOCD: CPT | Mod: CPTII,S$GLB,, | Performed by: PSYCHIATRY & NEUROLOGY

## 2021-07-22 PROCEDURE — 1126F PR PAIN SEVERITY QUANTIFIED, NO PAIN PRESENT: ICD-10-PCS | Mod: CPTII,S$GLB,, | Performed by: PSYCHIATRY & NEUROLOGY

## 2021-07-22 RX ORDER — DIAZEPAM 10 MG/1
TABLET ORAL
Qty: 1 TABLET | Refills: 0 | Status: SHIPPED | OUTPATIENT
Start: 2021-07-22 | End: 2021-10-12

## 2021-07-23 ENCOUNTER — HOSPITAL ENCOUNTER (OUTPATIENT)
Dept: RADIOLOGY | Facility: HOSPITAL | Age: 71
Discharge: HOME | End: 2021-07-23
Attending: NURSE PRACTITIONER
Payer: MEDICARE

## 2021-07-23 ENCOUNTER — OFFICE VISIT (OUTPATIENT)
Dept: ORTHOPEDICS | Facility: CLINIC | Age: 71
End: 2021-07-23
Payer: MEDICARE

## 2021-07-23 VITALS — HEIGHT: 64 IN | WEIGHT: 168 LBS | BODY MASS INDEX: 28.68 KG/M2
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
## 2021-07-23 DIAGNOSIS — M25.562 ACUTE PAIN OF LEFT KNEE: ICD-10-CM

## 2021-07-23 DIAGNOSIS — M25.562 ACUTE PAIN OF LEFT KNEE: Primary | ICD-10-CM

## 2021-07-23 LAB
FOLATE SERPL-MCNC: 19.5 NG/ML (ref 4–24)
RPR SER QL: NORMAL

## 2021-07-23 PROCEDURE — 73562 X-RAY EXAM OF KNEE 3: CPT | Mod: 26,RT,, | Performed by: RADIOLOGY

## 2021-07-23 PROCEDURE — 1159F MED LIST DOCD IN RCRD: CPT | Mod: CPTII,S$GLB,, | Performed by: PHYSICIAN ASSISTANT

## 2021-07-23 PROCEDURE — 73562 XR KNEE ORTHO LEFT WITH FLEXION: ICD-10-PCS | Mod: 26,RT,, | Performed by: RADIOLOGY

## 2021-07-23 PROCEDURE — 1125F PR PAIN SEVERITY QUANTIFIED, PAIN PRESENT: ICD-10-PCS | Mod: CPTII,S$GLB,, | Performed by: PHYSICIAN ASSISTANT

## 2021-07-23 PROCEDURE — 99213 OFFICE O/P EST LOW 20 MIN: CPT | Mod: S$GLB,,, | Performed by: PHYSICIAN ASSISTANT

## 2021-07-23 PROCEDURE — 1159F PR MEDICATION LIST DOCUMENTED IN MEDICAL RECORD: ICD-10-PCS | Mod: CPTII,S$GLB,, | Performed by: PHYSICIAN ASSISTANT

## 2021-07-23 PROCEDURE — 73564 X-RAY EXAM KNEE 4 OR MORE: CPT | Mod: 26,LT,, | Performed by: RADIOLOGY

## 2021-07-23 PROCEDURE — 99999 PR PBB SHADOW E&M-EST. PATIENT-LVL III: CPT | Mod: PBBFAC,,, | Performed by: PHYSICIAN ASSISTANT

## 2021-07-23 PROCEDURE — 3008F PR BODY MASS INDEX (BMI) DOCUMENTED: ICD-10-PCS | Mod: CPTII,S$GLB,, | Performed by: PHYSICIAN ASSISTANT

## 2021-07-23 PROCEDURE — 1101F PR PT FALLS ASSESS DOC 0-1 FALLS W/OUT INJ PAST YR: ICD-10-PCS | Mod: CPTII,S$GLB,, | Performed by: PHYSICIAN ASSISTANT

## 2021-07-23 PROCEDURE — 3288F PR FALLS RISK ASSESSMENT DOCUMENTED: ICD-10-PCS | Mod: CPTII,S$GLB,, | Performed by: PHYSICIAN ASSISTANT

## 2021-07-23 PROCEDURE — 99999 PR PBB SHADOW E&M-EST. PATIENT-LVL III: ICD-10-PCS | Mod: PBBFAC,,, | Performed by: PHYSICIAN ASSISTANT

## 2021-07-23 PROCEDURE — 1125F AMNT PAIN NOTED PAIN PRSNT: CPT | Mod: CPTII,S$GLB,, | Performed by: PHYSICIAN ASSISTANT

## 2021-07-23 PROCEDURE — 73564 X-RAY EXAM KNEE 4 OR MORE: CPT | Mod: TC,LT

## 2021-07-23 PROCEDURE — 73564 XR KNEE ORTHO LEFT WITH FLEXION: ICD-10-PCS | Mod: 26,LT,, | Performed by: RADIOLOGY

## 2021-07-23 PROCEDURE — 3288F FALL RISK ASSESSMENT DOCD: CPT | Mod: CPTII,S$GLB,, | Performed by: PHYSICIAN ASSISTANT

## 2021-07-23 PROCEDURE — 99213 PR OFFICE/OUTPT VISIT, EST, LEVL III, 20-29 MIN: ICD-10-PCS | Mod: S$GLB,,, | Performed by: PHYSICIAN ASSISTANT

## 2021-07-23 PROCEDURE — 1101F PT FALLS ASSESS-DOCD LE1/YR: CPT | Mod: CPTII,S$GLB,, | Performed by: PHYSICIAN ASSISTANT

## 2021-07-23 PROCEDURE — 3008F BODY MASS INDEX DOCD: CPT | Mod: CPTII,S$GLB,, | Performed by: PHYSICIAN ASSISTANT

## 2021-07-23 RX ORDER — MELOXICAM 15 MG/1
15 TABLET ORAL DAILY
Qty: 30 TABLET | Refills: 0 | Status: SHIPPED | OUTPATIENT
Start: 2021-07-23 | End: 2021-08-22

## 2021-07-24 LAB — VIT B12 SERPL-MCNC: 759 NG/L (ref 180–914)

## 2021-07-27 LAB — VIT B1 BLD-MCNC: 65 UG/L (ref 38–122)

## 2021-08-07 ENCOUNTER — HOSPITAL ENCOUNTER (OUTPATIENT)
Dept: RADIOLOGY | Facility: HOSPITAL | Age: 71
Discharge: HOME | End: 2021-08-07
Attending: PSYCHIATRY & NEUROLOGY
Payer: MEDICARE

## 2021-08-07 ENCOUNTER — HOSPITAL ENCOUNTER (OUTPATIENT)
Dept: RADIOLOGY | Facility: HOSPITAL | Age: 71
Discharge: HOME | End: 2021-08-07
Attending: ORTHOPAEDIC SURGERY
Payer: MEDICARE

## 2021-08-07 DIAGNOSIS — G95.9 MYELOPATHY: ICD-10-CM

## 2021-08-07 DIAGNOSIS — F02.80 LATE ONSET ALZHEIMER'S DISEASE WITHOUT BEHAVIORAL DISTURBANCE: ICD-10-CM

## 2021-08-07 DIAGNOSIS — G30.1 LATE ONSET ALZHEIMER'S DISEASE WITHOUT BEHAVIORAL DISTURBANCE: ICD-10-CM

## 2021-08-07 PROCEDURE — 70551 MRI BRAIN STEM W/O DYE: CPT | Mod: TC

## 2021-08-07 PROCEDURE — 70551 MRI BRAIN WITHOUT CONTRAST: ICD-10-PCS | Mod: 26,,, | Performed by: RADIOLOGY

## 2021-08-07 PROCEDURE — 72141 MRI NECK SPINE W/O DYE: CPT | Mod: TC

## 2021-08-07 PROCEDURE — 70551 MRI BRAIN STEM W/O DYE: CPT | Mod: 26,,, | Performed by: RADIOLOGY

## 2021-08-07 PROCEDURE — 72141 MRI NECK SPINE W/O DYE: CPT | Mod: 26,,, | Performed by: RADIOLOGY

## 2021-08-07 PROCEDURE — 72141 MRI CERVICAL SPINE WITHOUT CONTRAST: ICD-10-PCS | Mod: 26,,, | Performed by: RADIOLOGY

## 2021-08-24 ENCOUNTER — OFFICE VISIT (OUTPATIENT)
Dept: OTOLARYNGOLOGY | Facility: CLINIC | Age: 71
End: 2021-08-24
Payer: MEDICARE

## 2021-08-24 VITALS
WEIGHT: 163.81 LBS | HEART RATE: 89 BPM | DIASTOLIC BLOOD PRESSURE: 80 MMHG | BODY MASS INDEX: 28.12 KG/M2 | SYSTOLIC BLOOD PRESSURE: 134 MMHG

## 2021-08-24 DIAGNOSIS — H93.8X3 SENSATION OF FULLNESS IN BOTH EARS: Primary | ICD-10-CM

## 2021-08-24 PROCEDURE — 99999 PR PBB SHADOW E&M-EST. PATIENT-LVL IV: CPT | Mod: PBBFAC,,, | Performed by: OTOLARYNGOLOGY

## 2021-08-24 PROCEDURE — 1101F PR PT FALLS ASSESS DOC 0-1 FALLS W/OUT INJ PAST YR: ICD-10-PCS | Mod: CPTII,S$GLB,, | Performed by: OTOLARYNGOLOGY

## 2021-08-24 PROCEDURE — 99213 OFFICE O/P EST LOW 20 MIN: CPT | Mod: 25,S$GLB,, | Performed by: OTOLARYNGOLOGY

## 2021-08-24 PROCEDURE — 3075F SYST BP GE 130 - 139MM HG: CPT | Mod: CPTII,S$GLB,, | Performed by: OTOLARYNGOLOGY

## 2021-08-24 PROCEDURE — 3008F BODY MASS INDEX DOCD: CPT | Mod: CPTII,S$GLB,, | Performed by: OTOLARYNGOLOGY

## 2021-08-24 PROCEDURE — 69210 PR REMOVAL IMPACTED CERUMEN REQUIRING INSTRUMENTATION, UNILATERAL: ICD-10-PCS | Mod: S$GLB,,, | Performed by: OTOLARYNGOLOGY

## 2021-08-24 PROCEDURE — 1101F PT FALLS ASSESS-DOCD LE1/YR: CPT | Mod: CPTII,S$GLB,, | Performed by: OTOLARYNGOLOGY

## 2021-08-24 PROCEDURE — 1126F AMNT PAIN NOTED NONE PRSNT: CPT | Mod: CPTII,S$GLB,, | Performed by: OTOLARYNGOLOGY

## 2021-08-24 PROCEDURE — 99999 PR PBB SHADOW E&M-EST. PATIENT-LVL IV: ICD-10-PCS | Mod: PBBFAC,,, | Performed by: OTOLARYNGOLOGY

## 2021-08-24 PROCEDURE — 3288F PR FALLS RISK ASSESSMENT DOCUMENTED: ICD-10-PCS | Mod: CPTII,S$GLB,, | Performed by: OTOLARYNGOLOGY

## 2021-08-24 PROCEDURE — 3079F DIAST BP 80-89 MM HG: CPT | Mod: CPTII,S$GLB,, | Performed by: OTOLARYNGOLOGY

## 2021-08-24 PROCEDURE — 3075F PR MOST RECENT SYSTOLIC BLOOD PRESS GE 130-139MM HG: ICD-10-PCS | Mod: CPTII,S$GLB,, | Performed by: OTOLARYNGOLOGY

## 2021-08-24 PROCEDURE — 1159F PR MEDICATION LIST DOCUMENTED IN MEDICAL RECORD: ICD-10-PCS | Mod: CPTII,S$GLB,, | Performed by: OTOLARYNGOLOGY

## 2021-08-24 PROCEDURE — 3288F FALL RISK ASSESSMENT DOCD: CPT | Mod: CPTII,S$GLB,, | Performed by: OTOLARYNGOLOGY

## 2021-08-24 PROCEDURE — 3079F PR MOST RECENT DIASTOLIC BLOOD PRESSURE 80-89 MM HG: ICD-10-PCS | Mod: CPTII,S$GLB,, | Performed by: OTOLARYNGOLOGY

## 2021-08-24 PROCEDURE — 1159F MED LIST DOCD IN RCRD: CPT | Mod: CPTII,S$GLB,, | Performed by: OTOLARYNGOLOGY

## 2021-08-24 PROCEDURE — 3008F PR BODY MASS INDEX (BMI) DOCUMENTED: ICD-10-PCS | Mod: CPTII,S$GLB,, | Performed by: OTOLARYNGOLOGY

## 2021-08-24 PROCEDURE — 99213 PR OFFICE/OUTPT VISIT, EST, LEVL III, 20-29 MIN: ICD-10-PCS | Mod: 25,S$GLB,, | Performed by: OTOLARYNGOLOGY

## 2021-08-24 PROCEDURE — 1126F PR PAIN SEVERITY QUANTIFIED, NO PAIN PRESENT: ICD-10-PCS | Mod: CPTII,S$GLB,, | Performed by: OTOLARYNGOLOGY

## 2021-08-24 PROCEDURE — 69210 REMOVE IMPACTED EAR WAX UNI: CPT | Mod: S$GLB,,, | Performed by: OTOLARYNGOLOGY

## 2021-08-25 ENCOUNTER — TELEPHONE (OUTPATIENT)
Dept: NEUROLOGY | Facility: CLINIC | Age: 71
End: 2021-08-25

## 2021-09-14 ENCOUNTER — OFFICE VISIT (OUTPATIENT)
Dept: PRIMARY CARE CLINIC | Facility: CLINIC | Age: 71
End: 2021-09-14
Attending: FAMILY MEDICINE
Payer: MEDICARE

## 2021-09-14 DIAGNOSIS — R41.3 MEMORY CHANGES: Primary | ICD-10-CM

## 2021-09-14 DIAGNOSIS — F33.1 MAJOR DEPRESSIVE DISORDER, RECURRENT EPISODE, MODERATE: ICD-10-CM

## 2021-09-14 DIAGNOSIS — M79.10 PAIN ON MOVEMENT OF SKELETAL MUSCLE: ICD-10-CM

## 2021-09-14 DIAGNOSIS — J30.1 SEASONAL ALLERGIC RHINITIS DUE TO POLLEN: ICD-10-CM

## 2021-09-14 DIAGNOSIS — I10 ESSENTIAL HYPERTENSION: ICD-10-CM

## 2021-09-14 DIAGNOSIS — F41.9 ANXIETY: ICD-10-CM

## 2021-09-14 PROCEDURE — 99442 PR PHYSICIAN TELEPHONE EVALUATION 11-20 MIN: ICD-10-PCS | Mod: 95,,, | Performed by: FAMILY MEDICINE

## 2021-09-14 PROCEDURE — 99442 PR PHYSICIAN TELEPHONE EVALUATION 11-20 MIN: CPT | Mod: 95,,, | Performed by: FAMILY MEDICINE

## 2021-09-14 PROCEDURE — 4010F ACE/ARB THERAPY RXD/TAKEN: CPT | Mod: CPTII,95,, | Performed by: FAMILY MEDICINE

## 2021-09-14 PROCEDURE — 4010F PR ACE/ARB THEARPY RXD/TAKEN: ICD-10-PCS | Mod: CPTII,95,, | Performed by: FAMILY MEDICINE

## 2021-09-14 RX ORDER — LOSARTAN POTASSIUM 100 MG/1
100 TABLET ORAL DAILY
Qty: 90 TABLET | Refills: 1 | Status: SHIPPED | OUTPATIENT
Start: 2021-09-14 | End: 2022-01-10

## 2021-09-23 ENCOUNTER — TELEPHONE (OUTPATIENT)
Dept: OTOLARYNGOLOGY | Facility: CLINIC | Age: 71
End: 2021-09-23

## 2021-09-23 ENCOUNTER — HOSPITAL ENCOUNTER (EMERGENCY)
Facility: OTHER | Age: 71
Discharge: HOME | End: 2021-09-23
Attending: EMERGENCY MEDICINE
Payer: MEDICARE

## 2021-09-23 VITALS
SYSTOLIC BLOOD PRESSURE: 172 MMHG | DIASTOLIC BLOOD PRESSURE: 85 MMHG | OXYGEN SATURATION: 95 % | RESPIRATION RATE: 18 BRPM | TEMPERATURE: 99 F | HEART RATE: 69 BPM

## 2021-09-23 DIAGNOSIS — S29.019A THORACIC MYOFASCIAL STRAIN, INITIAL ENCOUNTER: ICD-10-CM

## 2021-09-23 DIAGNOSIS — R07.9 CHEST PAIN: ICD-10-CM

## 2021-09-23 DIAGNOSIS — S29.9XXA CHEST WALL INJURY, INITIAL ENCOUNTER: Primary | ICD-10-CM

## 2021-09-23 DIAGNOSIS — R10.13 EPIGASTRIC PAIN: ICD-10-CM

## 2021-09-23 LAB
ALBUMIN SERPL BCP-MCNC: 4.1 G/DL (ref 3.5–5.2)
ALP SERPL-CCNC: 76 U/L (ref 55–135)
ALT SERPL W/O P-5'-P-CCNC: 16 U/L (ref 10–44)
ANION GAP SERPL CALC-SCNC: 11 MMOL/L (ref 8–16)
AST SERPL-CCNC: 20 U/L (ref 10–40)
BASOPHILS # BLD AUTO: 0.02 K/UL (ref 0–0.2)
BASOPHILS NFR BLD: 0.2 % (ref 0–1.9)
BILIRUB SERPL-MCNC: 0.4 MG/DL (ref 0.1–1)
BUN SERPL-MCNC: 9 MG/DL (ref 8–23)
CALCIUM SERPL-MCNC: 9.7 MG/DL (ref 8.7–10.5)
CHLORIDE SERPL-SCNC: 109 MMOL/L (ref 95–110)
CO2 SERPL-SCNC: 22 MMOL/L (ref 23–29)
CREAT SERPL-MCNC: 0.8 MG/DL (ref 0.5–1.4)
DIFFERENTIAL METHOD: ABNORMAL
EOSINOPHIL # BLD AUTO: 0.2 K/UL (ref 0–0.5)
EOSINOPHIL NFR BLD: 2.5 % (ref 0–8)
ERYTHROCYTE [DISTWIDTH] IN BLOOD BY AUTOMATED COUNT: 13.9 % (ref 11.5–14.5)
EST. GFR  (AFRICAN AMERICAN): >60 ML/MIN/1.73 M^2
EST. GFR  (NON AFRICAN AMERICAN): >60 ML/MIN/1.73 M^2
GLUCOSE SERPL-MCNC: 94 MG/DL (ref 70–110)
HCT VFR BLD AUTO: 42.1 % (ref 37–48.5)
HGB BLD-MCNC: 13.3 G/DL (ref 12–16)
IMM GRANULOCYTES # BLD AUTO: 0.02 K/UL (ref 0–0.04)
IMM GRANULOCYTES NFR BLD AUTO: 0.2 % (ref 0–0.5)
LIPASE SERPL-CCNC: 17 U/L (ref 4–60)
LYMPHOCYTES # BLD AUTO: 2.2 K/UL (ref 1–4.8)
LYMPHOCYTES NFR BLD: 26.9 % (ref 18–48)
MCH RBC QN AUTO: 27 PG (ref 27–31)
MCHC RBC AUTO-ENTMCNC: 31.6 G/DL (ref 32–36)
MCV RBC AUTO: 85 FL (ref 82–98)
MONOCYTES # BLD AUTO: 0.4 K/UL (ref 0.3–1)
MONOCYTES NFR BLD: 4.9 % (ref 4–15)
NEUTROPHILS # BLD AUTO: 5.3 K/UL (ref 1.8–7.7)
NEUTROPHILS NFR BLD: 65.3 % (ref 38–73)
NRBC BLD-RTO: 0 /100 WBC
PLATELET # BLD AUTO: 260 K/UL (ref 150–450)
PMV BLD AUTO: 9.5 FL (ref 9.2–12.9)
POTASSIUM SERPL-SCNC: 3.8 MMOL/L (ref 3.5–5.1)
PROT SERPL-MCNC: 7.9 G/DL (ref 6–8.4)
RBC # BLD AUTO: 4.93 M/UL (ref 4–5.4)
SODIUM SERPL-SCNC: 142 MMOL/L (ref 136–145)
TROPONIN I SERPL DL<=0.01 NG/ML-MCNC: 0.01 NG/ML (ref 0–0.03)
WBC # BLD AUTO: 8.1 K/UL (ref 3.9–12.7)

## 2021-09-23 PROCEDURE — 80053 COMPREHEN METABOLIC PANEL: CPT | Performed by: EMERGENCY MEDICINE

## 2021-09-23 PROCEDURE — 99285 EMERGENCY DEPT VISIT HI MDM: CPT | Mod: 25

## 2021-09-23 PROCEDURE — 84484 ASSAY OF TROPONIN QUANT: CPT | Performed by: EMERGENCY MEDICINE

## 2021-09-23 PROCEDURE — 85025 COMPLETE CBC W/AUTO DIFF WBC: CPT | Performed by: EMERGENCY MEDICINE

## 2021-09-23 PROCEDURE — 93005 ELECTROCARDIOGRAM TRACING: CPT

## 2021-09-23 PROCEDURE — 83690 ASSAY OF LIPASE: CPT | Performed by: EMERGENCY MEDICINE

## 2021-09-23 RX ORDER — OMEPRAZOLE 40 MG/1
40 CAPSULE, DELAYED RELEASE ORAL EVERY MORNING
Qty: 30 CAPSULE | Refills: 4 | Status: SHIPPED | OUTPATIENT
Start: 2021-09-23 | End: 2022-02-07 | Stop reason: SDUPTHER

## 2021-09-23 RX ORDER — DEXTROMETHORPHAN HYDROBROMIDE, GUAIFENESIN 5; 100 MG/5ML; MG/5ML
650 LIQUID ORAL EVERY 8 HOURS PRN
Refills: 0 | COMMUNITY
Start: 2021-09-23 | End: 2023-07-17

## 2021-09-23 RX ORDER — METHOCARBAMOL 500 MG/1
1000 TABLET, FILM COATED ORAL 3 TIMES DAILY PRN
Qty: 30 TABLET | Refills: 0 | Status: SHIPPED | OUTPATIENT
Start: 2021-09-23 | End: 2021-09-28

## 2021-09-24 ENCOUNTER — PES CALL (OUTPATIENT)
Dept: ADMINISTRATIVE | Facility: CLINIC | Age: 71
End: 2021-09-24

## 2021-09-27 ENCOUNTER — TELEPHONE (OUTPATIENT)
Dept: INTERNAL MEDICINE | Facility: CLINIC | Age: 71
End: 2021-09-27

## 2021-09-27 ENCOUNTER — OFFICE VISIT (OUTPATIENT)
Dept: INTERNAL MEDICINE | Facility: CLINIC | Age: 71
End: 2021-09-27
Payer: MEDICARE

## 2021-09-27 VITALS
HEIGHT: 64 IN | HEART RATE: 76 BPM | OXYGEN SATURATION: 100 % | SYSTOLIC BLOOD PRESSURE: 122 MMHG | BODY MASS INDEX: 27.82 KG/M2 | DIASTOLIC BLOOD PRESSURE: 78 MMHG | WEIGHT: 162.94 LBS

## 2021-09-27 DIAGNOSIS — F41.9 ANXIETY: ICD-10-CM

## 2021-09-27 DIAGNOSIS — K21.9 GASTROESOPHAGEAL REFLUX DISEASE WITHOUT ESOPHAGITIS: Primary | ICD-10-CM

## 2021-09-27 DIAGNOSIS — I10 ESSENTIAL HYPERTENSION: ICD-10-CM

## 2021-09-27 PROCEDURE — 3078F PR MOST RECENT DIASTOLIC BLOOD PRESSURE < 80 MM HG: ICD-10-PCS | Mod: CPTII,S$GLB,, | Performed by: PHYSICIAN ASSISTANT

## 2021-09-27 PROCEDURE — 3288F FALL RISK ASSESSMENT DOCD: CPT | Mod: CPTII,S$GLB,, | Performed by: PHYSICIAN ASSISTANT

## 2021-09-27 PROCEDURE — 3074F SYST BP LT 130 MM HG: CPT | Mod: CPTII,S$GLB,, | Performed by: PHYSICIAN ASSISTANT

## 2021-09-27 PROCEDURE — 1126F PR PAIN SEVERITY QUANTIFIED, NO PAIN PRESENT: ICD-10-PCS | Mod: CPTII,S$GLB,, | Performed by: PHYSICIAN ASSISTANT

## 2021-09-27 PROCEDURE — 3078F DIAST BP <80 MM HG: CPT | Mod: CPTII,S$GLB,, | Performed by: PHYSICIAN ASSISTANT

## 2021-09-27 PROCEDURE — 3288F PR FALLS RISK ASSESSMENT DOCUMENTED: ICD-10-PCS | Mod: CPTII,S$GLB,, | Performed by: PHYSICIAN ASSISTANT

## 2021-09-27 PROCEDURE — 3008F BODY MASS INDEX DOCD: CPT | Mod: CPTII,S$GLB,, | Performed by: PHYSICIAN ASSISTANT

## 2021-09-27 PROCEDURE — 99999 PR PBB SHADOW E&M-EST. PATIENT-LVL V: ICD-10-PCS | Mod: PBBFAC,,, | Performed by: PHYSICIAN ASSISTANT

## 2021-09-27 PROCEDURE — 1159F MED LIST DOCD IN RCRD: CPT | Mod: CPTII,S$GLB,, | Performed by: PHYSICIAN ASSISTANT

## 2021-09-27 PROCEDURE — 1160F PR REVIEW ALL MEDS BY PRESCRIBER/CLIN PHARMACIST DOCUMENTED: ICD-10-PCS | Mod: CPTII,S$GLB,, | Performed by: PHYSICIAN ASSISTANT

## 2021-09-27 PROCEDURE — 3074F PR MOST RECENT SYSTOLIC BLOOD PRESSURE < 130 MM HG: ICD-10-PCS | Mod: CPTII,S$GLB,, | Performed by: PHYSICIAN ASSISTANT

## 2021-09-27 PROCEDURE — 1126F AMNT PAIN NOTED NONE PRSNT: CPT | Mod: CPTII,S$GLB,, | Performed by: PHYSICIAN ASSISTANT

## 2021-09-27 PROCEDURE — 1159F PR MEDICATION LIST DOCUMENTED IN MEDICAL RECORD: ICD-10-PCS | Mod: CPTII,S$GLB,, | Performed by: PHYSICIAN ASSISTANT

## 2021-09-27 PROCEDURE — 99999 PR PBB SHADOW E&M-EST. PATIENT-LVL V: CPT | Mod: PBBFAC,,, | Performed by: PHYSICIAN ASSISTANT

## 2021-09-27 PROCEDURE — 1101F PR PT FALLS ASSESS DOC 0-1 FALLS W/OUT INJ PAST YR: ICD-10-PCS | Mod: CPTII,S$GLB,, | Performed by: PHYSICIAN ASSISTANT

## 2021-09-27 PROCEDURE — 99214 PR OFFICE/OUTPT VISIT, EST, LEVL IV, 30-39 MIN: ICD-10-PCS | Mod: S$GLB,,, | Performed by: PHYSICIAN ASSISTANT

## 2021-09-27 PROCEDURE — 4010F PR ACE/ARB THEARPY RXD/TAKEN: ICD-10-PCS | Mod: CPTII,S$GLB,, | Performed by: PHYSICIAN ASSISTANT

## 2021-09-27 PROCEDURE — 4010F ACE/ARB THERAPY RXD/TAKEN: CPT | Mod: CPTII,S$GLB,, | Performed by: PHYSICIAN ASSISTANT

## 2021-09-27 PROCEDURE — 1160F RVW MEDS BY RX/DR IN RCRD: CPT | Mod: CPTII,S$GLB,, | Performed by: PHYSICIAN ASSISTANT

## 2021-09-27 PROCEDURE — 1101F PT FALLS ASSESS-DOCD LE1/YR: CPT | Mod: CPTII,S$GLB,, | Performed by: PHYSICIAN ASSISTANT

## 2021-09-27 PROCEDURE — 99214 OFFICE O/P EST MOD 30 MIN: CPT | Mod: S$GLB,,, | Performed by: PHYSICIAN ASSISTANT

## 2021-09-27 PROCEDURE — 3008F PR BODY MASS INDEX (BMI) DOCUMENTED: ICD-10-PCS | Mod: CPTII,S$GLB,, | Performed by: PHYSICIAN ASSISTANT

## 2021-10-05 DIAGNOSIS — M62.838 MUSCLE SPASM: Primary | ICD-10-CM

## 2021-10-05 RX ORDER — METHOCARBAMOL 500 MG/1
1000 TABLET, FILM COATED ORAL 3 TIMES DAILY
Qty: 60 TABLET | Refills: 0 | Status: SHIPPED | OUTPATIENT
Start: 2021-10-05 | End: 2021-10-15

## 2021-10-11 ENCOUNTER — TELEPHONE (OUTPATIENT)
Dept: NEUROLOGY | Facility: CLINIC | Age: 71
End: 2021-10-11

## 2021-10-12 ENCOUNTER — OFFICE VISIT (OUTPATIENT)
Dept: NEUROLOGY | Facility: CLINIC | Age: 71
End: 2021-10-12
Payer: MEDICARE

## 2021-10-12 VITALS
WEIGHT: 165 LBS | HEIGHT: 64 IN | BODY MASS INDEX: 28.17 KG/M2 | SYSTOLIC BLOOD PRESSURE: 158 MMHG | HEART RATE: 75 BPM | DIASTOLIC BLOOD PRESSURE: 82 MMHG

## 2021-10-12 DIAGNOSIS — G30.1 LATE ONSET ALZHEIMER'S DISEASE WITHOUT BEHAVIORAL DISTURBANCE: Primary | ICD-10-CM

## 2021-10-12 DIAGNOSIS — F02.80 LATE ONSET ALZHEIMER'S DISEASE WITHOUT BEHAVIORAL DISTURBANCE: Primary | ICD-10-CM

## 2021-10-12 DIAGNOSIS — F33.1 MAJOR DEPRESSIVE DISORDER, RECURRENT EPISODE, MODERATE: ICD-10-CM

## 2021-10-12 PROCEDURE — 99999 PR PBB SHADOW E&M-EST. PATIENT-LVL III: ICD-10-PCS | Mod: PBBFAC,,, | Performed by: PSYCHIATRY & NEUROLOGY

## 2021-10-12 PROCEDURE — 3288F FALL RISK ASSESSMENT DOCD: CPT | Mod: CPTII,S$GLB,, | Performed by: PSYCHIATRY & NEUROLOGY

## 2021-10-12 PROCEDURE — 3077F PR MOST RECENT SYSTOLIC BLOOD PRESSURE >= 140 MM HG: ICD-10-PCS | Mod: CPTII,S$GLB,, | Performed by: PSYCHIATRY & NEUROLOGY

## 2021-10-12 PROCEDURE — 1160F RVW MEDS BY RX/DR IN RCRD: CPT | Mod: CPTII,S$GLB,, | Performed by: PSYCHIATRY & NEUROLOGY

## 2021-10-12 PROCEDURE — 3079F PR MOST RECENT DIASTOLIC BLOOD PRESSURE 80-89 MM HG: ICD-10-PCS | Mod: CPTII,S$GLB,, | Performed by: PSYCHIATRY & NEUROLOGY

## 2021-10-12 PROCEDURE — 3008F PR BODY MASS INDEX (BMI) DOCUMENTED: ICD-10-PCS | Mod: CPTII,S$GLB,, | Performed by: PSYCHIATRY & NEUROLOGY

## 2021-10-12 PROCEDURE — 3079F DIAST BP 80-89 MM HG: CPT | Mod: CPTII,S$GLB,, | Performed by: PSYCHIATRY & NEUROLOGY

## 2021-10-12 PROCEDURE — 3077F SYST BP >= 140 MM HG: CPT | Mod: CPTII,S$GLB,, | Performed by: PSYCHIATRY & NEUROLOGY

## 2021-10-12 PROCEDURE — 1159F PR MEDICATION LIST DOCUMENTED IN MEDICAL RECORD: ICD-10-PCS | Mod: CPTII,S$GLB,, | Performed by: PSYCHIATRY & NEUROLOGY

## 2021-10-12 PROCEDURE — 1160F PR REVIEW ALL MEDS BY PRESCRIBER/CLIN PHARMACIST DOCUMENTED: ICD-10-PCS | Mod: CPTII,S$GLB,, | Performed by: PSYCHIATRY & NEUROLOGY

## 2021-10-12 PROCEDURE — 1126F PR PAIN SEVERITY QUANTIFIED, NO PAIN PRESENT: ICD-10-PCS | Mod: CPTII,S$GLB,, | Performed by: PSYCHIATRY & NEUROLOGY

## 2021-10-12 PROCEDURE — 1159F MED LIST DOCD IN RCRD: CPT | Mod: CPTII,S$GLB,, | Performed by: PSYCHIATRY & NEUROLOGY

## 2021-10-12 PROCEDURE — 99999 PR PBB SHADOW E&M-EST. PATIENT-LVL III: CPT | Mod: PBBFAC,,, | Performed by: PSYCHIATRY & NEUROLOGY

## 2021-10-12 PROCEDURE — 99214 PR OFFICE/OUTPT VISIT, EST, LEVL IV, 30-39 MIN: ICD-10-PCS | Mod: S$GLB,,, | Performed by: PSYCHIATRY & NEUROLOGY

## 2021-10-12 PROCEDURE — 4010F ACE/ARB THERAPY RXD/TAKEN: CPT | Mod: CPTII,S$GLB,, | Performed by: PSYCHIATRY & NEUROLOGY

## 2021-10-12 PROCEDURE — 3288F PR FALLS RISK ASSESSMENT DOCUMENTED: ICD-10-PCS | Mod: CPTII,S$GLB,, | Performed by: PSYCHIATRY & NEUROLOGY

## 2021-10-12 PROCEDURE — 1101F PR PT FALLS ASSESS DOC 0-1 FALLS W/OUT INJ PAST YR: ICD-10-PCS | Mod: CPTII,S$GLB,, | Performed by: PSYCHIATRY & NEUROLOGY

## 2021-10-12 PROCEDURE — 4010F PR ACE/ARB THEARPY RXD/TAKEN: ICD-10-PCS | Mod: CPTII,S$GLB,, | Performed by: PSYCHIATRY & NEUROLOGY

## 2021-10-12 PROCEDURE — 1101F PT FALLS ASSESS-DOCD LE1/YR: CPT | Mod: CPTII,S$GLB,, | Performed by: PSYCHIATRY & NEUROLOGY

## 2021-10-12 PROCEDURE — 99214 OFFICE O/P EST MOD 30 MIN: CPT | Mod: S$GLB,,, | Performed by: PSYCHIATRY & NEUROLOGY

## 2021-10-12 PROCEDURE — 3008F BODY MASS INDEX DOCD: CPT | Mod: CPTII,S$GLB,, | Performed by: PSYCHIATRY & NEUROLOGY

## 2021-10-12 PROCEDURE — 1126F AMNT PAIN NOTED NONE PRSNT: CPT | Mod: CPTII,S$GLB,, | Performed by: PSYCHIATRY & NEUROLOGY

## 2021-10-12 RX ORDER — DONEPEZIL HYDROCHLORIDE 5 MG/1
5 TABLET, FILM COATED ORAL NIGHTLY
Qty: 30 TABLET | Refills: 2 | Status: SHIPPED | OUTPATIENT
Start: 2021-10-12 | End: 2022-01-05 | Stop reason: SDUPTHER

## 2021-10-20 ENCOUNTER — OFFICE VISIT (OUTPATIENT)
Dept: PRIMARY CARE CLINIC | Facility: CLINIC | Age: 71
End: 2021-10-20
Attending: FAMILY MEDICINE
Payer: MEDICARE

## 2021-10-20 ENCOUNTER — TELEPHONE (OUTPATIENT)
Dept: PRIMARY CARE CLINIC | Facility: CLINIC | Age: 71
End: 2021-10-20

## 2021-10-20 VITALS
WEIGHT: 160.63 LBS | BODY MASS INDEX: 27.42 KG/M2 | HEIGHT: 64 IN | DIASTOLIC BLOOD PRESSURE: 78 MMHG | SYSTOLIC BLOOD PRESSURE: 140 MMHG | HEART RATE: 90 BPM | OXYGEN SATURATION: 98 %

## 2021-10-20 DIAGNOSIS — G89.29 CHRONIC MIDLINE LOW BACK PAIN WITH SCIATICA, SCIATICA LATERALITY UNSPECIFIED: ICD-10-CM

## 2021-10-20 DIAGNOSIS — F33.1 MAJOR DEPRESSIVE DISORDER, RECURRENT EPISODE, MODERATE: ICD-10-CM

## 2021-10-20 DIAGNOSIS — R21 RASH AND NONSPECIFIC SKIN ERUPTION: ICD-10-CM

## 2021-10-20 DIAGNOSIS — K21.9 GASTROESOPHAGEAL REFLUX DISEASE WITHOUT ESOPHAGITIS: ICD-10-CM

## 2021-10-20 DIAGNOSIS — E87.6 HYPOKALEMIA: ICD-10-CM

## 2021-10-20 DIAGNOSIS — I10 ESSENTIAL HYPERTENSION: Primary | ICD-10-CM

## 2021-10-20 DIAGNOSIS — M54.40 CHRONIC MIDLINE LOW BACK PAIN WITH SCIATICA, SCIATICA LATERALITY UNSPECIFIED: ICD-10-CM

## 2021-10-20 DIAGNOSIS — R79.9 ABNORMAL FINDING OF BLOOD CHEMISTRY, UNSPECIFIED: ICD-10-CM

## 2021-10-20 DIAGNOSIS — L91.8 SKIN TAG: ICD-10-CM

## 2021-10-20 PROCEDURE — 3077F SYST BP >= 140 MM HG: CPT | Mod: CPTII,S$GLB,, | Performed by: FAMILY MEDICINE

## 2021-10-20 PROCEDURE — 1101F PR PT FALLS ASSESS DOC 0-1 FALLS W/OUT INJ PAST YR: ICD-10-PCS | Mod: CPTII,S$GLB,, | Performed by: FAMILY MEDICINE

## 2021-10-20 PROCEDURE — 3078F PR MOST RECENT DIASTOLIC BLOOD PRESSURE < 80 MM HG: ICD-10-PCS | Mod: CPTII,S$GLB,, | Performed by: FAMILY MEDICINE

## 2021-10-20 PROCEDURE — 3008F BODY MASS INDEX DOCD: CPT | Mod: CPTII,S$GLB,, | Performed by: FAMILY MEDICINE

## 2021-10-20 PROCEDURE — 3078F DIAST BP <80 MM HG: CPT | Mod: CPTII,S$GLB,, | Performed by: FAMILY MEDICINE

## 2021-10-20 PROCEDURE — 3077F PR MOST RECENT SYSTOLIC BLOOD PRESSURE >= 140 MM HG: ICD-10-PCS | Mod: CPTII,S$GLB,, | Performed by: FAMILY MEDICINE

## 2021-10-20 PROCEDURE — 1159F MED LIST DOCD IN RCRD: CPT | Mod: CPTII,S$GLB,, | Performed by: FAMILY MEDICINE

## 2021-10-20 PROCEDURE — 1101F PT FALLS ASSESS-DOCD LE1/YR: CPT | Mod: CPTII,S$GLB,, | Performed by: FAMILY MEDICINE

## 2021-10-20 PROCEDURE — 99215 OFFICE O/P EST HI 40 MIN: CPT | Mod: S$GLB,,, | Performed by: FAMILY MEDICINE

## 2021-10-20 PROCEDURE — 1126F PR PAIN SEVERITY QUANTIFIED, NO PAIN PRESENT: ICD-10-PCS | Mod: CPTII,S$GLB,, | Performed by: FAMILY MEDICINE

## 2021-10-20 PROCEDURE — 4010F ACE/ARB THERAPY RXD/TAKEN: CPT | Mod: CPTII,S$GLB,, | Performed by: FAMILY MEDICINE

## 2021-10-20 PROCEDURE — 99999 PR PBB SHADOW E&M-EST. PATIENT-LVL V: ICD-10-PCS | Mod: PBBFAC,,, | Performed by: FAMILY MEDICINE

## 2021-10-20 PROCEDURE — 4010F PR ACE/ARB THEARPY RXD/TAKEN: ICD-10-PCS | Mod: CPTII,S$GLB,, | Performed by: FAMILY MEDICINE

## 2021-10-20 PROCEDURE — 99215 PR OFFICE/OUTPT VISIT, EST, LEVL V, 40-54 MIN: ICD-10-PCS | Mod: S$GLB,,, | Performed by: FAMILY MEDICINE

## 2021-10-20 PROCEDURE — 99999 PR PBB SHADOW E&M-EST. PATIENT-LVL V: CPT | Mod: PBBFAC,,, | Performed by: FAMILY MEDICINE

## 2021-10-20 PROCEDURE — 1159F PR MEDICATION LIST DOCUMENTED IN MEDICAL RECORD: ICD-10-PCS | Mod: CPTII,S$GLB,, | Performed by: FAMILY MEDICINE

## 2021-10-20 PROCEDURE — 3288F PR FALLS RISK ASSESSMENT DOCUMENTED: ICD-10-PCS | Mod: CPTII,S$GLB,, | Performed by: FAMILY MEDICINE

## 2021-10-20 PROCEDURE — 1160F RVW MEDS BY RX/DR IN RCRD: CPT | Mod: CPTII,S$GLB,, | Performed by: FAMILY MEDICINE

## 2021-10-20 PROCEDURE — 3288F FALL RISK ASSESSMENT DOCD: CPT | Mod: CPTII,S$GLB,, | Performed by: FAMILY MEDICINE

## 2021-10-20 PROCEDURE — 1160F PR REVIEW ALL MEDS BY PRESCRIBER/CLIN PHARMACIST DOCUMENTED: ICD-10-PCS | Mod: CPTII,S$GLB,, | Performed by: FAMILY MEDICINE

## 2021-10-20 PROCEDURE — 3008F PR BODY MASS INDEX (BMI) DOCUMENTED: ICD-10-PCS | Mod: CPTII,S$GLB,, | Performed by: FAMILY MEDICINE

## 2021-10-20 PROCEDURE — 1126F AMNT PAIN NOTED NONE PRSNT: CPT | Mod: CPTII,S$GLB,, | Performed by: FAMILY MEDICINE

## 2021-10-20 RX ORDER — MOMETASONE FUROATE 1 MG/G
CREAM TOPICAL
Qty: 50 G | Refills: 3 | Status: SHIPPED | OUTPATIENT
Start: 2021-10-20 | End: 2022-04-21 | Stop reason: SDUPTHER

## 2021-10-22 ENCOUNTER — PES CALL (OUTPATIENT)
Dept: ADMINISTRATIVE | Facility: CLINIC | Age: 71
End: 2021-10-22

## 2021-10-28 ENCOUNTER — ANESTHESIA EVENT (OUTPATIENT)
Dept: ENDOSCOPY | Facility: OTHER | Age: 71
DRG: 378 | End: 2021-10-28
Payer: MEDICARE

## 2021-10-28 ENCOUNTER — HOSPITAL ENCOUNTER (INPATIENT)
Facility: OTHER | Age: 71
LOS: 2 days | Discharge: HOME | DRG: 378 | End: 2021-10-31
Attending: EMERGENCY MEDICINE | Admitting: EMERGENCY MEDICINE
Payer: MEDICARE

## 2021-10-28 DIAGNOSIS — K92.2 ACUTE UPPER GI BLEED: ICD-10-CM

## 2021-10-28 DIAGNOSIS — R00.0 TACHYCARDIA: ICD-10-CM

## 2021-10-28 DIAGNOSIS — D62 ACUTE BLOOD LOSS ANEMIA: ICD-10-CM

## 2021-10-28 DIAGNOSIS — K92.2 GASTROINTESTINAL HEMORRHAGE, UNSPECIFIED GASTROINTESTINAL HEMORRHAGE TYPE: ICD-10-CM

## 2021-10-28 DIAGNOSIS — K92.2 GI BLEED: Primary | ICD-10-CM

## 2021-10-28 PROBLEM — F03.90 DEMENTIA WITHOUT BEHAVIORAL DISTURBANCE: Status: ACTIVE | Noted: 2021-10-28

## 2021-10-28 LAB
ABO + RH BLD: NORMAL
ALBUMIN SERPL BCP-MCNC: 3.8 G/DL (ref 3.5–5.2)
ALP SERPL-CCNC: 60 U/L (ref 55–135)
ALT SERPL W/O P-5'-P-CCNC: 11 U/L (ref 10–44)
ANION GAP SERPL CALC-SCNC: 11 MMOL/L (ref 8–16)
AST SERPL-CCNC: 20 U/L (ref 10–40)
BASOPHILS # BLD AUTO: 0.01 K/UL (ref 0–0.2)
BASOPHILS # BLD AUTO: 0.02 K/UL (ref 0–0.2)
BASOPHILS # BLD AUTO: 0.02 K/UL (ref 0–0.2)
BASOPHILS # BLD AUTO: 0.04 K/UL (ref 0–0.2)
BASOPHILS NFR BLD: 0.2 % (ref 0–1.9)
BASOPHILS NFR BLD: 0.3 % (ref 0–1.9)
BILIRUB SERPL-MCNC: 0.3 MG/DL (ref 0.1–1)
BLD GP AB SCN CELLS X3 SERPL QL: NORMAL
BUN SERPL-MCNC: 13 MG/DL (ref 8–23)
CALCIUM SERPL-MCNC: 9.2 MG/DL (ref 8.7–10.5)
CHLORIDE SERPL-SCNC: 110 MMOL/L (ref 95–110)
CO2 SERPL-SCNC: 22 MMOL/L (ref 23–29)
CREAT SERPL-MCNC: 0.8 MG/DL (ref 0.5–1.4)
DIFFERENTIAL METHOD: ABNORMAL
EOSINOPHIL # BLD AUTO: 0.2 K/UL (ref 0–0.5)
EOSINOPHIL # BLD AUTO: 0.3 K/UL (ref 0–0.5)
EOSINOPHIL NFR BLD: 2.6 % (ref 0–8)
EOSINOPHIL NFR BLD: 2.8 % (ref 0–8)
EOSINOPHIL NFR BLD: 3.2 % (ref 0–8)
EOSINOPHIL NFR BLD: 3.2 % (ref 0–8)
ERYTHROCYTE [DISTWIDTH] IN BLOOD BY AUTOMATED COUNT: 13.8 % (ref 11.5–14.5)
ERYTHROCYTE [DISTWIDTH] IN BLOOD BY AUTOMATED COUNT: 14 % (ref 11.5–14.5)
ERYTHROCYTE [DISTWIDTH] IN BLOOD BY AUTOMATED COUNT: 14.1 % (ref 11.5–14.5)
ERYTHROCYTE [DISTWIDTH] IN BLOOD BY AUTOMATED COUNT: 14.2 % (ref 11.5–14.5)
EST. GFR  (AFRICAN AMERICAN): >60 ML/MIN/1.73 M^2
EST. GFR  (NON AFRICAN AMERICAN): >60 ML/MIN/1.73 M^2
GLUCOSE SERPL-MCNC: 113 MG/DL (ref 70–110)
HCT VFR BLD AUTO: 26.9 % (ref 37–48.5)
HCT VFR BLD AUTO: 29.1 % (ref 37–48.5)
HCT VFR BLD AUTO: 29.8 % (ref 37–48.5)
HCT VFR BLD AUTO: 31.1 % (ref 37–48.5)
HGB BLD-MCNC: 8.2 G/DL (ref 12–16)
HGB BLD-MCNC: 9.2 G/DL (ref 12–16)
HGB BLD-MCNC: 9.5 G/DL (ref 12–16)
HGB BLD-MCNC: 9.9 G/DL (ref 12–16)
IMM GRANULOCYTES # BLD AUTO: 0.02 K/UL (ref 0–0.04)
IMM GRANULOCYTES # BLD AUTO: 0.02 K/UL (ref 0–0.04)
IMM GRANULOCYTES # BLD AUTO: 0.03 K/UL (ref 0–0.04)
IMM GRANULOCYTES # BLD AUTO: 0.04 K/UL (ref 0–0.04)
IMM GRANULOCYTES NFR BLD AUTO: 0.3 % (ref 0–0.5)
IMM GRANULOCYTES NFR BLD AUTO: 0.5 % (ref 0–0.5)
LYMPHOCYTES # BLD AUTO: 1.5 K/UL (ref 1–4.8)
LYMPHOCYTES # BLD AUTO: 2 K/UL (ref 1–4.8)
LYMPHOCYTES # BLD AUTO: 2.7 K/UL (ref 1–4.8)
LYMPHOCYTES # BLD AUTO: 5 K/UL (ref 1–4.8)
LYMPHOCYTES NFR BLD: 24 % (ref 18–48)
LYMPHOCYTES NFR BLD: 32 % (ref 18–48)
LYMPHOCYTES NFR BLD: 37.5 % (ref 18–48)
LYMPHOCYTES NFR BLD: 41.8 % (ref 18–48)
MCH RBC QN AUTO: 27.3 PG (ref 27–31)
MCH RBC QN AUTO: 27.5 PG (ref 27–31)
MCH RBC QN AUTO: 27.5 PG (ref 27–31)
MCH RBC QN AUTO: 27.7 PG (ref 27–31)
MCHC RBC AUTO-ENTMCNC: 30.5 G/DL (ref 32–36)
MCHC RBC AUTO-ENTMCNC: 31.6 G/DL (ref 32–36)
MCHC RBC AUTO-ENTMCNC: 31.8 G/DL (ref 32–36)
MCHC RBC AUTO-ENTMCNC: 31.9 G/DL (ref 32–36)
MCV RBC AUTO: 86 FL (ref 82–98)
MCV RBC AUTO: 86 FL (ref 82–98)
MCV RBC AUTO: 87 FL (ref 82–98)
MCV RBC AUTO: 91 FL (ref 82–98)
MONOCYTES # BLD AUTO: 0.4 K/UL (ref 0.3–1)
MONOCYTES # BLD AUTO: 0.5 K/UL (ref 0.3–1)
MONOCYTES # BLD AUTO: 0.5 K/UL (ref 0.3–1)
MONOCYTES # BLD AUTO: 0.9 K/UL (ref 0.3–1)
MONOCYTES NFR BLD: 6.5 % (ref 4–15)
MONOCYTES NFR BLD: 7.1 % (ref 4–15)
MONOCYTES NFR BLD: 7.3 % (ref 4–15)
MONOCYTES NFR BLD: 7.6 % (ref 4–15)
NEUTROPHILS # BLD AUTO: 3.6 K/UL (ref 1.8–7.7)
NEUTROPHILS # BLD AUTO: 3.7 K/UL (ref 1.8–7.7)
NEUTROPHILS # BLD AUTO: 4.1 K/UL (ref 1.8–7.7)
NEUTROPHILS # BLD AUTO: 5.7 K/UL (ref 1.8–7.7)
NEUTROPHILS NFR BLD: 47.9 % (ref 38–73)
NEUTROPHILS NFR BLD: 51.4 % (ref 38–73)
NEUTROPHILS NFR BLD: 57.8 % (ref 38–73)
NEUTROPHILS NFR BLD: 64.8 % (ref 38–73)
NRBC BLD-RTO: 0 /100 WBC
PLATELET # BLD AUTO: 176 K/UL (ref 150–450)
PLATELET # BLD AUTO: 191 K/UL (ref 150–450)
PLATELET # BLD AUTO: 199 K/UL (ref 150–450)
PLATELET # BLD AUTO: 203 K/UL (ref 150–450)
PMV BLD AUTO: 10 FL (ref 9.2–12.9)
PMV BLD AUTO: 10.1 FL (ref 9.2–12.9)
PMV BLD AUTO: 10.2 FL (ref 9.2–12.9)
PMV BLD AUTO: 10.3 FL (ref 9.2–12.9)
POTASSIUM SERPL-SCNC: 3.7 MMOL/L (ref 3.5–5.1)
PROT SERPL-MCNC: 6.9 G/DL (ref 6–8.4)
RBC # BLD AUTO: 2.96 M/UL (ref 4–5.4)
RBC # BLD AUTO: 3.35 M/UL (ref 4–5.4)
RBC # BLD AUTO: 3.45 M/UL (ref 4–5.4)
RBC # BLD AUTO: 3.62 M/UL (ref 4–5.4)
SODIUM SERPL-SCNC: 143 MMOL/L (ref 136–145)
WBC # BLD AUTO: 11.95 K/UL (ref 3.9–12.7)
WBC # BLD AUTO: 6.28 K/UL (ref 3.9–12.7)
WBC # BLD AUTO: 6.34 K/UL (ref 3.9–12.7)
WBC # BLD AUTO: 7.17 K/UL (ref 3.9–12.7)

## 2021-10-28 PROCEDURE — 85025 COMPLETE CBC W/AUTO DIFF WBC: CPT | Mod: 91 | Performed by: EMERGENCY MEDICINE

## 2021-10-28 PROCEDURE — G0378 HOSPITAL OBSERVATION PER HR: HCPCS

## 2021-10-28 PROCEDURE — 96365 THER/PROPH/DIAG IV INF INIT: CPT

## 2021-10-28 PROCEDURE — C9113 INJ PANTOPRAZOLE SODIUM, VIA: HCPCS | Performed by: EMERGENCY MEDICINE

## 2021-10-28 PROCEDURE — 86900 BLOOD TYPING SEROLOGIC ABO: CPT | Performed by: EMERGENCY MEDICINE

## 2021-10-28 PROCEDURE — 96376 TX/PRO/DX INJ SAME DRUG ADON: CPT

## 2021-10-28 PROCEDURE — 93005 ELECTROCARDIOGRAM TRACING: CPT

## 2021-10-28 PROCEDURE — 86920 COMPATIBILITY TEST SPIN: CPT | Performed by: PHYSICIAN ASSISTANT

## 2021-10-28 PROCEDURE — 99220 PR INITIAL OBSERVATION CARE,LEVL III: CPT | Mod: ,,, | Performed by: PHYSICIAN ASSISTANT

## 2021-10-28 PROCEDURE — 36415 COLL VENOUS BLD VENIPUNCTURE: CPT | Performed by: PHYSICIAN ASSISTANT

## 2021-10-28 PROCEDURE — 85025 COMPLETE CBC W/AUTO DIFF WBC: CPT | Mod: 91 | Performed by: PHYSICIAN ASSISTANT

## 2021-10-28 PROCEDURE — 96366 THER/PROPH/DIAG IV INF ADDON: CPT

## 2021-10-28 PROCEDURE — 93010 EKG 12-LEAD: ICD-10-PCS | Mod: ,,, | Performed by: INTERNAL MEDICINE

## 2021-10-28 PROCEDURE — 63600175 PHARM REV CODE 636 W HCPCS: Performed by: EMERGENCY MEDICINE

## 2021-10-28 PROCEDURE — C9113 INJ PANTOPRAZOLE SODIUM, VIA: HCPCS | Performed by: PHYSICIAN ASSISTANT

## 2021-10-28 PROCEDURE — 25000003 PHARM REV CODE 250: Performed by: EMERGENCY MEDICINE

## 2021-10-28 PROCEDURE — 25000003 PHARM REV CODE 250: Performed by: PHYSICIAN ASSISTANT

## 2021-10-28 PROCEDURE — 85025 COMPLETE CBC W/AUTO DIFF WBC: CPT | Performed by: INTERNAL MEDICINE

## 2021-10-28 PROCEDURE — 36415 COLL VENOUS BLD VENIPUNCTURE: CPT | Performed by: INTERNAL MEDICINE

## 2021-10-28 PROCEDURE — 99285 EMERGENCY DEPT VISIT HI MDM: CPT | Mod: 25

## 2021-10-28 PROCEDURE — 63600175 PHARM REV CODE 636 W HCPCS: Performed by: PHYSICIAN ASSISTANT

## 2021-10-28 PROCEDURE — 80053 COMPREHEN METABOLIC PANEL: CPT | Performed by: EMERGENCY MEDICINE

## 2021-10-28 PROCEDURE — 94761 N-INVAS EAR/PLS OXIMETRY MLT: CPT

## 2021-10-28 PROCEDURE — 99220 PR INITIAL OBSERVATION CARE,LEVL III: ICD-10-PCS | Mod: ,,, | Performed by: PHYSICIAN ASSISTANT

## 2021-10-28 PROCEDURE — 93010 ELECTROCARDIOGRAM REPORT: CPT | Mod: ,,, | Performed by: INTERNAL MEDICINE

## 2021-10-28 PROCEDURE — 96361 HYDRATE IV INFUSION ADD-ON: CPT

## 2021-10-28 RX ORDER — ACETAMINOPHEN 325 MG/1
325 TABLET ORAL EVERY 6 HOURS PRN
Status: DISCONTINUED | OUTPATIENT
Start: 2021-10-28 | End: 2021-10-31 | Stop reason: HOSPADM

## 2021-10-28 RX ORDER — SODIUM CHLORIDE 9 MG/ML
INJECTION, SOLUTION INTRAVENOUS CONTINUOUS
Status: DISCONTINUED | OUTPATIENT
Start: 2021-10-28 | End: 2021-10-29

## 2021-10-28 RX ORDER — DONEPEZIL HYDROCHLORIDE 5 MG/1
5 TABLET, FILM COATED ORAL NIGHTLY
Status: DISCONTINUED | OUTPATIENT
Start: 2021-10-28 | End: 2021-10-31 | Stop reason: HOSPADM

## 2021-10-28 RX ORDER — MAG HYDROX/ALUMINUM HYD/SIMETH 200-200-20
30 SUSPENSION, ORAL (FINAL DOSE FORM) ORAL
Status: DISCONTINUED | OUTPATIENT
Start: 2021-10-28 | End: 2021-10-28

## 2021-10-28 RX ORDER — GABAPENTIN 300 MG/1
300 CAPSULE ORAL 3 TIMES DAILY
Status: DISCONTINUED | OUTPATIENT
Start: 2021-10-28 | End: 2021-10-31 | Stop reason: HOSPADM

## 2021-10-28 RX ORDER — SODIUM CHLORIDE 0.9 % (FLUSH) 0.9 %
10 SYRINGE (ML) INJECTION
Status: DISCONTINUED | OUTPATIENT
Start: 2021-10-28 | End: 2021-10-31 | Stop reason: HOSPADM

## 2021-10-28 RX ORDER — PANTOPRAZOLE SODIUM 40 MG/10ML
80 INJECTION, POWDER, LYOPHILIZED, FOR SOLUTION INTRAVENOUS
Status: COMPLETED | OUTPATIENT
Start: 2021-10-28 | End: 2021-10-28

## 2021-10-28 RX ORDER — SUCRALFATE 1 G/10ML
1 SUSPENSION ORAL EVERY 6 HOURS
Status: DISCONTINUED | OUTPATIENT
Start: 2021-10-28 | End: 2021-10-28

## 2021-10-28 RX ORDER — TALC
6 POWDER (GRAM) TOPICAL NIGHTLY PRN
Status: DISCONTINUED | OUTPATIENT
Start: 2021-10-28 | End: 2021-10-31 | Stop reason: HOSPADM

## 2021-10-28 RX ORDER — ALPRAZOLAM 0.5 MG/1
0.5 TABLET ORAL 2 TIMES DAILY PRN
Status: DISCONTINUED | OUTPATIENT
Start: 2021-10-28 | End: 2021-10-31 | Stop reason: HOSPADM

## 2021-10-28 RX ORDER — HYDROCODONE BITARTRATE AND ACETAMINOPHEN 500; 5 MG/1; MG/1
TABLET ORAL
Status: DISCONTINUED | OUTPATIENT
Start: 2021-10-29 | End: 2021-10-31 | Stop reason: HOSPADM

## 2021-10-28 RX ORDER — PANTOPRAZOLE SODIUM 40 MG/10ML
40 INJECTION, POWDER, LYOPHILIZED, FOR SOLUTION INTRAVENOUS 2 TIMES DAILY
Status: DISCONTINUED | OUTPATIENT
Start: 2021-10-28 | End: 2021-10-30

## 2021-10-28 RX ORDER — HYDROCODONE BITARTRATE AND ACETAMINOPHEN 500; 5 MG/1; MG/1
TABLET ORAL
Status: CANCELLED | OUTPATIENT
Start: 2021-10-28

## 2021-10-28 RX ADMIN — PANTOPRAZOLE SODIUM 8 MG/HR: 40 INJECTION, POWDER, FOR SOLUTION INTRAVENOUS at 10:10

## 2021-10-28 RX ADMIN — ALPRAZOLAM 0.5 MG: 0.5 TABLET ORAL at 11:10

## 2021-10-28 RX ADMIN — GABAPENTIN 300 MG: 300 CAPSULE ORAL at 09:10

## 2021-10-28 RX ADMIN — PANTOPRAZOLE SODIUM 80 MG: 40 INJECTION, POWDER, LYOPHILIZED, FOR SOLUTION INTRAVENOUS at 10:10

## 2021-10-28 RX ADMIN — PANTOPRAZOLE SODIUM 40 MG: 40 INJECTION, POWDER, FOR SOLUTION INTRAVENOUS at 09:10

## 2021-10-28 RX ADMIN — SODIUM CHLORIDE 500 ML: 0.9 INJECTION, SOLUTION INTRAVENOUS at 11:10

## 2021-10-28 RX ADMIN — DONEPEZIL HYDROCHLORIDE 5 MG: 5 TABLET, FILM COATED ORAL at 09:10

## 2021-10-28 RX ADMIN — SODIUM CHLORIDE 1000 ML: 0.9 INJECTION, SOLUTION INTRAVENOUS at 10:10

## 2021-10-28 RX ADMIN — SODIUM CHLORIDE: 0.9 INJECTION, SOLUTION INTRAVENOUS at 12:10

## 2021-10-29 ENCOUNTER — ANESTHESIA (OUTPATIENT)
Dept: ENDOSCOPY | Facility: OTHER | Age: 71
DRG: 378 | End: 2021-10-29
Payer: MEDICARE

## 2021-10-29 LAB
ANION GAP SERPL CALC-SCNC: 5 MMOL/L (ref 8–16)
ANION GAP SERPL CALC-SCNC: 6 MMOL/L (ref 8–16)
BASOPHILS # BLD AUTO: 0.02 K/UL (ref 0–0.2)
BASOPHILS # BLD AUTO: 0.03 K/UL (ref 0–0.2)
BASOPHILS NFR BLD: 0.2 % (ref 0–1.9)
BASOPHILS NFR BLD: 0.4 % (ref 0–1.9)
BLD PROD TYP BPU: NORMAL
BLD PROD TYP BPU: NORMAL
BLOOD UNIT EXPIRATION DATE: NORMAL
BLOOD UNIT EXPIRATION DATE: NORMAL
BLOOD UNIT TYPE CODE: 6200
BLOOD UNIT TYPE CODE: 6200
BLOOD UNIT TYPE: NORMAL
BLOOD UNIT TYPE: NORMAL
BUN SERPL-MCNC: 10 MG/DL (ref 8–23)
BUN SERPL-MCNC: 10 MG/DL (ref 8–23)
CALCIUM SERPL-MCNC: 8 MG/DL (ref 8.7–10.5)
CALCIUM SERPL-MCNC: 8.4 MG/DL (ref 8.7–10.5)
CHLORIDE SERPL-SCNC: 115 MMOL/L (ref 95–110)
CHLORIDE SERPL-SCNC: 116 MMOL/L (ref 95–110)
CO2 SERPL-SCNC: 22 MMOL/L (ref 23–29)
CO2 SERPL-SCNC: 23 MMOL/L (ref 23–29)
CODING SYSTEM: NORMAL
CODING SYSTEM: NORMAL
CREAT SERPL-MCNC: 0.7 MG/DL (ref 0.5–1.4)
CREAT SERPL-MCNC: 0.7 MG/DL (ref 0.5–1.4)
DIFFERENTIAL METHOD: ABNORMAL
DIFFERENTIAL METHOD: ABNORMAL
DISPENSE STATUS: NORMAL
DISPENSE STATUS: NORMAL
EOSINOPHIL # BLD AUTO: 0.1 K/UL (ref 0–0.5)
EOSINOPHIL # BLD AUTO: 0.2 K/UL (ref 0–0.5)
EOSINOPHIL NFR BLD: 1 % (ref 0–8)
EOSINOPHIL NFR BLD: 2.1 % (ref 0–8)
ERYTHROCYTE [DISTWIDTH] IN BLOOD BY AUTOMATED COUNT: 13.9 % (ref 11.5–14.5)
ERYTHROCYTE [DISTWIDTH] IN BLOOD BY AUTOMATED COUNT: 14 % (ref 11.5–14.5)
ERYTHROCYTE [DISTWIDTH] IN BLOOD BY AUTOMATED COUNT: 14 % (ref 11.5–14.5)
EST. GFR  (AFRICAN AMERICAN): >60 ML/MIN/1.73 M^2
EST. GFR  (AFRICAN AMERICAN): >60 ML/MIN/1.73 M^2
EST. GFR  (NON AFRICAN AMERICAN): >60 ML/MIN/1.73 M^2
EST. GFR  (NON AFRICAN AMERICAN): >60 ML/MIN/1.73 M^2
GLUCOSE SERPL-MCNC: 117 MG/DL (ref 70–110)
GLUCOSE SERPL-MCNC: 96 MG/DL (ref 70–110)
HCT VFR BLD AUTO: 24.3 % (ref 37–48.5)
HCT VFR BLD AUTO: 25.5 % (ref 37–48.5)
HCT VFR BLD AUTO: 28.2 % (ref 37–48.5)
HGB BLD-MCNC: 7.9 G/DL (ref 12–16)
HGB BLD-MCNC: 8.1 G/DL (ref 12–16)
HGB BLD-MCNC: 9.1 G/DL (ref 12–16)
IMM GRANULOCYTES # BLD AUTO: 0.02 K/UL (ref 0–0.04)
IMM GRANULOCYTES # BLD AUTO: 0.02 K/UL (ref 0–0.04)
IMM GRANULOCYTES NFR BLD AUTO: 0.2 % (ref 0–0.5)
IMM GRANULOCYTES NFR BLD AUTO: 0.3 % (ref 0–0.5)
LYMPHOCYTES # BLD AUTO: 1.6 K/UL (ref 1–4.8)
LYMPHOCYTES # BLD AUTO: 1.7 K/UL (ref 1–4.8)
LYMPHOCYTES NFR BLD: 16.8 % (ref 18–48)
LYMPHOCYTES NFR BLD: 21.8 % (ref 18–48)
MAGNESIUM SERPL-MCNC: 2 MG/DL (ref 1.6–2.6)
MCH RBC QN AUTO: 27.7 PG (ref 27–31)
MCH RBC QN AUTO: 28.5 PG (ref 27–31)
MCH RBC QN AUTO: 28.6 PG (ref 27–31)
MCHC RBC AUTO-ENTMCNC: 31.8 G/DL (ref 32–36)
MCHC RBC AUTO-ENTMCNC: 32.3 G/DL (ref 32–36)
MCHC RBC AUTO-ENTMCNC: 32.5 G/DL (ref 32–36)
MCV RBC AUTO: 87 FL (ref 82–98)
MCV RBC AUTO: 88 FL (ref 82–98)
MCV RBC AUTO: 89 FL (ref 82–98)
MONOCYTES # BLD AUTO: 0.4 K/UL (ref 0.3–1)
MONOCYTES # BLD AUTO: 0.5 K/UL (ref 0.3–1)
MONOCYTES NFR BLD: 4.2 % (ref 4–15)
MONOCYTES NFR BLD: 6 % (ref 4–15)
NEUTROPHILS # BLD AUTO: 5.5 K/UL (ref 1.8–7.7)
NEUTROPHILS # BLD AUTO: 7.4 K/UL (ref 1.8–7.7)
NEUTROPHILS NFR BLD: 69.4 % (ref 38–73)
NEUTROPHILS NFR BLD: 77.6 % (ref 38–73)
NRBC BLD-RTO: 0 /100 WBC
NRBC BLD-RTO: 0 /100 WBC
PLATELET # BLD AUTO: 134 K/UL (ref 150–450)
PLATELET # BLD AUTO: 139 K/UL (ref 150–450)
PLATELET # BLD AUTO: 142 K/UL (ref 150–450)
PMV BLD AUTO: 10.3 FL (ref 9.2–12.9)
PMV BLD AUTO: 10.6 FL (ref 9.2–12.9)
PMV BLD AUTO: 9.8 FL (ref 9.2–12.9)
POTASSIUM SERPL-SCNC: 3.5 MMOL/L (ref 3.5–5.1)
POTASSIUM SERPL-SCNC: 3.9 MMOL/L (ref 3.5–5.1)
RBC # BLD AUTO: 2.77 M/UL (ref 4–5.4)
RBC # BLD AUTO: 2.92 M/UL (ref 4–5.4)
RBC # BLD AUTO: 3.18 M/UL (ref 4–5.4)
SODIUM SERPL-SCNC: 143 MMOL/L (ref 136–145)
SODIUM SERPL-SCNC: 144 MMOL/L (ref 136–145)
TRANS ERYTHROCYTES VOL PATIENT: NORMAL ML
TRANS ERYTHROCYTES VOL PATIENT: NORMAL ML
WBC # BLD AUTO: 10.7 K/UL (ref 3.9–12.7)
WBC # BLD AUTO: 7.97 K/UL (ref 3.9–12.7)
WBC # BLD AUTO: 9.59 K/UL (ref 3.9–12.7)

## 2021-10-29 PROCEDURE — 63600175 PHARM REV CODE 636 W HCPCS: Performed by: NURSE ANESTHETIST, CERTIFIED REGISTERED

## 2021-10-29 PROCEDURE — 36569 INSJ PICC 5 YR+ W/O IMAGING: CPT

## 2021-10-29 PROCEDURE — 25000003 PHARM REV CODE 250: Performed by: PHYSICIAN ASSISTANT

## 2021-10-29 PROCEDURE — 63600175 PHARM REV CODE 636 W HCPCS: Performed by: INTERNAL MEDICINE

## 2021-10-29 PROCEDURE — 99233 SBSQ HOSP IP/OBS HIGH 50: CPT | Mod: ,,, | Performed by: PHYSICIAN ASSISTANT

## 2021-10-29 PROCEDURE — A4216 STERILE WATER/SALINE, 10 ML: HCPCS | Performed by: PHYSICIAN ASSISTANT

## 2021-10-29 PROCEDURE — 80048 BASIC METABOLIC PNL TOTAL CA: CPT | Performed by: PHYSICIAN ASSISTANT

## 2021-10-29 PROCEDURE — C9113 INJ PANTOPRAZOLE SODIUM, VIA: HCPCS | Performed by: PHYSICIAN ASSISTANT

## 2021-10-29 PROCEDURE — 21400001 HC TELEMETRY ROOM

## 2021-10-29 PROCEDURE — P9021 RED BLOOD CELLS UNIT: HCPCS | Performed by: PHYSICIAN ASSISTANT

## 2021-10-29 PROCEDURE — 80048 BASIC METABOLIC PNL TOTAL CA: CPT | Mod: 91 | Performed by: INTERNAL MEDICINE

## 2021-10-29 PROCEDURE — 63600175 PHARM REV CODE 636 W HCPCS: Performed by: PHYSICIAN ASSISTANT

## 2021-10-29 PROCEDURE — 37000009 HC ANESTHESIA EA ADD 15 MINS: Performed by: INTERNAL MEDICINE

## 2021-10-29 PROCEDURE — 25000003 PHARM REV CODE 250: Performed by: INTERNAL MEDICINE

## 2021-10-29 PROCEDURE — 99233 PR SUBSEQUENT HOSPITAL CARE,LEVL III: ICD-10-PCS | Mod: ,,, | Performed by: PHYSICIAN ASSISTANT

## 2021-10-29 PROCEDURE — 85025 COMPLETE CBC W/AUTO DIFF WBC: CPT | Mod: 91 | Performed by: PHYSICIAN ASSISTANT

## 2021-10-29 PROCEDURE — A4216 STERILE WATER/SALINE, 10 ML: HCPCS | Performed by: INTERNAL MEDICINE

## 2021-10-29 PROCEDURE — 85027 COMPLETE CBC AUTOMATED: CPT | Performed by: PHYSICIAN ASSISTANT

## 2021-10-29 PROCEDURE — C9113 INJ PANTOPRAZOLE SODIUM, VIA: HCPCS | Performed by: INTERNAL MEDICINE

## 2021-10-29 PROCEDURE — 36430 TRANSFUSION BLD/BLD COMPNT: CPT

## 2021-10-29 PROCEDURE — 83735 ASSAY OF MAGNESIUM: CPT | Performed by: PHYSICIAN ASSISTANT

## 2021-10-29 PROCEDURE — 96361 HYDRATE IV INFUSION ADD-ON: CPT

## 2021-10-29 PROCEDURE — 96367 TX/PROPH/DG ADDL SEQ IV INF: CPT

## 2021-10-29 PROCEDURE — 94761 N-INVAS EAR/PLS OXIMETRY MLT: CPT

## 2021-10-29 PROCEDURE — C1751 CATH, INF, PER/CENT/MIDLINE: HCPCS

## 2021-10-29 PROCEDURE — 37000008 HC ANESTHESIA 1ST 15 MINUTES: Performed by: INTERNAL MEDICINE

## 2021-10-29 PROCEDURE — 43235 EGD DIAGNOSTIC BRUSH WASH: CPT | Performed by: INTERNAL MEDICINE

## 2021-10-29 PROCEDURE — 25000003 PHARM REV CODE 250: Performed by: NURSE ANESTHETIST, CERTIFIED REGISTERED

## 2021-10-29 RX ORDER — POTASSIUM CHLORIDE 7.45 MG/ML
10 INJECTION INTRAVENOUS
Status: COMPLETED | OUTPATIENT
Start: 2021-10-29 | End: 2021-10-29

## 2021-10-29 RX ORDER — SODIUM CHLORIDE 0.9 % (FLUSH) 0.9 %
10 SYRINGE (ML) INJECTION
Status: DISCONTINUED | OUTPATIENT
Start: 2021-10-29 | End: 2021-10-31 | Stop reason: HOSPADM

## 2021-10-29 RX ORDER — SODIUM CHLORIDE 0.9 % (FLUSH) 0.9 %
10 SYRINGE (ML) INJECTION EVERY 6 HOURS
Status: DISCONTINUED | OUTPATIENT
Start: 2021-10-29 | End: 2021-10-31 | Stop reason: HOSPADM

## 2021-10-29 RX ORDER — PROPOFOL 10 MG/ML
VIAL (ML) INTRAVENOUS
Status: DISCONTINUED | OUTPATIENT
Start: 2021-10-29 | End: 2021-10-29

## 2021-10-29 RX ORDER — LIDOCAINE HYDROCHLORIDE 20 MG/ML
INJECTION INTRAVENOUS
Status: DISCONTINUED | OUTPATIENT
Start: 2021-10-29 | End: 2021-10-29

## 2021-10-29 RX ADMIN — PROPOFOL 20 MG: 10 INJECTION, EMULSION INTRAVENOUS at 07:10

## 2021-10-29 RX ADMIN — GABAPENTIN 300 MG: 300 CAPSULE ORAL at 08:10

## 2021-10-29 RX ADMIN — POTASSIUM CHLORIDE 10 MEQ: 7.46 INJECTION, SOLUTION INTRAVENOUS at 07:10

## 2021-10-29 RX ADMIN — SODIUM CHLORIDE: 0.9 INJECTION, SOLUTION INTRAVENOUS at 12:10

## 2021-10-29 RX ADMIN — SODIUM CHLORIDE, PRESERVATIVE FREE 10 ML: 5 INJECTION INTRAVENOUS at 06:10

## 2021-10-29 RX ADMIN — SODIUM CHLORIDE, PRESERVATIVE FREE 10 ML: 5 INJECTION INTRAVENOUS at 08:10

## 2021-10-29 RX ADMIN — PANTOPRAZOLE SODIUM 40 MG: 40 INJECTION, POWDER, FOR SOLUTION INTRAVENOUS at 09:10

## 2021-10-29 RX ADMIN — SODIUM CHLORIDE: 0.9 INJECTION, SOLUTION INTRAVENOUS at 07:10

## 2021-10-29 RX ADMIN — SODIUM CHLORIDE, PRESERVATIVE FREE 10 ML: 5 INJECTION INTRAVENOUS at 12:10

## 2021-10-29 RX ADMIN — SODIUM CHLORIDE, PRESERVATIVE FREE 10 ML: 5 INJECTION INTRAVENOUS at 11:10

## 2021-10-29 RX ADMIN — LIDOCAINE HYDROCHLORIDE 50 MG: 20 INJECTION, SOLUTION INTRAVENOUS at 07:10

## 2021-10-29 RX ADMIN — GABAPENTIN 300 MG: 300 CAPSULE ORAL at 02:10

## 2021-10-29 RX ADMIN — ACETAMINOPHEN 325 MG: 325 TABLET, FILM COATED ORAL at 12:10

## 2021-10-29 RX ADMIN — ACETAMINOPHEN 325 MG: 325 TABLET, FILM COATED ORAL at 10:10

## 2021-10-29 RX ADMIN — DONEPEZIL HYDROCHLORIDE 5 MG: 5 TABLET, FILM COATED ORAL at 08:10

## 2021-10-29 RX ADMIN — PANTOPRAZOLE SODIUM 40 MG: 40 INJECTION, POWDER, FOR SOLUTION INTRAVENOUS at 08:10

## 2021-10-29 RX ADMIN — POTASSIUM CHLORIDE 10 MEQ: 7.46 INJECTION, SOLUTION INTRAVENOUS at 09:10

## 2021-10-30 LAB
ANION GAP SERPL CALC-SCNC: 7 MMOL/L (ref 8–16)
BASOPHILS # BLD AUTO: 0.03 K/UL (ref 0–0.2)
BASOPHILS NFR BLD: 0.5 % (ref 0–1.9)
BUN SERPL-MCNC: 7 MG/DL (ref 8–23)
CALCIUM SERPL-MCNC: 8.7 MG/DL (ref 8.7–10.5)
CHLORIDE SERPL-SCNC: 113 MMOL/L (ref 95–110)
CO2 SERPL-SCNC: 25 MMOL/L (ref 23–29)
CREAT SERPL-MCNC: 0.7 MG/DL (ref 0.5–1.4)
DIFFERENTIAL METHOD: ABNORMAL
EOSINOPHIL # BLD AUTO: 0.3 K/UL (ref 0–0.5)
EOSINOPHIL NFR BLD: 4.4 % (ref 0–8)
ERYTHROCYTE [DISTWIDTH] IN BLOOD BY AUTOMATED COUNT: 14 % (ref 11.5–14.5)
EST. GFR  (AFRICAN AMERICAN): >60 ML/MIN/1.73 M^2
EST. GFR  (NON AFRICAN AMERICAN): >60 ML/MIN/1.73 M^2
GLUCOSE SERPL-MCNC: 85 MG/DL (ref 70–110)
HCT VFR BLD AUTO: 27.4 % (ref 37–48.5)
HGB BLD-MCNC: 9.1 G/DL (ref 12–16)
IMM GRANULOCYTES # BLD AUTO: 0.02 K/UL (ref 0–0.04)
IMM GRANULOCYTES NFR BLD AUTO: 0.3 % (ref 0–0.5)
LYMPHOCYTES # BLD AUTO: 1.6 K/UL (ref 1–4.8)
LYMPHOCYTES NFR BLD: 25.5 % (ref 18–48)
MCH RBC QN AUTO: 29.3 PG (ref 27–31)
MCHC RBC AUTO-ENTMCNC: 33.2 G/DL (ref 32–36)
MCV RBC AUTO: 88 FL (ref 82–98)
MONOCYTES # BLD AUTO: 0.5 K/UL (ref 0.3–1)
MONOCYTES NFR BLD: 7.6 % (ref 4–15)
NEUTROPHILS # BLD AUTO: 3.8 K/UL (ref 1.8–7.7)
NEUTROPHILS NFR BLD: 61.7 % (ref 38–73)
NRBC BLD-RTO: 0 /100 WBC
PLATELET # BLD AUTO: 120 K/UL (ref 150–450)
PMV BLD AUTO: 9.7 FL (ref 9.2–12.9)
POTASSIUM SERPL-SCNC: 3.5 MMOL/L (ref 3.5–5.1)
RBC # BLD AUTO: 3.11 M/UL (ref 4–5.4)
SODIUM SERPL-SCNC: 145 MMOL/L (ref 136–145)
WBC # BLD AUTO: 6.2 K/UL (ref 3.9–12.7)

## 2021-10-30 PROCEDURE — 63600175 PHARM REV CODE 636 W HCPCS: Performed by: INTERNAL MEDICINE

## 2021-10-30 PROCEDURE — C9113 INJ PANTOPRAZOLE SODIUM, VIA: HCPCS | Performed by: INTERNAL MEDICINE

## 2021-10-30 PROCEDURE — 99233 PR SUBSEQUENT HOSPITAL CARE,LEVL III: ICD-10-PCS | Mod: ,,, | Performed by: PHYSICIAN ASSISTANT

## 2021-10-30 PROCEDURE — 25000003 PHARM REV CODE 250: Performed by: PHYSICIAN ASSISTANT

## 2021-10-30 PROCEDURE — 25000003 PHARM REV CODE 250: Performed by: INTERNAL MEDICINE

## 2021-10-30 PROCEDURE — 99233 SBSQ HOSP IP/OBS HIGH 50: CPT | Mod: ,,, | Performed by: PHYSICIAN ASSISTANT

## 2021-10-30 PROCEDURE — 80048 BASIC METABOLIC PNL TOTAL CA: CPT | Performed by: INTERNAL MEDICINE

## 2021-10-30 PROCEDURE — 85025 COMPLETE CBC W/AUTO DIFF WBC: CPT | Performed by: INTERNAL MEDICINE

## 2021-10-30 PROCEDURE — 21400001 HC TELEMETRY ROOM

## 2021-10-30 PROCEDURE — 94761 N-INVAS EAR/PLS OXIMETRY MLT: CPT

## 2021-10-30 PROCEDURE — A4216 STERILE WATER/SALINE, 10 ML: HCPCS | Performed by: INTERNAL MEDICINE

## 2021-10-30 RX ORDER — POTASSIUM CHLORIDE 20 MEQ/1
20 TABLET, EXTENDED RELEASE ORAL ONCE
Status: COMPLETED | OUTPATIENT
Start: 2021-10-30 | End: 2021-10-30

## 2021-10-30 RX ORDER — PANTOPRAZOLE SODIUM 40 MG/1
40 TABLET, DELAYED RELEASE ORAL DAILY
Status: DISCONTINUED | OUTPATIENT
Start: 2021-10-31 | End: 2021-10-31 | Stop reason: HOSPADM

## 2021-10-30 RX ADMIN — POTASSIUM CHLORIDE 20 MEQ: 1500 TABLET, EXTENDED RELEASE ORAL at 08:10

## 2021-10-30 RX ADMIN — GABAPENTIN 300 MG: 300 CAPSULE ORAL at 08:10

## 2021-10-30 RX ADMIN — SODIUM CHLORIDE, PRESERVATIVE FREE 10 ML: 5 INJECTION INTRAVENOUS at 06:10

## 2021-10-30 RX ADMIN — GABAPENTIN 300 MG: 300 CAPSULE ORAL at 04:10

## 2021-10-30 RX ADMIN — PANTOPRAZOLE SODIUM 40 MG: 40 INJECTION, POWDER, FOR SOLUTION INTRAVENOUS at 08:10

## 2021-10-30 RX ADMIN — SODIUM CHLORIDE, PRESERVATIVE FREE 10 ML: 5 INJECTION INTRAVENOUS at 08:10

## 2021-10-30 RX ADMIN — SODIUM CHLORIDE, PRESERVATIVE FREE 10 ML: 5 INJECTION INTRAVENOUS at 11:10

## 2021-10-30 RX ADMIN — DONEPEZIL HYDROCHLORIDE 5 MG: 5 TABLET, FILM COATED ORAL at 08:10

## 2021-10-30 RX ADMIN — MELATONIN TAB 3 MG 6 MG: 3 TAB at 08:10

## 2021-10-30 RX ADMIN — ALPRAZOLAM 0.5 MG: 0.5 TABLET ORAL at 08:10

## 2021-10-31 VITALS
DIASTOLIC BLOOD PRESSURE: 58 MMHG | HEIGHT: 66 IN | BODY MASS INDEX: 26.14 KG/M2 | HEART RATE: 96 BPM | OXYGEN SATURATION: 99 % | TEMPERATURE: 98 F | RESPIRATION RATE: 18 BRPM | SYSTOLIC BLOOD PRESSURE: 125 MMHG | WEIGHT: 162.63 LBS

## 2021-10-31 LAB
ANION GAP SERPL CALC-SCNC: 7 MMOL/L (ref 8–16)
BASOPHILS # BLD AUTO: 0.02 K/UL (ref 0–0.2)
BASOPHILS NFR BLD: 0.4 % (ref 0–1.9)
BUN SERPL-MCNC: 12 MG/DL (ref 8–23)
CALCIUM SERPL-MCNC: 8.3 MG/DL (ref 8.7–10.5)
CHLORIDE SERPL-SCNC: 112 MMOL/L (ref 95–110)
CO2 SERPL-SCNC: 25 MMOL/L (ref 23–29)
CREAT SERPL-MCNC: 0.7 MG/DL (ref 0.5–1.4)
DIFFERENTIAL METHOD: ABNORMAL
EOSINOPHIL # BLD AUTO: 0.2 K/UL (ref 0–0.5)
EOSINOPHIL NFR BLD: 3.6 % (ref 0–8)
ERYTHROCYTE [DISTWIDTH] IN BLOOD BY AUTOMATED COUNT: 14.2 % (ref 11.5–14.5)
EST. GFR  (AFRICAN AMERICAN): >60 ML/MIN/1.73 M^2
EST. GFR  (NON AFRICAN AMERICAN): >60 ML/MIN/1.73 M^2
GLUCOSE SERPL-MCNC: 89 MG/DL (ref 70–110)
HCT VFR BLD AUTO: 23.1 % (ref 37–48.5)
HCT VFR BLD AUTO: 24.2 % (ref 37–48.5)
HGB BLD-MCNC: 7.6 G/DL (ref 12–16)
HGB BLD-MCNC: 7.9 G/DL (ref 12–16)
IMM GRANULOCYTES # BLD AUTO: 0.01 K/UL (ref 0–0.04)
IMM GRANULOCYTES NFR BLD AUTO: 0.2 % (ref 0–0.5)
LYMPHOCYTES # BLD AUTO: 1.8 K/UL (ref 1–4.8)
LYMPHOCYTES NFR BLD: 31.8 % (ref 18–48)
MCH RBC QN AUTO: 29.1 PG (ref 27–31)
MCHC RBC AUTO-ENTMCNC: 32.9 G/DL (ref 32–36)
MCV RBC AUTO: 89 FL (ref 82–98)
MONOCYTES # BLD AUTO: 0.5 K/UL (ref 0.3–1)
MONOCYTES NFR BLD: 8.4 % (ref 4–15)
NEUTROPHILS # BLD AUTO: 3.1 K/UL (ref 1.8–7.7)
NEUTROPHILS NFR BLD: 55.6 % (ref 38–73)
NRBC BLD-RTO: 0 /100 WBC
PLATELET # BLD AUTO: 119 K/UL (ref 150–450)
PMV BLD AUTO: 9.9 FL (ref 9.2–12.9)
POTASSIUM SERPL-SCNC: 3.4 MMOL/L (ref 3.5–5.1)
RBC # BLD AUTO: 2.61 M/UL (ref 4–5.4)
SODIUM SERPL-SCNC: 144 MMOL/L (ref 136–145)
WBC # BLD AUTO: 5.5 K/UL (ref 3.9–12.7)

## 2021-10-31 PROCEDURE — 99239 HOSP IP/OBS DSCHRG MGMT >30: CPT | Mod: ,,, | Performed by: PHYSICIAN ASSISTANT

## 2021-10-31 PROCEDURE — 80048 BASIC METABOLIC PNL TOTAL CA: CPT | Performed by: INTERNAL MEDICINE

## 2021-10-31 PROCEDURE — 25000003 PHARM REV CODE 250: Performed by: PHYSICIAN ASSISTANT

## 2021-10-31 PROCEDURE — 85018 HEMOGLOBIN: CPT | Performed by: PHYSICIAN ASSISTANT

## 2021-10-31 PROCEDURE — 85014 HEMATOCRIT: CPT | Performed by: PHYSICIAN ASSISTANT

## 2021-10-31 PROCEDURE — 99239 PR HOSPITAL DISCHARGE DAY,>30 MIN: ICD-10-PCS | Mod: ,,, | Performed by: PHYSICIAN ASSISTANT

## 2021-10-31 PROCEDURE — 25000003 PHARM REV CODE 250: Performed by: INTERNAL MEDICINE

## 2021-10-31 PROCEDURE — 85025 COMPLETE CBC W/AUTO DIFF WBC: CPT | Performed by: INTERNAL MEDICINE

## 2021-10-31 PROCEDURE — A4216 STERILE WATER/SALINE, 10 ML: HCPCS | Performed by: INTERNAL MEDICINE

## 2021-10-31 RX ORDER — POTASSIUM CHLORIDE 20 MEQ/1
40 TABLET, EXTENDED RELEASE ORAL ONCE
Status: COMPLETED | OUTPATIENT
Start: 2021-10-31 | End: 2021-10-31

## 2021-10-31 RX ADMIN — PANTOPRAZOLE SODIUM 40 MG: 40 TABLET, DELAYED RELEASE ORAL at 08:10

## 2021-10-31 RX ADMIN — SODIUM CHLORIDE, PRESERVATIVE FREE 10 ML: 5 INJECTION INTRAVENOUS at 12:10

## 2021-10-31 RX ADMIN — ALPRAZOLAM 0.5 MG: 0.5 TABLET ORAL at 01:10

## 2021-10-31 RX ADMIN — ACETAMINOPHEN 325 MG: 325 TABLET, FILM COATED ORAL at 10:10

## 2021-10-31 RX ADMIN — SODIUM CHLORIDE, PRESERVATIVE FREE 10 ML: 5 INJECTION INTRAVENOUS at 06:10

## 2021-10-31 RX ADMIN — GABAPENTIN 300 MG: 300 CAPSULE ORAL at 08:10

## 2021-10-31 RX ADMIN — POTASSIUM CHLORIDE 40 MEQ: 1500 TABLET, EXTENDED RELEASE ORAL at 09:10

## 2021-11-01 ENCOUNTER — PATIENT OUTREACH (OUTPATIENT)
Dept: ADMINISTRATIVE | Facility: CLINIC | Age: 71
End: 2021-11-01
Payer: MEDICARE

## 2021-11-04 ENCOUNTER — LAB VISIT (OUTPATIENT)
Dept: LAB | Facility: OTHER | Age: 71
End: 2021-11-04
Attending: FAMILY MEDICINE
Payer: MEDICARE

## 2021-11-04 DIAGNOSIS — E87.6 HYPOKALEMIA: ICD-10-CM

## 2021-11-04 DIAGNOSIS — I10 ESSENTIAL HYPERTENSION: ICD-10-CM

## 2021-11-04 DIAGNOSIS — F33.1 MAJOR DEPRESSIVE DISORDER, RECURRENT EPISODE, MODERATE: ICD-10-CM

## 2021-11-04 DIAGNOSIS — K21.9 GASTROESOPHAGEAL REFLUX DISEASE WITHOUT ESOPHAGITIS: ICD-10-CM

## 2021-11-04 DIAGNOSIS — R79.9 ABNORMAL FINDING OF BLOOD CHEMISTRY, UNSPECIFIED: ICD-10-CM

## 2021-11-04 LAB
CHOLEST SERPL-MCNC: 195 MG/DL (ref 120–199)
CHOLEST/HDLC SERPL: 3.6 {RATIO} (ref 2–5)
ESTIMATED AVG GLUCOSE: 103 MG/DL (ref 68–131)
HBA1C MFR BLD: 5.2 % (ref 4–5.6)
HDLC SERPL-MCNC: 54 MG/DL (ref 40–75)
HDLC SERPL: 27.7 % (ref 20–50)
LDLC SERPL CALC-MCNC: 127.4 MG/DL (ref 63–159)
NONHDLC SERPL-MCNC: 141 MG/DL
TRIGL SERPL-MCNC: 68 MG/DL (ref 30–150)
TSH SERPL DL<=0.005 MIU/L-ACNC: 1.4 UIU/ML (ref 0.4–4)

## 2021-11-04 PROCEDURE — 84443 ASSAY THYROID STIM HORMONE: CPT | Performed by: FAMILY MEDICINE

## 2021-11-04 PROCEDURE — 80061 LIPID PANEL: CPT | Performed by: FAMILY MEDICINE

## 2021-11-04 PROCEDURE — 83036 HEMOGLOBIN GLYCOSYLATED A1C: CPT | Performed by: FAMILY MEDICINE

## 2021-11-04 PROCEDURE — 36415 COLL VENOUS BLD VENIPUNCTURE: CPT | Performed by: FAMILY MEDICINE

## 2021-11-05 ENCOUNTER — PATIENT MESSAGE (OUTPATIENT)
Dept: ORTHOPEDICS | Facility: CLINIC | Age: 71
End: 2021-11-05
Payer: MEDICARE

## 2021-11-05 ENCOUNTER — TELEPHONE (OUTPATIENT)
Dept: INTERNAL MEDICINE | Facility: CLINIC | Age: 71
End: 2021-11-05
Payer: MEDICARE

## 2021-11-08 ENCOUNTER — OFFICE VISIT (OUTPATIENT)
Dept: ORTHOPEDICS | Facility: CLINIC | Age: 71
End: 2021-11-08
Payer: MEDICARE

## 2021-11-08 ENCOUNTER — PES CALL (OUTPATIENT)
Dept: HOME HEALTH SERVICES | Facility: CLINIC | Age: 71
End: 2021-11-08
Payer: MEDICARE

## 2021-11-08 VITALS — WEIGHT: 162.69 LBS | HEIGHT: 66 IN | BODY MASS INDEX: 26.14 KG/M2

## 2021-11-08 DIAGNOSIS — M79.605 LEFT LEG PAIN: Primary | ICD-10-CM

## 2021-11-08 PROCEDURE — 3044F PR MOST RECENT HEMOGLOBIN A1C LEVEL <7.0%: ICD-10-PCS | Mod: CPTII,S$GLB,, | Performed by: PHYSICIAN ASSISTANT

## 2021-11-08 PROCEDURE — 3008F PR BODY MASS INDEX (BMI) DOCUMENTED: ICD-10-PCS | Mod: CPTII,S$GLB,, | Performed by: PHYSICIAN ASSISTANT

## 2021-11-08 PROCEDURE — 1160F RVW MEDS BY RX/DR IN RCRD: CPT | Mod: CPTII,S$GLB,, | Performed by: PHYSICIAN ASSISTANT

## 2021-11-08 PROCEDURE — 1160F PR REVIEW ALL MEDS BY PRESCRIBER/CLIN PHARMACIST DOCUMENTED: ICD-10-PCS | Mod: CPTII,S$GLB,, | Performed by: PHYSICIAN ASSISTANT

## 2021-11-08 PROCEDURE — 4010F PR ACE/ARB THEARPY RXD/TAKEN: ICD-10-PCS | Mod: CPTII,S$GLB,, | Performed by: PHYSICIAN ASSISTANT

## 2021-11-08 PROCEDURE — 1159F MED LIST DOCD IN RCRD: CPT | Mod: CPTII,S$GLB,, | Performed by: PHYSICIAN ASSISTANT

## 2021-11-08 PROCEDURE — 4010F ACE/ARB THERAPY RXD/TAKEN: CPT | Mod: CPTII,S$GLB,, | Performed by: PHYSICIAN ASSISTANT

## 2021-11-08 PROCEDURE — 1111F PR DISCHARGE MEDS RECONCILED W/ CURRENT OUTPATIENT MED LIST: ICD-10-PCS | Mod: CPTII,S$GLB,, | Performed by: PHYSICIAN ASSISTANT

## 2021-11-08 PROCEDURE — 1125F AMNT PAIN NOTED PAIN PRSNT: CPT | Mod: CPTII,S$GLB,, | Performed by: PHYSICIAN ASSISTANT

## 2021-11-08 PROCEDURE — 1111F DSCHRG MED/CURRENT MED MERGE: CPT | Mod: CPTII,S$GLB,, | Performed by: PHYSICIAN ASSISTANT

## 2021-11-08 PROCEDURE — 3044F HG A1C LEVEL LT 7.0%: CPT | Mod: CPTII,S$GLB,, | Performed by: PHYSICIAN ASSISTANT

## 2021-11-08 PROCEDURE — 99212 OFFICE O/P EST SF 10 MIN: CPT | Mod: S$GLB,,, | Performed by: PHYSICIAN ASSISTANT

## 2021-11-08 PROCEDURE — 99999 PR PBB SHADOW E&M-EST. PATIENT-LVL III: CPT | Mod: PBBFAC,,, | Performed by: PHYSICIAN ASSISTANT

## 2021-11-08 PROCEDURE — 1159F PR MEDICATION LIST DOCUMENTED IN MEDICAL RECORD: ICD-10-PCS | Mod: CPTII,S$GLB,, | Performed by: PHYSICIAN ASSISTANT

## 2021-11-08 PROCEDURE — 99212 PR OFFICE/OUTPT VISIT, EST, LEVL II, 10-19 MIN: ICD-10-PCS | Mod: S$GLB,,, | Performed by: PHYSICIAN ASSISTANT

## 2021-11-08 PROCEDURE — 3008F BODY MASS INDEX DOCD: CPT | Mod: CPTII,S$GLB,, | Performed by: PHYSICIAN ASSISTANT

## 2021-11-08 PROCEDURE — 99999 PR PBB SHADOW E&M-EST. PATIENT-LVL III: ICD-10-PCS | Mod: PBBFAC,,, | Performed by: PHYSICIAN ASSISTANT

## 2021-11-08 PROCEDURE — 1125F PR PAIN SEVERITY QUANTIFIED, PAIN PRESENT: ICD-10-PCS | Mod: CPTII,S$GLB,, | Performed by: PHYSICIAN ASSISTANT

## 2021-11-09 ENCOUNTER — OFFICE VISIT (OUTPATIENT)
Dept: HOME HEALTH SERVICES | Facility: CLINIC | Age: 71
End: 2021-11-09
Payer: MEDICARE

## 2021-11-09 DIAGNOSIS — F03.90 DEMENTIA WITHOUT BEHAVIORAL DISTURBANCE, UNSPECIFIED DEMENTIA TYPE: Primary | ICD-10-CM

## 2021-11-09 PROCEDURE — 3288F PR FALLS RISK ASSESSMENT DOCUMENTED: ICD-10-PCS | Mod: CPTII,S$GLB,, | Performed by: NURSE PRACTITIONER

## 2021-11-09 PROCEDURE — 1160F PR REVIEW ALL MEDS BY PRESCRIBER/CLIN PHARMACIST DOCUMENTED: ICD-10-PCS | Mod: CPTII,S$GLB,, | Performed by: NURSE PRACTITIONER

## 2021-11-09 PROCEDURE — 1160F RVW MEDS BY RX/DR IN RCRD: CPT | Mod: CPTII,S$GLB,, | Performed by: NURSE PRACTITIONER

## 2021-11-09 PROCEDURE — 4010F PR ACE/ARB THEARPY RXD/TAKEN: ICD-10-PCS | Mod: CPTII,S$GLB,, | Performed by: NURSE PRACTITIONER

## 2021-11-09 PROCEDURE — 99497 PR ADVNCD CARE PLAN 30 MIN: ICD-10-PCS | Mod: S$GLB,,, | Performed by: NURSE PRACTITIONER

## 2021-11-09 PROCEDURE — 3074F SYST BP LT 130 MM HG: CPT | Mod: CPTII,S$GLB,, | Performed by: NURSE PRACTITIONER

## 2021-11-09 PROCEDURE — 1101F PT FALLS ASSESS-DOCD LE1/YR: CPT | Mod: CPTII,S$GLB,, | Performed by: NURSE PRACTITIONER

## 2021-11-09 PROCEDURE — 3008F PR BODY MASS INDEX (BMI) DOCUMENTED: ICD-10-PCS | Mod: CPTII,S$GLB,, | Performed by: NURSE PRACTITIONER

## 2021-11-09 PROCEDURE — 4010F ACE/ARB THERAPY RXD/TAKEN: CPT | Mod: CPTII,S$GLB,, | Performed by: NURSE PRACTITIONER

## 2021-11-09 PROCEDURE — 3288F FALL RISK ASSESSMENT DOCD: CPT | Mod: CPTII,S$GLB,, | Performed by: NURSE PRACTITIONER

## 2021-11-09 PROCEDURE — 3074F PR MOST RECENT SYSTOLIC BLOOD PRESSURE < 130 MM HG: ICD-10-PCS | Mod: CPTII,S$GLB,, | Performed by: NURSE PRACTITIONER

## 2021-11-09 PROCEDURE — 1159F MED LIST DOCD IN RCRD: CPT | Mod: CPTII,S$GLB,, | Performed by: NURSE PRACTITIONER

## 2021-11-09 PROCEDURE — 1126F AMNT PAIN NOTED NONE PRSNT: CPT | Mod: CPTII,S$GLB,, | Performed by: NURSE PRACTITIONER

## 2021-11-09 PROCEDURE — 3044F HG A1C LEVEL LT 7.0%: CPT | Mod: CPTII,S$GLB,, | Performed by: NURSE PRACTITIONER

## 2021-11-09 PROCEDURE — 3044F PR MOST RECENT HEMOGLOBIN A1C LEVEL <7.0%: ICD-10-PCS | Mod: CPTII,S$GLB,, | Performed by: NURSE PRACTITIONER

## 2021-11-09 PROCEDURE — 1101F PR PT FALLS ASSESS DOC 0-1 FALLS W/OUT INJ PAST YR: ICD-10-PCS | Mod: CPTII,S$GLB,, | Performed by: NURSE PRACTITIONER

## 2021-11-09 PROCEDURE — 1159F PR MEDICATION LIST DOCUMENTED IN MEDICAL RECORD: ICD-10-PCS | Mod: CPTII,S$GLB,, | Performed by: NURSE PRACTITIONER

## 2021-11-09 PROCEDURE — 1126F PR PAIN SEVERITY QUANTIFIED, NO PAIN PRESENT: ICD-10-PCS | Mod: CPTII,S$GLB,, | Performed by: NURSE PRACTITIONER

## 2021-11-09 PROCEDURE — 3078F DIAST BP <80 MM HG: CPT | Mod: CPTII,S$GLB,, | Performed by: NURSE PRACTITIONER

## 2021-11-09 PROCEDURE — 3008F BODY MASS INDEX DOCD: CPT | Mod: CPTII,S$GLB,, | Performed by: NURSE PRACTITIONER

## 2021-11-09 PROCEDURE — 99497 ADVNCD CARE PLAN 30 MIN: CPT | Mod: S$GLB,,, | Performed by: NURSE PRACTITIONER

## 2021-11-09 PROCEDURE — 99495 TRANSJ CARE MGMT MOD F2F 14D: CPT | Mod: S$GLB,,, | Performed by: NURSE PRACTITIONER

## 2021-11-09 PROCEDURE — 3078F PR MOST RECENT DIASTOLIC BLOOD PRESSURE < 80 MM HG: ICD-10-PCS | Mod: CPTII,S$GLB,, | Performed by: NURSE PRACTITIONER

## 2021-11-09 PROCEDURE — 99495 TCM SERVICES (MODERATE COMPLEXITY): ICD-10-PCS | Mod: S$GLB,,, | Performed by: NURSE PRACTITIONER

## 2021-11-11 ENCOUNTER — TELEPHONE (OUTPATIENT)
Dept: OTOLARYNGOLOGY | Facility: CLINIC | Age: 71
End: 2021-11-11
Payer: MEDICARE

## 2021-11-12 ENCOUNTER — PATIENT MESSAGE (OUTPATIENT)
Dept: PRIMARY CARE CLINIC | Facility: CLINIC | Age: 71
End: 2021-11-12
Payer: MEDICARE

## 2021-11-12 ENCOUNTER — PATIENT MESSAGE (OUTPATIENT)
Dept: HOME HEALTH SERVICES | Facility: CLINIC | Age: 71
End: 2021-11-12
Payer: MEDICARE

## 2021-11-12 DIAGNOSIS — F41.9 ANXIETY: ICD-10-CM

## 2021-11-15 VITALS
HEART RATE: 78 BPM | DIASTOLIC BLOOD PRESSURE: 65 MMHG | TEMPERATURE: 98 F | OXYGEN SATURATION: 97 % | RESPIRATION RATE: 18 BRPM | WEIGHT: 162 LBS | HEIGHT: 66 IN | SYSTOLIC BLOOD PRESSURE: 120 MMHG | BODY MASS INDEX: 26.03 KG/M2

## 2021-11-15 RX ORDER — ALPRAZOLAM 0.5 MG/1
TABLET ORAL
Qty: 60 TABLET | Refills: 0 | Status: SHIPPED | OUTPATIENT
Start: 2021-11-15 | End: 2021-12-08 | Stop reason: SDUPTHER

## 2021-11-16 ENCOUNTER — OFFICE VISIT (OUTPATIENT)
Dept: OTOLARYNGOLOGY | Facility: CLINIC | Age: 71
End: 2021-11-16
Payer: MEDICARE

## 2021-11-16 ENCOUNTER — PES CALL (OUTPATIENT)
Dept: ADMINISTRATIVE | Facility: CLINIC | Age: 71
End: 2021-11-16
Payer: MEDICARE

## 2021-11-16 VITALS
SYSTOLIC BLOOD PRESSURE: 143 MMHG | BODY MASS INDEX: 25.9 KG/M2 | HEART RATE: 84 BPM | WEIGHT: 160.5 LBS | DIASTOLIC BLOOD PRESSURE: 80 MMHG

## 2021-11-16 DIAGNOSIS — H93.8X3 SENSATION OF FULLNESS IN BOTH EARS: Primary | ICD-10-CM

## 2021-11-16 PROCEDURE — 3044F HG A1C LEVEL LT 7.0%: CPT | Mod: CPTII,S$GLB,, | Performed by: OTOLARYNGOLOGY

## 2021-11-16 PROCEDURE — 3044F PR MOST RECENT HEMOGLOBIN A1C LEVEL <7.0%: ICD-10-PCS | Mod: CPTII,S$GLB,, | Performed by: OTOLARYNGOLOGY

## 2021-11-16 PROCEDURE — 1101F PR PT FALLS ASSESS DOC 0-1 FALLS W/OUT INJ PAST YR: ICD-10-PCS | Mod: CPTII,S$GLB,, | Performed by: OTOLARYNGOLOGY

## 2021-11-16 PROCEDURE — 3008F BODY MASS INDEX DOCD: CPT | Mod: CPTII,S$GLB,, | Performed by: OTOLARYNGOLOGY

## 2021-11-16 PROCEDURE — 1159F PR MEDICATION LIST DOCUMENTED IN MEDICAL RECORD: ICD-10-PCS | Mod: CPTII,S$GLB,, | Performed by: OTOLARYNGOLOGY

## 2021-11-16 PROCEDURE — 4010F ACE/ARB THERAPY RXD/TAKEN: CPT | Mod: CPTII,S$GLB,, | Performed by: OTOLARYNGOLOGY

## 2021-11-16 PROCEDURE — 3079F DIAST BP 80-89 MM HG: CPT | Mod: CPTII,S$GLB,, | Performed by: OTOLARYNGOLOGY

## 2021-11-16 PROCEDURE — 99999 PR PBB SHADOW E&M-EST. PATIENT-LVL IV: CPT | Mod: PBBFAC,,, | Performed by: OTOLARYNGOLOGY

## 2021-11-16 PROCEDURE — 3079F PR MOST RECENT DIASTOLIC BLOOD PRESSURE 80-89 MM HG: ICD-10-PCS | Mod: CPTII,S$GLB,, | Performed by: OTOLARYNGOLOGY

## 2021-11-16 PROCEDURE — 1126F AMNT PAIN NOTED NONE PRSNT: CPT | Mod: CPTII,S$GLB,, | Performed by: OTOLARYNGOLOGY

## 2021-11-16 PROCEDURE — 3008F PR BODY MASS INDEX (BMI) DOCUMENTED: ICD-10-PCS | Mod: CPTII,S$GLB,, | Performed by: OTOLARYNGOLOGY

## 2021-11-16 PROCEDURE — 3288F FALL RISK ASSESSMENT DOCD: CPT | Mod: CPTII,S$GLB,, | Performed by: OTOLARYNGOLOGY

## 2021-11-16 PROCEDURE — 3077F SYST BP >= 140 MM HG: CPT | Mod: CPTII,S$GLB,, | Performed by: OTOLARYNGOLOGY

## 2021-11-16 PROCEDURE — 1159F MED LIST DOCD IN RCRD: CPT | Mod: CPTII,S$GLB,, | Performed by: OTOLARYNGOLOGY

## 2021-11-16 PROCEDURE — 1111F PR DISCHARGE MEDS RECONCILED W/ CURRENT OUTPATIENT MED LIST: ICD-10-PCS | Mod: CPTII,S$GLB,, | Performed by: OTOLARYNGOLOGY

## 2021-11-16 PROCEDURE — 3288F PR FALLS RISK ASSESSMENT DOCUMENTED: ICD-10-PCS | Mod: CPTII,S$GLB,, | Performed by: OTOLARYNGOLOGY

## 2021-11-16 PROCEDURE — 99213 OFFICE O/P EST LOW 20 MIN: CPT | Mod: 25,S$GLB,, | Performed by: OTOLARYNGOLOGY

## 2021-11-16 PROCEDURE — 69210 PR REMOVAL IMPACTED CERUMEN REQUIRING INSTRUMENTATION, UNILATERAL: ICD-10-PCS | Mod: S$GLB,,, | Performed by: OTOLARYNGOLOGY

## 2021-11-16 PROCEDURE — 99213 PR OFFICE/OUTPT VISIT, EST, LEVL III, 20-29 MIN: ICD-10-PCS | Mod: 25,S$GLB,, | Performed by: OTOLARYNGOLOGY

## 2021-11-16 PROCEDURE — 1101F PT FALLS ASSESS-DOCD LE1/YR: CPT | Mod: CPTII,S$GLB,, | Performed by: OTOLARYNGOLOGY

## 2021-11-16 PROCEDURE — 69210 REMOVE IMPACTED EAR WAX UNI: CPT | Mod: S$GLB,,, | Performed by: OTOLARYNGOLOGY

## 2021-11-16 PROCEDURE — 1126F PR PAIN SEVERITY QUANTIFIED, NO PAIN PRESENT: ICD-10-PCS | Mod: CPTII,S$GLB,, | Performed by: OTOLARYNGOLOGY

## 2021-11-16 PROCEDURE — 3077F PR MOST RECENT SYSTOLIC BLOOD PRESSURE >= 140 MM HG: ICD-10-PCS | Mod: CPTII,S$GLB,, | Performed by: OTOLARYNGOLOGY

## 2021-11-16 PROCEDURE — 4010F PR ACE/ARB THEARPY RXD/TAKEN: ICD-10-PCS | Mod: CPTII,S$GLB,, | Performed by: OTOLARYNGOLOGY

## 2021-11-16 PROCEDURE — 99999 PR PBB SHADOW E&M-EST. PATIENT-LVL IV: ICD-10-PCS | Mod: PBBFAC,,, | Performed by: OTOLARYNGOLOGY

## 2021-11-16 PROCEDURE — 1111F DSCHRG MED/CURRENT MED MERGE: CPT | Mod: CPTII,S$GLB,, | Performed by: OTOLARYNGOLOGY

## 2021-11-18 ENCOUNTER — PATIENT MESSAGE (OUTPATIENT)
Dept: PRIMARY CARE CLINIC | Facility: CLINIC | Age: 71
End: 2021-11-18
Payer: MEDICARE

## 2021-11-29 ENCOUNTER — TELEPHONE (OUTPATIENT)
Dept: INTERNAL MEDICINE | Facility: CLINIC | Age: 71
End: 2021-11-29

## 2021-11-29 ENCOUNTER — IMMUNIZATION (OUTPATIENT)
Dept: INTERNAL MEDICINE | Facility: CLINIC | Age: 71
End: 2021-11-29
Payer: MEDICARE

## 2021-11-29 DIAGNOSIS — Z23 NEED FOR VACCINATION: Primary | ICD-10-CM

## 2021-11-29 PROCEDURE — 91300 COVID-19, MRNA, LNP-S, PF, 30 MCG/0.3 ML DOSE VACCINE: CPT | Mod: PBBFAC | Performed by: INTERNAL MEDICINE

## 2021-11-29 PROCEDURE — 0003A COVID-19, MRNA, LNP-S, PF, 30 MCG/0.3 ML DOSE VACCINE: CPT | Mod: PBBFAC | Performed by: INTERNAL MEDICINE

## 2021-12-03 ENCOUNTER — TELEPHONE (OUTPATIENT)
Dept: INTERNAL MEDICINE | Facility: CLINIC | Age: 71
End: 2021-12-03
Payer: MEDICARE

## 2021-12-06 ENCOUNTER — TELEPHONE (OUTPATIENT)
Dept: INTERNAL MEDICINE | Facility: CLINIC | Age: 71
End: 2021-12-06
Payer: MEDICARE

## 2021-12-08 ENCOUNTER — OFFICE VISIT (OUTPATIENT)
Dept: INTERNAL MEDICINE | Facility: CLINIC | Age: 71
End: 2021-12-08
Payer: MEDICARE

## 2021-12-08 ENCOUNTER — LAB VISIT (OUTPATIENT)
Dept: LAB | Facility: OTHER | Age: 71
End: 2021-12-08
Attending: STUDENT IN AN ORGANIZED HEALTH CARE EDUCATION/TRAINING PROGRAM
Payer: MEDICARE

## 2021-12-08 VITALS
DIASTOLIC BLOOD PRESSURE: 72 MMHG | HEIGHT: 66 IN | BODY MASS INDEX: 25.72 KG/M2 | WEIGHT: 160.06 LBS | SYSTOLIC BLOOD PRESSURE: 142 MMHG | HEART RATE: 85 BPM | OXYGEN SATURATION: 98 %

## 2021-12-08 DIAGNOSIS — R94.6 ABNORMAL THYROID EXAM: ICD-10-CM

## 2021-12-08 DIAGNOSIS — Z00.00 PREVENTATIVE HEALTH CARE: ICD-10-CM

## 2021-12-08 DIAGNOSIS — D69.6 THROMBOCYTOPENIA, UNSPECIFIED: ICD-10-CM

## 2021-12-08 DIAGNOSIS — K92.2 GASTROINTESTINAL HEMORRHAGE, UNSPECIFIED GASTROINTESTINAL HEMORRHAGE TYPE: ICD-10-CM

## 2021-12-08 DIAGNOSIS — D62 ACUTE BLOOD LOSS ANEMIA: ICD-10-CM

## 2021-12-08 DIAGNOSIS — Z79.899 CHRONIC PRESCRIPTION BENZODIAZEPINE USE: ICD-10-CM

## 2021-12-08 DIAGNOSIS — R10.9 STOMACH PAIN: ICD-10-CM

## 2021-12-08 DIAGNOSIS — Z76.89 ENCOUNTER TO ESTABLISH CARE: Primary | ICD-10-CM

## 2021-12-08 DIAGNOSIS — I70.0 AORTIC ATHEROSCLEROSIS: ICD-10-CM

## 2021-12-08 DIAGNOSIS — Z00.00 ANNUAL PHYSICAL EXAM: ICD-10-CM

## 2021-12-08 DIAGNOSIS — F41.9 ANXIETY: ICD-10-CM

## 2021-12-08 LAB
ALBUMIN SERPL BCP-MCNC: 3.8 G/DL (ref 3.5–5.2)
ALP SERPL-CCNC: 76 U/L (ref 55–135)
ALT SERPL W/O P-5'-P-CCNC: 11 U/L (ref 10–44)
ANION GAP SERPL CALC-SCNC: 10 MMOL/L (ref 8–16)
AST SERPL-CCNC: 19 U/L (ref 10–40)
BASOPHILS # BLD AUTO: 0.01 K/UL (ref 0–0.2)
BASOPHILS NFR BLD: 0.2 % (ref 0–1.9)
BILIRUB SERPL-MCNC: 0.2 MG/DL (ref 0.1–1)
BUN SERPL-MCNC: 11 MG/DL (ref 8–23)
CALCIUM SERPL-MCNC: 9.2 MG/DL (ref 8.7–10.5)
CHLORIDE SERPL-SCNC: 111 MMOL/L (ref 95–110)
CO2 SERPL-SCNC: 23 MMOL/L (ref 23–29)
CREAT SERPL-MCNC: 0.8 MG/DL (ref 0.5–1.4)
DIFFERENTIAL METHOD: ABNORMAL
EOSINOPHIL # BLD AUTO: 0.2 K/UL (ref 0–0.5)
EOSINOPHIL NFR BLD: 2.7 % (ref 0–8)
ERYTHROCYTE [DISTWIDTH] IN BLOOD BY AUTOMATED COUNT: 13.5 % (ref 11.5–14.5)
EST. GFR  (AFRICAN AMERICAN): >60 ML/MIN/1.73 M^2
EST. GFR  (NON AFRICAN AMERICAN): >60 ML/MIN/1.73 M^2
GLUCOSE SERPL-MCNC: 76 MG/DL (ref 70–110)
HCT VFR BLD AUTO: 36.4 % (ref 37–48.5)
HGB BLD-MCNC: 10.9 G/DL (ref 12–16)
IMM GRANULOCYTES # BLD AUTO: 0.01 K/UL (ref 0–0.04)
IMM GRANULOCYTES NFR BLD AUTO: 0.2 % (ref 0–0.5)
LYMPHOCYTES # BLD AUTO: 1.3 K/UL (ref 1–4.8)
LYMPHOCYTES NFR BLD: 22.6 % (ref 18–48)
MCH RBC QN AUTO: 26.2 PG (ref 27–31)
MCHC RBC AUTO-ENTMCNC: 29.9 G/DL (ref 32–36)
MCV RBC AUTO: 88 FL (ref 82–98)
MONOCYTES # BLD AUTO: 0.4 K/UL (ref 0.3–1)
MONOCYTES NFR BLD: 6.8 % (ref 4–15)
NEUTROPHILS # BLD AUTO: 4 K/UL (ref 1.8–7.7)
NEUTROPHILS NFR BLD: 67.5 % (ref 38–73)
NRBC BLD-RTO: 0 /100 WBC
PLATELET # BLD AUTO: 272 K/UL (ref 150–450)
PMV BLD AUTO: 10.7 FL (ref 9.2–12.9)
POTASSIUM SERPL-SCNC: 3.9 MMOL/L (ref 3.5–5.1)
PROT SERPL-MCNC: 7.2 G/DL (ref 6–8.4)
RBC # BLD AUTO: 4.16 M/UL (ref 4–5.4)
SODIUM SERPL-SCNC: 144 MMOL/L (ref 136–145)
WBC # BLD AUTO: 5.88 K/UL (ref 3.9–12.7)

## 2021-12-08 PROCEDURE — 99999 PR PBB SHADOW E&M-EST. PATIENT-LVL V: CPT | Mod: PBBFAC,,, | Performed by: STUDENT IN AN ORGANIZED HEALTH CARE EDUCATION/TRAINING PROGRAM

## 2021-12-08 PROCEDURE — 80053 COMPREHEN METABOLIC PANEL: CPT | Performed by: STUDENT IN AN ORGANIZED HEALTH CARE EDUCATION/TRAINING PROGRAM

## 2021-12-08 PROCEDURE — 99999 PR PBB SHADOW E&M-EST. PATIENT-LVL V: ICD-10-PCS | Mod: PBBFAC,,, | Performed by: STUDENT IN AN ORGANIZED HEALTH CARE EDUCATION/TRAINING PROGRAM

## 2021-12-08 PROCEDURE — 36415 COLL VENOUS BLD VENIPUNCTURE: CPT | Performed by: STUDENT IN AN ORGANIZED HEALTH CARE EDUCATION/TRAINING PROGRAM

## 2021-12-08 PROCEDURE — 99213 PR OFFICE/OUTPT VISIT, EST, LEVL III, 20-29 MIN: ICD-10-PCS | Mod: 25,S$GLB,, | Performed by: STUDENT IN AN ORGANIZED HEALTH CARE EDUCATION/TRAINING PROGRAM

## 2021-12-08 PROCEDURE — 4010F PR ACE/ARB THEARPY RXD/TAKEN: ICD-10-PCS | Mod: CPTII,S$GLB,, | Performed by: STUDENT IN AN ORGANIZED HEALTH CARE EDUCATION/TRAINING PROGRAM

## 2021-12-08 PROCEDURE — 4010F ACE/ARB THERAPY RXD/TAKEN: CPT | Mod: CPTII,S$GLB,, | Performed by: STUDENT IN AN ORGANIZED HEALTH CARE EDUCATION/TRAINING PROGRAM

## 2021-12-08 PROCEDURE — 99213 OFFICE O/P EST LOW 20 MIN: CPT | Mod: 25,S$GLB,, | Performed by: STUDENT IN AN ORGANIZED HEALTH CARE EDUCATION/TRAINING PROGRAM

## 2021-12-08 PROCEDURE — 99499 RISK ADDL DX/OHS AUDIT: ICD-10-PCS | Mod: S$GLB,,, | Performed by: STUDENT IN AN ORGANIZED HEALTH CARE EDUCATION/TRAINING PROGRAM

## 2021-12-08 PROCEDURE — 99499 UNLISTED E&M SERVICE: CPT | Mod: S$GLB,,, | Performed by: STUDENT IN AN ORGANIZED HEALTH CARE EDUCATION/TRAINING PROGRAM

## 2021-12-08 PROCEDURE — 85025 COMPLETE CBC W/AUTO DIFF WBC: CPT | Performed by: STUDENT IN AN ORGANIZED HEALTH CARE EDUCATION/TRAINING PROGRAM

## 2021-12-08 RX ORDER — FERROUS SULFATE 325(65) MG
325 TABLET ORAL
Qty: 90 TABLET | Refills: 1 | Status: SHIPPED | OUTPATIENT
Start: 2021-12-08 | End: 2022-08-19

## 2021-12-08 RX ORDER — ALPRAZOLAM 0.5 MG/1
TABLET ORAL
Qty: 60 TABLET | Refills: 1 | Status: SHIPPED | OUTPATIENT
Start: 2021-12-08 | End: 2022-01-20 | Stop reason: SDUPTHER

## 2021-12-09 ENCOUNTER — PATIENT MESSAGE (OUTPATIENT)
Dept: INTERNAL MEDICINE | Facility: CLINIC | Age: 71
End: 2021-12-09
Payer: MEDICARE

## 2021-12-09 ENCOUNTER — HOSPITAL ENCOUNTER (OUTPATIENT)
Dept: RADIOLOGY | Facility: OTHER | Age: 71
Discharge: HOME | End: 2021-12-09
Attending: STUDENT IN AN ORGANIZED HEALTH CARE EDUCATION/TRAINING PROGRAM
Payer: MEDICARE

## 2021-12-09 DIAGNOSIS — R94.6 ABNORMAL THYROID EXAM: ICD-10-CM

## 2021-12-09 DIAGNOSIS — E04.1 THYROID NODULE: Chronic | ICD-10-CM

## 2021-12-09 PROCEDURE — 76536 US SOFT TISSUE HEAD NECK THYROID: ICD-10-PCS | Mod: 26,,, | Performed by: RADIOLOGY

## 2021-12-09 PROCEDURE — 76536 US EXAM OF HEAD AND NECK: CPT | Mod: TC

## 2021-12-09 PROCEDURE — 76536 US EXAM OF HEAD AND NECK: CPT | Mod: 26,,, | Performed by: RADIOLOGY

## 2021-12-10 ENCOUNTER — TELEPHONE (OUTPATIENT)
Dept: INTERNAL MEDICINE | Facility: CLINIC | Age: 71
End: 2021-12-10
Payer: MEDICARE

## 2021-12-21 ENCOUNTER — CARE AT HOME (OUTPATIENT)
Dept: HOME HEALTH SERVICES | Facility: CLINIC | Age: 71
End: 2021-12-21
Payer: MEDICARE

## 2021-12-21 DIAGNOSIS — Z51.5 PALLIATIVE CARE ENCOUNTER: Primary | ICD-10-CM

## 2021-12-21 PROCEDURE — 99443 PR PHYSICIAN TELEPHONE EVALUATION 21-30 MIN: CPT | Mod: 95,,, | Performed by: NURSE PRACTITIONER

## 2021-12-21 PROCEDURE — 99443 PR PHYSICIAN TELEPHONE EVALUATION 21-30 MIN: ICD-10-PCS | Mod: 95,,, | Performed by: NURSE PRACTITIONER

## 2021-12-30 ENCOUNTER — OFFICE VISIT (OUTPATIENT)
Dept: OTOLARYNGOLOGY | Facility: CLINIC | Age: 71
End: 2021-12-30
Payer: MEDICARE

## 2021-12-30 VITALS
WEIGHT: 162.25 LBS | HEART RATE: 73 BPM | SYSTOLIC BLOOD PRESSURE: 158 MMHG | BODY MASS INDEX: 26.19 KG/M2 | DIASTOLIC BLOOD PRESSURE: 93 MMHG

## 2021-12-30 DIAGNOSIS — H93.8X3 SENSATION OF FULLNESS IN BOTH EARS: Primary | ICD-10-CM

## 2021-12-30 PROCEDURE — 99999 PR PBB SHADOW E&M-EST. PATIENT-LVL IV: ICD-10-PCS | Mod: PBBFAC,,, | Performed by: OTOLARYNGOLOGY

## 2021-12-30 PROCEDURE — 3288F FALL RISK ASSESSMENT DOCD: CPT | Mod: CPTII,S$GLB,, | Performed by: OTOLARYNGOLOGY

## 2021-12-30 PROCEDURE — 99999 PR PBB SHADOW E&M-EST. PATIENT-LVL IV: CPT | Mod: PBBFAC,,, | Performed by: OTOLARYNGOLOGY

## 2021-12-30 PROCEDURE — 3008F PR BODY MASS INDEX (BMI) DOCUMENTED: ICD-10-PCS | Mod: CPTII,S$GLB,, | Performed by: OTOLARYNGOLOGY

## 2021-12-30 PROCEDURE — 3080F DIAST BP >= 90 MM HG: CPT | Mod: CPTII,S$GLB,, | Performed by: OTOLARYNGOLOGY

## 2021-12-30 PROCEDURE — 69210 PR REMOVAL IMPACTED CERUMEN REQUIRING INSTRUMENTATION, UNILATERAL: ICD-10-PCS | Mod: S$GLB,,, | Performed by: OTOLARYNGOLOGY

## 2021-12-30 PROCEDURE — 3288F PR FALLS RISK ASSESSMENT DOCUMENTED: ICD-10-PCS | Mod: CPTII,S$GLB,, | Performed by: OTOLARYNGOLOGY

## 2021-12-30 PROCEDURE — 1159F MED LIST DOCD IN RCRD: CPT | Mod: CPTII,S$GLB,, | Performed by: OTOLARYNGOLOGY

## 2021-12-30 PROCEDURE — 3077F PR MOST RECENT SYSTOLIC BLOOD PRESSURE >= 140 MM HG: ICD-10-PCS | Mod: CPTII,S$GLB,, | Performed by: OTOLARYNGOLOGY

## 2021-12-30 PROCEDURE — 3008F BODY MASS INDEX DOCD: CPT | Mod: CPTII,S$GLB,, | Performed by: OTOLARYNGOLOGY

## 2021-12-30 PROCEDURE — 99213 PR OFFICE/OUTPT VISIT, EST, LEVL III, 20-29 MIN: ICD-10-PCS | Mod: 25,S$GLB,, | Performed by: OTOLARYNGOLOGY

## 2021-12-30 PROCEDURE — 4010F ACE/ARB THERAPY RXD/TAKEN: CPT | Mod: CPTII,S$GLB,, | Performed by: OTOLARYNGOLOGY

## 2021-12-30 PROCEDURE — 3044F PR MOST RECENT HEMOGLOBIN A1C LEVEL <7.0%: ICD-10-PCS | Mod: CPTII,S$GLB,, | Performed by: OTOLARYNGOLOGY

## 2021-12-30 PROCEDURE — 3080F PR MOST RECENT DIASTOLIC BLOOD PRESSURE >= 90 MM HG: ICD-10-PCS | Mod: CPTII,S$GLB,, | Performed by: OTOLARYNGOLOGY

## 2021-12-30 PROCEDURE — 4010F PR ACE/ARB THEARPY RXD/TAKEN: ICD-10-PCS | Mod: CPTII,S$GLB,, | Performed by: OTOLARYNGOLOGY

## 2021-12-30 PROCEDURE — 1126F PR PAIN SEVERITY QUANTIFIED, NO PAIN PRESENT: ICD-10-PCS | Mod: CPTII,S$GLB,, | Performed by: OTOLARYNGOLOGY

## 2021-12-30 PROCEDURE — 1101F PT FALLS ASSESS-DOCD LE1/YR: CPT | Mod: CPTII,S$GLB,, | Performed by: OTOLARYNGOLOGY

## 2021-12-30 PROCEDURE — 69210 REMOVE IMPACTED EAR WAX UNI: CPT | Mod: S$GLB,,, | Performed by: OTOLARYNGOLOGY

## 2021-12-30 PROCEDURE — 1159F PR MEDICATION LIST DOCUMENTED IN MEDICAL RECORD: ICD-10-PCS | Mod: CPTII,S$GLB,, | Performed by: OTOLARYNGOLOGY

## 2021-12-30 PROCEDURE — 3044F HG A1C LEVEL LT 7.0%: CPT | Mod: CPTII,S$GLB,, | Performed by: OTOLARYNGOLOGY

## 2021-12-30 PROCEDURE — 1101F PR PT FALLS ASSESS DOC 0-1 FALLS W/OUT INJ PAST YR: ICD-10-PCS | Mod: CPTII,S$GLB,, | Performed by: OTOLARYNGOLOGY

## 2021-12-30 PROCEDURE — 99213 OFFICE O/P EST LOW 20 MIN: CPT | Mod: 25,S$GLB,, | Performed by: OTOLARYNGOLOGY

## 2021-12-30 PROCEDURE — 1126F AMNT PAIN NOTED NONE PRSNT: CPT | Mod: CPTII,S$GLB,, | Performed by: OTOLARYNGOLOGY

## 2021-12-30 PROCEDURE — 3077F SYST BP >= 140 MM HG: CPT | Mod: CPTII,S$GLB,, | Performed by: OTOLARYNGOLOGY

## 2022-01-01 VITALS — HEIGHT: 66 IN | WEIGHT: 162 LBS | BODY MASS INDEX: 26.03 KG/M2

## 2022-01-01 NOTE — PATIENT INSTRUCTIONS
Instructions:  - [facility] Nurse Practitioner to schedule home follow-up visit with patient in 4-6 weeks or as needed.  - Continue all medications, treatments and therapies as ordered.   - Follow all instructions, recommendations as discussed.  - Maintain Safety Precautions at all times.  - Attend all medical appointments as scheduled.  - For worsening symptoms: call Primary Care Physician or Nurse Practitioner.  - For emergencies, call 911 or immediately report to the nearest emergency room.  - Limit Risks of environmental exposure to coronavirus/COVID-19 as discussed including: social distancing, hand hygiene, and limiting departures from the home for necessities only.

## 2022-01-01 NOTE — PROGRESS NOTES
Established Patient - Audio Only Telehealth Visit     The patient location is: Home  The chief complaint leading to consultation is: Follow up  Visit type: Virtual visit with audio only (telephone)  Total time spent with patient: 20 minutes       The reason for the audio only service rather than synchronous audio and video virtual visit was related to technical difficulties or patient preference/necessity.     Each patient to whom I provide medical services by telemedicine is:  (1) informed of the relationship between the physician and patient and the respective role of any other health care provider with respect to management of the patient; and (2) notified that they may decline to receive medical services by telemedicine and may withdraw from such care at any time. Patient verbally consented to receive this service via voice-only telephone call.       HPI: Soila Chávez is a 71-year-old female with a past medical history of hypertension, anxiety, and dementia. She lives alone in [place], [place], her daughters live nearby and are very active in her care. She sees Dr. Parson as her PCP. I will continue following the patient in the home as it is difficult for her to physically make mulitple visits. She endorses eating x 3 small meals per day . She endorses regular BMs.      Assessment and plan:      Dementia without behavioral disturbance, unspecified dementia type  Continue home meds                     This service was not originating from a related E/M service provided within the previous 7 days nor will  to an E/M service or procedure within the next 24 hours or my soonest available appointment.  Prevailing standard of care was able to be met in this audio-only visit.

## 2022-01-05 ENCOUNTER — PATIENT MESSAGE (OUTPATIENT)
Dept: NEUROLOGY | Facility: CLINIC | Age: 72
End: 2022-01-05
Payer: MEDICARE

## 2022-01-10 ENCOUNTER — OFFICE VISIT (OUTPATIENT)
Dept: INTERNAL MEDICINE | Facility: CLINIC | Age: 72
End: 2022-01-10
Payer: MEDICARE

## 2022-01-10 VITALS
WEIGHT: 160.25 LBS | BODY MASS INDEX: 25.75 KG/M2 | HEART RATE: 78 BPM | DIASTOLIC BLOOD PRESSURE: 82 MMHG | HEIGHT: 66 IN | SYSTOLIC BLOOD PRESSURE: 146 MMHG | OXYGEN SATURATION: 99 %

## 2022-01-10 DIAGNOSIS — I10 ESSENTIAL HYPERTENSION: Primary | ICD-10-CM

## 2022-01-10 DIAGNOSIS — F03.90 DEMENTIA WITHOUT BEHAVIORAL DISTURBANCE, UNSPECIFIED DEMENTIA TYPE: ICD-10-CM

## 2022-01-10 DIAGNOSIS — Z12.31 ENCOUNTER FOR SCREENING MAMMOGRAM FOR MALIGNANT NEOPLASM OF BREAST: ICD-10-CM

## 2022-01-10 DIAGNOSIS — M54.40 CHRONIC MIDLINE LOW BACK PAIN WITH SCIATICA, SCIATICA LATERALITY UNSPECIFIED: ICD-10-CM

## 2022-01-10 DIAGNOSIS — G95.9 MYELOPATHY: ICD-10-CM

## 2022-01-10 DIAGNOSIS — F41.9 ANXIETY: ICD-10-CM

## 2022-01-10 DIAGNOSIS — I70.0 AORTIC ATHEROSCLEROSIS: ICD-10-CM

## 2022-01-10 DIAGNOSIS — F33.1 MAJOR DEPRESSIVE DISORDER, RECURRENT EPISODE, MODERATE: ICD-10-CM

## 2022-01-10 DIAGNOSIS — G89.29 CHRONIC MIDLINE LOW BACK PAIN WITH SCIATICA, SCIATICA LATERALITY UNSPECIFIED: ICD-10-CM

## 2022-01-10 PROBLEM — D62 ACUTE BLOOD LOSS ANEMIA: Status: RESOLVED | Noted: 2021-10-28 | Resolved: 2022-01-10

## 2022-01-10 PROBLEM — D69.6 THROMBOCYTOPENIA, UNSPECIFIED: Status: RESOLVED | Noted: 2021-12-08 | Resolved: 2022-01-10

## 2022-01-10 PROCEDURE — 3077F SYST BP >= 140 MM HG: CPT | Mod: CPTII,S$GLB,, | Performed by: STUDENT IN AN ORGANIZED HEALTH CARE EDUCATION/TRAINING PROGRAM

## 2022-01-10 PROCEDURE — 99215 OFFICE O/P EST HI 40 MIN: CPT | Mod: S$GLB,,, | Performed by: STUDENT IN AN ORGANIZED HEALTH CARE EDUCATION/TRAINING PROGRAM

## 2022-01-10 PROCEDURE — 3077F PR MOST RECENT SYSTOLIC BLOOD PRESSURE >= 140 MM HG: ICD-10-PCS | Mod: CPTII,S$GLB,, | Performed by: STUDENT IN AN ORGANIZED HEALTH CARE EDUCATION/TRAINING PROGRAM

## 2022-01-10 PROCEDURE — 3079F DIAST BP 80-89 MM HG: CPT | Mod: CPTII,S$GLB,, | Performed by: STUDENT IN AN ORGANIZED HEALTH CARE EDUCATION/TRAINING PROGRAM

## 2022-01-10 PROCEDURE — 99499 RISK ADDL DX/OHS AUDIT: ICD-10-PCS | Mod: S$GLB,,, | Performed by: STUDENT IN AN ORGANIZED HEALTH CARE EDUCATION/TRAINING PROGRAM

## 2022-01-10 PROCEDURE — 3288F FALL RISK ASSESSMENT DOCD: CPT | Mod: CPTII,S$GLB,, | Performed by: STUDENT IN AN ORGANIZED HEALTH CARE EDUCATION/TRAINING PROGRAM

## 2022-01-10 PROCEDURE — 1126F PR PAIN SEVERITY QUANTIFIED, NO PAIN PRESENT: ICD-10-PCS | Mod: CPTII,S$GLB,, | Performed by: STUDENT IN AN ORGANIZED HEALTH CARE EDUCATION/TRAINING PROGRAM

## 2022-01-10 PROCEDURE — 1159F MED LIST DOCD IN RCRD: CPT | Mod: CPTII,S$GLB,, | Performed by: STUDENT IN AN ORGANIZED HEALTH CARE EDUCATION/TRAINING PROGRAM

## 2022-01-10 PROCEDURE — 99999 PR PBB SHADOW E&M-EST. PATIENT-LVL V: ICD-10-PCS | Mod: PBBFAC,,, | Performed by: STUDENT IN AN ORGANIZED HEALTH CARE EDUCATION/TRAINING PROGRAM

## 2022-01-10 PROCEDURE — 99999 PR PBB SHADOW E&M-EST. PATIENT-LVL V: CPT | Mod: PBBFAC,,, | Performed by: STUDENT IN AN ORGANIZED HEALTH CARE EDUCATION/TRAINING PROGRAM

## 2022-01-10 PROCEDURE — 1159F PR MEDICATION LIST DOCUMENTED IN MEDICAL RECORD: ICD-10-PCS | Mod: CPTII,S$GLB,, | Performed by: STUDENT IN AN ORGANIZED HEALTH CARE EDUCATION/TRAINING PROGRAM

## 2022-01-10 PROCEDURE — 99499 UNLISTED E&M SERVICE: CPT | Mod: S$GLB,,, | Performed by: STUDENT IN AN ORGANIZED HEALTH CARE EDUCATION/TRAINING PROGRAM

## 2022-01-10 PROCEDURE — 4010F ACE/ARB THERAPY RXD/TAKEN: CPT | Mod: CPTII,S$GLB,, | Performed by: STUDENT IN AN ORGANIZED HEALTH CARE EDUCATION/TRAINING PROGRAM

## 2022-01-10 PROCEDURE — 3079F PR MOST RECENT DIASTOLIC BLOOD PRESSURE 80-89 MM HG: ICD-10-PCS | Mod: CPTII,S$GLB,, | Performed by: STUDENT IN AN ORGANIZED HEALTH CARE EDUCATION/TRAINING PROGRAM

## 2022-01-10 PROCEDURE — 99215 PR OFFICE/OUTPT VISIT, EST, LEVL V, 40-54 MIN: ICD-10-PCS | Mod: S$GLB,,, | Performed by: STUDENT IN AN ORGANIZED HEALTH CARE EDUCATION/TRAINING PROGRAM

## 2022-01-10 PROCEDURE — 3008F BODY MASS INDEX DOCD: CPT | Mod: CPTII,S$GLB,, | Performed by: STUDENT IN AN ORGANIZED HEALTH CARE EDUCATION/TRAINING PROGRAM

## 2022-01-10 PROCEDURE — 1101F PR PT FALLS ASSESS DOC 0-1 FALLS W/OUT INJ PAST YR: ICD-10-PCS | Mod: CPTII,S$GLB,, | Performed by: STUDENT IN AN ORGANIZED HEALTH CARE EDUCATION/TRAINING PROGRAM

## 2022-01-10 PROCEDURE — 1126F AMNT PAIN NOTED NONE PRSNT: CPT | Mod: CPTII,S$GLB,, | Performed by: STUDENT IN AN ORGANIZED HEALTH CARE EDUCATION/TRAINING PROGRAM

## 2022-01-10 PROCEDURE — 3008F PR BODY MASS INDEX (BMI) DOCUMENTED: ICD-10-PCS | Mod: CPTII,S$GLB,, | Performed by: STUDENT IN AN ORGANIZED HEALTH CARE EDUCATION/TRAINING PROGRAM

## 2022-01-10 PROCEDURE — 1101F PT FALLS ASSESS-DOCD LE1/YR: CPT | Mod: CPTII,S$GLB,, | Performed by: STUDENT IN AN ORGANIZED HEALTH CARE EDUCATION/TRAINING PROGRAM

## 2022-01-10 PROCEDURE — 3288F PR FALLS RISK ASSESSMENT DOCUMENTED: ICD-10-PCS | Mod: CPTII,S$GLB,, | Performed by: STUDENT IN AN ORGANIZED HEALTH CARE EDUCATION/TRAINING PROGRAM

## 2022-01-10 PROCEDURE — 4010F PR ACE/ARB THEARPY RXD/TAKEN: ICD-10-PCS | Mod: CPTII,S$GLB,, | Performed by: STUDENT IN AN ORGANIZED HEALTH CARE EDUCATION/TRAINING PROGRAM

## 2022-01-10 RX ORDER — AMLODIPINE AND VALSARTAN 5; 320 MG/1; MG/1
1 TABLET ORAL DAILY
Qty: 90 TABLET | Refills: 1 | Status: SHIPPED | OUTPATIENT
Start: 2022-01-10 | End: 2022-07-14 | Stop reason: SDUPTHER

## 2022-01-10 RX ORDER — DICLOFENAC SODIUM 10 MG/G
2 GEL TOPICAL DAILY
Qty: 50 G | Refills: 2 | Status: SHIPPED | OUTPATIENT
Start: 2022-01-10 | End: 2022-11-21 | Stop reason: SDUPTHER

## 2022-01-10 RX ORDER — DICLOFENAC SODIUM 10 MG/G
2 GEL TOPICAL DAILY
Qty: 50 G | Refills: 2 | Status: SHIPPED | OUTPATIENT
Start: 2022-01-10 | End: 2022-01-10 | Stop reason: SDUPTHER

## 2022-01-10 RX ORDER — AMLODIPINE AND VALSARTAN 5; 320 MG/1; MG/1
1 TABLET ORAL DAILY
Qty: 90 TABLET | Refills: 1 | Status: SHIPPED | OUTPATIENT
Start: 2022-01-10 | End: 2022-01-10 | Stop reason: SDUPTHER

## 2022-01-10 NOTE — PATIENT INSTRUCTIONS
Blood pressure is uncontrolled  Recent range has been 140's-150's/ 70-90's  Goal is approx 130/80    Switch the losartan 100 mg to exforge (amlodipine-valsartan 5-320 mg)     Nurse visit to check blood pressure in 2 weeks  -------------------------     Ms. Chávez declines psychiatry  If she changes her mind, psychiatry number is [phone] to make an appointment.  I will work to help her get off the xanax over the long term. We will start a new medicine to help with anxiety next visit.

## 2022-01-10 NOTE — PROGRESS NOTES
[facility]    Subjective:       Patient ID: Soila Chávez is a 71 y.o. female.    Chief Complaint: Hypertension  f/u    History was obtained from the patient and supplemented through chart review.  This patient is known to me.     HPI:    Patient is a 71 y.o. female who presents for HTN    Chronic benzo use   Has been Rx xanax 0.5 BID for several years  12/8/21 discussed importance of psychiatry; has not made an appt  Breaking it into half, sometimes takes whole tab  Sometimes she skips the whole morning, though it seems rarely  Sometimes she forgets to take the pill at night, though it also seems rarely  Difficult to get history from patient regarding this  Pt respects Dr. Guzmán's (ENT's) perspective, pt reports she was advised neurology might fill xanax (note states pt should wean off or reduce)  Pt very anxious about the potental for running out  60 pills filled 12/8/21  Brother is dying from cancer and also has high anxiety, depression is at all time high lately  Was seeing Dr. Renner  Pt does not want to see psychiatry.  Would like to consider my strategy. Does want an alternate medication.  Considering remeron.    Dementia   Aricept; pt tolerating  Follows with neurology    Anemia  Thrombocytopenia- resolved  2/2 blood loss    Aortic atherosclerosis  Taking statin  stable    Elevated ASCVD Risk score  Not on a statin, need to discuss    Stomach pain  Recent GI bleed; H & H improved Oct -> dec  Generalized discomfort  Taking omeprazole  Reviewed colonoscopy, EGD report (hx of diverticulitis)d    Myelopathy  Cervical  Not problematic currently    Confused regarding medications to the point that office staff was concerned.    Spoke with daughter on phone for short period of time to help with planning    Medical History  Past Medical History:   Diagnosis Date    Allergy     Anxiety     Cataract     Colon polyps 2010    Hearing loss     Hypertension     Joint pain     Mental  disorder        Review of Systems   Constitutional: Negative for fever.   HENT: Negative for trouble swallowing.    Respiratory: Negative for shortness of breath.    Cardiovascular: Negative for chest pain.   Gastrointestinal: Negative for blood in stool, constipation, diarrhea, nausea and vomiting.   Genitourinary: Negative for dysuria.   Musculoskeletal: Negative for gait problem.   Neurological: Negative for dizziness and seizures.        Memory difficulties   Psychiatric/Behavioral: Positive for dysphoric mood. Negative for hallucinations. The patient is nervous/anxious.          Surgical hx, family hx, social hx   Have been reviewed      Current Outpatient Medications:     acetaminophen (TYLENOL) 650 MG TbSR, Take 1 tablet (650 mg total) by mouth every 8 (eight) hours as needed (pain)., Disp: , Rfl: 0    ALPRAZolam (XANAX) 0.5 MG tablet, take 1 tablet by mouth twice daily (Patient taking differently: take 1 tablet by mouth twice daily), Disp: 60 tablet, Rfl: 1    ascorbic acid, vitamin C, (VITAMIN C) 100 MG tablet, Take 100 mg by mouth once daily., Disp: , Rfl:     aspirin (ECOTRIN) 81 MG EC tablet, Take 81 mg by mouth nightly. , Disp: , Rfl:     b complex vitamins tablet, Take 1 tablet by mouth once daily., Disp: , Rfl:     calcium carbonate-vitamin D3 500 mg(1,250mg) -125 unit per tablet, Take 1 tablet by mouth nightly. , Disp: , Rfl:     donepeziL (ARICEPT) 5 MG tablet, Take 1 tablet (5 mg total) by mouth every evening., Disp: 30 tablet, Rfl: 2    ergocalciferol, vitamin D2, (VITAMIN D ORAL), Take 400 Units by mouth nightly. , Disp: , Rfl:     ferrous sulfate (FEOSOL) 325 mg (65 mg iron) Tab tablet, Take 1 tablet (325 mg total) by mouth every Mon, Wed, Fri. With vitamin C, Disp: 90 tablet, Rfl: 1    gabapentin (NEURONTIN) 600 MG tablet, Take 1 tablet (600 mg total) by mouth 3 (three) times daily., Disp: 90 tablet, Rfl: 1    multivitamin (THERAGRAN) per tablet, Take 1 tablet by mouth once daily.  "Takes Centrum Silver, Disp: , Rfl:     omega-3 fatty acids 1,000 mg Cap, Take 1 g by mouth every evening., Disp: , Rfl:     amlodipine-valsartan (EXFORGE) 5-320 mg per tablet, Take 1 tablet by mouth once daily., Disp: 90 tablet, Rfl: 1    cetirizine (ZYRTEC) 10 MG tablet, Take 10 mg by mouth daily as needed. , Disp: , Rfl:     diclofenac sodium (VOLTAREN) 1 % Gel, Apply 2 g topically once daily., Disp: 50 g, Rfl: 2    ginkgo biloba 40 mg Tab, Take 1 capsule by mouth daily as needed. , Disp: , Rfl:     mometasone 0.1% (ELOCON) 0.1 % cream, Use daily, Disp: 50 g, Rfl: 3    omeprazole (PRILOSEC) 40 MG capsule, Take 1 capsule (40 mg total) by mouth every morning., Disp: 30 capsule, Rfl: 4    Objective:        Body mass index is 25.87 kg/m².  Vitals:    01/10/22 1136   BP: (!) 146/82   Pulse: 78   SpO2: 99%   Weight: 72.7 kg (160 lb 4.4 oz)   Height: 5' 6" (1.676 m)   PainSc: 0-No pain     Physical Exam  Vitals and nursing note reviewed.   Constitutional:       General: She is not in acute distress.     Appearance: Normal appearance. She is not ill-appearing.   HENT:      Head: Normocephalic and atraumatic.      Nose:      Comments: Wearing a mask  Eyes:      General: No scleral icterus.  Cardiovascular:      Rate and Rhythm: Normal rate and regular rhythm.      Heart sounds: Normal heart sounds.   Pulmonary:      Effort: Pulmonary effort is normal.   Abdominal:      General: There is no distension.   Musculoskeletal:         General: No deformity.      Cervical back: Normal range of motion.      Right lower leg: No edema.      Left lower leg: No edema.   Skin:     General: Skin is warm and dry.   Neurological:      Mental Status: She is alert.   Psychiatric:         Behavior: Behavior normal.      Comments: Tearful at times           Lab Results   Component Value Date    WBC 5.88 12/08/2021    HGB 10.9 (L) 12/08/2021    HCT 36.4 (L) 12/08/2021     12/08/2021    CHOL 195 11/04/2021    TRIG 68 11/04/2021    " HDL 54 11/04/2021    ALT 11 12/08/2021    AST 19 12/08/2021     12/08/2021    K 3.9 12/08/2021     (H) 12/08/2021    CREATININE 0.8 12/08/2021    BUN 11 12/08/2021    CO2 23 12/08/2021    TSH 1.395 11/04/2021    HGBA1C 5.2 11/04/2021       The 10-year ASCVD risk score (Felicita HDZ Jr., et al., 2013) is: 14.9%    Values used to calculate the score:      Age: 71 years      Sex: Female      Is Non- : Yes      Diabetic: No      Tobacco smoker: No      Systolic Blood Pressure: 146 mmHg      Is BP treated: Yes      HDL Cholesterol: 54 mg/dL      Total Cholesterol: 195 mg/dL    (Imaging have been independently reviewed)      Assessment:         1. Essential hypertension    2. Chronic midline low back pain with sciatica, sciatica laterality unspecified    3. Encounter for screening mammogram for malignant neoplasm of breast    4. Aortic atherosclerosis    5. Major depressive disorder, recurrent episode, moderate    6. Dementia without behavioral disturbance, unspecified dementia type    7. Anxiety    8. Myelopathy          Plan:     Soila was seen today for hypertension.    Diagnoses and all orders for this visit:    Essential hypertension  -     Discontinue: amlodipine-valsartan (EXFORGE) 5-320 mg per tablet; Take 1 tablet by mouth once daily.  -     amlodipine-valsartan (EXFORGE) 5-320 mg per tablet; Take 1 tablet by mouth once daily.    Chronic midline low back pain with sciatica, sciatica laterality unspecified  -     Discontinue: diclofenac sodium (VOLTAREN) 1 % Gel; Apply 2 g topically once daily.  -     diclofenac sodium (VOLTAREN) 1 % Gel; Apply 2 g topically once daily.    Encounter for screening mammogram for malignant neoplasm of breast  -     Mammo Digital Screening Bilat w/ Matty; Future    Aortic atherosclerosis    Major depressive disorder, recurrent episode, moderate    Dementia without behavioral disturbance, unspecified dementia type    Anxiety    Myelopathy        Health  Maintenance  - Lipids: normal 11/4/2020  - A1C: 5.2 11/4/2020  - Colon Ca Screen: 3/2020, repeat due 2025  - Immunizations: utd    Women's health  - Pap: need to discuss  - Mammo: 2/17/21 Bir[facility] 1 ; repeat scheduled  - Dexa: 8/25/20 [facility]    Follow up in about 1 month (around 2/10/2022).      Or sooner prn    Extensive communication about xanax, risks, compliance, plans; blood pressure goals, risks, medication compliance, safety, involvement of her daughter   106 min were used in chart review, evaluation and counseling of patient, documentation and review of results on same day of service     All medications were reviewed including potential side effects and risks/benefits.  Pt was counseled to call back if anything worsens or if questions arise.    Parminder Junior MD  Family Medicine  [facility]  [address] [address]  Phone [phone]  Fax [phone]

## 2022-01-20 ENCOUNTER — OFFICE VISIT (OUTPATIENT)
Dept: NEUROLOGY | Facility: CLINIC | Age: 72
End: 2022-01-20
Payer: MEDICARE

## 2022-01-20 VITALS
SYSTOLIC BLOOD PRESSURE: 143 MMHG | BODY MASS INDEX: 25.45 KG/M2 | HEART RATE: 78 BPM | DIASTOLIC BLOOD PRESSURE: 73 MMHG | WEIGHT: 158.38 LBS | HEIGHT: 66 IN

## 2022-01-20 DIAGNOSIS — F02.80 LATE ONSET ALZHEIMER'S DISEASE WITHOUT BEHAVIORAL DISTURBANCE: Primary | ICD-10-CM

## 2022-01-20 DIAGNOSIS — F41.9 ANXIETY: ICD-10-CM

## 2022-01-20 DIAGNOSIS — G30.1 LATE ONSET ALZHEIMER'S DISEASE WITHOUT BEHAVIORAL DISTURBANCE: Primary | ICD-10-CM

## 2022-01-20 PROCEDURE — 3078F DIAST BP <80 MM HG: CPT | Mod: CPTII,S$GLB,, | Performed by: PSYCHIATRY & NEUROLOGY

## 2022-01-20 PROCEDURE — 3077F SYST BP >= 140 MM HG: CPT | Mod: CPTII,S$GLB,, | Performed by: PSYCHIATRY & NEUROLOGY

## 2022-01-20 PROCEDURE — 3078F PR MOST RECENT DIASTOLIC BLOOD PRESSURE < 80 MM HG: ICD-10-PCS | Mod: CPTII,S$GLB,, | Performed by: PSYCHIATRY & NEUROLOGY

## 2022-01-20 PROCEDURE — 99214 PR OFFICE/OUTPT VISIT, EST, LEVL IV, 30-39 MIN: ICD-10-PCS | Mod: S$GLB,,, | Performed by: PSYCHIATRY & NEUROLOGY

## 2022-01-20 PROCEDURE — 3008F PR BODY MASS INDEX (BMI) DOCUMENTED: ICD-10-PCS | Mod: CPTII,S$GLB,, | Performed by: PSYCHIATRY & NEUROLOGY

## 2022-01-20 PROCEDURE — 3008F BODY MASS INDEX DOCD: CPT | Mod: CPTII,S$GLB,, | Performed by: PSYCHIATRY & NEUROLOGY

## 2022-01-20 PROCEDURE — 1126F PR PAIN SEVERITY QUANTIFIED, NO PAIN PRESENT: ICD-10-PCS | Mod: CPTII,S$GLB,, | Performed by: PSYCHIATRY & NEUROLOGY

## 2022-01-20 PROCEDURE — 3077F PR MOST RECENT SYSTOLIC BLOOD PRESSURE >= 140 MM HG: ICD-10-PCS | Mod: CPTII,S$GLB,, | Performed by: PSYCHIATRY & NEUROLOGY

## 2022-01-20 PROCEDURE — 1101F PR PT FALLS ASSESS DOC 0-1 FALLS W/OUT INJ PAST YR: ICD-10-PCS | Mod: CPTII,S$GLB,, | Performed by: PSYCHIATRY & NEUROLOGY

## 2022-01-20 PROCEDURE — 1159F PR MEDICATION LIST DOCUMENTED IN MEDICAL RECORD: ICD-10-PCS | Mod: CPTII,S$GLB,, | Performed by: PSYCHIATRY & NEUROLOGY

## 2022-01-20 PROCEDURE — 3288F PR FALLS RISK ASSESSMENT DOCUMENTED: ICD-10-PCS | Mod: CPTII,S$GLB,, | Performed by: PSYCHIATRY & NEUROLOGY

## 2022-01-20 PROCEDURE — 3288F FALL RISK ASSESSMENT DOCD: CPT | Mod: CPTII,S$GLB,, | Performed by: PSYCHIATRY & NEUROLOGY

## 2022-01-20 PROCEDURE — 1159F MED LIST DOCD IN RCRD: CPT | Mod: CPTII,S$GLB,, | Performed by: PSYCHIATRY & NEUROLOGY

## 2022-01-20 PROCEDURE — 99999 PR PBB SHADOW E&M-EST. PATIENT-LVL IV: CPT | Mod: PBBFAC,,, | Performed by: PSYCHIATRY & NEUROLOGY

## 2022-01-20 PROCEDURE — 4010F PR ACE/ARB THEARPY RXD/TAKEN: ICD-10-PCS | Mod: CPTII,S$GLB,, | Performed by: PSYCHIATRY & NEUROLOGY

## 2022-01-20 PROCEDURE — 1126F AMNT PAIN NOTED NONE PRSNT: CPT | Mod: CPTII,S$GLB,, | Performed by: PSYCHIATRY & NEUROLOGY

## 2022-01-20 PROCEDURE — 1101F PT FALLS ASSESS-DOCD LE1/YR: CPT | Mod: CPTII,S$GLB,, | Performed by: PSYCHIATRY & NEUROLOGY

## 2022-01-20 PROCEDURE — 99214 OFFICE O/P EST MOD 30 MIN: CPT | Mod: S$GLB,,, | Performed by: PSYCHIATRY & NEUROLOGY

## 2022-01-20 PROCEDURE — 99999 PR PBB SHADOW E&M-EST. PATIENT-LVL IV: ICD-10-PCS | Mod: PBBFAC,,, | Performed by: PSYCHIATRY & NEUROLOGY

## 2022-01-20 PROCEDURE — 4010F ACE/ARB THERAPY RXD/TAKEN: CPT | Mod: CPTII,S$GLB,, | Performed by: PSYCHIATRY & NEUROLOGY

## 2022-01-20 RX ORDER — DONEPEZIL HYDROCHLORIDE 10 MG/1
10 TABLET, FILM COATED ORAL NIGHTLY
Qty: 30 TABLET | Refills: 5 | Status: SHIPPED | OUTPATIENT
Start: 2022-01-20 | End: 2022-04-19 | Stop reason: DRUGHIGH

## 2022-01-20 RX ORDER — ESCITALOPRAM OXALATE 5 MG/1
TABLET ORAL
Qty: 30 TABLET | Refills: 5 | Status: SHIPPED | OUTPATIENT
Start: 2022-01-20 | End: 2022-04-19 | Stop reason: SDUPTHER

## 2022-01-20 RX ORDER — ALPRAZOLAM 0.5 MG/1
TABLET ORAL
Qty: 45 TABLET | Refills: 2 | Status: SHIPPED | OUTPATIENT
Start: 2022-01-20 | End: 2022-04-19 | Stop reason: SDUPTHER

## 2022-01-20 NOTE — PROGRESS NOTES
Subjective:       Patient ID: Soila Chávez is a 71 y.o. female.    Chief Complaint: Memory Loss      Repeats questions, no change.  Stress--her brother has cancer.  Took a trip to [place] and handled it well. Out of state family did not seem to notice issues with memory, according to her daughter here with her today.      Past Medical History:   Diagnosis Date    Allergy     Anxiety     Cataract     Colon polyps 2010    Hearing loss     Hypertension     Joint pain     Mental disorder       Past Surgical History:   Procedure Laterality Date    COLONOSCOPY N/A 3/5/2020    Procedure: COLONOSCOPY;  Surgeon: Nathalia Kemp MD;  [facility]  Service: Colon and Rectal;  Laterality: N/A;  requests later appt time if available - Pt open to any MD- Pt informed arrival time may be adjusted, Pt verbalized understanding- ERW2/24/20@1409    ESOPHAGOGASTRODUODENOSCOPY N/A 10/1/2020    Procedure: EGD (ESOPHAGOGASTRODUODENOSCOPY);  Surgeon: Casey Guerrero MD;  Location: [address]);  Service: Endoscopy;  Laterality: N/A;  [facility]id test 9/28-[facility]    ESOPHAGOGASTRODUODENOSCOPY N/A 10/29/2021    Procedure: EGD (ESOPHAGOGASTRODUODENOSCOPY);  Surgeon: Billy Vegas MD;  Location: [facility];  Service: Endoscopy;  Laterality: N/A;    HYSTERECTOMY      PERIPARTUM CHITO        Current Outpatient Medications:     acetaminophen (TYLENOL) 650 MG TbSR, Take 1 tablet (650 mg total) by mouth every 8 (eight) hours as needed (pain)., Disp: , Rfl: 0    ALPRAZolam (XANAX) 0.5 MG tablet, take 1 tablet by mouth twice daily (Patient taking differently: take 1 tablet by mouth twice daily), Disp: 60 tablet, Rfl: 1    amlodipine-valsartan (EXFORGE) 5-320 mg per tablet, Take 1 tablet by mouth once daily., Disp: 90 tablet, Rfl: 1    ascorbic acid, vitamin C, (VITAMIN C) 100 MG tablet, Take 100 mg by mouth once daily., Disp: , Rfl:     aspirin (ECOTRIN) 81 MG EC tablet, Take 81 mg by mouth nightly. , Disp:  , Rfl:     b complex vitamins tablet, Take 1 tablet by mouth once daily., Disp: , Rfl:     calcium carbonate-vitamin D3 500 mg(1,250mg) -125 unit per tablet, Take 1 tablet by mouth nightly. , Disp: , Rfl:     cetirizine (ZYRTEC) 10 MG tablet, Take 10 mg by mouth daily as needed. , Disp: , Rfl:     diclofenac sodium (VOLTAREN) 1 % Gel, Apply 2 g topically once daily., Disp: 50 g, Rfl: 2    ergocalciferol, vitamin D2, (VITAMIN D ORAL), Take 400 Units by mouth nightly. , Disp: , Rfl:     ferrous sulfate (FEOSOL) 325 mg (65 mg iron) Tab tablet, Take 1 tablet (325 mg total) by mouth every Mon, Wed, Fri. With vitamin C, Disp: 90 tablet, Rfl: 1    gabapentin (NEURONTIN) 600 MG tablet, Take 1 tablet (600 mg total) by mouth 3 (three) times daily., Disp: 90 tablet, Rfl: 1    ginkgo biloba 40 mg Tab, Take 1 capsule by mouth daily as needed. , Disp: , Rfl:     mometasone 0.1% (ELOCON) 0.1 % cream, Use daily, Disp: 50 g, Rfl: 3    multivitamin (THERAGRAN) per tablet, Take 1 tablet by mouth once daily. Takes Centrum Silver, Disp: , Rfl:     omega-3 fatty acids 1,000 mg Cap, Take 1 g by mouth every evening., Disp: , Rfl:     omeprazole (PRILOSEC) 40 MG capsule, Take 1 capsule (40 mg total) by mouth every morning., Disp: 30 capsule, Rfl: 4    donepeziL (ARICEPT) 10 MG tablet, Take 1 tablet (10 mg total) by mouth every evening. One po qhs, Disp: 30 tablet, Rfl: 5   Review of patient's allergies indicates:   Allergen Reactions    Lotensin [benazepril] Swelling    Penicillins Rash        Review of Systems        Objective:      Physical Exam  Constitutional:       General: She is not in acute distress.     Appearance: She is not ill-appearing.   Neurological:      Mental Status: She is alert. Mental status is at baseline.      Cranial Nerves: No dysarthria.      Comments: Delayed recall   1/5  Oriented to date --- Jan 20 2022   Does not floor or city    Pt thought of 7 words beginning with the letter F  in one minute.           Psychiatric:         Mood and Affect: Mood normal.         Thought Content: Thought content normal.           Assessment:       1. Late onset Alzheimer's disease without behavioral disturbance        Plan:        MOHAMUD-- pt lives alone. Does not drive. No agitation.    Pt asking about xanax. Has taken PRN and now averaging one every night. Has taken it since 2003, with no issues. Does have a hx of a nervous breakdown.   I agreed to continue it and her daughter will oversee it and fill the dispenser for her weekly. Thus far there have been no errors with meds.   Increase donepezil to 10mg qhs, no SE at this point    Longstanding AIDE--refer to psychiatry , add lexapro, and hopefully can further reduce zanax. Will decrease from 60/month to 45/month for now. Plan to reduce to 30/month at follow up              Alesha Levin MD   01/20/2022   9:09 AM

## 2022-01-24 ENCOUNTER — CLINICAL SUPPORT (OUTPATIENT)
Dept: INTERNAL MEDICINE | Facility: CLINIC | Age: 72
End: 2022-01-24
Payer: MEDICARE

## 2022-01-24 VITALS — SYSTOLIC BLOOD PRESSURE: 138 MMHG | DIASTOLIC BLOOD PRESSURE: 80 MMHG | OXYGEN SATURATION: 98 % | HEART RATE: 88 BPM

## 2022-01-24 PROCEDURE — 99999 PR PBB SHADOW E&M-EST. PATIENT-LVL I: CPT | Mod: PBBFAC,,,

## 2022-01-24 PROCEDURE — 99999 PR PBB SHADOW E&M-EST. PATIENT-LVL I: ICD-10-PCS | Mod: PBBFAC,,,

## 2022-01-24 NOTE — PROGRESS NOTES
Soila Dunncon 71 y.o. female is here today for Blood Pressure check.   History of HTN yes.    Review of patient's allergies indicates:   Allergen Reactions    Lotensin [benazepril] Swelling    Penicillins Rash     Creatinine   Date Value Ref Range Status   12/08/2021 0.8 0.5 - 1.4 mg/dL Final     Sodium   Date Value Ref Range Status   12/08/2021 144 136 - 145 mmol/L Final     Potassium   Date Value Ref Range Status   12/08/2021 3.9 3.5 - 5.1 mmol/L Final   ]  Patient verifies taking blood pressure medications on a regular bases at the same time of the day.     Current Outpatient Medications:     acetaminophen (TYLENOL) 650 MG TbSR, Take 1 tablet (650 mg total) by mouth every 8 (eight) hours as needed (pain)., Disp: , Rfl: 0    ALPRAZolam (XANAX) 0.5 MG tablet, take One tablet by mouth every night at bedtime and one tablet every day as needed, Disp: 45 tablet, Rfl: 2    amlodipine-valsartan (EXFORGE) 5-320 mg per tablet, Take 1 tablet by mouth once daily., Disp: 90 tablet, Rfl: 1    ascorbic acid, vitamin C, (VITAMIN C) 100 MG tablet, Take 100 mg by mouth once daily., Disp: , Rfl:     aspirin (ECOTRIN) 81 MG EC tablet, Take 81 mg by mouth nightly. , Disp: , Rfl:     b complex vitamins tablet, Take 1 tablet by mouth once daily., Disp: , Rfl:     calcium carbonate-vitamin D3 500 mg(1,250mg) -125 unit per tablet, Take 1 tablet by mouth nightly. , Disp: , Rfl:     cetirizine (ZYRTEC) 10 MG tablet, Take 10 mg by mouth daily as needed. , Disp: , Rfl:     diclofenac sodium (VOLTAREN) 1 % Gel, Apply 2 g topically once daily., Disp: 50 g, Rfl: 2    donepeziL (ARICEPT) 10 MG tablet, Take 1 tablet (10 mg total) by mouth every evening., Disp: 30 tablet, Rfl: 5    ergocalciferol, vitamin D2, (VITAMIN D ORAL), Take 400 Units by mouth nightly. , Disp: , Rfl:     EScitalopram oxalate (LEXAPRO) 5 MG Tab, take 1 tablet by mouth every evening, Disp: 30 tablet, Rfl: 5    ferrous sulfate (FEOSOL) 325 mg (65 mg iron)  Tab tablet, Take 1 tablet (325 mg total) by mouth every Mon, Wed, Fri. With vitamin C, Disp: 90 tablet, Rfl: 1    gabapentin (NEURONTIN) 600 MG tablet, Take 1 tablet (600 mg total) by mouth 3 (three) times daily., Disp: 90 tablet, Rfl: 1    ginkgo biloba 40 mg Tab, Take 1 capsule by mouth daily as needed. , Disp: , Rfl:     mometasone 0.1% (ELOCON) 0.1 % cream, Use daily, Disp: 50 g, Rfl: 3    multivitamin (THERAGRAN) per tablet, Take 1 tablet by mouth once daily. Takes Centrum Silver, Disp: , Rfl:     omega-3 fatty acids 1,000 mg Cap, Take 1 g by mouth every evening., Disp: , Rfl:     omeprazole (PRILOSEC) 40 MG capsule, Take 1 capsule (40 mg total) by mouth every morning., Disp: 30 capsule, Rfl: 4  Does patient have record of home blood pressure readings no. Readings have been averaging unknown.   Last dose of blood pressure medication was taken at 0800am.  Patient is asymptomatic.   BP: 138/80 , Pulse: 88.  Dr. Junior notified.

## 2022-02-01 ENCOUNTER — CARE AT HOME (OUTPATIENT)
Dept: HOME HEALTH SERVICES | Facility: CLINIC | Age: 72
End: 2022-02-01
Payer: MEDICARE

## 2022-02-01 DIAGNOSIS — F03.90 DEMENTIA WITHOUT BEHAVIORAL DISTURBANCE, UNSPECIFIED DEMENTIA TYPE: ICD-10-CM

## 2022-02-01 DIAGNOSIS — Z51.5 PALLIATIVE CARE ENCOUNTER: Primary | ICD-10-CM

## 2022-02-01 PROCEDURE — 99443 PR PHYSICIAN TELEPHONE EVALUATION 21-30 MIN: CPT | Mod: 95,,, | Performed by: NURSE PRACTITIONER

## 2022-02-01 PROCEDURE — 99443 PR PHYSICIAN TELEPHONE EVALUATION 21-30 MIN: ICD-10-PCS | Mod: 95,,, | Performed by: NURSE PRACTITIONER

## 2022-02-02 VITALS — HEIGHT: 66 IN | WEIGHT: 158 LBS | BODY MASS INDEX: 25.39 KG/M2

## 2022-02-02 NOTE — PATIENT INSTRUCTIONS
Instructions:  - [facility] Nurse Practitioner to schedule home follow-up visit with patient in 8-10 weeks or as needed.  - Continue all medications, treatments and therapies as ordered.   - Follow all instructions, recommendations as discussed.  - Maintain Safety Precautions at all times.  - Attend all medical appointments as scheduled.  - For worsening symptoms: call Primary Care Physician or Nurse Practitioner.  - For emergencies, call 911 or immediately report to the nearest emergency room.  - Limit Risks of environmental exposure to coronavirus/COVID-19 as discussed including: social distancing, hand hygiene, and limiting departures from the home for necessities only.

## 2022-02-02 NOTE — PROGRESS NOTES
Established Patient - Audio Only Telehealth Visit     The patient location is: Home  The chief complaint leading to consultation is: Follow up  Visit type: Virtual visit with audio only (telephone)  Total time spent with patient: 20 minutes       The reason for the audio only service rather than synchronous audio and video virtual visit was related to technical difficulties or patient preference/necessity.     Each patient to whom I provide medical services by telemedicine is:  (1) informed of the relationship between the physician and patient and the respective role of any other health care provider with respect to management of the patient; and (2) notified that they may decline to receive medical services by telemedicine and may withdraw from such care at any time. Patient verbally consented to receive this service via voice-only telephone call.       HPI: Soila Chávez is a 71-year-old female with a past medical history of hypertension, anxiety, and dementia. She lives alone in [place], [place], her daughters live nearby and are very active in her care. She sees Dr. Parson as her PCP. I will continue following the patient in the home as it is difficult for her to physically make mulitple visits. She endorses eating x 3 small meals per day . She endorses regular BMs.     She was scheduled for a face to face follow up home visit today, however she reports she is not up to it today. After speaking with her daughter I learned her brother is transitioning and she is feeling a little down. All her medications are current as per her daughter and were reconciled. Will follow up face to face when patient is available.     Assessment and plan:      Dementia without behavioral disturbance, unspecified dementia type  Continue home meds                     This service was not originating from a related E/M service provided within the previous 7 days nor will  to an E/M service or procedure within the next 24 hours or my  soonest available appointment.  Prevailing standard of care was able to be met in this audio-only visit.

## 2022-02-15 ENCOUNTER — TELEPHONE (OUTPATIENT)
Dept: OTOLARYNGOLOGY | Facility: CLINIC | Age: 72
End: 2022-02-15
Payer: MEDICARE

## 2022-02-15 NOTE — TELEPHONE ENCOUNTER
Spoke with pt daughter to schedule an appointment. Appointment scheduled, pt happy with date and time.

## 2022-02-17 ENCOUNTER — OFFICE VISIT (OUTPATIENT)
Dept: INTERNAL MEDICINE | Facility: CLINIC | Age: 72
End: 2022-02-17
Payer: MEDICARE

## 2022-02-17 VITALS
BODY MASS INDEX: 25.54 KG/M2 | OXYGEN SATURATION: 98 % | HEART RATE: 96 BPM | DIASTOLIC BLOOD PRESSURE: 72 MMHG | SYSTOLIC BLOOD PRESSURE: 128 MMHG | WEIGHT: 158.94 LBS | HEIGHT: 66 IN

## 2022-02-17 DIAGNOSIS — R20.9 UNSPECIFIED DISTURBANCES OF SKIN SENSATION: ICD-10-CM

## 2022-02-17 DIAGNOSIS — J30.1 SEASONAL ALLERGIC RHINITIS DUE TO POLLEN: ICD-10-CM

## 2022-02-17 DIAGNOSIS — D64.9 NORMOCYTIC ANEMIA: ICD-10-CM

## 2022-02-17 DIAGNOSIS — F02.80 LATE ONSET ALZHEIMER'S DEMENTIA WITHOUT BEHAVIORAL DISTURBANCE: ICD-10-CM

## 2022-02-17 DIAGNOSIS — F41.9 ANXIETY: ICD-10-CM

## 2022-02-17 DIAGNOSIS — K21.9 GASTROESOPHAGEAL REFLUX DISEASE WITHOUT ESOPHAGITIS: ICD-10-CM

## 2022-02-17 DIAGNOSIS — J34.89 RHINORRHEA: ICD-10-CM

## 2022-02-17 DIAGNOSIS — E04.1 THYROID NODULE: Chronic | ICD-10-CM

## 2022-02-17 DIAGNOSIS — Z91.89 10 YEAR RISK OF MI OR STROKE 7.5% OR GREATER: ICD-10-CM

## 2022-02-17 DIAGNOSIS — I70.0 AORTIC ATHEROSCLEROSIS: ICD-10-CM

## 2022-02-17 DIAGNOSIS — F33.1 MAJOR DEPRESSIVE DISORDER, RECURRENT EPISODE, MODERATE: ICD-10-CM

## 2022-02-17 DIAGNOSIS — I10 ESSENTIAL HYPERTENSION: Primary | ICD-10-CM

## 2022-02-17 DIAGNOSIS — G30.1 LATE ONSET ALZHEIMER'S DEMENTIA WITHOUT BEHAVIORAL DISTURBANCE: ICD-10-CM

## 2022-02-17 PROCEDURE — 99999 PR PBB SHADOW E&M-EST. PATIENT-LVL IV: ICD-10-PCS | Mod: PBBFAC,,, | Performed by: STUDENT IN AN ORGANIZED HEALTH CARE EDUCATION/TRAINING PROGRAM

## 2022-02-17 PROCEDURE — 4010F PR ACE/ARB THEARPY RXD/TAKEN: ICD-10-PCS | Mod: CPTII,S$GLB,, | Performed by: STUDENT IN AN ORGANIZED HEALTH CARE EDUCATION/TRAINING PROGRAM

## 2022-02-17 PROCEDURE — 99215 OFFICE O/P EST HI 40 MIN: CPT | Mod: S$GLB,,, | Performed by: STUDENT IN AN ORGANIZED HEALTH CARE EDUCATION/TRAINING PROGRAM

## 2022-02-17 PROCEDURE — 3074F SYST BP LT 130 MM HG: CPT | Mod: CPTII,S$GLB,, | Performed by: STUDENT IN AN ORGANIZED HEALTH CARE EDUCATION/TRAINING PROGRAM

## 2022-02-17 PROCEDURE — 99999 PR PBB SHADOW E&M-EST. PATIENT-LVL IV: CPT | Mod: PBBFAC,,, | Performed by: STUDENT IN AN ORGANIZED HEALTH CARE EDUCATION/TRAINING PROGRAM

## 2022-02-17 PROCEDURE — 3078F DIAST BP <80 MM HG: CPT | Mod: CPTII,S$GLB,, | Performed by: STUDENT IN AN ORGANIZED HEALTH CARE EDUCATION/TRAINING PROGRAM

## 2022-02-17 PROCEDURE — 1101F PT FALLS ASSESS-DOCD LE1/YR: CPT | Mod: CPTII,S$GLB,, | Performed by: STUDENT IN AN ORGANIZED HEALTH CARE EDUCATION/TRAINING PROGRAM

## 2022-02-17 PROCEDURE — 1126F PR PAIN SEVERITY QUANTIFIED, NO PAIN PRESENT: ICD-10-PCS | Mod: CPTII,S$GLB,, | Performed by: STUDENT IN AN ORGANIZED HEALTH CARE EDUCATION/TRAINING PROGRAM

## 2022-02-17 PROCEDURE — 3008F PR BODY MASS INDEX (BMI) DOCUMENTED: ICD-10-PCS | Mod: CPTII,S$GLB,, | Performed by: STUDENT IN AN ORGANIZED HEALTH CARE EDUCATION/TRAINING PROGRAM

## 2022-02-17 PROCEDURE — 1159F MED LIST DOCD IN RCRD: CPT | Mod: CPTII,S$GLB,, | Performed by: STUDENT IN AN ORGANIZED HEALTH CARE EDUCATION/TRAINING PROGRAM

## 2022-02-17 PROCEDURE — 3288F FALL RISK ASSESSMENT DOCD: CPT | Mod: CPTII,S$GLB,, | Performed by: STUDENT IN AN ORGANIZED HEALTH CARE EDUCATION/TRAINING PROGRAM

## 2022-02-17 PROCEDURE — 99215 PR OFFICE/OUTPT VISIT, EST, LEVL V, 40-54 MIN: ICD-10-PCS | Mod: S$GLB,,, | Performed by: STUDENT IN AN ORGANIZED HEALTH CARE EDUCATION/TRAINING PROGRAM

## 2022-02-17 PROCEDURE — 1101F PR PT FALLS ASSESS DOC 0-1 FALLS W/OUT INJ PAST YR: ICD-10-PCS | Mod: CPTII,S$GLB,, | Performed by: STUDENT IN AN ORGANIZED HEALTH CARE EDUCATION/TRAINING PROGRAM

## 2022-02-17 PROCEDURE — 1159F PR MEDICATION LIST DOCUMENTED IN MEDICAL RECORD: ICD-10-PCS | Mod: CPTII,S$GLB,, | Performed by: STUDENT IN AN ORGANIZED HEALTH CARE EDUCATION/TRAINING PROGRAM

## 2022-02-17 PROCEDURE — 3008F BODY MASS INDEX DOCD: CPT | Mod: CPTII,S$GLB,, | Performed by: STUDENT IN AN ORGANIZED HEALTH CARE EDUCATION/TRAINING PROGRAM

## 2022-02-17 PROCEDURE — 3288F PR FALLS RISK ASSESSMENT DOCUMENTED: ICD-10-PCS | Mod: CPTII,S$GLB,, | Performed by: STUDENT IN AN ORGANIZED HEALTH CARE EDUCATION/TRAINING PROGRAM

## 2022-02-17 PROCEDURE — 4010F ACE/ARB THERAPY RXD/TAKEN: CPT | Mod: CPTII,S$GLB,, | Performed by: STUDENT IN AN ORGANIZED HEALTH CARE EDUCATION/TRAINING PROGRAM

## 2022-02-17 PROCEDURE — 3078F PR MOST RECENT DIASTOLIC BLOOD PRESSURE < 80 MM HG: ICD-10-PCS | Mod: CPTII,S$GLB,, | Performed by: STUDENT IN AN ORGANIZED HEALTH CARE EDUCATION/TRAINING PROGRAM

## 2022-02-17 PROCEDURE — 1126F AMNT PAIN NOTED NONE PRSNT: CPT | Mod: CPTII,S$GLB,, | Performed by: STUDENT IN AN ORGANIZED HEALTH CARE EDUCATION/TRAINING PROGRAM

## 2022-02-17 PROCEDURE — 3074F PR MOST RECENT SYSTOLIC BLOOD PRESSURE < 130 MM HG: ICD-10-PCS | Mod: CPTII,S$GLB,, | Performed by: STUDENT IN AN ORGANIZED HEALTH CARE EDUCATION/TRAINING PROGRAM

## 2022-02-17 RX ORDER — PRAVASTATIN SODIUM 10 MG/1
10 TABLET ORAL NIGHTLY
Qty: 90 TABLET | Refills: 3 | Status: SHIPPED | OUTPATIENT
Start: 2022-02-17 | End: 2023-02-23

## 2022-02-17 RX ORDER — AZELASTINE 1 MG/ML
1 SPRAY, METERED NASAL 2 TIMES DAILY
Qty: 30 ML | Refills: 3 | Status: SHIPPED | OUTPATIENT
Start: 2022-02-17 | End: 2022-05-17 | Stop reason: SDUPTHER

## 2022-02-17 RX ORDER — PRAVASTATIN SODIUM 10 MG/1
10 TABLET ORAL DAILY
Qty: 90 TABLET | Refills: 3 | Status: SHIPPED | OUTPATIENT
Start: 2022-02-17 | End: 2022-02-17

## 2022-02-17 NOTE — PROGRESS NOTES
[facility]    Subjective:       Patient ID: Soila Chávez is a 71 y.o. female.    Chief Complaint: Hypertension (F/u)  f/u    History was obtained from the patient and supplemented through chart review.  This patient is known to me.     HPI:    Patient is a 71 y.o. female who presents for HTN, dementia    Chronic benzo use/anxiety/dementia  Late onset Alzheimer's   Aricept 10 increased from 5  Also started lexapro 5, helped  xanax 0.5 daily and nightly PRN   Referred to psychiatry    Normocytic Anemia  Thrombocytopenia- resolved  Likely 2/2 blood loss, recent GI blood   Check further labs, repeat CBC    HTN  exforge 5-320 daily, stable  No CP/HA/SOB/Vision changes    Hand pain  Wishes to see particular provider on 9th floor, not [facility]  Does not know the name, encouraged to    Aortic atherosclerosis  Started statin    Elevated ASCVD Risk score  Starting pravastatin to reduce risk, chol borderline    Thyroid nodule  Repeat US annually for 5 years (dec 2021 initially)    GERD  Improved with omeprazole  Reviewed colonoscopy, EGD report (hx of diverticulitis) 10/29/2021    Myelopathy  Cervical  Not problematic currently    Wt Readings from Last 15 Encounters:   02/17/22 72.1 kg (158 lb 15.2 oz)   02/01/22 71.7 kg (158 lb)   01/20/22 71.9 kg (158 lb 6.4 oz)   01/10/22 72.7 kg (160 lb 4.4 oz)   12/30/21 73.6 kg (162 lb 4.1 oz)   12/21/21 73.5 kg (162 lb)   12/08/21 72.6 kg (160 lb 0.9 oz)   11/16/21 72.8 kg (160 lb 7.9 oz)   11/09/21 73.5 kg (162 lb)   11/08/21 73.8 kg (162 lb 11.2 oz)   10/29/21 73.8 kg (162 lb 9.6 oz)   10/20/21 72.8 kg (160 lb 9.7 oz)   10/12/21 74.8 kg (165 lb)   09/27/21 73.9 kg (162 lb 14.7 oz)   08/24/21 74.3 kg (163 lb 12.8 oz)         Medical History  Past Medical History:   Diagnosis Date    Allergy     Anxiety     Cataract     Colon polyps 2010    Hearing loss     Hypertension     Joint pain     Mental disorder        Review of Systems   Constitutional:  Negative for fever.   HENT: Negative for trouble swallowing.    Respiratory: Negative for shortness of breath.    Cardiovascular: Negative for chest pain.   Gastrointestinal: Negative for blood in stool, constipation, diarrhea, nausea and vomiting.   Genitourinary: Negative for dysuria.   Musculoskeletal: Positive for arthralgias (finger pain). Negative for gait problem.   Neurological: Negative for dizziness and seizures.        Memory difficulties   Psychiatric/Behavioral: Positive for dysphoric mood. Negative for hallucinations. The patient is nervous/anxious.          Surgical hx, family hx, social hx   Have been reviewed      Current Outpatient Medications:     acetaminophen (TYLENOL) 650 MG TbSR, Take 1 tablet (650 mg total) by mouth every 8 (eight) hours as needed (pain)., Disp: , Rfl: 0    ALPRAZolam (XANAX) 0.5 MG tablet, take One tablet by mouth every night at bedtime and one tablet every day as needed (Patient taking differently: take One tablet by mouth every night at bedtime and one tablet every day as needed), Disp: 45 tablet, Rfl: 2    amlodipine-valsartan (EXFORGE) 5-320 mg per tablet, Take 1 tablet by mouth once daily., Disp: 90 tablet, Rfl: 1    ascorbic acid, vitamin C, (VITAMIN C) 100 MG tablet, Take 100 mg by mouth once daily., Disp: , Rfl:     aspirin (ECOTRIN) 81 MG EC tablet, Take 81 mg by mouth nightly. , Disp: , Rfl:     b complex vitamins tablet, Take 1 tablet by mouth once daily., Disp: , Rfl:     calcium carbonate-vitamin D3 500 mg(1,250mg) -125 unit per tablet, Take 1 tablet by mouth nightly. , Disp: , Rfl:     cetirizine (ZYRTEC) 10 MG tablet, Take 10 mg by mouth daily as needed. , Disp: , Rfl:     diclofenac sodium (VOLTAREN) 1 % Gel, Apply 2 g topically once daily., Disp: 50 g, Rfl: 2    donepeziL (ARICEPT) 10 MG tablet, Take 1 tablet (10 mg total) by mouth every evening., Disp: 30 tablet, Rfl: 5    ergocalciferol, vitamin D2, (VITAMIN D ORAL), Take 400 Units by mouth  "nightly. , Disp: , Rfl:     EScitalopram oxalate (LEXAPRO) 5 MG Tab, take 1 tablet by mouth every evening (Patient taking differently: take 1 tablet by mouth every evening), Disp: 30 tablet, Rfl: 5    ferrous sulfate (FEOSOL) 325 mg (65 mg iron) Tab tablet, Take 1 tablet (325 mg total) by mouth every Mon, Wed, Fri. With vitamin C, Disp: 90 tablet, Rfl: 1    gabapentin (NEURONTIN) 600 MG tablet, Take 1 tablet (600 mg total) by mouth 3 (three) times daily., Disp: 90 tablet, Rfl: 1    ginkgo biloba 40 mg Tab, Take 1 capsule by mouth daily as needed. , Disp: , Rfl:     mometasone 0.1% (ELOCON) 0.1 % cream, Use daily, Disp: 50 g, Rfl: 3    multivitamin (THERAGRAN) per tablet, Take 1 tablet by mouth once daily. Takes Centrum Silver, Disp: , Rfl:     omega-3 fatty acids 1,000 mg Cap, Take 1 g by mouth every evening., Disp: , Rfl:     omeprazole (PRILOSEC) 40 MG capsule, Take 1 capsule (40 mg total) by mouth every morning., Disp: 90 capsule, Rfl: 1    azelastine (ASTELIN) 137 mcg (0.1 %) nasal spray, instill 1 spray (137 mcg total) by Nasal route 2 (two) times daily., Disp: 30 mL, Rfl: 3    pravastatin (PRAVACHOL) 10 MG tablet, Take 1 tablet (10 mg total) by mouth every evening., Disp: 90 tablet, Rfl: 3    Objective:        Body mass index is 25.66 kg/m².  Vitals:    02/17/22 1024   BP: 128/72   Pulse: 96   SpO2: 98%   Weight: 72.1 kg (158 lb 15.2 oz)   Height: 5' 6" (1.676 m)   PainSc: 0-No pain     Physical Exam  Vitals and nursing note reviewed.   Constitutional:       General: She is not in acute distress.     Appearance: Normal appearance. She is not ill-appearing.   HENT:      Head: Normocephalic and atraumatic.      Nose:      Comments: Wearing a mask  Eyes:      General: No scleral icterus.  Cardiovascular:      Rate and Rhythm: Normal rate and regular rhythm.      Heart sounds: Normal heart sounds.   Pulmonary:      Effort: Pulmonary effort is normal.   Abdominal:      General: There is no distension. "   Musculoskeletal:         General: No deformity.      Cervical back: Normal range of motion.      Right lower leg: No edema.      Left lower leg: No edema.   Skin:     General: Skin is warm and dry.   Neurological:      Mental Status: She is alert.   Psychiatric:         Behavior: Behavior normal.      Comments: Tearful at times  Tangential, asks multiple questions, appreciating repetition           Lab Results   Component Value Date    WBC 5.88 12/08/2021    HGB 10.9 (L) 12/08/2021    HCT 36.4 (L) 12/08/2021     12/08/2021    CHOL 195 11/04/2021    TRIG 68 11/04/2021    HDL 54 11/04/2021    ALT 11 12/08/2021    AST 19 12/08/2021     12/08/2021    K 3.9 12/08/2021     (H) 12/08/2021    CREATININE 0.8 12/08/2021    BUN 11 12/08/2021    CO2 23 12/08/2021    TSH 1.395 11/04/2021    HGBA1C 5.2 11/04/2021       The 10-year ASCVD risk score (Felicita ANDREINA Jr., et al., 2013) is: 11.7%    Values used to calculate the score:      Age: 71 years      Sex: Female      Is Non- : Yes      Diabetic: No      Tobacco smoker: No      Systolic Blood Pressure: 128 mmHg      Is BP treated: Yes      HDL Cholesterol: 54 mg/dL      Total Cholesterol: 195 mg/dL      (Imaging have been independently reviewed)      Assessment:         1. Essential hypertension    2. Late onset Alzheimer's dementia without behavioral disturbance    3. Normocytic anemia    4. Rhinorrhea    5. Unspecified disturbances of skin sensation     6. 10 year risk of MI or stroke 7.5% or greater    7. Anxiety    8. Major depressive disorder, recurrent episode, moderate    9. Aortic atherosclerosis    10. Thyroid nodule    11. Gastroesophageal reflux disease without esophagitis    12. Seasonal allergic rhinitis due to pollen          Plan:     Soila was seen today for hypertension.    Diagnoses and all orders for this visit:    Essential hypertension    Late onset Alzheimer's dementia without behavioral disturbance    Normocytic  anemia  -     Ferritin; Future  -     Iron and TIBC; Future  -     Vitamin B12; Future  -     Folate; Future  -     CBC Auto Differential; Future    Rhinorrhea  -     azelastine (ASTELIN) 137 mcg (0.1 %) nasal spray; instill 1 spray (137 mcg total) by Nasal route 2 (two) times daily.    Unspecified disturbances of skin sensation   -     Vitamin B12; Future  -     Folate; Future    10 year risk of MI or stroke 7.5% or greater  -     Discontinue: pravastatin (PRAVACHOL) 10 MG tablet; Take 1 tablet (10 mg total) by mouth once daily.  -     pravastatin (PRAVACHOL) 10 MG tablet; Take 1 tablet (10 mg total) by mouth every evening.    Anxiety    Major depressive disorder, recurrent episode, moderate    Aortic atherosclerosis    Thyroid nodule    Gastroesophageal reflux disease without esophagitis    Seasonal allergic rhinitis due to pollen        Health Maintenance  - Lipids: normal 11/4/2021  - A1C: 5.2 11/4/2021  - Colon Ca Screen: 3/2020, repeat due 2025  - Immunizations: [facility]    Women's health  - Pap: s/p hyst  - Mammo: 2/17/21 Birads 1 ; repeat ordered  - Dexa: 8/25/20 [facility]    Follow up in about 3 months (around 5/17/2022).      Or sooner prn    Notes were provided for patient to give to daughter for updated on care    >54 min were used in chart review, evaluation and counseling of patient :re ASCVD risk, reason for labs and recent anemia, lexapro, chronic use of ativan now rx by neurology, documentation and review of results on same day of service     All medications were reviewed including potential side effects and risks/benefits.  Pt was counseled to call back if anything worsens or if questions arise.    Parminder Junior MD  Family Medicine  [facility]  [address]  [address]  [place] [address]  Phone [phone]  Fax [phone]

## 2022-02-17 NOTE — PATIENT INSTRUCTIONS
Love that Dr. Levin is involved in your mom's care.  The lexapro, higher dose of aricept and plan for careful monitoring of the xanax sounds great.  Dr. Levin's note does mention recommendation your mom see a psychiatrist as well.  Your mom seems to think this will be re-evaluated in time.  Could be.    Starting astelin spray for runny nose    Also I recommended starting a low potency cholesterol medication to reduce heart attack or stroke.  Her risk is elevated though her cholesterol levels aren't bad. The medicine helps smooth arteries/ stabilize plaques as well as decrease levels in general.   This is pravastatin 20 mg every night.  I sent in today    Mammogram is recommended annually by the [facility] radiology department.  These often stop at age 75.  Please let us know if there are concerns.  The order is in and your mom can schedule when she wishes.    I am following your mom's anemia as she recovers from her GI bleed in October.  Hopefully she can stop the iron soon.    Thanks for your help!  OTTO

## 2022-02-22 ENCOUNTER — OFFICE VISIT (OUTPATIENT)
Dept: OTOLARYNGOLOGY | Facility: CLINIC | Age: 72
End: 2022-02-22
Payer: MEDICARE

## 2022-02-22 VITALS
HEART RATE: 70 BPM | WEIGHT: 155.63 LBS | SYSTOLIC BLOOD PRESSURE: 110 MMHG | DIASTOLIC BLOOD PRESSURE: 62 MMHG | BODY MASS INDEX: 25.12 KG/M2

## 2022-02-22 DIAGNOSIS — H93.8X3 SENSATION OF FULLNESS IN BOTH EARS: Primary | ICD-10-CM

## 2022-02-22 PROCEDURE — 3008F PR BODY MASS INDEX (BMI) DOCUMENTED: ICD-10-PCS | Mod: CPTII,S$GLB,, | Performed by: OTOLARYNGOLOGY

## 2022-02-22 PROCEDURE — 1159F MED LIST DOCD IN RCRD: CPT | Mod: CPTII,S$GLB,, | Performed by: OTOLARYNGOLOGY

## 2022-02-22 PROCEDURE — 99213 OFFICE O/P EST LOW 20 MIN: CPT | Mod: 25,S$GLB,, | Performed by: OTOLARYNGOLOGY

## 2022-02-22 PROCEDURE — 3074F SYST BP LT 130 MM HG: CPT | Mod: CPTII,S$GLB,, | Performed by: OTOLARYNGOLOGY

## 2022-02-22 PROCEDURE — 99999 PR PBB SHADOW E&M-EST. PATIENT-LVL IV: ICD-10-PCS | Mod: PBBFAC,,, | Performed by: OTOLARYNGOLOGY

## 2022-02-22 PROCEDURE — 3078F DIAST BP <80 MM HG: CPT | Mod: CPTII,S$GLB,, | Performed by: OTOLARYNGOLOGY

## 2022-02-22 PROCEDURE — 99999 PR PBB SHADOW E&M-EST. PATIENT-LVL IV: CPT | Mod: PBBFAC,,, | Performed by: OTOLARYNGOLOGY

## 2022-02-22 PROCEDURE — 3008F BODY MASS INDEX DOCD: CPT | Mod: CPTII,S$GLB,, | Performed by: OTOLARYNGOLOGY

## 2022-02-22 PROCEDURE — 1159F PR MEDICATION LIST DOCUMENTED IN MEDICAL RECORD: ICD-10-PCS | Mod: CPTII,S$GLB,, | Performed by: OTOLARYNGOLOGY

## 2022-02-22 PROCEDURE — 4010F ACE/ARB THERAPY RXD/TAKEN: CPT | Mod: CPTII,S$GLB,, | Performed by: OTOLARYNGOLOGY

## 2022-02-22 PROCEDURE — 3078F PR MOST RECENT DIASTOLIC BLOOD PRESSURE < 80 MM HG: ICD-10-PCS | Mod: CPTII,S$GLB,, | Performed by: OTOLARYNGOLOGY

## 2022-02-22 PROCEDURE — 4010F PR ACE/ARB THEARPY RXD/TAKEN: ICD-10-PCS | Mod: CPTII,S$GLB,, | Performed by: OTOLARYNGOLOGY

## 2022-02-22 PROCEDURE — 1126F PR PAIN SEVERITY QUANTIFIED, NO PAIN PRESENT: ICD-10-PCS | Mod: CPTII,S$GLB,, | Performed by: OTOLARYNGOLOGY

## 2022-02-22 PROCEDURE — 69210 PR REMOVAL IMPACTED CERUMEN REQUIRING INSTRUMENTATION, UNILATERAL: ICD-10-PCS | Mod: S$GLB,,, | Performed by: OTOLARYNGOLOGY

## 2022-02-22 PROCEDURE — 99213 PR OFFICE/OUTPT VISIT, EST, LEVL III, 20-29 MIN: ICD-10-PCS | Mod: 25,S$GLB,, | Performed by: OTOLARYNGOLOGY

## 2022-02-22 PROCEDURE — 3074F PR MOST RECENT SYSTOLIC BLOOD PRESSURE < 130 MM HG: ICD-10-PCS | Mod: CPTII,S$GLB,, | Performed by: OTOLARYNGOLOGY

## 2022-02-22 PROCEDURE — 69210 REMOVE IMPACTED EAR WAX UNI: CPT | Mod: S$GLB,,, | Performed by: OTOLARYNGOLOGY

## 2022-02-22 PROCEDURE — 1126F AMNT PAIN NOTED NONE PRSNT: CPT | Mod: CPTII,S$GLB,, | Performed by: OTOLARYNGOLOGY

## 2022-02-22 NOTE — PROGRESS NOTES
Subjective:       Patient ID: Soila Chávez is a 71 y.o. female.    Chief Complaint: Ear Fullness    Ear Fullness   There is pain in both ears. This is a recurrent problem. The current episode started 1 to 4 weeks ago. The problem occurs constantly. The problem has been unchanged. There has been no fever. The patient is experiencing no pain. Associated symptoms include hearing loss. Pertinent negatives include no coughing, diarrhea, ear discharge, headaches, neck pain, rash, rhinorrhea or vomiting. She has tried nothing for the symptoms. Her past medical history is significant for hearing loss.     Review of Systems   Constitutional: Negative for activity change, appetite change and fever.   HENT: Positive for hearing loss. Negative for congestion, ear discharge, ear pain, nosebleeds, postnasal drip, rhinorrhea and sneezing.    Eyes: Negative for redness and visual disturbance.   Respiratory: Negative for apnea, cough, shortness of breath and wheezing.    Cardiovascular: Negative for chest pain and palpitations.   Gastrointestinal: Negative for diarrhea and vomiting.   Genitourinary: Negative for difficulty urinating and frequency.   Musculoskeletal: Negative for arthralgias, back pain, gait problem and neck pain.   Skin: Negative for color change and rash.   Neurological: Negative for dizziness, speech difficulty, weakness and headaches.   Hematological: Negative for adenopathy. Does not bruise/bleed easily.   Psychiatric/Behavioral: Negative for agitation and behavioral problems.       Objective:        Constitutional:   Vital signs are normal. She appears well-developed and well-nourished. She is active. Normal speech.      Head:  Normocephalic and atraumatic. Salivary glands normal.  Facial strength is normal.      Nose:  Nose normal including turbinates, nasal mucosa, sinuses and nasal septum.     Mouth/Throat  Oropharynx clear and moist without lesions or asymmetry and normal uvula midline.      Neck:  Neck normal without thyromegaly masses, asymmetry, normal tracheal structure, crepitus, and tenderness, thyroid normal, trachea normal, phonation normal and full range of motion with neck supple.     Psychiatric:   She has a normal mood and affect. Her speech is normal and behavior is normal.     Neurological:   She has neurological normal, alert and oriented.     Skin:   No abrasions, lacerations, lesions, or rashes.         PROCEDURE NOTE:  Ceruminosis is noted in both EACs.  Wax was removed by manual debridement and suctioning utilizing the assistance of binocular microscopy revealing EACs and TMs WNL. The patient tolerated this procedure without difficulty. The subjective decrease noted in hearing pre-cleaning was resolved post-cleaning.       Assessment:       1. Sensation of fullness in both ears        Plan:       1. Hearing conservation strongly recommended.  2. Trial of amplification bilaterally also recommended (patient not interested).  3. Re-check of hearing in 18-24 months or sooner if subjective change noted.  4. F/U with PCP as per schedule.

## 2022-03-17 ENCOUNTER — DOCUMENTATION ONLY (OUTPATIENT)
Dept: ORTHOPEDICS | Facility: CLINIC | Age: 72
End: 2022-03-17
Payer: MEDICARE

## 2022-03-17 DIAGNOSIS — I10 ESSENTIAL HYPERTENSION: ICD-10-CM

## 2022-03-17 NOTE — PROGRESS NOTES
Pt completed surgery paperwork at clinic visit on 01/23/20 for DELGADO ANDRES. Pt has not yet contacted [facility] to choose a surgery date as of 03/17/2022.     Should pt reach out to our office to schedule surgery, he/she will need to return to clinic for re-evaluation, surgical discussion, & completion of updated surgery forms including surgical consent.     Surgical documents were discarded 03/17/2022.

## 2022-03-17 NOTE — TELEPHONE ENCOUNTER
No new care gaps identified.  Powered by [employer]. Reference number: [id number].   3/17/2022 11:41:44 AM GREERT

## 2022-03-20 ENCOUNTER — PATIENT MESSAGE (OUTPATIENT)
Dept: INTERNAL MEDICINE | Facility: CLINIC | Age: 72
End: 2022-03-20
Payer: MEDICARE

## 2022-03-22 ENCOUNTER — PATIENT MESSAGE (OUTPATIENT)
Dept: INTERNAL MEDICINE | Facility: CLINIC | Age: 72
End: 2022-03-22
Payer: MEDICARE

## 2022-03-23 ENCOUNTER — OFFICE VISIT (OUTPATIENT)
Dept: INTERNAL MEDICINE | Facility: CLINIC | Age: 72
End: 2022-03-23
Attending: INTERNAL MEDICINE
Payer: MEDICARE

## 2022-03-23 VITALS — HEIGHT: 66 IN | BODY MASS INDEX: 24.91 KG/M2 | WEIGHT: 155 LBS

## 2022-03-23 DIAGNOSIS — R41.89 COGNITIVE DECLINE: ICD-10-CM

## 2022-03-23 DIAGNOSIS — J06.9 VIRAL UPPER RESPIRATORY TRACT INFECTION: Primary | ICD-10-CM

## 2022-03-23 PROCEDURE — 4010F PR ACE/ARB THEARPY RXD/TAKEN: ICD-10-PCS | Mod: CPTII,95,, | Performed by: INTERNAL MEDICINE

## 2022-03-23 PROCEDURE — 1101F PR PT FALLS ASSESS DOC 0-1 FALLS W/OUT INJ PAST YR: ICD-10-PCS | Mod: CPTII,95,, | Performed by: INTERNAL MEDICINE

## 2022-03-23 PROCEDURE — 3008F PR BODY MASS INDEX (BMI) DOCUMENTED: ICD-10-PCS | Mod: CPTII,95,, | Performed by: INTERNAL MEDICINE

## 2022-03-23 PROCEDURE — 1126F AMNT PAIN NOTED NONE PRSNT: CPT | Mod: CPTII,95,, | Performed by: INTERNAL MEDICINE

## 2022-03-23 PROCEDURE — 99442 PR PHYSICIAN TELEPHONE EVALUATION 11-20 MIN: ICD-10-PCS | Mod: 95,,, | Performed by: INTERNAL MEDICINE

## 2022-03-23 PROCEDURE — 1101F PT FALLS ASSESS-DOCD LE1/YR: CPT | Mod: CPTII,95,, | Performed by: INTERNAL MEDICINE

## 2022-03-23 PROCEDURE — 1159F PR MEDICATION LIST DOCUMENTED IN MEDICAL RECORD: ICD-10-PCS | Mod: CPTII,95,, | Performed by: INTERNAL MEDICINE

## 2022-03-23 PROCEDURE — 3288F PR FALLS RISK ASSESSMENT DOCUMENTED: ICD-10-PCS | Mod: CPTII,95,, | Performed by: INTERNAL MEDICINE

## 2022-03-23 PROCEDURE — 4010F ACE/ARB THERAPY RXD/TAKEN: CPT | Mod: CPTII,95,, | Performed by: INTERNAL MEDICINE

## 2022-03-23 PROCEDURE — 3288F FALL RISK ASSESSMENT DOCD: CPT | Mod: CPTII,95,, | Performed by: INTERNAL MEDICINE

## 2022-03-23 PROCEDURE — 1126F PR PAIN SEVERITY QUANTIFIED, NO PAIN PRESENT: ICD-10-PCS | Mod: CPTII,95,, | Performed by: INTERNAL MEDICINE

## 2022-03-23 PROCEDURE — 3008F BODY MASS INDEX DOCD: CPT | Mod: CPTII,95,, | Performed by: INTERNAL MEDICINE

## 2022-03-23 PROCEDURE — 1159F MED LIST DOCD IN RCRD: CPT | Mod: CPTII,95,, | Performed by: INTERNAL MEDICINE

## 2022-03-23 PROCEDURE — 99442 PR PHYSICIAN TELEPHONE EVALUATION 11-20 MIN: CPT | Mod: 95,,, | Performed by: INTERNAL MEDICINE

## 2022-03-23 RX ORDER — OMEPRAZOLE 40 MG/1
40 CAPSULE, DELAYED RELEASE ORAL EVERY MORNING
Qty: 90 CAPSULE | Refills: 1 | Status: SHIPPED | OUTPATIENT
Start: 2022-03-23 | End: 2022-08-19 | Stop reason: SDUPTHER

## 2022-03-23 RX ORDER — LOSARTAN POTASSIUM 100 MG/1
TABLET ORAL
Qty: 90 TABLET | Refills: 1 | OUTPATIENT
Start: 2022-03-23

## 2022-03-23 RX ORDER — PROMETHAZINE HYDROCHLORIDE AND DEXTROMETHORPHAN HYDROBROMIDE 6.25; 15 MG/5ML; MG/5ML
5 SYRUP ORAL EVERY 4 HOURS PRN
Qty: 180 ML | Refills: 0 | Status: SHIPPED | OUTPATIENT
Start: 2022-03-23 | End: 2022-04-02

## 2022-03-23 NOTE — PROGRESS NOTES
The patient location is:  Patient Home   The chief complaint leading to consultation is:  URI and Cough    Subjective:         Hx of sinus drip which improves with astelin but states no congestion currently, no post nasal drip but occasional throat clearing.  Cough has been an issue for past week, today started with headache when she woke up. Frontal local, non tender, resolved. Productive cough, yellowish to clear, no SOB.  No fatigue or myalgias. No sick contacts. Cough worsened last night.  Has been taking her omeprazole 40 mg.     URI   This is a new problem. The current episode started in the past 7 days. Associated symptoms include coughing and headaches. Pertinent negatives include no congestion, rhinorrhea, sinus pain or sore throat.      Soila Chávez is a 71 y.o.  female who presents for URI and Cough  .   Review of Systems   Constitutional: Negative for chills and fever.   HENT: Negative for congestion, postnasal drip, rhinorrhea, sinus pain, sore throat and trouble swallowing.    Respiratory: Positive for cough. Negative for shortness of breath.    Neurological: Positive for headaches. Negative for dizziness.   Psychiatric/Behavioral: Negative for dysphoric mood.       No problems updated.    Past Medical History:   Diagnosis Date    Allergy     Anxiety     Cataract     Colon polyps 2010    Hearing loss     Hypertension     Joint pain     Mental disorder        Past Surgical History:   Procedure Laterality Date    COLONOSCOPY N/A 3/5/2020    Procedure: COLONOSCOPY;  Surgeon: Nathalia Kemp MD;  Location: [address]);  Service: Colon and Rectal;  Laterality: N/A;  requests later appt time if available - Pt open to any MD- Pt informed arrival time may be adjusted, Pt verbalized understanding- [facility]2/24/20@1409    ESOPHAGOGASTRODUODENOSCOPY N/A 10/1/2020    Procedure: EGD (ESOPHAGOGASTRODUODENOSCOPY);  Surgeon: Casey Guerrero MD;  Location: [facility] ([address] [place] [address]);  Service: Endoscopy;   "Laterality: N/A;  [facility] 9/28-[facility]    ESOPHAGOGASTRODUODENOSCOPY N/A 10/29/2021    Procedure: EGD (ESOPHAGOGASTRODUODENOSCOPY);  Surgeon: Billy Vegas MD;  Location: [facility];  Service: Endoscopy;  Laterality: N/A;    HYSTERECTOMY      PERIPARTUM CHITO       Family History   Problem Relation Age of Onset    Breast cancer Sister     Colon cancer Neg Hx     Ovarian cancer Neg Hx     Melanoma Neg Hx        Social History     Tobacco Use    Smoking status: Never Smoker    Smokeless tobacco: Never Used   Substance Use Topics    Alcohol use: Yes     Comment: beer occasionally    Drug use: No       Objective:   Height 5' 6" (1.676 m), weight 70.3 kg (155 lb).     Physical Exam  Pulmonary:      Comments: Speaking in full sentences with ease, no audible respiratory distress, occasional cough  Neurological:      Mental Status: She is alert.      Comments: Repeating questions recently answered           Prior labs reviewed  Assessment/Plan:        Soila was seen today for uri and cough.    Diagnoses and all orders for this visit:    Viral upper respiratory tract infection  -     COVID-19 Routine Screening; Future  -     promethazine-dextromethorphan (PROMETHAZINE-DM) 6.25-15 mg/5 mL Syrp; Take 5 mLs by mouth every 4 (four) hours as needed.   call if symptoms worsen or do not resolve.    Cognitive decline  Staff to be clear with instuctions for testing and management   Other orders  -     omeprazole (PRILOSEC) 40 MG capsule; Take 1 capsule (40 mg total) by mouth every morning.           Visit type: Virtual visit with synchronous audio and video- video not done due to pt requirements    Total time spent with patient: 20 minutes    Each patient to whom he or she provides medical services by telemedicine is:  (1) informed of the relationship between the physician and patient and the respective role of any other health care provider with respect to management of the patient; and (2) notified that he or she may " decline to receive medical services by telemedicine and may withdraw from such care at any time.  Medication List with Changes/Refills   New Medications    PROMETHAZINE-DEXTROMETHORPHAN (PROMETHAZINE-DM) 6.25-15 MG/5 ML SYRP    Take 5 mLs by mouth every 4 (four) hours as needed.   Current Medications    ACETAMINOPHEN (TYLENOL) 650 MG TBSR    Take 1 tablet (650 mg total) by mouth every 8 (eight) hours as needed (pain).    ALPRAZOLAM (XANAX) 0.5 MG TABLET    take One tablet by mouth every night at bedtime and one tablet every day as needed    AMLODIPINE-VALSARTAN (EXFORGE) 5-320 MG PER TABLET    Take 1 tablet by mouth once daily.    ASCORBIC ACID, VITAMIN C, (VITAMIN C) 100 MG TABLET    Take 100 mg by mouth once daily.    ASPIRIN (ECOTRIN) 81 MG EC TABLET    Take 81 mg by mouth nightly.     AZELASTINE (ASTELIN) 137 MCG (0.1 %) NASAL SPRAY    instill 1 spray (137 mcg total) by Nasal route 2 (two) times daily.    B COMPLEX VITAMINS TABLET    Take 1 tablet by mouth once daily.    CALCIUM CARBONATE-VITAMIN D3 500 MG(1,250MG) -125 UNIT PER TABLET    Take 1 tablet by mouth nightly.     CETIRIZINE (ZYRTEC) 10 MG TABLET    Take 10 mg by mouth daily as needed.     DICLOFENAC SODIUM (VOLTAREN) 1 % GEL    Apply 2 g topically once daily.    DONEPEZIL (ARICEPT) 10 MG TABLET    Take 1 tablet (10 mg total) by mouth every evening at bedtime    ERGOCALCIFEROL, VITAMIN D2, (VITAMIN D ORAL)    Take 400 Units by mouth nightly.     ESCITALOPRAM OXALATE (LEXAPRO) 5 MG TAB    take 1 tablet by mouth every evening at bedtime    FERROUS SULFATE (FEOSOL) 325 MG (65 MG IRON) TAB TABLET    Take 1 tablet (325 mg total) by mouth every Mon, Wed, Fri. With vitamin C    GABAPENTIN (NEURONTIN) 600 MG TABLET    Take 1 tablet (600 mg total) by mouth 3 (three) times daily.    GINKGO BILOBA 40 MG TAB    Take 1 capsule by mouth daily as needed.     MOMETASONE 0.1% (ELOCON) 0.1 % CREAM    Use daily    MULTIVITAMIN (THERAGRAN) PER TABLET    Take 1 tablet by  mouth once daily. Takes Centrum Silver    OMEGA-3 FATTY ACIDS 1,000 MG CAP    Take 1 g by mouth every evening.    PRAVASTATIN (PRAVACHOL) 10 MG TABLET    Take 1 tablet (10 mg total) by mouth every evening.   Changed and/or Refilled Medications    Modified Medication Previous Medication    OMEPRAZOLE (PRILOSEC) 40 MG CAPSULE omeprazole (PRILOSEC) 40 MG capsule       Take 1 capsule (40 mg total) by mouth every morning.    Take 1 capsule (40 mg total) by mouth every morning.

## 2022-03-24 NOTE — TELEPHONE ENCOUNTER
Quick DC. Inappropriate Request    Refill Authorization Note   Soila Chávez  is requesting a refill authorization.  Brief Assessment and Rationale for Refill:  Quick Discontinue  Medication Therapy Plan:       Medication Reconciliation Completed:  No      Comments:   Pended Medication(s)       Requested Prescriptions     Refused Prescriptions Disp Refills    losartan (COZAAR) 100 MG tablet [Pharmacy Med Name: LOSARTAN 100MG TABLETS] 90 tablet 1     Sig: TAKE 1 TABLET(100 MG) BY MOUTH EVERY DAY     Refused By: LORETTA WALLS     Reason for Refusal: Refill not appropriate        Duplicate Pended Encounter(s)/ Last Prescribed Details: (includes pharmacy & prescriber details)      Office Visit on 1/10/2022        Revision History        Detailed Report             Note composed:8:13 PM 03/23/2022

## 2022-03-28 ENCOUNTER — PATIENT MESSAGE (OUTPATIENT)
Dept: ORTHOPEDICS | Facility: CLINIC | Age: 72
End: 2022-03-28
Payer: MEDICARE

## 2022-04-05 ENCOUNTER — CARE AT HOME (OUTPATIENT)
Dept: HOME HEALTH SERVICES | Facility: CLINIC | Age: 72
End: 2022-04-05
Payer: MEDICARE

## 2022-04-05 VITALS
WEIGHT: 155 LBS | DIASTOLIC BLOOD PRESSURE: 76 MMHG | TEMPERATURE: 98 F | HEART RATE: 80 BPM | SYSTOLIC BLOOD PRESSURE: 124 MMHG | OXYGEN SATURATION: 96 % | BODY MASS INDEX: 24.91 KG/M2 | RESPIRATION RATE: 18 BRPM | HEIGHT: 66 IN

## 2022-04-05 DIAGNOSIS — Z51.5 PALLIATIVE CARE ENCOUNTER: Primary | ICD-10-CM

## 2022-04-05 PROCEDURE — 99350 PR HOME VISIT,ESTAB PATIENT,LEVEL IV: ICD-10-PCS | Mod: S$GLB,,, | Performed by: NURSE PRACTITIONER

## 2022-04-05 PROCEDURE — 99350 HOME/RES VST EST HIGH MDM 60: CPT | Mod: S$GLB,,, | Performed by: NURSE PRACTITIONER

## 2022-04-05 PROCEDURE — 99497 ADVNCD CARE PLAN 30 MIN: CPT | Mod: S$GLB,,, | Performed by: NURSE PRACTITIONER

## 2022-04-05 PROCEDURE — 99497 PR ADVNCD CARE PLAN 30 MIN: ICD-10-PCS | Mod: S$GLB,,, | Performed by: NURSE PRACTITIONER

## 2022-04-05 NOTE — PATIENT INSTRUCTIONS
Instructions:  - [facility] Nurse Practitioner to schedule home follow-up visit with patient in 4-6 weeks or as needed.  - Continue all medications, treatments and therapies as ordered.   - Follow all instructions, recommendations as discussed.  - Maintain Safety Precautions at all times.  - Attend all medical appointments as scheduled.  - For worsening symptoms: call Primary Care Physician or Nurse Practitioner.  - For emergencies, call 911 or immediately report to the nearest emergency room.  - Limit Risks of environmental exposure to coronavirus/COVID-19 as discussed including: social distancing, hand hygiene, and limiting departures from the home for necessities only.

## 2022-04-05 NOTE — PROGRESS NOTES
"  [facility]  Palliative Care Home Visit    Visit Date: 4/5/2022  Encounter Provider: Franca Mcintyre NP  PCP:  Parminder Junior MD    PRESENTING HISTORY      Patient ID: Soila Chávez is a 71 y.o. female.    Consult Requested By:  No ref. provider found  Reason for Consult:  Palliative Care follow up    Soila is being seen at home due to  physical debility that presents a taxing effort to leave the home, to mitigate high risk of hospital readmission or due to the limited availability of reliable or safe options for transportation to the point of access to the provider. Prior to treatment on this visit the chart was reviewed and patient consent was obtained.        Chief Complaint: Follow-up      History of Present Illness: Ms. Soial Chávez is a 71 y.o. female who was recently admitted to the hospital.    Admission Date: 10/28/2021  Hospital Length of Stay: 2 days  Discharge Date and Time:  10/31/2021    HPI:   71-year-old female with history of hypertension, anxiety, dementia presented to the ED today with complaint of 3 bloody bowel movements.  Patient stated that she ate dinner last night about an hour later went to the bathroom in noted "liquid red blood", with subsequent episode after her bath.  Stated she went to bed and noted around 330 this morning that there was blood in her bed in went to the restroom and had another bloody bowel movement.  She stated at this time she also noted brown loose stool and dark stool.  She denied any associated abdominal pain, nausea, vomiting, shortness of breath, dizziness, lightheadedness.  Reported that she called her daughter to inform her of the loose bloody stools thinking it was a side effect of a new medication she had started.  Patient with no previous episodes.  Denied hematemesis.  Reported that she used to take 2 Aleve a day for headaches until a little over a month ago when her primary care provider told her to stop taking the Aleve, only take " acetaminophen.  Also reported that she had decreased the number of beers she had a week from drinking 12 pack on the weekends to maybe 1 or 2 a week.  Quit smoking a few years ago, and denies any illicit drug use.  Previous EGD 10/2020 showed Gastric antral and oxyntic mucosa with Helicobacter gastritis, colonoscopy 3/2020 showed Multiple small and large-mouthed diverticula were found in the entire colon.  ED evaluation H&H 9.9/31.1 lower than priors, otherwise labs unremarkable.  Vital signs stable.  Patient admitted to observation.  Poor historian        Procedure(s) (LRB):  EGD (ESOPHAGOGASTRODUODENOSCOPY) (N/A)       Hospital Course:   71-year-old female admitted with hematochezia suspected secondary to diverticular bleed, transfused 2 units packed RBCs.  Patient with previous history of H.pylori unclear for completely treated underwent EGD showed small hiatal hernia, stomach was normal examined duodenum was normal.  No further bleeding, patient hemodynamically stable discharge home.  Instructed to call or return if hematochezia returns.  Plan of care discussed with patient and her daughter.  Daughter acknowledges understanding and agrees with plan of care declined home health at this time.  _________________________________________________________  Today:Ms. Chávez is being evaluated at home today for a palliative care home visit, see last hospital course above.        With this visit today patient is found at home alone, she opened the door for me and is ambulatory with no assistive device. Patient is AAOx3, during our conversation, she had some episodes of confusion but was able to verify her name and . Most of patients other history was received from her daughter during a previous phone conversation and her medical record. She sees Dr. Parson as her PCP. I will continue seeing the patient in the home as it is difficult for her to physically make mulitple visits. She endorses eating x 3 small meals per day  . She endorses regular BMs. Patient reports she is still cooking and enjoys spending time with her family. She does express excitement about her birthday that is this weekend and a planned party she has out of town.      NO home health needs identified at this time. Fall/Safety precautions reinforced. Education completed ~ 15 minutes. Plan to follow up.     VSS. Denies fever, chest pain, shortness of breath, nausea, vomiting, diarrhea. Risks of environmental exposure to coronavirus discussed including: social distancing, hand hygiene, and limiting departures from the home for necessities only.  Reports understanding and willingness to comply.  All hospital discharge orders reviewed and being followed, all medications reconciled and reviewed, patient and family verbalized understanding. No other needs identified at this time.     I retiereated the process of advance care planning today and explained the importance of this process to the patient and family.  I introduced the concept of advance directives to the patient, as well. Then the patient received detailed information about the importance of designating a Health Care Power of  (HCPOA). She was also instructed to communicate with this person about his wishes for future healthcare, should he become sick and lose decision-making capacity. FULL CODE STATUS    I Introduced [place] form with patient/family, explaining this is the patient's wishes, and this form will be uploaded into the patient's [facility] Chart and the [place] Registry.     We spoke about ACP for 20 minutes.    Attestation: Screening criteria to assess the level of the patient's risk for infection with COVID-19 as recommended by the CDC at the time of the above documented home visit concluded appropriateness to proceed. Universal precautions were maintained at all times, including provider use of 60% alcohol gel hand  immediately prior to entry and upon departing the patient's  home.        Review of Systems   Constitutional: Negative for activity change, fatigue and unexpected weight change.   HENT: Negative for congestion, dental problem, trouble swallowing and voice change.    Eyes: Negative for redness and visual disturbance.   Cardiovascular: Negative for chest pain and palpitations.   Gastrointestinal: Negative for abdominal distention, abdominal pain, nausea and vomiting.   Endocrine: Negative for polydipsia and polyuria.   Genitourinary: Negative for difficulty urinating and dyspareunia.   Musculoskeletal: Negative for arthralgias and gait problem.   Skin: Negative for color change and rash.   Allergic/Immunologic: Negative for food allergies.   Neurological: Negative for dizziness and weakness.   Psychiatric/Behavioral: Positive for confusion. Negative for agitation.        Some episodes of confusion       Assessments:  · Lives in single story home with 4 steps to enter  · Functional Status:Independent with ADLs and mobility   · Safety: Fall precautions  · Nutritional: 3 meals daily  · Home Health/DME/Supplies: n/a    PAST HISTORY:     Past Medical History:   Diagnosis Date    Allergy     Anxiety     Cataract     Colon polyps 2010    Hearing loss     Hypertension     Joint pain     Mental disorder        Past Surgical History:   Procedure Laterality Date    COLONOSCOPY N/A 3/5/2020    Procedure: COLONOSCOPY;  Surgeon: Nathalia Kemp MD;  Location: [address]);  Service: Colon and Rectal;  Laterality: N/A;  requests later appt time if available - Pt open to any MD- Pt informed arrival time may be adjusted, Pt verbalized understanding- ERW2/24/20@1409    ESOPHAGOGASTRODUODENOSCOPY N/A 10/1/2020    Procedure: EGD (ESOPHAGOGASTRODUODENOSCOPY);  Surgeon: Casey Guerrero MD;  Location: [address]);  Service: Endoscopy;  Laterality: N/A;  [facility]    ESOPHAGOGASTRODUODENOSCOPY N/A 10/29/2021    Procedure: EGD (ESOPHAGOGASTRODUODENOSCOPY);   Surgeon: Billy Vegas MD;  Location: [facility];  Service: Endoscopy;  Laterality: N/A;    HYSTERECTOMY      PERIPARTUM CHITO       Family History   Problem Relation Age of Onset    Breast cancer Sister     Colon cancer Neg Hx     Ovarian cancer Neg Hx     Melanoma Neg Hx        Social History     Socioeconomic History    Marital status:    Tobacco Use    Smoking status: Never Smoker    Smokeless tobacco: Never Used   Substance and Sexual Activity    Alcohol use: Yes     Comment: beer occasionally    Drug use: No    Sexual activity: Not Currently     Birth control/protection: None   Social History Narrative    June 2016    The patient reports that she lives alone normally, however her daughter recently moved in with her    She has a 43-year-old daughter, Saima    And a 26-year-old daughterChris, who is a schoolteacher for 6 in seventh grade in [employer]    She is retired from working as a  in the school system    She now works about 5-15 hours a week for [employer], which she enjoys.    Enjoys working at [employer] couple days a week       MEDICATIONS & ALLERGIES:     Current Outpatient Medications on File Prior to Visit   Medication Sig Dispense Refill    acetaminophen (TYLENOL) 650 MG TbSR Take 1 tablet (650 mg total) by mouth every 8 (eight) hours as needed (pain).  0    ALPRAZolam (XANAX) 0.5 MG tablet take One tablet by mouth every night at bedtime and one tablet every day as needed (Patient taking differently: take One tablet by mouth every night at bedtime and one tablet every day as needed) 45 tablet 2    amlodipine-valsartan (EXFORGE) 5-320 mg per tablet Take 1 tablet by mouth once daily. 90 tablet 1    ascorbic acid, vitamin C, (VITAMIN C) 100 MG tablet Take 100 mg by mouth once daily.      aspirin (ECOTRIN) 81 MG EC tablet Take 81 mg by mouth nightly.       azelastine (ASTELIN) 137 mcg (0.1 %) nasal spray instill 1 spray (137 mcg total) by Nasal route 2 (two)  times daily. 30 mL 3    b complex vitamins tablet Take 1 tablet by mouth once daily.      calcium carbonate-vitamin D3 500 mg(1,250mg) -125 unit per tablet Take 1 tablet by mouth nightly.       cetirizine (ZYRTEC) 10 MG tablet Take 10 mg by mouth daily as needed.       diclofenac sodium (VOLTAREN) 1 % Gel Apply 2 g topically once daily. 50 g 2    donepeziL (ARICEPT) 10 MG tablet Take 1 tablet (10 mg total) by mouth every evening at bedtime 30 tablet 5    ergocalciferol, vitamin D2, (VITAMIN D ORAL) Take 400 Units by mouth nightly.       EScitalopram oxalate (LEXAPRO) 5 MG Tab take 1 tablet by mouth every evening at bedtime (Patient taking differently: take 1 tablet by mouth every evening at bedtime) 30 tablet 5    ferrous sulfate (FEOSOL) 325 mg (65 mg iron) Tab tablet Take 1 tablet (325 mg total) by mouth every Mon, Wed, Fri. With vitamin C 90 tablet 1    gabapentin (NEURONTIN) 600 MG tablet Take 1 tablet (600 mg total) by mouth 3 (three) times daily. 90 tablet 1    ginkgo biloba 40 mg Tab Take 1 capsule by mouth daily as needed.       mometasone 0.1% (ELOCON) 0.1 % cream Use daily 50 g 3    multivitamin (THERAGRAN) per tablet Take 1 tablet by mouth once daily. Takes Centrum Silver      omega-3 fatty acids 1,000 mg Cap Take 1 g by mouth every evening.      omeprazole (PRILOSEC) 40 MG capsule Take 1 capsule (40 mg total) by mouth every morning. 90 capsule 1    pravastatin (PRAVACHOL) 10 MG tablet Take 1 tablet (10 mg total) by mouth every evening. (Patient not taking: Reported on 3/23/2022) 90 tablet 3    [DISCONTINUED] cimetidine (TAGAMET) 300 MG tablet Take 1 tablet (300 mg total) by mouth 2 (two) times daily. 180 tablet 1     No current facility-administered medications on file prior to visit.        Review of patient's allergies indicates:   Allergen Reactions    Lotensin [benazepril] Swelling    Penicillins Rash       OBJECTIVE:     Vital Signs:  Vitals:    04/05/22 1100   BP: 124/76   Pulse:  80   Resp: 18   Temp: 97.7 °F (36.5 °C)     Body mass index is 25.02 kg/m².     Physical Exam:  Physical Exam  Constitutional:       Appearance: Normal appearance.   HENT:      Head: Normocephalic.      Right Ear: Tympanic membrane normal.      Nose: Nose normal.      Mouth/Throat:      Mouth: Mucous membranes are moist.   Eyes:      Pupils: Pupils are equal, round, and reactive to light.   Cardiovascular:      Rate and Rhythm: Normal rate and regular rhythm.   Pulmonary:      Effort: Pulmonary effort is normal.      Breath sounds: Normal breath sounds.   Abdominal:      General: Abdomen is flat.      Palpations: Abdomen is soft.   Musculoskeletal:         General: Normal range of motion.      Cervical back: Normal range of motion.   Skin:     General: Skin is warm and dry.      Capillary Refill: Capillary refill takes less than 2 seconds.   Neurological:      Mental Status: She is alert and oriented to person, place, and time.   Psychiatric:         Mood and Affect: Mood normal.         Laboratory  Lab Results   Component Value Date    WBC 5.88 12/08/2021    HGB 10.9 (L) 12/08/2021    HCT 36.4 (L) 12/08/2021    MCV 88 12/08/2021     12/08/2021     No results found for: INR, PROTIME  Lab Results   Component Value Date    HGBA1C 5.2 11/04/2021     No results for input(s): POCTGLUCOSE in the last 72 hours.    Diagnostic Results:  n/a    TRANSITION OF CARE:     [facility] Contact Note: 11/01/2021    Family and/or Caretaker present at visit?  No.  Diagnostic tests reviewed/disposition: No diagnosic tests pending after this hospitalization.  Disease/illness education: Fall precautions   Home health/community services discussion/referrals: Patient does not have home health established from hospital visit.  They do not need home health.  If needed, we will set up home health for the patient.   Establishment or re-establishment of referral orders for community resources: No other necessary community resources.    Discussion with other health care providers: No discussion with other health care providers necessary.     Transition of Care Visit:     I have reviewed and updated the history and problem list.  I have reconciled the medication list.  I have discussed the hospitalization and current medical issues, prognosis and plans with the patient/family.  I  spent more than 50% of time discussing the care with the patient/family.  Total Face-to-Face Encounter: 60 minutes.    Medications Reconciliation:   I have reconciled the patient's home medications and discharge medications with the patient/family. I have updated all changes.  Refer to After-Visit Medication List.    ASSESSMENT & PLAN:     HIGH RISK CONDITION(S):  Patient has a condition that poses threat to life and bodily function:     Soila was seen today for transitional care.    Diagnoses and all orders for this visit:    Dementia without behavioral disturbance, unspecified dementia type  Continue home meds      Were controlled substances prescribed?  No    Instructions for the patient:    Scheduled Follow-up :  Future Appointments   Date Time Provider Department Center   4/7/2022  9:45 AM Rebekah Bernal MD [facility] DERM [place]   4/19/2022 11:20 AM Alesha Levin MD [facility]   4/21/2022 11:30 AM Adriano Guzmán MD [facility] ENT [place]   4/26/2022 11:30 AM Adriano Guzmán MD [facility] ENT [place]   5/17/2022  1:00 PM Parminder Junior MD [facility]       After Visit Medication List :     Medication List          Accurate as of April 5, 2022  6:46 PM. If you have any questions, ask your nurse or doctor.            CONTINUE taking these medications    acetaminophen 650 MG Tbsr  Commonly known as: TYLENOL  Take 1 tablet (650 mg total) by mouth every 8 (eight) hours as needed (pain).     ALPRAZolam 0.5 MG tablet  Commonly known as: XANAX  take One tablet by mouth every night at bedtime and one tablet every day as needed      amlodipine-valsartan 5-320 mg per tablet  Commonly known as: EXFORGE  Take 1 tablet by mouth once daily.     ascorbic acid (vitamin C) 100 MG tablet  Commonly known as: VITAMIN C     aspirin 81 MG EC tablet  Commonly known as: ECOTRIN     azelastine 137 mcg (0.1 %) nasal spray  Commonly known as: ASTELIN  instill 1 spray (137 mcg total) by Nasal route 2 (two) times daily.     b complex vitamins tablet     calcium carbonate-vitamin D3 500 mg-3.125 mcg (125 unit) per tablet     cetirizine 10 MG tablet  Commonly known as: ZYRTEC     diclofenac sodium 1 % Gel  Commonly known as: VOLTAREN  Apply 2 g topically once daily.     donepeziL 10 MG tablet  Commonly known as: ARICEPT  Take 1 tablet (10 mg total) by mouth every evening at bedtime     EScitalopram oxalate 5 MG Tab  Commonly known as: LEXAPRO  take 1 tablet by mouth every evening at bedtime     ferrous sulfate 325 mg (65 mg iron) Tab tablet  Commonly known as: FEOSOL  Take 1 tablet (325 mg total) by mouth every Mon, Wed, Fri. With vitamin C     gabapentin 600 MG tablet  Commonly known as: NEURONTIN  Take 1 tablet (600 mg total) by mouth 3 (three) times daily.     ginkgo biloba 40 mg Tab     mometasone 0.1% 0.1 % cream  Commonly known as: ELOCON  Use daily     multivitamin per tablet  Commonly known as: THERAGRAN     omega-3 fatty acids 1,000 mg Cap     omeprazole 40 MG capsule  Commonly known as: PRILOSEC  Take 1 capsule (40 mg total) by mouth every morning.     pravastatin 10 MG tablet  Commonly known as: PRAVACHOL  Take 1 tablet (10 mg total) by mouth every evening.     VITAMIN D ORAL            Signature:  Franca Mcintyre NP    Patient consent obtained prior to treatment

## 2022-04-19 ENCOUNTER — OFFICE VISIT (OUTPATIENT)
Dept: NEUROLOGY | Facility: CLINIC | Age: 72
End: 2022-04-19
Payer: MEDICARE

## 2022-04-19 VITALS
DIASTOLIC BLOOD PRESSURE: 74 MMHG | BODY MASS INDEX: 25.08 KG/M2 | HEART RATE: 54 BPM | HEIGHT: 66 IN | SYSTOLIC BLOOD PRESSURE: 145 MMHG | WEIGHT: 156.06 LBS

## 2022-04-19 DIAGNOSIS — F41.9 ANXIETY: ICD-10-CM

## 2022-04-19 DIAGNOSIS — G30.1 LATE ONSET ALZHEIMER'S DISEASE WITHOUT BEHAVIORAL DISTURBANCE: Primary | ICD-10-CM

## 2022-04-19 DIAGNOSIS — F02.80 LATE ONSET ALZHEIMER'S DISEASE WITHOUT BEHAVIORAL DISTURBANCE: Primary | ICD-10-CM

## 2022-04-19 PROCEDURE — 3078F DIAST BP <80 MM HG: CPT | Mod: CPTII,S$GLB,, | Performed by: PSYCHIATRY & NEUROLOGY

## 2022-04-19 PROCEDURE — 3288F PR FALLS RISK ASSESSMENT DOCUMENTED: ICD-10-PCS | Mod: CPTII,S$GLB,, | Performed by: PSYCHIATRY & NEUROLOGY

## 2022-04-19 PROCEDURE — 3008F BODY MASS INDEX DOCD: CPT | Mod: CPTII,S$GLB,, | Performed by: PSYCHIATRY & NEUROLOGY

## 2022-04-19 PROCEDURE — 3288F FALL RISK ASSESSMENT DOCD: CPT | Mod: CPTII,S$GLB,, | Performed by: PSYCHIATRY & NEUROLOGY

## 2022-04-19 PROCEDURE — 3078F PR MOST RECENT DIASTOLIC BLOOD PRESSURE < 80 MM HG: ICD-10-PCS | Mod: CPTII,S$GLB,, | Performed by: PSYCHIATRY & NEUROLOGY

## 2022-04-19 PROCEDURE — 3077F PR MOST RECENT SYSTOLIC BLOOD PRESSURE >= 140 MM HG: ICD-10-PCS | Mod: CPTII,S$GLB,, | Performed by: PSYCHIATRY & NEUROLOGY

## 2022-04-19 PROCEDURE — 1101F PR PT FALLS ASSESS DOC 0-1 FALLS W/OUT INJ PAST YR: ICD-10-PCS | Mod: CPTII,S$GLB,, | Performed by: PSYCHIATRY & NEUROLOGY

## 2022-04-19 PROCEDURE — 4010F PR ACE/ARB THEARPY RXD/TAKEN: ICD-10-PCS | Mod: CPTII,S$GLB,, | Performed by: PSYCHIATRY & NEUROLOGY

## 2022-04-19 PROCEDURE — 1126F AMNT PAIN NOTED NONE PRSNT: CPT | Mod: CPTII,S$GLB,, | Performed by: PSYCHIATRY & NEUROLOGY

## 2022-04-19 PROCEDURE — 3077F SYST BP >= 140 MM HG: CPT | Mod: CPTII,S$GLB,, | Performed by: PSYCHIATRY & NEUROLOGY

## 2022-04-19 PROCEDURE — 99214 PR OFFICE/OUTPT VISIT, EST, LEVL IV, 30-39 MIN: ICD-10-PCS | Mod: S$GLB,,, | Performed by: PSYCHIATRY & NEUROLOGY

## 2022-04-19 PROCEDURE — 99999 PR PBB SHADOW E&M-EST. PATIENT-LVL III: ICD-10-PCS | Mod: PBBFAC,,, | Performed by: PSYCHIATRY & NEUROLOGY

## 2022-04-19 PROCEDURE — 1159F PR MEDICATION LIST DOCUMENTED IN MEDICAL RECORD: ICD-10-PCS | Mod: CPTII,S$GLB,, | Performed by: PSYCHIATRY & NEUROLOGY

## 2022-04-19 PROCEDURE — 99214 OFFICE O/P EST MOD 30 MIN: CPT | Mod: S$GLB,,, | Performed by: PSYCHIATRY & NEUROLOGY

## 2022-04-19 PROCEDURE — 1159F MED LIST DOCD IN RCRD: CPT | Mod: CPTII,S$GLB,, | Performed by: PSYCHIATRY & NEUROLOGY

## 2022-04-19 PROCEDURE — 3008F PR BODY MASS INDEX (BMI) DOCUMENTED: ICD-10-PCS | Mod: CPTII,S$GLB,, | Performed by: PSYCHIATRY & NEUROLOGY

## 2022-04-19 PROCEDURE — 1126F PR PAIN SEVERITY QUANTIFIED, NO PAIN PRESENT: ICD-10-PCS | Mod: CPTII,S$GLB,, | Performed by: PSYCHIATRY & NEUROLOGY

## 2022-04-19 PROCEDURE — 1101F PT FALLS ASSESS-DOCD LE1/YR: CPT | Mod: CPTII,S$GLB,, | Performed by: PSYCHIATRY & NEUROLOGY

## 2022-04-19 PROCEDURE — 99999 PR PBB SHADOW E&M-EST. PATIENT-LVL III: CPT | Mod: PBBFAC,,, | Performed by: PSYCHIATRY & NEUROLOGY

## 2022-04-19 PROCEDURE — 4010F ACE/ARB THERAPY RXD/TAKEN: CPT | Mod: CPTII,S$GLB,, | Performed by: PSYCHIATRY & NEUROLOGY

## 2022-04-19 RX ORDER — DONEPEZIL HYDROCHLORIDE 23 MG/1
23 TABLET, FILM COATED ORAL DAILY
Qty: 90 TABLET | Refills: 1 | Status: SHIPPED | OUTPATIENT
Start: 2022-04-19 | End: 2022-10-02 | Stop reason: SDUPTHER

## 2022-04-19 RX ORDER — ALPRAZOLAM 0.5 MG/1
TABLET ORAL
Qty: 45 TABLET | Refills: 5 | Status: SHIPPED | OUTPATIENT
Start: 2022-04-19 | End: 2022-10-25 | Stop reason: SDUPTHER

## 2022-04-19 RX ORDER — ESCITALOPRAM OXALATE 5 MG/1
TABLET ORAL
Qty: 90 TABLET | Refills: 1 | Status: SHIPPED | OUTPATIENT
Start: 2022-04-19 | End: 2022-10-02 | Stop reason: SDUPTHER

## 2022-04-19 NOTE — PROGRESS NOTES
Subjective:       Patient ID: Soila Chávez is a 72 y.o. female.    Chief Complaint: Memory Loss      No setbacks.   No falls, szrs, hospitalizations.Sleep is good.   Does drive short distances again.   She cooks and grocery shops  She is fully independent; her sisters both offered for her to live with them but the pt does not want this. ( and in fact get tearful and slightly loud just bc we discussed it)  She handles her own money, and without errors.   Has her own cell phone.  Gets anxious and xanax helps.   Forgets conversations sometimes. Today could not think of her daughter's BD.     No SE of aricept.   Pt and family do see improvement of her memory, more good days.     Past Medical History:   Diagnosis Date    Allergy     Anxiety     Cataract     Colon polyps 2010    Hearing loss     Hypertension     Joint pain     Mental disorder       Past Surgical History:   Procedure Laterality Date    COLONOSCOPY N/A 3/5/2020    Procedure: COLONOSCOPY;  Surgeon: Nathalia Kemp MD;  Location: [address]);  Service: Colon and Rectal;  Laterality: N/A;  requests later appt time if available - Pt open to any MD- Pt informed arrival time may be adjusted, Pt verbalized understanding- ERW2/24/20@1409    ESOPHAGOGASTRODUODENOSCOPY N/A 10/1/2020    Procedure: EGD (ESOPHAGOGASTRODUODENOSCOPY);  Surgeon: Casey Guerrero MD;  Location: [address]);  Service: Endoscopy;  Laterality: N/A;  [facility] test 9/28-[facility]    ESOPHAGOGASTRODUODENOSCOPY N/A 10/29/2021    Procedure: EGD (ESOPHAGOGASTRODUODENOSCOPY);  Surgeon: Billy Vegas MD;  Location: [facility];  Service: Endoscopy;  Laterality: N/A;    HYSTERECTOMY      PERIPARTUM CHITO        Current Outpatient Medications:     acetaminophen (TYLENOL) 650 MG TbSR, Take 1 tablet (650 mg total) by mouth every 8 (eight) hours as needed (pain)., Disp: , Rfl: 0    ALPRAZolam (XANAX) 0.5 MG tablet, take One tablet by mouth every night at bedtime and one tablet  every day as needed (Patient taking differently: take One tablet by mouth every night at bedtime and one tablet every day as needed), Disp: 45 tablet, Rfl: 2    amlodipine-valsartan (EXFORGE) 5-320 mg per tablet, Take 1 tablet by mouth once daily., Disp: 90 tablet, Rfl: 1    azelastine (ASTELIN) 137 mcg (0.1 %) nasal spray, instill 1 spray (137 mcg total) by Nasal route 2 (two) times daily., Disp: 30 mL, Rfl: 3    calcium carbonate-vitamin D3 500 mg(1,250mg) -125 unit per tablet, Take 1 tablet by mouth nightly. , Disp: , Rfl:     cetirizine (ZYRTEC) 10 MG tablet, Take 10 mg by mouth daily as needed. , Disp: , Rfl:     diclofenac sodium (VOLTAREN) 1 % Gel, Apply 2 g topically once daily., Disp: 50 g, Rfl: 2    donepeziL (ARICEPT) 10 MG tablet, Take 1 tablet (10 mg total) by mouth every evening at bedtime, Disp: 30 tablet, Rfl: 5    EScitalopram oxalate (LEXAPRO) 5 MG Tab, take 1 tablet by mouth every evening at bedtime (Patient taking differently: take 1 tablet by mouth every evening at bedtime), Disp: 30 tablet, Rfl: 5    ferrous sulfate (FEOSOL) 325 mg (65 mg iron) Tab tablet, Take 1 tablet (325 mg total) by mouth every Mon, Wed, Fri. With vitamin C, Disp: 90 tablet, Rfl: 1    gabapentin (NEURONTIN) 600 MG tablet, Take 1 tablet (600 mg total) by mouth 3 (three) times daily., Disp: 90 tablet, Rfl: 1    mometasone 0.1% (ELOCON) 0.1 % cream, Use daily, Disp: 50 g, Rfl: 3    omeprazole (PRILOSEC) 40 MG capsule, Take 1 capsule (40 mg total) by mouth every morning., Disp: 90 capsule, Rfl: 1    pravastatin (PRAVACHOL) 10 MG tablet, Take 1 tablet (10 mg total) by mouth every evening., Disp: 90 tablet, Rfl: 3    ascorbic acid, vitamin C, (VITAMIN C) 100 MG tablet, Take 100 mg by mouth once daily., Disp: , Rfl:     aspirin (ECOTRIN) 81 MG EC tablet, Take 81 mg by mouth nightly. , Disp: , Rfl:     b complex vitamins tablet, Take 1 tablet by mouth once daily., Disp: , Rfl:     ergocalciferol, vitamin D2,  (VITAMIN D ORAL), Take 400 Units by mouth nightly. , Disp: , Rfl:     ginkgo biloba 40 mg Tab, Take by mouth daily as needed., Disp: , Rfl:     multivitamin (THERAGRAN) per tablet, Take 1 tablet by mouth once daily. Takes Centrum Silver, Disp: , Rfl:     omega-3 fatty acids 1,000 mg Cap, Take 1 g by mouth every evening., Disp: , Rfl:    Review of patient's allergies indicates:   Allergen Reactions    Lotensin [benazepril] Swelling    Penicillins Rash        Review of Systems        Objective:      Physical Exam  Neurological:      Mental Status: Mental status is at baseline.   Psychiatric:         Thought Content: Thought content normal.           Assessment:       1. Late onset Alzheimer's disease without behavioral disturbance    2. Anxiety        Plan:            MOHAMUD, stable--anxiety and frustration is a problem. Is independent at this time but her dtr worries about how pt spends her money. I did ask the pt to handle the account be herself, but allow her dtr to see the bank account regularlyo setbacks.   No falls, szrs, hospitalizations.Sleep is good.   Does drive short distances again.   She cooks and grocery shops  She is fully independent; her sisters both offered for her to live with them but the pt does not want this. ( and in fact get tearful and slightly loud just bc we discussed it)  Has handles her own money, and without errors.   Has her own cell phone.  Gets anxious and xanax helps.   Forgets conversations sometimes. Today could not think of her daughter's BD.     No SE of aricept.   Pt and family do see improvement of her memory, more good days.   Plan increase to 23 mg/day  No change of xanax.         Takes centrum silver, B complex not necc in addition to the MV given normal levels in July 2021            Alesha Levin MD   04/19/2022   12:28 PM

## 2022-04-21 ENCOUNTER — OFFICE VISIT (OUTPATIENT)
Dept: OTOLARYNGOLOGY | Facility: CLINIC | Age: 72
End: 2022-04-21
Payer: MEDICARE

## 2022-04-21 VITALS — WEIGHT: 155 LBS | BODY MASS INDEX: 25.01 KG/M2

## 2022-04-21 DIAGNOSIS — H93.8X3 SENSATION OF FULLNESS IN BOTH EARS: Primary | ICD-10-CM

## 2022-04-21 DIAGNOSIS — R21 RASH AND NONSPECIFIC SKIN ERUPTION: ICD-10-CM

## 2022-04-21 PROCEDURE — 99999 PR PBB SHADOW E&M-EST. PATIENT-LVL III: ICD-10-PCS | Mod: PBBFAC,,, | Performed by: OTOLARYNGOLOGY

## 2022-04-21 PROCEDURE — 1159F PR MEDICATION LIST DOCUMENTED IN MEDICAL RECORD: ICD-10-PCS | Mod: CPTII,S$GLB,, | Performed by: OTOLARYNGOLOGY

## 2022-04-21 PROCEDURE — 3008F BODY MASS INDEX DOCD: CPT | Mod: CPTII,S$GLB,, | Performed by: OTOLARYNGOLOGY

## 2022-04-21 PROCEDURE — 4010F PR ACE/ARB THEARPY RXD/TAKEN: ICD-10-PCS | Mod: CPTII,S$GLB,, | Performed by: OTOLARYNGOLOGY

## 2022-04-21 PROCEDURE — 99213 OFFICE O/P EST LOW 20 MIN: CPT | Mod: 25,S$GLB,, | Performed by: OTOLARYNGOLOGY

## 2022-04-21 PROCEDURE — 69210 REMOVE IMPACTED EAR WAX UNI: CPT | Mod: S$GLB,,, | Performed by: OTOLARYNGOLOGY

## 2022-04-21 PROCEDURE — 1101F PT FALLS ASSESS-DOCD LE1/YR: CPT | Mod: CPTII,S$GLB,, | Performed by: OTOLARYNGOLOGY

## 2022-04-21 PROCEDURE — 3288F PR FALLS RISK ASSESSMENT DOCUMENTED: ICD-10-PCS | Mod: CPTII,S$GLB,, | Performed by: OTOLARYNGOLOGY

## 2022-04-21 PROCEDURE — 1126F PR PAIN SEVERITY QUANTIFIED, NO PAIN PRESENT: ICD-10-PCS | Mod: CPTII,S$GLB,, | Performed by: OTOLARYNGOLOGY

## 2022-04-21 PROCEDURE — 99999 PR PBB SHADOW E&M-EST. PATIENT-LVL III: CPT | Mod: PBBFAC,,, | Performed by: OTOLARYNGOLOGY

## 2022-04-21 PROCEDURE — 69210 PR REMOVAL IMPACTED CERUMEN REQUIRING INSTRUMENTATION, UNILATERAL: ICD-10-PCS | Mod: S$GLB,,, | Performed by: OTOLARYNGOLOGY

## 2022-04-21 PROCEDURE — 1101F PR PT FALLS ASSESS DOC 0-1 FALLS W/OUT INJ PAST YR: ICD-10-PCS | Mod: CPTII,S$GLB,, | Performed by: OTOLARYNGOLOGY

## 2022-04-21 PROCEDURE — 4010F ACE/ARB THERAPY RXD/TAKEN: CPT | Mod: CPTII,S$GLB,, | Performed by: OTOLARYNGOLOGY

## 2022-04-21 PROCEDURE — 3288F FALL RISK ASSESSMENT DOCD: CPT | Mod: CPTII,S$GLB,, | Performed by: OTOLARYNGOLOGY

## 2022-04-21 PROCEDURE — 3008F PR BODY MASS INDEX (BMI) DOCUMENTED: ICD-10-PCS | Mod: CPTII,S$GLB,, | Performed by: OTOLARYNGOLOGY

## 2022-04-21 PROCEDURE — 1126F AMNT PAIN NOTED NONE PRSNT: CPT | Mod: CPTII,S$GLB,, | Performed by: OTOLARYNGOLOGY

## 2022-04-21 PROCEDURE — 99213 PR OFFICE/OUTPT VISIT, EST, LEVL III, 20-29 MIN: ICD-10-PCS | Mod: 25,S$GLB,, | Performed by: OTOLARYNGOLOGY

## 2022-04-21 PROCEDURE — 1159F MED LIST DOCD IN RCRD: CPT | Mod: CPTII,S$GLB,, | Performed by: OTOLARYNGOLOGY

## 2022-04-21 RX ORDER — MOMETASONE FUROATE 1 MG/G
CREAM TOPICAL
Qty: 45 G | Refills: 3 | Status: SHIPPED | OUTPATIENT
Start: 2022-04-21

## 2022-04-21 NOTE — PROGRESS NOTES
Subjective:       Patient ID: Soila Chávez is a 72 y.o. female.    Chief Complaint: Ear Fullness    Ear Fullness   There is pain in both ears. This is a recurrent problem. The current episode started 1 to 4 weeks ago (Last seen for similar complaint 2/22/2022.). The problem occurs constantly. The problem has been waxing and waning. There has been no fever. The patient is experiencing no pain. Associated symptoms include hearing loss. Pertinent negatives include no coughing, diarrhea, ear discharge, headaches, neck pain, rash, rhinorrhea or vomiting. Her past medical history is significant for hearing loss.     Review of Systems   Constitutional: Negative for activity change, appetite change and fever.   HENT: Positive for hearing loss. Negative for congestion, ear discharge, ear pain, nosebleeds, postnasal drip, rhinorrhea and sneezing.    Eyes: Negative for redness and visual disturbance.   Respiratory: Negative for apnea, cough, shortness of breath and wheezing.    Cardiovascular: Negative for chest pain and palpitations.   Gastrointestinal: Negative for diarrhea and vomiting.   Genitourinary: Negative for difficulty urinating and frequency.   Musculoskeletal: Negative for arthralgias, back pain, gait problem and neck pain.   Skin: Negative for color change and rash.   Neurological: Negative for dizziness, speech difficulty, weakness and headaches.   Hematological: Negative for adenopathy. Does not bruise/bleed easily.   Psychiatric/Behavioral: Negative for agitation and behavioral problems.       Objective:        Constitutional:   Vital signs are normal. She appears well-developed and well-nourished. She is active. Normal speech.      Head:  Normocephalic and atraumatic. Salivary glands normal.  Facial strength is normal.      Nose:  Nose normal including turbinates, nasal mucosa, sinuses and nasal septum.     Mouth/Throat  Oropharynx clear and moist without lesions or asymmetry and normal uvula midline.      Neck:  Neck normal without thyromegaly masses, asymmetry, normal tracheal structure, crepitus, and tenderness, thyroid normal, trachea normal, phonation normal and full range of motion with neck supple.     Psychiatric:   She has a normal mood and affect. Her speech is normal and behavior is normal.     Neurological:   She has neurological normal, alert and oriented.     Skin:   No abrasions, lacerations, lesions, or rashes.         PROCEDURE NOTE:  Ceruminosis is noted in both EACs.  Wax was removed by manual debridement and suctioning utilizing the assistance of binocular microscopy revealing EACs and TMs WNL. The patient tolerated this procedure without difficulty. The subjective decrease noted in hearing pre-cleaning was resolved post-cleaning.       Assessment:       1. Sensation of fullness in both ears        Plan:       1. Hearing conservation strongly recommended.  2. Trial of amplification bilaterally also recommended (patient not interested).  3. Re-check of hearing in 18-24 months or sooner if subjective change noted.  4. F/U with PCP as per schedule.

## 2022-04-25 ENCOUNTER — PATIENT MESSAGE (OUTPATIENT)
Dept: INTERNAL MEDICINE | Facility: CLINIC | Age: 72
End: 2022-04-25
Payer: MEDICARE

## 2022-04-25 DIAGNOSIS — L91.8 SKIN TAG: Primary | ICD-10-CM

## 2022-05-17 ENCOUNTER — LAB VISIT (OUTPATIENT)
Dept: LAB | Facility: OTHER | Age: 72
End: 2022-05-17
Attending: STUDENT IN AN ORGANIZED HEALTH CARE EDUCATION/TRAINING PROGRAM
Payer: MEDICARE

## 2022-05-17 ENCOUNTER — OFFICE VISIT (OUTPATIENT)
Dept: INTERNAL MEDICINE | Facility: CLINIC | Age: 72
End: 2022-05-17
Payer: MEDICARE

## 2022-05-17 VITALS
DIASTOLIC BLOOD PRESSURE: 75 MMHG | WEIGHT: 160.06 LBS | OXYGEN SATURATION: 98 % | HEART RATE: 67 BPM | HEIGHT: 66 IN | BODY MASS INDEX: 25.72 KG/M2 | SYSTOLIC BLOOD PRESSURE: 136 MMHG

## 2022-05-17 DIAGNOSIS — I10 ESSENTIAL HYPERTENSION: Primary | ICD-10-CM

## 2022-05-17 DIAGNOSIS — R20.9 UNSPECIFIED DISTURBANCES OF SKIN SENSATION: ICD-10-CM

## 2022-05-17 DIAGNOSIS — G30.1 LATE ONSET ALZHEIMER'S DEMENTIA WITHOUT BEHAVIORAL DISTURBANCE: ICD-10-CM

## 2022-05-17 DIAGNOSIS — F33.1 MAJOR DEPRESSIVE DISORDER, RECURRENT EPISODE, MODERATE: ICD-10-CM

## 2022-05-17 DIAGNOSIS — R01.1 MURMUR: ICD-10-CM

## 2022-05-17 DIAGNOSIS — I70.0 AORTIC ATHEROSCLEROSIS: ICD-10-CM

## 2022-05-17 DIAGNOSIS — J30.9 ALLERGIC RHINITIS, UNSPECIFIED SEASONALITY, UNSPECIFIED TRIGGER: ICD-10-CM

## 2022-05-17 DIAGNOSIS — D64.9 NORMOCYTIC ANEMIA: ICD-10-CM

## 2022-05-17 DIAGNOSIS — F02.80 LATE ONSET ALZHEIMER'S DEMENTIA WITHOUT BEHAVIORAL DISTURBANCE: ICD-10-CM

## 2022-05-17 DIAGNOSIS — J34.89 RHINORRHEA: ICD-10-CM

## 2022-05-17 PROBLEM — R10.13 EPIGASTRIC ABDOMINAL PAIN: Status: RESOLVED | Noted: 2020-10-01 | Resolved: 2022-05-17

## 2022-05-17 PROBLEM — K92.2 GI BLEED: Status: RESOLVED | Noted: 2021-10-28 | Resolved: 2022-05-17

## 2022-05-17 LAB
BASOPHILS # BLD AUTO: 0.03 K/UL (ref 0–0.2)
BASOPHILS NFR BLD: 0.5 % (ref 0–1.9)
DIFFERENTIAL METHOD: ABNORMAL
EOSINOPHIL # BLD AUTO: 0.1 K/UL (ref 0–0.5)
EOSINOPHIL NFR BLD: 2.1 % (ref 0–8)
ERYTHROCYTE [DISTWIDTH] IN BLOOD BY AUTOMATED COUNT: 14.5 % (ref 11.5–14.5)
FERRITIN SERPL-MCNC: 33 NG/ML (ref 20–300)
FOLATE SERPL-MCNC: 22.1 NG/ML (ref 4–24)
HCT VFR BLD AUTO: 39.8 % (ref 37–48.5)
HGB BLD-MCNC: 12.5 G/DL (ref 12–16)
IMM GRANULOCYTES # BLD AUTO: 0.02 K/UL (ref 0–0.04)
IMM GRANULOCYTES NFR BLD AUTO: 0.3 % (ref 0–0.5)
IRON SERPL-MCNC: 99 UG/DL (ref 30–160)
LYMPHOCYTES # BLD AUTO: 1.5 K/UL (ref 1–4.8)
LYMPHOCYTES NFR BLD: 25.1 % (ref 18–48)
MCH RBC QN AUTO: 28 PG (ref 27–31)
MCHC RBC AUTO-ENTMCNC: 31.4 G/DL (ref 32–36)
MCV RBC AUTO: 89 FL (ref 82–98)
MONOCYTES # BLD AUTO: 0.4 K/UL (ref 0.3–1)
MONOCYTES NFR BLD: 7.3 % (ref 4–15)
NEUTROPHILS # BLD AUTO: 3.9 K/UL (ref 1.8–7.7)
NEUTROPHILS NFR BLD: 64.7 % (ref 38–73)
NRBC BLD-RTO: 0 /100 WBC
PLATELET # BLD AUTO: 234 K/UL (ref 150–450)
PMV BLD AUTO: 10.4 FL (ref 9.2–12.9)
RBC # BLD AUTO: 4.47 M/UL (ref 4–5.4)
SATURATED IRON: 21 % (ref 20–50)
TOTAL IRON BINDING CAPACITY: 466 UG/DL (ref 250–450)
TRANSFERRIN SERPL-MCNC: 315 MG/DL (ref 200–375)
VIT B12 SERPL-MCNC: 1121 PG/ML (ref 210–950)
WBC # BLD AUTO: 6.05 K/UL (ref 3.9–12.7)

## 2022-05-17 PROCEDURE — 1101F PR PT FALLS ASSESS DOC 0-1 FALLS W/OUT INJ PAST YR: ICD-10-PCS | Mod: CPTII,S$GLB,, | Performed by: STUDENT IN AN ORGANIZED HEALTH CARE EDUCATION/TRAINING PROGRAM

## 2022-05-17 PROCEDURE — 1126F AMNT PAIN NOTED NONE PRSNT: CPT | Mod: CPTII,S$GLB,, | Performed by: STUDENT IN AN ORGANIZED HEALTH CARE EDUCATION/TRAINING PROGRAM

## 2022-05-17 PROCEDURE — 84466 ASSAY OF TRANSFERRIN: CPT | Performed by: STUDENT IN AN ORGANIZED HEALTH CARE EDUCATION/TRAINING PROGRAM

## 2022-05-17 PROCEDURE — 99999 PR PBB SHADOW E&M-EST. PATIENT-LVL IV: CPT | Mod: PBBFAC,,, | Performed by: STUDENT IN AN ORGANIZED HEALTH CARE EDUCATION/TRAINING PROGRAM

## 2022-05-17 PROCEDURE — 3078F PR MOST RECENT DIASTOLIC BLOOD PRESSURE < 80 MM HG: ICD-10-PCS | Mod: CPTII,S$GLB,, | Performed by: STUDENT IN AN ORGANIZED HEALTH CARE EDUCATION/TRAINING PROGRAM

## 2022-05-17 PROCEDURE — 3078F DIAST BP <80 MM HG: CPT | Mod: CPTII,S$GLB,, | Performed by: STUDENT IN AN ORGANIZED HEALTH CARE EDUCATION/TRAINING PROGRAM

## 2022-05-17 PROCEDURE — 3075F PR MOST RECENT SYSTOLIC BLOOD PRESS GE 130-139MM HG: ICD-10-PCS | Mod: CPTII,S$GLB,, | Performed by: STUDENT IN AN ORGANIZED HEALTH CARE EDUCATION/TRAINING PROGRAM

## 2022-05-17 PROCEDURE — 4010F ACE/ARB THERAPY RXD/TAKEN: CPT | Mod: CPTII,S$GLB,, | Performed by: STUDENT IN AN ORGANIZED HEALTH CARE EDUCATION/TRAINING PROGRAM

## 2022-05-17 PROCEDURE — 82746 ASSAY OF FOLIC ACID SERUM: CPT | Performed by: STUDENT IN AN ORGANIZED HEALTH CARE EDUCATION/TRAINING PROGRAM

## 2022-05-17 PROCEDURE — 82607 VITAMIN B-12: CPT | Performed by: STUDENT IN AN ORGANIZED HEALTH CARE EDUCATION/TRAINING PROGRAM

## 2022-05-17 PROCEDURE — 1126F PR PAIN SEVERITY QUANTIFIED, NO PAIN PRESENT: ICD-10-PCS | Mod: CPTII,S$GLB,, | Performed by: STUDENT IN AN ORGANIZED HEALTH CARE EDUCATION/TRAINING PROGRAM

## 2022-05-17 PROCEDURE — 1101F PT FALLS ASSESS-DOCD LE1/YR: CPT | Mod: CPTII,S$GLB,, | Performed by: STUDENT IN AN ORGANIZED HEALTH CARE EDUCATION/TRAINING PROGRAM

## 2022-05-17 PROCEDURE — 3008F BODY MASS INDEX DOCD: CPT | Mod: CPTII,S$GLB,, | Performed by: STUDENT IN AN ORGANIZED HEALTH CARE EDUCATION/TRAINING PROGRAM

## 2022-05-17 PROCEDURE — 1159F PR MEDICATION LIST DOCUMENTED IN MEDICAL RECORD: ICD-10-PCS | Mod: CPTII,S$GLB,, | Performed by: STUDENT IN AN ORGANIZED HEALTH CARE EDUCATION/TRAINING PROGRAM

## 2022-05-17 PROCEDURE — 36415 COLL VENOUS BLD VENIPUNCTURE: CPT | Performed by: STUDENT IN AN ORGANIZED HEALTH CARE EDUCATION/TRAINING PROGRAM

## 2022-05-17 PROCEDURE — 85025 COMPLETE CBC W/AUTO DIFF WBC: CPT | Performed by: STUDENT IN AN ORGANIZED HEALTH CARE EDUCATION/TRAINING PROGRAM

## 2022-05-17 PROCEDURE — 3008F PR BODY MASS INDEX (BMI) DOCUMENTED: ICD-10-PCS | Mod: CPTII,S$GLB,, | Performed by: STUDENT IN AN ORGANIZED HEALTH CARE EDUCATION/TRAINING PROGRAM

## 2022-05-17 PROCEDURE — 99215 OFFICE O/P EST HI 40 MIN: CPT | Mod: S$GLB,,, | Performed by: STUDENT IN AN ORGANIZED HEALTH CARE EDUCATION/TRAINING PROGRAM

## 2022-05-17 PROCEDURE — 3075F SYST BP GE 130 - 139MM HG: CPT | Mod: CPTII,S$GLB,, | Performed by: STUDENT IN AN ORGANIZED HEALTH CARE EDUCATION/TRAINING PROGRAM

## 2022-05-17 PROCEDURE — 82728 ASSAY OF FERRITIN: CPT | Performed by: STUDENT IN AN ORGANIZED HEALTH CARE EDUCATION/TRAINING PROGRAM

## 2022-05-17 PROCEDURE — 3288F FALL RISK ASSESSMENT DOCD: CPT | Mod: CPTII,S$GLB,, | Performed by: STUDENT IN AN ORGANIZED HEALTH CARE EDUCATION/TRAINING PROGRAM

## 2022-05-17 PROCEDURE — 99215 PR OFFICE/OUTPT VISIT, EST, LEVL V, 40-54 MIN: ICD-10-PCS | Mod: S$GLB,,, | Performed by: STUDENT IN AN ORGANIZED HEALTH CARE EDUCATION/TRAINING PROGRAM

## 2022-05-17 PROCEDURE — 3288F PR FALLS RISK ASSESSMENT DOCUMENTED: ICD-10-PCS | Mod: CPTII,S$GLB,, | Performed by: STUDENT IN AN ORGANIZED HEALTH CARE EDUCATION/TRAINING PROGRAM

## 2022-05-17 PROCEDURE — 99999 PR PBB SHADOW E&M-EST. PATIENT-LVL IV: ICD-10-PCS | Mod: PBBFAC,,, | Performed by: STUDENT IN AN ORGANIZED HEALTH CARE EDUCATION/TRAINING PROGRAM

## 2022-05-17 PROCEDURE — 4010F PR ACE/ARB THEARPY RXD/TAKEN: ICD-10-PCS | Mod: CPTII,S$GLB,, | Performed by: STUDENT IN AN ORGANIZED HEALTH CARE EDUCATION/TRAINING PROGRAM

## 2022-05-17 PROCEDURE — 1159F MED LIST DOCD IN RCRD: CPT | Mod: CPTII,S$GLB,, | Performed by: STUDENT IN AN ORGANIZED HEALTH CARE EDUCATION/TRAINING PROGRAM

## 2022-05-17 RX ORDER — AZELASTINE 1 MG/ML
1 SPRAY, METERED NASAL 2 TIMES DAILY
Qty: 30 ML | Refills: 3 | Status: SHIPPED | OUTPATIENT
Start: 2022-05-17 | End: 2023-07-17

## 2022-05-17 RX ORDER — CETIRIZINE HYDROCHLORIDE 10 MG/1
10 TABLET ORAL DAILY PRN
Qty: 90 TABLET | Refills: 3 | Status: SHIPPED | OUTPATIENT
Start: 2022-05-17

## 2022-05-17 NOTE — PROGRESS NOTES
"[facility]    Subjective:       Patient ID: Soila Chávez is a 72 y.o. female.    Chief Complaint: Hypertension (F/u)  f/u    History was obtained from the patient and supplemented through chart review.  This patient is known to me.   She was a pt of Dr. Parson prior    HPI:    Patient is a 72 y.o. female who presents for HTN, dementia     just passed away within the past week and a half  She states that "considering, she is doing fine"    Pt asks questions that were recently answered    Chronic benzo use/anxiety/dementia  Late onset Alzheimer's   Aricept 23 mg/day   Family noticed improvement in memory  Also started lexapro 5, helped  xanax 0.5 daily and nightly PRN Rx by neurology  Referred to psychiatry, pt did not pursue  Following with neurology    Skin irritation bilat temple  Denies adhesive  Has been using neosporin, advised to stop  Also advised to not touch except once in morning and once in evening to wash her face    Itching eyes  Refilled antihistamine and advised to continue OTC drops, notify ophtho or me if fails to improve    Having care at home visit regularly     Normocytic Anemia  Thrombocytopenia- resolved  Likely 2/2 blood loss, recent GI blood   Check further labs, repeat CBC     HTN  exforge 5-320 daily, stable  No CP/HA/SOB/Vision changes    Left flank pain with stretching, turning  Intermittent  Gabapentin helps at night    Aortic atherosclerosis  Pravastatin 10     Elevated ASCVD Risk score  On pravastatin to reduce risk    Thyroid nodule  Repeat US annually for 5 years (dec 2021 initially)    GERD  Improved with omeprazole  Reviewed colonoscopy, EGD report (hx of diverticulitis) 10/29/2021    Myelopathy  Cervical  Not problematic currently    Wt Readings from Last 15 Encounters:   05/17/22 72.6 kg (160 lb 0.9 oz)   04/21/22 70.3 kg (154 lb 15.7 oz)   04/19/22 70.8 kg (156 lb 1.4 oz)   04/05/22 70.3 kg (155 lb)   03/23/22 70.3 kg (155 lb)   02/22/22 70.6 kg (155 " lb 10.3 oz)   02/17/22 72.1 kg (158 lb 15.2 oz)   02/01/22 71.7 kg (158 lb)   01/20/22 71.9 kg (158 lb 6.4 oz)   01/10/22 72.7 kg (160 lb 4.4 oz)   12/30/21 73.6 kg (162 lb 4.1 oz)   12/21/21 73.5 kg (162 lb)   12/08/21 72.6 kg (160 lb 0.9 oz)   11/16/21 72.8 kg (160 lb 7.9 oz)   11/09/21 73.5 kg (162 lb)     Not addressed  Hand pain  Wishes to see particular provider on 9th floor, states it's not [facility]  Does not know the name, encouraged to find out and let us know how we can help    Medical History  Past Medical History:   Diagnosis Date    Allergy     Anxiety     Cataract     Colon polyps 2010    Hearing loss     Hypertension     Joint pain     Mental disorder        Review of Systems   Constitutional: Negative for fever.   HENT: Negative for trouble swallowing.    Respiratory: Negative for shortness of breath.    Cardiovascular: Negative for chest pain.   Gastrointestinal: Negative for blood in stool, constipation, diarrhea, nausea and vomiting.        Left flank pain with positional changes   Genitourinary: Negative for dysuria.   Musculoskeletal: Negative for gait problem.   Skin:        Dry skin bilat scalp   Neurological: Negative for dizziness, seizures and headaches.        Memory difficulties   Psychiatric/Behavioral: Negative for dysphoric mood and hallucinations. The patient is nervous/anxious (generally, not specifically).         Grief         Surgical hx, family hx, social hx    Have been reviewed      Current Outpatient Medications:     acetaminophen (TYLENOL) 650 MG TbSR, Take 1 tablet (650 mg total) by mouth every 8 (eight) hours as needed (pain)., Disp: , Rfl: 0    ALPRAZolam (XANAX) 0.5 MG tablet, take One tablet by mouth every night at bedtime and one tablet every day as needed, Disp: 45 tablet, Rfl: 5    amlodipine-valsartan (EXFORGE) 5-320 mg per tablet, Take 1 tablet by mouth once daily., Disp: 90 tablet, Rfl: 1    aspirin (ECOTRIN) 81 MG EC tablet, Take 81 mg by mouth  "nightly. , Disp: , Rfl:     b complex vitamins tablet, Take 1 tablet by mouth once daily., Disp: , Rfl:     calcium carbonate-vitamin D3 500 mg(1,250mg) -125 unit per tablet, Take 1 tablet by mouth nightly. , Disp: , Rfl:     diclofenac sodium (VOLTAREN) 1 % Gel, Apply 2 g topically once daily., Disp: 50 g, Rfl: 2    donepeziL 23 mg Tab, Take 1 tablet (23 mg total) by mouth once daily., Disp: 90 tablet, Rfl: 1    ergocalciferol, vitamin D2, (VITAMIN D ORAL), Take 400 Units by mouth nightly. , Disp: , Rfl:     EScitalopram oxalate (LEXAPRO) 5 MG Tab, take 1 tablet by mouth every evening at bedtime, Disp: 90 tablet, Rfl: 1    ferrous sulfate (FEOSOL) 325 mg (65 mg iron) Tab tablet, Take 1 tablet (325 mg total) by mouth every Mon, Wed, Fri. With vitamin C, Disp: 90 tablet, Rfl: 1    gabapentin (NEURONTIN) 600 MG tablet, Take 1 tablet (600 mg total) by mouth 3 (three) times daily., Disp: 90 tablet, Rfl: 1    mometasone 0.1% (ELOCON) 0.1 % cream, apply to affected area topically daily, Disp: 45 g, Rfl: 3    omeprazole (PRILOSEC) 40 MG capsule, Take 1 capsule (40 mg total) by mouth every morning., Disp: 90 capsule, Rfl: 1    pravastatin (PRAVACHOL) 10 MG tablet, Take 1 tablet (10 mg total) by mouth every evening., Disp: 90 tablet, Rfl: 3    azelastine (ASTELIN) 137 mcg (0.1 %) nasal spray, instill 1 spray (137 mcg total) by Nasal route 2 (two) times daily., Disp: 30 mL, Rfl: 3    cetirizine (ZYRTEC) 10 MG tablet, Take 1 tablet (10 mg total) by mouth daily as needed for Allergies., Disp: 90 tablet, Rfl: 3    Objective:        Body mass index is 25.83 kg/m².  Vitals:    05/17/22 1308 05/17/22 1330   BP: (!) 142/74 136/75   Pulse: 67    SpO2: 98%    Weight: 72.6 kg (160 lb 0.9 oz)    Height: 5' 6" (1.676 m)    PainSc: 0-No pain      Physical Exam  Vitals and nursing note reviewed.   Constitutional:       General: She is not in acute distress.     Appearance: Normal appearance. She is not ill-appearing.   HENT: "      Head: Normocephalic and atraumatic.      Nose:      Comments: Wearing a mask  Eyes:      General: No scleral icterus.  Cardiovascular:      Rate and Rhythm: Normal rate and regular rhythm.      Heart sounds: Murmur heard.   Pulmonary:      Effort: Pulmonary effort is normal.   Abdominal:      General: There is no distension.   Musculoskeletal:         General: No deformity.      Cervical back: Normal range of motion.      Right lower leg: No edema.      Left lower leg: No edema.   Skin:     General: Skin is warm and dry.      Comments: Pt aggressively rubbing skin bilat temples at hair line  Excoriations, peeling- no discharge, no erythema     Neurological:      Mental Status: She is alert.   Psychiatric:         Behavior: Behavior normal.                   Lab Results   Component Value Date    WBC 6.05 05/17/2022    HGB 12.5 05/17/2022    HCT 39.8 05/17/2022     05/17/2022    CHOL 195 11/04/2021    TRIG 68 11/04/2021    HDL 54 11/04/2021    ALT 11 12/08/2021    AST 19 12/08/2021     12/08/2021    K 3.9 12/08/2021     (H) 12/08/2021    CREATININE 0.8 12/08/2021    BUN 11 12/08/2021    CO2 23 12/08/2021    TSH 1.395 11/04/2021    HGBA1C 5.2 11/04/2021       The 10-year ASCVD risk score (Felicita HDZ Jr., et al., 2013) is: 13.7%    Values used to calculate the score:      Age: 72 years      Sex: Female      Is Non- : Yes      Diabetic: No      Tobacco smoker: No      Systolic Blood Pressure: 136 mmHg      Is BP treated: Yes      HDL Cholesterol: 54 mg/dL      Total Cholesterol: 195 mg/dL    On pravastatin    (Imaging have been independently reviewed)      Assessment:         1. Essential hypertension    2. Allergic rhinitis, unspecified seasonality, unspecified trigger    3. Rhinorrhea    4. Murmur    5. Major depressive disorder, recurrent episode, moderate    6. Late onset Alzheimer's dementia without behavioral disturbance    7. Aortic atherosclerosis          Plan:      Soila was seen today for hypertension.    Diagnoses and all orders for this visit:    Essential hypertension  -     Echo; Future    Allergic rhinitis, unspecified seasonality, unspecified trigger  -     cetirizine (ZYRTEC) 10 MG tablet; Take 1 tablet (10 mg total) by mouth daily as needed for Allergies.    Rhinorrhea  -     azelastine (ASTELIN) 137 mcg (0.1 %) nasal spray; instill 1 spray (137 mcg total) by Nasal route 2 (two) times daily.    Murmur  -     Echo; Future    Major depressive disorder, recurrent episode, moderate    Late onset Alzheimer's dementia without behavioral disturbance    Aortic atherosclerosis        Health Maintenance  - Lipids: normal 11/4/2021  - A1C: 5.2 11/4/2021  - Colon Ca Screen: 3/2020, repeat due 2025  - Immunizations: [facility]    Women's health  - Pap: s/p hyst  - Mammo: 2/17/21 Birads 1 ; repeat ordered  - Dexa: 8/25/20 [facility]MD     Follow up in about 3 months (around 8/17/2022).      Or sooner prn    Notes were provided for patient to give to daughter for updated on care    45 min were used in chart review, evaluation and counseling of patient re: neosporin use, medications, muscle/flank pain, echo cardiogram for heart function, labs and coordination of care, documentation and review of results on same day of service     All medications were reviewed including potential side effects and risks/benefits.  Pt was counseled to call back if anything worsens or if questions arise.    Parminder Junior MD  Family Medicine  [facility]  [address]  [address]  [place] [address]  Phone [phone]  Fax [phone]

## 2022-05-23 ENCOUNTER — HOSPITAL ENCOUNTER (OUTPATIENT)
Dept: CARDIOLOGY | Facility: OTHER | Age: 72
Discharge: HOME | End: 2022-05-23
Attending: STUDENT IN AN ORGANIZED HEALTH CARE EDUCATION/TRAINING PROGRAM
Payer: MEDICARE

## 2022-05-23 VITALS
HEART RATE: 67 BPM | WEIGHT: 160 LBS | HEIGHT: 66 IN | SYSTOLIC BLOOD PRESSURE: 136 MMHG | DIASTOLIC BLOOD PRESSURE: 75 MMHG | BODY MASS INDEX: 25.71 KG/M2

## 2022-05-23 DIAGNOSIS — I10 ESSENTIAL HYPERTENSION: ICD-10-CM

## 2022-05-23 DIAGNOSIS — R01.1 MURMUR: ICD-10-CM

## 2022-05-23 LAB
ASCENDING AORTA: 2.78 CM
AV INDEX (PROSTH): 1.1
AV MEAN GRADIENT: 6 MMHG
AV PEAK GRADIENT: 12 MMHG
AV VALVE AREA: 3.16 CM2
AV VELOCITY RATIO: 0.98
BSA FOR ECHO PROCEDURE: 1.84 M2
CV ECHO LV RWT: 0.47 CM
DOP CALC AO PEAK VEL: 1.72 M/S
DOP CALC AO VTI: 32.75 CM
DOP CALC LVOT AREA: 2.9 CM2
DOP CALC LVOT DIAMETER: 1.91 CM
DOP CALC LVOT PEAK VEL: 1.69 M/S
DOP CALC LVOT STROKE VOLUME: 103.35 CM3
DOP CALCLVOT PEAK VEL VTI: 36.09 CM
E WAVE DECELERATION TIME: 256.3 MSEC
E/A RATIO: 0.85
E/E' RATIO: 8.35 M/S
ECHO LV POSTERIOR WALL: 0.95 CM (ref 0.6–1.1)
EJECTION FRACTION: 70 %
FRACTIONAL SHORTENING: 39 % (ref 28–44)
INTERVENTRICULAR SEPTUM: 0.9 CM (ref 0.6–1.1)
IVRT: 96.89 MSEC
LA MAJOR: 5.53 CM
LA MINOR: 5.3 CM
LA WIDTH: 3.01 CM
LEFT ATRIUM SIZE: 3.6 CM
LEFT ATRIUM VOLUME INDEX MOD: 28 ML/M2
LEFT ATRIUM VOLUME INDEX: 27.4 ML/M2
LEFT ATRIUM VOLUME MOD: 51 CM3
LEFT ATRIUM VOLUME: 49.85 CM3
LEFT INTERNAL DIMENSION IN SYSTOLE: 2.48 CM (ref 2.1–4)
LEFT VENTRICLE DIASTOLIC VOLUME INDEX: 39.33 ML/M2
LEFT VENTRICLE DIASTOLIC VOLUME: 71.58 ML
LEFT VENTRICLE MASS INDEX: 64 G/M2
LEFT VENTRICLE SYSTOLIC VOLUME INDEX: 12 ML/M2
LEFT VENTRICLE SYSTOLIC VOLUME: 21.86 ML
LEFT VENTRICULAR INTERNAL DIMENSION IN DIASTOLE: 4.04 CM (ref 3.5–6)
LEFT VENTRICULAR MASS: 115.75 G
LV LATERAL E/E' RATIO: 7.1 M/S
LV SEPTAL E/E' RATIO: 10.14 M/S
MV A" WAVE DURATION": 7.84 MSEC
MV PEAK A VEL: 0.84 M/S
MV PEAK E VEL: 0.71 M/S
MV STENOSIS PRESSURE HALF TIME: 74.33 MS
MV VALVE AREA P 1/2 METHOD: 2.96 CM2
PISA MRMAX VEL: 0.04 M/S
PISA TR MAX VEL: 2.36 M/S
PULM VEIN S/D RATIO: 1.34
PV PEAK D VEL: 0.32 M/S
PV PEAK S VEL: 0.43 M/S
PV PEAK VELOCITY: 1.05 CM/S
RA MAJOR: 4.31 CM
RA PRESSURE: 3 MMHG
RIGHT VENTRICULAR END-DIASTOLIC DIMENSION: 2.78 CM
RV TISSUE DOPPLER FREE WALL SYSTOLIC VELOCITY 1 (APICAL 4 CHAMBER VIEW): 12.94 CM/S
SINUS: 2.86 CM
STJ: 2.97 CM
TDI LATERAL: 0.1 M/S
TDI SEPTAL: 0.07 M/S
TDI: 0.09 M/S
TR MAX PG: 22 MMHG
TRICUSPID ANNULAR PLANE SYSTOLIC EXCURSION: 2.64 CM
TV REST PULMONARY ARTERY PRESSURE: 25 MMHG

## 2022-05-23 PROCEDURE — 93306 TTE W/DOPPLER COMPLETE: CPT

## 2022-05-23 PROCEDURE — 93306 ECHO (CUPID ONLY): ICD-10-PCS | Mod: 26,,, | Performed by: INTERNAL MEDICINE

## 2022-05-23 PROCEDURE — 93306 TTE W/DOPPLER COMPLETE: CPT | Mod: 26,,, | Performed by: INTERNAL MEDICINE

## 2022-05-30 ENCOUNTER — PATIENT MESSAGE (OUTPATIENT)
Dept: ADMINISTRATIVE | Facility: HOSPITAL | Age: 72
End: 2022-05-30
Payer: MEDICARE

## 2022-06-14 ENCOUNTER — CARE AT HOME (OUTPATIENT)
Dept: HOME HEALTH SERVICES | Facility: CLINIC | Age: 72
End: 2022-06-14
Payer: MEDICARE

## 2022-06-14 DIAGNOSIS — G30.9 ALZHEIMER'S DEMENTIA WITH BEHAVIORAL DISTURBANCE, UNSPECIFIED TIMING OF DEMENTIA ONSET: ICD-10-CM

## 2022-06-14 DIAGNOSIS — F02.81 ALZHEIMER'S DEMENTIA WITH BEHAVIORAL DISTURBANCE, UNSPECIFIED TIMING OF DEMENTIA ONSET: ICD-10-CM

## 2022-06-14 DIAGNOSIS — Z51.5 PALLIATIVE CARE ENCOUNTER: Primary | ICD-10-CM

## 2022-06-14 PROCEDURE — 99350 PR HOME VISIT,ESTAB PATIENT,LEVEL IV: ICD-10-PCS | Mod: S$GLB,,, | Performed by: NURSE PRACTITIONER

## 2022-06-14 PROCEDURE — 99350 HOME/RES VST EST HIGH MDM 60: CPT | Mod: S$GLB,,, | Performed by: NURSE PRACTITIONER

## 2022-06-17 ENCOUNTER — HOSPITAL ENCOUNTER (OUTPATIENT)
Dept: RADIOLOGY | Facility: OTHER | Age: 72
Discharge: HOME | End: 2022-06-17
Attending: STUDENT IN AN ORGANIZED HEALTH CARE EDUCATION/TRAINING PROGRAM
Payer: MEDICARE

## 2022-06-17 DIAGNOSIS — Z12.31 ENCOUNTER FOR SCREENING MAMMOGRAM FOR MALIGNANT NEOPLASM OF BREAST: ICD-10-CM

## 2022-06-17 PROCEDURE — 77067 SCR MAMMO BI INCL CAD: CPT | Mod: 26,,, | Performed by: RADIOLOGY

## 2022-06-17 PROCEDURE — 77067 MAMMO DIGITAL SCREENING BILAT WITH TOMO: ICD-10-PCS | Mod: 26,,, | Performed by: RADIOLOGY

## 2022-06-17 PROCEDURE — 77063 MAMMO DIGITAL SCREENING BILAT WITH TOMO: ICD-10-PCS | Mod: 26,,, | Performed by: RADIOLOGY

## 2022-06-17 PROCEDURE — 77063 BREAST TOMOSYNTHESIS BI: CPT | Mod: 26,,, | Performed by: RADIOLOGY

## 2022-06-17 PROCEDURE — 77063 BREAST TOMOSYNTHESIS BI: CPT | Mod: TC

## 2022-06-17 PROCEDURE — 77067 SCR MAMMO BI INCL CAD: CPT | Mod: TC

## 2022-06-20 NOTE — PROGRESS NOTES
"  [facility]  Palliative Care Home Visit    Visit Date: 6/14/2022  Encounter Provider: Franca Mcintyre NP  PCP:  Parminder Junior MD    PRESENTING HISTORY      Patient ID: Soila Chávez is a 72 y.o. female.    Consult Requested By:  No ref. provider found  Reason for Consult:  Palliative Care follow up    Soila is being seen at home due to  physical debility that presents a taxing effort to leave the home, to mitigate high risk of hospital readmission or due to the limited availability of reliable or safe options for transportation to the point of access to the provider. Prior to treatment on this visit the chart was reviewed and patient consent was obtained.        Chief Complaint: Follow-up      History of Present Illness: Ms. Soila Chávez is a 72 y.o. female who was recently admitted to the hospital.    Admission Date: 10/28/2021  Hospital Length of Stay: 2 days  Discharge Date and Time:  10/31/2021    HPI:   71-year-old female with history of hypertension, anxiety, dementia presented to the ED today with complaint of 3 bloody bowel movements.  Patient stated that she ate dinner last night about an hour later went to the bathroom in noted "liquid red blood", with subsequent episode after her bath.  Stated she went to bed and noted around 330 this morning that there was blood in her bed in went to the restroom and had another bloody bowel movement.  She stated at this time she also noted brown loose stool and dark stool.  She denied any associated abdominal pain, nausea, vomiting, shortness of breath, dizziness, lightheadedness.  Reported that she called her daughter to inform her of the loose bloody stools thinking it was a side effect of a new medication she had started.  Patient with no previous episodes.  Denied hematemesis.  Reported that she used to take 2 Aleve a day for headaches until a little over a month ago when her primary care provider told her to stop taking the Aleve, only take " acetaminophen.  Also reported that she had decreased the number of beers she had a week from drinking 12 pack on the weekends to maybe 1 or 2 a week.  Quit smoking a few years ago, and denies any illicit drug use.  Previous EGD 10/2020 showed Gastric antral and oxyntic mucosa with Helicobacter gastritis, colonoscopy 3/2020 showed Multiple small and large-mouthed diverticula were found in the entire colon.  ED evaluation H&H 9.9/31.1 lower than priors, otherwise labs unremarkable.  Vital signs stable.  Patient admitted to observation.  Poor historian        Procedure(s) (LRB):  EGD (ESOPHAGOGASTRODUODENOSCOPY) (N/A)       Hospital Course:   71-year-old female admitted with hematochezia suspected secondary to diverticular bleed, transfused 2 units packed RBCs.  Patient with previous history of H.pylori unclear for completely treated underwent EGD showed small hiatal hernia, stomach was normal examined duodenum was normal.  No further bleeding, patient hemodynamically stable discharge home.  Instructed to call or return if hematochezia returns.  Plan of care discussed with patient and her daughter.  Daughter acknowledges understanding and agrees with plan of care declined home health at this time.  _________________________________________________________  Today:Ms. Chávez is being evaluated at home today for a palliative care home visit, see last hospital course above.        With this visit today patient is found at home alone, she opened the door for me and is ambulatory with no assistive device. Patient is AAOx3, during our conversation, she had multiple episodes of confusion but was able to verify her name and  and easily reoriented, she speaks repetetive and not in real time. Most of patients other history was received from her daughter during a previous phone conversation and her medical record. She sees Dr. Parson as her PCP. I will continue seeing the patient in the home as it is difficult for her to  physically make mulitple visits. She endorses eating x 3 small meals per day . She endorses regular BMs. Patient reports she is still cooking and enjoys spending time with her family. She spoke about how much she enjoyed her birthday trip out of town. She loves to talk about her daughters who she reports is a successful school principle and one is a successful  School counselor or works with children.     NO home health needs identified at this time. Fall/Safety precautions reinforced. Education completed ~ 15 minutes. Plan to follow up.     VSS. Denies fever, chest pain, shortness of breath, nausea, vomiting, diarrhea. Risks of environmental exposure to coronavirus discussed including: social distancing, hand hygiene, and limiting departures from the home for necessities only.  Reports understanding and willingness to comply.  All hospital discharge orders reviewed and being followed, all medications reconciled and reviewed, patient and family verbalized understanding. No other needs identified at this time.         Attestation: Screening criteria to assess the level of the patient's risk for infection with COVID-19 as recommended by the CDC at the time of the above documented home visit concluded appropriateness to proceed. Universal precautions were maintained at all times, including provider use of 60% alcohol gel hand  immediately prior to entry and upon departing the patient's home.        Review of Systems   Constitutional: Negative for activity change, fatigue and unexpected weight change.   HENT: Negative for congestion, dental problem, trouble swallowing and voice change.    Eyes: Negative for redness and visual disturbance.   Cardiovascular: Negative for chest pain and palpitations.   Gastrointestinal: Negative for abdominal distention, abdominal pain, nausea and vomiting.   Endocrine: Negative for polydipsia and polyuria.   Genitourinary: Negative for difficulty urinating and dyspareunia.    Musculoskeletal: Negative for arthralgias and gait problem.   Skin: Negative for color change and rash.   Allergic/Immunologic: Negative for food allergies.   Neurological: Negative for dizziness and weakness.   Psychiatric/Behavioral: Positive for confusion. Negative for agitation.        Some episodes of confusion       Assessments:  · Lives in single story home with 4 steps to enter  · Functional Status:Independent with ADLs and mobility   · Safety: Fall precautions  · Nutritional: 3 meals daily  · Home Health/DME/Supplies: n/a    PAST HISTORY:     Past Medical History:   Diagnosis Date    Allergy     Anxiety     Cataract     Colon polyps 2010    Hearing loss     Hypertension     Joint pain     Mental disorder        Past Surgical History:   Procedure Laterality Date    COLONOSCOPY N/A 3/5/2020    Procedure: COLONOSCOPY;  Surgeon: Nathalia Kemp MD;  Location: [facility] ([address] [place] [address]);  Service: Colon and Rectal;  Laterality: N/A;  requests later appt time if available - Pt open to any MD- Pt informed arrival time may be adjusted, Pt verbalized understanding- ERW2/24/20@1409    ESOPHAGOGASTRODUODENOSCOPY N/A 10/1/2020    Procedure: EGD (ESOPHAGOGASTRODUODENOSCOPY);  Surgeon: Casey Guerrero MD;  Location: [address]);  Service: Endoscopy;  Laterality: N/A;  [facility]    ESOPHAGOGASTRODUODENOSCOPY N/A 10/29/2021    Procedure: EGD (ESOPHAGOGASTRODUODENOSCOPY);  Surgeon: Billy Vegas MD;  Location: [facility];  Service: Endoscopy;  Laterality: N/A;    HYSTERECTOMY      PERIPARTUM CHITO       Family History   Problem Relation Age of Onset    Breast cancer Sister     Colon cancer Neg Hx     Ovarian cancer Neg Hx     Melanoma Neg Hx        Social History     Socioeconomic History    Marital status:    Tobacco Use    Smoking status: Never Smoker    Smokeless tobacco: Never Used   Substance and Sexual Activity    Alcohol use: Yes     Comment: beer occasionally    Drug  use: No    Sexual activity: Not Currently     Birth control/protection: None   Social History Narrative    June 2016    The patient reports that she lives alone normally, however her daughter recently moved in with her    She has a 43-year-old daughter, Saima    And a 26-year-old daughterChris, who is a schoolteacher for 6 in seventh grade in [employer]    She is retired from working as a  in the school system    She now works about 5-15 hours a week for [employer], which she enjoys.    Enjoys working at [employer] couple days a week       MEDICATIONS & ALLERGIES:     Current Outpatient Medications on File Prior to Visit   Medication Sig Dispense Refill    acetaminophen (TYLENOL) 650 MG TbSR Take 1 tablet (650 mg total) by mouth every 8 (eight) hours as needed (pain).  0    ALPRAZolam (XANAX) 0.5 MG tablet take One tablet by mouth every night at bedtime and one tablet every day as needed 45 tablet 5    amlodipine-valsartan (EXFORGE) 5-320 mg per tablet Take 1 tablet by mouth once daily. 90 tablet 1    aspirin (ECOTRIN) 81 MG EC tablet Take 81 mg by mouth nightly.       azelastine (ASTELIN) 137 mcg (0.1 %) nasal spray instill 1 spray (137 mcg total) by Nasal route 2 (two) times daily. 30 mL 3    b complex vitamins tablet Take 1 tablet by mouth once daily.      calcium carbonate-vitamin D3 500 mg(1,250mg) -125 unit per tablet Take 1 tablet by mouth nightly.       cetirizine (ZYRTEC) 10 MG tablet Take 1 tablet (10 mg total) by mouth daily as needed for Allergies. 90 tablet 3    diclofenac sodium (VOLTAREN) 1 % Gel Apply 2 g topically once daily. 50 g 2    donepeziL 23 mg Tab Take 1 tablet (23 mg total) by mouth once daily. 90 tablet 1    ergocalciferol, vitamin D2, (VITAMIN D ORAL) Take 400 Units by mouth nightly.       EScitalopram oxalate (LEXAPRO) 5 MG Tab take 1 tablet by mouth every evening at bedtime 90 tablet 1    ferrous sulfate (FEOSOL) 325 mg (65 mg iron) Tab tablet Take 1  tablet (325 mg total) by mouth every Mon, Wed, Fri. With vitamin C 90 tablet 1    gabapentin (NEURONTIN) 600 MG tablet Take 1 tablet (600 mg total) by mouth 3 (three) times daily. 90 tablet 1    mometasone 0.1% (ELOCON) 0.1 % cream apply to affected area topically daily 45 g 3    omeprazole (PRILOSEC) 40 MG capsule Take 1 capsule (40 mg total) by mouth every morning. 90 capsule 1    pravastatin (PRAVACHOL) 10 MG tablet Take 1 tablet (10 mg total) by mouth every evening. 90 tablet 3    [DISCONTINUED] cimetidine (TAGAMET) 300 MG tablet Take 1 tablet (300 mg total) by mouth 2 (two) times daily. 180 tablet 1     No current facility-administered medications on file prior to visit.        Review of patient's allergies indicates:   Allergen Reactions    Lotensin [benazepril] Swelling    Penicillins Rash       OBJECTIVE:     Vital Signs:  Vitals:    06/14/22 0954   BP: 137/68   Pulse: 76   Resp: 18   Temp: 97.9 °F (36.6 °C)     Body mass index is 26.15 kg/m².     Physical Exam:  Physical Exam  Constitutional:       Appearance: Normal appearance.   HENT:      Head: Normocephalic.      Right Ear: Tympanic membrane normal.      Nose: Nose normal.      Mouth/Throat:      Mouth: Mucous membranes are moist.   Eyes:      Pupils: Pupils are equal, round, and reactive to light.   Cardiovascular:      Rate and Rhythm: Normal rate and regular rhythm.   Pulmonary:      Effort: Pulmonary effort is normal.      Breath sounds: Normal breath sounds.   Abdominal:      General: Abdomen is flat.      Palpations: Abdomen is soft.   Musculoskeletal:         General: Normal range of motion.      Cervical back: Normal range of motion.   Skin:     General: Skin is warm and dry.      Capillary Refill: Capillary refill takes less than 2 seconds.   Neurological:      Mental Status: She is alert and oriented to person, place, and time.   Psychiatric:         Mood and Affect: Mood normal.         Laboratory  Lab Results   Component Value Date     WBC 6.05 05/17/2022    HGB 12.5 05/17/2022    HCT 39.8 05/17/2022    MCV 89 05/17/2022     05/17/2022     No results found for: INR, PROTIME  Lab Results   Component Value Date    HGBA1C 5.2 11/04/2021     No results for input(s): POCTGLUCOSE in the last 72 hours.    Diagnostic Results:  n/a    TRANSITION OF CARE:     [facility] Contact Note: 11/01/2021    Family and/or Caretaker present at visit?  No.  Diagnostic tests reviewed/disposition: No diagnosic tests pending after this hospitalization.  Disease/illness education: Fall precautions   Home health/community services discussion/referrals: Patient does not have home health established from hospital visit.  They do not need home health.  If needed, we will set up home health for the patient.   Establishment or re-establishment of referral orders for community resources: No other necessary community resources.   Discussion with other health care providers: No discussion with other health care providers necessary.     Transition of Care Visit:     I have reviewed and updated the history and problem list.  I have reconciled the medication list.  I have discussed the hospitalization and current medical issues, prognosis and plans with the patient/family.  I  spent more than 50% of time discussing the care with the patient/family.  Total Face-to-Face Encounter: 60 minutes.    Medications Reconciliation:   I have reconciled the patient's home medications and discharge medications with the patient/family. I have updated all changes.  Refer to After-Visit Medication List.    ASSESSMENT & PLAN:     HIGH RISK CONDITION(S):  Patient has a condition that poses threat to life and bodily function:     Soila was seen today for transitional care.    Diagnoses and all orders for this visit:    Dementia without behavioral disturbance, unspecified dementia type  Continue home meds      Were controlled substances prescribed?  No    Instructions for the  patient:    Scheduled Follow-up :  Future Appointments   Date Time Provider Department Center   7/5/2022 11:00 AM Adriano Guzmán MD [facility] [address]   8/17/2022  1:00 PM Parminder Junior MD [facility]   10/20/2022 10:00 AM Alesha Levin MD [facility]  [address]       After Visit Medication List :     Medication List          Accurate as of June 14, 2022 11:59 PM. If you have any questions, ask your nurse or doctor.            CONTINUE taking these medications    acetaminophen 650 MG Tbsr  Commonly known as: TYLENOL  Take 1 tablet (650 mg total) by mouth every 8 (eight) hours as needed (pain).     ALPRAZolam 0.5 MG tablet  Commonly known as: XANAX  take One tablet by mouth every night at bedtime and one tablet every day as needed     amlodipine-valsartan 5-320 mg per tablet  Commonly known as: EXFORGE  Take 1 tablet by mouth once daily.     aspirin 81 MG EC tablet  Commonly known as: ECOTRIN     azelastine 137 mcg (0.1 %) nasal spray  Commonly known as: ASTELIN  instill 1 spray (137 mcg total) by Nasal route 2 (two) times daily.     b complex vitamins tablet     calcium carbonate-vitamin D3 500 mg-3.125 mcg (125 unit) per tablet     cetirizine 10 MG tablet  Commonly known as: ZYRTEC  Take 1 tablet (10 mg total) by mouth daily as needed for Allergies.     diclofenac sodium 1 % Gel  Commonly known as: VOLTAREN  Apply 2 g topically once daily.     donepeziL 23 mg Tab  Take 1 tablet (23 mg total) by mouth once daily.     EScitalopram oxalate 5 MG Tab  Commonly known as: LEXAPRO  take 1 tablet by mouth every evening at bedtime     ferrous sulfate 325 mg (65 mg iron) Tab tablet  Commonly known as: FEOSOL  Take 1 tablet (325 mg total) by mouth every Mon, Wed, Fri. With vitamin C     gabapentin 600 MG tablet  Commonly known as: NEURONTIN  Take 1 tablet (600 mg total) by mouth 3 (three) times daily.     mometasone 0.1% 0.1 % cream  Commonly known as: ELOCON  apply to affected area topically daily      omeprazole 40 MG capsule  Commonly known as: PRILOSEC  Take 1 capsule (40 mg total) by mouth every morning.     pravastatin 10 MG tablet  Commonly known as: PRAVACHOL  Take 1 tablet (10 mg total) by mouth every evening.     VITAMIN D ORAL            Signature:  Franca Mcintyre NP    Patient consent obtained prior to treatment

## 2022-06-21 ENCOUNTER — TELEPHONE (OUTPATIENT)
Dept: DERMATOLOGY | Facility: CLINIC | Age: 72
End: 2022-06-21
Payer: MEDICARE

## 2022-06-24 VITALS
DIASTOLIC BLOOD PRESSURE: 68 MMHG | HEART RATE: 76 BPM | RESPIRATION RATE: 18 BRPM | BODY MASS INDEX: 26.03 KG/M2 | TEMPERATURE: 98 F | OXYGEN SATURATION: 98 % | SYSTOLIC BLOOD PRESSURE: 137 MMHG | HEIGHT: 66 IN | WEIGHT: 162 LBS

## 2022-06-24 NOTE — PATIENT INSTRUCTIONS
Instructions:  - [facility] Nurse Practitioner to schedule home follow-up visit with patient in 6-8 weeks or as needed.  - Continue all medications, treatments and therapies as ordered.   - Follow all instructions, recommendations as discussed.  - Maintain Safety Precautions at all times.  - Attend all medical appointments as scheduled.  - For worsening symptoms: call Primary Care Physician or Nurse Practitioner.  - For emergencies, call 911 or immediately report to the nearest emergency room.  - Limit Risks of environmental exposure to coronavirus/COVID-19 as discussed including: social distancing, hand hygiene, and limiting departures from the home for necessities only.

## 2022-06-28 ENCOUNTER — TELEPHONE (OUTPATIENT)
Dept: OTOLARYNGOLOGY | Facility: CLINIC | Age: 72
End: 2022-06-28

## 2022-07-07 ENCOUNTER — OFFICE VISIT (OUTPATIENT)
Dept: OTOLARYNGOLOGY | Facility: CLINIC | Age: 72
End: 2022-07-07
Payer: MEDICARE

## 2022-07-07 VITALS — SYSTOLIC BLOOD PRESSURE: 102 MMHG | DIASTOLIC BLOOD PRESSURE: 67 MMHG | HEART RATE: 55 BPM

## 2022-07-07 DIAGNOSIS — R41.89 SIGNS AND SYMPTOMS INVOLVING COGNITION: ICD-10-CM

## 2022-07-07 DIAGNOSIS — H61.23 BILATERAL IMPACTED CERUMEN: Primary | ICD-10-CM

## 2022-07-07 PROCEDURE — 99499 RISK ADDL DX/OHS AUDIT: ICD-10-PCS | Mod: S$GLB,,, | Performed by: NURSE PRACTITIONER

## 2022-07-07 PROCEDURE — 99499 UNLISTED E&M SERVICE: CPT | Mod: S$GLB,,, | Performed by: NURSE PRACTITIONER

## 2022-07-07 PROCEDURE — 1159F MED LIST DOCD IN RCRD: CPT | Mod: CPTII,S$GLB,, | Performed by: NURSE PRACTITIONER

## 2022-07-07 PROCEDURE — 3078F PR MOST RECENT DIASTOLIC BLOOD PRESSURE < 80 MM HG: ICD-10-PCS | Mod: CPTII,S$GLB,, | Performed by: NURSE PRACTITIONER

## 2022-07-07 PROCEDURE — 1126F AMNT PAIN NOTED NONE PRSNT: CPT | Mod: CPTII,S$GLB,, | Performed by: NURSE PRACTITIONER

## 2022-07-07 PROCEDURE — 69210 REMOVE IMPACTED EAR WAX UNI: CPT | Mod: S$GLB,,, | Performed by: NURSE PRACTITIONER

## 2022-07-07 PROCEDURE — 3078F DIAST BP <80 MM HG: CPT | Mod: CPTII,S$GLB,, | Performed by: NURSE PRACTITIONER

## 2022-07-07 PROCEDURE — 99999 PR PBB SHADOW E&M-EST. PATIENT-LVL III: ICD-10-PCS | Mod: PBBFAC,,, | Performed by: NURSE PRACTITIONER

## 2022-07-07 PROCEDURE — 3074F SYST BP LT 130 MM HG: CPT | Mod: CPTII,S$GLB,, | Performed by: NURSE PRACTITIONER

## 2022-07-07 PROCEDURE — 69210 EAR CERUMEN REMOVAL: ICD-10-PCS | Mod: S$GLB,,, | Performed by: NURSE PRACTITIONER

## 2022-07-07 PROCEDURE — 99999 PR PBB SHADOW E&M-EST. PATIENT-LVL III: CPT | Mod: PBBFAC,,, | Performed by: NURSE PRACTITIONER

## 2022-07-07 PROCEDURE — 4010F ACE/ARB THERAPY RXD/TAKEN: CPT | Mod: CPTII,S$GLB,, | Performed by: NURSE PRACTITIONER

## 2022-07-07 PROCEDURE — 3074F PR MOST RECENT SYSTOLIC BLOOD PRESSURE < 130 MM HG: ICD-10-PCS | Mod: CPTII,S$GLB,, | Performed by: NURSE PRACTITIONER

## 2022-07-07 PROCEDURE — 1126F PR PAIN SEVERITY QUANTIFIED, NO PAIN PRESENT: ICD-10-PCS | Mod: CPTII,S$GLB,, | Performed by: NURSE PRACTITIONER

## 2022-07-07 PROCEDURE — 4010F PR ACE/ARB THEARPY RXD/TAKEN: ICD-10-PCS | Mod: CPTII,S$GLB,, | Performed by: NURSE PRACTITIONER

## 2022-07-07 PROCEDURE — 1159F PR MEDICATION LIST DOCUMENTED IN MEDICAL RECORD: ICD-10-PCS | Mod: CPTII,S$GLB,, | Performed by: NURSE PRACTITIONER

## 2022-07-07 NOTE — PROGRESS NOTES
"    Subjective:      Soila Chávez is a 72 y.o. female who was {display:19197:o:"self-referred","referred to me by No ref. provider found in consultation"} for {display:19197:o:"ear infection","aural fullness","ear pain","tinnitus","dizziness","hearing loss","otorrhea","cerumen impaction"}.      There {is/is not:9024::"is not"} a family history of hearing loss at a young age.  There {is/is not:19887::"is not"} a prior history of ear surgery***.  There {is/is not:19887::"is not"} a prior history of ear infections.  There {IS/IS NOT:20346} a history of ear trauma.  She {Actions; denies/reports/admits to:19208::"denies"} a history of significant noise exposure***.  She {does/does not:19885::"does not"} wear hearing aids currently.  She {has/has not:19786::"has not"} had a hearing test recently.      Past Medical History  She has a past medical history of Allergy, Anxiety, Cataract, Colon polyps, Hearing loss, Hypertension, Joint pain, and Mental disorder.    Past Surgical History  She has a past surgical history that includes Hysterectomy; Colonoscopy (N/A, 3/5/2020); Esophagogastroduodenoscopy (N/A, 10/1/2020); and Esophagogastroduodenoscopy (N/A, 10/29/2021).    Family History  Her family history includes Breast cancer in her sister.    Social History  She reports that she has never smoked. She has never used smokeless tobacco. She reports current alcohol use. She reports that she does not use drugs.    Allergies  She is allergic to lotensin [benazepril] and penicillins.    Medications  She has a current medication list which includes the following prescription(s): acetaminophen, alprazolam, amlodipine-valsartan, aspirin, azelastine, b complex vitamins, calcium carbonate-vitamin d3, cetirizine, diclofenac sodium, donepezil, ergocalciferol (vitamin d2), escitalopram oxalate, ferrous sulfate, gabapentin, mometasone 0.1%, omeprazole, pravastatin, and [DISCONTINUED] cimetidine.    Review of Systems  Objective: " "  There were no vitals taken for this visit.   Physical Exam  Procedure  {display:39831:a:"Cerumen removal performed.  See procedure note.","Flexible laryngoscopy performed.  See procedure note.","Nasal endoscopy performed.  See procedure note.","None"}    Data Reviewed  WBC (K/uL)   Date Value   05/17/2022 6.05     Eosinophil % (%)   Date Value   05/17/2022 2.1     Eos # (K/uL)   Date Value   05/17/2022 0.1     Platelets (K/uL)   Date Value   05/17/2022 234     Glucose (mg/dL)   Date Value   12/08/2021 76     No results found for: IGE    Imaging  No pertinent imaging available.    Audiogram    I independently reviewed the tracings of the {display:97370:o:"tympanogram","complete audiometric evaluation"} performed {display:42910:o:"today"}.  I reviewed the audiogram with the patient as well.  Pertinent findings include {display:61520:o:"binaural sloping HF sensorineural hearing loss with normal tymps","a normal study"}.  Assessment:     No diagnosis found.  Plan:   There are no diagnoses linked to this encounter.    No follow-ups on file.  "

## 2022-07-07 NOTE — PROCEDURES
Ear Cerumen Removal    Date/Time: 7/7/2022 2:00 PM  Performed by: Jyoti Callejas NP  Authorized by: Jyoti Callejas NP       Local anesthetic:  None  Location details:  Both ears  Procedure type: curette    Cerumen  Removal Results:  Cerumen completely removed  Patient tolerance:  Patient tolerated the procedure well with no immediate complications     Procedure Note:    The patient was brought to the minor procedure room and placed under the operating microscope of the right ear canal which was cleaned of ceruminous debris. Using a combination of suction, curettes and cup forceps the patient's cerumen impaction was removed. The tympanic membrane was evaluated and was unremarkable. The patient tolerated the procedure well. There were no complications.    Procedure Note:    The patient was brought to the minor procedure room and placed under the operating microscope of the left ear canal which was cleaned of ceruminous debris. Using a combination of suction, curettes and cup forceps the patient's cerumen impaction was removed. The tympanic membrane was evaluated and was unremarkable. The patient tolerated the procedure well. There were no complications.        Patient of Dr. Guzmán presents for ear cleaning.  Dementia documented on her problem list, very apparent at this appointment.  Advised repeat cleaning in 4 months.  Last Audio July 2021 with normal hearing on the left and profound hearing loss on the right.

## 2022-07-11 ENCOUNTER — TELEPHONE (OUTPATIENT)
Dept: DERMATOLOGY | Facility: CLINIC | Age: 72
End: 2022-07-11
Payer: MEDICARE

## 2022-07-11 NOTE — TELEPHONE ENCOUNTER
Called pt to schedule an appt. Spoke with daughter. Had to add her to waiting list because no appt slots were accommodating to her.

## 2022-07-14 ENCOUNTER — PATIENT MESSAGE (OUTPATIENT)
Dept: INTERNAL MEDICINE | Facility: CLINIC | Age: 72
End: 2022-07-14
Payer: MEDICARE

## 2022-07-14 DIAGNOSIS — I10 ESSENTIAL HYPERTENSION: ICD-10-CM

## 2022-07-14 RX ORDER — AMLODIPINE AND VALSARTAN 5; 320 MG/1; MG/1
1 TABLET ORAL DAILY
Qty: 90 TABLET | Refills: 1 | Status: SHIPPED | OUTPATIENT
Start: 2022-07-14 | End: 2023-01-16 | Stop reason: SDUPTHER

## 2022-07-14 NOTE — TELEPHONE ENCOUNTER
No new care gaps identified.  [facility] Embedded Care Gaps. Reference number: [id number]. 7/14/2022   11:00:55 AM CDT

## 2022-08-09 ENCOUNTER — CARE AT HOME (OUTPATIENT)
Dept: HOME HEALTH SERVICES | Facility: CLINIC | Age: 72
End: 2022-08-09
Payer: MEDICARE

## 2022-08-09 DIAGNOSIS — G30.1 LATE ONSET ALZHEIMER'S DEMENTIA WITHOUT BEHAVIORAL DISTURBANCE: Primary | ICD-10-CM

## 2022-08-09 DIAGNOSIS — F02.80 LATE ONSET ALZHEIMER'S DEMENTIA WITHOUT BEHAVIORAL DISTURBANCE: Primary | ICD-10-CM

## 2022-08-09 PROCEDURE — 99349 PR HOME VISIT,ESTAB PATIENT,LEVEL III: ICD-10-PCS | Mod: S$GLB,,, | Performed by: NURSE PRACTITIONER

## 2022-08-09 PROCEDURE — 99349 HOME/RES VST EST MOD MDM 40: CPT | Mod: S$GLB,,, | Performed by: NURSE PRACTITIONER

## 2022-08-15 ENCOUNTER — OFFICE VISIT (OUTPATIENT)
Dept: OBSTETRICS AND GYNECOLOGY | Facility: CLINIC | Age: 72
End: 2022-08-15
Payer: MEDICARE

## 2022-08-15 VITALS
SYSTOLIC BLOOD PRESSURE: 122 MMHG | BODY MASS INDEX: 24.59 KG/M2 | DIASTOLIC BLOOD PRESSURE: 64 MMHG | WEIGHT: 153 LBS | HEIGHT: 66 IN

## 2022-08-15 DIAGNOSIS — Z01.419 ENCOUNTER FOR ROUTINE GYNECOLOGIC EXAMINATION IN MEDICARE PATIENT: Primary | ICD-10-CM

## 2022-08-15 PROCEDURE — 3074F SYST BP LT 130 MM HG: CPT | Mod: CPTII,S$GLB,, | Performed by: OBSTETRICS & GYNECOLOGY

## 2022-08-15 PROCEDURE — 1160F PR REVIEW ALL MEDS BY PRESCRIBER/CLIN PHARMACIST DOCUMENTED: ICD-10-PCS | Mod: CPTII,S$GLB,, | Performed by: OBSTETRICS & GYNECOLOGY

## 2022-08-15 PROCEDURE — 3008F PR BODY MASS INDEX (BMI) DOCUMENTED: ICD-10-PCS | Mod: CPTII,S$GLB,, | Performed by: OBSTETRICS & GYNECOLOGY

## 2022-08-15 PROCEDURE — 3288F FALL RISK ASSESSMENT DOCD: CPT | Mod: CPTII,S$GLB,, | Performed by: OBSTETRICS & GYNECOLOGY

## 2022-08-15 PROCEDURE — 1126F AMNT PAIN NOTED NONE PRSNT: CPT | Mod: CPTII,S$GLB,, | Performed by: OBSTETRICS & GYNECOLOGY

## 2022-08-15 PROCEDURE — 1101F PT FALLS ASSESS-DOCD LE1/YR: CPT | Mod: CPTII,S$GLB,, | Performed by: OBSTETRICS & GYNECOLOGY

## 2022-08-15 PROCEDURE — G0101 CA SCREEN;PELVIC/BREAST EXAM: HCPCS | Mod: S$GLB,,, | Performed by: OBSTETRICS & GYNECOLOGY

## 2022-08-15 PROCEDURE — G0101 PR CA SCREEN;PELVIC/BREAST EXAM: ICD-10-PCS | Mod: S$GLB,,, | Performed by: OBSTETRICS & GYNECOLOGY

## 2022-08-15 PROCEDURE — 1101F PR PT FALLS ASSESS DOC 0-1 FALLS W/OUT INJ PAST YR: ICD-10-PCS | Mod: CPTII,S$GLB,, | Performed by: OBSTETRICS & GYNECOLOGY

## 2022-08-15 PROCEDURE — 3078F PR MOST RECENT DIASTOLIC BLOOD PRESSURE < 80 MM HG: ICD-10-PCS | Mod: CPTII,S$GLB,, | Performed by: OBSTETRICS & GYNECOLOGY

## 2022-08-15 PROCEDURE — 1160F RVW MEDS BY RX/DR IN RCRD: CPT | Mod: CPTII,S$GLB,, | Performed by: OBSTETRICS & GYNECOLOGY

## 2022-08-15 PROCEDURE — 3078F DIAST BP <80 MM HG: CPT | Mod: CPTII,S$GLB,, | Performed by: OBSTETRICS & GYNECOLOGY

## 2022-08-15 PROCEDURE — 1159F MED LIST DOCD IN RCRD: CPT | Mod: CPTII,S$GLB,, | Performed by: OBSTETRICS & GYNECOLOGY

## 2022-08-15 PROCEDURE — 3288F PR FALLS RISK ASSESSMENT DOCUMENTED: ICD-10-PCS | Mod: CPTII,S$GLB,, | Performed by: OBSTETRICS & GYNECOLOGY

## 2022-08-15 PROCEDURE — 1126F PR PAIN SEVERITY QUANTIFIED, NO PAIN PRESENT: ICD-10-PCS | Mod: CPTII,S$GLB,, | Performed by: OBSTETRICS & GYNECOLOGY

## 2022-08-15 PROCEDURE — 99999 PR PBB SHADOW E&M-EST. PATIENT-LVL III: CPT | Mod: PBBFAC,,, | Performed by: OBSTETRICS & GYNECOLOGY

## 2022-08-15 PROCEDURE — 3074F PR MOST RECENT SYSTOLIC BLOOD PRESSURE < 130 MM HG: ICD-10-PCS | Mod: CPTII,S$GLB,, | Performed by: OBSTETRICS & GYNECOLOGY

## 2022-08-15 PROCEDURE — 1159F PR MEDICATION LIST DOCUMENTED IN MEDICAL RECORD: ICD-10-PCS | Mod: CPTII,S$GLB,, | Performed by: OBSTETRICS & GYNECOLOGY

## 2022-08-15 PROCEDURE — 4010F PR ACE/ARB THEARPY RXD/TAKEN: ICD-10-PCS | Mod: CPTII,S$GLB,, | Performed by: OBSTETRICS & GYNECOLOGY

## 2022-08-15 PROCEDURE — 99999 PR PBB SHADOW E&M-EST. PATIENT-LVL III: ICD-10-PCS | Mod: PBBFAC,,, | Performed by: OBSTETRICS & GYNECOLOGY

## 2022-08-15 PROCEDURE — 4010F ACE/ARB THERAPY RXD/TAKEN: CPT | Mod: CPTII,S$GLB,, | Performed by: OBSTETRICS & GYNECOLOGY

## 2022-08-15 PROCEDURE — 3008F BODY MASS INDEX DOCD: CPT | Mod: CPTII,S$GLB,, | Performed by: OBSTETRICS & GYNECOLOGY

## 2022-08-15 NOTE — PROGRESS NOTES
"  [facility]  Palliative Care Home Visit    Visit Date: 8/09/2022  Encounter Provider: Franca Mcintyre NP  PCP:  Parminder Junior MD    PRESENTING HISTORY      Patient ID: Soila Chávez is a 72 y.o. female.    Consult Requested By:  No ref. provider found  Reason for Consult:  Palliative Care follow up    Soila is being seen at home due to  physical debility that presents a taxing effort to leave the home, to mitigate high risk of hospital readmission or due to the limited availability of reliable or safe options for transportation to the point of access to the provider. Prior to treatment on this visit the chart was reviewed and patient consent was obtained.        Chief Complaint: Follow-up      History of Present Illness: Ms. Soila Chávez is a 72 y.o. female who was recently admitted to the hospital.    Admission Date: 10/28/2021  Hospital Length of Stay: 2 days  Discharge Date and Time:  10/31/2021    HPI:   71-year-old female with history of hypertension, anxiety, dementia presented to the ED today with complaint of 3 bloody bowel movements.  Patient stated that she ate dinner last night about an hour later went to the bathroom in noted "liquid red blood", with subsequent episode after her bath.  Stated she went to bed and noted around 330 this morning that there was blood in her bed in went to the restroom and had another bloody bowel movement.  She stated at this time she also noted brown loose stool and dark stool.  She denied any associated abdominal pain, nausea, vomiting, shortness of breath, dizziness, lightheadedness.  Reported that she called her daughter to inform her of the loose bloody stools thinking it was a side effect of a new medication she had started.  Patient with no previous episodes.  Denied hematemesis.  Reported that she used to take 2 Aleve a day for headaches until a little over a month ago when her primary care provider told her to stop taking the Aleve, only take " acetaminophen.  Also reported that she had decreased the number of beers she had a week from drinking 12 pack on the weekends to maybe 1 or 2 a week.  Quit smoking a few years ago, and denies any illicit drug use.  Previous EGD 10/2020 showed Gastric antral and oxyntic mucosa with Helicobacter gastritis, colonoscopy 3/2020 showed Multiple small and large-mouthed diverticula were found in the entire colon.  ED evaluation H&H 9.9/31.1 lower than priors, otherwise labs unremarkable.  Vital signs stable.  Patient admitted to observation.  Poor historian        Procedure(s) (LRB):  EGD (ESOPHAGOGASTRODUODENOSCOPY) (N/A)       Hospital Course:   71-year-old female admitted with hematochezia suspected secondary to diverticular bleed, transfused 2 units packed RBCs.  Patient with previous history of H.pylori unclear for completely treated underwent EGD showed small hiatal hernia, stomach was normal examined duodenum was normal.  No further bleeding, patient hemodynamically stable discharge home.  Instructed to call or return if hematochezia returns.  Plan of care discussed with patient and her daughter.  Daughter acknowledges understanding and agrees with plan of care declined home health at this time.  _________________________________________________________  Today:Ms. Chávez is being evaluated at home today for a palliative care home visit, see last hospital course above.        With this visit today patient is found at home alone, she opened the door for me and is ambulatory with no assistive device. Patient is AAOx3, during our conversation, she had multiple episodes of confusion but was able to verify her name and  and easily reoriented, she speaks repetetive and not in real time. Most of patients other history was received from her daughter during a previous phone conversation and her medical record. She sees Dr. Junior as her PCP. I will continue seeing the patient in the home as it is difficult for her to  physically make mulitple visits. She endorses eating x 3 small meals per day . She endorses regular BMs. Patient reports she is still cooking and enjoys spending time with her family. She loves to talk about her daughters who she reports is a successful school principle and one is a successful  School counselor or works with children. Today she is about the  father of her daughter, reminiscing and showing the program, she talks as if this just occurred.     NO home health needs identified at this time. Fall/Safety precautions reinforced. Education completed ~ 15 minutes. Plan to follow up.     VSS. Denies fever, chest pain, shortness of breath, nausea, vomiting, diarrhea. Risks of environmental exposure to coronavirus discussed including: social distancing, hand hygiene, and limiting departures from the home for necessities only.  Reports understanding and willingness to comply.  All hospital discharge orders reviewed and being followed, all medications reconciled and reviewed, patient and family verbalized understanding. No other needs identified at this time.         Attestation: Screening criteria to assess the level of the patient's risk for infection with COVID-19 as recommended by the CDC at the time of the above documented home visit concluded appropriateness to proceed. Universal precautions were maintained at all times, including provider use of 60% alcohol gel hand  immediately prior to entry and upon departing the patient's home.        Review of Systems   Constitutional: Negative for activity change, fatigue and unexpected weight change.   HENT: Negative for congestion, dental problem, trouble swallowing and voice change.    Eyes: Negative for redness and visual disturbance.   Cardiovascular: Negative for chest pain and palpitations.   Gastrointestinal: Negative for abdominal distention, abdominal pain, nausea and vomiting.   Endocrine: Negative for polydipsia and polyuria.    Genitourinary: Negative for difficulty urinating and dyspareunia.   Musculoskeletal: Negative for arthralgias and gait problem.   Skin: Negative for color change and rash.   Allergic/Immunologic: Negative for food allergies.   Neurological: Negative for dizziness and weakness.   Psychiatric/Behavioral: Positive for confusion. Negative for agitation.        Some episodes of confusion       Assessments:  · Lives in single story home with 4 steps to enter  · Functional Status:Independent with ADLs and mobility   · Safety: Fall precautions  · Nutritional: 3 meals daily  · Home Health/DME/Supplies: n/a    PAST HISTORY:     Past Medical History:   Diagnosis Date    Allergy     Anxiety     Cataract     Colon polyps 2010    Hearing loss     Hypertension     Joint pain     Mental disorder        Past Surgical History:   Procedure Laterality Date    COLONOSCOPY N/A 3/5/2020    Procedure: COLONOSCOPY;  Surgeon: Nathalia Kemp MD;  Location: [address]);  Service: Colon and Rectal;  Laterality: N/A;  requests later appt time if available - Pt open to any MD- Pt informed arrival time may be adjusted, Pt verbalized understanding- ERW2/24/20@1409    ESOPHAGOGASTRODUODENOSCOPY N/A 10/1/2020    Procedure: EGD (ESOPHAGOGASTRODUODENOSCOPY);  Surgeon: Casey Guerrero MD;  Location: [address]);  Service: Endoscopy;  Laterality: N/A;  covid test 9/28-[place] [facility]    ESOPHAGOGASTRODUODENOSCOPY N/A 10/29/2021    Procedure: EGD (ESOPHAGOGASTRODUODENOSCOPY);  Surgeon: Billy Vegas MD;  Location: [facility];  Service: Endoscopy;  Laterality: N/A;    HYSTERECTOMY      PERIPARTUM CHITO       Family History   Problem Relation Age of Onset    Breast cancer Sister     Colon cancer Neg Hx     Ovarian cancer Neg Hx     Melanoma Neg Hx        Social History     Socioeconomic History    Marital status:    Tobacco Use    Smoking status: Never Smoker    Smokeless tobacco: Never Used   Substance and Sexual  Activity    Alcohol use: Yes     Comment: beer occasionally    Drug use: No    Sexual activity: Not Currently     Birth control/protection: None   Social History Narrative    June 2016    The patient reports that she lives alone normally, however her daughter recently moved in with her    She has a 43-year-old daughter, Saima    And a 26-year-old daughterChris, who is a schoolteacher for 6 in seventh grade in [employer]    She is retired from working as a  in the school system    She now works about 5-15 hours a week for [employer], which she enjoys.    Enjoys working at [employer] couple days a week       MEDICATIONS & ALLERGIES:     Current Outpatient Medications on File Prior to Visit   Medication Sig Dispense Refill    acetaminophen (TYLENOL) 650 MG TbSR Take 1 tablet (650 mg total) by mouth every 8 (eight) hours as needed (pain). (Patient not taking: Reported on 7/7/2022)  0    ALPRAZolam (XANAX) 0.5 MG tablet take One tablet by mouth every night at bedtime and one tablet every day as needed 45 tablet 5    amlodipine-valsartan (EXFORGE) 5-320 mg per tablet Take 1 tablet by mouth once daily. 90 tablet 1    aspirin (ECOTRIN) 81 MG EC tablet Take 81 mg by mouth nightly.       azelastine (ASTELIN) 137 mcg (0.1 %) nasal spray instill 1 spray (137 mcg total) by Nasal route 2 (two) times daily. 30 mL 3    b complex vitamins tablet Take 1 tablet by mouth once daily.      calcium carbonate-vitamin D3 500 mg(1,250mg) -125 unit per tablet Take 1 tablet by mouth nightly.       cetirizine (ZYRTEC) 10 MG tablet Take 1 tablet (10 mg total) by mouth daily as needed for Allergies. (Patient not taking: Reported on 7/7/2022) 90 tablet 3    diclofenac sodium (VOLTAREN) 1 % Gel Apply 2 g topically once daily. 50 g 2    donepeziL 23 mg Tab Take 1 tablet (23 mg total) by mouth once daily. 90 tablet 1    ergocalciferol, vitamin D2, (VITAMIN D ORAL) Take 400 Units by mouth nightly.       EScitalopram  oxalate (LEXAPRO) 5 MG Tab take 1 tablet by mouth every evening at bedtime 90 tablet 1    ferrous sulfate (FEOSOL) 325 mg (65 mg iron) Tab tablet Take 1 tablet (325 mg total) by mouth every Mon, Wed, Fri. With vitamin C 90 tablet 1    gabapentin (NEURONTIN) 600 MG tablet Take 1 tablet (600 mg total) by mouth 3 (three) times daily. 90 tablet 1    mometasone 0.1% (ELOCON) 0.1 % cream apply to affected area topically daily 45 g 3    omeprazole (PRILOSEC) 40 MG capsule Take 1 capsule (40 mg total) by mouth every morning. 90 capsule 1    pravastatin (PRAVACHOL) 10 MG tablet Take 1 tablet (10 mg total) by mouth every evening. 90 tablet 3    [DISCONTINUED] cimetidine (TAGAMET) 300 MG tablet Take 1 tablet (300 mg total) by mouth 2 (two) times daily. 180 tablet 1     No current facility-administered medications on file prior to visit.        Review of patient's allergies indicates:   Allergen Reactions    Lotensin [benazepril] Swelling    Penicillins Rash       OBJECTIVE:     Vital Signs:  Vitals:    08/09/22 1300   Resp: 18     Body mass index is 24.69 kg/m².     Physical Exam:  Physical Exam  Constitutional:       Appearance: Normal appearance.   HENT:      Head: Normocephalic.      Right Ear: Tympanic membrane normal.      Nose: Nose normal.      Mouth/Throat:      Mouth: Mucous membranes are moist.   Eyes:      Pupils: Pupils are equal, round, and reactive to light.   Cardiovascular:      Rate and Rhythm: Normal rate and regular rhythm.   Pulmonary:      Effort: Pulmonary effort is normal.      Breath sounds: Normal breath sounds.   Abdominal:      General: Abdomen is flat.      Palpations: Abdomen is soft.   Musculoskeletal:         General: Normal range of motion.      Cervical back: Normal range of motion.   Skin:     General: Skin is warm and dry.      Capillary Refill: Capillary refill takes less than 2 seconds.   Neurological:      Mental Status: She is alert and oriented to person, place, and time.    Psychiatric:         Mood and Affect: Mood normal.         Laboratory  Lab Results   Component Value Date    WBC 6.05 05/17/2022    HGB 12.5 05/17/2022    HCT 39.8 05/17/2022    MCV 89 05/17/2022     05/17/2022     No results found for: INR, PROTIME  Lab Results   Component Value Date    HGBA1C 5.2 11/04/2021     No results for input(s): POCTGLUCOSE in the last 72 hours.    Diagnostic Results:  n/a    TRANSITION OF CARE:     [facility] Contact Note: 11/01/2021    Family and/or Caretaker present at visit?  No.  Diagnostic tests reviewed/disposition: No diagnosic tests pending after this hospitalization.  Disease/illness education: Fall precautions   Home health/community services discussion/referrals: Patient does not have home health established from hospital visit.  They do not need home health.  If needed, we will set up home health for the patient.   Establishment or re-establishment of referral orders for community resources: No other necessary community resources.   Discussion with other health care providers: No discussion with other health care providers necessary.     Transition of Care Visit:     I have reviewed and updated the history and problem list.  I have reconciled the medication list.  I have discussed the hospitalization and current medical issues, prognosis and plans with the patient/family.  I  spent more than 50% of time discussing the care with the patient/family.  Total Face-to-Face Encounter: 60 minutes.    Medications Reconciliation:   I have reconciled the patient's home medications and discharge medications with the patient/family. I have updated all changes.  Refer to After-Visit Medication List.    ASSESSMENT & PLAN:     HIGH RISK CONDITION(S):  Patient has a condition that poses threat to life and bodily function:     Soila was seen today for transitional care.    Diagnoses and all orders for this visit:    Dementia without behavioral disturbance, unspecified dementia  type  Continue home meds      Were controlled substances prescribed?  No    Instructions for the patient:    Scheduled Follow-up :  Future Appointments   Date Time Provider Department Center   8/23/2022 11:00 AM Adriano Guzmán MD [facility] [address]   9/16/2022  3:00 PM Parminder Junior MD [facility]   10/20/2022 10:00 AM Alesha Levin MD [facility]       After Visit Medication List :     Medication List          Accurate as of August 9, 2022 11:59 PM. If you have any questions, ask your nurse or doctor.            CONTINUE taking these medications    acetaminophen 650 MG Tbsr  Commonly known as: TYLENOL  Take 1 tablet (650 mg total) by mouth every 8 (eight) hours as needed (pain).     ALPRAZolam 0.5 MG tablet  Commonly known as: XANAX  take One tablet by mouth every night at bedtime and one tablet every day as needed     amlodipine-valsartan 5-320 mg per tablet  Commonly known as: EXFORGE  Take 1 tablet by mouth once daily.     aspirin 81 MG EC tablet  Commonly known as: ECOTRIN     azelastine 137 mcg (0.1 %) nasal spray  Commonly known as: ASTELIN  instill 1 spray (137 mcg total) by Nasal route 2 (two) times daily.     b complex vitamins tablet     calcium carbonate-vitamin D3 500 mg-3.125 mcg (125 unit) per tablet     cetirizine 10 MG tablet  Commonly known as: ZYRTEC  Take 1 tablet (10 mg total) by mouth daily as needed for Allergies.     diclofenac sodium 1 % Gel  Commonly known as: VOLTAREN  Apply 2 g topically once daily.     donepeziL 23 mg Tab  Take 1 tablet (23 mg total) by mouth once daily.     EScitalopram oxalate 5 MG Tab  Commonly known as: LEXAPRO  take 1 tablet by mouth every evening at bedtime     ferrous sulfate 325 mg (65 mg iron) Tab tablet  Commonly known as: FEOSOL  Take 1 tablet (325 mg total) by mouth every Mon, Wed, Fri. With vitamin C     gabapentin 600 MG tablet  Commonly known as: NEURONTIN  Take 1 tablet (600 mg total) by mouth 3 (three) times daily.      mometasone 0.1% 0.1 % cream  Commonly known as: ELOCON  apply to affected area topically daily     omeprazole 40 MG capsule  Commonly known as: PRILOSEC  Take 1 capsule (40 mg total) by mouth every morning.     pravastatin 10 MG tablet  Commonly known as: PRAVACHOL  Take 1 tablet (10 mg total) by mouth every evening.     VITAMIN D ORAL            Signature:  Franca Mcintyre NP    Patient consent obtained prior to treatment

## 2022-08-15 NOTE — PROGRESS NOTES
SUBJECTIVE:     Chief Complaint: Well Woman       History of Present Illness:  Annual Exam  Patient presents for annual exam.   She c/o nothing today.  LMP: s/p hyst 40 years ago  She denies any vd, vb, dyspareunia, dysuria, depression, anxiety.  Last pap was in unsure and was neg per patient. HPV na.  Birth Control: s/p peripartum CHITO  declines STD testing.     GYN screening history: denies  Mammogram history: neg   Colonoscopy history: neg , due   Dexa history: per PCP    FH:   Breast cancer: sister  Colon cancer: none  Ovarian cancer: none    Review of patient's allergies indicates:   Allergen Reactions    Lotensin [benazepril] Swelling    Penicillins Rash       Past Medical History:   Diagnosis Date    Allergy     Anxiety     Cataract     Colon polyps     Hearing loss     Hypertension     Joint pain     Mental disorder      Past Surgical History:   Procedure Laterality Date    COLONOSCOPY N/A 3/5/2020    Procedure: COLONOSCOPY;  Surgeon: Nathalia Kemp MD;  Location[address]);  Service: Colon and Rectal;  Laterality: N/A;  requests later appt time if available - Pt open to any MD- Pt informed arrival time may be adjusted, Pt verbalized understanding- [id number]    ESOPHAGOGASTRODUODENOSCOPY N/A 10/1/2020    Procedure: EGD (ESOPHAGOGASTRODUODENOSCOPY);  Surgeon: Casey Guerrero MD;  Location: [address]);  Service: Endoscopy;  Laterality: N/A;  covid test -[facility]    ESOPHAGOGASTRODUODENOSCOPY N/A 10/29/2021    Procedure: EGD (ESOPHAGOGASTRODUODENOSCOPY);  Surgeon: Billy Vegas MD;  Location[facility];  Service: Endoscopy;  Laterality: N/A;    HYSTERECTOMY      PERIPARTUM CHITO     OB History        2    Para   2    Term   2            AB        Living           SAB        IAB        Ectopic        Multiple        Live Births                   Family History   Problem Relation Age of Onset    Breast cancer Sister     Colon cancer Neg  Hx     Ovarian cancer Neg Hx     Melanoma Neg Hx      Social History     Tobacco Use    Smoking status: Never Smoker    Smokeless tobacco: Never Used   Substance Use Topics    Alcohol use: Yes     Comment: beer occasionally    Drug use: No       Current Outpatient Medications   Medication Sig    ALPRAZolam (XANAX) 0.5 MG tablet take One tablet by mouth every night at bedtime and one tablet every day as needed    amlodipine-valsartan (EXFORGE) 5-320 mg per tablet Take 1 tablet by mouth once daily.    acetaminophen (TYLENOL) 650 MG TbSR Take 1 tablet (650 mg total) by mouth every 8 (eight) hours as needed (pain). (Patient not taking: Reported on 7/7/2022)    aspirin (ECOTRIN) 81 MG EC tablet Take 81 mg by mouth nightly.     azelastine (ASTELIN) 137 mcg (0.1 %) nasal spray instill 1 spray (137 mcg total) by Nasal route 2 (two) times daily.    b complex vitamins tablet Take 1 tablet by mouth once daily.    calcium carbonate-vitamin D3 500 mg(1,250mg) -125 unit per tablet Take 1 tablet by mouth nightly.     cetirizine (ZYRTEC) 10 MG tablet Take 1 tablet (10 mg total) by mouth daily as needed for Allergies. (Patient not taking: Reported on 7/7/2022)    diclofenac sodium (VOLTAREN) 1 % Gel Apply 2 g topically once daily.    donepeziL 23 mg Tab Take 1 tablet (23 mg total) by mouth once daily.    ergocalciferol, vitamin D2, (VITAMIN D ORAL) Take 400 Units by mouth nightly.     EScitalopram oxalate (LEXAPRO) 5 MG Tab take 1 tablet by mouth every evening at bedtime    ferrous sulfate (FEOSOL) 325 mg (65 mg iron) Tab tablet Take 1 tablet (325 mg total) by mouth every Mon, Wed, Fri. With vitamin C    gabapentin (NEURONTIN) 600 MG tablet Take 1 tablet (600 mg total) by mouth 3 (three) times daily.    mometasone 0.1% (ELOCON) 0.1 % cream apply to affected area topically daily    omeprazole (PRILOSEC) 40 MG capsule Take 1 capsule (40 mg total) by mouth every morning.    pravastatin (PRAVACHOL) 10 MG tablet  Take 1 tablet (10 mg total) by mouth every evening.     No current facility-administered medications for this visit.       Review of Systems:  GENERAL: No fever, chills, fatigability or weight loss.  CARDIOVASCULAR: No chest pain. No palpitations.  RESPIRATORY: No SOB, no wheezing.  BREAST: Denies pain. No lumps. No discharge.  VULVAR: No pain, no lesions and no itching.  VAGINAL: No relaxation, no itching, no discharge, no abnormal bleeding and no lesions.  ABDOMEN: No abdominal pain. Denies nausea. Denies vomiting. No diarrhea. No constipation  URINARY: No incontinence, no nocturia, no frequency and no dysuria.  NEUROLOGICAL: No headaches. No vision changes.       OBJECTIVE:     Vitals:    08/15/22 1020   BP: 122/64       Patient verbally consents to pelvic and breast exams.  Physical Exam:  Gen: NAD, well developed, well-nourished  HEENT: Normocephalic, atraumatic  Eyes: EOM nl, conjuntivae normal  Neck: ROM normal, no thyromegaly  Respiratory: Effort normal   Abd: soft, nontender, no masses palpated  Breast: Normal bilaterally, no masses, lesions or tenderness. No nipple discharge on expression, no lymphadenopathy bilaterally.  SSE:  Vulva: no lesions or rashes  Cervix: absent  BME:   Cervix: absent  Adnexa: nl bilaterally, no masses or fullness palpated  Uterus: absent  Musculoskeletal: normal ROM  Neuro: alert, AAOx3  Skin: warm and dry  Psych: mood/affect nl, behavior normal, judgement normal, thought content normal        ASSESSMENT:       ICD-10-CM ICD-9-CM    1. Encounter for routine gynecologic examination in Medicare patient  Z01.419 V72.31           Plan:      Soila was seen today for well woman.    Diagnoses and all orders for this visit:    Encounter for routine gynecologic examination in Medicare patient        No orders of the defined types were placed in this encounter.      Follow up in one year for annual, or prn.    Julie R Jeansonne

## 2022-08-17 VITALS — WEIGHT: 153 LBS | BODY MASS INDEX: 24.59 KG/M2 | OXYGEN SATURATION: 96 % | RESPIRATION RATE: 18 BRPM | HEIGHT: 66 IN

## 2022-08-18 ENCOUNTER — TELEPHONE (OUTPATIENT)
Dept: INTERNAL MEDICINE | Facility: CLINIC | Age: 72
End: 2022-08-18
Payer: MEDICARE

## 2022-08-18 NOTE — PATIENT INSTRUCTIONS
Instructions:  - [facility] to schedule home follow-up visit with patient in 4-6 weeks or as needed.  - Continue all medications, treatments and therapies as ordered.   - Follow all instructions, recommendations as discussed.  - Maintain Safety Precautions at all times.  - Attend all medical appointments as scheduled.  - For worsening symptoms: call Primary Care Physician or Nurse Practitioner.  - For emergencies, call 911 or immediately report to the nearest emergency room.  - Limit Risks of environmental exposure to coronavirus/COVID-19 as discussed including: social distancing, hand hygiene, and limiting departures from the home for necessities only.

## 2022-08-19 ENCOUNTER — OFFICE VISIT (OUTPATIENT)
Dept: INTERNAL MEDICINE | Facility: CLINIC | Age: 72
End: 2022-08-19
Payer: MEDICARE

## 2022-08-19 ENCOUNTER — LAB VISIT (OUTPATIENT)
Dept: LAB | Facility: OTHER | Age: 72
End: 2022-08-19
Attending: STUDENT IN AN ORGANIZED HEALTH CARE EDUCATION/TRAINING PROGRAM
Payer: MEDICARE

## 2022-08-19 ENCOUNTER — TELEPHONE (OUTPATIENT)
Dept: OBSTETRICS AND GYNECOLOGY | Facility: CLINIC | Age: 72
End: 2022-08-19
Payer: MEDICARE

## 2022-08-19 VITALS
HEIGHT: 66 IN | DIASTOLIC BLOOD PRESSURE: 74 MMHG | BODY MASS INDEX: 24.31 KG/M2 | OXYGEN SATURATION: 99 % | HEART RATE: 65 BPM | WEIGHT: 151.25 LBS | SYSTOLIC BLOOD PRESSURE: 122 MMHG

## 2022-08-19 DIAGNOSIS — I10 ESSENTIAL HYPERTENSION: Primary | ICD-10-CM

## 2022-08-19 DIAGNOSIS — Z13.220 ENCOUNTER FOR LIPID SCREENING FOR CARDIOVASCULAR DISEASE: ICD-10-CM

## 2022-08-19 DIAGNOSIS — F41.9 ANXIETY: ICD-10-CM

## 2022-08-19 DIAGNOSIS — K21.9 GASTROESOPHAGEAL REFLUX DISEASE WITHOUT ESOPHAGITIS: ICD-10-CM

## 2022-08-19 DIAGNOSIS — L98.9 SKIN LESION: ICD-10-CM

## 2022-08-19 DIAGNOSIS — Z91.89 AT RISK FOR BLEEDING: ICD-10-CM

## 2022-08-19 DIAGNOSIS — G30.1 LATE ONSET ALZHEIMER'S DEMENTIA WITHOUT BEHAVIORAL DISTURBANCE: ICD-10-CM

## 2022-08-19 DIAGNOSIS — Z13.6 ENCOUNTER FOR LIPID SCREENING FOR CARDIOVASCULAR DISEASE: ICD-10-CM

## 2022-08-19 DIAGNOSIS — F33.1 MAJOR DEPRESSIVE DISORDER, RECURRENT EPISODE, MODERATE: ICD-10-CM

## 2022-08-19 DIAGNOSIS — F02.80 LATE ONSET ALZHEIMER'S DEMENTIA WITHOUT BEHAVIORAL DISTURBANCE: ICD-10-CM

## 2022-08-19 DIAGNOSIS — I70.0 AORTIC ATHEROSCLEROSIS: ICD-10-CM

## 2022-08-19 DIAGNOSIS — I10 ESSENTIAL HYPERTENSION: ICD-10-CM

## 2022-08-19 LAB
ALBUMIN SERPL BCP-MCNC: 3.6 G/DL (ref 3.5–5.2)
ALP SERPL-CCNC: 74 U/L (ref 55–135)
ALT SERPL W/O P-5'-P-CCNC: 11 U/L (ref 10–44)
ANION GAP SERPL CALC-SCNC: 9 MMOL/L (ref 8–16)
AST SERPL-CCNC: 18 U/L (ref 10–40)
BASOPHILS # BLD AUTO: 0.02 K/UL (ref 0–0.2)
BASOPHILS NFR BLD: 0.4 % (ref 0–1.9)
BILIRUB SERPL-MCNC: 0.4 MG/DL (ref 0.1–1)
BUN SERPL-MCNC: 9 MG/DL (ref 8–23)
CALCIUM SERPL-MCNC: 9.4 MG/DL (ref 8.7–10.5)
CHLORIDE SERPL-SCNC: 109 MMOL/L (ref 95–110)
CHOLEST SERPL-MCNC: 183 MG/DL (ref 120–199)
CHOLEST/HDLC SERPL: 2.7 {RATIO} (ref 2–5)
CO2 SERPL-SCNC: 26 MMOL/L (ref 23–29)
CREAT SERPL-MCNC: 0.9 MG/DL (ref 0.5–1.4)
DIFFERENTIAL METHOD: ABNORMAL
EOSINOPHIL # BLD AUTO: 0.1 K/UL (ref 0–0.5)
EOSINOPHIL NFR BLD: 2.3 % (ref 0–8)
ERYTHROCYTE [DISTWIDTH] IN BLOOD BY AUTOMATED COUNT: 13.8 % (ref 11.5–14.5)
EST. GFR  (NO RACE VARIABLE): >60 ML/MIN/1.73 M^2
GLUCOSE SERPL-MCNC: 81 MG/DL (ref 70–110)
HCT VFR BLD AUTO: 40.1 % (ref 37–48.5)
HDLC SERPL-MCNC: 69 MG/DL (ref 40–75)
HDLC SERPL: 37.7 % (ref 20–50)
HGB BLD-MCNC: 12.8 G/DL (ref 12–16)
IMM GRANULOCYTES # BLD AUTO: 0.01 K/UL (ref 0–0.04)
IMM GRANULOCYTES NFR BLD AUTO: 0.2 % (ref 0–0.5)
LDLC SERPL CALC-MCNC: 101.6 MG/DL (ref 63–159)
LYMPHOCYTES # BLD AUTO: 1.4 K/UL (ref 1–4.8)
LYMPHOCYTES NFR BLD: 28.9 % (ref 18–48)
MCH RBC QN AUTO: 27.6 PG (ref 27–31)
MCHC RBC AUTO-ENTMCNC: 31.9 G/DL (ref 32–36)
MCV RBC AUTO: 87 FL (ref 82–98)
MONOCYTES # BLD AUTO: 0.4 K/UL (ref 0.3–1)
MONOCYTES NFR BLD: 7.7 % (ref 4–15)
NEUTROPHILS # BLD AUTO: 2.9 K/UL (ref 1.8–7.7)
NEUTROPHILS NFR BLD: 60.5 % (ref 38–73)
NONHDLC SERPL-MCNC: 114 MG/DL
NRBC BLD-RTO: 0 /100 WBC
PLATELET # BLD AUTO: 219 K/UL (ref 150–450)
PMV BLD AUTO: 10.2 FL (ref 9.2–12.9)
POTASSIUM SERPL-SCNC: 3.5 MMOL/L (ref 3.5–5.1)
PROT SERPL-MCNC: 7.2 G/DL (ref 6–8.4)
RBC # BLD AUTO: 4.63 M/UL (ref 4–5.4)
SODIUM SERPL-SCNC: 144 MMOL/L (ref 136–145)
TRIGL SERPL-MCNC: 62 MG/DL (ref 30–150)
WBC # BLD AUTO: 4.81 K/UL (ref 3.9–12.7)

## 2022-08-19 PROCEDURE — 99999 PR PBB SHADOW E&M-EST. PATIENT-LVL IV: CPT | Mod: PBBFAC,,, | Performed by: STUDENT IN AN ORGANIZED HEALTH CARE EDUCATION/TRAINING PROGRAM

## 2022-08-19 PROCEDURE — 3078F PR MOST RECENT DIASTOLIC BLOOD PRESSURE < 80 MM HG: ICD-10-PCS | Mod: CPTII,S$GLB,, | Performed by: STUDENT IN AN ORGANIZED HEALTH CARE EDUCATION/TRAINING PROGRAM

## 2022-08-19 PROCEDURE — 1101F PR PT FALLS ASSESS DOC 0-1 FALLS W/OUT INJ PAST YR: ICD-10-PCS | Mod: CPTII,S$GLB,, | Performed by: STUDENT IN AN ORGANIZED HEALTH CARE EDUCATION/TRAINING PROGRAM

## 2022-08-19 PROCEDURE — 3074F SYST BP LT 130 MM HG: CPT | Mod: CPTII,S$GLB,, | Performed by: STUDENT IN AN ORGANIZED HEALTH CARE EDUCATION/TRAINING PROGRAM

## 2022-08-19 PROCEDURE — 3078F DIAST BP <80 MM HG: CPT | Mod: CPTII,S$GLB,, | Performed by: STUDENT IN AN ORGANIZED HEALTH CARE EDUCATION/TRAINING PROGRAM

## 2022-08-19 PROCEDURE — 3074F PR MOST RECENT SYSTOLIC BLOOD PRESSURE < 130 MM HG: ICD-10-PCS | Mod: CPTII,S$GLB,, | Performed by: STUDENT IN AN ORGANIZED HEALTH CARE EDUCATION/TRAINING PROGRAM

## 2022-08-19 PROCEDURE — 99215 OFFICE O/P EST HI 40 MIN: CPT | Mod: S$GLB,,, | Performed by: STUDENT IN AN ORGANIZED HEALTH CARE EDUCATION/TRAINING PROGRAM

## 2022-08-19 PROCEDURE — 1126F PR PAIN SEVERITY QUANTIFIED, NO PAIN PRESENT: ICD-10-PCS | Mod: CPTII,S$GLB,, | Performed by: STUDENT IN AN ORGANIZED HEALTH CARE EDUCATION/TRAINING PROGRAM

## 2022-08-19 PROCEDURE — 99215 PR OFFICE/OUTPT VISIT, EST, LEVL V, 40-54 MIN: ICD-10-PCS | Mod: S$GLB,,, | Performed by: STUDENT IN AN ORGANIZED HEALTH CARE EDUCATION/TRAINING PROGRAM

## 2022-08-19 PROCEDURE — 4010F ACE/ARB THERAPY RXD/TAKEN: CPT | Mod: CPTII,S$GLB,, | Performed by: STUDENT IN AN ORGANIZED HEALTH CARE EDUCATION/TRAINING PROGRAM

## 2022-08-19 PROCEDURE — 1126F AMNT PAIN NOTED NONE PRSNT: CPT | Mod: CPTII,S$GLB,, | Performed by: STUDENT IN AN ORGANIZED HEALTH CARE EDUCATION/TRAINING PROGRAM

## 2022-08-19 PROCEDURE — 80053 COMPREHEN METABOLIC PANEL: CPT | Performed by: STUDENT IN AN ORGANIZED HEALTH CARE EDUCATION/TRAINING PROGRAM

## 2022-08-19 PROCEDURE — 3008F PR BODY MASS INDEX (BMI) DOCUMENTED: ICD-10-PCS | Mod: CPTII,S$GLB,, | Performed by: STUDENT IN AN ORGANIZED HEALTH CARE EDUCATION/TRAINING PROGRAM

## 2022-08-19 PROCEDURE — 4010F PR ACE/ARB THEARPY RXD/TAKEN: ICD-10-PCS | Mod: CPTII,S$GLB,, | Performed by: STUDENT IN AN ORGANIZED HEALTH CARE EDUCATION/TRAINING PROGRAM

## 2022-08-19 PROCEDURE — 1159F MED LIST DOCD IN RCRD: CPT | Mod: CPTII,S$GLB,, | Performed by: STUDENT IN AN ORGANIZED HEALTH CARE EDUCATION/TRAINING PROGRAM

## 2022-08-19 PROCEDURE — 3288F PR FALLS RISK ASSESSMENT DOCUMENTED: ICD-10-PCS | Mod: CPTII,S$GLB,, | Performed by: STUDENT IN AN ORGANIZED HEALTH CARE EDUCATION/TRAINING PROGRAM

## 2022-08-19 PROCEDURE — 1101F PT FALLS ASSESS-DOCD LE1/YR: CPT | Mod: CPTII,S$GLB,, | Performed by: STUDENT IN AN ORGANIZED HEALTH CARE EDUCATION/TRAINING PROGRAM

## 2022-08-19 PROCEDURE — 1159F PR MEDICATION LIST DOCUMENTED IN MEDICAL RECORD: ICD-10-PCS | Mod: CPTII,S$GLB,, | Performed by: STUDENT IN AN ORGANIZED HEALTH CARE EDUCATION/TRAINING PROGRAM

## 2022-08-19 PROCEDURE — 99999 PR PBB SHADOW E&M-EST. PATIENT-LVL IV: ICD-10-PCS | Mod: PBBFAC,,, | Performed by: STUDENT IN AN ORGANIZED HEALTH CARE EDUCATION/TRAINING PROGRAM

## 2022-08-19 PROCEDURE — 85025 COMPLETE CBC W/AUTO DIFF WBC: CPT | Performed by: STUDENT IN AN ORGANIZED HEALTH CARE EDUCATION/TRAINING PROGRAM

## 2022-08-19 PROCEDURE — 3008F BODY MASS INDEX DOCD: CPT | Mod: CPTII,S$GLB,, | Performed by: STUDENT IN AN ORGANIZED HEALTH CARE EDUCATION/TRAINING PROGRAM

## 2022-08-19 PROCEDURE — 36415 COLL VENOUS BLD VENIPUNCTURE: CPT | Performed by: STUDENT IN AN ORGANIZED HEALTH CARE EDUCATION/TRAINING PROGRAM

## 2022-08-19 PROCEDURE — 3288F FALL RISK ASSESSMENT DOCD: CPT | Mod: CPTII,S$GLB,, | Performed by: STUDENT IN AN ORGANIZED HEALTH CARE EDUCATION/TRAINING PROGRAM

## 2022-08-19 PROCEDURE — 80061 LIPID PANEL: CPT | Performed by: STUDENT IN AN ORGANIZED HEALTH CARE EDUCATION/TRAINING PROGRAM

## 2022-08-19 RX ORDER — OMEPRAZOLE 40 MG/1
40 CAPSULE, DELAYED RELEASE ORAL EVERY MORNING
Qty: 90 CAPSULE | Refills: 1 | Status: SHIPPED | OUTPATIENT
Start: 2022-08-19 | End: 2022-11-21

## 2022-08-19 NOTE — PROGRESS NOTES
[facility]    Subjective:       Patient ID: Soila Chávez is a 72 y.o. female.    Chief Complaint: Hypertension (F/u)  f/u    History was obtained from the patient and supplemented through chart review.  This patient is known to me.   She was a pt of Dr. Parson prior    HPI:    Patient is a 72 y.o. female who presents for HTN, late onset Alzheimer's dementia    Missed appointment yesterday though she was in the building.  Daughter thinks she went to the wrong floor.  Pt had stated she was in waiting room but may have not checked in.  Pt's other daughter is changing jobs and will be able to assist with appointments.  Both daughters aware she needs to have more support with logistics.  They are working to accomodate.    Chronic benzo use/anxiety/dementia  Late onset Alzheimer's   Aricept 23 mg/day   Also taking lexapro 5, helped  xanax 0.5 daily and nightly PRN Rx by neurology  Referred to psychiatry, pt did not pursue  Following with neurology    Having care at home visit regularly      Normocytic Anemia  Thrombocytopenia- resolved  Likely 2/2 blood loss, GI bleed 10/2021  Had been high NSAID use  Recheck, normalized in May  stable    HTN  exforge 5-320 daily, stable  No CP/HA/SOB/Vision changes    Vulvar bump  On exam, more excoriation  Refer back to gyn for monitoring  Conservative treatment recommended for now to allow for healing    Left flank pain with stretching, turning  Seems msk  Intermittent   Gabapentin helps at night    Aortic atherosclerosis  Pravastatin 10     Elevated ASCVD Risk score  On pravastatin to reduce risk    Thyroid nodule  Repeat US annually for 5 years (dec 2021 initially)    GERD  Improved with omeprazole  Reviewed colonoscopy, EGD report (hx of diverticulitis) 10/29/2021    Myelopathy  Cervical  Not problematic currently    Wt Readings from Last 15 Encounters:   08/19/22 68.6 kg (151 lb 3.8 oz)   08/15/22 69.4 kg (153 lb)   08/09/22 69.4 kg (153 lb)   06/14/22 73.5 kg  (162 lb)   05/23/22 72.6 kg (160 lb)   05/17/22 72.6 kg (160 lb 0.9 oz)   04/21/22 70.3 kg (154 lb 15.7 oz)   04/19/22 70.8 kg (156 lb 1.4 oz)   04/05/22 70.3 kg (155 lb)   03/23/22 70.3 kg (155 lb)   02/22/22 70.6 kg (155 lb 10.3 oz)   02/17/22 72.1 kg (158 lb 15.2 oz)   02/01/22 71.7 kg (158 lb)   01/20/22 71.9 kg (158 lb 6.4 oz)   01/10/22 72.7 kg (160 lb 4.4 oz)       Medical History  Past Medical History:   Diagnosis Date    Allergy     Anxiety     Cataract     Colon polyps 2010    Hearing loss     Hypertension     Joint pain     Mental disorder        Review of Systems   Constitutional: Negative for fever.   HENT: Negative for trouble swallowing.    Respiratory: Negative for shortness of breath.    Cardiovascular: Negative for chest pain.   Gastrointestinal: Negative for blood in stool, constipation, diarrhea, nausea and vomiting.   Genitourinary: Negative for dysuria.   Musculoskeletal: Negative for gait problem.   Skin:        Dry skin bilat scalp   Neurological: Negative for dizziness, seizures and headaches.        Memory difficulties   Psychiatric/Behavioral: Negative for dysphoric mood and hallucinations. The patient is nervous/anxious (generally, not specifically).          Surgical hx, family hx, social hx    Have been reviewed      Current Outpatient Medications:     ALPRAZolam (XANAX) 0.5 MG tablet, take One tablet by mouth every night at bedtime and one tablet every day as needed, Disp: 45 tablet, Rfl: 5    amlodipine-valsartan (EXFORGE) 5-320 mg per tablet, Take 1 tablet by mouth once daily., Disp: 90 tablet, Rfl: 1    aspirin (ECOTRIN) 81 MG EC tablet, Take 81 mg by mouth nightly. , Disp: , Rfl:     azelastine (ASTELIN) 137 mcg (0.1 %) nasal spray, instill 1 spray (137 mcg total) by Nasal route 2 (two) times daily., Disp: 30 mL, Rfl: 3    b complex vitamins tablet, Take 1 tablet by mouth once daily., Disp: , Rfl:     calcium carbonate-vitamin D3 500 mg(1,250mg) -125 unit per tablet,  "Take 1 tablet by mouth nightly. , Disp: , Rfl:     diclofenac sodium (VOLTAREN) 1 % Gel, Apply 2 g topically once daily., Disp: 50 g, Rfl: 2    donepeziL 23 mg Tab, Take 1 tablet (23 mg total) by mouth once daily., Disp: 90 tablet, Rfl: 1    ergocalciferol, vitamin D2, (VITAMIN D ORAL), Take 400 Units by mouth nightly. , Disp: , Rfl:     EScitalopram oxalate (LEXAPRO) 5 MG Tab, take 1 tablet by mouth every evening at bedtime, Disp: 90 tablet, Rfl: 1    gabapentin (NEURONTIN) 600 MG tablet, Take 1 tablet (600 mg total) by mouth 3 (three) times daily., Disp: 90 tablet, Rfl: 1    mometasone 0.1% (ELOCON) 0.1 % cream, apply to affected area topically daily, Disp: 45 g, Rfl: 3    pravastatin (PRAVACHOL) 10 MG tablet, Take 1 tablet (10 mg total) by mouth every evening., Disp: 90 tablet, Rfl: 3    acetaminophen (TYLENOL) 650 MG TbSR, Take 1 tablet (650 mg total) by mouth every 8 (eight) hours as needed (pain). (Patient not taking: No sig reported), Disp: , Rfl: 0    cetirizine (ZYRTEC) 10 MG tablet, Take 1 tablet (10 mg total) by mouth daily as needed for Allergies. (Patient not taking: No sig reported), Disp: 90 tablet, Rfl: 3    omeprazole (PRILOSEC) 40 MG capsule, Take 1 capsule (40 mg total) by mouth every morning., Disp: 90 capsule, Rfl: 1    Objective:        Body mass index is 24.41 kg/m².  Vitals:    08/19/22 0804   BP: 122/74   Pulse: 65   SpO2: 99%   Weight: 68.6 kg (151 lb 3.8 oz)   Height: 5' 6" (1.676 m)   PainSc: 0-No pain     Physical Exam  Vitals and nursing note reviewed.   Constitutional:       General: She is not in acute distress.     Appearance: Normal appearance. She is not ill-appearing.   HENT:      Head: Normocephalic and atraumatic.      Nose:      Comments: Wearing a mask  Eyes:      General: No scleral icterus.  Cardiovascular:      Rate and Rhythm: Normal rate and regular rhythm.      Heart sounds: Murmur heard.   Pulmonary:      Effort: Pulmonary effort is normal.   Abdominal:      " General: There is no distension.   Genitourinary:     Comments: Right solitary posterior labial with excoriation, no swelling, no drainage, no erythema. No vesicle.   Musculoskeletal:         General: No deformity.      Cervical back: Normal range of motion.      Right lower leg: No edema.      Left lower leg: No edema.   Skin:     General: Skin is warm and dry.   Neurological:      Mental Status: She is alert.   Psychiatric:         Behavior: Behavior normal.                       Lab Results   Component Value Date    WBC 4.81 08/19/2022    HGB 12.8 08/19/2022    HCT 40.1 08/19/2022     08/19/2022    CHOL 183 08/19/2022    TRIG 62 08/19/2022    HDL 69 08/19/2022    ALT 11 08/19/2022    AST 18 08/19/2022     08/19/2022    K 3.5 08/19/2022     08/19/2022    CREATININE 0.9 08/19/2022    BUN 9 08/19/2022    CO2 26 08/19/2022    TSH 1.395 11/04/2021    HGBA1C 5.2 11/04/2021       The 10-year ASCVD risk score ([place] ANDREINA Jr., et al., 2013) is: 11.1%    Values used to calculate the score:      Age: 72 years      Sex: Female      Is Non- : Yes      Diabetic: No      Tobacco smoker: No      Systolic Blood Pressure: 122 mmHg      Is BP treated: Yes      HDL Cholesterol: 69 mg/dL      Total Cholesterol: 183 mg/dL    On pravastatin    (Imaging have been independently reviewed)      Assessment:         1. Essential hypertension    2. Skin lesion    3. Encounter for lipid screening for cardiovascular disease    4. At risk for bleeding    5. Gastroesophageal reflux disease without esophagitis    6. Late onset Alzheimer's dementia without behavioral disturbance    7. Anxiety    8. Major depressive disorder, recurrent episode, moderate    9. Aortic atherosclerosis          Plan:     Soila was seen today for hypertension.    Diagnoses and all orders for this visit:    Essential hypertension  -     COMPREHENSIVE METABOLIC PANEL; Future    Skin lesion  -     Ambulatory referral/consult to  Dermatology; Future    Encounter for lipid screening for cardiovascular disease  -     Lipid Panel; Future    At risk for bleeding  -     CBC Auto Differential; Future    Gastroesophageal reflux disease without esophagitis  -     omeprazole (PRILOSEC) 40 MG capsule; Take 1 capsule (40 mg total) by mouth every morning.    Late onset Alzheimer's dementia without behavioral disturbance    Anxiety    Major depressive disorder, recurrent episode, moderate    Aortic atherosclerosis        Health Maintenance  - Lipids: normal 11/4/2021  - A1C: 5.2 11/4/2021  - Colon Ca Screen: 3/2020, repeat due 2025  - Immunizations: [facility]    Women's health  - Pap: s/p hyst, seen by gyn. Doing well  - Mammo: 2/17/21 Birads 1 ; repeat ordered  - Dexa: 8/25/20 NBMD     Follow up in about 3 months (around 11/19/2022).      Or sooner prn    Notes were provided for patient to give to daughter for updated on care    40 min were used in chart review, evaluation and counseling of patient re: memory, medical concerns, GYN PE, coordinatino of care, documentation and review of results on same day of service     Had daughter on the phone during visit    All medications were reviewed including potential side effects and risks/benefits.  Pt was counseled to call back if anything worsens or if questions arise.    Parminder Junior MD  Family Medicine  [facility]  [facility] [address] [address]  Phone [phone]  Fax [phone]

## 2022-08-19 NOTE — TELEPHONE ENCOUNTER
Spoke with pt in regards to scheduling follow up appt    ----- Message from Kasey Marion MA sent at 8/19/2022 11:44 AM CDT -----  Patient seen Dr. Junior and she wants her to be seen by you within one month. Dr. Junior said patient has excoriation (cut) that should be followed-up to ensure it heals appropriately please.  No hx of trauma. Can someone please reach out to the patient to schedule.

## 2022-08-23 ENCOUNTER — OFFICE VISIT (OUTPATIENT)
Dept: OTOLARYNGOLOGY | Facility: CLINIC | Age: 72
End: 2022-08-23
Payer: MEDICARE

## 2022-08-23 VITALS
HEART RATE: 50 BPM | DIASTOLIC BLOOD PRESSURE: 69 MMHG | WEIGHT: 146.19 LBS | SYSTOLIC BLOOD PRESSURE: 108 MMHG | BODY MASS INDEX: 23.59 KG/M2

## 2022-08-23 DIAGNOSIS — H93.8X3 SENSATION OF FULLNESS IN BOTH EARS: Primary | ICD-10-CM

## 2022-08-23 PROCEDURE — 69210 REMOVE IMPACTED EAR WAX UNI: CPT | Mod: S$GLB,,, | Performed by: OTOLARYNGOLOGY

## 2022-08-23 PROCEDURE — 99213 PR OFFICE/OUTPT VISIT, EST, LEVL III, 20-29 MIN: ICD-10-PCS | Mod: 25,S$GLB,, | Performed by: OTOLARYNGOLOGY

## 2022-08-23 PROCEDURE — 4010F ACE/ARB THERAPY RXD/TAKEN: CPT | Mod: CPTII,S$GLB,, | Performed by: OTOLARYNGOLOGY

## 2022-08-23 PROCEDURE — 1159F PR MEDICATION LIST DOCUMENTED IN MEDICAL RECORD: ICD-10-PCS | Mod: CPTII,S$GLB,, | Performed by: OTOLARYNGOLOGY

## 2022-08-23 PROCEDURE — 1101F PR PT FALLS ASSESS DOC 0-1 FALLS W/OUT INJ PAST YR: ICD-10-PCS | Mod: CPTII,S$GLB,, | Performed by: OTOLARYNGOLOGY

## 2022-08-23 PROCEDURE — 1126F PR PAIN SEVERITY QUANTIFIED, NO PAIN PRESENT: ICD-10-PCS | Mod: CPTII,S$GLB,, | Performed by: OTOLARYNGOLOGY

## 2022-08-23 PROCEDURE — 4010F PR ACE/ARB THEARPY RXD/TAKEN: ICD-10-PCS | Mod: CPTII,S$GLB,, | Performed by: OTOLARYNGOLOGY

## 2022-08-23 PROCEDURE — 3078F PR MOST RECENT DIASTOLIC BLOOD PRESSURE < 80 MM HG: ICD-10-PCS | Mod: CPTII,S$GLB,, | Performed by: OTOLARYNGOLOGY

## 2022-08-23 PROCEDURE — 1126F AMNT PAIN NOTED NONE PRSNT: CPT | Mod: CPTII,S$GLB,, | Performed by: OTOLARYNGOLOGY

## 2022-08-23 PROCEDURE — 1159F MED LIST DOCD IN RCRD: CPT | Mod: CPTII,S$GLB,, | Performed by: OTOLARYNGOLOGY

## 2022-08-23 PROCEDURE — 69210 PR REMOVAL IMPACTED CERUMEN REQUIRING INSTRUMENTATION, UNILATERAL: ICD-10-PCS | Mod: S$GLB,,, | Performed by: OTOLARYNGOLOGY

## 2022-08-23 PROCEDURE — 99999 PR PBB SHADOW E&M-EST. PATIENT-LVL IV: CPT | Mod: PBBFAC,,, | Performed by: OTOLARYNGOLOGY

## 2022-08-23 PROCEDURE — 3074F SYST BP LT 130 MM HG: CPT | Mod: CPTII,S$GLB,, | Performed by: OTOLARYNGOLOGY

## 2022-08-23 PROCEDURE — 3078F DIAST BP <80 MM HG: CPT | Mod: CPTII,S$GLB,, | Performed by: OTOLARYNGOLOGY

## 2022-08-23 PROCEDURE — 3074F PR MOST RECENT SYSTOLIC BLOOD PRESSURE < 130 MM HG: ICD-10-PCS | Mod: CPTII,S$GLB,, | Performed by: OTOLARYNGOLOGY

## 2022-08-23 PROCEDURE — 99999 PR PBB SHADOW E&M-EST. PATIENT-LVL IV: ICD-10-PCS | Mod: PBBFAC,,, | Performed by: OTOLARYNGOLOGY

## 2022-08-23 PROCEDURE — 99213 OFFICE O/P EST LOW 20 MIN: CPT | Mod: 25,S$GLB,, | Performed by: OTOLARYNGOLOGY

## 2022-08-23 PROCEDURE — 1101F PT FALLS ASSESS-DOCD LE1/YR: CPT | Mod: CPTII,S$GLB,, | Performed by: OTOLARYNGOLOGY

## 2022-08-23 PROCEDURE — 3288F PR FALLS RISK ASSESSMENT DOCUMENTED: ICD-10-PCS | Mod: CPTII,S$GLB,, | Performed by: OTOLARYNGOLOGY

## 2022-08-23 PROCEDURE — 3288F FALL RISK ASSESSMENT DOCD: CPT | Mod: CPTII,S$GLB,, | Performed by: OTOLARYNGOLOGY

## 2022-08-23 PROCEDURE — 3008F BODY MASS INDEX DOCD: CPT | Mod: CPTII,S$GLB,, | Performed by: OTOLARYNGOLOGY

## 2022-08-23 PROCEDURE — 3008F PR BODY MASS INDEX (BMI) DOCUMENTED: ICD-10-PCS | Mod: CPTII,S$GLB,, | Performed by: OTOLARYNGOLOGY

## 2022-08-23 NOTE — PROGRESS NOTES
Subjective:       Patient ID: Soila Chávez is a 72 y.o. female.    Chief Complaint: Ear Fullness    Ear Fullness   There is pain in both ears. This is a recurrent problem. The current episode started in the past 7 days (Last seen for similar complaint 7/7/2022.). The problem has been waxing and waning. There has been no fever. The patient is experiencing no pain. Associated symptoms include hearing loss. Pertinent negatives include no coughing, diarrhea, ear discharge, headaches, neck pain, rash, rhinorrhea or vomiting. She has tried nothing for the symptoms. Her past medical history is significant for hearing loss.     Review of Systems   Constitutional: Negative for activity change, appetite change and fever.   HENT: Positive for hearing loss. Negative for congestion, ear discharge, ear pain, nosebleeds, postnasal drip, rhinorrhea and sneezing.    Eyes: Negative for redness and visual disturbance.   Respiratory: Negative for apnea, cough, shortness of breath and wheezing.    Cardiovascular: Negative for chest pain and palpitations.   Gastrointestinal: Negative for diarrhea and vomiting.   Genitourinary: Negative for difficulty urinating and frequency.   Musculoskeletal: Negative for arthralgias, back pain, gait problem and neck pain.   Skin: Negative for color change and rash.   Neurological: Negative for dizziness, speech difficulty, weakness and headaches.   Hematological: Negative for adenopathy. Does not bruise/bleed easily.   Psychiatric/Behavioral: Negative for agitation and behavioral problems.       Objective:        Constitutional:   Vital signs are normal. She appears well-developed and well-nourished. She is active. Normal speech.      Head:  Normocephalic and atraumatic. Salivary glands normal.  Facial strength is normal.      Nose:  Nose normal including turbinates, nasal mucosa, sinuses and nasal septum.     Mouth/Throat  Oropharynx clear and moist without lesions or asymmetry and normal uvula  midline.     Neck:  Neck normal without thyromegaly masses, asymmetry, normal tracheal structure, crepitus, and tenderness, thyroid normal, trachea normal, phonation normal and full range of motion with neck supple.     Psychiatric:   She has a normal mood and affect. Her speech is normal and behavior is normal.     Neurological:   She has neurological normal, alert and oriented.     Skin:   No abrasions, lacerations, lesions, or rashes.         PROCEDURE NOTE:  Ceruminosis is noted in both EACs.  Wax was removed by manual debridement and suctioning utilizing the assistance of binocular microscopy revealing EACs and TMs WNL. The patient tolerated this procedure without difficulty. The subjective decrease noted in hearing pre-cleaning was resolved post-cleaning.       Assessment:       1. Sensation of fullness in both ears        Plan:       1. Hearing conservation strongly recommended.  2. Trial of amplification bilaterally also recommended (patient not interested).  3. Re-check of hearing in 18-24 months or sooner if subjective change noted.  4. F/U with PCP as per schedule.

## 2022-09-13 ENCOUNTER — OFFICE VISIT (OUTPATIENT)
Dept: DERMATOLOGY | Facility: CLINIC | Age: 72
End: 2022-09-13
Payer: MEDICARE

## 2022-09-13 DIAGNOSIS — L91.8 SKIN TAG: ICD-10-CM

## 2022-09-13 PROCEDURE — 1159F PR MEDICATION LIST DOCUMENTED IN MEDICAL RECORD: ICD-10-PCS | Mod: CPTII,S$GLB,, | Performed by: DERMATOLOGY

## 2022-09-13 PROCEDURE — 1126F AMNT PAIN NOTED NONE PRSNT: CPT | Mod: CPTII,S$GLB,, | Performed by: DERMATOLOGY

## 2022-09-13 PROCEDURE — 99212 PR OFFICE/OUTPT VISIT, EST, LEVL II, 10-19 MIN: ICD-10-PCS | Mod: S$GLB,,, | Performed by: DERMATOLOGY

## 2022-09-13 PROCEDURE — 1160F PR REVIEW ALL MEDS BY PRESCRIBER/CLIN PHARMACIST DOCUMENTED: ICD-10-PCS | Mod: CPTII,S$GLB,, | Performed by: DERMATOLOGY

## 2022-09-13 PROCEDURE — 1101F PR PT FALLS ASSESS DOC 0-1 FALLS W/OUT INJ PAST YR: ICD-10-PCS | Mod: CPTII,S$GLB,, | Performed by: DERMATOLOGY

## 2022-09-13 PROCEDURE — 1160F RVW MEDS BY RX/DR IN RCRD: CPT | Mod: CPTII,S$GLB,, | Performed by: DERMATOLOGY

## 2022-09-13 PROCEDURE — 1101F PT FALLS ASSESS-DOCD LE1/YR: CPT | Mod: CPTII,S$GLB,, | Performed by: DERMATOLOGY

## 2022-09-13 PROCEDURE — 3288F FALL RISK ASSESSMENT DOCD: CPT | Mod: CPTII,S$GLB,, | Performed by: DERMATOLOGY

## 2022-09-13 PROCEDURE — 4010F ACE/ARB THERAPY RXD/TAKEN: CPT | Mod: CPTII,S$GLB,, | Performed by: DERMATOLOGY

## 2022-09-13 PROCEDURE — 1159F MED LIST DOCD IN RCRD: CPT | Mod: CPTII,S$GLB,, | Performed by: DERMATOLOGY

## 2022-09-13 PROCEDURE — 1126F PR PAIN SEVERITY QUANTIFIED, NO PAIN PRESENT: ICD-10-PCS | Mod: CPTII,S$GLB,, | Performed by: DERMATOLOGY

## 2022-09-13 PROCEDURE — 99212 OFFICE O/P EST SF 10 MIN: CPT | Mod: S$GLB,,, | Performed by: DERMATOLOGY

## 2022-09-13 PROCEDURE — 4010F PR ACE/ARB THEARPY RXD/TAKEN: ICD-10-PCS | Mod: CPTII,S$GLB,, | Performed by: DERMATOLOGY

## 2022-09-13 PROCEDURE — 3288F PR FALLS RISK ASSESSMENT DOCUMENTED: ICD-10-PCS | Mod: CPTII,S$GLB,, | Performed by: DERMATOLOGY

## 2022-09-13 NOTE — PROGRESS NOTES
Patient Information  Name: Soila Chávez  : 1950  MRN: [id number]     Referring Physician:  Vernon   Primary Care Physician:  Parminder Junior MD   Date of Visit: 2022      Subjective:     History of Present lllness:    Soila Chávez is a 72 y.o. female who presents with a chief complaint of lesion.    Location: left buttock  Duration: 1 year  Signs/Symptoms: denies pain, bleeding, or itching; she can feel it but it doesn't bother her  Relieving factors/Prior treatments: none    Clinical documentation obtained by nursing staff reviewed.    Review of Systems    Objective:   Physical Exam   Constitutional: She appears well-developed and well-nourished. No distress.   Neurological: She is alert and oriented to person, place, and time. She is not disoriented.   Psychiatric: She has a normal mood and affect.   Skin:   Areas Examined (abnormalities noted in diagram):   Genitals / Buttocks / Groin Inspection Performed          Diagram Legend     Erythematous scaling macule/papule c/w actinic keratosis       Vascular papule c/w angioma      Pigmented verrucoid papule/plaque c/w seborrheic keratosis      Yellow umbilicated papule c/w sebaceous hyperplasia      Irregularly shaped tan macule c/w lentigo     1-2 mm smooth white papules consistent with Milia      Movable subcutaneous cyst with punctum c/w epidermal inclusion cyst      Subcutaneous movable cyst c/w pilar cyst      Firm pink to brown papule c/w dermatofibroma      Pedunculated fleshy papule(s) c/w skin tag(s)      Evenly pigmented macule c/w junctional nevus     Mildly variegated pigmented, slightly irregular-bordered macule c/w mildly atypical nevus      Flesh colored to evenly pigmented papule c/w intradermal nevus       Pink pearly papule/plaque c/w basal cell carcinoma      Erythematous hyperkeratotic cursted plaque c/w SCC      Surgical scar with no sign of skin cancer recurrence      Open and closed comedones      Inflammatory papules  and pustules      Verrucoid papule consistent consistent with wart     Erythematous eczematous patches and plaques     Dystrophic onycholytic nail with subungual debris c/w onychomycosis     Umbilicated papule    Erythematous-base heme-crusted tan verrucoid plaque consistent with inflamed seborrheic keratosis     Erythematous Silvery Scaling Plaque c/w Psoriasis     See annotation    No images are attached to the encounter or orders placed in the encounter.      [] Data reviewed  [] Prior external notes reviewed  [] Independent review of test  [] Management discussed with another provider  [] Independent historian    Assessment / Plan:        Skin tag  Benign-appearing skin tag on exam today. Reassurance was given to the patient. No treatment is necessary.  Counseled patient to return to clinic for biopsy/removal if it becomes symptomatic (bleeding, itching, pain, etc).      Tabby Jj MD, FAAD  [facility]

## 2022-10-02 DIAGNOSIS — S80.10XA CONTUSION OF LOWER LEG, UNSPECIFIED LATERALITY, INITIAL ENCOUNTER: ICD-10-CM

## 2022-10-02 DIAGNOSIS — M25.512 LEFT SHOULDER PAIN, UNSPECIFIED CHRONICITY: ICD-10-CM

## 2022-10-03 RX ORDER — GABAPENTIN 600 MG/1
600 TABLET ORAL 3 TIMES DAILY
Qty: 90 TABLET | Refills: 1 | Status: SHIPPED | OUTPATIENT
Start: 2022-10-03 | End: 2023-02-20

## 2022-10-03 RX ORDER — ESCITALOPRAM OXALATE 5 MG/1
TABLET ORAL
Qty: 90 TABLET | Refills: 1 | Status: SHIPPED | OUTPATIENT
Start: 2022-10-03 | End: 2022-10-25 | Stop reason: SDUPTHER

## 2022-10-03 RX ORDER — DONEPEZIL HYDROCHLORIDE 23 MG/1
23 TABLET, FILM COATED ORAL DAILY
Qty: 90 TABLET | Refills: 1 | Status: SHIPPED | OUTPATIENT
Start: 2022-10-03 | End: 2022-10-20 | Stop reason: DRUGHIGH

## 2022-10-04 ENCOUNTER — OFFICE VISIT (OUTPATIENT)
Dept: OTOLARYNGOLOGY | Facility: CLINIC | Age: 72
End: 2022-10-04
Payer: MEDICARE

## 2022-10-04 VITALS
WEIGHT: 156.5 LBS | HEART RATE: 53 BPM | BODY MASS INDEX: 25.26 KG/M2 | DIASTOLIC BLOOD PRESSURE: 68 MMHG | SYSTOLIC BLOOD PRESSURE: 115 MMHG

## 2022-10-04 DIAGNOSIS — H93.8X3 SENSATION OF FULLNESS IN BOTH EARS: Primary | ICD-10-CM

## 2022-10-04 PROCEDURE — 3074F SYST BP LT 130 MM HG: CPT | Mod: CPTII,S$GLB,, | Performed by: OTOLARYNGOLOGY

## 2022-10-04 PROCEDURE — 99999 PR PBB SHADOW E&M-EST. PATIENT-LVL III: CPT | Mod: PBBFAC,,, | Performed by: OTOLARYNGOLOGY

## 2022-10-04 PROCEDURE — 1101F PT FALLS ASSESS-DOCD LE1/YR: CPT | Mod: CPTII,S$GLB,, | Performed by: OTOLARYNGOLOGY

## 2022-10-04 PROCEDURE — 1159F MED LIST DOCD IN RCRD: CPT | Mod: CPTII,S$GLB,, | Performed by: OTOLARYNGOLOGY

## 2022-10-04 PROCEDURE — 99999 PR PBB SHADOW E&M-EST. PATIENT-LVL III: ICD-10-PCS | Mod: PBBFAC,,, | Performed by: OTOLARYNGOLOGY

## 2022-10-04 PROCEDURE — 1159F PR MEDICATION LIST DOCUMENTED IN MEDICAL RECORD: ICD-10-PCS | Mod: CPTII,S$GLB,, | Performed by: OTOLARYNGOLOGY

## 2022-10-04 PROCEDURE — 3008F PR BODY MASS INDEX (BMI) DOCUMENTED: ICD-10-PCS | Mod: CPTII,S$GLB,, | Performed by: OTOLARYNGOLOGY

## 2022-10-04 PROCEDURE — 1101F PR PT FALLS ASSESS DOC 0-1 FALLS W/OUT INJ PAST YR: ICD-10-PCS | Mod: CPTII,S$GLB,, | Performed by: OTOLARYNGOLOGY

## 2022-10-04 PROCEDURE — 69210 REMOVE IMPACTED EAR WAX UNI: CPT | Mod: S$GLB,,, | Performed by: OTOLARYNGOLOGY

## 2022-10-04 PROCEDURE — 99213 PR OFFICE/OUTPT VISIT, EST, LEVL III, 20-29 MIN: ICD-10-PCS | Mod: 25,S$GLB,, | Performed by: OTOLARYNGOLOGY

## 2022-10-04 PROCEDURE — 1126F AMNT PAIN NOTED NONE PRSNT: CPT | Mod: CPTII,S$GLB,, | Performed by: OTOLARYNGOLOGY

## 2022-10-04 PROCEDURE — 3008F BODY MASS INDEX DOCD: CPT | Mod: CPTII,S$GLB,, | Performed by: OTOLARYNGOLOGY

## 2022-10-04 PROCEDURE — 3288F PR FALLS RISK ASSESSMENT DOCUMENTED: ICD-10-PCS | Mod: CPTII,S$GLB,, | Performed by: OTOLARYNGOLOGY

## 2022-10-04 PROCEDURE — 1126F PR PAIN SEVERITY QUANTIFIED, NO PAIN PRESENT: ICD-10-PCS | Mod: CPTII,S$GLB,, | Performed by: OTOLARYNGOLOGY

## 2022-10-04 PROCEDURE — 3074F PR MOST RECENT SYSTOLIC BLOOD PRESSURE < 130 MM HG: ICD-10-PCS | Mod: CPTII,S$GLB,, | Performed by: OTOLARYNGOLOGY

## 2022-10-04 PROCEDURE — 69210 PR REMOVAL IMPACTED CERUMEN REQUIRING INSTRUMENTATION, UNILATERAL: ICD-10-PCS | Mod: S$GLB,,, | Performed by: OTOLARYNGOLOGY

## 2022-10-04 PROCEDURE — 3078F PR MOST RECENT DIASTOLIC BLOOD PRESSURE < 80 MM HG: ICD-10-PCS | Mod: CPTII,S$GLB,, | Performed by: OTOLARYNGOLOGY

## 2022-10-04 PROCEDURE — 4010F PR ACE/ARB THEARPY RXD/TAKEN: ICD-10-PCS | Mod: CPTII,S$GLB,, | Performed by: OTOLARYNGOLOGY

## 2022-10-04 PROCEDURE — 3288F FALL RISK ASSESSMENT DOCD: CPT | Mod: CPTII,S$GLB,, | Performed by: OTOLARYNGOLOGY

## 2022-10-04 PROCEDURE — 4010F ACE/ARB THERAPY RXD/TAKEN: CPT | Mod: CPTII,S$GLB,, | Performed by: OTOLARYNGOLOGY

## 2022-10-04 PROCEDURE — 3078F DIAST BP <80 MM HG: CPT | Mod: CPTII,S$GLB,, | Performed by: OTOLARYNGOLOGY

## 2022-10-04 PROCEDURE — 99213 OFFICE O/P EST LOW 20 MIN: CPT | Mod: 25,S$GLB,, | Performed by: OTOLARYNGOLOGY

## 2022-10-04 NOTE — PROGRESS NOTES
Subjective:       Patient ID: Soila Chávez is a 72 y.o. female.    Chief Complaint: Ear Fullness    Ear Fullness   There is pain in both ears. This is a recurrent problem. The current episode started in the past 7 days. The problem occurs constantly. The problem has been waxing and waning. There has been no fever. The patient is experiencing no pain. Associated symptoms include hearing loss. Pertinent negatives include no coughing, diarrhea, ear discharge, headaches, neck pain, rash, rhinorrhea or vomiting. She has tried nothing for the symptoms. Her past medical history is significant for hearing loss.   Review of Systems   Constitutional:  Negative for activity change, appetite change and fever.   HENT:  Positive for hearing loss. Negative for nasal congestion, ear discharge, ear pain, nosebleeds, postnasal drip, rhinorrhea and sneezing.    Eyes:  Negative for redness and visual disturbance.   Respiratory:  Negative for apnea, cough, shortness of breath and wheezing.    Cardiovascular:  Negative for chest pain and palpitations.   Gastrointestinal:  Negative for diarrhea and vomiting.   Genitourinary:  Negative for difficulty urinating and frequency.   Musculoskeletal:  Negative for arthralgias, back pain, gait problem and neck pain.   Integumentary:  Negative for color change and rash.   Neurological:  Negative for dizziness, speech difficulty, weakness and headaches.   Hematological:  Negative for adenopathy. Does not bruise/bleed easily.   Psychiatric/Behavioral:  Negative for agitation and behavioral problems.        Objective:      Physical Exam  Constitutional:       General: She is not in acute distress.     Appearance: She is well-developed.   HENT:      Head: Normocephalic and atraumatic. Not macrocephalic and not microcephalic. No Pedersen's sign, abrasion or contusion.      Right Ear: There is impacted cerumen.      Left Ear: There is impacted cerumen.      Nose: Nose normal.      Mouth/Throat:       Pharynx: No oropharyngeal exudate.   Eyes:      General:         Right eye: No discharge.         Left eye: No discharge.      Pupils: Pupils are equal, round, and reactive to light.   Neck:      Thyroid: No thyroid mass or thyromegaly.      Trachea: Trachea normal. No tracheal deviation.   Cardiovascular:      Rate and Rhythm: Normal rate and regular rhythm.      Heart sounds: Normal heart sounds. No murmur heard.    No friction rub. No gallop.   Pulmonary:      Effort: Pulmonary effort is normal. No respiratory distress.      Breath sounds: Normal breath sounds. No stridor. No wheezing or rales.   Abdominal:      General: Bowel sounds are normal.      Tenderness: There is no abdominal tenderness.   Genitourinary:     Vagina: Normal.   Musculoskeletal:         General: No tenderness. Normal range of motion.      Cervical back: Normal range of motion and neck supple.   Lymphadenopathy:      Head:      Right side of head: No submental, submandibular, tonsillar, preauricular, posterior auricular or occipital adenopathy.      Left side of head: No submental, submandibular, tonsillar, preauricular, posterior auricular or occipital adenopathy.      Cervical: No cervical adenopathy.   Skin:     General: Skin is dry.      Findings: No erythema or rash.   Neurological:      Mental Status: She is alert and oriented to person, place, and time.      Cranial Nerves: No cranial nerve deficit.      Coordination: Coordination normal.   Psychiatric:         Behavior: Behavior normal.           PROCEDURE NOTE:  Ceruminosis is noted in both EACs.  Wax was removed by manual debridement and suctioning utilizing the assistance of binocular microscopy revealing EACs and TMs WNL. The patient tolerated this procedure without difficulty. The subjective decrease noted in hearing pre-cleaning was resolved post-cleaning.        Assessment:       Problem List Items Addressed This Visit    None  Visit Diagnoses       Sensation of fullness in  both ears    -  Primary              Plan:       1. Hearing conservation strongly recommended.  2. Trial of amplification bilaterally also recommended (patient not interested).  3. Re-check of hearing in 18-24 months or sooner if subjective change noted.  4. F/U with PCP as per schedule.

## 2022-10-06 ENCOUNTER — PATIENT MESSAGE (OUTPATIENT)
Dept: NEUROLOGY | Facility: CLINIC | Age: 72
End: 2022-10-06
Payer: MEDICARE

## 2022-10-13 ENCOUNTER — TELEPHONE (OUTPATIENT)
Dept: NEUROLOGY | Facility: CLINIC | Age: 72
End: 2022-10-13
Payer: MEDICARE

## 2022-10-13 ENCOUNTER — TELEPHONE (OUTPATIENT)
Dept: ORTHOPEDICS | Facility: CLINIC | Age: 72
End: 2022-10-13
Payer: MEDICARE

## 2022-10-20 ENCOUNTER — OFFICE VISIT (OUTPATIENT)
Dept: NEUROLOGY | Facility: CLINIC | Age: 72
End: 2022-10-20
Payer: MEDICARE

## 2022-10-20 VITALS
DIASTOLIC BLOOD PRESSURE: 70 MMHG | HEART RATE: 56 BPM | RESPIRATION RATE: 18 BRPM | BODY MASS INDEX: 24.94 KG/M2 | HEIGHT: 66 IN | WEIGHT: 155.19 LBS | SYSTOLIC BLOOD PRESSURE: 131 MMHG

## 2022-10-20 DIAGNOSIS — F02.80 LATE ONSET ALZHEIMER'S DISEASE WITHOUT BEHAVIORAL DISTURBANCE: Primary | ICD-10-CM

## 2022-10-20 DIAGNOSIS — G30.1 LATE ONSET ALZHEIMER'S DISEASE WITHOUT BEHAVIORAL DISTURBANCE: Primary | ICD-10-CM

## 2022-10-20 PROCEDURE — 3078F DIAST BP <80 MM HG: CPT | Mod: CPTII,S$GLB,, | Performed by: PSYCHIATRY & NEUROLOGY

## 2022-10-20 PROCEDURE — 3078F PR MOST RECENT DIASTOLIC BLOOD PRESSURE < 80 MM HG: ICD-10-PCS | Mod: CPTII,S$GLB,, | Performed by: PSYCHIATRY & NEUROLOGY

## 2022-10-20 PROCEDURE — 1101F PT FALLS ASSESS-DOCD LE1/YR: CPT | Mod: CPTII,S$GLB,, | Performed by: PSYCHIATRY & NEUROLOGY

## 2022-10-20 PROCEDURE — 1159F PR MEDICATION LIST DOCUMENTED IN MEDICAL RECORD: ICD-10-PCS | Mod: CPTII,S$GLB,, | Performed by: PSYCHIATRY & NEUROLOGY

## 2022-10-20 PROCEDURE — 99215 OFFICE O/P EST HI 40 MIN: CPT | Mod: S$GLB,,, | Performed by: PSYCHIATRY & NEUROLOGY

## 2022-10-20 PROCEDURE — 3288F PR FALLS RISK ASSESSMENT DOCUMENTED: ICD-10-PCS | Mod: CPTII,S$GLB,, | Performed by: PSYCHIATRY & NEUROLOGY

## 2022-10-20 PROCEDURE — 99215 PR OFFICE/OUTPT VISIT, EST, LEVL V, 40-54 MIN: ICD-10-PCS | Mod: S$GLB,,, | Performed by: PSYCHIATRY & NEUROLOGY

## 2022-10-20 PROCEDURE — 1101F PR PT FALLS ASSESS DOC 0-1 FALLS W/OUT INJ PAST YR: ICD-10-PCS | Mod: CPTII,S$GLB,, | Performed by: PSYCHIATRY & NEUROLOGY

## 2022-10-20 PROCEDURE — 4010F PR ACE/ARB THEARPY RXD/TAKEN: ICD-10-PCS | Mod: CPTII,S$GLB,, | Performed by: PSYCHIATRY & NEUROLOGY

## 2022-10-20 PROCEDURE — 3288F FALL RISK ASSESSMENT DOCD: CPT | Mod: CPTII,S$GLB,, | Performed by: PSYCHIATRY & NEUROLOGY

## 2022-10-20 PROCEDURE — 99999 PR PBB SHADOW E&M-EST. PATIENT-LVL IV: ICD-10-PCS | Mod: PBBFAC,,, | Performed by: PSYCHIATRY & NEUROLOGY

## 2022-10-20 PROCEDURE — 1126F AMNT PAIN NOTED NONE PRSNT: CPT | Mod: CPTII,S$GLB,, | Performed by: PSYCHIATRY & NEUROLOGY

## 2022-10-20 PROCEDURE — 99999 PR PBB SHADOW E&M-EST. PATIENT-LVL IV: CPT | Mod: PBBFAC,,, | Performed by: PSYCHIATRY & NEUROLOGY

## 2022-10-20 PROCEDURE — 4010F ACE/ARB THERAPY RXD/TAKEN: CPT | Mod: CPTII,S$GLB,, | Performed by: PSYCHIATRY & NEUROLOGY

## 2022-10-20 PROCEDURE — 3075F PR MOST RECENT SYSTOLIC BLOOD PRESS GE 130-139MM HG: ICD-10-PCS | Mod: CPTII,S$GLB,, | Performed by: PSYCHIATRY & NEUROLOGY

## 2022-10-20 PROCEDURE — 1159F MED LIST DOCD IN RCRD: CPT | Mod: CPTII,S$GLB,, | Performed by: PSYCHIATRY & NEUROLOGY

## 2022-10-20 PROCEDURE — 1126F PR PAIN SEVERITY QUANTIFIED, NO PAIN PRESENT: ICD-10-PCS | Mod: CPTII,S$GLB,, | Performed by: PSYCHIATRY & NEUROLOGY

## 2022-10-20 PROCEDURE — 3075F SYST BP GE 130 - 139MM HG: CPT | Mod: CPTII,S$GLB,, | Performed by: PSYCHIATRY & NEUROLOGY

## 2022-10-20 RX ORDER — DONEPEZIL HYDROCHLORIDE 10 MG/1
10 TABLET, FILM COATED ORAL NIGHTLY
Qty: 90 TABLET | Refills: 1 | Status: SHIPPED | OUTPATIENT
Start: 2022-10-20 | End: 2023-03-15 | Stop reason: SDUPTHER

## 2022-10-20 NOTE — PROGRESS NOTES
Subjective:       Patient ID: Soila Chávez is a 72 y.o. female.    Chief Complaint: Memory Loss      No complaints. Feels well. Does her own cooking. Says reads the newspaper daily ( and able to tell me that there is an election coming up and there are voting issues)  She follows a daily routine.  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, literally up to 5 hours ( like Tuesday Morning; does not go to big stores like [employer])    Past Medical History:   Diagnosis Date    Allergy     Anxiety     Cataract     Colon polyps 2010    Hearing loss     Hypertension     Joint pain     Mental disorder       Past Surgical History:   Procedure Laterality Date    COLONOSCOPY N/A 3/5/2020    Procedure: COLONOSCOPY;  Surgeon: Nathalia Kemp MD;  [facility]  Service: Colon and Rectal;  Laterality: N/A;  requests later appt time if available - Pt open to any MD- Pt informed arrival time may be adjusted, Pt verbalized understanding- ERW2/24/20@1409    ESOPHAGOGASTRODUODENOSCOPY N/A 10/1/2020    Procedure: EGD (ESOPHAGOGASTRODUODENOSCOPY);  Surgeon: Casey Guerrero MD;  [address]  Service: Endoscopy;  Laterality: N/A;  covid test 9/28-[facility]    ESOPHAGOGASTRODUODENOSCOPY N/A 10/29/2021    Procedure: EGD (ESOPHAGOGASTRODUODENOSCOPY);  Surgeon: Billy Vegas MD;  Location: [facility];  Service: Endoscopy;  Laterality: N/A;    HYSTERECTOMY      PERIPARTUM CHITO        Current Outpatient Medications:     acetaminophen (TYLENOL) 650 MG TbSR, Take 1 tablet (650 mg total) by mouth every 8 (eight) hours as needed (pain)., Disp: , Rfl: 0    ALPRAZolam (XANAX) 0.5 MG tablet, take One tablet by mouth every night at bedtime and one tablet every day as needed, Disp: 45 tablet, Rfl: 5    amlodipine-valsartan (EXFORGE) 5-320 mg per tablet, Take 1 tablet by mouth once daily., Disp: 90 tablet, Rfl: 1    aspirin (ECOTRIN) 81 MG EC tablet, Take 81 mg by mouth nightly. , Disp: , Rfl:     azelastine  (ASTELIN) 137 mcg (0.1 %) nasal spray, instill 1 spray (137 mcg total) by Nasal route 2 (two) times daily., Disp: 30 mL, Rfl: 3    b complex vitamins tablet, Take 1 tablet by mouth once daily., Disp: , Rfl:     calcium carbonate-vitamin D3 500 mg(1,250mg) -125 unit per tablet, Take 1 tablet by mouth nightly. , Disp: , Rfl:     cetirizine (ZYRTEC) 10 MG tablet, Take 1 tablet (10 mg total) by mouth daily as needed for Allergies., Disp: 90 tablet, Rfl: 3    diclofenac sodium (VOLTAREN) 1 % Gel, Apply 2 g topically once daily., Disp: 50 g, Rfl: 2    ergocalciferol, vitamin D2, (VITAMIN D ORAL), Take 400 Units by mouth nightly. , Disp: , Rfl:     EScitalopram oxalate (LEXAPRO) 5 MG Tab, take 1 tablet by mouth every evening at bedtime, Disp: 90 tablet, Rfl: 1    gabapentin (NEURONTIN) 600 MG tablet, Take 1 tablet (600 mg total) by mouth 3 (three) times daily., Disp: 90 tablet, Rfl: 1    mometasone 0.1% (ELOCON) 0.1 % cream, apply to affected area topically daily, Disp: 45 g, Rfl: 3    omeprazole (PRILOSEC) 40 MG capsule, Take 1 capsule (40 mg total) by mouth every morning., Disp: 90 capsule, Rfl: 1    pravastatin (PRAVACHOL) 10 MG tablet, Take 1 tablet (10 mg total) by mouth every evening., Disp: 90 tablet, Rfl: 3    donepeziL (ARICEPT) 10 MG tablet, Take 1 tablet (10 mg total) by mouth every evening., Disp: 90 tablet, Rfl: 1   Review of patient's allergies indicates:   Allergen Reactions    Lotensin [benazepril] Swelling    Penicillins Rash        Review of Systems   Psychiatric/Behavioral:  Negative for agitation and sleep disturbance.          Objective:      Physical Exam  Constitutional:       Appearance: She is not ill-appearing.   Neurological:      Mental Status: She is alert. Mental status is at baseline.      Cranial Nerves: No dysarthria.      Gait: Gait normal.      Comments: Normal speech and language and memory for recent events and family life all good, in conversational speech.          Assessment:        1. Late onset Alzheimer's disease without behavioral disturbance          Plan:              MOHAMUD, pt has no complaints and is logical  and appropriate in conversational speech. Continues to live alone, handles her bills and drives. Her daughter has 2 concerns however 1) after 2pm most days memory seems worse, repeats herself often. 2) recently parked at a pediatric building at [facility] instead of the correct parking lot, then needed security to hep her get to her car, ( she went to the wrong black car, different make and model. She is also sometimes slow to switch lanes.   Plan OT driving assessments  Note family provides strong oversight,a nd recently purchased a car tracker. They also use the friend/family  on her smart phone.   Reduce aricept back to 10mg as this might be contributing to her regression  Be careful with gabapentin, which she takes PRN. Advised to use only 1/2 pill, ie 300mg and never drive after taking it.    Visit time over 40 minutes. Her oldest daughter face timed me and provided a good history            Alesha Levin MD   10/20/2022   10:45 AM

## 2022-10-23 ENCOUNTER — PATIENT MESSAGE (OUTPATIENT)
Dept: NEUROLOGY | Facility: CLINIC | Age: 72
End: 2022-10-23
Payer: MEDICARE

## 2022-10-23 DIAGNOSIS — F41.9 ANXIETY: ICD-10-CM

## 2022-10-24 RX ORDER — ALPRAZOLAM 0.5 MG/1
TABLET ORAL
Qty: 45 TABLET | Refills: 5 | OUTPATIENT
Start: 2022-10-24

## 2022-10-25 ENCOUNTER — PATIENT MESSAGE (OUTPATIENT)
Dept: NEUROLOGY | Facility: CLINIC | Age: 72
End: 2022-10-25
Payer: MEDICARE

## 2022-10-25 DIAGNOSIS — F41.9 ANXIETY: ICD-10-CM

## 2022-10-25 RX ORDER — ALPRAZOLAM 0.5 MG/1
TABLET ORAL
Qty: 45 TABLET | Refills: 5 | Status: CANCELLED | OUTPATIENT
Start: 2022-10-25

## 2022-10-25 RX ORDER — ALPRAZOLAM 0.5 MG/1
TABLET ORAL
Qty: 45 TABLET | Refills: 5 | Status: SHIPPED | OUTPATIENT
Start: 2022-10-25 | End: 2023-05-17 | Stop reason: SDUPTHER

## 2022-10-25 RX ORDER — ESCITALOPRAM OXALATE 5 MG/1
TABLET ORAL
Qty: 90 TABLET | Refills: 1 | Status: SHIPPED | OUTPATIENT
Start: 2022-10-25 | End: 2023-03-08 | Stop reason: SDUPTHER

## 2022-11-17 ENCOUNTER — PATIENT MESSAGE (OUTPATIENT)
Dept: NEUROLOGY | Facility: CLINIC | Age: 72
End: 2022-11-17
Payer: MEDICARE

## 2022-11-18 NOTE — PROGRESS NOTES
[facility]    Subjective:       Patient ID: Soila Chávez is a 72 y.o. female.    Chief Complaint: Annual Exam and Hand Pain  f/u    History was obtained from the patient and supplemented through chart review.  This patient is known to me.   She was a pt of Dr. Parson prior    Daughter is at office visit with her today    HPI:    Patient is a 72 y.o. female who presents for HTN, late onset Alzheimer's dementia    Late onset Alzheimer's Dementia  Still living alone and driving some  Daughter had to  patient from [employer] due to inability to get home  Advised extreme caution; consider stopping driving all together; messaged Dr. Levin and Dr. Levin's staff for info about OT driving assessment in her note  Advised daughter to limit driving (as family is already considering); evidently car tracker has been purchased  Pt understandably concerned about loss of independence    Chronic benzo use/anxiety/dementia  Late onset Alzheimer's   Aricept 10 mg/day   Also taking lexapro 5 (marked as not taking, but taking)  xanax 0.5 daily and nightly PRN Rx by neurology  Referred to psychiatry, pt declines  Following with neurology regularly    Having care at home visit regularly      Normocytic Anemia; Thrombocytopenia- resolved, will recheck with next blood draw  Likely 2/2 blood loss, GI bleed 10/2021  Had been high NSAID use  Recheck, normalized in May  stable    HTN  exforge 5-320 daily, stable; No CP/HA/SOB/Vision changes    Left flank pain with stretching, turning  Seems msk to pt, daughter and mysefl  Intermittent, thinks she is sleeping oddly, position affects; asvised topical  Gabapentin helps at night some; advised caution with 600 mg dose prescribed by other provider    Aortic atherosclerosis  Pravastatin 10     Elevated ASCVD Risk score  On pravastatin to reduce risk    Thyroid nodule  Repeat US annually for 5 years (dec 2021 initially)  Repeat ordered, will  schedule    GERD  Improved with omeprazole  Reviewed colonoscopy, EGD report (hx of diverticulitis) 10/29/2021  stable    Myelopathy  Cervical  Not problematic currently    Leg pain  Gabapentin 600 prescribed 2021, refill sent in by other provider  Has been advised to take 1/2 pill PRN, and not driving after taking due to somnolence  AE discussed    Wt Readings from Last 15 Encounters:   11/21/22 71.3 kg (157 lb 3 oz)   10/20/22 70.4 kg (155 lb 3.3 oz)   10/04/22 71 kg (156 lb 8.4 oz)   08/23/22 66.3 kg (146 lb 2.6 oz)   08/19/22 68.6 kg (151 lb 3.8 oz)   08/15/22 69.4 kg (153 lb)   08/09/22 69.4 kg (153 lb)   06/14/22 73.5 kg (162 lb)   05/23/22 72.6 kg (160 lb)   05/17/22 72.6 kg (160 lb 0.9 oz)   04/21/22 70.3 kg (154 lb 15.7 oz)   04/19/22 70.8 kg (156 lb 1.4 oz)   04/05/22 70.3 kg (155 lb)   03/23/22 70.3 kg (155 lb)   02/22/22 70.6 kg (155 lb 10.3 oz)       Medical History  Past Medical History:   Diagnosis Date    Allergy     Anxiety     Cataract     Colon polyps 2010    Hearing loss     Hypertension     Joint pain     Mental disorder        Review of Systems   Constitutional:  Negative for fever.   HENT:  Negative for trouble swallowing.    Respiratory:  Negative for shortness of breath.    Cardiovascular:  Negative for chest pain.   Gastrointestinal:  Negative for blood in stool, constipation, diarrhea, nausea and vomiting.   Genitourinary:  Negative for dysuria.   Musculoskeletal:  Negative for gait problem.   Skin:         Dry skin bilat scalp   Neurological:  Negative for dizziness, seizures and headaches.        Memory difficulties   Psychiatric/Behavioral:  Negative for dysphoric mood and hallucinations. The patient is nervous/anxious (generally, not specifically).        Surgical hx, family hx, social hx    Have been reviewed      Current Outpatient Medications:     ALPRAZolam (XANAX) 0.5 MG tablet, Take one tablet by mouth every night at bedtime and one tablet every day as needed, Disp: 45 tablet,  Rfl: 5    amlodipine-valsartan (EXFORGE) 5-320 mg per tablet, Take 1 tablet by mouth once daily., Disp: 90 tablet, Rfl: 1    aspirin (ECOTRIN) 81 MG EC tablet, Take 81 mg by mouth nightly. , Disp: , Rfl:     b complex vitamins tablet, Take 1 tablet by mouth once daily., Disp: , Rfl:     calcium carbonate-vitamin D3 500 mg(1,250mg) -125 unit per tablet, Take 1 tablet by mouth nightly. , Disp: , Rfl:     ergocalciferol, vitamin D2, (VITAMIN D ORAL), Take 400 Units by mouth nightly. , Disp: , Rfl:     gabapentin (NEURONTIN) 600 MG tablet, Take 1 tablet (600 mg total) by mouth 3 (three) times daily., Disp: 90 tablet, Rfl: 1    acetaminophen (TYLENOL) 650 MG TbSR, Take 1 tablet (650 mg total) by mouth every 8 (eight) hours as needed (pain). (Patient not taking: Reported on 11/21/2022), Disp: , Rfl: 0    azelastine (ASTELIN) 137 mcg (0.1 %) nasal spray, instill 1 spray (137 mcg total) by Nasal route 2 (two) times daily. (Patient not taking: Reported on 11/21/2022), Disp: 30 mL, Rfl: 3    cetirizine (ZYRTEC) 10 MG tablet, Take 1 tablet (10 mg total) by mouth daily as needed for Allergies. (Patient not taking: Reported on 11/21/2022), Disp: 90 tablet, Rfl: 3    diclofenac sodium (VOLTAREN) 1 % Gel, Apply 2 g topically once daily., Disp: 50 g, Rfl: 2    donepeziL (ARICEPT) 10 MG tablet, Take 1 tablet (10 mg total) by mouth every evening. (Patient not taking: Reported on 11/21/2022), Disp: 90 tablet, Rfl: 1    EScitalopram oxalate (LEXAPRO) 5 MG Tab, take 1 tablet by mouth every evening at bedtime (Patient not taking: Reported on 11/21/2022), Disp: 90 tablet, Rfl: 1    mometasone 0.1% (ELOCON) 0.1 % cream, apply to affected area topically daily (Patient not taking: Reported on 11/21/2022), Disp: 45 g, Rfl: 3    pravastatin (PRAVACHOL) 10 MG tablet, Take 1 tablet (10 mg total) by mouth every evening. (Patient not taking: Reported on 11/21/2022), Disp: 90 tablet, Rfl: 3    Objective:        Body mass index is 25.37  "kg/m².  Vitals:    11/21/22 1012   BP: 122/68   Pulse: (!) 57   SpO2: 99%   Weight: 71.3 kg (157 lb 3 oz)   Height: 5' 6" (1.676 m)   PainSc: 0-No pain       Physical Exam  Vitals and nursing note reviewed.   Constitutional:       General: She is not in acute distress.     Appearance: Normal appearance. She is not ill-appearing.   HENT:      Head: Normocephalic and atraumatic.   Eyes:      General: No scleral icterus.  Cardiovascular:      Rate and Rhythm: Normal rate and regular rhythm.      Heart sounds: Normal heart sounds.   Pulmonary:      Effort: Pulmonary effort is normal.   Musculoskeletal:         General: No deformity.      Cervical back: Normal range of motion.   Skin:     General: Skin is warm and dry.   Neurological:      Mental Status: She is alert and oriented to person, place, and time.   Psychiatric:         Behavior: Behavior normal.           Lab Results   Component Value Date    WBC 4.81 08/19/2022    HGB 12.8 08/19/2022    HCT 40.1 08/19/2022     08/19/2022    CHOL 183 08/19/2022    TRIG 62 08/19/2022    HDL 69 08/19/2022    ALT 11 08/19/2022    AST 18 08/19/2022     08/19/2022    K 3.5 08/19/2022     08/19/2022    CREATININE 0.9 08/19/2022    BUN 9 08/19/2022    CO2 26 08/19/2022    TSH 1.395 11/04/2021    HGBA1C 5.2 11/04/2021       The 10-year ASCVD risk score (Marie DK, et al., 2019) is: 11.1%    Values used to calculate the score:      Age: 72 years      Sex: Female      Is Non- : Yes      Diabetic: No      Tobacco smoker: No      Systolic Blood Pressure: 122 mmHg      Is BP treated: Yes      HDL Cholesterol: 69 mg/dL      Total Cholesterol: 183 mg/dL    On pravastatin    (Imaging have been independently reviewed)      Assessment:         1. Essential hypertension    2. Late onset Alzheimer's dementia without behavioral disturbance    3. Dementia without behavioral disturbance    4. Chronic midline low back pain with sciatica, sciatica laterality " unspecified    5. Thyroid nodule    6. At risk for bleeding    7. Vitamin D insufficiency    8. Major depressive disorder, recurrent episode, moderate    9. Aortic atherosclerosis    10. Gastroesophageal reflux disease without esophagitis            Plan:     Soila was seen today for annual exam and hand pain.    Diagnoses and all orders for this visit:    Essential hypertension  -     COMPREHENSIVE METABOLIC PANEL; Future    Late onset Alzheimer's dementia without behavioral disturbance  -     CBC Auto Differential; Future    Dementia without behavioral disturbance  -     Vitamin D; Future    Chronic midline low back pain with sciatica, sciatica laterality unspecified  -     diclofenac sodium (VOLTAREN) 1 % Gel; Apply 2 g topically once daily.    Thyroid nodule  -     TSH; Future  -     US Soft Tissue Head Neck Thyroid; Future    At risk for bleeding  -     CBC Auto Differential; Future    Vitamin D insufficiency  -     Vitamin D; Future    Major depressive disorder, recurrent episode, moderate    Aortic atherosclerosis    Gastroesophageal reflux disease without esophagitis        Health Maintenance  - Lipids: normal 11/4/2021  - A1C: 5.2 11/4/2021  - Colon Ca Screen: 3/2020, repeat due 2025  - Immunizations: [facility]    Women's health  - Pap: s/p hyst, seen by gyn. Doing well  - Mammo: 2/17/21 Birads 1 ; repeat ordered  - Dexa: 8/25/20 NBMD     Follow up in about 3 months (around 2/21/2023).      Or sooner prn    45 min were used in chart review, evaluation and counseling of patient re: memory, driving, heart rate, memory, safety, medications, documentation and review of results on same day of service     All medications were reviewed including potential side effects and risks/benefits.  Pt was counseled to call back if anything worsens or if questions arise.    Parminder Junior MD  Family Medicine  [facility]  [address]  [address]  [place] [address]  Phone [phone]  Fax  [phone]

## 2022-11-21 ENCOUNTER — OFFICE VISIT (OUTPATIENT)
Dept: INTERNAL MEDICINE | Facility: CLINIC | Age: 72
End: 2022-11-21
Payer: MEDICARE

## 2022-11-21 VITALS
SYSTOLIC BLOOD PRESSURE: 122 MMHG | OXYGEN SATURATION: 99 % | HEIGHT: 66 IN | WEIGHT: 157.19 LBS | DIASTOLIC BLOOD PRESSURE: 68 MMHG | BODY MASS INDEX: 25.26 KG/M2 | HEART RATE: 57 BPM

## 2022-11-21 DIAGNOSIS — M54.40 CHRONIC MIDLINE LOW BACK PAIN WITH SCIATICA, SCIATICA LATERALITY UNSPECIFIED: ICD-10-CM

## 2022-11-21 DIAGNOSIS — E04.1 THYROID NODULE: Chronic | ICD-10-CM

## 2022-11-21 DIAGNOSIS — I70.0 AORTIC ATHEROSCLEROSIS: ICD-10-CM

## 2022-11-21 DIAGNOSIS — G89.29 CHRONIC MIDLINE LOW BACK PAIN WITH SCIATICA, SCIATICA LATERALITY UNSPECIFIED: ICD-10-CM

## 2022-11-21 DIAGNOSIS — E55.9 VITAMIN D INSUFFICIENCY: ICD-10-CM

## 2022-11-21 DIAGNOSIS — K21.9 GASTROESOPHAGEAL REFLUX DISEASE WITHOUT ESOPHAGITIS: ICD-10-CM

## 2022-11-21 DIAGNOSIS — Z91.89 AT RISK FOR BLEEDING: ICD-10-CM

## 2022-11-21 DIAGNOSIS — F02.80 LATE ONSET ALZHEIMER'S DEMENTIA WITHOUT BEHAVIORAL DISTURBANCE: ICD-10-CM

## 2022-11-21 DIAGNOSIS — I10 ESSENTIAL HYPERTENSION: Primary | ICD-10-CM

## 2022-11-21 DIAGNOSIS — G30.1 LATE ONSET ALZHEIMER'S DEMENTIA WITHOUT BEHAVIORAL DISTURBANCE: ICD-10-CM

## 2022-11-21 DIAGNOSIS — F03.90 DEMENTIA WITHOUT BEHAVIORAL DISTURBANCE: ICD-10-CM

## 2022-11-21 DIAGNOSIS — F33.1 MAJOR DEPRESSIVE DISORDER, RECURRENT EPISODE, MODERATE: ICD-10-CM

## 2022-11-21 PROCEDURE — 3008F PR BODY MASS INDEX (BMI) DOCUMENTED: ICD-10-PCS | Mod: CPTII,S$GLB,, | Performed by: STUDENT IN AN ORGANIZED HEALTH CARE EDUCATION/TRAINING PROGRAM

## 2022-11-21 PROCEDURE — 3288F PR FALLS RISK ASSESSMENT DOCUMENTED: ICD-10-PCS | Mod: CPTII,S$GLB,, | Performed by: STUDENT IN AN ORGANIZED HEALTH CARE EDUCATION/TRAINING PROGRAM

## 2022-11-21 PROCEDURE — 3008F BODY MASS INDEX DOCD: CPT | Mod: CPTII,S$GLB,, | Performed by: STUDENT IN AN ORGANIZED HEALTH CARE EDUCATION/TRAINING PROGRAM

## 2022-11-21 PROCEDURE — 1159F MED LIST DOCD IN RCRD: CPT | Mod: CPTII,S$GLB,, | Performed by: STUDENT IN AN ORGANIZED HEALTH CARE EDUCATION/TRAINING PROGRAM

## 2022-11-21 PROCEDURE — 4010F ACE/ARB THERAPY RXD/TAKEN: CPT | Mod: CPTII,S$GLB,, | Performed by: STUDENT IN AN ORGANIZED HEALTH CARE EDUCATION/TRAINING PROGRAM

## 2022-11-21 PROCEDURE — 99215 PR OFFICE/OUTPT VISIT, EST, LEVL V, 40-54 MIN: ICD-10-PCS | Mod: S$GLB,,, | Performed by: STUDENT IN AN ORGANIZED HEALTH CARE EDUCATION/TRAINING PROGRAM

## 2022-11-21 PROCEDURE — 1126F PR PAIN SEVERITY QUANTIFIED, NO PAIN PRESENT: ICD-10-PCS | Mod: CPTII,S$GLB,, | Performed by: STUDENT IN AN ORGANIZED HEALTH CARE EDUCATION/TRAINING PROGRAM

## 2022-11-21 PROCEDURE — 3074F PR MOST RECENT SYSTOLIC BLOOD PRESSURE < 130 MM HG: ICD-10-PCS | Mod: CPTII,S$GLB,, | Performed by: STUDENT IN AN ORGANIZED HEALTH CARE EDUCATION/TRAINING PROGRAM

## 2022-11-21 PROCEDURE — 3074F SYST BP LT 130 MM HG: CPT | Mod: CPTII,S$GLB,, | Performed by: STUDENT IN AN ORGANIZED HEALTH CARE EDUCATION/TRAINING PROGRAM

## 2022-11-21 PROCEDURE — 99999 PR PBB SHADOW E&M-EST. PATIENT-LVL IV: CPT | Mod: PBBFAC,,, | Performed by: STUDENT IN AN ORGANIZED HEALTH CARE EDUCATION/TRAINING PROGRAM

## 2022-11-21 PROCEDURE — 1101F PT FALLS ASSESS-DOCD LE1/YR: CPT | Mod: CPTII,S$GLB,, | Performed by: STUDENT IN AN ORGANIZED HEALTH CARE EDUCATION/TRAINING PROGRAM

## 2022-11-21 PROCEDURE — 1126F AMNT PAIN NOTED NONE PRSNT: CPT | Mod: CPTII,S$GLB,, | Performed by: STUDENT IN AN ORGANIZED HEALTH CARE EDUCATION/TRAINING PROGRAM

## 2022-11-21 PROCEDURE — 3288F FALL RISK ASSESSMENT DOCD: CPT | Mod: CPTII,S$GLB,, | Performed by: STUDENT IN AN ORGANIZED HEALTH CARE EDUCATION/TRAINING PROGRAM

## 2022-11-21 PROCEDURE — 99999 PR PBB SHADOW E&M-EST. PATIENT-LVL IV: ICD-10-PCS | Mod: PBBFAC,,, | Performed by: STUDENT IN AN ORGANIZED HEALTH CARE EDUCATION/TRAINING PROGRAM

## 2022-11-21 PROCEDURE — 3078F DIAST BP <80 MM HG: CPT | Mod: CPTII,S$GLB,, | Performed by: STUDENT IN AN ORGANIZED HEALTH CARE EDUCATION/TRAINING PROGRAM

## 2022-11-21 PROCEDURE — 1159F PR MEDICATION LIST DOCUMENTED IN MEDICAL RECORD: ICD-10-PCS | Mod: CPTII,S$GLB,, | Performed by: STUDENT IN AN ORGANIZED HEALTH CARE EDUCATION/TRAINING PROGRAM

## 2022-11-21 PROCEDURE — 99215 OFFICE O/P EST HI 40 MIN: CPT | Mod: S$GLB,,, | Performed by: STUDENT IN AN ORGANIZED HEALTH CARE EDUCATION/TRAINING PROGRAM

## 2022-11-21 PROCEDURE — 1101F PR PT FALLS ASSESS DOC 0-1 FALLS W/OUT INJ PAST YR: ICD-10-PCS | Mod: CPTII,S$GLB,, | Performed by: STUDENT IN AN ORGANIZED HEALTH CARE EDUCATION/TRAINING PROGRAM

## 2022-11-21 PROCEDURE — 4010F PR ACE/ARB THEARPY RXD/TAKEN: ICD-10-PCS | Mod: CPTII,S$GLB,, | Performed by: STUDENT IN AN ORGANIZED HEALTH CARE EDUCATION/TRAINING PROGRAM

## 2022-11-21 PROCEDURE — 3078F PR MOST RECENT DIASTOLIC BLOOD PRESSURE < 80 MM HG: ICD-10-PCS | Mod: CPTII,S$GLB,, | Performed by: STUDENT IN AN ORGANIZED HEALTH CARE EDUCATION/TRAINING PROGRAM

## 2022-11-21 RX ORDER — DICLOFENAC SODIUM 10 MG/G
2 GEL TOPICAL DAILY
Qty: 50 G | Refills: 2 | Status: SHIPPED | OUTPATIENT
Start: 2022-11-21 | End: 2023-02-27 | Stop reason: SDUPTHER

## 2022-11-21 NOTE — Clinical Note
Morning, I saw pt today and we discussed driving.  I see in your note, there were plans for OT driving assessment.  How does the family go about making this happen?  I think I know about jennifer having this program?  Thanks, Parminder Junior

## 2022-12-20 DIAGNOSIS — K21.9 GASTROESOPHAGEAL REFLUX DISEASE WITHOUT ESOPHAGITIS: ICD-10-CM

## 2022-12-20 RX ORDER — OMEPRAZOLE 40 MG/1
40 CAPSULE, DELAYED RELEASE ORAL EVERY MORNING
Qty: 90 CAPSULE | Refills: 1 | Status: SHIPPED | OUTPATIENT
Start: 2022-12-20 | End: 2023-03-08 | Stop reason: SDUPTHER

## 2022-12-20 NOTE — TELEPHONE ENCOUNTER
No new care gaps identified.  [facility] Embedded Care Gaps. Reference number: [id number]. 12/20/2022   10:04:11 AM CST

## 2023-01-16 DIAGNOSIS — I10 ESSENTIAL HYPERTENSION: ICD-10-CM

## 2023-01-16 RX ORDER — AMLODIPINE AND VALSARTAN 5; 320 MG/1; MG/1
1 TABLET ORAL DAILY
Qty: 90 TABLET | Refills: 2 | Status: SHIPPED | OUTPATIENT
Start: 2023-01-16 | End: 2023-02-23

## 2023-01-16 NOTE — TELEPHONE ENCOUNTER
No new care gaps identified.  [facility] Embedded Care Gaps. Reference number: [id number]. 1/16/2023   1:53:40 AM CST

## 2023-01-17 NOTE — TELEPHONE ENCOUNTER
Refill Decision Note   Soila Saira  is requesting a refill authorization.  Brief Assessment and Rationale for Refill:  Approve     Medication Therapy Plan:       Medication Reconciliation Completed: No   Comments:     No Care Gaps recommended.     Note composed:11:58 PM 01/16/2023

## 2023-01-24 ENCOUNTER — OFFICE VISIT (OUTPATIENT)
Dept: OTOLARYNGOLOGY | Facility: CLINIC | Age: 73
End: 2023-01-24
Payer: MEDICARE

## 2023-01-24 VITALS — WEIGHT: 151 LBS | BODY MASS INDEX: 24.37 KG/M2

## 2023-01-24 DIAGNOSIS — H93.8X3 SENSATION OF FULLNESS IN BOTH EARS: Primary | ICD-10-CM

## 2023-01-24 PROCEDURE — 3288F PR FALLS RISK ASSESSMENT DOCUMENTED: ICD-10-PCS | Mod: CPTII,S$GLB,, | Performed by: OTOLARYNGOLOGY

## 2023-01-24 PROCEDURE — 69210 REMOVE IMPACTED EAR WAX UNI: CPT | Mod: S$GLB,,, | Performed by: OTOLARYNGOLOGY

## 2023-01-24 PROCEDURE — 3008F BODY MASS INDEX DOCD: CPT | Mod: CPTII,S$GLB,, | Performed by: OTOLARYNGOLOGY

## 2023-01-24 PROCEDURE — 99213 OFFICE O/P EST LOW 20 MIN: CPT | Mod: 25,S$GLB,, | Performed by: OTOLARYNGOLOGY

## 2023-01-24 PROCEDURE — 1160F PR REVIEW ALL MEDS BY PRESCRIBER/CLIN PHARMACIST DOCUMENTED: ICD-10-PCS | Mod: CPTII,S$GLB,, | Performed by: OTOLARYNGOLOGY

## 2023-01-24 PROCEDURE — 3288F FALL RISK ASSESSMENT DOCD: CPT | Mod: CPTII,S$GLB,, | Performed by: OTOLARYNGOLOGY

## 2023-01-24 PROCEDURE — 1160F RVW MEDS BY RX/DR IN RCRD: CPT | Mod: CPTII,S$GLB,, | Performed by: OTOLARYNGOLOGY

## 2023-01-24 PROCEDURE — 4010F PR ACE/ARB THEARPY RXD/TAKEN: ICD-10-PCS | Mod: CPTII,S$GLB,, | Performed by: OTOLARYNGOLOGY

## 2023-01-24 PROCEDURE — 1159F PR MEDICATION LIST DOCUMENTED IN MEDICAL RECORD: ICD-10-PCS | Mod: CPTII,S$GLB,, | Performed by: OTOLARYNGOLOGY

## 2023-01-24 PROCEDURE — 99213 PR OFFICE/OUTPT VISIT, EST, LEVL III, 20-29 MIN: ICD-10-PCS | Mod: 25,S$GLB,, | Performed by: OTOLARYNGOLOGY

## 2023-01-24 PROCEDURE — 1101F PT FALLS ASSESS-DOCD LE1/YR: CPT | Mod: CPTII,S$GLB,, | Performed by: OTOLARYNGOLOGY

## 2023-01-24 PROCEDURE — 3008F PR BODY MASS INDEX (BMI) DOCUMENTED: ICD-10-PCS | Mod: CPTII,S$GLB,, | Performed by: OTOLARYNGOLOGY

## 2023-01-24 PROCEDURE — 1159F MED LIST DOCD IN RCRD: CPT | Mod: CPTII,S$GLB,, | Performed by: OTOLARYNGOLOGY

## 2023-01-24 PROCEDURE — 69210 PR REMOVAL IMPACTED CERUMEN REQUIRING INSTRUMENTATION, UNILATERAL: ICD-10-PCS | Mod: S$GLB,,, | Performed by: OTOLARYNGOLOGY

## 2023-01-24 PROCEDURE — 4010F ACE/ARB THERAPY RXD/TAKEN: CPT | Mod: CPTII,S$GLB,, | Performed by: OTOLARYNGOLOGY

## 2023-01-24 PROCEDURE — 99999 PR PBB SHADOW E&M-EST. PATIENT-LVL III: ICD-10-PCS | Mod: PBBFAC,,, | Performed by: OTOLARYNGOLOGY

## 2023-01-24 PROCEDURE — 1101F PR PT FALLS ASSESS DOC 0-1 FALLS W/OUT INJ PAST YR: ICD-10-PCS | Mod: CPTII,S$GLB,, | Performed by: OTOLARYNGOLOGY

## 2023-01-24 PROCEDURE — 99999 PR PBB SHADOW E&M-EST. PATIENT-LVL III: CPT | Mod: PBBFAC,,, | Performed by: OTOLARYNGOLOGY

## 2023-01-24 NOTE — PROGRESS NOTES
Subjective:       Patient ID: Soila Chávez is a 72 y.o. female.    Chief Complaint: Cerumen Impaction and Ear Fullness    Ear Fullness   There is pain in both ears. This is a recurrent problem. The current episode started more than 1 month ago. The problem occurs constantly. The problem has been waxing and waning. There has been no fever. The patient is experiencing no pain. Associated symptoms include hearing loss. Pertinent negatives include no coughing, diarrhea, ear discharge, headaches, neck pain, rash, rhinorrhea or vomiting. She has tried nothing for the symptoms. Her past medical history is significant for hearing loss.   Review of Systems   Constitutional:  Negative for activity change, appetite change and fever.   HENT:  Positive for hearing loss. Negative for nasal congestion, ear discharge, ear pain, nosebleeds, postnasal drip, rhinorrhea and sneezing.    Eyes:  Negative for redness and visual disturbance.   Respiratory:  Negative for apnea, cough, shortness of breath and wheezing.    Cardiovascular:  Negative for chest pain and palpitations.   Gastrointestinal:  Negative for diarrhea and vomiting.   Genitourinary:  Negative for difficulty urinating and frequency.   Musculoskeletal:  Negative for arthralgias, back pain, gait problem and neck pain.   Integumentary:  Negative for color change and rash.   Neurological:  Negative for dizziness, speech difficulty, weakness and headaches.   Hematological:  Negative for adenopathy. Does not bruise/bleed easily.   Psychiatric/Behavioral:  Negative for agitation and behavioral problems.        Objective:      Physical Exam  Constitutional:       General: She is not in acute distress.     Appearance: She is well-developed.   HENT:      Head: Normocephalic and atraumatic. Not macrocephalic and not microcephalic. No Pedersen's sign, abrasion or contusion.      Right Ear: There is impacted cerumen.      Left Ear: There is impacted cerumen.      Nose: Nose normal.       Mouth/Throat:      Mouth: Mucous membranes are dry.      Pharynx: No oropharyngeal exudate.   Eyes:      General:         Right eye: No discharge.         Left eye: No discharge.      Extraocular Movements: Extraocular movements intact.   Neck:      Thyroid: No thyroid mass or thyromegaly.      Trachea: Trachea normal. No tracheal deviation.   Cardiovascular:      Heart sounds: No murmur heard.    No friction rub. No gallop.   Pulmonary:      Effort: No respiratory distress.      Breath sounds: No stridor. No wheezing or rales.   Abdominal:      Tenderness: There is no abdominal tenderness.   Genitourinary:     Vagina: Normal.   Musculoskeletal:         General: No tenderness. Normal range of motion.      Cervical back: Normal range of motion and neck supple.   Lymphadenopathy:      Head:      Right side of head: No submental, submandibular, tonsillar, preauricular, posterior auricular or occipital adenopathy.      Left side of head: No submental, submandibular, tonsillar, preauricular, posterior auricular or occipital adenopathy.      Cervical: No cervical adenopathy.   Skin:     General: Skin is warm and dry.      Findings: No erythema or rash.   Neurological:      Mental Status: She is alert and oriented to person, place, and time.      Cranial Nerves: No cranial nerve deficit.      Coordination: Coordination normal.   Psychiatric:         Mood and Affect: Mood normal.         Behavior: Behavior normal.       PROCEDURE NOTE:  Ceruminosis is noted in both EACs.  Wax was removed by manual debridement and suctioning utilizing the assistance of binocular microscopy revealing EACs and TMs WNL. The patient tolerated this procedure without difficulty. The subjective decrease noted in hearing pre-cleaning was resolved post-cleaning.       Assessment:       Problem List Items Addressed This Visit    None  Visit Diagnoses       Sensation of fullness in both ears    -  Primary              Plan:       1. Hearing  conservation strongly recommended.  2. Trial of amplification is not currently indicated.  3. Re-check of hearing after 76 years of age.  4. F/U with PCP as per schedule.

## 2023-01-25 ENCOUNTER — LAB VISIT (OUTPATIENT)
Dept: LAB | Facility: OTHER | Age: 73
End: 2023-01-25
Attending: STUDENT IN AN ORGANIZED HEALTH CARE EDUCATION/TRAINING PROGRAM
Payer: MEDICARE

## 2023-01-25 DIAGNOSIS — F02.80 LATE ONSET ALZHEIMER'S DEMENTIA WITHOUT BEHAVIORAL DISTURBANCE: ICD-10-CM

## 2023-01-25 DIAGNOSIS — G30.1 LATE ONSET ALZHEIMER'S DEMENTIA WITHOUT BEHAVIORAL DISTURBANCE: ICD-10-CM

## 2023-01-25 DIAGNOSIS — E55.9 VITAMIN D INSUFFICIENCY: ICD-10-CM

## 2023-01-25 DIAGNOSIS — Z91.89 AT RISK FOR BLEEDING: ICD-10-CM

## 2023-01-25 DIAGNOSIS — I10 ESSENTIAL HYPERTENSION: ICD-10-CM

## 2023-01-25 DIAGNOSIS — E04.1 THYROID NODULE: Chronic | ICD-10-CM

## 2023-01-25 DIAGNOSIS — F03.90 DEMENTIA WITHOUT BEHAVIORAL DISTURBANCE: ICD-10-CM

## 2023-01-25 LAB
25(OH)D3+25(OH)D2 SERPL-MCNC: 37 NG/ML (ref 30–96)
ALBUMIN SERPL BCP-MCNC: 3.9 G/DL (ref 3.5–5.2)
ALP SERPL-CCNC: 67 U/L (ref 55–135)
ALT SERPL W/O P-5'-P-CCNC: 11 U/L (ref 10–44)
ANION GAP SERPL CALC-SCNC: 6 MMOL/L (ref 8–16)
AST SERPL-CCNC: 16 U/L (ref 10–40)
BASOPHILS # BLD AUTO: 0.02 K/UL (ref 0–0.2)
BASOPHILS NFR BLD: 0.4 % (ref 0–1.9)
BILIRUB SERPL-MCNC: 0.5 MG/DL (ref 0.1–1)
BUN SERPL-MCNC: 12 MG/DL (ref 8–23)
CALCIUM SERPL-MCNC: 9.7 MG/DL (ref 8.7–10.5)
CHLORIDE SERPL-SCNC: 109 MMOL/L (ref 95–110)
CO2 SERPL-SCNC: 27 MMOL/L (ref 23–29)
CREAT SERPL-MCNC: 0.9 MG/DL (ref 0.5–1.4)
DIFFERENTIAL METHOD: ABNORMAL
EOSINOPHIL # BLD AUTO: 0.2 K/UL (ref 0–0.5)
EOSINOPHIL NFR BLD: 3.3 % (ref 0–8)
ERYTHROCYTE [DISTWIDTH] IN BLOOD BY AUTOMATED COUNT: 13.7 % (ref 11.5–14.5)
EST. GFR  (NO RACE VARIABLE): >60 ML/MIN/1.73 M^2
GLUCOSE SERPL-MCNC: 78 MG/DL (ref 70–110)
HCT VFR BLD AUTO: 39 % (ref 37–48.5)
HGB BLD-MCNC: 12.4 G/DL (ref 12–16)
IMM GRANULOCYTES # BLD AUTO: 0.01 K/UL (ref 0–0.04)
IMM GRANULOCYTES NFR BLD AUTO: 0.2 % (ref 0–0.5)
LYMPHOCYTES # BLD AUTO: 2 K/UL (ref 1–4.8)
LYMPHOCYTES NFR BLD: 42.8 % (ref 18–48)
MCH RBC QN AUTO: 27.7 PG (ref 27–31)
MCHC RBC AUTO-ENTMCNC: 31.8 G/DL (ref 32–36)
MCV RBC AUTO: 87 FL (ref 82–98)
MONOCYTES # BLD AUTO: 0.3 K/UL (ref 0.3–1)
MONOCYTES NFR BLD: 7.2 % (ref 4–15)
NEUTROPHILS # BLD AUTO: 2.1 K/UL (ref 1.8–7.7)
NEUTROPHILS NFR BLD: 46.1 % (ref 38–73)
NRBC BLD-RTO: 0 /100 WBC
PLATELET # BLD AUTO: 195 K/UL (ref 150–450)
PMV BLD AUTO: 10.3 FL (ref 9.2–12.9)
POTASSIUM SERPL-SCNC: 3.8 MMOL/L (ref 3.5–5.1)
PROT SERPL-MCNC: 7.1 G/DL (ref 6–8.4)
RBC # BLD AUTO: 4.48 M/UL (ref 4–5.4)
SODIUM SERPL-SCNC: 142 MMOL/L (ref 136–145)
TSH SERPL DL<=0.005 MIU/L-ACNC: 1.5 UIU/ML (ref 0.4–4)
WBC # BLD AUTO: 4.56 K/UL (ref 3.9–12.7)

## 2023-01-25 PROCEDURE — 80053 COMPREHEN METABOLIC PANEL: CPT | Performed by: STUDENT IN AN ORGANIZED HEALTH CARE EDUCATION/TRAINING PROGRAM

## 2023-01-25 PROCEDURE — 84443 ASSAY THYROID STIM HORMONE: CPT | Performed by: STUDENT IN AN ORGANIZED HEALTH CARE EDUCATION/TRAINING PROGRAM

## 2023-01-25 PROCEDURE — 36415 COLL VENOUS BLD VENIPUNCTURE: CPT | Performed by: STUDENT IN AN ORGANIZED HEALTH CARE EDUCATION/TRAINING PROGRAM

## 2023-01-25 PROCEDURE — 82306 VITAMIN D 25 HYDROXY: CPT | Performed by: STUDENT IN AN ORGANIZED HEALTH CARE EDUCATION/TRAINING PROGRAM

## 2023-01-25 PROCEDURE — 85025 COMPLETE CBC W/AUTO DIFF WBC: CPT | Performed by: STUDENT IN AN ORGANIZED HEALTH CARE EDUCATION/TRAINING PROGRAM

## 2023-01-31 ENCOUNTER — TELEPHONE (OUTPATIENT)
Dept: INTERNAL MEDICINE | Facility: CLINIC | Age: 73
End: 2023-01-31
Payer: MEDICARE

## 2023-01-31 ENCOUNTER — TELEPHONE (OUTPATIENT)
Dept: ORTHOPEDICS | Facility: CLINIC | Age: 73
End: 2023-01-31
Payer: MEDICARE

## 2023-01-31 ENCOUNTER — PATIENT MESSAGE (OUTPATIENT)
Dept: ORTHOPEDICS | Facility: CLINIC | Age: 73
End: 2023-01-31
Payer: MEDICARE

## 2023-01-31 DIAGNOSIS — M79.642 BILATERAL HAND PAIN: Primary | ICD-10-CM

## 2023-01-31 DIAGNOSIS — M79.641 BILATERAL HAND PAIN: Primary | ICD-10-CM

## 2023-02-01 NOTE — TELEPHONE ENCOUNTER
Spoke with the patientS daughter. Informed of X-rays scheduled prior to appointment. Patients daughter verbalized understanding and was thankful.       Samia Morrow MA  Medical Assistant   [facility]

## 2023-02-03 ENCOUNTER — HOSPITAL ENCOUNTER (OUTPATIENT)
Dept: RADIOLOGY | Facility: OTHER | Age: 73
Discharge: HOME | End: 2023-02-03
Attending: ORTHOPAEDIC SURGERY
Payer: MEDICARE

## 2023-02-03 DIAGNOSIS — M79.642 BILATERAL HAND PAIN: ICD-10-CM

## 2023-02-03 DIAGNOSIS — M79.641 BILATERAL HAND PAIN: ICD-10-CM

## 2023-02-03 PROCEDURE — 73130 X-RAY EXAM OF HAND: CPT | Mod: TC,50,FY

## 2023-02-03 PROCEDURE — 73130 X-RAY EXAM OF HAND: CPT | Mod: 26,50,, | Performed by: RADIOLOGY

## 2023-02-03 PROCEDURE — 73130 XR HAND COMPLETE 3 VIEWS BILATERAL: ICD-10-PCS | Mod: 26,50,, | Performed by: RADIOLOGY

## 2023-02-13 ENCOUNTER — OFFICE VISIT (OUTPATIENT)
Dept: ORTHOPEDICS | Facility: CLINIC | Age: 73
End: 2023-02-13
Payer: MEDICARE

## 2023-02-13 VITALS — BODY MASS INDEX: 24.27 KG/M2 | WEIGHT: 151 LBS | HEIGHT: 66 IN

## 2023-02-13 DIAGNOSIS — M65.351 TRIGGER LITTLE FINGER OF RIGHT HAND: Primary | ICD-10-CM

## 2023-02-13 DIAGNOSIS — M19.042 PRIMARY OSTEOARTHRITIS OF BOTH HANDS: ICD-10-CM

## 2023-02-13 DIAGNOSIS — M79.641 CHRONIC PAIN OF RIGHT HAND: ICD-10-CM

## 2023-02-13 DIAGNOSIS — M19.041 PRIMARY OSTEOARTHRITIS OF BOTH HANDS: ICD-10-CM

## 2023-02-13 DIAGNOSIS — G89.29 CHRONIC PAIN OF RIGHT HAND: ICD-10-CM

## 2023-02-13 PROCEDURE — 3008F BODY MASS INDEX DOCD: CPT | Mod: CPTII,S$GLB,, | Performed by: ORTHOPAEDIC SURGERY

## 2023-02-13 PROCEDURE — 1159F MED LIST DOCD IN RCRD: CPT | Mod: CPTII,S$GLB,, | Performed by: ORTHOPAEDIC SURGERY

## 2023-02-13 PROCEDURE — 1125F PR PAIN SEVERITY QUANTIFIED, PAIN PRESENT: ICD-10-PCS | Mod: CPTII,S$GLB,, | Performed by: ORTHOPAEDIC SURGERY

## 2023-02-13 PROCEDURE — 1160F PR REVIEW ALL MEDS BY PRESCRIBER/CLIN PHARMACIST DOCUMENTED: ICD-10-PCS | Mod: CPTII,S$GLB,, | Performed by: ORTHOPAEDIC SURGERY

## 2023-02-13 PROCEDURE — 99999 PR PBB SHADOW E&M-EST. PATIENT-LVL III: ICD-10-PCS | Mod: PBBFAC,,, | Performed by: ORTHOPAEDIC SURGERY

## 2023-02-13 PROCEDURE — 99215 OFFICE O/P EST HI 40 MIN: CPT | Mod: 25,S$GLB,, | Performed by: ORTHOPAEDIC SURGERY

## 2023-02-13 PROCEDURE — 1125F AMNT PAIN NOTED PAIN PRSNT: CPT | Mod: CPTII,S$GLB,, | Performed by: ORTHOPAEDIC SURGERY

## 2023-02-13 PROCEDURE — 20550 TENDON SHEATH: ICD-10-PCS | Mod: RT,S$GLB,, | Performed by: ORTHOPAEDIC SURGERY

## 2023-02-13 PROCEDURE — 20550 NJX 1 TENDON SHEATH/LIGAMENT: CPT | Mod: RT,S$GLB,, | Performed by: ORTHOPAEDIC SURGERY

## 2023-02-13 PROCEDURE — 3008F PR BODY MASS INDEX (BMI) DOCUMENTED: ICD-10-PCS | Mod: CPTII,S$GLB,, | Performed by: ORTHOPAEDIC SURGERY

## 2023-02-13 PROCEDURE — 1160F RVW MEDS BY RX/DR IN RCRD: CPT | Mod: CPTII,S$GLB,, | Performed by: ORTHOPAEDIC SURGERY

## 2023-02-13 PROCEDURE — 4010F PR ACE/ARB THEARPY RXD/TAKEN: ICD-10-PCS | Mod: CPTII,S$GLB,, | Performed by: ORTHOPAEDIC SURGERY

## 2023-02-13 PROCEDURE — 99999 PR PBB SHADOW E&M-EST. PATIENT-LVL III: CPT | Mod: PBBFAC,,, | Performed by: ORTHOPAEDIC SURGERY

## 2023-02-13 PROCEDURE — 99215 PR OFFICE/OUTPT VISIT, EST, LEVL V, 40-54 MIN: ICD-10-PCS | Mod: 25,S$GLB,, | Performed by: ORTHOPAEDIC SURGERY

## 2023-02-13 PROCEDURE — 4010F ACE/ARB THERAPY RXD/TAKEN: CPT | Mod: CPTII,S$GLB,, | Performed by: ORTHOPAEDIC SURGERY

## 2023-02-13 PROCEDURE — 1159F PR MEDICATION LIST DOCUMENTED IN MEDICAL RECORD: ICD-10-PCS | Mod: CPTII,S$GLB,, | Performed by: ORTHOPAEDIC SURGERY

## 2023-02-13 RX ADMIN — METHYLPREDNISOLONE ACETATE 40 MG: 40 INJECTION, SUSPENSION INTRA-ARTICULAR; INTRALESIONAL; INTRAMUSCULAR; SOFT TISSUE at 11:02

## 2023-02-13 NOTE — PROGRESS NOTES
[facility]  History & Physical  Orthopedics    SUBJECTIVE:      COVID-19 attestation:  This patient was treated during the COVID-19 pandemic.  This was discussed with the patient, they are aware of our current policies and procedures, were given the option of delaying their visit and or switching to a virtual visit, delaying their surgery when applicable, and they elect to proceed.    Chief Complaint:   Chief Complaint   Patient presents with    Right Hand - Pain       Referring Provider: No ref. provider found     History of Present Illness:  Patient is a 72 y.o. right hand dominant female who presents today with complaints of Right 5th trigger finger pain and locking for months. Patient has been seen previously by [facility] who recommended trigger finger release. Patient has been unable to , grasp objects due to pain as of late. Reported no history of injections in the past or surgery on the right hand.    PMH: early signs of dementia - Daughter Miguel is the historian and present during visit     The patient denies any fevers, chills, N/V, D/C and presents for evaluation.       Past Medical History:   Diagnosis Date    Allergy     Anxiety     Cataract     Colon polyps 2010    Hearing loss     Hypertension     Joint pain     Mental disorder      Past Surgical History:   Procedure Laterality Date    COLONOSCOPY N/A 3/5/2020    Procedure: COLONOSCOPY;  Surgeon: Nathalia Kemp MD;  [facility]  Service: Colon and Rectal;  Laterality: N/A;  requests later appt time if available - Pt open to any MD- Pt informed arrival time may be adjusted, Pt verbalized understanding- ERW2/24/20@1409    ESOPHAGOGASTRODUODENOSCOPY N/A 10/1/2020    Procedure: EGD (ESOPHAGOGASTRODUODENOSCOPY);  Surgeon: Casey Guerrero MD;  Location: [facility] ([address] [place] [address]);  Service: Endoscopy;  Laterality: N/A;  covid test 9/28-[facility]    ESOPHAGOGASTRODUODENOSCOPY N/A 10/29/2021    Procedure: EGD  (ESOPHAGOGASTRODUODENOSCOPY);  Surgeon: Billy Vegas MD;  Location: [facility];  Service: Endoscopy;  Laterality: N/A;    HYSTERECTOMY      PERIPARTUM CHITO     Review of patient's allergies indicates:   Allergen Reactions    Lotensin [benazepril] Swelling    Penicillins Rash     Social History     Social History Narrative    June 2016    The patient reports that she lives alone normally, however her daughter recently moved in with her    She has a 43-year-old daughter, Saima    And a 26-year-old daughterChris, who is a schoolteacher for 6 in seventh grade in [employer] [facility]    She is retired from working as a  in the school system    She now works about 5-15 hours a week for [employer], which she enjoys.    Enjoys working at [employer] couple days a week     Family History   Problem Relation Age of Onset    Breast cancer Sister     Colon cancer Neg Hx     Ovarian cancer Neg Hx     Melanoma Neg Hx          Current Outpatient Medications:     acetaminophen (TYLENOL) 650 MG TbSR, Take 1 tablet (650 mg total) by mouth every 8 (eight) hours as needed (pain)., Disp: , Rfl: 0    ALPRAZolam (XANAX) 0.5 MG tablet, Take one tablet by mouth every night at bedtime and one tablet every day as needed, Disp: 45 tablet, Rfl: 5    aspirin (ECOTRIN) 81 MG EC tablet, Take 81 mg by mouth nightly. , Disp: , Rfl:     azelastine (ASTELIN) 137 mcg (0.1 %) nasal spray, instill 1 spray (137 mcg total) by Nasal route 2 (two) times daily., Disp: 30 mL, Rfl: 3    b complex vitamins tablet, Take 1 tablet by mouth once daily., Disp: , Rfl:     calcium carbonate-vitamin D3 500 mg(1,250mg) -125 unit per tablet, Take 1 tablet by mouth nightly. , Disp: , Rfl:     cetirizine (ZYRTEC) 10 MG tablet, Take 1 tablet (10 mg total) by mouth daily as needed for Allergies., Disp: 90 tablet, Rfl: 3    diclofenac sodium (VOLTAREN) 1 % Gel, Apply 2 g topically once daily., Disp: 100 g, Rfl: 2    donepeziL (ARICEPT) 10 MG tablet, Take 1 tablet (10  "mg total) by mouth every evening., Disp: 90 tablet, Rfl: 1    ergocalciferol, vitamin D2, (VITAMIN D ORAL), Take 400 Units by mouth nightly. , Disp: , Rfl:     EScitalopram oxalate (LEXAPRO) 5 MG Tab, take 1 tablet by mouth every evening at bedtime, Disp: 90 tablet, Rfl: 1    mometasone 0.1% (ELOCON) 0.1 % cream, apply to affected area topically daily, Disp: 45 g, Rfl: 3    omeprazole (PRILOSEC) 40 MG capsule, Take 1 capsule (40 mg total) by mouth every morning., Disp: 90 capsule, Rfl: 1    pravastatin (PRAVACHOL) 40 MG tablet, Take 1 tablet (40 mg total) by mouth once daily., Disp: 90 tablet, Rfl: 3    valsartan-hydrochlorothiazide (DIOVAN-HCT) 160-12.5 mg per tablet, Take 1 tablet by mouth once daily., Disp: 30 tablet, Rfl: 6    ROS    Review of Systems:  Constitutional: no fever or chills  Eyes: no visual changes  ENT: no nasal congestion or sore throat  Respiratory: no cough or shortness of breath  Cardiovascular: no chest pain  Gastrointestinal: no nausea or vomiting, tolerating diet  Musculoskeletal: pain, soreness, and decreased ROM    OBJECTIVE:      Vital Signs (Most Recent):  Vitals:    02/13/23 1137   Weight: 68.5 kg (151 lb)   Height: 5' 6" (1.676 m)     Body mass index is 24.37 kg/m².    Physical Exam    Physical Exam:  Constitutional: The patient appears well-developed and well-nourished. No distress.   Head: Normocephalic and atraumatic.   Nose: Nose normal.   Eyes: Conjunctivae and EOM are normal.   Neck: No tracheal deviation present.   Cardiovascular: Normal rate and intact distal pulses.    Pulmonary/Chest: Effort normal. No respiratory distress.   Abdominal: There is no guarding.   Lymphatic: Negative for adenopathy   Neurological: The patient is alert.   Psychiatric: The patient has a normal mood and affect.     Cervical Exam:  ROM full, supple  Negative Spurling's  Negative Kothari's    Bilateral Hand/Wrist Examination:    Observation/Inspection:  Swelling  Right small " finger    Deformity  none  Discoloration  none     Scars   none    Atrophy  none    HAND/WRIST EXAMINATION:  Finkelstein's Test   Neg  WHAT Test    Neg  CMC grind    Neg  Circumduction test   Neg  TFCC Compression Test  Neg    Tenderness to palpation right small finger A1 pulley, palpable nodule flexor tendon, demonstrable catching and locking of finger, otherwise bilateral ROM hand/wrist/elbow full, painless    Neurovascular Exam:  Digits WWP, brisk CR < 3s throughout  2+ biceps and brachioradialis reflexes  NVI motor/LTS to M/R/U nerves, radial pulse 2+  Tinel's Test - Carpal Tunnel  Neg  Tinel's Test - Cubital Tunnel  Neg  Phalen's Test    Neg  Median Nerve Compression Test Neg    Diagnostic Results:        X-rays AP, lateral and oblique bilateral hands taken today are independently reviewed by me and shows bilateral hand IP joint osteoarthritic changes and Eaton stage II basilar thumb arthritis.       ASSESSMENT/PLAN:      72 y.o. yo female with   Encounter Diagnoses   Name Primary?    Chronic pain of right hand     Trigger little finger of right hand Yes    Primary osteoarthritis of both hands       Plan: We have discussed the natural history of trigger fingers including treatment options such as splinting, oral and topical anti-inflammatories, cortisone injections and surgery. I have given her an ulnar gutter splint for comfort for the next few weeks.    -I have offered her a selective injection. I have explained the risks, benefits, and alternatives of the procedure in detail.  The patient voices understanding and all questions have been answered. The patient agrees to proceed as planned. So after a sterile prep of the skin in the normal fashion the right small finger flexor sheath was injected using a 25 gauge needle with a combination of 1cc 1% plain lidocaine and 40 mg of methylprednisolone.  The patient is cautioned and immediate relief of pain is secondary to the local anesthetic and will be temporary.   After the anesthetic wears off there may be a increase in pain that may last for a few hours or a few days and they should use ice to help alleviate this flair up of pain. Patient tolerated the procedure well.    - F/U as needed for any worsening of symptoms  - Call with any questions/concerns in the interim       The patient's pathophysiology was explained in detail with reference to x-rays, models, other visual aids as appropriate.  Treatment options were discussed in detail.  Questions were invited and answered to the patient's satisfaction. I reviewed Primary care , and other specialty's notes to better coordinate patient's care.          Franny Whitney MD    Please be aware that this note has been generated with the assistance of [employer] voice-to-text.  Please excuse any spelling or grammatical errors.

## 2023-02-18 NOTE — PROGRESS NOTES
"[facility]    Subjective:       Patient ID: Soila Chávez is a 72 y.o. female.    Chief Complaint: No chief complaint on file.    f/u    History was obtained from the patient and supplemented through chart review.  This patient is known to me.   She was a pt of Dr. Parson prior    Daughter is at office visit with her today    HPI:    Patient is a 72 y.o. female who presents for HTN, late onset Alzheimer's dementia    Last seen Nov. Daughter in office today    Pt forgot to pay light bill at house and now living with daughter  Lost phone recently. Found in her bedroom at her daughter's house.    Bradycardia  Worsening  Prior 50's last year, now 44 in EKG (sinus mary grace), 48 in office  Asymptomatic  Apptw ith Dr. Chapman Thursday this week    Right hand contracture with right small digit, limited ROM  Dropping items and pain  myelopathy  Following with Dr. Whitney  Daughter has been giving her tylenol-3, advised tylenol extra strength  Discussed dosing  Encouraged determining f/u plan with td    Late onset Alzheimer's Dementia  No longer driving   Pt understandably concerned about loss of independence    Chronic benzo use/anxiety/dementia  Late onset Alzheimer's   Aricept 10 mg/day   Also taking lexapro 5  xanax 0.5 daily and nightly PRN Rx from neurology  Referred to psychiatry, pt declines  Following with neurology regularly    Was being seen by care at home visit regularly      Normocytic Anemia; Thrombocytopenia- resolved, will recheck with next blood draw  Likely 2/2 blood loss, GI bleed 10/2021  Had been high NSAID use in the past. Encouraged tylenol  normalized    HTN  exforge 5-320 daily, stable; No CP/HA/SOB/Vision changes    Left flank pain/back, declines specialist, declines PT.  "Not that bad"    Aortic atherosclerosis  Pravastatin 10     Elevated ASCVD Risk score  On pravastatin to reduce risk    Thyroid nodule  Repeat US annually for 5 years (dec 2021 initially)  Repeat ordered, will " schedule    GERD  Improved with omeprazole  Reviewed colonoscopy, EGD report (hx of diverticulitis) 10/29/2021  stable    Myelopathy  Cervical  Not problematic currently      Wt Readings from Last 15 Encounters:   02/20/23 71.2 kg (156 lb 15.5 oz)   02/13/23 68.5 kg (151 lb)   01/24/23 68.5 kg (151 lb 0.2 oz)   11/21/22 71.3 kg (157 lb 3 oz)   10/20/22 70.4 kg (155 lb 3.3 oz)   10/04/22 71 kg (156 lb 8.4 oz)   08/23/22 66.3 kg (146 lb 2.6 oz)   08/19/22 68.6 kg (151 lb 3.8 oz)   08/15/22 69.4 kg (153 lb)   08/09/22 69.4 kg (153 lb)   06/14/22 73.5 kg (162 lb)   05/23/22 72.6 kg (160 lb)   05/17/22 72.6 kg (160 lb 0.9 oz)   04/21/22 70.3 kg (154 lb 15.7 oz)   04/19/22 70.8 kg (156 lb 1.4 oz)       Medical History  Past Medical History:   Diagnosis Date    Allergy     Anxiety     Cataract     Colon polyps 2010    Hearing loss     Hypertension     Joint pain     Mental disorder        Review of Systems   Constitutional:  Negative for fever.   HENT:  Negative for trouble swallowing.    Respiratory:  Negative for shortness of breath.    Cardiovascular:  Negative for chest pain.   Gastrointestinal:  Negative for blood in stool, constipation, diarrhea, nausea and vomiting.   Genitourinary:  Negative for dysuria.   Musculoskeletal:  Positive for arthralgias and myalgias. Negative for gait problem.   Neurological:  Negative for dizziness, seizures and headaches.        Memory difficulties   Psychiatric/Behavioral:  Positive for confusion and decreased concentration. Negative for dysphoric mood and hallucinations. The patient is nervous/anxious (generally, not specifically).        Surgical hx, family hx, social hx    Have been reviewed      Current Outpatient Medications:     acetaminophen (TYLENOL) 650 MG TbSR, Take 1 tablet (650 mg total) by mouth every 8 (eight) hours as needed (pain)., Disp: , Rfl: 0    ALPRAZolam (XANAX) 0.5 MG tablet, Take one tablet by mouth every night at bedtime and one tablet every day as  "needed, Disp: 45 tablet, Rfl: 5    amlodipine-valsartan (EXFORGE) 5-320 mg per tablet, Take 1 tablet by mouth once daily., Disp: 90 tablet, Rfl: 2    aspirin (ECOTRIN) 81 MG EC tablet, Take 81 mg by mouth nightly. , Disp: , Rfl:     azelastine (ASTELIN) 137 mcg (0.1 %) nasal spray, instill 1 spray (137 mcg total) by Nasal route 2 (two) times daily., Disp: 30 mL, Rfl: 3    calcium carbonate-vitamin D3 500 mg(1,250mg) -125 unit per tablet, Take 1 tablet by mouth nightly. , Disp: , Rfl:     cetirizine (ZYRTEC) 10 MG tablet, Take 1 tablet (10 mg total) by mouth daily as needed for Allergies., Disp: 90 tablet, Rfl: 3    diclofenac sodium (VOLTAREN) 1 % Gel, Apply 2 g topically once daily., Disp: 50 g, Rfl: 2    ergocalciferol, vitamin D2, (VITAMIN D ORAL), Take 400 Units by mouth nightly. , Disp: , Rfl:     EScitalopram oxalate (LEXAPRO) 5 MG Tab, take 1 tablet by mouth every evening at bedtime, Disp: 90 tablet, Rfl: 1    mometasone 0.1% (ELOCON) 0.1 % cream, apply to affected area topically daily, Disp: 45 g, Rfl: 3    omeprazole (PRILOSEC) 40 MG capsule, Take 1 capsule (40 mg total) by mouth every morning., Disp: 90 capsule, Rfl: 1    pravastatin (PRAVACHOL) 10 MG tablet, Take 1 tablet (10 mg total) by mouth every evening., Disp: 90 tablet, Rfl: 3    b complex vitamins tablet, Take 1 tablet by mouth once daily., Disp: , Rfl:     donepeziL (ARICEPT) 10 MG tablet, Take 1 tablet (10 mg total) by mouth every evening., Disp: 90 tablet, Rfl: 1    Objective:        Body mass index is 25.34 kg/m².  Vitals:    02/20/23 0806   BP: 138/78   Pulse: (!) 48   SpO2: 98%   Weight: 71.2 kg (156 lb 15.5 oz)   Height: 5' 6" (1.676 m)   PainSc: 0-No pain         Physical Exam  Vitals and nursing note reviewed.   Constitutional:       General: She is not in acute distress.     Appearance: Normal appearance. She is not ill-appearing.   HENT:      Head: Normocephalic and atraumatic.   Eyes:      General: No scleral " icterus.  Cardiovascular:      Rate and Rhythm: Normal rate and regular rhythm.      Heart sounds: Normal heart sounds.   Pulmonary:      Effort: Pulmonary effort is normal.   Musculoskeletal:         General: Deformity and signs of injury present.      Cervical back: Normal range of motion.      Comments: Right small digit held in contracted position; pt withdraws at attempt for exam until careful approach   Skin:     General: Skin is warm and dry.   Neurological:      Mental Status: She is alert and oriented to person, place, and time.   Psychiatric:         Behavior: Behavior normal.           Lab Results   Component Value Date    WBC 4.56 01/25/2023    HGB 12.4 01/25/2023    HCT 39.0 01/25/2023     01/25/2023    CHOL 183 08/19/2022    TRIG 62 08/19/2022    HDL 69 08/19/2022    ALT 11 01/25/2023    AST 16 01/25/2023     01/25/2023    K 3.8 01/25/2023     01/25/2023    CREATININE 0.9 01/25/2023    BUN 12 01/25/2023    CO2 27 01/25/2023    TSH 1.500 01/25/2023    HGBA1C 5.2 11/04/2021       The 10-year ASCVD risk score (Marie ALDANA, et al., 2019) is: 13.7%    Values used to calculate the score:      Age: 72 years      Sex: Female      Is Non- : Yes      Diabetic: No      Tobacco smoker: No      Systolic Blood Pressure: 138 mmHg      Is BP treated: Yes      HDL Cholesterol: 69 mg/dL      Total Cholesterol: 183 mg/dL    Pravastatin 10 nightly    (Imaging have been independently reviewed)    MRI brain 8/2021  No evidence of acute intracranial pathology.  Mild generalized cerebral volume loss.  Modest chronic microvascular ischemic change.  Three punctate foci of prior hemorrhage in the periphery of the posterior left cerebral hemisphere.    Assessment:         1. Bradycardia    2. Dementia without behavioral disturbance    3. Major depressive disorder, recurrent episode, moderate    4. Chronic prescription benzodiazepine use    5. Late onset Alzheimer's dementia without  behavioral disturbance    6. Essential hypertension    7. Myelopathy    8. Aortic atherosclerosis              Plan:     Diagnoses and all orders for this visit:    Bradycardia  -     SCHEDULED EKG 12-LEAD (to Muse); Future  -     Ambulatory referral/consult to Cardiology; Future    Dementia without behavioral disturbance  -     Ambulatory referral/consult to [facility]; Future    Major depressive disorder, recurrent episode, moderate  -     Ambulatory referral/consult to [facility]; Future    Chronic prescription benzodiazepine use    Late onset Alzheimer's dementia without behavioral disturbance    Essential hypertension  -     SCHEDULED EKG 12-LEAD (to Muse); Future    Myelopathy    Aortic atherosclerosis            Health Maintenance  - Lipids: normal 11/4/2021  - A1C: 5.2 11/4/2021  - Colon Ca Screen: 3/2020, repeat due 2025  - Immunizations: [facility]    Women's health  - Pap: s/p hyst, seen by gyn.   - Mammo: 6/17/2022 Birads 1   - Dexa: 8/25/20 NBMD     Follow up in about 3 months (around 5/20/2023).      Card appt this week    Or sooner prn    42 min were used in chart review, evaluation and counseling of patient, coordination of EKG/thyroid us same day and cardiology appt this week, phone call to daughter, documentation and review of results on same day of service     All medications were reviewed including potential side effects and risks/benefits.  Pt was counseled to call back if anything worsens or if questions arise.    Parminder Junior MD  Family Medicine  [facility]  [address]  [address]  [place] [address]  Phone [phone]  Fax [phone]

## 2023-02-20 ENCOUNTER — OFFICE VISIT (OUTPATIENT)
Dept: INTERNAL MEDICINE | Facility: CLINIC | Age: 73
End: 2023-02-20
Payer: MEDICARE

## 2023-02-20 ENCOUNTER — HOSPITAL ENCOUNTER (OUTPATIENT)
Dept: RADIOLOGY | Facility: OTHER | Age: 73
Discharge: HOME | End: 2023-02-20
Attending: STUDENT IN AN ORGANIZED HEALTH CARE EDUCATION/TRAINING PROGRAM
Payer: MEDICARE

## 2023-02-20 ENCOUNTER — HOSPITAL ENCOUNTER (OUTPATIENT)
Dept: CARDIOLOGY | Facility: OTHER | Age: 73
Discharge: HOME | End: 2023-02-20
Attending: STUDENT IN AN ORGANIZED HEALTH CARE EDUCATION/TRAINING PROGRAM
Payer: MEDICARE

## 2023-02-20 VITALS
BODY MASS INDEX: 25.22 KG/M2 | WEIGHT: 156.94 LBS | HEIGHT: 66 IN | DIASTOLIC BLOOD PRESSURE: 78 MMHG | HEART RATE: 48 BPM | SYSTOLIC BLOOD PRESSURE: 138 MMHG | OXYGEN SATURATION: 98 %

## 2023-02-20 DIAGNOSIS — R00.1 BRADYCARDIA: Primary | ICD-10-CM

## 2023-02-20 DIAGNOSIS — I10 ESSENTIAL HYPERTENSION: ICD-10-CM

## 2023-02-20 DIAGNOSIS — R00.1 BRADYCARDIA: ICD-10-CM

## 2023-02-20 DIAGNOSIS — F02.80 LATE ONSET ALZHEIMER'S DEMENTIA WITHOUT BEHAVIORAL DISTURBANCE: ICD-10-CM

## 2023-02-20 DIAGNOSIS — E04.1 THYROID NODULE: ICD-10-CM

## 2023-02-20 DIAGNOSIS — F03.90 DEMENTIA WITHOUT BEHAVIORAL DISTURBANCE: ICD-10-CM

## 2023-02-20 DIAGNOSIS — Z79.899 CHRONIC PRESCRIPTION BENZODIAZEPINE USE: ICD-10-CM

## 2023-02-20 DIAGNOSIS — I70.0 AORTIC ATHEROSCLEROSIS: ICD-10-CM

## 2023-02-20 DIAGNOSIS — G95.9 MYELOPATHY: ICD-10-CM

## 2023-02-20 DIAGNOSIS — G30.1 LATE ONSET ALZHEIMER'S DEMENTIA WITHOUT BEHAVIORAL DISTURBANCE: ICD-10-CM

## 2023-02-20 DIAGNOSIS — F33.1 MAJOR DEPRESSIVE DISORDER, RECURRENT EPISODE, MODERATE: ICD-10-CM

## 2023-02-20 PROCEDURE — 93010 EKG 12-LEAD: ICD-10-PCS | Mod: ,,, | Performed by: INTERNAL MEDICINE

## 2023-02-20 PROCEDURE — 3075F SYST BP GE 130 - 139MM HG: CPT | Mod: CPTII,S$GLB,, | Performed by: STUDENT IN AN ORGANIZED HEALTH CARE EDUCATION/TRAINING PROGRAM

## 2023-02-20 PROCEDURE — 3008F PR BODY MASS INDEX (BMI) DOCUMENTED: ICD-10-PCS | Mod: CPTII,S$GLB,, | Performed by: STUDENT IN AN ORGANIZED HEALTH CARE EDUCATION/TRAINING PROGRAM

## 2023-02-20 PROCEDURE — 3008F BODY MASS INDEX DOCD: CPT | Mod: CPTII,S$GLB,, | Performed by: STUDENT IN AN ORGANIZED HEALTH CARE EDUCATION/TRAINING PROGRAM

## 2023-02-20 PROCEDURE — 99999 PR PBB SHADOW E&M-EST. PATIENT-LVL IV: ICD-10-PCS | Mod: PBBFAC,,, | Performed by: STUDENT IN AN ORGANIZED HEALTH CARE EDUCATION/TRAINING PROGRAM

## 2023-02-20 PROCEDURE — 93005 ELECTROCARDIOGRAM TRACING: CPT

## 2023-02-20 PROCEDURE — 1101F PT FALLS ASSESS-DOCD LE1/YR: CPT | Mod: CPTII,S$GLB,, | Performed by: STUDENT IN AN ORGANIZED HEALTH CARE EDUCATION/TRAINING PROGRAM

## 2023-02-20 PROCEDURE — 99215 OFFICE O/P EST HI 40 MIN: CPT | Mod: S$GLB,,, | Performed by: STUDENT IN AN ORGANIZED HEALTH CARE EDUCATION/TRAINING PROGRAM

## 2023-02-20 PROCEDURE — 76536 US EXAM OF HEAD AND NECK: CPT | Mod: 26,,, | Performed by: RADIOLOGY

## 2023-02-20 PROCEDURE — 3075F PR MOST RECENT SYSTOLIC BLOOD PRESS GE 130-139MM HG: ICD-10-PCS | Mod: CPTII,S$GLB,, | Performed by: STUDENT IN AN ORGANIZED HEALTH CARE EDUCATION/TRAINING PROGRAM

## 2023-02-20 PROCEDURE — 76536 US EXAM OF HEAD AND NECK: CPT | Mod: TC

## 2023-02-20 PROCEDURE — 1159F PR MEDICATION LIST DOCUMENTED IN MEDICAL RECORD: ICD-10-PCS | Mod: CPTII,S$GLB,, | Performed by: STUDENT IN AN ORGANIZED HEALTH CARE EDUCATION/TRAINING PROGRAM

## 2023-02-20 PROCEDURE — 3288F FALL RISK ASSESSMENT DOCD: CPT | Mod: CPTII,S$GLB,, | Performed by: STUDENT IN AN ORGANIZED HEALTH CARE EDUCATION/TRAINING PROGRAM

## 2023-02-20 PROCEDURE — 3288F PR FALLS RISK ASSESSMENT DOCUMENTED: ICD-10-PCS | Mod: CPTII,S$GLB,, | Performed by: STUDENT IN AN ORGANIZED HEALTH CARE EDUCATION/TRAINING PROGRAM

## 2023-02-20 PROCEDURE — 3078F DIAST BP <80 MM HG: CPT | Mod: CPTII,S$GLB,, | Performed by: STUDENT IN AN ORGANIZED HEALTH CARE EDUCATION/TRAINING PROGRAM

## 2023-02-20 PROCEDURE — 4010F PR ACE/ARB THEARPY RXD/TAKEN: ICD-10-PCS | Mod: CPTII,S$GLB,, | Performed by: STUDENT IN AN ORGANIZED HEALTH CARE EDUCATION/TRAINING PROGRAM

## 2023-02-20 PROCEDURE — 99999 PR PBB SHADOW E&M-EST. PATIENT-LVL IV: CPT | Mod: PBBFAC,,, | Performed by: STUDENT IN AN ORGANIZED HEALTH CARE EDUCATION/TRAINING PROGRAM

## 2023-02-20 PROCEDURE — 93010 ELECTROCARDIOGRAM REPORT: CPT | Mod: ,,, | Performed by: INTERNAL MEDICINE

## 2023-02-20 PROCEDURE — 1126F PR PAIN SEVERITY QUANTIFIED, NO PAIN PRESENT: ICD-10-PCS | Mod: CPTII,S$GLB,, | Performed by: STUDENT IN AN ORGANIZED HEALTH CARE EDUCATION/TRAINING PROGRAM

## 2023-02-20 PROCEDURE — 3078F PR MOST RECENT DIASTOLIC BLOOD PRESSURE < 80 MM HG: ICD-10-PCS | Mod: CPTII,S$GLB,, | Performed by: STUDENT IN AN ORGANIZED HEALTH CARE EDUCATION/TRAINING PROGRAM

## 2023-02-20 PROCEDURE — 76536 US SOFT TISSUE HEAD NECK THYROID: ICD-10-PCS | Mod: 26,,, | Performed by: RADIOLOGY

## 2023-02-20 PROCEDURE — 99215 PR OFFICE/OUTPT VISIT, EST, LEVL V, 40-54 MIN: ICD-10-PCS | Mod: S$GLB,,, | Performed by: STUDENT IN AN ORGANIZED HEALTH CARE EDUCATION/TRAINING PROGRAM

## 2023-02-20 PROCEDURE — 1159F MED LIST DOCD IN RCRD: CPT | Mod: CPTII,S$GLB,, | Performed by: STUDENT IN AN ORGANIZED HEALTH CARE EDUCATION/TRAINING PROGRAM

## 2023-02-20 PROCEDURE — 1101F PR PT FALLS ASSESS DOC 0-1 FALLS W/OUT INJ PAST YR: ICD-10-PCS | Mod: CPTII,S$GLB,, | Performed by: STUDENT IN AN ORGANIZED HEALTH CARE EDUCATION/TRAINING PROGRAM

## 2023-02-20 PROCEDURE — 1126F AMNT PAIN NOTED NONE PRSNT: CPT | Mod: CPTII,S$GLB,, | Performed by: STUDENT IN AN ORGANIZED HEALTH CARE EDUCATION/TRAINING PROGRAM

## 2023-02-20 PROCEDURE — 4010F ACE/ARB THERAPY RXD/TAKEN: CPT | Mod: CPTII,S$GLB,, | Performed by: STUDENT IN AN ORGANIZED HEALTH CARE EDUCATION/TRAINING PROGRAM

## 2023-02-23 ENCOUNTER — OFFICE VISIT (OUTPATIENT)
Dept: CARDIOLOGY | Facility: CLINIC | Age: 73
End: 2023-02-23
Payer: MEDICARE

## 2023-02-23 VITALS
BODY MASS INDEX: 25.58 KG/M2 | SYSTOLIC BLOOD PRESSURE: 118 MMHG | WEIGHT: 159.19 LBS | HEIGHT: 66 IN | DIASTOLIC BLOOD PRESSURE: 63 MMHG | HEART RATE: 53 BPM

## 2023-02-23 DIAGNOSIS — I70.0 ATHEROSCLEROSIS OF AORTA: ICD-10-CM

## 2023-02-23 DIAGNOSIS — R00.1 BRADYCARDIA: Primary | ICD-10-CM

## 2023-02-23 DIAGNOSIS — I10 ESSENTIAL HYPERTENSION: ICD-10-CM

## 2023-02-23 DIAGNOSIS — I70.0 AORTIC ATHEROSCLEROSIS: ICD-10-CM

## 2023-02-23 PROCEDURE — 3074F PR MOST RECENT SYSTOLIC BLOOD PRESSURE < 130 MM HG: ICD-10-PCS | Mod: CPTII,S$GLB,, | Performed by: INTERNAL MEDICINE

## 2023-02-23 PROCEDURE — 1125F PR PAIN SEVERITY QUANTIFIED, PAIN PRESENT: ICD-10-PCS | Mod: CPTII,S$GLB,, | Performed by: INTERNAL MEDICINE

## 2023-02-23 PROCEDURE — 3008F PR BODY MASS INDEX (BMI) DOCUMENTED: ICD-10-PCS | Mod: CPTII,S$GLB,, | Performed by: INTERNAL MEDICINE

## 2023-02-23 PROCEDURE — 99205 OFFICE O/P NEW HI 60 MIN: CPT | Mod: S$GLB,,, | Performed by: INTERNAL MEDICINE

## 2023-02-23 PROCEDURE — 4010F PR ACE/ARB THEARPY RXD/TAKEN: ICD-10-PCS | Mod: CPTII,S$GLB,, | Performed by: INTERNAL MEDICINE

## 2023-02-23 PROCEDURE — 1101F PR PT FALLS ASSESS DOC 0-1 FALLS W/OUT INJ PAST YR: ICD-10-PCS | Mod: CPTII,S$GLB,, | Performed by: INTERNAL MEDICINE

## 2023-02-23 PROCEDURE — 1125F AMNT PAIN NOTED PAIN PRSNT: CPT | Mod: CPTII,S$GLB,, | Performed by: INTERNAL MEDICINE

## 2023-02-23 PROCEDURE — 99999 PR PBB SHADOW E&M-EST. PATIENT-LVL III: CPT | Mod: PBBFAC,,, | Performed by: INTERNAL MEDICINE

## 2023-02-23 PROCEDURE — 3078F DIAST BP <80 MM HG: CPT | Mod: CPTII,S$GLB,, | Performed by: INTERNAL MEDICINE

## 2023-02-23 PROCEDURE — 99205 PR OFFICE/OUTPT VISIT, NEW, LEVL V, 60-74 MIN: ICD-10-PCS | Mod: S$GLB,,, | Performed by: INTERNAL MEDICINE

## 2023-02-23 PROCEDURE — 3288F PR FALLS RISK ASSESSMENT DOCUMENTED: ICD-10-PCS | Mod: CPTII,S$GLB,, | Performed by: INTERNAL MEDICINE

## 2023-02-23 PROCEDURE — 3288F FALL RISK ASSESSMENT DOCD: CPT | Mod: CPTII,S$GLB,, | Performed by: INTERNAL MEDICINE

## 2023-02-23 PROCEDURE — 3008F BODY MASS INDEX DOCD: CPT | Mod: CPTII,S$GLB,, | Performed by: INTERNAL MEDICINE

## 2023-02-23 PROCEDURE — 3078F PR MOST RECENT DIASTOLIC BLOOD PRESSURE < 80 MM HG: ICD-10-PCS | Mod: CPTII,S$GLB,, | Performed by: INTERNAL MEDICINE

## 2023-02-23 PROCEDURE — 3074F SYST BP LT 130 MM HG: CPT | Mod: CPTII,S$GLB,, | Performed by: INTERNAL MEDICINE

## 2023-02-23 PROCEDURE — 4010F ACE/ARB THERAPY RXD/TAKEN: CPT | Mod: CPTII,S$GLB,, | Performed by: INTERNAL MEDICINE

## 2023-02-23 PROCEDURE — 99999 PR PBB SHADOW E&M-EST. PATIENT-LVL III: ICD-10-PCS | Mod: PBBFAC,,, | Performed by: INTERNAL MEDICINE

## 2023-02-23 PROCEDURE — 1101F PT FALLS ASSESS-DOCD LE1/YR: CPT | Mod: CPTII,S$GLB,, | Performed by: INTERNAL MEDICINE

## 2023-02-23 RX ORDER — VALSARTAN AND HYDROCHLOROTHIAZIDE 160; 12.5 MG/1; MG/1
1 TABLET, FILM COATED ORAL DAILY
Qty: 30 TABLET | Refills: 6 | Status: SHIPPED | OUTPATIENT
Start: 2023-02-23 | End: 2023-12-05 | Stop reason: SDUPTHER

## 2023-02-23 RX ORDER — PRAVASTATIN SODIUM 40 MG/1
40 TABLET ORAL DAILY
Qty: 90 TABLET | Refills: 3 | Status: SHIPPED | OUTPATIENT
Start: 2023-02-23 | End: 2023-07-17

## 2023-02-23 NOTE — PROCEDURES
Tendon Sheath    Date/Time: 2/13/2023 11:30 AM  Performed by: Franny Whitney MD  Authorized by: Franny Whitney MD     Consent Done?:  Yes (Verbal)  Indications:  Pain  Timeout: prior to procedure the correct patient, procedure, and site was verified    Prep: patient was prepped and draped in usual sterile fashion      Local anesthesia used?: Yes    Anesthesia:  Local infiltration  Local anesthetic:  Lidocaine 1% without epinephrine  Anesthetic total (ml):  1    Location:  Small finger  Site:  R small flexor tendon sheath  Ultrasonic guidance for needle placement?: No    Needle size:  25 G  Approach:  Volar  Medications:  40 mg methylPREDNISolone acetate 40 mg/mL  Patient tolerance:  Patient tolerated the procedure well with no immediate complications

## 2023-02-23 NOTE — PROGRESS NOTES
"[facility]      Chief Complaint   Patient presents with    Bradycardia       Patient ID: Soila Chávez is a 72 y.o. female with HTN, aortic atherosclerosis, overweight, who presents for initial appointment.  Pertinent history/events are as follows:     -Pt kindly referred by Dr. Junior for evaluation of bradycardia (per her note on 2/18/2023):  "Bradycardia   Worsening   Prior 50's last year, now 44 in EKG (sinus mary grace), 48 in office   Asymptomatic"    HPI:  Mrs. Chávez is accompanied by her daughter.  She reports slow heart rate in the 50's over a year ago.  Reports heart rate in the 40's recently.  She has no chest pain, SOB, palpitations, TIA symptoms or syncope.  Heart rate 53 bpm in clinic today.  EKG on 2/20/2023 shows marked sinus bradycardia (44 bpm) with nonspecific T wave abnormality.    Past Medical History:   Diagnosis Date    Allergy     Anxiety     Cataract     Colon polyps 2010    Hearing loss     Hypertension     Joint pain     Mental disorder      Past Surgical History:   Procedure Laterality Date    COLONOSCOPY N/A 3/5/2020    Procedure: COLONOSCOPY;  Surgeon: Nathalia Kemp MD;  Location: [address]);  Service: Colon and Rectal;  Laterality: N/A;  requests later appt time if available - Pt open to any MD- Pt informed arrival time may be adjusted, Pt verbalized understanding- W2/24/20@1409    ESOPHAGOGASTRODUODENOSCOPY N/A 10/1/2020    Procedure: EGD (ESOPHAGOGASTRODUODENOSCOPY);  Surgeon: Casey Guerrero MD;  Location: [address]);  Service: Endoscopy;  Laterality: N/A;  covid test 9/28-[facility]    ESOPHAGOGASTRODUODENOSCOPY N/A 10/29/2021    Procedure: EGD (ESOPHAGOGASTRODUODENOSCOPY);  Surgeon: Billy Vegas MD;  Location: [facility];  Service: Endoscopy;  Laterality: N/A;    HYSTERECTOMY      PERIPARTUM CHITO     Social History     Socioeconomic History    Marital status:    Tobacco Use    Smoking status: Never     Passive exposure: Never    " Smokeless tobacco: Never   Substance and Sexual Activity    Alcohol use: Yes     Comment: beer occasionally    Drug use: No    Sexual activity: Not Currently     Birth control/protection: None   Social History Narrative    June 2016    The patient reports that she lives alone normally, however her daughter recently moved in with her    She has a 43-year-old daughter, Saima    And a 26-year-old daughterChris, who is a schoolteacher for 6 in seventh grade in [employer]    She is retired from working as a  in the school system    She now works about 5-15 hours a week for [employer], which she enjoys.    Enjoys working at [employer] couple days a week     Family History   Problem Relation Age of Onset    Breast cancer Sister     Colon cancer Neg Hx     Ovarian cancer Neg Hx     Melanoma Neg Hx        Review of patient's allergies indicates:   Allergen Reactions    Lotensin [benazepril] Swelling    Penicillins Rash       Medication List with Changes/Refills   Current Medications    ACETAMINOPHEN (TYLENOL) 650 MG TBSR    Take 1 tablet (650 mg total) by mouth every 8 (eight) hours as needed (pain).    ALPRAZOLAM (XANAX) 0.5 MG TABLET    Take one tablet by mouth every night at bedtime and one tablet every day as needed    AMLODIPINE-VALSARTAN (EXFORGE) 5-320 MG PER TABLET    Take 1 tablet by mouth once daily.    ASPIRIN (ECOTRIN) 81 MG EC TABLET    Take 81 mg by mouth nightly.     AZELASTINE (ASTELIN) 137 MCG (0.1 %) NASAL SPRAY    instill 1 spray (137 mcg total) by Nasal route 2 (two) times daily.    B COMPLEX VITAMINS TABLET    Take 1 tablet by mouth once daily.    CALCIUM CARBONATE-VITAMIN D3 500 MG(1,250MG) -125 UNIT PER TABLET    Take 1 tablet by mouth nightly.     CETIRIZINE (ZYRTEC) 10 MG TABLET    Take 1 tablet (10 mg total) by mouth daily as needed for Allergies.    DICLOFENAC SODIUM (VOLTAREN) 1 % GEL    Apply 2 g topically once daily.    DONEPEZIL (ARICEPT) 10 MG TABLET    Take 1 tablet (10  "mg total) by mouth every evening.    ERGOCALCIFEROL, VITAMIN D2, (VITAMIN D ORAL)    Take 400 Units by mouth nightly.     ESCITALOPRAM OXALATE (LEXAPRO) 5 MG TAB    take 1 tablet by mouth every evening at bedtime    MOMETASONE 0.1% (ELOCON) 0.1 % CREAM    apply to affected area topically daily    OMEPRAZOLE (PRILOSEC) 40 MG CAPSULE    Take 1 capsule (40 mg total) by mouth every morning.    PRAVASTATIN (PRAVACHOL) 10 MG TABLET    Take 1 tablet (10 mg total) by mouth every evening.       Review of Systems  Constitution: Denies chills, fever, and sweats.  HENT: Denies headaches or blurry vision.  Cardiovascular: Denies chest pain or irregular heart beat.  Respiratory: Denies cough or shortness of breath.  Gastrointestinal: Denies abdominal pain, nausea, or vomiting.  Musculoskeletal: Denies muscle cramps.  Neurological: Denies dizziness or focal weakness.  Psychiatric/Behavioral: Normal mental status.  Hematologic/Lymphatic: Denies bleeding problem or easy bruising/bleeding.  Skin: Denies rash or suspicious lesions    Physical Examination  /63   Pulse (!) 53   Ht 5' 6" (1.676 m)   Wt 72.2 kg (159 lb 2.8 oz)   BMI 25.69 kg/m²     Constitutional: No acute distress, conversant  HEENT: Sclera anicteric, Pupils equal, round and reactive to light, extraocular motions intact, Oropharynx clear  Neck: No JVD, no carotid bruits  Cardiovascular: regular rate and rhythm, no murmur, rubs or gallops, normal S1/S2  Pulmonary: Clear to auscultation bilaterally  Abdominal: Abdomen soft, nontender, nondistended, positive bowel sounds  Extremities: No lower extremity edema,   Pulses:  Carotid pulses are 2+ on the right side, and 2+ on the left side.  Radial pulses are 2+ on the right side, and 2+ on the left side.   Femoral pulses are 2+ on the right side, and 2+ on the left side.  Popliteal pulses are 2+ on the right side, and 2+ on the left side.   Dorsalis pedis pulses are 2+ on the right side, and 2+ on the left side. "   Posterior tibial pulses are 2+ on the right side, and 2+ on the left side.    Skin: No ecchymosis, erythema, or ulcers  Psych: Alert and oriented x 3, appropriate affect  Neuro: CNII-XII intact, no focal deficits    Labs:  Most Recent Data  CBC:   Lab Results   Component Value Date    WBC 4.56 01/25/2023    HGB 12.4 01/25/2023    HCT 39.0 01/25/2023     01/25/2023    MCV 87 01/25/2023    RDW 13.7 01/25/2023     BMP:   Lab Results   Component Value Date     01/25/2023    K 3.8 01/25/2023     01/25/2023    CO2 27 01/25/2023    BUN 12 01/25/2023    CREATININE 0.9 01/25/2023    GLU 78 01/25/2023    CALCIUM 9.7 01/25/2023    MG 2.0 10/29/2021     LFTS;   Lab Results   Component Value Date    PROT 7.1 01/25/2023    ALBUMIN 3.9 01/25/2023    BILITOT 0.5 01/25/2023    AST 16 01/25/2023    ALKPHOS 67 01/25/2023    ALT 11 01/25/2023     COAGS: No results found for: INR, PROTIME, PTT  FLP:   Lab Results   Component Value Date    CHOL 183 08/19/2022    HDL 69 08/19/2022    LDLCALC 101.6 08/19/2022    TRIG 62 08/19/2022    CHOLHDL 37.7 08/19/2022     CARDIAC:   Lab Results   Component Value Date    TROPONINI 0.009 09/23/2021     Imaging:    EKG 2/20/2023:  Marked sinus bradycardia (44 bpm) with nonspecific T wave abnormality    Assessment/Plan:  Soila Chávez is a 72 y.o. female with HTN, aortic atherosclerosis, overweight, who presents for initial appointment.     Bradycardia- Pt is asymptomatic.  EKG on 2/20/2023 shows marked sinus bradycardia (44 bpm) with nonspecific T wave abnormality.  Check exercise stress echo to evaluate for chronotropic competence.  Check 30 day event monitor.  Amlodipine has been shown to cause bradycardia in rare instances.  Therefore, will discontinue amlodipine and start HCTZ 12.5 mg  daily.         2. HTN- Changes as per above.  Pt to monitor blood pressure and heart rate on above regimen.    3. Aortic Atherosclerosis- Increase pravastatin to 40 mg daily.      4.  Overweight- Encourage diet, exercise, and weight loss.     Will call pt with results of stress test  Otherwise, follow up in 3 months     Total duration of face to face visit time 30 minutes.  Total time spent counseling greater than fifty percent of total visit time.  Counseling included discussion regarding imaging findings, diagnosis, possibilities, treatment options, risks and benefits.  The patient had many questions regarding the options and long-term effects.    Champ Chapman MD, PhD  Interventional Cardiology

## 2023-02-23 NOTE — PATIENT INSTRUCTIONS
Assessment/Plan:  Soila Chávez is a 72 y.o. female with HTN, aortic atherosclerosis, overweight, who presents for initial appointment.     Bradycardia- Pt is asymptomatic.  EKG on 2/20/2023 shows marked sinus bradycardia (44 bpm) with nonspecific T wave abnormality.  Check exercise stress echo to evaluate for chronotropic competence.  Check 30 day event monitor.  Amlodipine has been shown to cause bradycardia in rare instances.  Therefore, will discontinue amlodipine and start HCTZ 12.5 mg  daily.         2. HTN- Changes as per above.  Pt to monitor blood pressure and heart rate on above regimen.    3. Aortic Atherosclerosis- Increase pravastatin to 40 mg daily.      4. Overweight- Encourage diet, exercise, and weight loss.     Will call pt with results of stress test  Otherwise, follow up in 3 months

## 2023-02-24 ENCOUNTER — PES CALL (OUTPATIENT)
Dept: ADMINISTRATIVE | Facility: CLINIC | Age: 73
End: 2023-02-24
Payer: MEDICARE

## 2023-02-27 DIAGNOSIS — M54.40 CHRONIC MIDLINE LOW BACK PAIN WITH SCIATICA, SCIATICA LATERALITY UNSPECIFIED: ICD-10-CM

## 2023-02-27 DIAGNOSIS — G89.29 CHRONIC MIDLINE LOW BACK PAIN WITH SCIATICA, SCIATICA LATERALITY UNSPECIFIED: ICD-10-CM

## 2023-02-27 RX ORDER — DICLOFENAC SODIUM 10 MG/G
2 GEL TOPICAL DAILY
Qty: 100 G | Refills: 2 | Status: SHIPPED | OUTPATIENT
Start: 2023-02-27 | End: 2023-10-01 | Stop reason: SDUPTHER

## 2023-03-03 RX ORDER — METHYLPREDNISOLONE ACETATE 40 MG/ML
40 INJECTION, SUSPENSION INTRA-ARTICULAR; INTRALESIONAL; INTRAMUSCULAR; SOFT TISSUE
Status: DISCONTINUED | OUTPATIENT
Start: 2023-02-13 | End: 2023-03-03 | Stop reason: HOSPADM

## 2023-03-08 ENCOUNTER — CARE AT HOME (OUTPATIENT)
Dept: HOME HEALTH SERVICES | Facility: CLINIC | Age: 73
End: 2023-03-08
Payer: MEDICARE

## 2023-03-08 DIAGNOSIS — F33.1 MAJOR DEPRESSIVE DISORDER, RECURRENT EPISODE, MODERATE: ICD-10-CM

## 2023-03-08 DIAGNOSIS — F03.90 DEMENTIA WITHOUT BEHAVIORAL DISTURBANCE: ICD-10-CM

## 2023-03-08 DIAGNOSIS — K21.9 GASTROESOPHAGEAL REFLUX DISEASE WITHOUT ESOPHAGITIS: Primary | ICD-10-CM

## 2023-03-08 PROCEDURE — 99349 PR HOME VISIT,ESTAB PATIENT,LEVEL III: ICD-10-PCS | Mod: S$GLB,,, | Performed by: NURSE PRACTITIONER

## 2023-03-08 PROCEDURE — 99349 HOME/RES VST EST MOD MDM 40: CPT | Mod: S$GLB,,, | Performed by: NURSE PRACTITIONER

## 2023-03-08 RX ORDER — ESCITALOPRAM OXALATE 5 MG/1
TABLET ORAL
Qty: 90 TABLET | Refills: 2 | Status: SHIPPED | OUTPATIENT
Start: 2023-03-08 | End: 2023-06-13 | Stop reason: SDUPTHER

## 2023-03-08 RX ORDER — OMEPRAZOLE 40 MG/1
40 CAPSULE, DELAYED RELEASE ORAL EVERY MORNING
Qty: 30 CAPSULE | Refills: 2 | Status: SHIPPED | OUTPATIENT
Start: 2023-03-08 | End: 2023-07-26 | Stop reason: SDUPTHER

## 2023-03-09 ENCOUNTER — PATIENT MESSAGE (OUTPATIENT)
Dept: NEUROLOGY | Facility: CLINIC | Age: 73
End: 2023-03-09
Payer: MEDICARE

## 2023-03-13 VITALS
DIASTOLIC BLOOD PRESSURE: 68 MMHG | SYSTOLIC BLOOD PRESSURE: 130 MMHG | HEART RATE: 66 BPM | OXYGEN SATURATION: 98 % | RESPIRATION RATE: 16 BRPM

## 2023-03-13 NOTE — PROGRESS NOTES
[facility]  Palliative Care Home Visit    Visit Date: 3/8/23  Encounter Provider: Taz Mayes NP  PCP:  Parminder Junior MD    PRESENTING HISTORY      Patient ID: Soila Chávez is a 72 y.o. female.  Consult Requested By:  Dr. Parminder Junior  Reason for Consult:  Palliative Care follow up    Soila is being seen at home due to  physical debility that presents a taxing effort to leave the home, to mitigate high risk of hospital readmission or due to the limited availability of reliable or safe options for transportation to the point of access to the provider. Prior to treatment on this visit the chart was reviewed and patient consent was obtained.      Chief Complaint: follow up for dementia  Today:Ms. Chávez is being evaluated at home today for a follow up medical/palliative care home visit.     With this visit today patient is found at home alone, she opened the door for me and is ambulatory with no assistive device. Patient is AAOx3, during our conversation, she has had multiple episodes of confusion but was able to verify her name and  and easily reoriented, she speaks repetetive and not in real time. Most of patients other history was received from her daughter Connor during a previous phone conversation and her medical record. She sees Dr. Junior as her PCP. She endorses eating x 3 small meals per day . She endorses regular BMs. Patient reports she is still cooking and enjoys spending time with her family. She loves to talk about her daughters who she reports is a successful school principle and one is a successful  School counselor or works with children.      NO home health needs identified at this time. Fall/Safety precautions reinforced.      VSS. Denies fever, chest pain, shortness of breath, nausea, vomiting, diarrhea.  All hospital discharge orders reviewed and being followed, all medications reconciled and reviewed, patient and daughter verbalized understanding. No other needs  identified at this time.       Review of Systems   Constitutional: Negative for activity change, fatigue and unexpected weight change.   HENT: Negative for congestion, dental problem, trouble swallowing and voice change.    Eyes: Negative for redness and visual disturbance.   Cardiovascular: Negative for chest pain and palpitations.   Gastrointestinal: Negative for abdominal distention, abdominal pain, nausea and vomiting.   Endocrine: Negative for polydipsia and polyuria.   Genitourinary: Negative for difficulty urinating and dyspareunia.   Musculoskeletal: Negative for arthralgias and gait problem.   Skin: Negative for color change and rash.   Allergic/Immunologic: Negative for food allergies.   Neurological: Negative for dizziness and weakness.   Psychiatric/Behavioral: Positive for confusion. Negative for agitation.        More frequent episodes of confusion       Assessments:  Lives in single story home with 4 steps to enter  Functional Status:Independent with ADLs and mobility   Safety: Fall precautions  Nutritional: 3 meals daily  Home Health/DME/Supplies: n/a    PAST HISTORY:     Past Medical History:   Diagnosis Date    Allergy     Anxiety     Cataract     Colon polyps 2010    Hearing loss     Hypertension     Joint pain     Mental disorder        Past Surgical History:   Procedure Laterality Date    COLONOSCOPY N/A 3/5/2020    Procedure: COLONOSCOPY;  Surgeon: Nathalia Kemp MD;  Location: [address]);  Service: Colon and Rectal;  Laterality: N/A;  requests later appt time if available - Pt open to any MD- Pt informed arrival time may be adjusted, Pt verbalized understanding- ERW2/24/20@1409    ESOPHAGOGASTRODUODENOSCOPY N/A 10/1/2020    Procedure: EGD (ESOPHAGOGASTRODUODENOSCOPY);  Surgeon: Casey Guerrero MD;  Location: [address]);  Service: Endoscopy;  Laterality: N/A;  [facility]id test 9/28-[facility]    ESOPHAGOGASTRODUODENOSCOPY N/A 10/29/2021    Procedure: EGD  (ESOPHAGOGASTRODUODENOSCOPY);  Surgeon: Billy Vegas MD;  Location: [facility];  Service: Endoscopy;  Laterality: N/A;    HYSTERECTOMY      PERIPARTUM CHITO       Family History   Problem Relation Age of Onset    Breast cancer Sister     Colon cancer Neg Hx     Ovarian cancer Neg Hx     Melanoma Neg Hx        Social History     Socioeconomic History    Marital status:    Tobacco Use    Smoking status: Never     Passive exposure: Never    Smokeless tobacco: Never   Substance and Sexual Activity    Alcohol use: Yes     Comment: beer occasionally    Drug use: No    Sexual activity: Not Currently     Birth control/protection: None   Social History Narrative    June 2016    The patient reports that she lives alone normally, however her daughter recently moved in with her    She has a 43-year-old daughter, Saima    And a 26-year-old daughterChris, who is a schoolteacher for 6 in seventh grade in [employer]    She is retired from working as a  in the school system    She now works about 5-15 hours a week for [employer], which she enjoys.    Enjoys working at [employer] couple days a week       MEDICATIONS & ALLERGIES:     Current Outpatient Medications on File Prior to Visit   Medication Sig Dispense Refill    ALPRAZolam (XANAX) 0.5 MG tablet Take one tablet by mouth every night at bedtime and one tablet every day as needed 45 tablet 5    aspirin (ECOTRIN) 81 MG EC tablet Take 81 mg by mouth nightly.       cetirizine (ZYRTEC) 10 MG tablet Take 1 tablet (10 mg total) by mouth daily as needed for Allergies. 90 tablet 3    donepeziL (ARICEPT) 10 MG tablet Take 1 tablet (10 mg total) by mouth every evening. 90 tablet 1    pravastatin (PRAVACHOL) 40 MG tablet Take 1 tablet (40 mg total) by mouth once daily. 90 tablet 3    valsartan-hydrochlorothiazide (DIOVAN-HCT) 160-12.5 mg per tablet Take 1 tablet by mouth once daily. 30 tablet 6    acetaminophen (TYLENOL) 650 MG TbSR Take 1 tablet (650 mg total)  by mouth every 8 (eight) hours as needed (pain).  0    azelastine (ASTELIN) 137 mcg (0.1 %) nasal spray instill 1 spray (137 mcg total) by Nasal route 2 (two) times daily. 30 mL 3    diclofenac sodium (VOLTAREN) 1 % Gel Apply 2 g topically once daily. 100 g 2    mometasone 0.1% (ELOCON) 0.1 % cream apply to affected area topically daily 45 g 3    [DISCONTINUED] cimetidine (TAGAMET) 300 MG tablet Take 1 tablet (300 mg total) by mouth 2 (two) times daily. 180 tablet 1     No current facility-administered medications on file prior to visit.        Review of patient's allergies indicates:   Allergen Reactions    Lotensin [benazepril] Swelling    Penicillins Rash       OBJECTIVE:     Vital Signs:  Vitals:    03/08/23 1040   BP: 130/68   Pulse: 66   Resp: 16     There is no height or weight on file to calculate BMI.     Physical Exam:  Physical Exam  Constitutional:       Appearance: Normal appearance.   HENT:      Head: Normocephalic.      Right Ear: Tympanic membrane normal.      Nose: Nose normal.      Mouth/Throat:      Mouth: Mucous membranes are moist.   Eyes:      Pupils: Pupils are equal, round, and reactive to light.   Cardiovascular:      Rate and Rhythm: Normal rate and regular rhythm.   Pulmonary:      Effort: Pulmonary effort is normal.      Breath sounds: Normal breath sounds.   Abdominal:      General: Abdomen is flat.      Palpations: Abdomen is soft.   Musculoskeletal:         General: Normal range of motion.      Cervical back: Normal range of motion.   Skin:     General: Skin is warm and dry.      Capillary Refill: Capillary refill takes less than 2 seconds.   Neurological:      Mental Status: She is alert and oriented to person, place, and time.   Psychiatric:         Mood and Affect: Mood normal. Impaired memory and cognition.        Laboratory  Lab Results   Component Value Date    WBC 4.56 01/25/2023    HGB 12.4 01/25/2023    HCT 39.0 01/25/2023    MCV 87 01/25/2023     01/25/2023     No  results found for: INR, PROTIME  Lab Results   Component Value Date    HGBA1C 5.2 11/04/2021     No results for input(s): POCTGLUCOSE in the last 72 hours.    I have reconciled the patient's home medications and discharge medications with the patient/family. I have updated all changes.  Refer to After-Visit Medication List.    ASSESSMENT & PLAN:     Diagnoses and all orders for this visit:    Dementia without behavioral disturbance, unspecified dementia type  Continue home meds  Daughters assist daily with medications, ADLs, transportation and any needs pt has.  Pt wants to remain independent and in her home, daughters fine with this plan and will assist as needed.       Were controlled substances prescribed?  No    Instructions for the patient:  Home NP will f/u with pt in 8-10 wks. Information given to pt and daughter Saima if questions, concerns or status changes to contact me.     Scheduled Follow-up :  Future Appointments   Date Time Provider Department Center   3/15/2023 10:00 AM MONITOR, ARRHYTHMIA EVENT [facility] ARRMERCYRO Jag antoinette   3/27/2023 11:00 AM EXERCISE, STRESS ECHO [facility] ECHOSTR [facility] antoinette   5/23/2023 10:00 AM Parminder Junior MD [facility]       After Visit Medication List :     Medication List            Accurate as of March 8, 2023 11:59 PM. If you have any questions, ask your nurse or doctor.                CONTINUE taking these medications      acetaminophen 650 MG Tbsr  Commonly known as: TYLENOL  Take 1 tablet (650 mg total) by mouth every 8 (eight) hours as needed (pain).     ALPRAZolam 0.5 MG tablet  Commonly known as: XANAX  Take one tablet by mouth every night at bedtime and one tablet every day as needed     aspirin 81 MG EC tablet  Commonly known as: ECOTRIN     azelastine 137 mcg (0.1 %) nasal spray  Commonly known as: ASTELIN  instill 1 spray (137 mcg total) by Nasal route 2 (two) times daily.     cetirizine 10 MG tablet  Commonly known as: ZYRTEC  Take 1 tablet (10 mg total) by  mouth daily as needed for Allergies.     diclofenac sodium 1 % Gel  Commonly known as: VOLTAREN  Apply 2 g topically once daily.     donepeziL 10 MG tablet  Commonly known as: ARICEPT  Take 1 tablet (10 mg total) by mouth every evening.     EScitalopram oxalate 5 MG Tab  Commonly known as: LEXAPRO  take 1 tablet by mouth every evening at bedtime     mometasone 0.1% 0.1 % cream  Commonly known as: ELOCON  apply to affected area topically daily     omeprazole 40 MG capsule  Commonly known as: PRILOSEC  Take 1 capsule (40 mg total) by mouth every morning.     pravastatin 40 MG tablet  Commonly known as: PRAVACHOL  Take 1 tablet (40 mg total) by mouth once daily.     valsartan-hydrochlorothiazide 160-12.5 mg per tablet  Commonly known as: DIOVAN-HCT  Take 1 tablet by mouth once daily.               Where to Get Your Medications        These medications were sent to [facility] [address], [place] [address]      Hours: Mon-Fri, 8a-5:30p Phone: [phone]   EScitalopram oxalate 5 MG Tab  omeprazole 40 MG capsule         Signature:  Taz Mayes NP    Patient consent obtained prior to treatment

## 2023-03-13 NOTE — PATIENT INSTRUCTIONS
Instructions:  - [facility] to schedule home follow-up visit with patient in 6-8 weeks or as needed.  - Continue all medications, treatments and therapies as ordered.   - Follow all instructions, recommendations as discussed.  - Maintain Safety Precautions at all times.  - Attend all medical appointments as scheduled.  - For worsening symptoms: call Primary Care Physician or Nurse Practitioner.  - For emergencies, call 911 or immediately report to the nearest emergency room.

## 2023-03-15 ENCOUNTER — CLINICAL SUPPORT (OUTPATIENT)
Dept: CARDIOLOGY | Facility: HOSPITAL | Age: 73
End: 2023-03-15
Attending: INTERNAL MEDICINE
Payer: MEDICARE

## 2023-03-15 DIAGNOSIS — R00.1 BRADYCARDIA: ICD-10-CM

## 2023-03-15 PROCEDURE — 93272 CARDIAC EVENT MONITOR (CUPID ONLY): ICD-10-PCS | Mod: ,,, | Performed by: INTERNAL MEDICINE

## 2023-03-15 PROCEDURE — 93270 REMOTE 30 DAY ECG REV/REPORT: CPT

## 2023-03-15 PROCEDURE — 93272 ECG/REVIEW INTERPRET ONLY: CPT | Mod: ,,, | Performed by: INTERNAL MEDICINE

## 2023-03-15 RX ORDER — DONEPEZIL HYDROCHLORIDE 10 MG/1
10 TABLET, FILM COATED ORAL NIGHTLY
Qty: 90 TABLET | Refills: 1 | Status: SHIPPED | OUTPATIENT
Start: 2023-03-15 | End: 2023-05-05 | Stop reason: DRUGHIGH

## 2023-05-05 ENCOUNTER — TELEPHONE (OUTPATIENT)
Dept: NEUROLOGY | Facility: CLINIC | Age: 73
End: 2023-05-05
Payer: MEDICARE

## 2023-05-05 ENCOUNTER — PATIENT MESSAGE (OUTPATIENT)
Dept: NEUROLOGY | Facility: CLINIC | Age: 73
End: 2023-05-05
Payer: MEDICARE

## 2023-05-05 RX ORDER — DONEPEZIL HYDROCHLORIDE 23 MG/1
23 TABLET, FILM COATED ORAL DAILY
Qty: 30 TABLET | Refills: 1 | Status: SHIPPED | OUTPATIENT
Start: 2023-05-05 | End: 2023-06-13

## 2023-05-16 ENCOUNTER — PATIENT OUTREACH (OUTPATIENT)
Dept: ADMINISTRATIVE | Facility: HOSPITAL | Age: 73
End: 2023-05-16
Payer: MEDICARE

## 2023-05-17 DIAGNOSIS — F41.9 ANXIETY: ICD-10-CM

## 2023-05-17 RX ORDER — ALPRAZOLAM 0.5 MG/1
TABLET ORAL
Qty: 45 TABLET | Refills: 0 | Status: SHIPPED | OUTPATIENT
Start: 2023-05-17 | End: 2023-06-13 | Stop reason: SDUPTHER

## 2023-05-20 NOTE — PROGRESS NOTES
[facility]    Subjective:       Patient ID: Soila Chávez is a 73 y.o. female.    Chief Complaint: Hypertension (F/u)    f/u    History was obtained from the patient and supplemented through chart review.  This patient is known to me.   She was a pt of Dr. Parson prior    Daughter is at office visit with her today    HPI:    Patient is a 73 y.o. female who presents for HTN, late onset Alzheimer's dementia    Last seen Feb.    Iron stopped, repeat CBC, iron studies  Requests insurance covered Stool softener    Pt forgot to pay light bill at house and now living with daughter in the past    Bradycardia  Worsening  Prior 50's last year, now 44 in EKG (sinus mary grace), 48 in office  Asymptomatic  Apptw ith Dr. Chapman Thursday this week    Was being seen by care at home visit regularly      Normocytic Anemia; Thrombocytopenia- resolved, will recheck with next blood draw  Likely 2/2 blood loss, GI bleed 10/2021  Had been high NSAID use in the past. Encouraged tylenol  normalized    HTN  Valsartan-hctz 160-12.5 daily, stable; No CP/HA/SOB/Vision changes    Left flank pain/back, had declined work-up in the past  Check ct abdom/pelvis, did lose 8 lbs in past three months    Aortic atherosclerosis  Pravastatin 10     Elevated ASCVD Risk score  On pravastatin to reduce risk    Multinodular Thyroid   Repeat US annually for 5 years (dec 2021 initially)  Does not meet criteria for f/u per 2/20/2023 US    GERD  Improved with omeprazole  Reviewed colonoscopy, EGD report (hx of diverticulitis) 10/29/2021  stable    Not addressed  Myelopathy  Cervical  Not problematic currently    Right hand contracture with right small digit, limited ROM  Dropping items and pain  myelopathy  Following with Dr. Whitney  Daughter has been giving her tylenol-3, advised tylenol extra strength  Discussed dosing  Encouraged determining f/u plan with td    Late onset Alzheimer's Dementia  No longer driving   Pt understandably concerned  about loss of independence    Chronic benzo use/anxiety/dementia  Late onset Alzheimer's   Aricept 10 mg/day   Also taking lexapro 5  xanax 0.5 daily and nightly PRN Rx from neurology  Referred to psychiatry, pt declines  Following with neurology regularly      Wt Readings from Last 15 Encounters:   05/23/23 68.7 kg (151 lb 7.3 oz)   02/23/23 72.2 kg (159 lb 2.8 oz)   02/20/23 71.2 kg (156 lb 15.5 oz)   02/13/23 68.5 kg (151 lb)   01/24/23 68.5 kg (151 lb 0.2 oz)   11/21/22 71.3 kg (157 lb 3 oz)   10/20/22 70.4 kg (155 lb 3.3 oz)   10/04/22 71 kg (156 lb 8.4 oz)   08/23/22 66.3 kg (146 lb 2.6 oz)   08/19/22 68.6 kg (151 lb 3.8 oz)   08/15/22 69.4 kg (153 lb)   08/09/22 69.4 kg (153 lb)   06/14/22 73.5 kg (162 lb)   05/23/22 72.6 kg (160 lb)   05/17/22 72.6 kg (160 lb 0.9 oz)       Medical History  Past Medical History:   Diagnosis Date    Allergy     Anxiety     Cataract     Colon polyps 2010    Hearing loss     Hypertension     Joint pain     Mental disorder        Review of Systems   Constitutional:  Negative for fever.   HENT:  Negative for trouble swallowing.    Respiratory:  Negative for shortness of breath.    Cardiovascular:  Negative for chest pain.   Gastrointestinal:  Positive for abdominal pain (LLQ, left flank pain, left back pain). Negative for blood in stool, constipation, diarrhea, nausea and vomiting.   Genitourinary:  Negative for dysuria.   Musculoskeletal:  Positive for arthralgias and myalgias. Negative for gait problem.   Neurological:  Negative for dizziness, seizures and headaches.        Memory difficulties   Psychiatric/Behavioral:  Positive for confusion and decreased concentration. Negative for dysphoric mood and hallucinations. The patient is nervous/anxious (generally, not specifically).        Surgical hx, family hx, social hx    Have been reviewed      Current Outpatient Medications:     acetaminophen (TYLENOL) 650 MG TbSR, Take 1 tablet (650 mg total) by mouth every 8 (eight) hours as  "needed (pain)., Disp: , Rfl: 0    ALPRAZolam (XANAX) 0.5 MG tablet, Take one tablet by mouth every night at bedtime and one tablet every day as needed, Disp: 45 tablet, Rfl: 0    aspirin (ECOTRIN) 81 MG EC tablet, Take 81 mg by mouth nightly. , Disp: , Rfl:     cetirizine (ZYRTEC) 10 MG tablet, Take 1 tablet (10 mg total) by mouth daily as needed for Allergies., Disp: 90 tablet, Rfl: 3    diclofenac sodium (VOLTAREN) 1 % Gel, Apply 2 g topically once daily., Disp: 100 g, Rfl: 2    donepeziL 23 mg Tab, Take 1 tablet (23 mg total) by mouth once daily., Disp: 30 tablet, Rfl: 1    EScitalopram oxalate (LEXAPRO) 5 MG Tab, take 1 tablet by mouth every evening at bedtime, Disp: 90 tablet, Rfl: 2    omeprazole (PRILOSEC) 40 MG capsule, Take 1 capsule (40 mg total) by mouth every morning., Disp: 30 capsule, Rfl: 2    pravastatin (PRAVACHOL) 40 MG tablet, Take 1 tablet (40 mg total) by mouth once daily., Disp: 90 tablet, Rfl: 3    valsartan-hydrochlorothiazide (DIOVAN-HCT) 160-12.5 mg per tablet, Take 1 tablet by mouth once daily., Disp: 30 tablet, Rfl: 6    azelastine (ASTELIN) 137 mcg (0.1 %) nasal spray, instill 1 spray (137 mcg total) by Nasal route 2 (two) times daily., Disp: 30 mL, Rfl: 3    mometasone 0.1% (ELOCON) 0.1 % cream, apply to affected area topically daily (Patient not taking: Reported on 5/23/2023), Disp: 45 g, Rfl: 3    Objective:        Body mass index is 24.45 kg/m².  Vitals:    05/23/23 1001   BP: (!) 115/56   Pulse: (!) 54   SpO2: 98%   Weight: 68.7 kg (151 lb 7.3 oz)   Height: 5' 6" (1.676 m)   PainSc: 0-No pain           Physical Exam  Vitals and nursing note reviewed.   Constitutional:       General: She is not in acute distress.     Appearance: Normal appearance. She is not ill-appearing.   HENT:      Head: Normocephalic and atraumatic.   Eyes:      General: No scleral icterus.  Cardiovascular:      Rate and Rhythm: Normal rate and regular rhythm.      Heart sounds: Normal heart sounds.   Pulmonary: "      Effort: Pulmonary effort is normal.   Abdominal:      General: There is no distension.      Tenderness: There is no guarding or rebound.      Comments: Pain left lower quadrant  No CVA tenderness, not TTP flank   Musculoskeletal:      Cervical back: Normal range of motion.   Skin:     General: Skin is warm and dry.   Neurological:      Mental Status: She is alert and oriented to person, place, and time.   Psychiatric:         Behavior: Behavior normal.           Lab Results   Component Value Date    WBC 3.91 05/23/2023    HGB 12.9 05/23/2023    HCT 40.7 05/23/2023     05/23/2023    CHOL 183 08/19/2022    TRIG 62 08/19/2022    HDL 69 08/19/2022    ALT 12 05/23/2023    AST 24 05/23/2023     05/23/2023    K 3.4 (L) 05/23/2023     05/23/2023    CREATININE 0.9 05/23/2023    BUN 10 05/23/2023    CO2 26 05/23/2023    TSH 1.500 01/25/2023    HGBA1C 5.2 11/04/2021       The 10-year ASCVD risk score (Marie ALDANA, et al., 2019) is: 10.7%    Values used to calculate the score:      Age: 73 years      Sex: Female      Is Non- : Yes      Diabetic: No      Tobacco smoker: No      Systolic Blood Pressure: 115 mmHg      Is BP treated: Yes      HDL Cholesterol: 69 mg/dL      Total Cholesterol: 183 mg/dL    Pravastatin 10 nightly    (Imaging have been independently reviewed)    MRI brain 8/2021  No evidence of acute intracranial pathology.  Mild generalized cerebral volume loss.  Modest chronic microvascular ischemic change.  Three punctate foci of prior hemorrhage in the periphery of the posterior left cerebral hemisphere.    Assessment:         1. Hypertension, unspecified type    2. Abdominal pain, unspecified abdominal location    3. Calculus of gallbladder without cholecystitis without obstruction    4. Flank pain    5. Gastroesophageal reflux disease without esophagitis    6. Dementia without behavioral disturbance    7. Essential hypertension    8. At risk for bleeding    9. History  of iron deficiency    10. Encounter for screening mammogram for malignant neoplasm of breast                Plan:     Soila was seen today for hypertension.    Diagnoses and all orders for this visit:    Hypertension, unspecified type  -     Comprehensive Metabolic Panel; Future    Abdominal pain, unspecified abdominal location  -     Cancel: US Retroperitoneal Complete (Kidney and; Future  -     CT Abdomen Pelvis With Contrast; Future  -     Urine culture; Future  -     Urinalysis; Future    Calculus of gallbladder without cholecystitis without obstruction  -     Cancel: US Abdomen Complete; Future    Flank pain  -     Cancel: US Retroperitoneal Complete (Kidney and; Future    Gastroesophageal reflux disease without esophagitis    Dementia without behavioral disturbance  -     Vitamin B12; Future    Essential hypertension  -     CBC Without Differential; Future    At risk for bleeding  -     CBC Without Differential; Future    History of iron deficiency  -     Ferritin; Future  -     Iron and TIBC; Future    Encounter for screening mammogram for malignant neoplasm of breast          Health Maintenance  - Lipids: normal 11/4/2021  - A1C: 5.2 11/4/2021  - Colon Ca Screen: 3/2020, repeat due 2025  - Immunizations: [facility]    Women's health  - Pap: s/p hyst, seen by gyn.   - Mammo: 6/17/2022 Birads 1   - Dexa: 8/25/20 NBMD     Follow up in about 3 months (around 8/23/2023).        Or sooner prn         All medications were reviewed including potential side effects and risks/benefits.  Pt was counseled to call back if anything worsens or if questions arise.    Parminder Junior MD  Family Medicine  [facility]  [address] [facility]  [address] [place] [id number]  Phone [phone]  Fax [phone]

## 2023-05-23 ENCOUNTER — OFFICE VISIT (OUTPATIENT)
Dept: INTERNAL MEDICINE | Facility: CLINIC | Age: 73
End: 2023-05-23
Payer: MEDICARE

## 2023-05-23 ENCOUNTER — LAB VISIT (OUTPATIENT)
Dept: LAB | Facility: OTHER | Age: 73
End: 2023-05-23
Attending: STUDENT IN AN ORGANIZED HEALTH CARE EDUCATION/TRAINING PROGRAM
Payer: MEDICARE

## 2023-05-23 VITALS
HEART RATE: 54 BPM | SYSTOLIC BLOOD PRESSURE: 115 MMHG | HEIGHT: 66 IN | OXYGEN SATURATION: 98 % | WEIGHT: 151.44 LBS | DIASTOLIC BLOOD PRESSURE: 56 MMHG | BODY MASS INDEX: 24.34 KG/M2

## 2023-05-23 DIAGNOSIS — K21.9 GASTROESOPHAGEAL REFLUX DISEASE WITHOUT ESOPHAGITIS: ICD-10-CM

## 2023-05-23 DIAGNOSIS — R10.9 ABDOMINAL PAIN, UNSPECIFIED ABDOMINAL LOCATION: ICD-10-CM

## 2023-05-23 DIAGNOSIS — Z86.39 HISTORY OF IRON DEFICIENCY: ICD-10-CM

## 2023-05-23 DIAGNOSIS — Z91.89 AT RISK FOR BLEEDING: ICD-10-CM

## 2023-05-23 DIAGNOSIS — R10.9 FLANK PAIN: ICD-10-CM

## 2023-05-23 DIAGNOSIS — K80.20 CALCULUS OF GALLBLADDER WITHOUT CHOLECYSTITIS WITHOUT OBSTRUCTION: ICD-10-CM

## 2023-05-23 DIAGNOSIS — Z12.31 ENCOUNTER FOR SCREENING MAMMOGRAM FOR MALIGNANT NEOPLASM OF BREAST: ICD-10-CM

## 2023-05-23 DIAGNOSIS — F03.90 DEMENTIA WITHOUT BEHAVIORAL DISTURBANCE: ICD-10-CM

## 2023-05-23 DIAGNOSIS — I10 HYPERTENSION, UNSPECIFIED TYPE: Primary | ICD-10-CM

## 2023-05-23 DIAGNOSIS — I10 ESSENTIAL HYPERTENSION: ICD-10-CM

## 2023-05-23 LAB
BACTERIA #/AREA URNS HPF: ABNORMAL /HPF
BILIRUB UR QL STRIP: NEGATIVE
CLARITY UR: CLEAR
COLOR UR: YELLOW
GLUCOSE UR QL STRIP: NEGATIVE
HGB UR QL STRIP: NEGATIVE
KETONES UR QL STRIP: NEGATIVE
LEUKOCYTE ESTERASE UR QL STRIP: ABNORMAL
MICROSCOPIC COMMENT: ABNORMAL
NITRITE UR QL STRIP: NEGATIVE
PH UR STRIP: 6 [PH] (ref 5–8)
PROT UR QL STRIP: ABNORMAL
RBC #/AREA URNS HPF: 2 /HPF (ref 0–4)
SP GR UR STRIP: 1.02 (ref 1–1.03)
SQUAMOUS #/AREA URNS HPF: 0 /HPF
URN SPEC COLLECT METH UR: ABNORMAL
UROBILINOGEN UR STRIP-ACNC: NEGATIVE EU/DL
WBC #/AREA URNS HPF: 1 /HPF (ref 0–5)
YEAST URNS QL MICRO: ABNORMAL

## 2023-05-23 PROCEDURE — 3074F PR MOST RECENT SYSTOLIC BLOOD PRESSURE < 130 MM HG: ICD-10-PCS | Mod: CPTII,S$GLB,, | Performed by: STUDENT IN AN ORGANIZED HEALTH CARE EDUCATION/TRAINING PROGRAM

## 2023-05-23 PROCEDURE — 3288F PR FALLS RISK ASSESSMENT DOCUMENTED: ICD-10-PCS | Mod: CPTII,S$GLB,, | Performed by: STUDENT IN AN ORGANIZED HEALTH CARE EDUCATION/TRAINING PROGRAM

## 2023-05-23 PROCEDURE — 3078F DIAST BP <80 MM HG: CPT | Mod: CPTII,S$GLB,, | Performed by: STUDENT IN AN ORGANIZED HEALTH CARE EDUCATION/TRAINING PROGRAM

## 2023-05-23 PROCEDURE — 3078F PR MOST RECENT DIASTOLIC BLOOD PRESSURE < 80 MM HG: ICD-10-PCS | Mod: CPTII,S$GLB,, | Performed by: STUDENT IN AN ORGANIZED HEALTH CARE EDUCATION/TRAINING PROGRAM

## 2023-05-23 PROCEDURE — 4010F ACE/ARB THERAPY RXD/TAKEN: CPT | Mod: CPTII,S$GLB,, | Performed by: STUDENT IN AN ORGANIZED HEALTH CARE EDUCATION/TRAINING PROGRAM

## 2023-05-23 PROCEDURE — 1159F MED LIST DOCD IN RCRD: CPT | Mod: CPTII,S$GLB,, | Performed by: STUDENT IN AN ORGANIZED HEALTH CARE EDUCATION/TRAINING PROGRAM

## 2023-05-23 PROCEDURE — 87077 CULTURE AEROBIC IDENTIFY: CPT | Performed by: STUDENT IN AN ORGANIZED HEALTH CARE EDUCATION/TRAINING PROGRAM

## 2023-05-23 PROCEDURE — 99999 PR PBB SHADOW E&M-EST. PATIENT-LVL V: ICD-10-PCS | Mod: PBBFAC,,, | Performed by: STUDENT IN AN ORGANIZED HEALTH CARE EDUCATION/TRAINING PROGRAM

## 2023-05-23 PROCEDURE — 3008F PR BODY MASS INDEX (BMI) DOCUMENTED: ICD-10-PCS | Mod: CPTII,S$GLB,, | Performed by: STUDENT IN AN ORGANIZED HEALTH CARE EDUCATION/TRAINING PROGRAM

## 2023-05-23 PROCEDURE — 3008F BODY MASS INDEX DOCD: CPT | Mod: CPTII,S$GLB,, | Performed by: STUDENT IN AN ORGANIZED HEALTH CARE EDUCATION/TRAINING PROGRAM

## 2023-05-23 PROCEDURE — 99214 OFFICE O/P EST MOD 30 MIN: CPT | Mod: S$GLB,,, | Performed by: STUDENT IN AN ORGANIZED HEALTH CARE EDUCATION/TRAINING PROGRAM

## 2023-05-23 PROCEDURE — 87186 SC STD MICRODIL/AGAR DIL: CPT | Performed by: STUDENT IN AN ORGANIZED HEALTH CARE EDUCATION/TRAINING PROGRAM

## 2023-05-23 PROCEDURE — 3288F FALL RISK ASSESSMENT DOCD: CPT | Mod: CPTII,S$GLB,, | Performed by: STUDENT IN AN ORGANIZED HEALTH CARE EDUCATION/TRAINING PROGRAM

## 2023-05-23 PROCEDURE — 1126F PR PAIN SEVERITY QUANTIFIED, NO PAIN PRESENT: ICD-10-PCS | Mod: CPTII,S$GLB,, | Performed by: STUDENT IN AN ORGANIZED HEALTH CARE EDUCATION/TRAINING PROGRAM

## 2023-05-23 PROCEDURE — 1159F PR MEDICATION LIST DOCUMENTED IN MEDICAL RECORD: ICD-10-PCS | Mod: CPTII,S$GLB,, | Performed by: STUDENT IN AN ORGANIZED HEALTH CARE EDUCATION/TRAINING PROGRAM

## 2023-05-23 PROCEDURE — 3074F SYST BP LT 130 MM HG: CPT | Mod: CPTII,S$GLB,, | Performed by: STUDENT IN AN ORGANIZED HEALTH CARE EDUCATION/TRAINING PROGRAM

## 2023-05-23 PROCEDURE — 1126F AMNT PAIN NOTED NONE PRSNT: CPT | Mod: CPTII,S$GLB,, | Performed by: STUDENT IN AN ORGANIZED HEALTH CARE EDUCATION/TRAINING PROGRAM

## 2023-05-23 PROCEDURE — 87086 URINE CULTURE/COLONY COUNT: CPT | Performed by: STUDENT IN AN ORGANIZED HEALTH CARE EDUCATION/TRAINING PROGRAM

## 2023-05-23 PROCEDURE — 99999 PR PBB SHADOW E&M-EST. PATIENT-LVL V: CPT | Mod: PBBFAC,,, | Performed by: STUDENT IN AN ORGANIZED HEALTH CARE EDUCATION/TRAINING PROGRAM

## 2023-05-23 PROCEDURE — 87088 URINE BACTERIA CULTURE: CPT | Performed by: STUDENT IN AN ORGANIZED HEALTH CARE EDUCATION/TRAINING PROGRAM

## 2023-05-23 PROCEDURE — 81000 URINALYSIS NONAUTO W/SCOPE: CPT | Performed by: STUDENT IN AN ORGANIZED HEALTH CARE EDUCATION/TRAINING PROGRAM

## 2023-05-23 PROCEDURE — 99214 PR OFFICE/OUTPT VISIT, EST, LEVL IV, 30-39 MIN: ICD-10-PCS | Mod: S$GLB,,, | Performed by: STUDENT IN AN ORGANIZED HEALTH CARE EDUCATION/TRAINING PROGRAM

## 2023-05-23 PROCEDURE — 4010F PR ACE/ARB THEARPY RXD/TAKEN: ICD-10-PCS | Mod: CPTII,S$GLB,, | Performed by: STUDENT IN AN ORGANIZED HEALTH CARE EDUCATION/TRAINING PROGRAM

## 2023-05-23 PROCEDURE — 1101F PR PT FALLS ASSESS DOC 0-1 FALLS W/OUT INJ PAST YR: ICD-10-PCS | Mod: CPTII,S$GLB,, | Performed by: STUDENT IN AN ORGANIZED HEALTH CARE EDUCATION/TRAINING PROGRAM

## 2023-05-23 PROCEDURE — 1101F PT FALLS ASSESS-DOCD LE1/YR: CPT | Mod: CPTII,S$GLB,, | Performed by: STUDENT IN AN ORGANIZED HEALTH CARE EDUCATION/TRAINING PROGRAM

## 2023-05-23 NOTE — PATIENT INSTRUCTIONS
Reach out to Dr. Chapman (Cardiologist) if you all would like to pursue a pharmacologic stress test instead of treadmill

## 2023-05-25 LAB — BACTERIA UR CULT: ABNORMAL

## 2023-05-26 ENCOUNTER — TELEPHONE (OUTPATIENT)
Dept: INTERNAL MEDICINE | Facility: CLINIC | Age: 73
End: 2023-05-26
Payer: MEDICARE

## 2023-05-26 DIAGNOSIS — N30.00 ACUTE CYSTITIS WITHOUT HEMATURIA: Primary | ICD-10-CM

## 2023-05-26 RX ORDER — CEFDINIR 300 MG/1
300 CAPSULE ORAL 2 TIMES DAILY
Qty: 10 CAPSULE | Refills: 0 | Status: SHIPPED | OUTPATIENT
Start: 2023-05-26 | End: 2023-05-31

## 2023-05-26 NOTE — TELEPHONE ENCOUNTER
"----- Message from Parminder Junior MD sent at 5/26/2023  8:01 AM CDT -----  Please ensure family knows sending in antibiotic for slight UTI      Good morning,    Slight uti (the number of "colonies" of the bacteria doesn't meet the threshold of ALWAYS treating, but out of an abundance of caution, we will go ahead and treat for UTI. I am sending in cefdinir, which should work well even with the noted penicillin allergy (rash).  Please let us know if the abdominal pain gets worse in any way or fails to improve.    Please contact us for an appointment to discuss any concerns further.  Sincerely,  Parminder Junior    "

## 2023-05-29 ENCOUNTER — HOSPITAL ENCOUNTER (OUTPATIENT)
Dept: RADIOLOGY | Facility: OTHER | Age: 73
Discharge: HOME | End: 2023-05-29
Attending: STUDENT IN AN ORGANIZED HEALTH CARE EDUCATION/TRAINING PROGRAM
Payer: MEDICARE

## 2023-05-29 DIAGNOSIS — R10.9 ABDOMINAL PAIN, UNSPECIFIED ABDOMINAL LOCATION: ICD-10-CM

## 2023-05-29 PROCEDURE — A9698 NON-RAD CONTRAST MATERIALNOC: HCPCS | Performed by: STUDENT IN AN ORGANIZED HEALTH CARE EDUCATION/TRAINING PROGRAM

## 2023-05-29 PROCEDURE — 74177 CT ABD & PELVIS W/CONTRAST: CPT | Mod: 26,,, | Performed by: RADIOLOGY

## 2023-05-29 PROCEDURE — 25500020 PHARM REV CODE 255: Performed by: STUDENT IN AN ORGANIZED HEALTH CARE EDUCATION/TRAINING PROGRAM

## 2023-05-29 PROCEDURE — 74177 CT ABDOMEN PELVIS WITH CONTRAST: ICD-10-PCS | Mod: 26,,, | Performed by: RADIOLOGY

## 2023-05-29 PROCEDURE — 74177 CT ABD & PELVIS W/CONTRAST: CPT | Mod: TC

## 2023-05-29 RX ADMIN — IOHEXOL 75 ML: 350 INJECTION, SOLUTION INTRAVENOUS at 09:05

## 2023-05-29 RX ADMIN — IOHEXOL 1000 ML: 12 SOLUTION ORAL at 08:05

## 2023-06-12 ENCOUNTER — PATIENT MESSAGE (OUTPATIENT)
Dept: INTERNAL MEDICINE | Facility: CLINIC | Age: 73
End: 2023-06-12
Payer: MEDICARE

## 2023-06-12 DIAGNOSIS — R10.9 ABDOMINAL PAIN, UNSPECIFIED ABDOMINAL LOCATION: Primary | ICD-10-CM

## 2023-06-12 DIAGNOSIS — K21.9 GASTROESOPHAGEAL REFLUX DISEASE WITHOUT ESOPHAGITIS: ICD-10-CM

## 2023-06-12 DIAGNOSIS — K80.20 GALLSTONES: ICD-10-CM

## 2023-06-13 ENCOUNTER — OFFICE VISIT (OUTPATIENT)
Dept: NEUROLOGY | Facility: CLINIC | Age: 73
End: 2023-06-13
Payer: MEDICARE

## 2023-06-13 VITALS
DIASTOLIC BLOOD PRESSURE: 75 MMHG | HEIGHT: 66 IN | SYSTOLIC BLOOD PRESSURE: 135 MMHG | BODY MASS INDEX: 24.1 KG/M2 | HEART RATE: 50 BPM | WEIGHT: 149.94 LBS

## 2023-06-13 DIAGNOSIS — F02.80 LATE ONSET ALZHEIMER'S DISEASE WITHOUT BEHAVIORAL DISTURBANCE: Primary | ICD-10-CM

## 2023-06-13 DIAGNOSIS — F41.9 ANXIETY: ICD-10-CM

## 2023-06-13 DIAGNOSIS — G30.1 LATE ONSET ALZHEIMER'S DISEASE WITHOUT BEHAVIORAL DISTURBANCE: Primary | ICD-10-CM

## 2023-06-13 PROCEDURE — 3075F PR MOST RECENT SYSTOLIC BLOOD PRESS GE 130-139MM HG: ICD-10-PCS | Mod: CPTII,S$GLB,, | Performed by: PSYCHIATRY & NEUROLOGY

## 2023-06-13 PROCEDURE — 3078F DIAST BP <80 MM HG: CPT | Mod: CPTII,S$GLB,, | Performed by: PSYCHIATRY & NEUROLOGY

## 2023-06-13 PROCEDURE — 1159F PR MEDICATION LIST DOCUMENTED IN MEDICAL RECORD: ICD-10-PCS | Mod: CPTII,S$GLB,, | Performed by: PSYCHIATRY & NEUROLOGY

## 2023-06-13 PROCEDURE — 3288F FALL RISK ASSESSMENT DOCD: CPT | Mod: CPTII,S$GLB,, | Performed by: PSYCHIATRY & NEUROLOGY

## 2023-06-13 PROCEDURE — 1125F AMNT PAIN NOTED PAIN PRSNT: CPT | Mod: CPTII,S$GLB,, | Performed by: PSYCHIATRY & NEUROLOGY

## 2023-06-13 PROCEDURE — 3288F PR FALLS RISK ASSESSMENT DOCUMENTED: ICD-10-PCS | Mod: CPTII,S$GLB,, | Performed by: PSYCHIATRY & NEUROLOGY

## 2023-06-13 PROCEDURE — 1101F PT FALLS ASSESS-DOCD LE1/YR: CPT | Mod: CPTII,S$GLB,, | Performed by: PSYCHIATRY & NEUROLOGY

## 2023-06-13 PROCEDURE — 1159F MED LIST DOCD IN RCRD: CPT | Mod: CPTII,S$GLB,, | Performed by: PSYCHIATRY & NEUROLOGY

## 2023-06-13 PROCEDURE — 1125F PR PAIN SEVERITY QUANTIFIED, PAIN PRESENT: ICD-10-PCS | Mod: CPTII,S$GLB,, | Performed by: PSYCHIATRY & NEUROLOGY

## 2023-06-13 PROCEDURE — 3008F BODY MASS INDEX DOCD: CPT | Mod: CPTII,S$GLB,, | Performed by: PSYCHIATRY & NEUROLOGY

## 2023-06-13 PROCEDURE — 4010F PR ACE/ARB THEARPY RXD/TAKEN: ICD-10-PCS | Mod: CPTII,S$GLB,, | Performed by: PSYCHIATRY & NEUROLOGY

## 2023-06-13 PROCEDURE — 99999 PR PBB SHADOW E&M-EST. PATIENT-LVL III: CPT | Mod: PBBFAC,,, | Performed by: PSYCHIATRY & NEUROLOGY

## 2023-06-13 PROCEDURE — 3075F SYST BP GE 130 - 139MM HG: CPT | Mod: CPTII,S$GLB,, | Performed by: PSYCHIATRY & NEUROLOGY

## 2023-06-13 PROCEDURE — 99215 PR OFFICE/OUTPT VISIT, EST, LEVL V, 40-54 MIN: ICD-10-PCS | Mod: S$GLB,,, | Performed by: PSYCHIATRY & NEUROLOGY

## 2023-06-13 PROCEDURE — 1101F PR PT FALLS ASSESS DOC 0-1 FALLS W/OUT INJ PAST YR: ICD-10-PCS | Mod: CPTII,S$GLB,, | Performed by: PSYCHIATRY & NEUROLOGY

## 2023-06-13 PROCEDURE — 3008F PR BODY MASS INDEX (BMI) DOCUMENTED: ICD-10-PCS | Mod: CPTII,S$GLB,, | Performed by: PSYCHIATRY & NEUROLOGY

## 2023-06-13 PROCEDURE — 4010F ACE/ARB THERAPY RXD/TAKEN: CPT | Mod: CPTII,S$GLB,, | Performed by: PSYCHIATRY & NEUROLOGY

## 2023-06-13 PROCEDURE — 3078F PR MOST RECENT DIASTOLIC BLOOD PRESSURE < 80 MM HG: ICD-10-PCS | Mod: CPTII,S$GLB,, | Performed by: PSYCHIATRY & NEUROLOGY

## 2023-06-13 PROCEDURE — 99999 PR PBB SHADOW E&M-EST. PATIENT-LVL III: ICD-10-PCS | Mod: PBBFAC,,, | Performed by: PSYCHIATRY & NEUROLOGY

## 2023-06-13 PROCEDURE — 99215 OFFICE O/P EST HI 40 MIN: CPT | Mod: S$GLB,,, | Performed by: PSYCHIATRY & NEUROLOGY

## 2023-06-13 RX ORDER — ALPRAZOLAM 0.5 MG/1
TABLET ORAL
Qty: 45 TABLET | Refills: 3 | Status: SHIPPED | OUTPATIENT
Start: 2023-06-13 | End: 2023-12-08 | Stop reason: SDUPTHER

## 2023-06-13 RX ORDER — MEMANTINE HYDROCHLORIDE 10 MG/1
TABLET ORAL
Qty: 60 TABLET | Refills: 3 | Status: SHIPPED | OUTPATIENT
Start: 2023-06-13 | End: 2023-10-18 | Stop reason: SDUPTHER

## 2023-06-13 RX ORDER — ESCITALOPRAM OXALATE 5 MG/1
TABLET ORAL
Qty: 90 TABLET | Refills: 2 | Status: SHIPPED | OUTPATIENT
Start: 2023-06-13 | End: 2024-01-04

## 2023-06-13 RX ORDER — MEMANTINE HYDROCHLORIDE 5 MG/1
TABLET ORAL
Qty: 60 TABLET | Refills: 0 | Status: SHIPPED | OUTPATIENT
Start: 2023-06-13 | End: 2023-10-18

## 2023-06-13 NOTE — PATIENT INSTRUCTIONS
Contact the [employer]   [phone]    Do not begin the memantine until she is off of donepezil ( in 2 weeks)

## 2023-06-13 NOTE — PROGRESS NOTES
Subjective:       Patient ID: Soila Chávez is a 73 y.o. female.    Chief Complaint: Follow-up      Patient presents with 1 of her daughters and we called her other daughter.  She does continue to live alone but her daughter seemed concerned.  They do see her daily and she is not had any wandering or setbacks.  However they are concerned because she is now repeating herself more often and apparently is asking questions 10 seconds after she was provided information.  They went to [place] and she was getting out of bed frequently walking around the place where they were staying in the middle of the night.  They thus decided to have her stay with 1 of the daughters every night which I think is a good idea.    She is currently being evaluated by cardiologist for asymptomatic bradycardia.    Past Medical History:   Diagnosis Date    Allergy     Anxiety     Cataract     Colon polyps 2010    Hearing loss     Hypertension     Joint pain     Mental disorder       Past Surgical History:   Procedure Laterality Date    COLONOSCOPY N/A 3/5/2020    Procedure: COLONOSCOPY;  Surgeon: Nathalia Kemp MD;  Location: [facility] ([address] [place] [address]);  Service: Colon and Rectal;  Laterality: N/A;  requests later appt time if available - Pt open to any MD- Pt informed arrival time may be adjusted, Pt verbalized understanding- ERW2/24/20@1409    ESOPHAGOGASTRODUODENOSCOPY N/A 10/1/2020    Procedure: EGD (ESOPHAGOGASTRODUODENOSCOPY);  Surgeon: Casey Guerrero MD;  Location: [address]);  Service: Endoscopy;  Laterality: N/A;  covid test 9/28-[facility]    ESOPHAGOGASTRODUODENOSCOPY N/A 10/29/2021    Procedure: EGD (ESOPHAGOGASTRODUODENOSCOPY);  Surgeon: Billy Vegas MD;  Location: [facility];  Service: Endoscopy;  Laterality: N/A;    HYSTERECTOMY      PERIPARTUM CHITO        Current Outpatient Medications:     acetaminophen (TYLENOL) 650 MG TbSR, Take 1 tablet (650 mg total) by mouth every 8 (eight) hours as needed (pain)., Disp: ,  Rfl: 0    ALPRAZolam (XANAX) 0.5 MG tablet, Take one tablet by mouth every night at bedtime and one tablet every day as needed, Disp: 45 tablet, Rfl: 0    aspirin (ECOTRIN) 81 MG EC tablet, Take 81 mg by mouth nightly. , Disp: , Rfl:     cetirizine (ZYRTEC) 10 MG tablet, Take 1 tablet (10 mg total) by mouth daily as needed for Allergies., Disp: 90 tablet, Rfl: 3    diclofenac sodium (VOLTAREN) 1 % Gel, Apply 2 g topically once daily., Disp: 100 g, Rfl: 2    EScitalopram oxalate (LEXAPRO) 5 MG Tab, take 1 tablet by mouth every evening at bedtime, Disp: 90 tablet, Rfl: 2    omeprazole (PRILOSEC) 40 MG capsule, Take 1 capsule (40 mg total) by mouth every morning., Disp: 30 capsule, Rfl: 2    pravastatin (PRAVACHOL) 40 MG tablet, Take 1 tablet (40 mg total) by mouth once daily., Disp: 90 tablet, Rfl: 3    valsartan-hydrochlorothiazide (DIOVAN-HCT) 160-12.5 mg per tablet, Take 1 tablet by mouth once daily., Disp: 30 tablet, Rfl: 6    azelastine (ASTELIN) 137 mcg (0.1 %) nasal spray, instill 1 spray (137 mcg total) by Nasal route 2 (two) times daily., Disp: 30 mL, Rfl: 3    memantine (NAMENDA) 10 MG Tab, One po BID, Disp: 60 tablet, Rfl: 3    memantine (NAMENDA) 5 MG Tab, One po q am for 2 weeks then one po BID, Disp: 60 tablet, Rfl: 0    mometasone 0.1% (ELOCON) 0.1 % cream, apply to affected area topically daily (Patient not taking: Reported on 5/23/2023), Disp: 45 g, Rfl: 3   Review of patient's allergies indicates:   Allergen Reactions    Lotensin [benazepril] Swelling    Penicillins Rash        Review of Systems   Constitutional:  Positive for unexpected weight change (7 Lbs loss in 4 mos). Negative for fever.   Cardiovascular:  Negative for palpitations.   Gastrointestinal:  Positive for abdominal pain and constipation.   Neurological:  Positive for weakness (Rt hand). Negative for dizziness and syncope.   Psychiatric/Behavioral:  Negative for agitation (and no wandering or pacing) and sleep disturbance.           Objective:      Physical Exam  Constitutional:       Appearance: She is not ill-appearing.   Neurological:      Mental Status: She is alert. Mental status is at baseline.      Cranial Nerves: No dysarthria.      Motor: Atrophy (Rt hand, FDI  and 4th and 5th fingers) present. No tremor or abnormal muscle tone.      Gait: Gait normal.      Comments: Timid affect  O to month, gave the year as 2022  Does not know name of president but family states normally would not. Cannot cite anything in the news despite watching the news daily.   Delayed recall  1/5            Assessment:       1. Late onset Alzheimer's disease without behavioral disturbance        Plan:          MOHAMUD, with worsening of significant short term memory loss. Has strong oversight from her daughters. Will likely soon start spending nights with one of her daughters.     Plan dc aricept bc of significant ( although asymptomatic) bradycardia ( HR low 40's to los 50's)  Will change from 23mg to 10mg/day for 2 weeks then dc it and then at that point begin memantine titrating slowly from 5mg/day to 10mg BID. Renal fx normal      Midline lower abdominal pain with intermittent constipation---has apt with GI in one month. Not in distress.               Alesha Levin MD   06/13/2023   10:28 AM

## 2023-07-03 ENCOUNTER — LAB VISIT (OUTPATIENT)
Dept: LAB | Facility: HOSPITAL | Age: 73
End: 2023-07-03
Attending: INTERNAL MEDICINE
Payer: MEDICARE

## 2023-07-03 DIAGNOSIS — I70.0 AORTIC ATHEROSCLEROSIS: ICD-10-CM

## 2023-07-03 LAB
CHOLEST SERPL-MCNC: 169 MG/DL (ref 120–199)
CHOLEST/HDLC SERPL: 2.8 {RATIO} (ref 2–5)
HDLC SERPL-MCNC: 60 MG/DL (ref 40–75)
HDLC SERPL: 35.5 % (ref 20–50)
LDLC SERPL CALC-MCNC: 97 MG/DL (ref 63–159)
NONHDLC SERPL-MCNC: 109 MG/DL
TRIGL SERPL-MCNC: 60 MG/DL (ref 30–150)

## 2023-07-03 PROCEDURE — 80061 LIPID PANEL: CPT | Performed by: INTERNAL MEDICINE

## 2023-07-03 PROCEDURE — 36415 COLL VENOUS BLD VENIPUNCTURE: CPT | Mod: PN | Performed by: INTERNAL MEDICINE

## 2023-07-12 ENCOUNTER — OFFICE VISIT (OUTPATIENT)
Dept: GASTROENTEROLOGY | Facility: CLINIC | Age: 73
End: 2023-07-12
Payer: MEDICARE

## 2023-07-12 VITALS
DIASTOLIC BLOOD PRESSURE: 67 MMHG | HEART RATE: 63 BPM | WEIGHT: 153.44 LBS | BODY MASS INDEX: 24.66 KG/M2 | SYSTOLIC BLOOD PRESSURE: 110 MMHG | HEIGHT: 66 IN

## 2023-07-12 DIAGNOSIS — R10.30 LOWER ABDOMINAL PAIN: ICD-10-CM

## 2023-07-12 DIAGNOSIS — K59.00 CONSTIPATION, UNSPECIFIED CONSTIPATION TYPE: Primary | ICD-10-CM

## 2023-07-12 PROCEDURE — 1125F PR PAIN SEVERITY QUANTIFIED, PAIN PRESENT: ICD-10-PCS | Mod: CPTII,S$GLB,,

## 2023-07-12 PROCEDURE — 3078F PR MOST RECENT DIASTOLIC BLOOD PRESSURE < 80 MM HG: ICD-10-PCS | Mod: CPTII,S$GLB,,

## 2023-07-12 PROCEDURE — 1159F MED LIST DOCD IN RCRD: CPT | Mod: CPTII,S$GLB,,

## 2023-07-12 PROCEDURE — 99214 PR OFFICE/OUTPT VISIT, EST, LEVL IV, 30-39 MIN: ICD-10-PCS | Mod: S$GLB,,,

## 2023-07-12 PROCEDURE — 3008F BODY MASS INDEX DOCD: CPT | Mod: CPTII,S$GLB,,

## 2023-07-12 PROCEDURE — 1159F PR MEDICATION LIST DOCUMENTED IN MEDICAL RECORD: ICD-10-PCS | Mod: CPTII,S$GLB,,

## 2023-07-12 PROCEDURE — 4010F ACE/ARB THERAPY RXD/TAKEN: CPT | Mod: CPTII,S$GLB,,

## 2023-07-12 PROCEDURE — 1125F AMNT PAIN NOTED PAIN PRSNT: CPT | Mod: CPTII,S$GLB,,

## 2023-07-12 PROCEDURE — 4010F PR ACE/ARB THEARPY RXD/TAKEN: ICD-10-PCS | Mod: CPTII,S$GLB,,

## 2023-07-12 PROCEDURE — 3074F SYST BP LT 130 MM HG: CPT | Mod: CPTII,S$GLB,,

## 2023-07-12 PROCEDURE — 3078F DIAST BP <80 MM HG: CPT | Mod: CPTII,S$GLB,,

## 2023-07-12 PROCEDURE — 3008F PR BODY MASS INDEX (BMI) DOCUMENTED: ICD-10-PCS | Mod: CPTII,S$GLB,,

## 2023-07-12 PROCEDURE — 99999 PR PBB SHADOW E&M-EST. PATIENT-LVL IV: CPT | Mod: PBBFAC,,,

## 2023-07-12 PROCEDURE — 3288F PR FALLS RISK ASSESSMENT DOCUMENTED: ICD-10-PCS | Mod: CPTII,S$GLB,,

## 2023-07-12 PROCEDURE — 99999 PR PBB SHADOW E&M-EST. PATIENT-LVL IV: ICD-10-PCS | Mod: PBBFAC,,,

## 2023-07-12 PROCEDURE — 1101F PT FALLS ASSESS-DOCD LE1/YR: CPT | Mod: CPTII,S$GLB,,

## 2023-07-12 PROCEDURE — 3074F PR MOST RECENT SYSTOLIC BLOOD PRESSURE < 130 MM HG: ICD-10-PCS | Mod: CPTII,S$GLB,,

## 2023-07-12 PROCEDURE — 1101F PR PT FALLS ASSESS DOC 0-1 FALLS W/OUT INJ PAST YR: ICD-10-PCS | Mod: CPTII,S$GLB,,

## 2023-07-12 PROCEDURE — 99214 OFFICE O/P EST MOD 30 MIN: CPT | Mod: S$GLB,,,

## 2023-07-12 PROCEDURE — 3288F FALL RISK ASSESSMENT DOCD: CPT | Mod: CPTII,S$GLB,,

## 2023-07-12 RX ORDER — BROMPHENIRAMINE MALEATE, DEXTROMETHORPHAN HBR, PHENYLEPHRINE HCL, DIPHENHYDRAMINE HCL, PHENYLEPHRINE HCL 0.52G
2 KIT ORAL 2 TIMES DAILY
Qty: 240 CAPSULE | Refills: 11 | Status: SHIPPED | OUTPATIENT
Start: 2023-07-12 | End: 2023-12-26

## 2023-07-12 NOTE — PROGRESS NOTES
Gastroenterology Clinic Consultation Note    Reason for Visit:  The primary encounter diagnosis was Constipation, unspecified constipation type. A diagnosis of Lower abdominal pain was also pertinent to this visit.    PCP:   Parminder Junior         Initial HPI   This is a 73 y.o. female referred from her PCP for ongoing stomach pain. Daughter with patient and assist with history. Patient reports lower abdominal pain most days that is relieved after defecation. Per daughter, patient does have a BM everyday, however, sometimes it is difficult. Does endorse some probable constipation related to her symptoms. Has taken Miralax in the past, which has helped her. Taking Mylanta which she reports helps her. Denies any unintentional weight loss, blood in stool, nausea, vomiting, diarrhea.     Of note, EGD on 10/1/2020 showed patient was positive for H. Pylori. Medication not taken as directed for uncertain circumstances, she did not complete her antibiotics. OV on 11/2/2020 with YOLETTE Wei NP; patient was rechecked for H. Pylori and was positive and retreated. No documented tests for eradication noted. Patient denies any epigastric pain, nausea, vomiting, bloating, hematemesis. States that her GERD is controlled on her current PPI regimen.      ROS:  Review of Systems   Constitutional:  Negative for chills, diaphoresis, fever, malaise/fatigue and weight loss.   HENT:  Negative for sore throat.    Eyes:  Negative for redness.   Gastrointestinal:  Negative for constipation, heartburn and melena.   Skin:  Negative for itching and rash.   Neurological:  Negative for dizziness, loss of consciousness and weakness.      Medical History:  has a past medical history of Allergy, Anxiety, Cataract, Colon polyps (2010), Hearing loss, Hypertension, Joint pain, and Mental disorder.    Surgical History:  has a past surgical history that includes Hysterectomy;  Colonoscopy (N/A, 3/5/2020); Esophagogastroduodenoscopy (N/A, 10/1/2020); and Esophagogastroduodenoscopy (N/A, 10/29/2021).    Family History: family history includes Breast cancer in her sister..       Review of patient's allergies indicates:   Allergen Reactions    Lotensin [benazepril] Swelling    Penicillins Rash       Current Outpatient Medications on File Prior to Visit   Medication Sig Dispense Refill    acetaminophen (TYLENOL) 650 MG TbSR Take 1 tablet (650 mg total) by mouth every 8 (eight) hours as needed (pain).  0    ALPRAZolam (XANAX) 0.5 MG tablet Take one tablet by mouth every night at bedtime and one tablet every day as needed 45 tablet 3    aspirin (ECOTRIN) 81 MG EC tablet Take 81 mg by mouth nightly.       cetirizine (ZYRTEC) 10 MG tablet Take 1 tablet (10 mg total) by mouth daily as needed for Allergies. 90 tablet 3    diclofenac sodium (VOLTAREN) 1 % Gel Apply 2 g topically once daily. 100 g 2    EScitalopram oxalate (LEXAPRO) 5 MG Tab take 1 tablet by mouth every evening at bedtime 90 tablet 2    memantine (NAMENDA) 10 MG Tab Take one tablet by mout twice daily 60 tablet 3    memantine (NAMENDA) 5 MG Tab Take one tablet by mouth every morning for 2 weeks, then increase to one twice daily 60 tablet 0    mometasone 0.1% (ELOCON) 0.1 % cream apply to affected area topically daily 45 g 3    omeprazole (PRILOSEC) 40 MG capsule Take 1 capsule (40 mg total) by mouth every morning. 30 capsule 2    pravastatin (PRAVACHOL) 40 MG tablet Take 1 tablet (40 mg total) by mouth once daily. 90 tablet 3    valsartan-hydrochlorothiazide (DIOVAN-HCT) 160-12.5 mg per tablet Take 1 tablet by mouth once daily. 30 tablet 6    azelastine (ASTELIN) 137 mcg (0.1 %) nasal spray instill 1 spray (137 mcg total) by Nasal route 2 (two) times daily. 30 mL 3    [DISCONTINUED] cimetidine (TAGAMET) 300 MG tablet Take 1 tablet (300 mg total) by mouth 2 (two) times daily. 180 tablet 1     No current facility-administered  "medications on file prior to visit.         Objective Findings:    Vital Signs:  /67   Pulse 63   Ht 5' 6" (1.676 m)   Wt 69.6 kg (153 lb 7 oz)   BMI 24.77 kg/m²   Body mass index is 24.77 kg/m².    Physical Exam:  Physical Exam  Vitals reviewed.   Constitutional:       Appearance: She is normal weight. She is not ill-appearing.   HENT:      Mouth/Throat:      Mouth: Mucous membranes are moist.      Pharynx: Oropharynx is clear.   Eyes:      General: No scleral icterus.  Abdominal:      General: Abdomen is flat. Bowel sounds are normal. There is no distension.      Palpations: Abdomen is soft. There is no mass.      Tenderness: There is no abdominal tenderness.      Hernia: No hernia is present.   Skin:     General: Skin is warm and dry.      Capillary Refill: Capillary refill takes less than 2 seconds.      Coloration: Skin is not jaundiced or pale.      Findings: No bruising or erythema.   Neurological:      Mental Status: She is alert. Mental status is at baseline.      Comments: Does have Alzeimers           Labs:  Lab Results   Component Value Date    WBC 3.91 05/23/2023    HGB 12.9 05/23/2023    HCT 40.7 05/23/2023     05/23/2023    CHOL 169 07/03/2023    TRIG 60 07/03/2023    HDL 60 07/03/2023    ALKPHOS 56 05/23/2023    LIPASE 17 09/23/2021    ALT 12 05/23/2023    AST 24 05/23/2023     05/23/2023    K 3.4 (L) 05/23/2023     05/23/2023    CREATININE 0.9 05/23/2023    BUN 10 05/23/2023    CO2 26 05/23/2023    TSH 1.500 01/25/2023    HGBA1C 5.2 11/04/2021       Imaging reviewed:   CT abdomen 5/29/2023  Impression:     No acute process seen.     Cholelithiasis but no evidence of acute cholecystitis.     Two left lobe liver cysts and focal fatty sparing near the falciform ligament.     Diverticulosis but no acute diverticulitis seen.        Electronically signed by: Nitin Costa MD  Date:                                            05/29/2023  Time:                                    "        09:52      Endoscopy reviewed:   EGD 10/29/2021  Impression:            - Small hiatal hernia.                          - Normal stomach.                          - Normal examined duodenum.                          - No specimens collected.                          - Likely a diverticular bleed   Recommendation:        - Return patient to hospital [facility] for ongoing care.                          - Monitor Hb to keep it between 7 and 9.                          - Full liquid diet   Attending Participation:        I personally performed the entire procedure.   Billy Vegas MD   10/29/2021 7:22:05 AM     Colonoscopy 3/5/2020  Impression:           - Diverticulosis in the entire examined colon.                         - The examination was otherwise normal on direct                         and retroflexion views.                         - No specimens collected.   Recommendation:       - Discharge patient to home.                         - Resume previous diet.                         - Continue present medications.                         - Repeat colonoscopy in 5 years for surveillance.                         - Return to primary care physician.                         - The signs and symptoms of potential delayed                         complications were discussed with the patient.                         - Patient has a contact number available for                         emergencies.                         - Return to normal activities tomorrow.   Attending Participation:        I personally performed the entire procedure.   Nathalia Kemp MD   3/5/2020 10:30:16 AM     Assessment:  1. Constipation, unspecified constipation type    2. Lower abdominal pain             Plan:  Recommendations to improve bowel regimen reviewed with patient. Will trial adding Fiber supplement and Miralax. KUB ordered to assess for bowel obstruction, large stool burden. CT Abdomen without acute abdominal process; did show  cholelithiasis, however symptoms not consistent with worsening cholecystitis. Will defer US abdomen at this time and consider if pain persist despite optimizing stool regimen.  KUB ordered   RTC PRN      Thank you for allowing me to participate in this patient's care.    Sincerely,     Erinn Greenberg NP  Gastroenterology Department  [facility]

## 2023-07-12 NOTE — PROGRESS NOTES
"GENERAL GI PATIENT INTAKE:    COVID symptoms in the last 7 days (runny nose, sore throat, congestion, cough, fever): No  PCP: Parminder Junior  If not PCP-  number given to establish [phone]: N/A    ALLERGIES REVIEWED:  Yes    CHIEF COMPLAINT:    Chief Complaint   Patient presents with    GI Problem     Abdominal pain       VITAL SIGNS:  /67   Pulse 63   Ht 5' 6" (1.676 m)   Wt 69.6 kg (153 lb 7 oz)   BMI 24.77 kg/m²      Change in medical, surgical, family or social history: No      REVIEWED MEDICATION LIST RECONCILED INCLUDING ABOVE MEDS:  Yes     "

## 2023-07-12 NOTE — PATIENT INSTRUCTIONS
- Start taking Miralax once daily, can use up to twice daily     [facility]  High Fiber Diet    25-30 grams of fiber per day is recommended  Fiber cereal = 5 grams (Raisin Bran, Shredded Wheat, Grape Nuts)  Konsyl 1 teaspoon = 6 grams  Metamucil 1 tablespoon = 3 grams  Citrucel 1 tablespoon = 2 grams  Fiber Choice = 3-4 per day    Drink at least 4-5 glasses of liquids per day or the fiber can be constipating rather then stimulating to your gut.  Boil and bake potatoes in their skin. Eat the skin, too.  Include fresh fruits and raw vegetables in your daily diet. Raw fruits and vegetables have more useful fiber than those that have been peeled, cooked, pureed, or otherwise processed.  Eat a wide variety of fibrous foods in reasonable amounts. Increase fiber intake slowly especially if you have been on a low-fiber diet.  Eat more legumes-peas, beans, soybeans.  For snacks, try dried fruit, whole wheat and rye crackers.  Avoid instant-cook hot cereals. Use the longer cooking cereals.  Use bran whenever possible. Sprinkle it on top of cereal, mix it into mashed potatoes or hamburger meat, or use it in combination dishes such as meat loaf.   Substitute whole grain, whole wheat and bran products for white flour products.  Eat slowly and chew your food thoroughly.        Start daily fiber.  Take 1 tsp of fiber powder (psyllium or other sugar-free powder).  Mix in 8 oz of water.  Take x 3-5 days.  Then, increase fiber by 1 tsp every 3-5 days until stool is easy to pass.  Stop and continue at that dose.   Do not exceed 6 tsps/day. GOAL:  More well-formed stool (one continuous well-formed piece vs. Pellets) and minimize straining with initiation.        Foods High in Fiber    This diet furnishes adequate amounts of all the essential nutrients needed by the body and a very liberal fiber or roughage content. Roughage is indigestible fiber found in fruits, vegetables and whole grain cereals. It provides bulk to the  large intestine and, accompanied by an adequate fluid intake, is a stimulant to elimination. Regular eating and elimination habits are vital to good health.     Fruits:  Use all fruits and juices liberally; fresh, cooked, dried or canned. Eat fruit raw and with skins when possible. Have at least four servings of fruit daily including a citrus fruit and a stewed dried fruit. Hard seeds of fruits (berries, figs, Grapes, mangoes, tomatoes) etc. may be removed.    Vegetables: Use all vegetables liberally. Green leafy vegetables, such as cabbage, spinach, lettuce, broccoli, and other greens are particularly good.    Potato: As desired. Serve baked frequently and eat the skin. Other starchy foods such as rice, macaroni, etc., may be occasionally substituted. Chew popcorn well and do not eat hard kernels.    Meat, Fish, Poultry: One or two servings daily.    Eggs: One daily if you are not on a low cholesterol diet.    Milk: Include at least one-half pint daily. Whole milk or skimmed may be used.     Cereals: Use whole grain breads and cereals such as bran, bran flakes, grape nut flakes, puffed wheat, oatmeal, Wheaties, etc., as much as possible. Refined breaks and cereals are not restricted; however, they do not contain the bulk necessary for this diet.     Sugars, Sweets: Use very moderately. Depend on fruit as dessert.    Fats: Use butter or margarine as desired. Oil or dressing on salads as desired. Fried foods may be used in moderation. Nuts may be eaten if you chew them well and may be ground or finely chopped for cooking.   Sample Menu                                                                                 Breakfast                          Lunch  Orange juice, 4 ounces                                                Vegetable soup                    Stewed fruit                                                                 Fresh fruit plate with cottage cheese  Shredded wheat                                                            Whole wheat toast  Scrambled eggs                                                           Butter  Whole wheat toast                                                       Coffee or tea  Dinner                                                                         Bedtime  Large glass tomato juice                                             1 glass milk  Roast beef                                                                   stewed fruit  Baked potato with skin  Buttered spinach  Raw vegetable salad  Baked apple with skin   Coffee or tea

## 2023-07-17 ENCOUNTER — HOSPITAL ENCOUNTER (OUTPATIENT)
Dept: RADIOLOGY | Facility: HOSPITAL | Age: 73
Discharge: HOME | End: 2023-07-17
Payer: MEDICARE

## 2023-07-17 ENCOUNTER — OFFICE VISIT (OUTPATIENT)
Dept: CARDIOLOGY | Facility: CLINIC | Age: 73
End: 2023-07-17
Payer: MEDICARE

## 2023-07-17 VITALS
BODY MASS INDEX: 24.87 KG/M2 | DIASTOLIC BLOOD PRESSURE: 62 MMHG | SYSTOLIC BLOOD PRESSURE: 112 MMHG | WEIGHT: 154.75 LBS | HEART RATE: 63 BPM | HEIGHT: 66 IN

## 2023-07-17 DIAGNOSIS — K59.00 CONSTIPATION, UNSPECIFIED CONSTIPATION TYPE: ICD-10-CM

## 2023-07-17 DIAGNOSIS — I10 HTN (HYPERTENSION), BENIGN: ICD-10-CM

## 2023-07-17 DIAGNOSIS — I10 PRIMARY HYPERTENSION: ICD-10-CM

## 2023-07-17 DIAGNOSIS — I70.0 AORTIC ATHEROSCLEROSIS: ICD-10-CM

## 2023-07-17 DIAGNOSIS — R00.1 ASYMPTOMATIC BRADYCARDIA: Primary | ICD-10-CM

## 2023-07-17 PROCEDURE — 1126F AMNT PAIN NOTED NONE PRSNT: CPT | Mod: CPTII,S$GLB,, | Performed by: INTERNAL MEDICINE

## 2023-07-17 PROCEDURE — 4010F ACE/ARB THERAPY RXD/TAKEN: CPT | Mod: CPTII,S$GLB,, | Performed by: INTERNAL MEDICINE

## 2023-07-17 PROCEDURE — 3074F PR MOST RECENT SYSTOLIC BLOOD PRESSURE < 130 MM HG: ICD-10-PCS | Mod: CPTII,S$GLB,, | Performed by: INTERNAL MEDICINE

## 2023-07-17 PROCEDURE — 74018 RADEX ABDOMEN 1 VIEW: CPT | Mod: 26,,, | Performed by: RADIOLOGY

## 2023-07-17 PROCEDURE — 3078F DIAST BP <80 MM HG: CPT | Mod: CPTII,S$GLB,, | Performed by: INTERNAL MEDICINE

## 2023-07-17 PROCEDURE — 3008F PR BODY MASS INDEX (BMI) DOCUMENTED: ICD-10-PCS | Mod: CPTII,S$GLB,, | Performed by: INTERNAL MEDICINE

## 2023-07-17 PROCEDURE — 74018 XR KUB: ICD-10-PCS | Mod: 26,,, | Performed by: RADIOLOGY

## 2023-07-17 PROCEDURE — 1159F MED LIST DOCD IN RCRD: CPT | Mod: CPTII,S$GLB,, | Performed by: INTERNAL MEDICINE

## 2023-07-17 PROCEDURE — 4010F PR ACE/ARB THEARPY RXD/TAKEN: ICD-10-PCS | Mod: CPTII,S$GLB,, | Performed by: INTERNAL MEDICINE

## 2023-07-17 PROCEDURE — 1126F PR PAIN SEVERITY QUANTIFIED, NO PAIN PRESENT: ICD-10-PCS | Mod: CPTII,S$GLB,, | Performed by: INTERNAL MEDICINE

## 2023-07-17 PROCEDURE — 99215 PR OFFICE/OUTPT VISIT, EST, LEVL V, 40-54 MIN: ICD-10-PCS | Mod: S$GLB,,, | Performed by: INTERNAL MEDICINE

## 2023-07-17 PROCEDURE — 99999 PR PBB SHADOW E&M-EST. PATIENT-LVL IV: CPT | Mod: PBBFAC,,, | Performed by: INTERNAL MEDICINE

## 2023-07-17 PROCEDURE — 74018 RADEX ABDOMEN 1 VIEW: CPT | Mod: TC,PO

## 2023-07-17 PROCEDURE — 3288F PR FALLS RISK ASSESSMENT DOCUMENTED: ICD-10-PCS | Mod: CPTII,S$GLB,, | Performed by: INTERNAL MEDICINE

## 2023-07-17 PROCEDURE — 99215 OFFICE O/P EST HI 40 MIN: CPT | Mod: S$GLB,,, | Performed by: INTERNAL MEDICINE

## 2023-07-17 PROCEDURE — 3008F BODY MASS INDEX DOCD: CPT | Mod: CPTII,S$GLB,, | Performed by: INTERNAL MEDICINE

## 2023-07-17 PROCEDURE — 3074F SYST BP LT 130 MM HG: CPT | Mod: CPTII,S$GLB,, | Performed by: INTERNAL MEDICINE

## 2023-07-17 PROCEDURE — 3078F PR MOST RECENT DIASTOLIC BLOOD PRESSURE < 80 MM HG: ICD-10-PCS | Mod: CPTII,S$GLB,, | Performed by: INTERNAL MEDICINE

## 2023-07-17 PROCEDURE — 3288F FALL RISK ASSESSMENT DOCD: CPT | Mod: CPTII,S$GLB,, | Performed by: INTERNAL MEDICINE

## 2023-07-17 PROCEDURE — 1101F PR PT FALLS ASSESS DOC 0-1 FALLS W/OUT INJ PAST YR: ICD-10-PCS | Mod: CPTII,S$GLB,, | Performed by: INTERNAL MEDICINE

## 2023-07-17 PROCEDURE — 1101F PT FALLS ASSESS-DOCD LE1/YR: CPT | Mod: CPTII,S$GLB,, | Performed by: INTERNAL MEDICINE

## 2023-07-17 PROCEDURE — 99999 PR PBB SHADOW E&M-EST. PATIENT-LVL IV: ICD-10-PCS | Mod: PBBFAC,,, | Performed by: INTERNAL MEDICINE

## 2023-07-17 PROCEDURE — 1159F PR MEDICATION LIST DOCUMENTED IN MEDICAL RECORD: ICD-10-PCS | Mod: CPTII,S$GLB,, | Performed by: INTERNAL MEDICINE

## 2023-07-17 RX ORDER — PRAVASTATIN SODIUM 80 MG/1
80 TABLET ORAL DAILY
Qty: 90 TABLET | Refills: 3 | Status: SHIPPED | OUTPATIENT
Start: 2023-07-17 | End: 2024-07-16

## 2023-07-17 NOTE — PATIENT INSTRUCTIONS
Assessment/Plan:  Soila Chávez is a 73 y.o. female with HTN, aortic atherosclerosis, overweight, who presents for a follow up appointment.     Bradycardia- Pt is asymptomatic.  Cardiac Event Monitor on 3/15/2023 demonstrated 12 patient triggered transmissions, correlating with sinus rhythm 49-88 bpm.  Unfortunately, pt did not show up for stress test as scheduled.  No further workup indicated at this time.  Continue to monitor.       2. HTN- Controlled.  Continue current medications.      3. Aortic Atherosclerosis- Increase pravastatin to 80 mg daily.      4. Overweight- Encourage diet, exercise, and weight loss.     Follow up in 6 months with lipids prior

## 2023-07-17 NOTE — PROGRESS NOTES
"[facility]      Chief Complaint   Patient presents with    Bradycardia       Patient ID: Soila Chávez is a 73 y.o. female with HTN, aortic atherosclerosis, overweight, who presents for a follow up appointment.  Pertinent history/events are as follows:     -Pt kindly referred by Dr. Junior for evaluation of bradycardia (per her note on 2/18/2023):  "Bradycardia   Worsening   Prior 50's last year, now 44 in EKG (sinus mary grace), 48 in office   Asymptomatic"    -At our initial clinic visit on 2/23/2023, Mrs. Chávez was accompanied by her daughter.  She reported slow heart rate in the 50's over a year ago.  Reports heart rate in the 40's recently.  She has no chest pain, SOB, palpitations, TIA symptoms or syncope.  Heart rate 53 bpm in clinic today.  EKG on 2/20/2023 shows marked sinus bradycardia (44 bpm) with nonspecific T wave abnormality.  Plan:   Bradycardia- Pt is asymptomatic.  EKG on 2/20/2023 shows marked sinus bradycardia (44 bpm) with nonspecific T wave abnormality.  Check exercise stress echo to evaluate for chronotropic competence.  Check 30 day event monitor.  Amlodipine has been shown to cause bradycardia in rare instances.  Therefore, will discontinue amlodipine and start HCTZ 12.5 mg  daily.       HTN- Changes as per above.  Pt to monitor blood pressure and heart rate on above regimen.  Aortic Atherosclerosis- Increase pravastatin to 40 mg daily.    Overweight- Encourage diet, exercise, and weight loss.     HPI:  Mrs. Chávez is accompanied by her daughter.  She reports no chest pain, SOB, LE edema, TIA symptoms or syncope.  Cardiac Event Monitor on 3/15/2023 demonstrated 12 patient triggered transmissions, correlating with sinus rhythm 49-88 bpm  Unfortunately, pt did not show up for stress test as scheduled.     Past Medical History:   Diagnosis Date    Allergy     Anxiety     Cataract     Colon polyps 2010    Hearing loss     Hypertension     Joint pain     Mental disorder  "     Past Surgical History:   Procedure Laterality Date    COLONOSCOPY N/A 3/5/2020    Procedure: COLONOSCOPY;  Surgeon: Nathalia Kemp MD;  Location: [facility] (4TH FLR);  Service: Colon and Rectal;  Laterality: N/A;  requests later appt time if available - Pt open to any MD- Pt informed arrival time may be adjusted, Pt verbalized understanding- ERW2/24/20@1409    ESOPHAGOGASTRODUODENOSCOPY N/A 10/1/2020    Procedure: EGD (ESOPHAGOGASTRODUODENOSCOPY);  Surgeon: Casey Guerrero MD;  Location: [facility] (4TH FLR);  Service: Endoscopy;  Laterality: N/A;  covid test 9/28-[facility]    ESOPHAGOGASTRODUODENOSCOPY N/A 10/29/2021    Procedure: EGD (ESOPHAGOGASTRODUODENOSCOPY);  Surgeon: Billy Vegas MD;  Location: [facility];  Service: Endoscopy;  Laterality: N/A;    HYSTERECTOMY      PERIPARTUM CHITO     Social History     Socioeconomic History    Marital status:    Tobacco Use    Smoking status: Never     Passive exposure: Never    Smokeless tobacco: Never   Substance and Sexual Activity    Alcohol use: Yes     Comment: beer occasionally    Drug use: No    Sexual activity: Not Currently     Birth control/protection: None   Social History Narrative    June 2016    The patient reports that she lives alone normally, however her daughter recently moved in with her    She has a 43-year-old daughter, Saima    And a 26-year-old daughterChris, who is a schoolteacher for 6 in seventh grade in [facility] [facility]    She is retired from working as a  in the school system    She now works about 5-15 hours a week for [employer], which she enjoys.    Enjoys working at [employer] couple days a week     Family History   Problem Relation Age of Onset    Breast cancer Sister     Colon cancer Neg Hx     Ovarian cancer Neg Hx     Melanoma Neg Hx        Review of patient's allergies indicates:   Allergen Reactions    Lotensin [benazepril] Swelling    Penicillins Rash       Medication List with Changes/Refills   Current  Medications    ALPRAZOLAM (XANAX) 0.5 MG TABLET    Take one tablet by mouth every night at bedtime and one tablet every day as needed    ASPIRIN (ECOTRIN) 81 MG EC TABLET    Take 81 mg by mouth nightly.     AZELASTINE (ASTELIN) 137 MCG (0.1 %) NASAL SPRAY    instill 1 spray (137 mcg total) by Nasal route 2 (two) times daily.    CETIRIZINE (ZYRTEC) 10 MG TABLET    Take 1 tablet (10 mg total) by mouth daily as needed for Allergies.    DICLOFENAC SODIUM (VOLTAREN) 1 % GEL    Apply 2 g topically once daily.    ESCITALOPRAM OXALATE (LEXAPRO) 5 MG TAB    take 1 tablet by mouth every evening at bedtime    MEMANTINE (NAMENDA) 10 MG TAB    Take one tablet by mout twice daily    MEMANTINE (NAMENDA) 5 MG TAB    Take one tablet by mouth every morning for 2 weeks, then increase to one twice daily    MOMETASONE 0.1% (ELOCON) 0.1 % CREAM    apply to affected area topically daily    OMEPRAZOLE (PRILOSEC) 40 MG CAPSULE    Take 1 capsule (40 mg total) by mouth every morning.    PRAVASTATIN (PRAVACHOL) 40 MG TABLET    Take 1 tablet (40 mg total) by mouth once daily.    PSYLLIUM 0.52 GRAM CAPSULE    Take 4 capsules (2.08 g total) by mouth 2 (two) times a day.    VALSARTAN-HYDROCHLOROTHIAZIDE (DIOVAN-HCT) 160-12.5 MG PER TABLET    Take 1 tablet by mouth once daily.   Discontinued Medications    ACETAMINOPHEN (TYLENOL) 650 MG TBSR    Take 1 tablet (650 mg total) by mouth every 8 (eight) hours as needed (pain).       Review of Systems  Constitution: Denies chills, fever, and sweats.  HENT: Denies headaches or blurry vision.  Cardiovascular: Denies chest pain or irregular heart beat.  Respiratory: Denies cough or shortness of breath.  Gastrointestinal: Denies abdominal pain, nausea, or vomiting.  Musculoskeletal: Denies muscle cramps.  Neurological: Denies dizziness or focal weakness.  Psychiatric/Behavioral: Normal mental status.  Hematologic/Lymphatic: Denies bleeding problem or easy bruising/bleeding.  Skin: Denies rash or suspicious  "lesions    Physical Examination  /62   Pulse 63   Ht 5' 6" (1.676 m)   Wt 70.2 kg (154 lb 12.2 oz)   BMI 24.98 kg/m²     Constitutional: No acute distress, conversant  HEENT: Sclera anicteric, Pupils equal, round and reactive to light, extraocular motions intact, Oropharynx clear  Neck: No JVD, no carotid bruits  Cardiovascular: regular rate and rhythm, no murmur, rubs or gallops, normal S1/S2  Pulmonary: Clear to auscultation bilaterally  Abdominal: Abdomen soft, nontender, nondistended, positive bowel sounds  Extremities: No lower extremity edema,   Pulses:  Carotid pulses are 2+ on the right side, and 2+ on the left side.  Radial pulses are 2+ on the right side, and 2+ on the left side.   Femoral pulses are 2+ on the right side, and 2+ on the left side.  Popliteal pulses are 2+ on the right side, and 2+ on the left side.   Dorsalis pedis pulses are 2+ on the right side, and 2+ on the left side.   Posterior tibial pulses are 2+ on the right side, and 2+ on the left side.    Skin: No ecchymosis, erythema, or ulcers  Psych: Alert and oriented x 3, appropriate affect  Neuro: CNII-XII intact, no focal deficits    Labs:  Most Recent Data  CBC:   Lab Results   Component Value Date    WBC 3.91 05/23/2023    HGB 12.9 05/23/2023    HCT 40.7 05/23/2023     05/23/2023    MCV 87 05/23/2023    RDW 13.5 05/23/2023     BMP:   Lab Results   Component Value Date     05/23/2023    K 3.4 (L) 05/23/2023     05/23/2023    CO2 26 05/23/2023    BUN 10 05/23/2023    CREATININE 0.9 05/23/2023     (H) 05/23/2023    CALCIUM 9.8 05/23/2023    MG 2.0 10/29/2021     LFTS;   Lab Results   Component Value Date    PROT 7.7 05/23/2023    ALBUMIN 4.2 05/23/2023    BILITOT 0.3 05/23/2023    AST 24 05/23/2023    ALKPHOS 56 05/23/2023    ALT 12 05/23/2023     COAGS: No results found for: INR, PROTIME, PTT  FLP:   Lab Results   Component Value Date    CHOL 169 07/03/2023    HDL 60 07/03/2023    LDLCALC 97.0 " 07/03/2023    TRIG 60 07/03/2023    CHOLHDL 35.5 07/03/2023     CARDIAC:   Lab Results   Component Value Date    TROPONINI 0.009 09/23/2021     Imaging:    EKG 2/20/2023:  Marked sinus bradycardia (44 bpm) with nonspecific T wave abnormality.    Cardiac Event Monitor 3/15/2023:  There were 12 patient triggered transmissions, correlating with sinus rhythm 49-88 bpm.    Assessment/Plan:  Soila Chávez is a 73 y.o. female with HTN, aortic atherosclerosis, overweight, who presents for a follow up appointment.     Bradycardia- Pt is asymptomatic.  Cardiac Event Monitor on 3/15/2023 demonstrated 12 patient triggered transmissions, correlating with sinus rhythm 49-88 bpm.  Unfortunately, pt did not show up for stress test as scheduled.  No further workup indicated at this time.  Continue to monitor.       2. HTN- Controlled.  Continue current medications.      3. Aortic Atherosclerosis- Increase pravastatin to 80 mg daily.      4. Overweight- Encourage diet, exercise, and weight loss.     Follow up in 6 months with lipids prior    Total duration of face to face visit time 30 minutes.  Total time spent counseling greater than fifty percent of total visit time.  Counseling included discussion regarding imaging findings, diagnosis, possibilities, treatment options, risks and benefits.  The patient had many questions regarding the options and long-term effects.    Champ Chapman MD, PhD  Interventional Cardiology

## 2023-07-19 DIAGNOSIS — K21.9 GASTROESOPHAGEAL REFLUX DISEASE WITHOUT ESOPHAGITIS: ICD-10-CM

## 2023-07-19 RX ORDER — OMEPRAZOLE 40 MG/1
40 CAPSULE, DELAYED RELEASE ORAL EVERY MORNING
Qty: 30 CAPSULE | Refills: 2 | Status: CANCELLED | OUTPATIENT
Start: 2023-07-19 | End: 2023-10-17

## 2023-07-26 ENCOUNTER — PATIENT MESSAGE (OUTPATIENT)
Dept: INTERNAL MEDICINE | Facility: CLINIC | Age: 73
End: 2023-07-26
Payer: MEDICARE

## 2023-07-26 ENCOUNTER — PATIENT MESSAGE (OUTPATIENT)
Dept: OBSTETRICS AND GYNECOLOGY | Facility: CLINIC | Age: 73
End: 2023-07-26
Payer: MEDICARE

## 2023-07-26 DIAGNOSIS — K21.9 GASTROESOPHAGEAL REFLUX DISEASE WITHOUT ESOPHAGITIS: ICD-10-CM

## 2023-07-26 RX ORDER — OMEPRAZOLE 40 MG/1
40 CAPSULE, DELAYED RELEASE ORAL EVERY MORNING
Qty: 90 CAPSULE | Refills: 3 | Status: SHIPPED | OUTPATIENT
Start: 2023-07-26 | End: 2024-03-23 | Stop reason: SDUPTHER

## 2023-07-26 NOTE — TELEPHONE ENCOUNTER
No care due was identified.  [facility] Embedded Care Due Messages. Reference number: [id number].   7/26/2023 7:55:36 AM CDT

## 2023-07-27 DIAGNOSIS — Z12.31 BREAST CANCER SCREENING BY MAMMOGRAM: Primary | ICD-10-CM

## 2023-07-28 ENCOUNTER — HOSPITAL ENCOUNTER (OUTPATIENT)
Dept: RADIOLOGY | Facility: HOSPITAL | Age: 73
Discharge: HOME | End: 2023-07-28
Attending: OBSTETRICS & GYNECOLOGY
Payer: MEDICARE

## 2023-07-28 VITALS — BODY MASS INDEX: 24.75 KG/M2 | HEIGHT: 66 IN | WEIGHT: 154 LBS

## 2023-07-28 DIAGNOSIS — Z12.31 BREAST CANCER SCREENING BY MAMMOGRAM: ICD-10-CM

## 2023-07-28 PROCEDURE — 77063 BREAST TOMOSYNTHESIS BI: CPT | Mod: 26,,, | Performed by: RADIOLOGY

## 2023-07-28 PROCEDURE — 77063 MAMMO DIGITAL SCREENING BILAT WITH TOMO: ICD-10-PCS | Mod: 26,,, | Performed by: RADIOLOGY

## 2023-07-28 PROCEDURE — 77067 MAMMO DIGITAL SCREENING BILAT WITH TOMO: ICD-10-PCS | Mod: 26,,, | Performed by: RADIOLOGY

## 2023-07-28 PROCEDURE — 77067 SCR MAMMO BI INCL CAD: CPT | Mod: 26,,, | Performed by: RADIOLOGY

## 2023-07-28 PROCEDURE — 77067 SCR MAMMO BI INCL CAD: CPT | Mod: TC

## 2023-09-05 ENCOUNTER — PATIENT MESSAGE (OUTPATIENT)
Dept: INTERNAL MEDICINE | Facility: CLINIC | Age: 73
End: 2023-09-05
Payer: MEDICARE

## 2023-09-13 ENCOUNTER — OFFICE VISIT (OUTPATIENT)
Dept: INTERNAL MEDICINE | Facility: CLINIC | Age: 73
End: 2023-09-13
Payer: MEDICARE

## 2023-09-13 VITALS
SYSTOLIC BLOOD PRESSURE: 134 MMHG | WEIGHT: 154.31 LBS | DIASTOLIC BLOOD PRESSURE: 76 MMHG | HEART RATE: 53 BPM | OXYGEN SATURATION: 99 % | HEIGHT: 66 IN | BODY MASS INDEX: 24.8 KG/M2

## 2023-09-13 DIAGNOSIS — I70.0 AORTIC ATHEROSCLEROSIS: ICD-10-CM

## 2023-09-13 DIAGNOSIS — Z78.0 POST-MENOPAUSAL: ICD-10-CM

## 2023-09-13 DIAGNOSIS — R00.1 BRADYCARDIA: ICD-10-CM

## 2023-09-13 DIAGNOSIS — G30.1 LATE ONSET ALZHEIMER'S DEMENTIA WITHOUT BEHAVIORAL DISTURBANCE: ICD-10-CM

## 2023-09-13 DIAGNOSIS — K21.9 GASTROESOPHAGEAL REFLUX DISEASE WITHOUT ESOPHAGITIS: ICD-10-CM

## 2023-09-13 DIAGNOSIS — E04.1 THYROID NODULE: ICD-10-CM

## 2023-09-13 DIAGNOSIS — I10 PRIMARY HYPERTENSION: Primary | ICD-10-CM

## 2023-09-13 DIAGNOSIS — F02.80 LATE ONSET ALZHEIMER'S DEMENTIA WITHOUT BEHAVIORAL DISTURBANCE: ICD-10-CM

## 2023-09-13 PROCEDURE — 99999 PR PBB SHADOW E&M-EST. PATIENT-LVL III: CPT | Mod: PBBFAC,,, | Performed by: PHYSICIAN ASSISTANT

## 2023-09-13 PROCEDURE — 99214 PR OFFICE/OUTPT VISIT, EST, LEVL IV, 30-39 MIN: ICD-10-PCS | Mod: S$GLB,,, | Performed by: PHYSICIAN ASSISTANT

## 2023-09-13 PROCEDURE — 99999 PR PBB SHADOW E&M-EST. PATIENT-LVL III: ICD-10-PCS | Mod: PBBFAC,,, | Performed by: PHYSICIAN ASSISTANT

## 2023-09-13 PROCEDURE — 3075F PR MOST RECENT SYSTOLIC BLOOD PRESS GE 130-139MM HG: ICD-10-PCS | Mod: CPTII,S$GLB,, | Performed by: PHYSICIAN ASSISTANT

## 2023-09-13 PROCEDURE — 3078F DIAST BP <80 MM HG: CPT | Mod: CPTII,S$GLB,, | Performed by: PHYSICIAN ASSISTANT

## 2023-09-13 PROCEDURE — 3008F PR BODY MASS INDEX (BMI) DOCUMENTED: ICD-10-PCS | Mod: CPTII,S$GLB,, | Performed by: PHYSICIAN ASSISTANT

## 2023-09-13 PROCEDURE — 1101F PR PT FALLS ASSESS DOC 0-1 FALLS W/OUT INJ PAST YR: ICD-10-PCS | Mod: CPTII,S$GLB,, | Performed by: PHYSICIAN ASSISTANT

## 2023-09-13 PROCEDURE — 1126F AMNT PAIN NOTED NONE PRSNT: CPT | Mod: CPTII,S$GLB,, | Performed by: PHYSICIAN ASSISTANT

## 2023-09-13 PROCEDURE — 3075F SYST BP GE 130 - 139MM HG: CPT | Mod: CPTII,S$GLB,, | Performed by: PHYSICIAN ASSISTANT

## 2023-09-13 PROCEDURE — 3008F BODY MASS INDEX DOCD: CPT | Mod: CPTII,S$GLB,, | Performed by: PHYSICIAN ASSISTANT

## 2023-09-13 PROCEDURE — 4010F PR ACE/ARB THEARPY RXD/TAKEN: ICD-10-PCS | Mod: CPTII,S$GLB,, | Performed by: PHYSICIAN ASSISTANT

## 2023-09-13 PROCEDURE — 3288F FALL RISK ASSESSMENT DOCD: CPT | Mod: CPTII,S$GLB,, | Performed by: PHYSICIAN ASSISTANT

## 2023-09-13 PROCEDURE — 1126F PR PAIN SEVERITY QUANTIFIED, NO PAIN PRESENT: ICD-10-PCS | Mod: CPTII,S$GLB,, | Performed by: PHYSICIAN ASSISTANT

## 2023-09-13 PROCEDURE — 3078F PR MOST RECENT DIASTOLIC BLOOD PRESSURE < 80 MM HG: ICD-10-PCS | Mod: CPTII,S$GLB,, | Performed by: PHYSICIAN ASSISTANT

## 2023-09-13 PROCEDURE — 99214 OFFICE O/P EST MOD 30 MIN: CPT | Mod: S$GLB,,, | Performed by: PHYSICIAN ASSISTANT

## 2023-09-13 PROCEDURE — 1159F MED LIST DOCD IN RCRD: CPT | Mod: CPTII,S$GLB,, | Performed by: PHYSICIAN ASSISTANT

## 2023-09-13 PROCEDURE — 3288F PR FALLS RISK ASSESSMENT DOCUMENTED: ICD-10-PCS | Mod: CPTII,S$GLB,, | Performed by: PHYSICIAN ASSISTANT

## 2023-09-13 PROCEDURE — 4010F ACE/ARB THERAPY RXD/TAKEN: CPT | Mod: CPTII,S$GLB,, | Performed by: PHYSICIAN ASSISTANT

## 2023-09-13 PROCEDURE — 1101F PT FALLS ASSESS-DOCD LE1/YR: CPT | Mod: CPTII,S$GLB,, | Performed by: PHYSICIAN ASSISTANT

## 2023-09-13 PROCEDURE — 1159F PR MEDICATION LIST DOCUMENTED IN MEDICAL RECORD: ICD-10-PCS | Mod: CPTII,S$GLB,, | Performed by: PHYSICIAN ASSISTANT

## 2023-09-13 NOTE — PROGRESS NOTES
"INTERNAL MEDICINE PROGRESS NOTE    CHIEF COMPLAINT     Chief Complaint   Patient presents with    Follow-up    Hypertension    Dementia       HPI     Soila Chávez is a 73 y.o. female who presents for a follow-up visit today.    PCP is Parminder Junior MD, patient is remotely known to me.     Patient presents for BP follow-up and Dementia follow-up.     Patient is a 73 y.o. female who presents for HTN, late onset Alzheimer's dementia she is here today with her daughter. Patient is feeling "fine" and has no complaints.      Last seen by PCP in May.          Bradycardia  Stable in the 50's   Asymptomatic  Cards - Dr. Chapman     Was being seen by care at home visit regularly      Normocytic Anemia; Thrombocytopenia- resolved, will recheck with next blood draw  Likely 2/2 blood loss, GI bleed 10/2021  Had been high NSAID use in the past. Encouraged tylenol  Normalized  -no reports of blood loss, normalized     HTN  Valsartan-hctz 160-12.5 daily, stable; No CP/HA/SOB/Vision changes     History of Left flank pain/back, had declined work-up in the past  ct abdom/pelvis done in May with no acute abnormalities, did lose 8 lbs earlier this year but now weight is stable   Wt Readings from Last 10 Encounters:   09/13/23 70 kg (154 lb 5.2 oz)   07/28/23 69.9 kg (154 lb)   07/17/23 70.2 kg (154 lb 12.2 oz)   07/12/23 69.6 kg (153 lb 7 oz)   06/13/23 68 kg (149 lb 14.6 oz)   05/23/23 68.7 kg (151 lb 7.3 oz)   02/23/23 72.2 kg (159 lb 2.8 oz)   02/20/23 71.2 kg (156 lb 15.5 oz)   02/13/23 68.5 kg (151 lb)   01/24/23 68.5 kg (151 lb 0.2 oz)         Aortic atherosclerosis  Pravastatin 10   Tolerating well      Elevated ASCVD Risk score  On pravastatin to reduce risk  Tolerating well      Multinodular Thyroid   Repeat US annually for 5 years (dec 2021 initially)  Does not meet criteria for f/u per 2/20/2023 US  Stable      GERD  Improved with omeprazole  Reviewed colonoscopy, EGD report (hx of diverticulitis) " 10/29/2021  Stable        Past Medical History:  Past Medical History:   Diagnosis Date    Allergy     Anxiety     Cataract     Colon polyps 2010    Hearing loss     Hypertension     Joint pain     Mental disorder        Home Medications:  Prior to Admission medications    Medication Sig Start Date End Date Taking? Authorizing Provider   ALPRAZolam (XANAX) 0.5 MG tablet Take one tablet by mouth every night at bedtime and one tablet every day as needed 6/13/23  Yes Alesha Levin MD   aspirin (ECOTRIN) 81 MG EC tablet Take 81 mg by mouth nightly.    Yes Provider, Historical   cetirizine (ZYRTEC) 10 MG tablet Take 1 tablet (10 mg total) by mouth daily as needed for Allergies. 5/17/22  Yes Parminder Junior MD   EScitalopram oxalate (LEXAPRO) 5 MG Tab take 1 tablet by mouth every evening at bedtime 6/13/23  Yes Alesha Levin MD   memantine (NAMENDA) 10 MG Tab Take one tablet by mout twice daily 6/13/23  Yes Alesha Levin MD   memantine (NAMENDA) 5 MG Tab Take one tablet by mouth every morning for 2 weeks, then increase to one twice daily 6/13/23  Yes Alesha Levin MD   omeprazole (PRILOSEC) 40 MG capsule Take 1 capsule (40 mg total) by mouth every morning. 7/26/23 10/24/23 Yes Parminder Junior MD   pravastatin (PRAVACHOL) 80 MG tablet Take 1 tablet (80 mg total) by mouth once daily. 7/17/23 7/16/24 Yes Champ Chapman MD PhD   valsartan-hydrochlorothiazide (DIOVAN-HCT) 160-12.5 mg per tablet Take 1 tablet by mouth once daily. 2/23/23 2/23/24 Yes Champ Chapman MD PhD   azelastine (ASTELIN) 137 mcg (0.1 %) nasal spray instill 1 spray (137 mcg total) by Nasal route 2 (two) times daily. 5/17/22 7/17/23  Parminder Junior MD   diclofenac sodium (VOLTAREN) 1 % Gel Apply 2 g topically once daily.  Patient not taking: Reported on 9/13/2023 2/27/23   Parminder Junior MD   mometasone 0.1% (ELOCON) 0.1 % cream apply to affected area topically daily  Patient not taking: Reported on  "9/13/2023 4/21/22   Adriano Guzmán MD   psyllium 0.52 gram capsule Take 4 capsules (2.08 g total) by mouth 2 (two) times a day.  Patient not taking: Reported on 9/13/2023 7/12/23 7/11/24  Erinn Greenberg, NP   cimetidine (TAGAMET) 300 MG tablet Take 1 tablet (300 mg total) by mouth 2 (two) times daily. 3/10/21 9/23/21  Jazzmine Parson MD       Review of Systems:  Review of Systems   Constitutional:  Negative for chills and fever.   HENT:  Negative for sore throat and trouble swallowing.    Eyes:  Negative for visual disturbance.   Respiratory:  Negative for cough and shortness of breath.    Cardiovascular:  Negative for chest pain.   Gastrointestinal:  Negative for abdominal pain, constipation, diarrhea, nausea and vomiting.   Genitourinary:  Negative for dysuria and flank pain.   Musculoskeletal:  Negative for back pain, neck pain and neck stiffness.   Skin:  Negative for rash.   Neurological:  Negative for dizziness, syncope, weakness and headaches.   Psychiatric/Behavioral:  Negative for confusion.        Health Maintainence:   Immunizations:  Health Maintenance         Date Due Completion Date    COVID-19 Vaccine (4 - Pfizer risk series) 01/24/2022 11/29/2021    DEXA Scan 08/25/2023 8/25/2020    High Dose Statin 07/17/2024 7/17/2023    Mammogram 07/28/2024 7/28/2023    Colorectal Cancer Screening 03/05/2025 3/5/2020    Override on 4/28/2010: Done (RESULTS IN LEGACY)    Lipid Panel 07/03/2028 7/3/2023             PHYSICAL EXAM     /76 (BP Location: Left arm, Patient Position: Sitting, BP Method: Large (Manual))   Pulse (!) 53   Ht 5' 6" (1.676 m)   Wt 70 kg (154 lb 5.2 oz)   SpO2 99%   BMI 24.91 kg/m²     Physical Exam  Vitals and nursing note reviewed.   Constitutional:       Appearance: Normal appearance.      Comments: Elderly female in NAD or apparent pain. She makes good eye contact, speaks in clear full sentences and is cooperative.    HENT:      Head: Normocephalic and atraumatic.      " Nose: Nose normal.      Mouth/Throat:      Pharynx: Oropharynx is clear.   Eyes:      Conjunctiva/sclera: Conjunctivae normal.   Neck:      Comments: No masses   Cardiovascular:      Rate and Rhythm: Normal rate and regular rhythm.      Pulses: Normal pulses.   Pulmonary:      Effort: No respiratory distress.   Abdominal:      Tenderness: There is no abdominal tenderness.   Musculoskeletal:         General: Normal range of motion.      Cervical back: No rigidity.   Skin:     General: Skin is warm and dry.      Capillary Refill: Capillary refill takes less than 2 seconds.      Findings: No rash.   Neurological:      General: No focal deficit present.      Mental Status: She is alert.      Gait: Gait normal.   Psychiatric:         Mood and Affect: Mood normal.         LABS     Lab Results   Component Value Date    HGBA1C 5.2 11/04/2021     CMP  Sodium   Date Value Ref Range Status   05/23/2023 141 136 - 145 mmol/L Final     Potassium   Date Value Ref Range Status   05/23/2023 3.4 (L) 3.5 - 5.1 mmol/L Final     Chloride   Date Value Ref Range Status   05/23/2023 106 95 - 110 mmol/L Final     CO2   Date Value Ref Range Status   05/23/2023 26 23 - 29 mmol/L Final     Glucose   Date Value Ref Range Status   05/23/2023 124 (H) 70 - 110 mg/dL Final     BUN   Date Value Ref Range Status   05/23/2023 10 8 - 23 mg/dL Final     Creatinine   Date Value Ref Range Status   05/23/2023 0.9 0.5 - 1.4 mg/dL Final     Calcium   Date Value Ref Range Status   05/23/2023 9.8 8.7 - 10.5 mg/dL Final     Total Protein   Date Value Ref Range Status   05/23/2023 7.7 6.0 - 8.4 g/dL Final     Albumin   Date Value Ref Range Status   05/23/2023 4.2 3.5 - 5.2 g/dL Final     Total Bilirubin   Date Value Ref Range Status   05/23/2023 0.3 0.1 - 1.0 mg/dL Final     Comment:     For infants and newborns, interpretation of results should be based  on gestational age, weight and in agreement with clinical  observations.    Premature Infant recommended  reference ranges:  Up to 24 hours.............<8.0 mg/dL  Up to 48 hours............<12.0 mg/dL  3-5 days..................<15.0 mg/dL  6-29 days.................<15.0 mg/dL       Alkaline Phosphatase   Date Value Ref Range Status   05/23/2023 56 55 - 135 U/L Final     AST   Date Value Ref Range Status   05/23/2023 24 10 - 40 U/L Final     ALT   Date Value Ref Range Status   05/23/2023 12 10 - 44 U/L Final     Anion Gap   Date Value Ref Range Status   05/23/2023 9 8 - 16 mmol/L Final     eGFR if    Date Value Ref Range Status   12/08/2021 >60 >60 mL/min/1.73 m^2 Final     eGFR if non    Date Value Ref Range Status   12/08/2021 >60 >60 mL/min/1.73 m^2 Final     Comment:     Calculation used to obtain the estimated glomerular filtration  rate (eGFR) is the CKD-EPI equation.        Lab Results   Component Value Date    WBC 3.91 05/23/2023    HGB 12.9 05/23/2023    HCT 40.7 05/23/2023    MCV 87 05/23/2023     05/23/2023     Lab Results   Component Value Date    CHOL 169 07/03/2023    CHOL 183 08/19/2022    CHOL 195 11/04/2021     Lab Results   Component Value Date    HDL 60 07/03/2023    HDL 69 08/19/2022    HDL 54 11/04/2021     Lab Results   Component Value Date    LDLCALC 97.0 07/03/2023    LDLCALC 101.6 08/19/2022    LDLCALC 127.4 11/04/2021     Lab Results   Component Value Date    TRIG 60 07/03/2023    TRIG 62 08/19/2022    TRIG 68 11/04/2021     Lab Results   Component Value Date    CHOLHDL 35.5 07/03/2023    CHOLHDL 37.7 08/19/2022    CHOLHDL 27.7 11/04/2021     Lab Results   Component Value Date    TSH 1.500 01/25/2023       ASSESSMENT/PLAN     Soila Chely Saira is a 73 y.o. female     Soila was seen today for follow-up, hypertension and dementia.    Diagnoses and all orders for this visit:    Primary hypertension    Post-menopausal  -     DXA Bone Density Axial Skeleton 1 or more sites; Future    Gastroesophageal reflux disease without esophagitis    Late onset  Alzheimer's dementia without behavioral disturbance    Bradycardia    Aortic atherosclerosis    Thyroid nodule          Loyda Mata PA-C   Department of Internal Medicine - [facility]   1:21 PM

## 2023-10-01 DIAGNOSIS — M54.40 CHRONIC MIDLINE LOW BACK PAIN WITH SCIATICA, SCIATICA LATERALITY UNSPECIFIED: ICD-10-CM

## 2023-10-01 DIAGNOSIS — G89.29 CHRONIC MIDLINE LOW BACK PAIN WITH SCIATICA, SCIATICA LATERALITY UNSPECIFIED: ICD-10-CM

## 2023-10-02 RX ORDER — DICLOFENAC SODIUM 10 MG/G
2 GEL TOPICAL DAILY
Qty: 100 G | Refills: 2 | Status: SHIPPED | OUTPATIENT
Start: 2023-10-02

## 2023-10-18 ENCOUNTER — OFFICE VISIT (OUTPATIENT)
Dept: NEUROLOGY | Facility: CLINIC | Age: 73
End: 2023-10-18
Payer: MEDICARE

## 2023-10-18 DIAGNOSIS — G30.1 LATE ONSET ALZHEIMER'S DISEASE WITHOUT BEHAVIORAL DISTURBANCE: Primary | ICD-10-CM

## 2023-10-18 DIAGNOSIS — F02.80 LATE ONSET ALZHEIMER'S DISEASE WITHOUT BEHAVIORAL DISTURBANCE: Primary | ICD-10-CM

## 2023-10-18 PROCEDURE — 99442 PR PHYSICIAN TELEPHONE EVALUATION 11-20 MIN: ICD-10-PCS | Mod: 95,,, | Performed by: PSYCHIATRY & NEUROLOGY

## 2023-10-18 PROCEDURE — 4010F ACE/ARB THERAPY RXD/TAKEN: CPT | Mod: CPTII,95,, | Performed by: PSYCHIATRY & NEUROLOGY

## 2023-10-18 PROCEDURE — 99442 PR PHYSICIAN TELEPHONE EVALUATION 11-20 MIN: CPT | Mod: 95,,, | Performed by: PSYCHIATRY & NEUROLOGY

## 2023-10-18 PROCEDURE — 4010F PR ACE/ARB THEARPY RXD/TAKEN: ICD-10-PCS | Mod: CPTII,95,, | Performed by: PSYCHIATRY & NEUROLOGY

## 2023-10-18 RX ORDER — MEMANTINE HYDROCHLORIDE 10 MG/1
TABLET ORAL
Qty: 180 TABLET | Refills: 2 | Status: SHIPPED | OUTPATIENT
Start: 2023-10-18 | End: 2023-12-26 | Stop reason: SDUPTHER

## 2023-10-18 NOTE — PROGRESS NOTES
Subjective:       Patient ID: Soila Chávez is a 73 y.o. female.    Chief Complaint: No chief complaint on file.      F/u of dementia, last visit 6-13-23      Overall the patient has done well since our last visit.  This phone call was with her daughter billy Phan.  Both Shy and Arthur are visiting the patient daily and Shy's young daughter also sees Ms. Chávez daily, which helps her psyche considerably.  Overall she is doing very well.  She does get a little confused in the afternoons but is never agitated and there has been no wandering.      She no longer drives.    Her family takes her places however and she seems to feel well and is in good spirits.      She is able to text and use face time.      Her family loads her dispenser and she is compliant in taking her medications in his overall and agreeable person.  Family notes that her remote memory is excellent.      Patient is able to provide identifier such as her own phone number date of birth social security number etc.            MRI Brain Without Contrast  Order: [id number]  Status: Final result       Visible to patient: Yes (not seen)       Next appt: 12/20/2023 at 02:00 PM in Internal Medicine (Parminder Junior MD)       Dx: Late onset Alzheimer's disease withou...    1 Result Note       1 Follow-up Encounter  Details      Reading Physician Reading Date Result Priority  Sven Granados MD  [phone] 8/7/2021 Routine    Narrative & Impression  EXAMINATION:  MRI BRAIN WITHOUT CONTRAST     CLINICAL HISTORY:  Dementia, Alzheimer's suspected;Alzheimer's disease with late onset     TECHNIQUE:  Multiplanar multisequence MR imaging of the brain was performed without intravenous contrast.     COMPARISON:  Head CT 12/10/2020     FINDINGS:  Intracranial Compartment:     Stable mild prominence of the ventricles and sulci, does not appear unusually advanced for age.  Medial temporal atrophy score 1.     Mild patchy T2/FLAIR hyperintensity in the  supratentorial white matter, nonspecific but most likely reflecting chronic microvascular ischemic changes. No recent or remote major vascular distribution infarct.  Three punctate foci prior microhemorrhage the periphery of the posterior left cerebral hemisphere.  No acute hemorrhage.  No mass effect or midline shift.     No extra-axial blood or fluid collections.     Normal vascular flow voids are preserved.     Skull/Extracranial Contents (limited evaluation):     Bone marrow signal intensity is normal.     Impression:     No evidence of acute intracranial pathology.     Mild generalized cerebral volume loss.     Modest chronic microvascular ischemic change.     Three punctate foci of prior hemorrhage in the periphery of the posterior left cerebral hemisphere.        Electronically signed by: Sven Granados MD  Date:                                            08/07/2021  Time:                                           13:33    Exam Ended: 08/07/21 12:16 Last Resulted: 08/07/21 13:33                 Past Medical History:   Diagnosis Date    Allergy     Anxiety     Cataract     Colon polyps 2010    Hearing loss     Hypertension     Joint pain     Mental disorder       Past Surgical History:   Procedure Laterality Date    COLONOSCOPY N/A 3/5/2020    Procedure: COLONOSCOPY;  Surgeon: Nathalia Kemp MD;  Location: [address]);  Service: Colon and Rectal;  Laterality: N/A;  requests later appt time if available - Pt open to any MD- Pt informed arrival time may be adjusted, Pt verbalized understanding- ERW2/24/20@1409    ESOPHAGOGASTRODUODENOSCOPY N/A 10/1/2020    Procedure: EGD (ESOPHAGOGASTRODUODENOSCOPY);  Surgeon: Casey Guerrero MD;  Location: [address];  Service: Endoscopy;  Laterality: N/A;  covid test 9/28-[facility]    ESOPHAGOGASTRODUODENOSCOPY N/A 10/29/2021    Procedure: EGD (ESOPHAGOGASTRODUODENOSCOPY);  Surgeon: Billy Vegas MD;  Location: [facility];  Service: Endoscopy;  Laterality:  N/A;    HYSTERECTOMY      PERIPARTUM CHITO        Current Outpatient Medications:     ALPRAZolam (XANAX) 0.5 MG tablet, Take one tablet by mouth every night at bedtime and one tablet every day as needed, Disp: 45 tablet, Rfl: 3    aspirin (ECOTRIN) 81 MG EC tablet, Take 81 mg by mouth nightly. , Disp: , Rfl:     azelastine (ASTELIN) 137 mcg (0.1 %) nasal spray, instill 1 spray (137 mcg total) by Nasal route 2 (two) times daily., Disp: 30 mL, Rfl: 3    cetirizine (ZYRTEC) 10 MG tablet, Take 1 tablet (10 mg total) by mouth daily as needed for Allergies., Disp: 90 tablet, Rfl: 3    diclofenac sodium (VOLTAREN) 1 % Gel, Apply 2 g topically once daily., Disp: 100 g, Rfl: 2    EScitalopram oxalate (LEXAPRO) 5 MG Tab, take 1 tablet by mouth every evening at bedtime, Disp: 90 tablet, Rfl: 2    memantine (NAMENDA) 10 MG Tab, Take one tablet by mout twice daily, Disp: 180 tablet, Rfl: 2    mometasone 0.1% (ELOCON) 0.1 % cream, apply to affected area topically daily (Patient not taking: Reported on 9/13/2023), Disp: 45 g, Rfl: 3    omeprazole (PRILOSEC) 40 MG capsule, Take 1 capsule (40 mg total) by mouth every morning., Disp: 90 capsule, Rfl: 3    pravastatin (PRAVACHOL) 80 MG tablet, Take 1 tablet (80 mg total) by mouth once daily., Disp: 90 tablet, Rfl: 3    psyllium 0.52 gram capsule, Take 4 capsules (2.08 g total) by mouth 2 (two) times a day. (Patient not taking: Reported on 9/13/2023), Disp: 240 capsule, Rfl: 11    valsartan-hydrochlorothiazide (DIOVAN-HCT) 160-12.5 mg per tablet, Take 1 tablet by mouth once daily., Disp: 30 tablet, Rfl: 6   Review of patient's allergies indicates:   Allergen Reactions    Lotensin [benazepril] Swelling    Penicillins Rash        Review of Systems   Musculoskeletal:  Negative for gait problem (and no falls).   Neurological:  Negative for seizures, syncope and headaches.   Psychiatric/Behavioral:  Negative for dysphoric mood and sleep disturbance (sleep is better than in the past). The  patient is not nervous/anxious.            Objective:      Physical Exam  Neurological:      Comments: Pt at baseline           Assessment:       1. Late onset Alzheimer's disease without behavioral disturbance        Plan:                        Alesha Levin MD   10/18/2023   4:55 PM Established Patient - Audio Only Telehealth Visit     The patient location is: Home  The chief complaint leading to consultation is: dementia  Visit type: Virtual visit with audio only (telephone)  Total time spent with patient: 12 minutes, 18 seconds       The reason for the audio only service rather than synchronous audio and video virtual visit was related to technical difficulties or patient preference/necessity.     Each patient to whom I provide medical services by telemedicine is:  (1) informed of the relationship between the physician and patient and the respective role of any other health care provider with respect to management of the patient; and (2) notified that they may decline to receive medical services by telemedicine and may withdraw from such care at any time. Patient verbally consented to receive this service via voice-only telephone call.       HPI: see HPI  Patient stable, and continue memantine 10 mg b.i.d..      Plan return in 6 months                        This service was not originating from a related E/M service provided within the previous 7 days nor will  to an E/M service or procedure within the next 24 hours or my soonest available appointment.  Prevailing standard of care was able to be met in this audio-only visit.

## 2023-10-19 ENCOUNTER — TELEPHONE (OUTPATIENT)
Dept: NEUROLOGY | Facility: CLINIC | Age: 73
End: 2023-10-19
Payer: MEDICARE

## 2023-10-19 NOTE — TELEPHONE ENCOUNTER
Called and spoke to patient about her mothers portal access. I wasn't able to hep the her with it but was able to give her [facility] technical support number. Marli voiced understanding

## 2023-10-19 NOTE — TELEPHONE ENCOUNTER
----- Message from Alesha Levin MD sent at 10/18/2023  5:12 PM CDT -----  Her dtr Arthur was inadvertently dropped from portal access. Can you help to add her back? Her # is . [email or web address]  ----- Message -----  From: Doretha Holguin MA  Sent: 10/18/2023   3:33 PM CDT  To: Alesha Levin MD      ----- Message -----  From: Edy Kraus  Sent: 10/18/2023   3:19 PM CDT  To: Poonam Eduardo Staff    Pt note to doctor    Who called: pt's daughter  Who called for pt:  Best call back #: (pt's daughter, 1st number to call)  [phone] (pt's other daughter, the caller, second number to call if no answer from first)  Add notes: caller said she needs Dr. Levin to call her sister or the second number listed for pt's virtual appt today due to pt having dementia; says they need to call the pt on [employer] call for this due to pt not possibly answering her phone for this virtual visit

## 2023-12-05 RX ORDER — VALSARTAN AND HYDROCHLOROTHIAZIDE 160; 12.5 MG/1; MG/1
1 TABLET, FILM COATED ORAL DAILY
Qty: 30 TABLET | Refills: 6 | Status: SHIPPED | OUTPATIENT
Start: 2023-12-05 | End: 2024-12-04

## 2023-12-08 DIAGNOSIS — F41.9 ANXIETY: ICD-10-CM

## 2023-12-08 RX ORDER — ALPRAZOLAM 0.5 MG/1
TABLET ORAL
Qty: 45 TABLET | Refills: 3 | Status: SHIPPED | OUTPATIENT
Start: 2023-12-08 | End: 2024-04-02

## 2023-12-26 ENCOUNTER — OFFICE VISIT (OUTPATIENT)
Dept: NEUROLOGY | Facility: CLINIC | Age: 73
End: 2023-12-26
Payer: MEDICARE

## 2023-12-26 VITALS
WEIGHT: 145.5 LBS | DIASTOLIC BLOOD PRESSURE: 69 MMHG | HEART RATE: 66 BPM | HEIGHT: 66 IN | SYSTOLIC BLOOD PRESSURE: 103 MMHG | BODY MASS INDEX: 23.38 KG/M2

## 2023-12-26 DIAGNOSIS — F02.80 LATE ONSET ALZHEIMER'S DISEASE WITHOUT BEHAVIORAL DISTURBANCE: Primary | ICD-10-CM

## 2023-12-26 DIAGNOSIS — G30.1 LATE ONSET ALZHEIMER'S DISEASE WITHOUT BEHAVIORAL DISTURBANCE: Primary | ICD-10-CM

## 2023-12-26 PROCEDURE — 3074F SYST BP LT 130 MM HG: CPT | Mod: CPTII,S$GLB,, | Performed by: PSYCHIATRY & NEUROLOGY

## 2023-12-26 PROCEDURE — 3008F BODY MASS INDEX DOCD: CPT | Mod: CPTII,S$GLB,, | Performed by: PSYCHIATRY & NEUROLOGY

## 2023-12-26 PROCEDURE — 1101F PT FALLS ASSESS-DOCD LE1/YR: CPT | Mod: CPTII,S$GLB,, | Performed by: PSYCHIATRY & NEUROLOGY

## 2023-12-26 PROCEDURE — 1126F AMNT PAIN NOTED NONE PRSNT: CPT | Mod: CPTII,S$GLB,, | Performed by: PSYCHIATRY & NEUROLOGY

## 2023-12-26 PROCEDURE — 1160F PR REVIEW ALL MEDS BY PRESCRIBER/CLIN PHARMACIST DOCUMENTED: ICD-10-PCS | Mod: CPTII,S$GLB,, | Performed by: PSYCHIATRY & NEUROLOGY

## 2023-12-26 PROCEDURE — 99999 PR PBB SHADOW E&M-EST. PATIENT-LVL IV: CPT | Mod: PBBFAC,,, | Performed by: PSYCHIATRY & NEUROLOGY

## 2023-12-26 PROCEDURE — 3288F FALL RISK ASSESSMENT DOCD: CPT | Mod: CPTII,S$GLB,, | Performed by: PSYCHIATRY & NEUROLOGY

## 2023-12-26 PROCEDURE — 1160F RVW MEDS BY RX/DR IN RCRD: CPT | Mod: CPTII,S$GLB,, | Performed by: PSYCHIATRY & NEUROLOGY

## 2023-12-26 PROCEDURE — 4010F ACE/ARB THERAPY RXD/TAKEN: CPT | Mod: CPTII,S$GLB,, | Performed by: PSYCHIATRY & NEUROLOGY

## 2023-12-26 PROCEDURE — 3078F DIAST BP <80 MM HG: CPT | Mod: CPTII,S$GLB,, | Performed by: PSYCHIATRY & NEUROLOGY

## 2023-12-26 PROCEDURE — 1159F PR MEDICATION LIST DOCUMENTED IN MEDICAL RECORD: ICD-10-PCS | Mod: CPTII,S$GLB,, | Performed by: PSYCHIATRY & NEUROLOGY

## 2023-12-26 PROCEDURE — 99215 OFFICE O/P EST HI 40 MIN: CPT | Mod: S$GLB,,, | Performed by: PSYCHIATRY & NEUROLOGY

## 2023-12-26 PROCEDURE — 3288F PR FALLS RISK ASSESSMENT DOCUMENTED: ICD-10-PCS | Mod: CPTII,S$GLB,, | Performed by: PSYCHIATRY & NEUROLOGY

## 2023-12-26 PROCEDURE — 1101F PR PT FALLS ASSESS DOC 0-1 FALLS W/OUT INJ PAST YR: ICD-10-PCS | Mod: CPTII,S$GLB,, | Performed by: PSYCHIATRY & NEUROLOGY

## 2023-12-26 PROCEDURE — 1159F MED LIST DOCD IN RCRD: CPT | Mod: CPTII,S$GLB,, | Performed by: PSYCHIATRY & NEUROLOGY

## 2023-12-26 PROCEDURE — 99999 PR PBB SHADOW E&M-EST. PATIENT-LVL IV: ICD-10-PCS | Mod: PBBFAC,,, | Performed by: PSYCHIATRY & NEUROLOGY

## 2023-12-26 PROCEDURE — 1126F PR PAIN SEVERITY QUANTIFIED, NO PAIN PRESENT: ICD-10-PCS | Mod: CPTII,S$GLB,, | Performed by: PSYCHIATRY & NEUROLOGY

## 2023-12-26 PROCEDURE — 4010F PR ACE/ARB THEARPY RXD/TAKEN: ICD-10-PCS | Mod: CPTII,S$GLB,, | Performed by: PSYCHIATRY & NEUROLOGY

## 2023-12-26 PROCEDURE — 3078F PR MOST RECENT DIASTOLIC BLOOD PRESSURE < 80 MM HG: ICD-10-PCS | Mod: CPTII,S$GLB,, | Performed by: PSYCHIATRY & NEUROLOGY

## 2023-12-26 PROCEDURE — 99215 PR OFFICE/OUTPT VISIT, EST, LEVL V, 40-54 MIN: ICD-10-PCS | Mod: S$GLB,,, | Performed by: PSYCHIATRY & NEUROLOGY

## 2023-12-26 PROCEDURE — 3008F PR BODY MASS INDEX (BMI) DOCUMENTED: ICD-10-PCS | Mod: CPTII,S$GLB,, | Performed by: PSYCHIATRY & NEUROLOGY

## 2023-12-26 PROCEDURE — 3074F PR MOST RECENT SYSTOLIC BLOOD PRESSURE < 130 MM HG: ICD-10-PCS | Mod: CPTII,S$GLB,, | Performed by: PSYCHIATRY & NEUROLOGY

## 2023-12-26 RX ORDER — DONEPEZIL HYDROCHLORIDE 10 MG/1
TABLET, FILM COATED ORAL
Qty: 90 TABLET | Refills: 2 | Status: SHIPPED | OUTPATIENT
Start: 2023-12-26 | End: 2024-04-02 | Stop reason: SDUPTHER

## 2023-12-26 RX ORDER — MEMANTINE HYDROCHLORIDE 10 MG/1
TABLET ORAL
Qty: 180 TABLET | Refills: 2 | Status: SHIPPED | OUTPATIENT
Start: 2023-12-26

## 2023-12-26 NOTE — PROGRESS NOTES
Subjective:       Patient ID: Soila Chávez is a 73 y.o. female.    Chief Complaint: Memory Loss        Here with one of her daughters. Memory loss has worsened. Recently went to hairesser and  did not wear her dentures which is not like her. Also tends to immediately forget that her dtr spent time with her.   She still lives alone. Daughter tried to take her in but she is constantly saying she wants to go home.   Nothing went wrong when living on her own but the situation requires a lot of work for her dtr who manages the meds, food, and finances.   There have no falls, no agitation, no wandering, ie from her own home. However, she did walk out of her dtr's home stating she was being kept against her will.  She sleeps over 14 hours a night; her family suspects that when in her own home she gets up in the middle of the night.  Her 2 dtrs live 15 minutes away.   Pt does not drive.   Has no stove but uses a hot plate. Has never burned anything.              Assessment/Plan:  Soila Chávez is a 73 y.o. female with HTN, aortic atherosclerosis, overweight, who presents for a follow up appointment.      1. Bradycardia- Pt is asymptomatic.  Cardiac Event Monitor on 3/15/2023 demonstrated 12 patient triggered transmissions, correlating with sinus rhythm 49-88 bpm.  Unfortunately, pt did not show up for stress test as scheduled.  No further workup indicated at this time.  Continue to monitor.        2. HTN- Controlled.  Continue current medications.       3. Aortic Atherosclerosis- Increase pravastatin to 80 mg daily.       4. Overweight- Encourage diet, exercise, and weight loss.      Follow up in 6 months with lipids prior           Past Medical History:   Diagnosis Date    Allergy     Anxiety     Cataract     Colon polyps 2010    Hearing loss     Hypertension     Joint pain     Mental disorder       Past Surgical History:   Procedure Laterality Date    COLONOSCOPY N/A 3/5/2020    Procedure: COLONOSCOPY;   Surgeon: Nathalia Kemp MD;  Location: [facility] (4TH FLR);  Service: Colon and Rectal;  Laterality: N/A;  requests later appt time if available - Pt open to any MD- Pt informed arrival time may be adjusted, Pt verbalized understanding- ERW2/24/20@1409    ESOPHAGOGASTRODUODENOSCOPY N/A 10/1/2020    Procedure: EGD (ESOPHAGOGASTRODUODENOSCOPY);  Surgeon: Casey Guerrero MD;  Location: [facility] ENDO (4TH FLR);  Service: Endoscopy;  Laterality: N/A;  covid test 9/28-[facility]    ESOPHAGOGASTRODUODENOSCOPY N/A 10/29/2021    Procedure: EGD (ESOPHAGOGASTRODUODENOSCOPY);  Surgeon: Billy Vegas MD;  [facility]: [facility];  Service: Endoscopy;  Laterality: N/A;    HYSTERECTOMY      PERIPARTUM CHITO        Current Outpatient Medications:     ALPRAZolam (XANAX) 0.5 MG tablet, Take one tablet by mouth every night at bedtime and one tablet every day as needed, Disp: 45 tablet, Rfl: 3    aspirin (ECOTRIN) 81 MG EC tablet, Take 81 mg by mouth nightly. , Disp: , Rfl:     cetirizine (ZYRTEC) 10 MG tablet, Take 1 tablet (10 mg total) by mouth daily as needed for Allergies., Disp: 90 tablet, Rfl: 3    diclofenac sodium (VOLTAREN) 1 % Gel, Apply 2 g topically once daily., Disp: 100 g, Rfl: 2    EScitalopram oxalate (LEXAPRO) 5 MG Tab, take 1 tablet by mouth every evening at bedtime, Disp: 90 tablet, Rfl: 2    memantine (NAMENDA) 10 MG Tab, Take one tablet by mout twice daily, Disp: 180 tablet, Rfl: 2    omeprazole (PRILOSEC) 40 MG capsule, Take 1 capsule (40 mg total) by mouth every morning., Disp: 90 capsule, Rfl: 3    pravastatin (PRAVACHOL) 80 MG tablet, Take 1 tablet (80 mg total) by mouth once daily., Disp: 90 tablet, Rfl: 3    valsartan-hydrochlorothiazide (DIOVAN-HCT) 160-12.5 mg per tablet, Take 1 tablet by mouth once daily., Disp: 30 tablet, Rfl: 6    azelastine (ASTELIN) 137 mcg (0.1 %) nasal spray, instill 1 spray (137 mcg total) by Nasal route 2 (two) times daily., Disp: 30 mL, Rfl: 3    mometasone 0.1% (ELOCON) 0.1 % cream,  apply to affected area topically daily (Patient not taking: Reported on 9/13/2023), Disp: 45 g, Rfl: 3    psyllium 0.52 gram capsule, Take 4 capsules (2.08 g total) by mouth 2 (two) times a day. (Patient not taking: Reported on 9/13/2023), Disp: 240 capsule, Rfl: 11   Review of patient's allergies indicates:   Allergen Reactions    Lotensin [benazepril] Swelling    Penicillins Rash        Review of Systems   Eyes:  Negative for visual disturbance.   Cardiovascular:  Negative for chest pain.   Musculoskeletal:  Negative for gait problem (walks unassisted).   Neurological:  Positive for memory loss. Negative for syncope (no syncope ever) and headaches.   Psychiatric/Behavioral:  Negative for dysphoric mood and sleep disturbance.            Objective:      Physical Exam  Constitutional:       Appearance: She is not ill-appearing.   Neurological:      Mental Status: She is alert. Mental status is at baseline.      Cranial Nerves: No dysarthria.      Motor: No tremor.      Comments: Very quiet  Answers questions appropriately  Does not recall that yesterday was Baldom[place] (although recalled when giving clues).   Cannot figure out today's date even after she did say Baldomero discussing   President-does not know his name   Delayed recall 0/5           Assessment:       1. Late onset Alzheimer's disease without behavioral disturbance        Plan:            MOHAMUD, progressing  For now pt will return to her own home during the day and then will stay with one of her 2 dtrs at night. (they will alternate keeping her at night).   Will also consult SS for input as to what help she might be eligible for.  Will continue namenda 10 mg BID  Will add back aricept ( I had dc'd it bc of bradycardia but memory has worsened and she has never been symptomatic of her low heart rate)            Alesha Levin MD   12/26/2023   9:27 AM

## 2024-01-02 ENCOUNTER — PATIENT MESSAGE (OUTPATIENT)
Dept: NEUROLOGY | Facility: CLINIC | Age: 74
End: 2024-01-02
Payer: MEDICARE

## 2024-01-03 ENCOUNTER — OFFICE VISIT (OUTPATIENT)
Dept: OPTOMETRY | Facility: CLINIC | Age: 74
End: 2024-01-03
Payer: MEDICARE

## 2024-01-03 DIAGNOSIS — H52.4 BILATERAL PRESBYOPIA: ICD-10-CM

## 2024-01-03 DIAGNOSIS — H25.20: Primary | ICD-10-CM

## 2024-01-03 PROCEDURE — 92014 COMPRE OPH EXAM EST PT 1/>: CPT | Mod: S$GLB,,, | Performed by: OPTOMETRIST

## 2024-01-03 PROCEDURE — 92015 DETERMINE REFRACTIVE STATE: CPT | Mod: S$GLB,,, | Performed by: OPTOMETRIST

## 2024-01-03 PROCEDURE — 99999 PR PBB SHADOW E&M-EST. PATIENT-LVL III: CPT | Mod: PBBFAC,,, | Performed by: OPTOMETRIST

## 2024-01-03 NOTE — PROGRESS NOTES
SINDY    PARRISH: 3 year  Chief complaint (CC): Routine   Glasses? +  Contacts? -  H/o eye surgery, injections or laser: -  H/o eye injury: -  Known eye conditions? -  Family h/o eye conditions? -  Eye gtts? -      (-) Flashes (-)  Floaters (-) Mucous   (-)  Tearing (-) Itching (-) Burning   (-) Headaches (-) Eye Pain/discomfort (-) Irritation   (-)  Redness (-) Double vision (-) Blurry vision    Diabetic? -       Last edited by Santhosh Owen on 1/3/2024  9:06 AM.            Assessment /Plan     For exam results, see Encounter Report.      Age-related hypermature cataract, unspecified laterality  -     Ambulatory referral/consult to Ophthalmology; Future; Expected date: 01/10/2024  Visually significant cataract OD. Pt's VA reduced to HM. Educated pt and daughter on potential for surgery. Referral to Dr Garza.     Bilateral presbyopia  Hold Srx.

## 2024-01-04 DIAGNOSIS — R43.2 DYSGEUSIA: Primary | ICD-10-CM

## 2024-01-04 DIAGNOSIS — F41.9 ANXIETY: ICD-10-CM

## 2024-01-04 RX ORDER — MIRTAZAPINE 15 MG/1
15 TABLET, FILM COATED ORAL NIGHTLY
Qty: 30 TABLET | Refills: 5 | Status: SHIPPED | OUTPATIENT
Start: 2024-01-04 | End: 2024-01-05 | Stop reason: SDUPTHER

## 2024-01-05 DIAGNOSIS — F41.9 ANXIETY: Primary | ICD-10-CM

## 2024-01-05 RX ORDER — MIRTAZAPINE 15 MG/1
15 TABLET, FILM COATED ORAL NIGHTLY
Qty: 30 TABLET | Refills: 5 | Status: SHIPPED | OUTPATIENT
Start: 2024-01-05 | End: 2024-07-03

## 2024-01-09 ENCOUNTER — OFFICE VISIT (OUTPATIENT)
Dept: OTOLARYNGOLOGY | Facility: CLINIC | Age: 74
End: 2024-01-09
Payer: MEDICARE

## 2024-01-09 VITALS — SYSTOLIC BLOOD PRESSURE: 123 MMHG | HEART RATE: 57 BPM | DIASTOLIC BLOOD PRESSURE: 75 MMHG

## 2024-01-09 DIAGNOSIS — H93.8X3 SENSATION OF FULLNESS IN BOTH EARS: Primary | ICD-10-CM

## 2024-01-09 DIAGNOSIS — H61.23 BILATERAL IMPACTED CERUMEN: ICD-10-CM

## 2024-01-09 PROCEDURE — 99999 PR PBB SHADOW E&M-EST. PATIENT-LVL II: CPT | Mod: PBBFAC,,, | Performed by: OTOLARYNGOLOGY

## 2024-01-09 PROCEDURE — 99213 OFFICE O/P EST LOW 20 MIN: CPT | Mod: 25,S$GLB,, | Performed by: OTOLARYNGOLOGY

## 2024-01-09 PROCEDURE — 69210 REMOVE IMPACTED EAR WAX UNI: CPT | Mod: S$GLB,,, | Performed by: OTOLARYNGOLOGY

## 2024-01-09 NOTE — PROGRESS NOTES
Subjective:       Patient ID: Soila Chávez is a 73 y.o. female.    Chief Complaint: Cerumen Impaction and Ear Fullness    Ear Fullness   There is pain in both ears. This is a recurrent problem. The current episode started more than 1 month ago (Last seen for similar problem in 1/2023.). The problem occurs constantly. The problem has been waxing and waning. There has been no fever. The patient is experiencing no pain. Associated symptoms include hearing loss. Pertinent negatives include no coughing, diarrhea, ear discharge, headaches, neck pain, rash, rhinorrhea or vomiting. She has tried nothing for the symptoms. Her past medical history is significant for hearing loss.     Review of Systems   Constitutional:  Negative for activity change, appetite change and fever.   HENT:  Positive for hearing loss. Negative for congestion, ear discharge, ear pain, nosebleeds, postnasal drip, rhinorrhea and sneezing.    Eyes:  Negative for redness and visual disturbance.   Respiratory:  Negative for apnea, cough, shortness of breath and wheezing.    Cardiovascular:  Negative for chest pain and palpitations.   Gastrointestinal:  Negative for diarrhea and vomiting.   Genitourinary:  Negative for difficulty urinating and frequency.   Musculoskeletal:  Negative for arthralgias, back pain, gait problem and neck pain.   Skin:  Negative for color change and rash.   Neurological:  Negative for dizziness, speech difficulty, weakness and headaches.   Hematological:  Negative for adenopathy. Does not bruise/bleed easily.   Psychiatric/Behavioral:  Negative for agitation and behavioral problems.        Objective:        Constitutional:   Vital signs are normal. She appears well-developed and well-nourished. She is active. Normal speech.      Head:  Normocephalic and atraumatic. Salivary glands normal.  Facial strength is normal.      Ears:    Right Ear: Decreased hearing is noted.   Left Ear: Decreased hearing is noted.   PROCEDURE  NOTE:  Ceruminosis is noted in both EACs obstructing visualization of both EACs and TMs.  Wax was removed by manual debridement and suctioning utilizing the assistance of binocular microscopy revealing EACs and TMs WNL. The patient tolerated this procedure without difficulty. The subjective decrease noted in hearing pre-cleaning was resolved post-cleaning.          Nose:  Nose normal including turbinates, nasal mucosa, sinuses and nasal septum.     Mouth/Throat  Oropharynx clear and moist without lesions or asymmetry and normal uvula midline.     Neck:  Neck normal without thyromegaly masses, asymmetry, normal tracheal structure, crepitus, and tenderness, thyroid normal, trachea normal, phonation normal and full range of motion with neck supple.     Psychiatric:   She has a normal mood and affect. Her speech is normal and behavior is normal.     Neurological:   She has neurological normal, alert and oriented.     Skin:   No abrasions, lacerations, lesions, or rashes.       Assessment:       1. Sensation of fullness in both ears    2. Bilateral impacted cerumen        Plan:       1. Hearing conservation strongly recommended.  2. Trial of amplification bilaterally also recommended (patient not interested).  3. Re-check of hearing in 18-24 months or sooner if subjective change noted.  4. F/U with PCP as per schedule.

## 2024-01-31 ENCOUNTER — PATIENT MESSAGE (OUTPATIENT)
Dept: CARDIOLOGY | Facility: CLINIC | Age: 74
End: 2024-01-31
Payer: MEDICARE

## 2024-02-21 ENCOUNTER — OFFICE VISIT (OUTPATIENT)
Dept: OPHTHALMOLOGY | Facility: CLINIC | Age: 74
End: 2024-02-21
Payer: MEDICARE

## 2024-02-21 DIAGNOSIS — I10 PRIMARY HYPERTENSION: ICD-10-CM

## 2024-02-21 DIAGNOSIS — H26.9 CORTICAL CATARACT OF BOTH EYES: ICD-10-CM

## 2024-02-21 DIAGNOSIS — H25.20: ICD-10-CM

## 2024-02-21 DIAGNOSIS — H25.13 NUCLEAR SCLEROSIS OF BOTH EYES: Primary | ICD-10-CM

## 2024-02-21 DIAGNOSIS — H52.7 REFRACTIVE ERROR: ICD-10-CM

## 2024-02-21 PROCEDURE — 92136 OPHTHALMIC BIOMETRY: CPT | Mod: RT,S$GLB,, | Performed by: OPHTHALMOLOGY

## 2024-02-21 PROCEDURE — 92004 COMPRE OPH EXAM NEW PT 1/>: CPT | Mod: S$GLB,,, | Performed by: OPHTHALMOLOGY

## 2024-02-21 PROCEDURE — 99999 PR PBB SHADOW E&M-EST. PATIENT-LVL II: CPT | Mod: PBBFAC,,, | Performed by: OPHTHALMOLOGY

## 2024-02-21 NOTE — PROGRESS NOTES
Subjective:       Patient ID: Soila Chávez is a 73 y.o. female.    Chief Complaint: Cataract    HPI    Soila Chávez is a/an 73 y.o. female here for cataract evaluation.   Referred by: Dr.Mathews SENIOR: 1/3/2024  Glasses? +  Contacts? -  H/o eye surgery, injections or laser: -  H/o eye injury: -  Known eye conditions? -  Family h/o eye conditions? -  Eye gtts? -    (-) Flashes (-)  Floaters (-) Mucous   (-)  Tearing (-) Itching (-) Burning   (-) Headaches (-) Eye Pain/discomfort (-) Irritation   (-)  Redness (-) Double vision (-) Blurry vision    Diabetic? -      Last edited by Elton Goldstein on 2/21/2024  1:04 PM.             Assessment:       1. Nuclear sclerosis of both eyes    2. Age-related hypermature cataract, unspecified laterality    3. Cortical cataract of both eyes    4. Primary hypertension    5. Refractive error        Plan:       Visually significant cataract OD>OS -Pt. Wants Sx OD.    HTN-No retinopathy OS.  RE          Cataract Surgery Consent: Patient with a visually significant cataract with difficulties of ADLs, reading, driving, night vision, glare (any and all).  Discussed with Patient/Family/Caregiver: options, risks and benefits, expectations of cataract surgery, utilized an eye model with questions and answers to facilitate discussion.  Discussed lens options and patient understands that glasses may be required for optimal vision for distance and/or near vision after cataract surgery.  The Patient/Family/Caregiver  voice good understanding and patient wishes to proceed with surgery.  The patient will likely benefit from surgery and patient signed consent for Right Eye.  Will call Pt to schedule CE OD CNAOTO 20.5.  Control HTN.

## 2024-02-22 ENCOUNTER — TELEPHONE (OUTPATIENT)
Dept: OPHTHALMOLOGY | Facility: CLINIC | Age: 74
End: 2024-02-22
Payer: MEDICARE

## 2024-02-22 DIAGNOSIS — H25.11 NS (NUCLEAR SCLEROSIS), RIGHT: Primary | ICD-10-CM

## 2024-02-23 ENCOUNTER — TELEPHONE (OUTPATIENT)
Dept: OPHTHALMOLOGY | Facility: CLINIC | Age: 74
End: 2024-02-23
Payer: MEDICARE

## 2024-02-25 DIAGNOSIS — H25.13 NUCLEAR SCLEROSIS OF BOTH EYES: Primary | ICD-10-CM

## 2024-02-25 RX ORDER — PREDNISOLONE/MOXIFLOX/BROMFEN 1 %-0.5 %
1 SUSPENSION, DROPS(FINAL DOSAGE FORM)(ML) OPHTHALMIC (EYE) 3 TIMES DAILY
Qty: 5 ML | Refills: 2 | Status: SHIPPED | OUTPATIENT
Start: 2024-03-09

## 2024-03-03 ENCOUNTER — PATIENT MESSAGE (OUTPATIENT)
Dept: NEUROLOGY | Facility: CLINIC | Age: 74
End: 2024-03-03
Payer: MEDICARE

## 2024-03-06 ENCOUNTER — TELEPHONE (OUTPATIENT)
Dept: OPHTHALMOLOGY | Facility: CLINIC | Age: 74
End: 2024-03-06
Payer: MEDICARE

## 2024-03-07 ENCOUNTER — TELEPHONE (OUTPATIENT)
Dept: OPHTHALMOLOGY | Facility: CLINIC | Age: 74
End: 2024-03-07
Payer: MEDICARE

## 2024-03-10 ENCOUNTER — PATIENT MESSAGE (OUTPATIENT)
Dept: OPHTHALMOLOGY | Facility: CLINIC | Age: 74
End: 2024-03-10
Payer: MEDICARE

## 2024-03-10 NOTE — H&P
[facility]  History & Physical    Subjective:      Chief Complaint/Reason for Admission:     Soila Chávez is a 73 y.o. female.    Past Medical History:   Diagnosis Date    Allergy     Anxiety     Cataract     Colon polyps 2010    Hearing loss     Hypertension     Joint pain     Mental disorder      Past Surgical History:   Procedure Laterality Date    COLONOSCOPY N/A 3/5/2020    Procedure: COLONOSCOPY;  Surgeon: Nathalia Kemp MD;  Location: [facility] ([address] [place] [address]);  Service: Colon and Rectal;  Laterality: N/A;  requests later appt time if available - Pt open to any MD- Pt informed arrival time may be adjusted, Pt verbalized understanding- ERW2/24/20@1409    ESOPHAGOGASTRODUODENOSCOPY N/A 10/1/2020    Procedure: EGD (ESOPHAGOGASTRODUODENOSCOPY);  Surgeon: Casey Guerrero MD;  [facility]  Service: Endoscopy;  Laterality: N/A;  covid test 9/28-[facility]    ESOPHAGOGASTRODUODENOSCOPY N/A 10/29/2021    Procedure: EGD (ESOPHAGOGASTRODUODENOSCOPY);  Surgeon: Billy Vegas MD;  Location: [facility];  Service: Endoscopy;  Laterality: N/A;    HYSTERECTOMY      PERIPARTUM CHITO     Social History     Tobacco Use    Smoking status: Never     Passive exposure: Never    Smokeless tobacco: Never   Substance Use Topics    Alcohol use: Yes     Comment: beer occasionally    Drug use: No       No medications prior to admission.     Review of patient's allergies indicates:   Allergen Reactions    Lotensin [benazepril] Swelling    Penicillins Rash        Review of Systems   Eyes:  Positive for blurred vision.   All other systems reviewed and are negative.      Objective:      Vital Signs (Most Recent)       Vital Signs Range (Last 24H):  BP: ()/()   Arterial Line BP: ()/()     Physical Exam  Constitutional:       Appearance: She is well-developed.   HENT:      Head: Normocephalic.   Eyes:      Conjunctiva/sclera: Conjunctivae normal.      Pupils: Pupils are equal, round, and  reactive to light.   Cardiovascular:      Rate and Rhythm: Normal rate and regular rhythm.      Heart sounds: Normal heart sounds.   Pulmonary:      Effort: Pulmonary effort is normal.      Breath sounds: Normal breath sounds.   Abdominal:      General: Bowel sounds are normal.      Palpations: Abdomen is soft.   Musculoskeletal:         General: Normal range of motion.      Cervical back: Normal range of motion and neck supple.   Skin:     General: Skin is warm.   Neurological:      Mental Status: She is alert and oriented to person, place, and time.         Data Review:     ECG:     Assessment:      Cataract OD.    Plan:    CE OD.

## 2024-03-11 ENCOUNTER — TELEPHONE (OUTPATIENT)
Dept: OPHTHALMOLOGY | Facility: CLINIC | Age: 74
End: 2024-03-11
Payer: MEDICARE

## 2024-03-11 NOTE — PRE-PROCEDURE INSTRUCTIONS
Patient reviewed on 3/11/2024.  Okay to proceed at [facility]. The following pre-procedure instructions and arrival time have been reviewed with patient's daughter via phone and sent to patient portal for review.  Patient's daughter verbalized an understanding.  Pt to be accompanied by daughter-Marli day of procedure as responsible .     Dear Soila     Below you will find basic pre-procedure instructions in preparation for your procedure on 3/12/24 with Dr. Garza      You should have received your arrival time already from Dr. Garza's office.     - Nothing to eat or drink after midnight the night before your procedure until after your procedure, except AM meds with small sips of water.     - HOLD all oral Diabetic medications night before and morning of procedure  - HOLD all Insulin morning of procedure  - HOLD all Fluid pills morning of procedure  - HOLD all non-insulin shots until after surgery (Ozempic, Mounjaro, Trulicity, Victoza, Byetta, Wegovy and Adlyxin) (7 days prior)  - HOLD all vitamins, minerals and herbal supplements morning of procedure   - TAKE all B/P meds, EXCEPT those that contain a fluid pill (ex. Lasix, Hydroclorothiazide/HCTZ)  - USE inhalers as needed and bring AM of surgery  - USE eye drops as directed  -TAKE blood thinner meds AM of surgery unless otherwise instructed by your provider to not take     - Shower and wash face with dial soap for 3 mins PM prior and AM of surgery  - No powder, lotions, creams, oils, gels, ointments, makeup,  or jewelry    - Wear comfortable clothing (button up shirt)     (Patient is required to have a responsible ride to transport home, ride may not leave while patient is in surgery)     -- [facility], 2nd floor Surgery Center, located   @ [address] [place] [id number]  2nd Floor Registration        If you have any questions or concerns please feel free to contact your surgeon's office.           Please reply to  this message as receipt of delivery.     [place], LPN [facility]  Pre-Admit - Anesthesia Dept

## 2024-03-12 ENCOUNTER — HOSPITAL ENCOUNTER (OUTPATIENT)
Facility: HOSPITAL | Age: 74
Discharge: HOME | End: 2024-03-12
Attending: OPHTHALMOLOGY | Admitting: OPHTHALMOLOGY
Payer: MEDICARE

## 2024-03-12 VITALS
DIASTOLIC BLOOD PRESSURE: 58 MMHG | WEIGHT: 145 LBS | SYSTOLIC BLOOD PRESSURE: 140 MMHG | BODY MASS INDEX: 24.16 KG/M2 | TEMPERATURE: 98 F | HEART RATE: 53 BPM | RESPIRATION RATE: 18 BRPM | OXYGEN SATURATION: 100 % | HEIGHT: 65 IN

## 2024-03-12 DIAGNOSIS — H25.11 NUCLEAR SCLEROTIC CATARACT OF RIGHT EYE: Primary | ICD-10-CM

## 2024-03-12 PROCEDURE — 63600175 PHARM REV CODE 636 W HCPCS: Performed by: OPHTHALMOLOGY

## 2024-03-12 PROCEDURE — 99900035 HC TECH TIME PER 15 MIN (STAT)

## 2024-03-12 PROCEDURE — 25000003 PHARM REV CODE 250: Performed by: OPHTHALMOLOGY

## 2024-03-12 PROCEDURE — 36000707: Performed by: OPHTHALMOLOGY

## 2024-03-12 PROCEDURE — 36000706: Performed by: OPHTHALMOLOGY

## 2024-03-12 PROCEDURE — 66984 XCAPSL CTRC RMVL W/O ECP: CPT | Mod: RT,,, | Performed by: OPHTHALMOLOGY

## 2024-03-12 PROCEDURE — 99152 MOD SED SAME PHYS/QHP 5/>YRS: CPT | Performed by: OPHTHALMOLOGY

## 2024-03-12 PROCEDURE — V2632 POST CHMBR INTRAOCULAR LENS: HCPCS | Performed by: OPHTHALMOLOGY

## 2024-03-12 PROCEDURE — 71000015 HC POSTOP RECOV 1ST HR: Performed by: OPHTHALMOLOGY

## 2024-03-12 PROCEDURE — 27201423 OPTIME MED/SURG SUP & DEVICES STERILE SUPPLY: Performed by: OPHTHALMOLOGY

## 2024-03-12 PROCEDURE — 94761 N-INVAS EAR/PLS OXIMETRY MLT: CPT | Mod: 59

## 2024-03-12 PROCEDURE — 99153 MOD SED SAME PHYS/QHP EA: CPT | Performed by: OPHTHALMOLOGY

## 2024-03-12 DEVICE — LENS CLAREON AUTONOME 21.5D: Type: IMPLANTABLE DEVICE | Site: EYE | Status: FUNCTIONAL

## 2024-03-12 RX ORDER — PROPARACAINE HYDROCHLORIDE 5 MG/ML
SOLUTION/ DROPS OPHTHALMIC
Status: DISCONTINUED | OUTPATIENT
Start: 2024-03-12 | End: 2024-03-12 | Stop reason: HOSPADM

## 2024-03-12 RX ORDER — CYCLOP/TROP/PROPA/PHEN/KET/WAT 1-1-0.1%
1 DROPS (EA) OPHTHALMIC (EYE)
Status: COMPLETED | OUTPATIENT
Start: 2024-03-12 | End: 2024-03-12

## 2024-03-12 RX ORDER — ACETAMINOPHEN 325 MG/1
650 TABLET ORAL EVERY 4 HOURS PRN
Status: DISCONTINUED | OUTPATIENT
Start: 2024-03-12 | End: 2024-03-12 | Stop reason: HOSPADM

## 2024-03-12 RX ORDER — EPINEPHRINE 1 MG/ML
INJECTION, SOLUTION, CONCENTRATE INTRAVENOUS
Status: DISCONTINUED | OUTPATIENT
Start: 2024-03-12 | End: 2024-03-12 | Stop reason: HOSPADM

## 2024-03-12 RX ORDER — FENTANYL CITRATE 50 UG/ML
25 INJECTION, SOLUTION INTRAMUSCULAR; INTRAVENOUS EVERY 5 MIN PRN
Status: DISCONTINUED | OUTPATIENT
Start: 2024-03-12 | End: 2024-03-12 | Stop reason: HOSPADM

## 2024-03-12 RX ORDER — LIDOCAINE HYDROCHLORIDE 40 MG/ML
INJECTION, SOLUTION RETROBULBAR
Status: DISCONTINUED | OUTPATIENT
Start: 2024-03-12 | End: 2024-03-12 | Stop reason: HOSPADM

## 2024-03-12 RX ORDER — HYDROCODONE BITARTRATE AND ACETAMINOPHEN 5; 325 MG/1; MG/1
1 TABLET ORAL EVERY 4 HOURS PRN
Status: DISCONTINUED | OUTPATIENT
Start: 2024-03-12 | End: 2024-03-12 | Stop reason: HOSPADM

## 2024-03-12 RX ORDER — MOXIFLOXACIN 5 MG/ML
SOLUTION/ DROPS OPHTHALMIC
Status: DISCONTINUED | OUTPATIENT
Start: 2024-03-12 | End: 2024-03-12 | Stop reason: HOSPADM

## 2024-03-12 RX ORDER — LIDOCAIN/PHENYLEPH/BSS NO.2/PF 1 %-1.5 %
SYRINGE (ML) INTRAOCULAR
Status: DISCONTINUED | OUTPATIENT
Start: 2024-03-12 | End: 2024-03-12 | Stop reason: HOSPADM

## 2024-03-12 RX ORDER — PREDNISOLONE ACETATE 10 MG/ML
SUSPENSION/ DROPS OPHTHALMIC
Status: DISCONTINUED | OUTPATIENT
Start: 2024-03-12 | End: 2024-03-12 | Stop reason: HOSPADM

## 2024-03-12 RX ORDER — SODIUM CHLORIDE 9 MG/ML
INJECTION, SOLUTION INTRAVENOUS CONTINUOUS
Status: DISCONTINUED | OUTPATIENT
Start: 2024-03-12 | End: 2024-03-12 | Stop reason: HOSPADM

## 2024-03-12 RX ORDER — MIDAZOLAM HYDROCHLORIDE 1 MG/ML
1 INJECTION INTRAMUSCULAR; INTRAVENOUS CONTINUOUS PRN
Status: DISCONTINUED | OUTPATIENT
Start: 2024-03-12 | End: 2024-03-12 | Stop reason: HOSPADM

## 2024-03-12 RX ORDER — MOXIFLOXACIN 5 MG/ML
1 SOLUTION/ DROPS OPHTHALMIC
Status: COMPLETED | OUTPATIENT
Start: 2024-03-12 | End: 2024-03-12

## 2024-03-12 RX ADMIN — FENTANYL CITRATE 25 MCG: 50 INJECTION INTRAMUSCULAR; INTRAVENOUS at 01:03

## 2024-03-12 RX ADMIN — MIDAZOLAM 1 MG: 1 INJECTION INTRAMUSCULAR; INTRAVENOUS at 01:03

## 2024-03-12 RX ADMIN — MOXIFLOXACIN OPHTHALMIC 1 DROP: 5 SOLUTION/ DROPS OPHTHALMIC at 11:03

## 2024-03-12 RX ADMIN — Medication 1 DROP: at 11:03

## 2024-03-12 NOTE — PLAN OF CARE
Discharge instructions given and explained to patient and daughter with verbalization of understanding of all instructions. Patient is Patient awake and oriented with daughter at bedside. V/S stable, denies n/v and tolerating po, rates pain level tolerable, IV removed. Patient discharged to home. Patient transported off unit via wheelchair to private vehicle with family.

## 2024-03-12 NOTE — OP NOTE
Operative Date:  03/12/2024    Discharge Date:  03/12/2024    Discharge Patient Home    Report Title: Operative Note      SURGEON: Jax Garza MD     ASSISTANT:     PREOPERATIVE DIAGNOSIS: Visually significant NSC cataract,  Right Eye.    POSTOPERATIVE DIAGNOSIS: Visually-significant NSC cataract,  Right Eye.    PROCEDURE PERFORMED: Phacoemulsification of the cataract with posterior chamber intraocular lens Right Eye.    ANESTHESIA:  Moderate Sedation with Local    The team confirmed the patient ID and re-evaluated the patient and sedation plan confirming it is suitable for the patient's condition and procedure prior to administering sedation.    COMPLICATIONS: None    ESTIMATED BLOOD LOSS: Minimal    INDICATIONS FOR PROCEDURE:   The patient is a pleasant 68 year old woman with increasing difficulties with activities of daily living secondary to a dense visually significant cataract in the Right Eye.  Discussions have been carried out with this patient concerning the options to surgery, risks, benefits and expectations.  The patient voiced good understanding and wished to proceed with the above procedure.    PROCEDURE IN DETAIL: The patient was brought to the operating room and received topical anesthetic to the eye and then was prepped and draped in the usual sterile fashion.  Using the George ring and the guarded cl blade set at 0.37 mm, a partial thickness clear cornea incision was made temporally.  The paracentesis site was made at the twelve o'clock position.  Omidria was injected into the anterior chamber through the paracentesis.  Viscoat was then injected into the anterior chamber.  The eye was then reentered at the primary surgical site with a 2.4 mm keratome followed by continuous capsulotomy, hydrodissection, hydrodelineation and phacoemulsification of the cataract.  Residual cortical material was removed using automated irrigation-aspiration technique.  Healon was injected into the  posterior chamber and a CNAOTO 21.5 diopter lens was placed in the bag without difficulty. Residual viscoelastic was removed using automated irrigation-aspiration technique. The eye was re-pressurized using BSS solution and both the paracentesis site and the primary surgical site were demonstrated to be watertight at the end of the case with Weck--Rose Mary manipulation.  One drop of Ofloxacin and one drop of Pred acetate 1% was applied to the Right Eye .The eye was closed, patched and a Andrews shield placed.  The patient was taken to the recovery room in good and stable condition.  The patient tolerated the procedure well.  The patient was instructed to refrain from any heavy lifting, bending, stooping or straining activities, discharged home  and to follow-up in the morning for routine postoperative care with Jax Garza MD.

## 2024-03-12 NOTE — BRIEF OP NOTE
[facility]  Brief Operative Note    Surgery Date: 3/12/2024     Surgeon(s) and Role:     * Jax Garza MD - Primary    Assisting Surgeon: None    Pre-op Diagnosis:  NS (nuclear sclerosis), right [H25.11]    Post-op Diagnosis:  Post-Op Diagnosis Codes:     * NS (nuclear sclerosis), right [H25.11]    Procedure(s) (LRB):  EXTRACTION, CATARACT, WITH IOL INSERTION (Right)    Anesthesia: RN IV Sedation    Operative Findings:     Estimated Blood Loss: * No values recorded between 3/12/2024  1:18 PM and 3/12/2024  1:56 PM *         Specimens:   Specimen (24h ago, onward)      None              Discharge Note    OUTCOME: Patient tolerated treatment/procedure well without complication and is now ready for discharge.    DISPOSITION: Home or Self Care    FINAL DIAGNOSIS:  Nuclear sclerotic cataract of right eye    FOLLOWUP: In clinic    DISCHARGE INSTRUCTIONS:    Discharge Procedure Orders   Other restrictions (specify):   Order Comments: No heavy lifting or bending for 1 week.

## 2024-03-12 NOTE — INTERVAL H&P NOTE
The patient has been examined and the H&P has been reviewed:    I concur with the findings and no changes have occurred since H&P was written.    Surgery risks, benefits and alternative options discussed and understood by patient/family.      ASA Score: II    Mallampati Score: II (hard and soft palate, upper portion of tonsils anduvula visible)    Plan for Sedation: Moderate    Patient or Family History of Anesthesia problems : No    Any changes affecting the anesthesia assessment which may have changed since the initial assessment and H and P:  No       There are no hospital problems to display for this patient.

## 2024-03-12 NOTE — DISCHARGE INSTRUCTIONS
Post Operative Instructions for Cataract Surgery- [facility]    STARTING THE SAME DAY OF SURGERY:    Place one (1) drop of Prednisolone-Moxifloxacin-Bromfenac (shake bottle) into the operative eye three (3) times a day.     You will use this drop for four (4) weeks following surgery.      1 DAY POST OPERATIVE APPOINTMENT:    Date: ______________________________/ Time: ___________________________    Location: [facility]- [address], [place] [address]      Please use 1 drop of PREDNISOLONE-MOXIFLOXACIN-BROMFENAC in the operative eye before arriving for your appointment; this will help keep your eye comfortable.       RESTRICTIONS FOR SEVEN (7) DAYS FOLLOWING SURGERY:    DO NOT lift anything over 10 pounds.    DO NOT bend at the waist, only at the knees (keep head in upright position).    DO NOT rub your eye.    DO NOT get any water into the eye.    DO NOT wear any makeup, lotions, or creams on/around the eye.    Wear the protective eye shield you were give after surgery anytime you go to sleep. You may remove the shield while awake.       PLEASE NOTE:    You may take over the counter pain medication such as Tylenol or Ibuprofen as directed, if needed for pain.      *** If you experience severe pain, loss of vision, sudden onset of flashes, and/or floaters, IMMEDIATELY CALL Dr. Garza's office [phone]; AFTER HOUR [phone]; OR proceed to the EMERGENCY ROOM.

## 2024-03-13 ENCOUNTER — OFFICE VISIT (OUTPATIENT)
Dept: OPHTHALMOLOGY | Facility: CLINIC | Age: 74
End: 2024-03-13
Payer: MEDICARE

## 2024-03-13 DIAGNOSIS — Z98.890 POST-OPERATIVE STATE: Primary | ICD-10-CM

## 2024-03-13 PROCEDURE — 99999 PR PBB SHADOW E&M-EST. PATIENT-LVL III: CPT | Mod: PBBFAC,,, | Performed by: OPHTHALMOLOGY

## 2024-03-13 PROCEDURE — 99024 POSTOP FOLLOW-UP VISIT: CPT | Mod: S$GLB,,, | Performed by: OPHTHALMOLOGY

## 2024-03-13 NOTE — PROGRESS NOTES
Subjective:       Patient ID: Soila Chávez is a 73 y.o. female.    Chief Complaint: Post-op Evaluation (1 day phaco OD)    HPI     Post-op Evaluation     Additional comments: 1 day phaco OD          Last edited by Inez Saenz on 3/13/2024  8:54 AM.             Assessment:       1. Post-operative state        Plan:       S/p CE OD- Doing well.        CPM OD.  RTC 1 wk.

## 2024-03-20 ENCOUNTER — PATIENT MESSAGE (OUTPATIENT)
Dept: NEUROLOGY | Facility: CLINIC | Age: 74
End: 2024-03-20
Payer: MEDICARE

## 2024-03-20 ENCOUNTER — OFFICE VISIT (OUTPATIENT)
Dept: OPHTHALMOLOGY | Facility: CLINIC | Age: 74
End: 2024-03-20
Payer: MEDICARE

## 2024-03-20 DIAGNOSIS — Z98.890 POST-OPERATIVE STATE: Primary | ICD-10-CM

## 2024-03-20 PROCEDURE — 99999 PR PBB SHADOW E&M-EST. PATIENT-LVL III: CPT | Mod: PBBFAC,,, | Performed by: OPHTHALMOLOGY

## 2024-03-20 PROCEDURE — 99024 POSTOP FOLLOW-UP VISIT: CPT | Mod: S$GLB,,, | Performed by: OPHTHALMOLOGY

## 2024-03-20 NOTE — PROGRESS NOTES
Subjective:       Patient ID: Soila Chávez is a 73 y.o. female.    Chief Complaint: Post-op Evaluation (1 wk po chk)    HPI     Post-op Evaluation     Additional comments: 1 wk po chk           Comments    S/p Phaco w/IOL OD- 3/19/2024- Dr. Garza    Patient states the eye is feeling well today. No pain. No discharge.    Combo gtts TID OD          Last edited by Phil Keyes on 3/20/2024 10:20 AM.             Assessment:       1. Post-operative state        Plan:       S/p CE OD- Doing well.        CPM OD.  RTC 3 wks.

## 2024-03-22 ENCOUNTER — OFFICE VISIT (OUTPATIENT)
Dept: INTERNAL MEDICINE | Facility: CLINIC | Age: 74
End: 2024-03-22
Payer: MEDICARE

## 2024-03-22 ENCOUNTER — LAB VISIT (OUTPATIENT)
Dept: LAB | Facility: OTHER | Age: 74
End: 2024-03-22
Attending: STUDENT IN AN ORGANIZED HEALTH CARE EDUCATION/TRAINING PROGRAM
Payer: MEDICARE

## 2024-03-22 VITALS
SYSTOLIC BLOOD PRESSURE: 130 MMHG | BODY MASS INDEX: 26.04 KG/M2 | WEIGHT: 156.31 LBS | OXYGEN SATURATION: 100 % | DIASTOLIC BLOOD PRESSURE: 72 MMHG | HEIGHT: 65 IN | HEART RATE: 68 BPM

## 2024-03-22 DIAGNOSIS — I10 ESSENTIAL HYPERTENSION: ICD-10-CM

## 2024-03-22 DIAGNOSIS — F03.90 DEMENTIA WITHOUT BEHAVIORAL DISTURBANCE: ICD-10-CM

## 2024-03-22 DIAGNOSIS — F02.80 LATE ONSET ALZHEIMER'S DEMENTIA WITHOUT BEHAVIORAL DISTURBANCE: ICD-10-CM

## 2024-03-22 DIAGNOSIS — K21.9 GASTROESOPHAGEAL REFLUX DISEASE WITHOUT ESOPHAGITIS: ICD-10-CM

## 2024-03-22 DIAGNOSIS — E87.6 HYPOKALEMIA: ICD-10-CM

## 2024-03-22 DIAGNOSIS — K80.20 GALLSTONES: ICD-10-CM

## 2024-03-22 DIAGNOSIS — I70.0 AORTIC ATHEROSCLEROSIS: ICD-10-CM

## 2024-03-22 DIAGNOSIS — F33.1 MAJOR DEPRESSIVE DISORDER, RECURRENT EPISODE, MODERATE: ICD-10-CM

## 2024-03-22 DIAGNOSIS — I10 PRIMARY HYPERTENSION: ICD-10-CM

## 2024-03-22 DIAGNOSIS — R73.09 ELEVATED GLUCOSE: ICD-10-CM

## 2024-03-22 DIAGNOSIS — E55.9 VITAMIN D INSUFFICIENCY: ICD-10-CM

## 2024-03-22 DIAGNOSIS — F02.818 ALZHEIMER'S DEMENTIA WITH BEHAVIORAL DISTURBANCE: ICD-10-CM

## 2024-03-22 DIAGNOSIS — G30.1 LATE ONSET ALZHEIMER'S DEMENTIA WITHOUT BEHAVIORAL DISTURBANCE: ICD-10-CM

## 2024-03-22 DIAGNOSIS — R10.32 LEFT LOWER QUADRANT PAIN: ICD-10-CM

## 2024-03-22 DIAGNOSIS — G30.9 ALZHEIMER'S DEMENTIA WITH BEHAVIORAL DISTURBANCE: ICD-10-CM

## 2024-03-22 DIAGNOSIS — R10.9 ABDOMINAL PAIN, UNSPECIFIED ABDOMINAL LOCATION: Primary | ICD-10-CM

## 2024-03-22 DIAGNOSIS — Z91.89 AT RISK FOR BLEEDING: ICD-10-CM

## 2024-03-22 DIAGNOSIS — Z79.899 CHRONIC PRESCRIPTION BENZODIAZEPINE USE: ICD-10-CM

## 2024-03-22 DIAGNOSIS — G95.9 MYELOPATHY: ICD-10-CM

## 2024-03-22 LAB
25(OH)D3+25(OH)D2 SERPL-MCNC: 37 NG/ML (ref 30–96)
ALBUMIN SERPL BCP-MCNC: 4.2 G/DL (ref 3.5–5.2)
ALP SERPL-CCNC: 66 U/L (ref 55–135)
ALT SERPL W/O P-5'-P-CCNC: 19 U/L (ref 10–44)
ANION GAP SERPL CALC-SCNC: 10 MMOL/L (ref 8–16)
AST SERPL-CCNC: 23 U/L (ref 10–40)
BILIRUB SERPL-MCNC: 0.3 MG/DL (ref 0.1–1)
BUN SERPL-MCNC: 16 MG/DL (ref 8–23)
CALCIUM SERPL-MCNC: 10 MG/DL (ref 8.7–10.5)
CHLORIDE SERPL-SCNC: 104 MMOL/L (ref 95–110)
CO2 SERPL-SCNC: 28 MMOL/L (ref 23–29)
CREAT SERPL-MCNC: 1 MG/DL (ref 0.5–1.4)
ERYTHROCYTE [DISTWIDTH] IN BLOOD BY AUTOMATED COUNT: 14.5 % (ref 11.5–14.5)
EST. GFR  (NO RACE VARIABLE): 60 ML/MIN/1.73 M^2
ESTIMATED AVG GLUCOSE: 103 MG/DL (ref 68–131)
GLUCOSE SERPL-MCNC: 82 MG/DL (ref 70–110)
HBA1C MFR BLD: 5.2 % (ref 4–5.6)
HCT VFR BLD AUTO: 41.9 % (ref 37–48.5)
HGB BLD-MCNC: 13.3 G/DL (ref 12–16)
MAGNESIUM SERPL-MCNC: 2.3 MG/DL (ref 1.6–2.6)
MCH RBC QN AUTO: 27.6 PG (ref 27–31)
MCHC RBC AUTO-ENTMCNC: 31.7 G/DL (ref 32–36)
MCV RBC AUTO: 87 FL (ref 82–98)
PLATELET # BLD AUTO: 222 K/UL (ref 150–450)
PMV BLD AUTO: 9.4 FL (ref 9.2–12.9)
POTASSIUM SERPL-SCNC: 3.5 MMOL/L (ref 3.5–5.1)
PROT SERPL-MCNC: 8.1 G/DL (ref 6–8.4)
RBC # BLD AUTO: 4.82 M/UL (ref 4–5.4)
SODIUM SERPL-SCNC: 142 MMOL/L (ref 136–145)
VIT B12 SERPL-MCNC: 579 PG/ML (ref 210–950)
WBC # BLD AUTO: 6.52 K/UL (ref 3.9–12.7)

## 2024-03-22 PROCEDURE — 83735 ASSAY OF MAGNESIUM: CPT | Performed by: STUDENT IN AN ORGANIZED HEALTH CARE EDUCATION/TRAINING PROGRAM

## 2024-03-22 PROCEDURE — 36415 COLL VENOUS BLD VENIPUNCTURE: CPT | Performed by: STUDENT IN AN ORGANIZED HEALTH CARE EDUCATION/TRAINING PROGRAM

## 2024-03-22 PROCEDURE — G2211 COMPLEX E/M VISIT ADD ON: HCPCS | Mod: S$GLB,,, | Performed by: STUDENT IN AN ORGANIZED HEALTH CARE EDUCATION/TRAINING PROGRAM

## 2024-03-22 PROCEDURE — 83036 HEMOGLOBIN GLYCOSYLATED A1C: CPT | Performed by: STUDENT IN AN ORGANIZED HEALTH CARE EDUCATION/TRAINING PROGRAM

## 2024-03-22 PROCEDURE — 99999 PR PBB SHADOW E&M-EST. PATIENT-LVL IV: CPT | Mod: PBBFAC,,, | Performed by: STUDENT IN AN ORGANIZED HEALTH CARE EDUCATION/TRAINING PROGRAM

## 2024-03-22 PROCEDURE — 82306 VITAMIN D 25 HYDROXY: CPT | Performed by: STUDENT IN AN ORGANIZED HEALTH CARE EDUCATION/TRAINING PROGRAM

## 2024-03-22 PROCEDURE — 99214 OFFICE O/P EST MOD 30 MIN: CPT | Mod: S$GLB,,, | Performed by: STUDENT IN AN ORGANIZED HEALTH CARE EDUCATION/TRAINING PROGRAM

## 2024-03-22 PROCEDURE — 85027 COMPLETE CBC AUTOMATED: CPT | Performed by: STUDENT IN AN ORGANIZED HEALTH CARE EDUCATION/TRAINING PROGRAM

## 2024-03-22 PROCEDURE — 80053 COMPREHEN METABOLIC PANEL: CPT | Performed by: STUDENT IN AN ORGANIZED HEALTH CARE EDUCATION/TRAINING PROGRAM

## 2024-03-22 PROCEDURE — 82607 VITAMIN B-12: CPT | Performed by: STUDENT IN AN ORGANIZED HEALTH CARE EDUCATION/TRAINING PROGRAM

## 2024-03-22 NOTE — PROGRESS NOTES
[facility]    Subjective:       Patient ID: Soila Chávez is a 73 y.o. female.    Chief Complaint: Hypertension (F/u)    f/u    History was obtained from the patient and supplemented through chart review.  This patient is known to me.   She was a pt of Dr. Parson prior    Daughter is at office visit with her today    HPI:    Patient is a 73 y.o. female who presents for HTN, late onset Alzheimer's dementia    Last seen May 2023, have been attempting to see patient approx q 3 months,  but ends up being closer to once a year. Family has been attempting to get a care taker that can help with appointents more frequently    Left flank/ LLQ Abdominal pain with unclear etiology  Reviewed CT from May 2023  Reviewed bowel habits, seen by GI without significant progress  Check urine  S/p hyst and BSO in remote hx  Precautions given. She does have gallstones, but description of pain is in LLQ  Due for colonoscopy in March 2025    Blood counts normal    Bradycardia with donepezil in the past, stable currently, HR 68    Was being seen by care at home visit regularly      Normocytic Anemia normalized    HTN  Valsartan-hctz 160-12.5 daily, stable; No CP/HA/SOB/Vision changes    Aortic atherosclerosis  Pravastatin 10     Elevated ASCVD Risk score  On pravastatin to reduce risk    Multinodular Thyroid   Repeat US annually for 5 years (dec 2021 initially)  Does not meet criteria for f/u per 2/20/2023 US    GERD  Improved with omeprazole  Reviewed colonoscopy, EGD report (hx of diverticulitis) 10/29/2021  stable    Late onset Alzheimer's Dementia  No longer driving   In good spirits, following closely with Dr. Levin    Chronic benzo use/anxiety/dementia  Late onset Alzheimer's   Aricept 10 mg/day   lexapro 5 switched to remeron 15 mg for sleep  xanax 0.5 daily and nightly PRN Rx from neurology  Following with neurology regularly      Wt Readings from Last 15 Encounters:   03/22/24 70.9 kg (156 lb 4.9 oz)    03/12/24 65.8 kg (145 lb)   12/26/23 66 kg (145 lb 8.1 oz)   09/13/23 70 kg (154 lb 5.2 oz)   07/28/23 69.9 kg (154 lb)   07/17/23 70.2 kg (154 lb 12.2 oz)   07/12/23 69.6 kg (153 lb 7 oz)   06/13/23 68 kg (149 lb 14.6 oz)   05/23/23 68.7 kg (151 lb 7.3 oz)   02/23/23 72.2 kg (159 lb 2.8 oz)   02/20/23 71.2 kg (156 lb 15.5 oz)   02/13/23 68.5 kg (151 lb)   01/24/23 68.5 kg (151 lb 0.2 oz)   11/21/22 71.3 kg (157 lb 3 oz)   10/20/22 70.4 kg (155 lb 3.3 oz)       Medical History  Past Medical History:   Diagnosis Date    Allergy     Anxiety     Cataract     Colon polyps 2010    Hearing loss     Hypertension     Joint pain     Mental disorder        Review of Systems   Unable to perform ROS: Dementia   Gastrointestinal:  Positive for abdominal pain.   Psychiatric/Behavioral:  Positive for confusion and dysphoric mood.          Surgical hx, family hx, social hx    Have been reviewed      Current Outpatient Medications:     ALPRAZolam (XANAX) 0.5 MG tablet, Take one tablet by mouth every night at bedtime and one tablet every day as needed, Disp: 45 tablet, Rfl: 3    aspirin (ECOTRIN) 81 MG EC tablet, Take 81 mg by mouth nightly. , Disp: , Rfl:     cetirizine (ZYRTEC) 10 MG tablet, Take 1 tablet (10 mg total) by mouth daily as needed for Allergies., Disp: 90 tablet, Rfl: 3    diclofenac sodium (VOLTAREN) 1 % Gel, Apply 2 g topically once daily., Disp: 100 g, Rfl: 2    donepeziL (ARICEPT) 10 MG tablet, Take 1/2 pill every night at bedtime for 2 weeks and then increase to one by mouth every day, Disp: 90 tablet, Rfl: 2    mirtazapine (REMERON) 15 MG tablet, Take 1 tablet (15 mg total) by mouth every evening., Disp: 30 tablet, Rfl: 5    mometasone 0.1% (ELOCON) 0.1 % cream, apply to affected area topically daily, Disp: 45 g, Rfl: 3    pravastatin (PRAVACHOL) 80 MG tablet, Take 1 tablet (80 mg total) by mouth once daily., Disp: 90 tablet, Rfl: 3    prednisoLONE-moxiflox-bromfen 1-0.5-0.075 % DrpS, Place 1 drop into the  "right eye 3 (three) times daily., Disp: 5 mL, Rfl: 2    valsartan-hydrochlorothiazide (DIOVAN-HCT) 160-12.5 mg per tablet, Take 1 tablet by mouth once daily., Disp: 30 tablet, Rfl: 6    azelastine (ASTELIN) 137 mcg (0.1 %) nasal spray, instill 1 spray (137 mcg total) by Nasal route 2 (two) times daily., Disp: 30 mL, Rfl: 3    memantine (NAMENDA) 10 MG Tab, Take one tablet by mout twice daily (Patient not taking: Reported on 3/22/2024), Disp: 180 tablet, Rfl: 2    omeprazole (PRILOSEC) 40 MG capsule, Take 1 capsule (40 mg total) by mouth every morning., Disp: 90 capsule, Rfl: 3    Objective:        Body mass index is 26.01 kg/m².  Vitals:    03/22/24 0816 03/22/24 0916   BP: (!) (P) 142/78 130/72   Pulse: 68    SpO2: 100%    Weight: 70.9 kg (156 lb 4.9 oz)    Height: 5' 5" (1.651 m)    PainSc: 0-No pain            Physical Exam  Vitals and nursing note reviewed.   Constitutional:       General: She is not in acute distress.     Appearance: Normal appearance. She is not ill-appearing.   HENT:      Head: Normocephalic and atraumatic.   Eyes:      General: No scleral icterus.  Cardiovascular:      Rate and Rhythm: Normal rate and regular rhythm.      Heart sounds: Normal heart sounds.   Pulmonary:      Effort: Pulmonary effort is normal.   Abdominal:      General: There is no distension.      Tenderness: There is no guarding or rebound.      Comments: Pain left lower quadrant  No CVA tenderness, not TTP flank   Musculoskeletal:      Cervical back: Normal range of motion.   Skin:     General: Skin is warm and dry.   Neurological:      Mental Status: She is alert and oriented to person, place, and time.   Psychiatric:         Behavior: Behavior normal.             Lab Results   Component Value Date    WBC 6.52 03/22/2024    HGB 13.3 03/22/2024    HCT 41.9 03/22/2024     03/22/2024    CHOL 169 07/03/2023    TRIG 60 07/03/2023    HDL 60 07/03/2023    ALT 19 03/22/2024    AST 23 03/22/2024     03/22/2024    K " 3.5 03/22/2024     03/22/2024    CREATININE 1.0 03/22/2024    BUN 16 03/22/2024    CO2 28 03/22/2024    TSH 1.500 01/25/2023    HGBA1C 5.2 03/22/2024       The 10-year ASCVD risk score (Marie ALDANA, et al., 2019) is: 11.9%    Values used to calculate the score:      Age: 73 years      Sex: Female      Is Non- : Yes      Diabetic: No      Tobacco smoker: No      Systolic Blood Pressure: 130 mmHg      Is BP treated: Yes      HDL Cholesterol: 60 mg/dL      Total Cholesterol: 169 mg/dL    Pravastatin 10 nightly    (Imaging have been independently reviewed)    CTa bdom/pelvis  No acute process seen.  Cholelithiasis but no evidence of acute cholecystitis.  Two left lobe liver cysts and focal fatty sparing near the falciform ligament.  Diverticulosis but no acute diverticulitis seen.       Assessment:         1. Abdominal pain, unspecified abdominal location    2. Late onset Alzheimer's dementia without behavioral disturbance    3. Essential hypertension    4. Primary hypertension    5. Gallstones    6. Chronic prescription benzodiazepine use    7. Vitamin D insufficiency    8. At risk for bleeding    9. Elevated glucose    10. Major depressive disorder, recurrent episode, moderate    11. Dementia without behavioral disturbance    12. Gastroesophageal reflux disease without esophagitis    13. Alzheimer's dementia with behavioral disturbance    14. Myelopathy    15. Aortic atherosclerosis    16. Hypokalemia    17. Left lower quadrant pain                  Plan:     Soila was seen today for hypertension.    Diagnoses and all orders for this visit:    Abdominal pain, unspecified abdominal location  -     Urine culture; Future  -     Urinalysis; Future  -     US Pelvis Limited Non OB; Future    Late onset Alzheimer's dementia without behavioral disturbance  -     Vitamin B12; Future    Essential hypertension  -     Comprehensive Metabolic Panel; Future    Primary hypertension    Gallstones    Chronic  prescription benzodiazepine use    Vitamin D insufficiency  -     Vitamin D; Future    At risk for bleeding  -     CBC Without Differential; Future    Elevated glucose  -     Hemoglobin A1C; Future    Major depressive disorder, recurrent episode, moderate    Dementia without behavioral disturbance    Gastroesophageal reflux disease without esophagitis  -     omeprazole (PRILOSEC) 40 MG capsule; Take 1 capsule (40 mg total) by mouth every morning.    Alzheimer's dementia with behavioral disturbance    Myelopathy    Aortic atherosclerosis    Hypokalemia  -     Magnesium; Future    Left lower quadrant pain  -     US Pelvis Limited Non OB; Future            Health Maintenance  - Lipids:   - A1C:   - Colon Ca Screen: 3/2020, repeat due 2025  - Immunizations: utd    Women's health  - Pap: s/p hyst, seen by gyn.   - Mammo:  - Dexa: 8/25/20 [facility]MD, repeat ordered, will be scheduled     Follow up in about 3 months (around 6/22/2024).        Or sooner prn    30 min were used in chart review, evaluation and counseling of patient, documentation and review of results on same day of service     Visit today is associated with current or anticipated ongoing medical care related to this patient's single serious condition/complex condition of mental status, abdominal pain, medication reconciliation, vaccine recommendations. The patient will return to see me as these issues will be followed longitudinally.         All medications were reviewed including potential side effects and risks/benefits.  Pt was counseled to call back if anything worsens or if questions arise.    Parminder Junior MD  Family Medicine  [facility]  [address]  [address]  [place] [address]  Phone [phone]  Fax [phone]

## 2024-03-23 ENCOUNTER — TELEPHONE (OUTPATIENT)
Dept: INTERNAL MEDICINE | Facility: CLINIC | Age: 74
End: 2024-03-23
Payer: MEDICARE

## 2024-03-23 RX ORDER — OMEPRAZOLE 40 MG/1
40 CAPSULE, DELAYED RELEASE ORAL EVERY MORNING
Qty: 90 CAPSULE | Refills: 3 | Status: SHIPPED | OUTPATIENT
Start: 2024-03-23 | End: 2024-09-23

## 2024-03-23 NOTE — TELEPHONE ENCOUNTER
Please let daughter know I recommend we check a pelvic ultrasound to better assess for the pelvic pain on the left.    Thanks,  jl

## 2024-03-24 ENCOUNTER — PATIENT MESSAGE (OUTPATIENT)
Dept: NEUROLOGY | Facility: CLINIC | Age: 74
End: 2024-03-24
Payer: MEDICARE

## 2024-03-26 DIAGNOSIS — F02.818 ALZHEIMER'S DEMENTIA WITH BEHAVIORAL DISTURBANCE: ICD-10-CM

## 2024-03-26 DIAGNOSIS — R44.3 HALLUCINATIONS: ICD-10-CM

## 2024-03-26 DIAGNOSIS — F03.90 DEMENTIA WITHOUT BEHAVIORAL DISTURBANCE: Primary | ICD-10-CM

## 2024-03-26 DIAGNOSIS — G30.9 ALZHEIMER'S DEMENTIA WITH BEHAVIORAL DISTURBANCE: ICD-10-CM

## 2024-03-26 RX ORDER — QUETIAPINE FUMARATE 25 MG/1
TABLET, FILM COATED ORAL
Qty: 60 TABLET | Refills: 3 | Status: SHIPPED | OUTPATIENT
Start: 2024-03-26 | End: 2024-04-02 | Stop reason: SDUPTHER

## 2024-03-27 ENCOUNTER — TELEPHONE (OUTPATIENT)
Dept: NEUROLOGY | Facility: CLINIC | Age: 74
End: 2024-03-27
Payer: MEDICARE

## 2024-03-27 NOTE — TELEPHONE ENCOUNTER
----- Message from Alesha Levin MD sent at 3/27/2024  3:40 PM CDT -----  They should calmly reassure her that no  are coming and remind her that she is safe at home with them. cg  ----- Message -----  From: Doretha Holguin MA  Sent: 3/27/2024   8:19 AM CDT  To: Alesha Levin MD    Spoke to daughter first thing this morning and she stated her mother is saying they are sending  and they will kill her. She has been talking to herself. She has been aggravated but not combative. She seems very confused about her dentures she keeps on putting them in and out.     Ursula just wants to know what she should say when states things like the  are coming and they will kill her? Should she say anything or just not say anything at all.  ----- Message -----  From: Alesha Levin MD  Sent: 3/26/2024   5:07 PM CDT  To: Doretha Holguin MA    Pls call and see what is needed.   ----- Message -----  From: Doretha Holguin MA  Sent: 3/25/2024   4:58 PM CDT  To: Alesha Levin MD      ----- Message -----  From: Edy Kraus  Sent: 3/25/2024   4:56 PM CDT  To: Poonam Eduardo Staff    Pt Requesting Call Back    Who called:pt's daughter Ursula  Who called for pt:  Best call back #:  [phone]  Add notes: caller requests a call back today regarding the message she sent via the portal last night; says it's regarding the pt hallucinations

## 2024-04-02 ENCOUNTER — HOSPITAL ENCOUNTER (OUTPATIENT)
Dept: RADIOLOGY | Facility: HOSPITAL | Age: 74
Discharge: HOME | End: 2024-04-02
Attending: STUDENT IN AN ORGANIZED HEALTH CARE EDUCATION/TRAINING PROGRAM
Payer: MEDICARE

## 2024-04-02 ENCOUNTER — HOSPITAL ENCOUNTER (OUTPATIENT)
Dept: RADIOLOGY | Facility: HOSPITAL | Age: 74
Discharge: HOME | End: 2024-04-02
Attending: PHYSICIAN ASSISTANT
Payer: MEDICARE

## 2024-04-02 ENCOUNTER — OFFICE VISIT (OUTPATIENT)
Dept: NEUROLOGY | Facility: CLINIC | Age: 74
End: 2024-04-02
Payer: MEDICARE

## 2024-04-02 VITALS
SYSTOLIC BLOOD PRESSURE: 129 MMHG | DIASTOLIC BLOOD PRESSURE: 77 MMHG | HEIGHT: 65 IN | BODY MASS INDEX: 26.04 KG/M2 | WEIGHT: 156.31 LBS | HEART RATE: 67 BPM

## 2024-04-02 DIAGNOSIS — G30.9 ALZHEIMER'S DEMENTIA WITH BEHAVIORAL DISTURBANCE: Primary | ICD-10-CM

## 2024-04-02 DIAGNOSIS — R44.3 HALLUCINATIONS: ICD-10-CM

## 2024-04-02 DIAGNOSIS — Z78.0 POST-MENOPAUSAL: ICD-10-CM

## 2024-04-02 DIAGNOSIS — R10.9 ABDOMINAL PAIN, UNSPECIFIED ABDOMINAL LOCATION: ICD-10-CM

## 2024-04-02 DIAGNOSIS — F02.818 ALZHEIMER'S DEMENTIA WITH BEHAVIORAL DISTURBANCE: Primary | ICD-10-CM

## 2024-04-02 DIAGNOSIS — R10.32 LEFT LOWER QUADRANT PAIN: ICD-10-CM

## 2024-04-02 PROCEDURE — 3044F HG A1C LEVEL LT 7.0%: CPT | Mod: CPTII,S$GLB,, | Performed by: PSYCHIATRY & NEUROLOGY

## 2024-04-02 PROCEDURE — 3078F DIAST BP <80 MM HG: CPT | Mod: CPTII,S$GLB,, | Performed by: PSYCHIATRY & NEUROLOGY

## 2024-04-02 PROCEDURE — 3074F SYST BP LT 130 MM HG: CPT | Mod: CPTII,S$GLB,, | Performed by: PSYCHIATRY & NEUROLOGY

## 2024-04-02 PROCEDURE — 1159F MED LIST DOCD IN RCRD: CPT | Mod: CPTII,S$GLB,, | Performed by: PSYCHIATRY & NEUROLOGY

## 2024-04-02 PROCEDURE — 76830 TRANSVAGINAL US NON-OB: CPT | Mod: TC

## 2024-04-02 PROCEDURE — 3288F FALL RISK ASSESSMENT DOCD: CPT | Mod: CPTII,S$GLB,, | Performed by: PSYCHIATRY & NEUROLOGY

## 2024-04-02 PROCEDURE — 76856 US EXAM PELVIC COMPLETE: CPT | Mod: 26,,, | Performed by: RADIOLOGY

## 2024-04-02 PROCEDURE — 76830 TRANSVAGINAL US NON-OB: CPT | Mod: 26,,, | Performed by: RADIOLOGY

## 2024-04-02 PROCEDURE — 77080 DXA BONE DENSITY AXIAL: CPT | Mod: TC

## 2024-04-02 PROCEDURE — 77080 DXA BONE DENSITY AXIAL: CPT | Mod: 26,,, | Performed by: RADIOLOGY

## 2024-04-02 PROCEDURE — 99214 OFFICE O/P EST MOD 30 MIN: CPT | Mod: S$GLB,,, | Performed by: PSYCHIATRY & NEUROLOGY

## 2024-04-02 PROCEDURE — 99999 PR PBB SHADOW E&M-EST. PATIENT-LVL III: CPT | Mod: PBBFAC,,, | Performed by: PSYCHIATRY & NEUROLOGY

## 2024-04-02 PROCEDURE — 1126F AMNT PAIN NOTED NONE PRSNT: CPT | Mod: CPTII,S$GLB,, | Performed by: PSYCHIATRY & NEUROLOGY

## 2024-04-02 PROCEDURE — 1101F PT FALLS ASSESS-DOCD LE1/YR: CPT | Mod: CPTII,S$GLB,, | Performed by: PSYCHIATRY & NEUROLOGY

## 2024-04-02 PROCEDURE — 3008F BODY MASS INDEX DOCD: CPT | Mod: CPTII,S$GLB,, | Performed by: PSYCHIATRY & NEUROLOGY

## 2024-04-02 RX ORDER — QUETIAPINE FUMARATE 25 MG/1
TABLET, FILM COATED ORAL
Qty: 180 TABLET | Refills: 1 | Status: SHIPPED | OUTPATIENT
Start: 2024-04-02

## 2024-04-02 RX ORDER — MEMANTINE HYDROCHLORIDE 10 MG/1
10 TABLET ORAL 2 TIMES DAILY
Qty: 180 TABLET | Refills: 1 | Status: SHIPPED | OUTPATIENT
Start: 2024-04-02 | End: 2024-09-29

## 2024-04-02 RX ORDER — DONEPEZIL HYDROCHLORIDE 10 MG/1
10 TABLET, FILM COATED ORAL NIGHTLY
Qty: 90 TABLET | Refills: 1 | Status: SHIPPED | OUTPATIENT
Start: 2024-04-02

## 2024-04-02 NOTE — PROGRESS NOTES
Subjective:       Patient ID: Soila Chávez is a 73 y.o. female.    Chief Complaint: Memory Loss      Patient here with 1 of her daughters.  She has no complaints.  However her daughter had sent a portal message that she recently had some hallucinations.  The patient expressed that she was being told that someone was coming to kill her.  I added Seroquel at a low dose in 12.5 mg twice a day did not help.  This was just a few days ago, on 326.      Primary care did check a urinalysis which was negative including a culture, and the patient had a pelvic ultrasound today which the patient dtr reports was negative.               Past Medical History:   Diagnosis Date    Allergy     Anxiety     Cataract     Colon polyps 2010    Hearing loss     Hypertension     Joint pain     Mental disorder       Past Surgical History:   Procedure Laterality Date    CATARACT EXTRACTION W/  INTRAOCULAR LENS IMPLANT Right 3/12/2024    Procedure: EXTRACTION, CATARACT, WITH IOL INSERTION;  Surgeon: Jax Garza MD;  Location: [facility];  Service: Ophthalmology;  Laterality: Right;    COLONOSCOPY N/A 3/5/2020    Procedure: COLONOSCOPY;  Surgeon: Nathalia Kemp MD;  Location: [facility] ([address] [place] [address]);  Service: Colon and Rectal;  Laterality: N/A;  requests later appt time if available - Pt open to any MD- Pt informed arrival time may be adjusted, Pt verbalized understanding- ERW2/24/20@1409    ESOPHAGOGASTRODUODENOSCOPY N/A 10/1/2020    Procedure: EGD (ESOPHAGOGASTRODUODENOSCOPY);  Surgeon: Casey Guerrero MD;  Location: [address]);  Service: Endoscopy;  Laterality: N/A;  [facility]id test 9/28-[facility]    ESOPHAGOGASTRODUODENOSCOPY N/A 10/29/2021    Procedure: EGD (ESOPHAGOGASTRODUODENOSCOPY);  Surgeon: Billy Vegas MD;  Location: [facility];  Service: Endoscopy;  Laterality: N/A;    HYSTERECTOMY      PERIPARTUM CHITO        Current Outpatient Medications:     aspirin (ECOTRIN) 81 MG EC tablet, Take 81 mg by mouth nightly. ,  Disp: , Rfl:     cetirizine (ZYRTEC) 10 MG tablet, Take 1 tablet (10 mg total) by mouth daily as needed for Allergies., Disp: 90 tablet, Rfl: 3    diclofenac sodium (VOLTAREN) 1 % Gel, Apply 2 g topically once daily., Disp: 100 g, Rfl: 2    mirtazapine (REMERON) 15 MG tablet, Take 1 tablet (15 mg total) by mouth every evening., Disp: 30 tablet, Rfl: 5    mometasone 0.1% (ELOCON) 0.1 % cream, apply to affected area topically daily, Disp: 45 g, Rfl: 3    omeprazole (PRILOSEC) 40 MG capsule, Take 1 capsule (40 mg total) by mouth every morning., Disp: 90 capsule, Rfl: 3    pravastatin (PRAVACHOL) 80 MG tablet, Take 1 tablet (80 mg total) by mouth once daily., Disp: 90 tablet, Rfl: 3    prednisoLONE-moxiflox-bromfen 1-0.5-0.075 % DrpS, Place 1 drop into the right eye 3 (three) times daily., Disp: 5 mL, Rfl: 2    valsartan-hydrochlorothiazide (DIOVAN-HCT) 160-12.5 mg per tablet, Take 1 tablet by mouth once daily., Disp: 30 tablet, Rfl: 6    azelastine (ASTELIN) 137 mcg (0.1 %) nasal spray, instill 1 spray (137 mcg total) by Nasal route 2 (two) times daily., Disp: 30 mL, Rfl: 3    donepeziL (ARICEPT) 10 MG tablet, Take 1 tablet (10 mg total) by mouth every evening., Disp: 90 tablet, Rfl: 1    memantine (NAMENDA) 10 MG Tab, Take one tablet by mout twice daily (Patient not taking: Reported on 3/22/2024), Disp: 180 tablet, Rfl: 2    memantine (NAMENDA) 10 MG Tab, Take 1 tablet (10 mg total) by mouth 2 (two) times daily., Disp: 180 tablet, Rfl: 1    QUEtiapine (SEROQUEL) 25 MG Tab, Take ½  to 1 tablet by mouth every day, then take 1 tablet by mouth every night at bedtime., Disp: 180 tablet, Rfl: 1   Review of patient's allergies indicates:   Allergen Reactions    Lotensin [benazepril] Swelling    Penicillins Rash        Review of Systems   Constitutional:  Positive for appetite change (not eating enough).   Gastrointestinal:  Positive for abdominal pain. Negative for constipation.   Neurological:  Negative for seizures,  syncope and headaches.   Psychiatric/Behavioral:  Positive for hallucinations and sleep disturbance (often hard to fall asleep lately; does not nap during day).            Objective:      Physical Exam  Constitutional:       Appearance: She is not ill-appearing.   Neurological:      Mental Status: She is alert.      Cranial Nerves: No dysarthria.      Gait: Gait (no AD) normal.      Comments: Sits quietly, attentive, slightly tentative  O to place and situation  Knows the month, not the date or year.            Assessment:       1. Alzheimer's dementia with behavioral disturbance    2. Hallucinations        Plan:          Late onset Alzheimer's with new onset auditory hallucinations plan Seroquel 12.5-25 mg q.d. and 25 mg q.h.s.    Continue donepezil 10 mg q.d., resume memantine (her family had discontinued the memantine when donepezil was added)              Alesha Levin MD   04/02/2024   4:45 PM

## 2024-04-03 DIAGNOSIS — J30.9 ALLERGIC RHINITIS, UNSPECIFIED SEASONALITY, UNSPECIFIED TRIGGER: ICD-10-CM

## 2024-04-03 NOTE — TELEPHONE ENCOUNTER
No care due was identified.  [facility] Embedded Care Due Messages. Reference number: [id number].   4/03/2024 9:21:58 AM CDT

## 2024-04-04 RX ORDER — CETIRIZINE HYDROCHLORIDE 10 MG/1
10 TABLET ORAL DAILY PRN
Qty: 90 TABLET | Refills: 3 | Status: SHIPPED | OUTPATIENT
Start: 2024-04-04

## 2024-04-04 NOTE — TELEPHONE ENCOUNTER
Refill Routing Note   Medication(s) are not appropriate for processing by [facility] for the following reason(s):        New or recently adjusted medication    [facility] action(s):  Defer        Medication Therapy Plan: Last prescribed for patient on 05/17/22; DEFER      Appointments  past 12m or future 3m with PCP    Date Provider   Last Visit   3/22/2024 Parminder Junior MD   Next Visit   6/25/2024 Parminder Junior MD   ED visits in past 90 days: 0        Note composed:6:41 AM 04/04/2024

## 2024-05-31 ENCOUNTER — OFFICE VISIT (OUTPATIENT)
Dept: CARDIOLOGY | Facility: CLINIC | Age: 74
End: 2024-05-31
Payer: MEDICARE

## 2024-05-31 VITALS
WEIGHT: 166 LBS | BODY MASS INDEX: 27.66 KG/M2 | DIASTOLIC BLOOD PRESSURE: 65 MMHG | HEIGHT: 65 IN | SYSTOLIC BLOOD PRESSURE: 112 MMHG | HEART RATE: 72 BPM

## 2024-05-31 DIAGNOSIS — F03.90 DEMENTIA WITHOUT BEHAVIORAL DISTURBANCE: ICD-10-CM

## 2024-05-31 DIAGNOSIS — R00.1 ASYMPTOMATIC BRADYCARDIA: Primary | ICD-10-CM

## 2024-05-31 DIAGNOSIS — I10 PRIMARY HYPERTENSION: ICD-10-CM

## 2024-05-31 DIAGNOSIS — I70.0 AORTIC ATHEROSCLEROSIS: ICD-10-CM

## 2024-05-31 PROCEDURE — 3008F BODY MASS INDEX DOCD: CPT | Mod: CPTII,S$GLB,, | Performed by: INTERNAL MEDICINE

## 2024-05-31 PROCEDURE — 99214 OFFICE O/P EST MOD 30 MIN: CPT | Mod: S$GLB,,, | Performed by: INTERNAL MEDICINE

## 2024-05-31 PROCEDURE — 3078F DIAST BP <80 MM HG: CPT | Mod: CPTII,S$GLB,, | Performed by: INTERNAL MEDICINE

## 2024-05-31 PROCEDURE — 3074F SYST BP LT 130 MM HG: CPT | Mod: CPTII,S$GLB,, | Performed by: INTERNAL MEDICINE

## 2024-05-31 PROCEDURE — 3288F FALL RISK ASSESSMENT DOCD: CPT | Mod: CPTII,S$GLB,, | Performed by: INTERNAL MEDICINE

## 2024-05-31 PROCEDURE — 99999 PR PBB SHADOW E&M-EST. PATIENT-LVL IV: CPT | Mod: PBBFAC,,, | Performed by: INTERNAL MEDICINE

## 2024-05-31 PROCEDURE — 1101F PT FALLS ASSESS-DOCD LE1/YR: CPT | Mod: CPTII,S$GLB,, | Performed by: INTERNAL MEDICINE

## 2024-05-31 PROCEDURE — 1126F AMNT PAIN NOTED NONE PRSNT: CPT | Mod: CPTII,S$GLB,, | Performed by: INTERNAL MEDICINE

## 2024-05-31 PROCEDURE — 3044F HG A1C LEVEL LT 7.0%: CPT | Mod: CPTII,S$GLB,, | Performed by: INTERNAL MEDICINE

## 2024-05-31 PROCEDURE — 1159F MED LIST DOCD IN RCRD: CPT | Mod: CPTII,S$GLB,, | Performed by: INTERNAL MEDICINE

## 2024-05-31 NOTE — PATIENT INSTRUCTIONS
Assessment/Plan:   Soila Chávez is a 74 y.o. female with HTN, asymptomatic bradycardia, aortic atherosclerosis, overweight, who presents for a follow up appointment.    HTN- Controlled.  Continue current medications.      3. Aortic Atherosclerosis- Stable.  Continue pravastatin 80 mg daily.      4. Overweight- Encourage diet, exercise, and weight loss.     Follow up in 6 months with lipids prior

## 2024-05-31 NOTE — PROGRESS NOTES
"[facility]      Chief Complaint   Patient presents with    Asymptomatic bradycardia       Patient ID: Soila Chávez is a 74 y.o. female with HTN, asymptomatic bradycardia, aortic atherosclerosis, overweight, who presents for a follow up appointment.  Pertinent history/events are as follows:     -Pt kindly referred by Dr. Junior for evaluation of bradycardia (per her note on 2/18/2023):  "Bradycardia   Worsening   Prior 50's last year, now 44 in EKG (sinus mary grace), 48 in office   Asymptomatic"    -At our initial clinic visit on 2/23/2023, Mrs. Chávez was accompanied by her daughter.  She reported slow heart rate in the 50's over a year ago.  Reports heart rate in the 40's recently.  She has no chest pain, SOB, palpitations, TIA symptoms or syncope.  Heart rate 53 bpm in clinic today.  EKG on 2/20/2023 shows marked sinus bradycardia (44 bpm) with nonspecific T wave abnormality.  Plan:   Bradycardia- Pt is asymptomatic.  EKG on 2/20/2023 shows marked sinus bradycardia (44 bpm) with nonspecific T wave abnormality.  Check exercise stress echo to evaluate for chronotropic competence.  Check 30 day event monitor.  Amlodipine has been shown to cause bradycardia in rare instances.  Therefore, will discontinue amlodipine and start HCTZ 12.5 mg  daily.       HTN- Changes as per above.  Pt to monitor blood pressure and heart rate on above regimen.  Aortic Atherosclerosis- Increase pravastatin to 40 mg daily.    Overweight- Encourage diet, exercise, and weight loss.     -At follow up clinic visit on 7/17/2023, Mrs. Chávez was accompanied by her daughter.  She reported no chest pain, SOB, LE edema, TIA symptoms or syncope.  Cardiac Event Monitor on 3/15/2023 demonstrated 12 patient triggered transmissions, correlating with sinus rhythm 49-88 bpm.  Unfortunately, pt did not show up for stress test as scheduled.   Plan:   Bradycardia- Pt is asymptomatic.  Cardiac Event Monitor on 3/15/2023 demonstrated 12 " patient triggered transmissions, correlating with sinus rhythm 49-88 bpm.  Unfortunately, pt did not show up for stress test as scheduled.  No further workup indicated at this time.  Continue to monitor.     HTN- Controlled.  Continue current medications.    Aortic Atherosclerosis- Increase pravastatin to 80 mg daily.    Overweight- Encourage diet, exercise, and weight loss.     HPI:  Mrs. Chávez is accompanied by her sitter.  She reports doing well with no chest pain, SOB, LE edema, TIA symptoms or syncope.  Staying active by walking in [employer] daily.      Past Medical History:   Diagnosis Date    Allergy     Anxiety     Cataract     Colon polyps 2010    Hearing loss     Hypertension     Joint pain     Mental disorder      Past Surgical History:   Procedure Laterality Date    CATARACT EXTRACTION W/  INTRAOCULAR LENS IMPLANT Right 3/12/2024    Procedure: EXTRACTION, CATARACT, WITH IOL INSERTION;  Surgeon: Jax Garza MD;  Location: [facility] OR;  Service: Ophthalmology;  Laterality: Right;    COLONOSCOPY N/A 3/5/2020    Procedure: COLONOSCOPY;  Surgeon: Nathalia Kemp MD;  Location: [facility] ([address] [place] [address]);  Service: Colon and Rectal;  Laterality: N/A;  requests later appt time if available - Pt open to any MD- Pt informed arrival time may be adjusted, Pt verbalized understanding- ERW2/24/20@1409    ESOPHAGOGASTRODUODENOSCOPY N/A 10/1/2020    Procedure: EGD (ESOPHAGOGASTRODUODENOSCOPY);  Surgeon: Casey Guerrero MD;  [facility]  Service: Endoscopy;  Laterality: N/A;  covid test 9/28-[facility]    ESOPHAGOGASTRODUODENOSCOPY N/A 10/29/2021    Procedure: EGD (ESOPHAGOGASTRODUODENOSCOPY);  Surgeon: Billy Vegas MD;  Location: [facility];  Service: Endoscopy;  Laterality: N/A;    HYSTERECTOMY      PERIPARTUM CHITO     Social History     Socioeconomic History    Marital status:    Tobacco Use    Smoking status: Never     Passive exposure: Never    Smokeless tobacco: Never   Substance and  Sexual Activity    Alcohol use: Yes     Comment: beer occasionally    Drug use: No    Sexual activity: Not Currently     Birth control/protection: None   Social History Narrative    June 2016    The patient reports that she lives alone normally, however her daughter recently moved in with her    She has a 43-year-old daughter, Saima    And a 26-year-old daughterChris, who is a schoolteacher for 6 in seventh grade in [employer]    She is retired from working as a  in the school system    She now works about 5-15 hours a week for [employer], which she enjoys.    Enjoys working at [employer] couple days a week     Social Determinants of Health     Financial Resource Strain: Medium Risk (5/31/2024)    Overall Financial Resource Strain (CARDIA)     Difficulty of Paying Living Expenses: Somewhat hard   Food Insecurity: No Food Insecurity (5/31/2024)    Hunger Vital Sign     Worried About Running Out of Food in the Last Year: Never true     Ran Out of Food in the Last Year: Never true   Physical Activity: Insufficiently Active (5/31/2024)    Exercise Vital Sign     Days of Exercise per Week: 2 days     Minutes of Exercise per Session: 10 min   Stress: No Stress Concern Present (5/31/2024)    [employer] - Occupational Stress Questionnaire     Feeling of Stress : Only a little   Housing Stability: Unknown (5/31/2024)    Housing Stability Vital Sign     Unable to Pay for Housing in the Last Year: No     Family History   Problem Relation Name Age of Onset    Breast cancer Sister Ingris     Colon cancer Neg Hx      Ovarian cancer Neg Hx      Melanoma Neg Hx         Review of patient's allergies indicates:   Allergen Reactions    Lotensin [benazepril] Swelling    Penicillins Rash       Medication List with Changes/Refills   Current Medications    ASPIRIN (ECOTRIN) 81 MG EC TABLET    Take 81 mg by mouth nightly.     AZELASTINE (ASTELIN) 137 MCG (0.1 %) NASAL SPRAY    instill 1  "spray (137 mcg total) by Nasal route 2 (two) times daily.    CETIRIZINE (ZYRTEC) 10 MG TABLET    Take 1 tablet (10 mg total) by mouth daily as needed for Allergies.    DICLOFENAC SODIUM (VOLTAREN) 1 % GEL    Apply 2 g topically once daily.    DONEPEZIL (ARICEPT) 10 MG TABLET    Take 1 tablet (10 mg total) by mouth every evening.    MEMANTINE (NAMENDA) 10 MG TAB    Take 1 tablet (10 mg total) by mouth 2 (two) times daily.    MIRTAZAPINE (REMERON) 15 MG TABLET    Take 1 tablet (15 mg total) by mouth every evening.    MOMETASONE 0.1% (ELOCON) 0.1 % CREAM    apply to affected area topically daily    OMEPRAZOLE (PRILOSEC) 40 MG CAPSULE    Take 1 capsule (40 mg total) by mouth every morning.    PRAVASTATIN (PRAVACHOL) 80 MG TABLET    Take 1 tablet (80 mg total) by mouth once daily.    PREDNISOLONE-MOXIFLOX-BROMFEN 1-0.5-0.075 % DRPS    Place 1 drop into the right eye 3 (three) times daily.    QUETIAPINE (SEROQUEL) 25 MG TAB    Take ½  to 1 tablet by mouth every day, then take 1 tablet by mouth every night at bedtime.    VALSARTAN-HYDROCHLOROTHIAZIDE (DIOVAN-HCT) 160-12.5 MG PER TABLET    Take 1 tablet by mouth once daily.   Discontinued Medications    MEMANTINE (NAMENDA) 10 MG TAB    Take one tablet by mout twice daily       Review of Systems  Constitution: Denies chills, fever, and sweats.  HENT: Denies headaches or blurry vision.  Cardiovascular: Denies chest pain or irregular heart beat.  Respiratory: Denies cough or shortness of breath.  Gastrointestinal: Denies abdominal pain, nausea, or vomiting.  Musculoskeletal: Denies muscle cramps.  Neurological: Denies dizziness or focal weakness.  Psychiatric/Behavioral: Normal mental status.  Hematologic/Lymphatic: Denies bleeding problem or easy bruising/bleeding.  Skin: Denies rash or suspicious lesions    Physical Examination  /65   Pulse 72   Ht 5' 5" (1.651 m)   Wt 75.3 kg (166 lb 0.1 oz)   BMI 27.62 kg/m²     Constitutional: No acute distress, " "conversant  HEENT: Sclera anicteric, Pupils equal, round and reactive to light, extraocular motions intact, Oropharynx clear  Neck: No JVD, no carotid bruits  Cardiovascular: regular rate and rhythm, no murmur, rubs or gallops, normal S1/S2  Pulmonary: Clear to auscultation bilaterally  Abdominal: Abdomen soft, nontender, nondistended, positive bowel sounds  Extremities: No lower extremity edema,   Pulses:  Carotid pulses are 2+ on the right side, and 2+ on the left side.  Radial pulses are 2+ on the right side, and 2+ on the left side.   Femoral pulses are 2+ on the right side, and 2+ on the left side.  Popliteal pulses are 2+ on the right side, and 2+ on the left side.   Dorsalis pedis pulses are 2+ on the right side, and 2+ on the left side.   Posterior tibial pulses are 2+ on the right side, and 2+ on the left side.    Skin: No ecchymosis, erythema, or ulcers  Psych: Alert and oriented x 3, appropriate affect  Neuro: CNII-XII intact, no focal deficits    Labs:  Most Recent Data  CBC:   Lab Results   Component Value Date    WBC 6.52 03/22/2024    HGB 13.3 03/22/2024    HCT 41.9 03/22/2024     03/22/2024    MCV 87 03/22/2024    RDW 14.5 03/22/2024     BMP:   Lab Results   Component Value Date     03/22/2024    K 3.5 03/22/2024     03/22/2024    CO2 28 03/22/2024    BUN 16 03/22/2024    CREATININE 1.0 03/22/2024    GLU 82 03/22/2024    CALCIUM 10.0 03/22/2024    MG 2.3 03/22/2024     LFTS;   Lab Results   Component Value Date    PROT 8.1 03/22/2024    ALBUMIN 4.2 03/22/2024    BILITOT 0.3 03/22/2024    AST 23 03/22/2024    ALKPHOS 66 03/22/2024    ALT 19 03/22/2024     COAGS: No results found for: "INR", "PROTIME", "PTT"  FLP:   Lab Results   Component Value Date    CHOL 169 07/03/2023    HDL 60 07/03/2023    LDLCALC 97.0 07/03/2023    TRIG 60 07/03/2023    CHOLHDL 35.5 07/03/2023     CARDIAC:   Lab Results   Component Value Date    TROPONINI 0.009 09/23/2021     Imaging:    EKG " 2/20/2023:  Marked sinus bradycardia (44 bpm) with nonspecific T wave abnormality.    Cardiac Event Monitor 3/15/2023:  There were 12 patient triggered transmissions, correlating with sinus rhythm 49-88 bpm.    Assessment/Plan:   Soila Chávez is a 74 y.o. female with HTN, asymptomatic bradycardia, aortic atherosclerosis, overweight, who presents for a follow up appointment.    HTN- Controlled.  Continue current medications.      3. Aortic Atherosclerosis- Stable.  Continue pravastatin 80 mg daily.      4. Overweight- Encourage diet, exercise, and weight loss.     Follow up in 6 months with lipids prior    Total duration of face to face visit time 30 minutes.  Total time spent counseling greater than fifty percent of total visit time.  Counseling included discussion regarding imaging findings, diagnosis, possibilities, treatment options, risks and benefits.  The patient had many questions regarding the options and long-term effects.    Champ Chapman MD, PhD  Interventional Cardiology

## 2024-06-11 ENCOUNTER — PATIENT OUTREACH (OUTPATIENT)
Dept: ADMINISTRATIVE | Facility: HOSPITAL | Age: 74
End: 2024-06-11
Payer: MEDICARE

## 2024-06-11 DIAGNOSIS — Z12.31 ENCOUNTER FOR SCREENING MAMMOGRAM FOR MALIGNANT NEOPLASM OF BREAST: Primary | ICD-10-CM

## 2024-06-25 ENCOUNTER — OFFICE VISIT (OUTPATIENT)
Dept: INTERNAL MEDICINE | Facility: CLINIC | Age: 74
End: 2024-06-25
Payer: MEDICARE

## 2024-06-25 DIAGNOSIS — M54.40 CHRONIC MIDLINE LOW BACK PAIN WITH SCIATICA, SCIATICA LATERALITY UNSPECIFIED: ICD-10-CM

## 2024-06-25 DIAGNOSIS — R73.09 ELEVATED GLUCOSE: ICD-10-CM

## 2024-06-25 DIAGNOSIS — I10 PRIMARY HYPERTENSION: ICD-10-CM

## 2024-06-25 DIAGNOSIS — I70.0 AORTIC ATHEROSCLEROSIS: ICD-10-CM

## 2024-06-25 DIAGNOSIS — R00.1 ASYMPTOMATIC BRADYCARDIA: ICD-10-CM

## 2024-06-25 DIAGNOSIS — R10.9 ABDOMINAL PAIN, UNSPECIFIED ABDOMINAL LOCATION: ICD-10-CM

## 2024-06-25 DIAGNOSIS — G89.29 CHRONIC MIDLINE LOW BACK PAIN WITH SCIATICA, SCIATICA LATERALITY UNSPECIFIED: ICD-10-CM

## 2024-06-25 DIAGNOSIS — G95.9 MYELOPATHY: ICD-10-CM

## 2024-06-25 DIAGNOSIS — F02.818 ALZHEIMER'S DEMENTIA WITH BEHAVIORAL DISTURBANCE: Primary | ICD-10-CM

## 2024-06-25 DIAGNOSIS — G30.9 ALZHEIMER'S DEMENTIA WITH BEHAVIORAL DISTURBANCE: Primary | ICD-10-CM

## 2024-06-25 DIAGNOSIS — K80.20 GALLSTONES: ICD-10-CM

## 2024-06-25 DIAGNOSIS — K21.9 GASTROESOPHAGEAL REFLUX DISEASE WITHOUT ESOPHAGITIS: ICD-10-CM

## 2024-06-25 DIAGNOSIS — Z91.89 AT RISK FOR BLEEDING: ICD-10-CM

## 2024-06-25 PROCEDURE — 1101F PT FALLS ASSESS-DOCD LE1/YR: CPT | Mod: CPTII,95,, | Performed by: STUDENT IN AN ORGANIZED HEALTH CARE EDUCATION/TRAINING PROGRAM

## 2024-06-25 PROCEDURE — 3288F FALL RISK ASSESSMENT DOCD: CPT | Mod: CPTII,95,, | Performed by: STUDENT IN AN ORGANIZED HEALTH CARE EDUCATION/TRAINING PROGRAM

## 2024-06-25 PROCEDURE — 3044F HG A1C LEVEL LT 7.0%: CPT | Mod: CPTII,95,, | Performed by: STUDENT IN AN ORGANIZED HEALTH CARE EDUCATION/TRAINING PROGRAM

## 2024-06-25 PROCEDURE — 99443 PR PHYSICIAN TELEPHONE EVALUATION 21-30 MIN: CPT | Mod: 95,,, | Performed by: STUDENT IN AN ORGANIZED HEALTH CARE EDUCATION/TRAINING PROGRAM

## 2024-06-25 PROCEDURE — 1126F AMNT PAIN NOTED NONE PRSNT: CPT | Mod: CPTII,95,, | Performed by: STUDENT IN AN ORGANIZED HEALTH CARE EDUCATION/TRAINING PROGRAM

## 2024-06-25 PROCEDURE — 1159F MED LIST DOCD IN RCRD: CPT | Mod: CPTII,95,, | Performed by: STUDENT IN AN ORGANIZED HEALTH CARE EDUCATION/TRAINING PROGRAM

## 2024-06-25 RX ORDER — QUETIAPINE FUMARATE 50 MG/1
TABLET, FILM COATED ORAL
Qty: 30 TABLET | Refills: 0 | Status: SHIPPED | OUTPATIENT
Start: 2024-06-25

## 2024-06-25 NOTE — PROGRESS NOTES
Established Patient - Audio Only Telehealth Visit     The patient location is: [facility]  The chief complaint leading to consultation is: abdominal pain, leg pain, appetite changes  Visit type: Virtual visit with audio only (telephone)  Total time spent with patient: 26       The reason for the audio only service rather than synchronous audio and video virtual visit was related to technical difficulties or patient preference/necessity.     Each patient to whom I provide medical services by telemedicine is:  (1) informed of the relationship between the physician and patient and the respective role of any other health care provider with respect to management of the patient; and (2) notified that they may decline to receive medical services by telemedicine and may withdraw from such care at any time. Patient verbally consented to receive this service via voice-only telephone call.            [facility]    Subjective:       Patient ID: Soila Chávez is a 74 y.o. female.    Chief Complaint: Hypertension (F/u)      History was obtained from the patient and supplemented through chart review.  This patient is known to me.   She was a pt of Dr. Parson prior    Daughter is three way call with her today    HPI:    Patient is a 74 y.o. female who presents for HTN, late onset Alzheimer's dementia    Has been eating frosted flakes with sugar added    Last seen May 2023, daughter Arthur was on 3 way call today and did the majority of communication    Left flank/ LLQ Abdominal pain with unclear etiology, stable, no worsening  Reviewed CT from May 2023, KUB without constipation, miralax helpful somewhat  Reviewed bowel habits, seen by GI without significant progress  Check urine  S/p hyst and BSO in remote hx  Precautions given. She does have gallstones, but description of pain is in LLQ  Due for colonoscopy in March 2025    Blood counts normal, recheck sept    Bradycardia with donepezil in the past,  stable currently on 10 mg  Also memantine 10 mg daily    Seroquel was lost, agreed to send in alternate dosing for assistance    Was being seen by care at home visit regularly     HTN  Valsartan-hctz 160-12.5 daily, stable; No CP/HA/SOB/Vision changes  Check lytes sept    Aortic atherosclerosis  Pravastatin 10     Elevated ASCVD Risk score  On pravastatin to reduce risk    Multinodular Thyroid   Repeat US annually for 5 years (dec 2021 initially)  Does not meet criteria for f/u per 2/20/2023 US    GERD  Improved with omeprazole  Reviewed colonoscopy, EGD report (hx of diverticulitis) 10/29/2021,  stable    Late onset Alzheimer's Dementia  No longer driving   In good spirits, following closely with Dr. Levin    Chronic benzo use/anxiety/dementia  Late onset Alzheimer's   Aricept 10 mg/day   lexapro 5 switched to remeron 15 mg for sleep  Also taking seroquel 25 prn agitation  xanax 0.5 daily and nightly PRN Rx from neurology  Following with neurology regularly      Wt Readings from Last 15 Encounters:   05/31/24 75.3 kg (166 lb 0.1 oz)   04/02/24 70.9 kg (156 lb 4.9 oz)   03/22/24 70.9 kg (156 lb 4.9 oz)   03/12/24 65.8 kg (145 lb)   12/26/23 66 kg (145 lb 8.1 oz)   09/13/23 70 kg (154 lb 5.2 oz)   07/28/23 69.9 kg (154 lb)   07/17/23 70.2 kg (154 lb 12.2 oz)   07/12/23 69.6 kg (153 lb 7 oz)   06/13/23 68 kg (149 lb 14.6 oz)   05/23/23 68.7 kg (151 lb 7.3 oz)   02/23/23 72.2 kg (159 lb 2.8 oz)   02/20/23 71.2 kg (156 lb 15.5 oz)   02/13/23 68.5 kg (151 lb)   01/24/23 68.5 kg (151 lb 0.2 oz)       Medical History  Past Medical History:   Diagnosis Date    Allergy     Anxiety     Cataract     Colon polyps 2010    Hearing loss     Hypertension     Joint pain     Mental disorder        Review of Systems   Unable to perform ROS: Dementia   Gastrointestinal:  Positive for abdominal pain.   Psychiatric/Behavioral:  Positive for confusion and dysphoric mood.          Surgical hx, family hx, social hx    Have been  reviewed      Current Outpatient Medications:     aspirin (ECOTRIN) 81 MG EC tablet, Take 81 mg by mouth nightly. , Disp: , Rfl:     azelastine (ASTELIN) 137 mcg (0.1 %) nasal spray, instill 1 spray (137 mcg total) by Nasal route 2 (two) times daily., Disp: 30 mL, Rfl: 3    cetirizine (ZYRTEC) 10 MG tablet, Take 1 tablet (10 mg total) by mouth daily as needed for Allergies., Disp: 90 tablet, Rfl: 3    diclofenac sodium (VOLTAREN) 1 % Gel, Apply 2 g topically once daily. (Patient taking differently: Apply 2 g topically as needed.), Disp: 100 g, Rfl: 2    donepeziL (ARICEPT) 10 MG tablet, Take 1 tablet (10 mg total) by mouth every evening., Disp: 90 tablet, Rfl: 1    memantine (NAMENDA) 10 MG Tab, Take 1 tablet (10 mg total) by mouth 2 (two) times daily., Disp: 180 tablet, Rfl: 1    mirtazapine (REMERON) 15 MG tablet, Take 1 tablet (15 mg total) by mouth every evening., Disp: 30 tablet, Rfl: 5    mometasone 0.1% (ELOCON) 0.1 % cream, apply to affected area topically daily, Disp: 45 g, Rfl: 3    omeprazole (PRILOSEC) 40 MG capsule, Take 1 capsule (40 mg total) by mouth every morning., Disp: 90 capsule, Rfl: 3    pravastatin (PRAVACHOL) 80 MG tablet, Take 1 tablet (80 mg total) by mouth once daily., Disp: 90 tablet, Rfl: 3    prednisoLONE-moxiflox-bromfen 1-0.5-0.075 % DrpS, Place 1 drop into the right eye 3 (three) times daily., Disp: 5 mL, Rfl: 2    QUEtiapine (SEROQUEL) 25 MG Tab, Take ½  to 1 tablet by mouth every day, then take 1 tablet by mouth every night at bedtime., Disp: 180 tablet, Rfl: 1    QUEtiapine (SEROQUEL) 50 MG tablet, Take 1/2 tablet by mouth at night as needed for insomnia, Disp: 30 tablet, Rfl: 0    valsartan-hydrochlorothiazide (DIOVAN-HCT) 160-12.5 mg per tablet, Take 1 tablet by mouth once daily., Disp: 30 tablet, Rfl: 6    Objective:        There is no height or weight on file to calculate BMI.  Vitals:    06/25/24 1515   PainSc: 0-No pain           Physical Exam    Audio visit    Lab Results    Component Value Date    WBC 6.52 03/22/2024    HGB 13.3 03/22/2024    HCT 41.9 03/22/2024     03/22/2024    CHOL 169 07/03/2023    TRIG 60 07/03/2023    HDL 60 07/03/2023    ALT 19 03/22/2024    AST 23 03/22/2024     03/22/2024    K 3.5 03/22/2024     03/22/2024    CREATININE 1.0 03/22/2024    BUN 16 03/22/2024    CO2 28 03/22/2024    TSH 1.500 01/25/2023    HGBA1C 5.2 03/22/2024       The 10-year ASCVD risk score (Marie ALDANA, et al., 2019) is: 9.9%    Values used to calculate the score:      Age: 74 years      Sex: Female      Is Non- : Yes      Diabetic: No      Tobacco smoker: No      Systolic Blood Pressure: 112 mmHg      Is BP treated: Yes      HDL Cholesterol: 60 mg/dL      Total Cholesterol: 169 mg/dL    Pravastatin 10 nightly    (Imaging have been independently reviewed)    CTa bdom/pelvis  No acute process seen.  Cholelithiasis but no evidence of acute cholecystitis.  Two left lobe liver cysts and focal fatty sparing near the falciform ligament.  Diverticulosis but no acute diverticulitis seen.       Assessment:         1. Alzheimer's dementia with behavioral disturbance    2. Primary hypertension    3. Elevated glucose    4. At risk for bleeding    5. Abdominal pain, unspecified abdominal location    6. Myelopathy    7. Asymptomatic bradycardia    8. Aortic atherosclerosis    9. Chronic midline low back pain with sciatica, sciatica laterality unspecified    10. Gastroesophageal reflux disease without esophagitis    11. Gallstones            Plan:     Soila was seen today for hypertension.    Diagnoses and all orders for this visit:    Alzheimer's dementia with behavioral disturbance  -     QUEtiapine (SEROQUEL) 50 MG tablet; Take 1/2 tablet by mouth at night as needed for insomnia    Primary hypertension  -     COMPREHENSIVE METABOLIC PANEL; Future    Elevated glucose  -     Hemoglobin A1C; Future    At risk for bleeding  -     CBC Without Differential;  Future    Abdominal pain, unspecified abdominal location  -     Urine culture; Future  -     Urinalysis; Future    Myelopathy    Asymptomatic bradycardia    Aortic atherosclerosis    Chronic midline low back pain with sciatica, sciatica laterality unspecified    Gastroesophageal reflux disease without esophagitis    Gallstones              Health Maintenance  - Lipids:   - A1C:   - Colon Ca Screen: 3/2020, repeat due 2025  - Immunizations: [facility]    Women's health  - Pap: s/p hyst, seen by gyn.   - Mammo:  - Dexa: normla 4/2/2024    No follow-ups on file.        Or sooner prn      All medications were reviewed including potential side effects and risks/benefits.  Pt was counseled to call back if anything worsens or if questions arise.    Parminder Junior MD  Family Medicine  [facility]  [facility] [address]  Phone [phone]  Fax [phone]       This service was not originating from a related E/M service provided within the previous 7 days nor will  to an E/M service or procedure within the next 24 hours or my soonest available appointment.  Prevailing standard of care was able to be met in this audio-only visit.

## 2024-07-09 RX ORDER — VALSARTAN AND HYDROCHLOROTHIAZIDE 160; 12.5 MG/1; MG/1
1 TABLET, FILM COATED ORAL DAILY
Qty: 30 TABLET | Refills: 6 | Status: SHIPPED | OUTPATIENT
Start: 2024-07-09 | End: 2025-07-09

## 2024-07-17 ENCOUNTER — HOSPITAL ENCOUNTER (OUTPATIENT)
Dept: RADIOLOGY | Facility: HOSPITAL | Age: 74
Discharge: HOME | End: 2024-07-17
Attending: PHYSICIAN ASSISTANT
Payer: MEDICARE

## 2024-07-17 ENCOUNTER — OFFICE VISIT (OUTPATIENT)
Dept: SPORTS MEDICINE | Facility: CLINIC | Age: 74
End: 2024-07-17
Payer: MEDICARE

## 2024-07-17 VITALS
HEART RATE: 64 BPM | WEIGHT: 169 LBS | DIASTOLIC BLOOD PRESSURE: 77 MMHG | BODY MASS INDEX: 28.16 KG/M2 | HEIGHT: 65 IN | SYSTOLIC BLOOD PRESSURE: 137 MMHG

## 2024-07-17 DIAGNOSIS — M25.562 PAIN IN BOTH KNEES, UNSPECIFIED CHRONICITY: ICD-10-CM

## 2024-07-17 DIAGNOSIS — M17.0 PRIMARY OSTEOARTHRITIS OF BOTH KNEES: ICD-10-CM

## 2024-07-17 DIAGNOSIS — M25.561 PAIN IN BOTH KNEES, UNSPECIFIED CHRONICITY: ICD-10-CM

## 2024-07-17 DIAGNOSIS — M25.561 PAIN IN BOTH KNEES, UNSPECIFIED CHRONICITY: Primary | ICD-10-CM

## 2024-07-17 DIAGNOSIS — M25.562 PAIN IN BOTH KNEES, UNSPECIFIED CHRONICITY: Primary | ICD-10-CM

## 2024-07-17 PROCEDURE — 1159F MED LIST DOCD IN RCRD: CPT | Mod: CPTII,S$GLB,, | Performed by: PHYSICIAN ASSISTANT

## 2024-07-17 PROCEDURE — 3044F HG A1C LEVEL LT 7.0%: CPT | Mod: CPTII,S$GLB,, | Performed by: PHYSICIAN ASSISTANT

## 2024-07-17 PROCEDURE — 3008F BODY MASS INDEX DOCD: CPT | Mod: CPTII,S$GLB,, | Performed by: PHYSICIAN ASSISTANT

## 2024-07-17 PROCEDURE — 3078F DIAST BP <80 MM HG: CPT | Mod: CPTII,S$GLB,, | Performed by: PHYSICIAN ASSISTANT

## 2024-07-17 PROCEDURE — 3075F SYST BP GE 130 - 139MM HG: CPT | Mod: CPTII,S$GLB,, | Performed by: PHYSICIAN ASSISTANT

## 2024-07-17 PROCEDURE — 99204 OFFICE O/P NEW MOD 45 MIN: CPT | Mod: 25,S$GLB,, | Performed by: PHYSICIAN ASSISTANT

## 2024-07-17 PROCEDURE — 73564 X-RAY EXAM KNEE 4 OR MORE: CPT | Mod: TC,50

## 2024-07-17 PROCEDURE — 73564 X-RAY EXAM KNEE 4 OR MORE: CPT | Mod: 26,50,, | Performed by: RADIOLOGY

## 2024-07-17 PROCEDURE — 1160F RVW MEDS BY RX/DR IN RCRD: CPT | Mod: CPTII,S$GLB,, | Performed by: PHYSICIAN ASSISTANT

## 2024-07-17 PROCEDURE — 1101F PT FALLS ASSESS-DOCD LE1/YR: CPT | Mod: CPTII,S$GLB,, | Performed by: PHYSICIAN ASSISTANT

## 2024-07-17 PROCEDURE — 99999 PR PBB SHADOW E&M-EST. PATIENT-LVL IV: CPT | Mod: PBBFAC,,, | Performed by: PHYSICIAN ASSISTANT

## 2024-07-17 PROCEDURE — 1125F AMNT PAIN NOTED PAIN PRSNT: CPT | Mod: CPTII,S$GLB,, | Performed by: PHYSICIAN ASSISTANT

## 2024-07-17 PROCEDURE — 20610 DRAIN/INJ JOINT/BURSA W/O US: CPT | Mod: 50,S$GLB,, | Performed by: PHYSICIAN ASSISTANT

## 2024-07-17 PROCEDURE — 3288F FALL RISK ASSESSMENT DOCD: CPT | Mod: CPTII,S$GLB,, | Performed by: PHYSICIAN ASSISTANT

## 2024-07-17 RX ADMIN — TRIAMCINOLONE ACETONIDE 40 MG: 40 INJECTION, SUSPENSION INTRA-ARTICULAR; INTRAMUSCULAR at 03:07

## 2024-07-17 NOTE — PROGRESS NOTES
Subjective:     Chief Complaint: Soila Chávez is a 74 y.o. female who had concerns including Pain of the Left Knee.    Soila Chávez is a 74 y.o. retiree with PMHx of dementia/Alzheimers, previously treated by Ortho/Trauma team. The pain started 10+ years ago with no clear CHACE and is becoming progressively worse. Has had multiple CSIs in the past but does not recall. Pain is located over (points to) behind the patella and anterior bilateral knee. She reports that the pain is a 8 /10 aching and throbbing pain today. Associated symptoms include swelling.  Pain is not responding adequately to conservative measures which have included activity modifications, rest, and oral medication. Is affecting ADLs and limiting desired level of activity. Denies numbness, tingling, radiation and inability to bear weight.  Pain is 8 /10 at its worst    Mechanical symptoms: none  Subjective instability: (+)   Worse with weightbearing and increased activity  Better with rest.   Nocturnal symptoms: (+)    No previous surgeries or recent trauma on knee        Adrianna Solares PA-C Previous HPI: Soila Chávez is a 71 y.o. female who presents to clinic with chief complaint of a-traumatic intermittent left posterior lateral knee pain. No pain with ROM of the knee. She is able to bear weight. Symptoms increased with walking and squatting. Patient stated that it is getting better. Symptoms did start after she slipped and fell going up her stairs. She does not have any hip pain. Denied locking catching popping and cracking.          Review of Systems   Constitutional: Negative for chills and fever.   HENT:  Negative for congestion and sore throat.    Eyes:  Negative for discharge and double vision.   Cardiovascular:  Negative for chest pain, palpitations and syncope.   Respiratory:  Negative for cough and shortness of breath.    Endocrine: Negative for cold intolerance and heat intolerance.   Skin:  Negative for dry skin and rash.    Musculoskeletal:  Positive for arthritis, joint pain and joint swelling.   Gastrointestinal:  Negative for abdominal pain, nausea and vomiting.   Neurological:  Negative for focal weakness, numbness and paresthesias.       Pain Related Questions  Over the past 3 days, what was your average pain during activity? (I.e. running, jogging, walking, climbing stairs, getting dressed, ect.): 9  Over the past 3 days, what was your highest pain level?: 10  Over the past 3 days, what was your lowest pain level? : 9    Other  How many nights a week are you awakened by your affected body part?: 0  Was the patient's HEIGHT measured or patient reported?: Measured  Was the patient's WEIGHT measured or patient reported?: Measured    Objective:     General: Soila is well-developed, well-nourished, appears stated age, in no acute distress, alert and oriented to time, place and person.     General    Nursing note and vitals reviewed.  Constitutional: She is oriented to person, place, and time. She appears well-developed and well-nourished. No distress.   Eyes: Conjunctivae and EOM are normal. Pupils are equal, round, and reactive to light.   Neck: Neck supple. No JVD present.   Cardiovascular:  Normal heart sounds and intact distal pulses.            No murmur heard.  Pulmonary/Chest: Effort normal and breath sounds normal. No respiratory distress.   Abdominal: Soft. Bowel sounds are normal. She exhibits no distension. There is no abdominal tenderness.   Neurological: She is alert and oriented to person, place, and time. Coordination normal.   Psychiatric: She has a normal mood and affect. Her behavior is normal. Judgment and thought content normal.     General Musculoskeletal Exam   Gait: normal and abnormal       Right Knee Exam     Inspection   Erythema: absent  Scars: absent  Swelling: absent  Effusion: absent  Deformity: absent  Bruising: absent    Tenderness   The patient is tender to palpation of the patella.    Crepitus   The  patient has crepitus of the patella.    Range of Motion   Extension:  0   Flexion:  130     Tests   Meniscus   Demetria:  Medial - negative Lateral - negative  Ligament Examination   Lachman: normal (-1 to 2mm)   PCL-Posterior Drawer: normal (0 to 2mm)     MCL - Valgus: normal (0 to 2mm)  LCL - Varus: normal  Dial Test at 90 degrees: normal (< 5 degrees)  Posterolateral Corner: stable  Patella   Patellar Glide (quadrants): Lateral - 1   Medial - 2  Patellar Grind: positive    Other   Popliteal (Baker's) Cyst: absent  Sensation: normal    Left Knee Exam     Inspection   Erythema: absent  Scars: absent  Swelling: absent  Effusion: absent  Deformity: absent  Bruising: absent    Tenderness   The patient tender to palpation of the patella and medial joint line.    Crepitus   The patient has crepitus of the patella.    Range of Motion   Extension:  0   Flexion:  110     Tests   Meniscus   Demetria:  Medial - negative Lateral - negative  Stability   Lachman: normal (-1 to 2mm)   PCL-Posterior Drawer: normal (0 to 2mm)  MCL - Valgus: normal (0 to 2mm)  LCL - Varus: normal (0 to 2mm)  Dial Test at 90 degrees: normal (< 5 degrees)  Posterolateral Corner: stable  Patella   Patellar Glide (Quadrants): Lateral - 1 Medial - 2  Patellar Grind: positive    Other   Popliteal (Baker's) Cyst: absent  Sensation: normal    Right Hip Exam     Tests   Sabrina: negative  Left Hip Exam     Tests   Sabrina: negative          Muscle Strength   Right Lower Extremity   Hip Abduction: 5/5   Quadriceps:  4/5   Hamstrin/5   Left Lower Extremity   Hip Abduction: 5/5   Quadriceps:  4/5   Hamstrin/5     Vascular Exam     Right Pulses  Dorsalis Pedis:      2+  Posterior Tibial:      2+        Left Pulses  Dorsalis Pedis:      2+  Posterior Tibial:      2+        Edema  Right Lower Leg: absent  Left Lower Leg: absent    Radiographic findings today:    Joint space narrowing particularly involving the lateral patellofemoral compartment is noted, but  there has been no significant interval worsening tibiofemoral or patellofemoral joint space narrowing since the 2021 examination.  Some minor tibiofemoral spurring is noted bilaterally, as is patellofemoral spurring, the latter somewhat more pronounced on the right side than the left.  No evidence of recent fracture or lytic destructive process.  No significant volume of joint effusion is seen on either side.     Impression:     Degenerative arthritic changes are observed bilaterally, with preferential involvement of the lateral patellofemoral compartment on the right side.  Findings are quite similar to the earlier examination of 07/23/2021.    Kellgren-Trey Classification: 3    Xrays of the knees were ordered and reviewed by me today. These findings were discussed and reviewed with the patient.      Assessment:     Encounter Diagnoses   Name Primary?    Pain in both knees, unspecified chronicity Yes    Primary osteoarthritis of both knees         Plan:     We have discussed a variety of treatment options including medications, injections, physical therapy and other alternative treatments. Pt is requesting CSI and physical therapy at this time.    I made the decision to obtain old records of the patient including previous notes and imaging. I independently reviewed and interpreted lab results today as well as prior imaging.     1. Injection Procedure  A time out was performed, including verification of patient ID, procedure, site and side, availability of information and equipment, review of safety issues, and agreement with consent, the procedure site was marked.    After time out was performed, the patient was prepped aseptically with chloraprep swabsticks. A diagnostic and therapeutic injection of 1:4cc Kenalog/Marcaine was given under sterile technique using a 22g x 1.5 needle from the Superolateral  aspect of the bilateral Knee Joint in the supine position.      Soila Chávez had no adverse reactions  to the medication. Pain decreased. She was instructed to apply ice to the joint for 20 minutes and avoid strenuous activities for 24-36 hours following the injection. She was warned of possible blood sugar and/or blood pressure changes during that time. Following that time, she can resume regular activities.    She was reminded to call the clinic immediately for any adverse side effects as explained in clinic today.    2. Mobic 15 mg 1 time daily PRN for pain management. Patient understands to take with food and/or OTC prilosec to decrease GI side effects.  3. HEP patellofemoral strength and conditioning protocol  4. Ice compress to the affected area 2-3x a day for 15-20 minutes as needed for pain management.  5. RTC to see Miguel Angel Canchola PA-C as needed for follow-up.    All of the patient's questions were answered and the patient will contact us if they have any questions or concerns in the interim.      Patient questionnaires may have been collected.

## 2024-07-19 DIAGNOSIS — M54.40 CHRONIC MIDLINE LOW BACK PAIN WITH SCIATICA, SCIATICA LATERALITY UNSPECIFIED: ICD-10-CM

## 2024-07-19 DIAGNOSIS — F02.818 ALZHEIMER'S DEMENTIA WITH BEHAVIORAL DISTURBANCE: ICD-10-CM

## 2024-07-19 DIAGNOSIS — G89.29 CHRONIC MIDLINE LOW BACK PAIN WITH SCIATICA, SCIATICA LATERALITY UNSPECIFIED: ICD-10-CM

## 2024-07-19 DIAGNOSIS — G30.9 ALZHEIMER'S DEMENTIA WITH BEHAVIORAL DISTURBANCE: ICD-10-CM

## 2024-07-19 NOTE — TELEPHONE ENCOUNTER
No care due was identified.  [facility] Embedded Care Due Messages. Reference number: [id number].   7/19/2024 12:58:45 AM CDT

## 2024-07-19 NOTE — TELEPHONE ENCOUNTER
Last office visit: 6/25/2024  Upcoming appointment scheduled with PRIMARY CARE PROVIDER for 9/25/2024    Labs last complete: 3/22/2024    Allergies confirmed, medication pended, and routed to PCP for advise,.

## 2024-07-22 RX ORDER — DICLOFENAC SODIUM 10 MG/G
2 GEL TOPICAL DAILY
Qty: 100 G | Refills: 2 | Status: SHIPPED | OUTPATIENT
Start: 2024-07-22

## 2024-07-22 RX ORDER — QUETIAPINE FUMARATE 50 MG/1
TABLET, FILM COATED ORAL
Qty: 30 TABLET | Refills: 0 | Status: SHIPPED | OUTPATIENT
Start: 2024-07-22

## 2024-07-22 RX ORDER — TRIAMCINOLONE ACETONIDE 40 MG/ML
40 INJECTION, SUSPENSION INTRA-ARTICULAR; INTRAMUSCULAR
Status: COMPLETED | OUTPATIENT
Start: 2024-07-17 | End: 2024-07-17

## 2024-07-22 RX ORDER — MELOXICAM 15 MG/1
15 TABLET ORAL DAILY
Qty: 30 TABLET | Refills: 0 | Status: SHIPPED | OUTPATIENT
Start: 2024-07-22

## 2024-08-13 RX ORDER — PRAVASTATIN SODIUM 80 MG/1
80 TABLET ORAL DAILY
Qty: 90 TABLET | Refills: 3 | Status: SHIPPED | OUTPATIENT
Start: 2024-08-13 | End: 2025-08-13

## 2024-09-03 ENCOUNTER — OFFICE VISIT (OUTPATIENT)
Dept: NEUROLOGY | Facility: CLINIC | Age: 74
End: 2024-09-03
Payer: MEDICARE

## 2024-09-03 VITALS — HEIGHT: 64 IN | WEIGHT: 174.19 LBS | BODY MASS INDEX: 29.74 KG/M2

## 2024-09-03 DIAGNOSIS — F02.80 LATE ONSET ALZHEIMER'S DISEASE WITHOUT BEHAVIORAL DISTURBANCE: Primary | ICD-10-CM

## 2024-09-03 DIAGNOSIS — G30.1 LATE ONSET ALZHEIMER'S DISEASE WITHOUT BEHAVIORAL DISTURBANCE: Primary | ICD-10-CM

## 2024-09-03 PROCEDURE — 1159F MED LIST DOCD IN RCRD: CPT | Mod: CPTII,S$GLB,, | Performed by: PSYCHIATRY & NEUROLOGY

## 2024-09-03 PROCEDURE — 1101F PT FALLS ASSESS-DOCD LE1/YR: CPT | Mod: CPTII,S$GLB,, | Performed by: PSYCHIATRY & NEUROLOGY

## 2024-09-03 PROCEDURE — 3008F BODY MASS INDEX DOCD: CPT | Mod: CPTII,S$GLB,, | Performed by: PSYCHIATRY & NEUROLOGY

## 2024-09-03 PROCEDURE — 3044F HG A1C LEVEL LT 7.0%: CPT | Mod: CPTII,S$GLB,, | Performed by: PSYCHIATRY & NEUROLOGY

## 2024-09-03 PROCEDURE — 99213 OFFICE O/P EST LOW 20 MIN: CPT | Mod: S$GLB,,, | Performed by: PSYCHIATRY & NEUROLOGY

## 2024-09-03 PROCEDURE — 99999 PR PBB SHADOW E&M-EST. PATIENT-LVL III: CPT | Mod: PBBFAC,,, | Performed by: PSYCHIATRY & NEUROLOGY

## 2024-09-03 PROCEDURE — 3288F FALL RISK ASSESSMENT DOCD: CPT | Mod: CPTII,S$GLB,, | Performed by: PSYCHIATRY & NEUROLOGY

## 2024-09-03 RX ORDER — MEMANTINE HYDROCHLORIDE 10 MG/1
10 TABLET ORAL 2 TIMES DAILY
Qty: 180 TABLET | Refills: 1 | Status: SHIPPED | OUTPATIENT
Start: 2024-09-03 | End: 2025-03-02

## 2024-09-03 RX ORDER — QUETIAPINE FUMARATE 25 MG/1
25 TABLET, FILM COATED ORAL NIGHTLY
Qty: 90 TABLET | Refills: 1 | Status: SHIPPED | OUTPATIENT
Start: 2024-09-03

## 2024-09-03 RX ORDER — DONEPEZIL HYDROCHLORIDE 10 MG/1
10 TABLET, FILM COATED ORAL NIGHTLY
Qty: 90 TABLET | Refills: 1 | Status: SHIPPED | OUTPATIENT
Start: 2024-09-03

## 2024-09-03 NOTE — PROGRESS NOTES
Subjective:       Patient ID: Soila Chávez is a 74 y.o. female.    Chief Complaint: Dementia      Ms. Sean alcala presents with her daughter.  Her daughter feels that she is doing a little better at least in terms of behavior and mood.  She has been sleeping well and there is no agitation.  There is never any pacing or wandering.          Past Medical History:   Diagnosis Date    Allergy     Anxiety     Cataract     Colon polyps 2010    Hearing loss     Hypertension     Joint pain     Mental disorder       Past Surgical History:   Procedure Laterality Date    CATARACT EXTRACTION W/  INTRAOCULAR LENS IMPLANT Right 3/12/2024    Procedure: EXTRACTION, CATARACT, WITH IOL INSERTION;  Surgeon: Jax Garza MD;  Location: [facility] OR;  Service: Ophthalmology;  Laterality: Right;    COLONOSCOPY N/A 3/5/2020    Procedure: COLONOSCOPY;  Surgeon: Nathalia Kemp MD;  Location: [facility] (4TH FLR);  Service: Colon and Rectal;  Laterality: N/A;  requests later appt time if available - Pt open to any MD- Pt informed arrival time may be adjusted, Pt verbalized understanding- ERW2/24/20@1409    ESOPHAGOGASTRODUODENOSCOPY N/A 10/1/2020    Procedure: EGD (ESOPHAGOGASTRODUODENOSCOPY);  Surgeon: Casey Guerrero MD;  [facility]  Service: Endoscopy;  Laterality: N/A;  covid test 9/28-[facility]    ESOPHAGOGASTRODUODENOSCOPY N/A 10/29/2021    Procedure: EGD (ESOPHAGOGASTRODUODENOSCOPY);  Surgeon: Billy Vegas MD;  Location: [facility];  Service: Endoscopy;  Laterality: N/A;    HYSTERECTOMY      PERIPARTUM CHITO        Current Outpatient Medications:     aspirin (ECOTRIN) 81 MG EC tablet, Take 81 mg by mouth nightly. , Disp: , Rfl:     cetirizine (ZYRTEC) 10 MG tablet, Take 1 tablet (10 mg total) by mouth daily as needed for Allergies., Disp: 90 tablet, Rfl: 3    diclofenac sodium (VOLTAREN) 1 % Gel, Apply 2 g topically once daily., Disp: 100 g, Rfl: 2    meloxicam (MOBIC) 15 MG tablet, Take 1 tablet (15  mg total) by mouth once daily., Disp: 30 tablet, Rfl: 0    mirtazapine (REMERON) 15 MG tablet, Take 1 tablet (15 mg total) by mouth every evening., Disp: 30 tablet, Rfl: 5    mometasone 0.1% (ELOCON) 0.1 % cream, apply to affected area topically daily, Disp: 45 g, Rfl: 3    omeprazole (PRILOSEC) 40 MG capsule, Take 1 capsule (40 mg total) by mouth every morning., Disp: 90 capsule, Rfl: 3    pravastatin (PRAVACHOL) 80 MG tablet, Take 1 tablet (80 mg total) by mouth once daily., Disp: 90 tablet, Rfl: 3    prednisoLONE-moxiflox-bromfen 1-0.5-0.075 % DrpS, Place 1 drop into the right eye 3 (three) times daily., Disp: 5 mL, Rfl: 2    valsartan-hydrochlorothiazide (DIOVAN-HCT) 160-12.5 mg per tablet, Take 1 tablet by mouth once daily., Disp: 30 tablet, Rfl: 6    azelastine (ASTELIN) 137 mcg (0.1 %) nasal spray, instill 1 spray (137 mcg total) by Nasal route 2 (two) times daily., Disp: 30 mL, Rfl: 3    donepeziL (ARICEPT) 10 MG tablet, Take 1 tablet (10 mg total) by mouth every evening., Disp: 90 tablet, Rfl: 1    memantine (NAMENDA) 10 MG Tab, Take 1 tablet (10 mg total) by mouth 2 (two) times daily., Disp: 180 tablet, Rfl: 1    QUEtiapine (SEROQUEL) 25 MG Tab, Take 1 tablet (25 mg total) by mouth every evening., Disp: 90 tablet, Rfl: 1   Review of patient's allergies indicates:   Allergen Reactions    Lotensin [benazepril] Swelling    Penicillins Rash        Review of Systems   Constitutional:  Negative for appetite change.   Psychiatric/Behavioral:  Positive for hallucinations (none since last visit). Negative for agitation (no pacing, no wandering).            Objective:      Physical Exam  Constitutional:       Appearance: She is not ill-appearing.   Neurological:      Mental Status: She is alert.      Cranial Nerves: No dysarthria.      Motor: No tremor.      Gait: Gait normal.      Comments: Quiet, paucity of speech. Timid.   Does not know the date. Thought was January and cool outside.  Knows she is at the doctor's  office.   Knows her address             Assessment:       1. 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  It is not 100% clear what dosage she is taking in her daughter will verify for me that it is still 25 mg.  Note that whatever dose she is currently taking is working quite well, with no side effects.  Continue donepezil and memantine.                          Alesha Levin MD   09/03/2024   4:42 PM

## 2024-09-24 ENCOUNTER — TELEPHONE (OUTPATIENT)
Dept: INTERNAL MEDICINE | Facility: CLINIC | Age: 74
End: 2024-09-24
Payer: MEDICARE

## 2024-09-24 NOTE — TELEPHONE ENCOUNTER
----- Message from Sadia Rudolph sent at 9/24/2024 10:06 AM CDT -----  Regarding: virtual appt  Patient daughter requesting to have a virtual appointment on tomorrow, please follow up.      Thanks  ANISH

## 2024-09-24 NOTE — TELEPHONE ENCOUNTER
Spoke to patient daughter and she said  she will see if her sister will be able to bring the patient to appointment

## 2024-09-30 RX ORDER — MIRTAZAPINE 15 MG/1
15 TABLET, FILM COATED ORAL NIGHTLY
Qty: 30 TABLET | Refills: 5 | Status: SHIPPED | OUTPATIENT
Start: 2024-09-30 | End: 2025-03-29

## 2024-10-01 NOTE — TELEPHONE ENCOUNTER
----- Message from Cydney Tellez sent at 5/5/2023 11:54 AM CDT -----  Type:  Patient Returning Call    Who Called:Marli mai's daughter   Who Left Message for Patient:office  Does the patient know what this is regarding?:n/a  Would the patient rather a call back or a response via [facility]? call  Best Call Back Number:  Additional Information:         
Called and spoke to Marli regarding medicine and appt. She stated she wants her mom to get back on the donepezil 23 mg and I made her mothers appt on 6/13/2023. I will send Dr. Levin a message regarding the medicine. Marli voiced understanding.  
The patient is a 55y Male complaining of fall.

## 2024-10-31 DIAGNOSIS — M25.562 PAIN IN BOTH KNEES, UNSPECIFIED CHRONICITY: ICD-10-CM

## 2024-10-31 DIAGNOSIS — M17.0 PRIMARY OSTEOARTHRITIS OF BOTH KNEES: ICD-10-CM

## 2024-10-31 DIAGNOSIS — M25.561 PAIN IN BOTH KNEES, UNSPECIFIED CHRONICITY: ICD-10-CM

## 2024-10-31 RX ORDER — MELOXICAM 15 MG/1
15 TABLET ORAL DAILY
Qty: 30 TABLET | Refills: 0 | Status: SHIPPED | OUTPATIENT
Start: 2024-10-31

## 2024-12-03 ENCOUNTER — PATIENT MESSAGE (OUTPATIENT)
Dept: INTERNAL MEDICINE | Facility: CLINIC | Age: 74
End: 2024-12-03
Payer: MEDICARE

## 2024-12-03 ENCOUNTER — PATIENT MESSAGE (OUTPATIENT)
Dept: NEUROLOGY | Facility: CLINIC | Age: 74
End: 2024-12-03
Payer: MEDICARE

## 2024-12-03 RX ORDER — QUETIAPINE FUMARATE 25 MG/1
25 TABLET, FILM COATED ORAL 2 TIMES DAILY
Qty: 180 TABLET | Refills: 1 | Status: SHIPPED | OUTPATIENT
Start: 2024-12-03

## 2024-12-14 ENCOUNTER — HOSPITAL ENCOUNTER (OUTPATIENT)
Dept: RADIOLOGY | Facility: OTHER | Age: 74
Discharge: HOME | End: 2024-12-14
Attending: STUDENT IN AN ORGANIZED HEALTH CARE EDUCATION/TRAINING PROGRAM
Payer: MEDICARE

## 2024-12-14 DIAGNOSIS — Z12.31 ENCOUNTER FOR SCREENING MAMMOGRAM FOR MALIGNANT NEOPLASM OF BREAST: ICD-10-CM

## 2024-12-14 PROCEDURE — 77067 SCR MAMMO BI INCL CAD: CPT | Mod: TC

## 2024-12-14 PROCEDURE — 77067 SCR MAMMO BI INCL CAD: CPT | Mod: 26,,, | Performed by: RADIOLOGY

## 2024-12-14 PROCEDURE — 77063 BREAST TOMOSYNTHESIS BI: CPT | Mod: 26,,, | Performed by: RADIOLOGY

## 2025-01-07 DIAGNOSIS — M17.0 PRIMARY OSTEOARTHRITIS OF BOTH KNEES: ICD-10-CM

## 2025-01-07 DIAGNOSIS — M25.561 PAIN IN BOTH KNEES, UNSPECIFIED CHRONICITY: ICD-10-CM

## 2025-01-07 DIAGNOSIS — M25.562 PAIN IN BOTH KNEES, UNSPECIFIED CHRONICITY: ICD-10-CM

## 2025-01-08 RX ORDER — MELOXICAM 15 MG/1
15 TABLET ORAL DAILY
Qty: 30 TABLET | Refills: 0 | Status: SHIPPED | OUTPATIENT
Start: 2025-01-08

## 2025-01-21 ENCOUNTER — PATIENT MESSAGE (OUTPATIENT)
Dept: INTERNAL MEDICINE | Facility: CLINIC | Age: 75
End: 2025-01-21
Payer: MEDICARE

## 2025-01-22 ENCOUNTER — OFFICE VISIT (OUTPATIENT)
Dept: INTERNAL MEDICINE | Facility: CLINIC | Age: 75
End: 2025-01-22
Payer: MEDICARE

## 2025-01-22 ENCOUNTER — TELEPHONE (OUTPATIENT)
Dept: INTERNAL MEDICINE | Facility: CLINIC | Age: 75
End: 2025-01-22
Payer: MEDICARE

## 2025-01-22 DIAGNOSIS — G30.9 ALZHEIMER'S DEMENTIA WITH BEHAVIORAL DISTURBANCE: ICD-10-CM

## 2025-01-22 DIAGNOSIS — F03.90 DEMENTIA WITHOUT BEHAVIORAL DISTURBANCE: ICD-10-CM

## 2025-01-22 DIAGNOSIS — R63.0 DECREASED APPETITE: Primary | ICD-10-CM

## 2025-01-22 DIAGNOSIS — G95.9 MYELOPATHY: ICD-10-CM

## 2025-01-22 DIAGNOSIS — F02.818 ALZHEIMER'S DEMENTIA WITH BEHAVIORAL DISTURBANCE: ICD-10-CM

## 2025-01-22 NOTE — TELEPHONE ENCOUNTER
Appointments were scheduled. Unfortunately, Lab blocked scheduling zinc- as notification states, not a standard order. Placed in notes to please include zinc when performing labs.

## 2025-01-22 NOTE — TELEPHONE ENCOUNTER
Please schedule all urine studies, labs including zinc from Dr. Levin and my old (should also include A1C) for Wednesday 29th around 11 at [facility] please. No need to contact patient/family    thanks

## 2025-01-22 NOTE — PROGRESS NOTES
"The patient location is: [facility]  The chief complaint leading to consultation is: decreased appetite    Visit type: audiovisual    Face to Face time with patient: 19  30 minutes of total time spent on the encounter, which includes face to face time and non-face to face time preparing to see the patient (eg, review of tests), Obtaining and/or reviewing separately obtained history, Documenting clinical information in the electronic or other health record, Independently interpreting results (not separately reported) and communicating results to the patient/family/caregiver, or Care coordination (not separately reported).         Each patient to whom he or she provides medical services by telemedicine is:  (1) informed of the relationship between the physician and patient and the respective role of any other health care provider with respect to management of the patient; and (2) notified that he or she may decline to receive medical services by telemedicine and may withdraw from such care at any time.    Notes:       [facility]    Subjective:       Patient ID: Soila Chávez is a 74 y.o. female.    Chief Complaint: Fatigue      History was obtained from the patient and supplemented through chart review.  This patient is known to me.   She was a pt of Dr. Parson prior    Daughter Marli is on virtual visit today    HPI:    Patient is a 74 y.o. female who presents for HTN, late onset Alzheimer's dementia  Presents for normally scheduled visit witched to virtual    Low PO intake for past week, more sleeping than usual.    More confusion than normal  No distress, no pain  Maybe difficulty sleeping, now taking seroquel twice a day, was having hallucinations  Care taker helping during the day  Unclear if bowel movements are doing well, pt not complaining but reporting is questionable due to dementia    Current complaint only of "warm urine" but states it has always been that way  Will test before " treating    Likely constipation is a problem    Hx of abdominal pain  Due for colonoscopy in March 2025    Blood counts normal, recheck sept    Bradycardia with donepezil in the past, stable currently on 10 mg  Also memantine 10 mg daily    Seroquel helpful    Was being seen by care at home visit regularly in the past  ---------------------------------------------    Not addressed  HTN  Valsartan-hctz 160-12.5 daily, stable; No CP/HA/SOB/Vision changes  Check lytes sept    Aortic atherosclerosis  Pravastatin 10     Elevated ASCVD Risk score  On pravastatin to reduce risk    Multinodular Thyroid   Repeat US annually for 5 years (dec 2021 initially)  Does not meet criteria for f/u per 2/20/2023 US    GERD  Improved with omeprazole  Reviewed colonoscopy, EGD report (hx of diverticulitis) 10/29/2021,  stable    Late onset Alzheimer's Dementia  No longer driving   In good spirits, following closely with Dr. Levin    Chronic benzo use/anxiety/dementia  Late onset Alzheimer's   Aricept 10 mg/day   lexapro 5 switched to remeron 15 mg for sleep  Also taking seroquel 25 prn agitation  xanax 0.5 daily and nightly PRN Rx from neurology  Following with neurology regularly      Wt Readings from Last 15 Encounters:   09/03/24 79 kg (174 lb 3.2 oz)   07/17/24 76.7 kg (169 lb)   05/31/24 75.3 kg (166 lb 0.1 oz)   04/02/24 70.9 kg (156 lb 4.9 oz)   03/22/24 70.9 kg (156 lb 4.9 oz)   03/12/24 65.8 kg (145 lb)   12/26/23 66 kg (145 lb 8.1 oz)   09/13/23 70 kg (154 lb 5.2 oz)   07/28/23 69.9 kg (154 lb)   07/17/23 70.2 kg (154 lb 12.2 oz)   07/12/23 69.6 kg (153 lb 7 oz)   06/13/23 68 kg (149 lb 14.6 oz)   05/23/23 68.7 kg (151 lb 7.3 oz)   02/23/23 72.2 kg (159 lb 2.8 oz)   02/20/23 71.2 kg (156 lb 15.5 oz)     Susan's mom is a sister/daughter    Medical History  Past Medical History:   Diagnosis Date    Allergy     Anxiety     Cataract     Colon polyps 2010    Hearing loss     Hypertension     Joint pain     Mental disorder         Review of Systems   Unable to perform ROS: Dementia (daughter assists)   Constitutional:  Positive for activity change. Negative for unexpected weight change.   HENT:  Negative for hearing loss, rhinorrhea and trouble swallowing.    Eyes:  Negative for discharge and visual disturbance.   Respiratory:  Negative for chest tightness and wheezing.    Cardiovascular:  Negative for chest pain and palpitations.   Gastrointestinal:  Negative for blood in stool, constipation, diarrhea and vomiting.   Endocrine: Negative for polydipsia and polyuria.   Genitourinary:  Negative for difficulty urinating, dysuria, hematuria and menstrual problem.   Musculoskeletal:  Negative for arthralgias, joint swelling and neck pain.   Neurological:  Negative for weakness and headaches.   Psychiatric/Behavioral:  Positive for confusion. Negative for dysphoric mood.          Surgical hx, family hx, social hx    Have been reviewed      Current Outpatient Medications:     aspirin (ECOTRIN) 81 MG EC tablet, Take 81 mg by mouth nightly. , Disp: , Rfl:     azelastine (ASTELIN) 137 mcg (0.1 %) nasal spray, instill 1 spray (137 mcg total) by Nasal route 2 (two) times daily., Disp: 30 mL, Rfl: 3    cetirizine (ZYRTEC) 10 MG tablet, Take 1 tablet (10 mg total) by mouth daily as needed for Allergies., Disp: 90 tablet, Rfl: 3    diclofenac sodium (VOLTAREN) 1 % Gel, Apply 2 g topically once daily., Disp: 100 g, Rfl: 2    donepeziL (ARICEPT) 10 MG tablet, Take 1 tablet (10 mg total) by mouth every evening., Disp: 90 tablet, Rfl: 1    meloxicam (MOBIC) 15 MG tablet, Take 1 tablet (15 mg total) by mouth once daily., Disp: 30 tablet, Rfl: 0    memantine (NAMENDA) 10 MG Tab, Take 1 tablet (10 mg total) by mouth 2 (two) times daily., Disp: 180 tablet, Rfl: 1    mirtazapine (REMERON) 15 MG tablet, Take 1 tablet (15 mg total) by mouth every evening., Disp: 30 tablet, Rfl: 5    mometasone 0.1% (ELOCON) 0.1 % cream, apply to affected area topically daily,  Disp: 45 g, Rfl: 3    omeprazole (PRILOSEC) 40 MG capsule, Take 1 capsule (40 mg total) by mouth every morning., Disp: 90 capsule, Rfl: 3    pravastatin (PRAVACHOL) 80 MG tablet, Take 1 tablet (80 mg total) by mouth once daily., Disp: 90 tablet, Rfl: 3    prednisoLONE-moxiflox-bromfen 1-0.5-0.075 % DrpS, Place 1 drop into the right eye 3 (three) times daily., Disp: 5 mL, Rfl: 2    QUEtiapine (SEROQUEL) 25 MG Tab, Take 1 tablet (25 mg total) by mouth 2 (two) times daily., Disp: 180 tablet, Rfl: 1    valsartan-hydrochlorothiazide (DIOVAN-HCT) 160-12.5 mg per tablet, Take 1 tablet by mouth once daily., Disp: 30 tablet, Rfl: 6    Objective:        There is no height or weight on file to calculate BMI.  There were no vitals filed for this visit.          Physical Exam  Vitals and nursing note reviewed.   Constitutional:       General: She is not in acute distress.     Appearance: Normal appearance. She is not ill-appearing.   HENT:      Head: Normocephalic and atraumatic.   Eyes:      General: No scleral icterus.  Pulmonary:      Effort: Pulmonary effort is normal.   Neurological:      Mental Status: She is alert and oriented to person, place, and time.   Psychiatric:         Behavior: Behavior normal.           Lab Results   Component Value Date    WBC 6.52 03/22/2024    HGB 13.3 03/22/2024    HCT 41.9 03/22/2024     03/22/2024    CHOL 169 07/03/2023    TRIG 60 07/03/2023    HDL 60 07/03/2023    ALT 19 03/22/2024    AST 23 03/22/2024     03/22/2024    K 3.5 03/22/2024     03/22/2024    CREATININE 1.0 03/22/2024    BUN 16 03/22/2024    CO2 28 03/22/2024    TSH 1.500 01/25/2023    HGBA1C 5.2 03/22/2024       The 10-year ASCVD risk score (Marie ALDANA, et al., 2019) is: 13.7%    Values used to calculate the score:      Age: 74 years      Sex: Female      Is Non- : Yes      Diabetic: No      Tobacco smoker: No      Systolic Blood Pressure: 137 mmHg      Is BP treated: Yes      HDL  Cholesterol: 60 mg/dL      Total Cholesterol: 169 mg/dL    Pravastatin 10 nightly    (Imaging have been independently reviewed)    None recent    Assessment:         1. Decreased appetite    2. Dementia without behavioral disturbance    3. Alzheimer's dementia with behavioral disturbance    4. Myelopathy            Plan:     Soila was seen today for fatigue.    Diagnoses and all orders for this visit:    Decreased appetite    Dementia without behavioral disturbance    Alzheimer's dementia with behavioral disturbance    Myelopathy              Health Maintenance  - Lipids:   - A1C:   - Colon Ca Screen: 3/2020, repeat due 2025  - Immunizations: utd    Women's health  - Pap: s/p hyst, seen by gyn.   - Mammo:  - Dexa: normla 4/2/2024    Follow up in about 3 months (around 4/22/2025).        Or sooner prn    30 min were used in chart review, evaluation and counseling of patient, documentation and review of results on same day of service     Visit today is associated with current or anticipated ongoing medical care related to this patient's single serious condition/complex condition of dementia, constipation. The patient will return to see me as these issues will be followed longitudinally.       All medications were reviewed including potential side effects and risks/benefits.  Pt was counseled to call back if anything worsens or if questions arise.    Parminder Junior MD  Family Medicine  [facility]  [address] [address]  Phone [phone]  Fax [phone]       This service was not originating from a related E/M service provided within the previous 7 days nor will  to an E/M service or procedure within the next 24 hours or my soonest available appointment.  Prevailing standard of care was able to be met in this audio-only visit.

## 2025-01-26 ENCOUNTER — PATIENT MESSAGE (OUTPATIENT)
Dept: NEUROLOGY | Facility: CLINIC | Age: 75
End: 2025-01-26
Payer: MEDICARE

## 2025-01-29 ENCOUNTER — LAB VISIT (OUTPATIENT)
Dept: LAB | Facility: HOSPITAL | Age: 75
End: 2025-01-29
Attending: STUDENT IN AN ORGANIZED HEALTH CARE EDUCATION/TRAINING PROGRAM
Payer: MEDICARE

## 2025-01-29 DIAGNOSIS — R10.9 ABDOMINAL PAIN, UNSPECIFIED ABDOMINAL LOCATION: ICD-10-CM

## 2025-01-29 LAB
BACTERIA #/AREA URNS AUTO: ABNORMAL /HPF
BILIRUB UR QL STRIP: NEGATIVE
CLARITY UR REFRACT.AUTO: ABNORMAL
COLOR UR AUTO: YELLOW
GLUCOSE UR QL STRIP: NEGATIVE
HGB UR QL STRIP: NEGATIVE
HYALINE CASTS UR QL AUTO: 4 /LPF
KETONES UR QL STRIP: NEGATIVE
LEUKOCYTE ESTERASE UR QL STRIP: ABNORMAL
MICROSCOPIC COMMENT: ABNORMAL
NITRITE UR QL STRIP: NEGATIVE
PH UR STRIP: 6 [PH] (ref 5–8)
PROT UR QL STRIP: ABNORMAL
RBC #/AREA URNS AUTO: 2 /HPF (ref 0–4)
SP GR UR STRIP: 1.02 (ref 1–1.03)
SQUAMOUS #/AREA URNS AUTO: 11 /HPF
URN SPEC COLLECT METH UR: ABNORMAL
WBC #/AREA URNS AUTO: 10 /HPF (ref 0–5)

## 2025-01-29 PROCEDURE — 87086 URINE CULTURE/COLONY COUNT: CPT | Performed by: STUDENT IN AN ORGANIZED HEALTH CARE EDUCATION/TRAINING PROGRAM

## 2025-01-29 PROCEDURE — 87088 URINE BACTERIA CULTURE: CPT | Performed by: STUDENT IN AN ORGANIZED HEALTH CARE EDUCATION/TRAINING PROGRAM

## 2025-01-29 PROCEDURE — 81001 URINALYSIS AUTO W/SCOPE: CPT | Performed by: STUDENT IN AN ORGANIZED HEALTH CARE EDUCATION/TRAINING PROGRAM

## 2025-01-29 PROCEDURE — 87186 SC STD MICRODIL/AGAR DIL: CPT | Performed by: STUDENT IN AN ORGANIZED HEALTH CARE EDUCATION/TRAINING PROGRAM

## 2025-01-30 ENCOUNTER — TELEPHONE (OUTPATIENT)
Dept: NEUROLOGY | Facility: CLINIC | Age: 75
End: 2025-01-30
Payer: MEDICARE

## 2025-01-30 NOTE — TELEPHONE ENCOUNTER
I left a message Dr. Levin would like to see the patient in 2 months. I scheduled an appointment if she need to reschedule or cancel to please call.

## 2025-01-31 ENCOUNTER — TELEPHONE (OUTPATIENT)
Dept: INTERNAL MEDICINE | Facility: CLINIC | Age: 75
End: 2025-01-31
Payer: MEDICARE

## 2025-01-31 DIAGNOSIS — N30.00 ACUTE CYSTITIS WITHOUT HEMATURIA: Primary | ICD-10-CM

## 2025-01-31 RX ORDER — CEPHALEXIN 500 MG/1
500 CAPSULE ORAL EVERY 12 HOURS
Qty: 14 CAPSULE | Refills: 0 | Status: SHIPPED | OUTPATIENT
Start: 2025-01-31 | End: 2025-02-07

## 2025-01-31 NOTE — TELEPHONE ENCOUNTER
----- Message from Parminder Junior MD sent at 1/31/2025  1:14 PM CST -----  UTI borderline but warrants treatment in this context. I'm send in an cephalexin twice a day  x 7 days.

## 2025-02-01 LAB — BACTERIA UR CULT: ABNORMAL

## 2025-02-03 DIAGNOSIS — N17.9 AKI (ACUTE KIDNEY INJURY): ICD-10-CM

## 2025-02-03 DIAGNOSIS — E87.6 HYPOKALEMIA: Primary | ICD-10-CM

## 2025-02-08 ENCOUNTER — LAB VISIT (OUTPATIENT)
Dept: LAB | Facility: HOSPITAL | Age: 75
End: 2025-02-08
Payer: MEDICARE

## 2025-02-08 DIAGNOSIS — E87.6 HYPOKALEMIA: ICD-10-CM

## 2025-02-08 DIAGNOSIS — N17.9 AKI (ACUTE KIDNEY INJURY): ICD-10-CM

## 2025-02-08 DIAGNOSIS — I70.0 AORTIC ATHEROSCLEROSIS: ICD-10-CM

## 2025-02-08 LAB
ALBUMIN SERPL BCP-MCNC: 3.7 G/DL (ref 3.5–5.2)
ALP SERPL-CCNC: 62 U/L (ref 40–150)
ALT SERPL W/O P-5'-P-CCNC: 13 U/L (ref 10–44)
ANION GAP SERPL CALC-SCNC: 13 MMOL/L (ref 8–16)
AST SERPL-CCNC: 27 U/L (ref 10–40)
BILIRUB SERPL-MCNC: 0.5 MG/DL (ref 0.1–1)
BUN SERPL-MCNC: 23 MG/DL (ref 8–23)
CALCIUM SERPL-MCNC: 9.3 MG/DL (ref 8.7–10.5)
CHLORIDE SERPL-SCNC: 101 MMOL/L (ref 95–110)
CHOLEST SERPL-MCNC: 129 MG/DL (ref 120–199)
CHOLEST/HDLC SERPL: 3.7 {RATIO} (ref 2–5)
CO2 SERPL-SCNC: 26 MMOL/L (ref 23–29)
CREAT SERPL-MCNC: 1.8 MG/DL (ref 0.5–1.4)
EST. GFR  (NO RACE VARIABLE): 29.4 ML/MIN/1.73 M^2
GLUCOSE SERPL-MCNC: 94 MG/DL (ref 70–110)
HDLC SERPL-MCNC: 35 MG/DL (ref 40–75)
HDLC SERPL: 27.1 % (ref 20–50)
LDLC SERPL CALC-MCNC: 78.6 MG/DL (ref 63–159)
NONHDLC SERPL-MCNC: 94 MG/DL
POTASSIUM SERPL-SCNC: 3.2 MMOL/L (ref 3.5–5.1)
PROT SERPL-MCNC: 7.7 G/DL (ref 6–8.4)
SODIUM SERPL-SCNC: 140 MMOL/L (ref 136–145)
TRIGL SERPL-MCNC: 77 MG/DL (ref 30–150)

## 2025-02-08 PROCEDURE — 36415 COLL VENOUS BLD VENIPUNCTURE: CPT | Mod: PO | Performed by: INTERNAL MEDICINE

## 2025-02-08 PROCEDURE — 80053 COMPREHEN METABOLIC PANEL: CPT | Performed by: STUDENT IN AN ORGANIZED HEALTH CARE EDUCATION/TRAINING PROGRAM

## 2025-02-08 PROCEDURE — 80061 LIPID PANEL: CPT | Performed by: INTERNAL MEDICINE

## 2025-02-10 ENCOUNTER — HOSPITAL ENCOUNTER (EMERGENCY)
Facility: OTHER | Age: 75
Discharge: HOME | End: 2025-02-10
Attending: EMERGENCY MEDICINE
Payer: MEDICARE

## 2025-02-10 VITALS
SYSTOLIC BLOOD PRESSURE: 95 MMHG | HEIGHT: 64 IN | OXYGEN SATURATION: 99 % | WEIGHT: 174 LBS | TEMPERATURE: 99 F | HEART RATE: 69 BPM | RESPIRATION RATE: 16 BRPM | BODY MASS INDEX: 29.71 KG/M2 | DIASTOLIC BLOOD PRESSURE: 55 MMHG

## 2025-02-10 DIAGNOSIS — E87.6 HYPOKALEMIA: Primary | ICD-10-CM

## 2025-02-10 DIAGNOSIS — E86.0 DEHYDRATION: Primary | ICD-10-CM

## 2025-02-10 DIAGNOSIS — R68.81 EARLY SATIETY: ICD-10-CM

## 2025-02-10 DIAGNOSIS — E87.6 HYPOKALEMIA: ICD-10-CM

## 2025-02-10 DIAGNOSIS — R53.1 WEAKNESS: ICD-10-CM

## 2025-02-10 LAB
ALBUMIN SERPL BCP-MCNC: 3.6 G/DL (ref 3.5–5.2)
ALP SERPL-CCNC: 56 U/L (ref 40–150)
ALT SERPL W/O P-5'-P-CCNC: 20 U/L (ref 10–44)
ANION GAP SERPL CALC-SCNC: 14 MMOL/L (ref 8–16)
AST SERPL-CCNC: 28 U/L (ref 10–40)
BASOPHILS # BLD AUTO: 0.02 K/UL (ref 0–0.2)
BASOPHILS NFR BLD: 0.2 % (ref 0–1.9)
BILIRUB SERPL-MCNC: 0.4 MG/DL (ref 0.1–1)
BUN SERPL-MCNC: 27 MG/DL (ref 8–23)
CALCIUM SERPL-MCNC: 9.4 MG/DL (ref 8.7–10.5)
CHLORIDE SERPL-SCNC: 102 MMOL/L (ref 95–110)
CK SERPL-CCNC: 82 U/L (ref 20–180)
CO2 SERPL-SCNC: 24 MMOL/L (ref 23–29)
CREAT SERPL-MCNC: 2.2 MG/DL (ref 0.5–1.4)
DIFFERENTIAL METHOD BLD: ABNORMAL
EOSINOPHIL # BLD AUTO: 0.2 K/UL (ref 0–0.5)
EOSINOPHIL NFR BLD: 2 % (ref 0–8)
ERYTHROCYTE [DISTWIDTH] IN BLOOD BY AUTOMATED COUNT: 13.2 % (ref 11.5–14.5)
EST. GFR  (NO RACE VARIABLE): 23 ML/MIN/1.73 M^2
GLUCOSE SERPL-MCNC: 92 MG/DL (ref 70–110)
HCT VFR BLD AUTO: 36.3 % (ref 37–48.5)
HGB BLD-MCNC: 11.7 G/DL (ref 12–16)
IMM GRANULOCYTES # BLD AUTO: 0.04 K/UL (ref 0–0.04)
IMM GRANULOCYTES NFR BLD AUTO: 0.5 % (ref 0–0.5)
LYMPHOCYTES # BLD AUTO: 2.3 K/UL (ref 1–4.8)
LYMPHOCYTES NFR BLD: 26.2 % (ref 18–48)
MAGNESIUM SERPL-MCNC: 2.3 MG/DL (ref 1.6–2.6)
MCH RBC QN AUTO: 27.5 PG (ref 27–31)
MCHC RBC AUTO-ENTMCNC: 32.2 G/DL (ref 32–36)
MCV RBC AUTO: 85 FL (ref 82–98)
MONOCYTES # BLD AUTO: 0.5 K/UL (ref 0.3–1)
MONOCYTES NFR BLD: 6 % (ref 4–15)
NEUTROPHILS # BLD AUTO: 5.6 K/UL (ref 1.8–7.7)
NEUTROPHILS NFR BLD: 65.1 % (ref 38–73)
NRBC BLD-RTO: 0 /100 WBC
OHS QRS DURATION: 74 MS
OHS QTC CALCULATION: 414 MS
PLATELET # BLD AUTO: 185 K/UL (ref 150–450)
PMV BLD AUTO: 11.2 FL (ref 9.2–12.9)
POTASSIUM SERPL-SCNC: 3.3 MMOL/L (ref 3.5–5.1)
PROT SERPL-MCNC: 7.5 G/DL (ref 6–8.4)
RBC # BLD AUTO: 4.25 M/UL (ref 4–5.4)
SODIUM SERPL-SCNC: 140 MMOL/L (ref 136–145)
WBC # BLD AUTO: 8.6 K/UL (ref 3.9–12.7)

## 2025-02-10 PROCEDURE — 93010 ELECTROCARDIOGRAM REPORT: CPT | Mod: ,,, | Performed by: INTERNAL MEDICINE

## 2025-02-10 PROCEDURE — 96361 HYDRATE IV INFUSION ADD-ON: CPT

## 2025-02-10 PROCEDURE — 85025 COMPLETE CBC W/AUTO DIFF WBC: CPT | Performed by: PHYSICIAN ASSISTANT

## 2025-02-10 PROCEDURE — 25000003 PHARM REV CODE 250: Performed by: EMERGENCY MEDICINE

## 2025-02-10 PROCEDURE — 36415 COLL VENOUS BLD VENIPUNCTURE: CPT | Performed by: EMERGENCY MEDICINE

## 2025-02-10 PROCEDURE — 82550 ASSAY OF CK (CPK): CPT | Performed by: EMERGENCY MEDICINE

## 2025-02-10 PROCEDURE — 93005 ELECTROCARDIOGRAM TRACING: CPT

## 2025-02-10 PROCEDURE — 96360 HYDRATION IV INFUSION INIT: CPT

## 2025-02-10 PROCEDURE — 99284 EMERGENCY DEPT VISIT MOD MDM: CPT | Mod: 25

## 2025-02-10 PROCEDURE — 83735 ASSAY OF MAGNESIUM: CPT | Performed by: EMERGENCY MEDICINE

## 2025-02-10 PROCEDURE — 80053 COMPREHEN METABOLIC PANEL: CPT | Performed by: EMERGENCY MEDICINE

## 2025-02-10 RX ORDER — POTASSIUM CHLORIDE 750 MG/1
10 TABLET, EXTENDED RELEASE ORAL DAILY
Qty: 30 TABLET | Refills: 0 | Status: SHIPPED | OUTPATIENT
Start: 2025-02-10

## 2025-02-10 RX ORDER — POTASSIUM CHLORIDE 20 MEQ/1
20 TABLET, EXTENDED RELEASE ORAL
Status: DISCONTINUED | OUTPATIENT
Start: 2025-02-10 | End: 2025-02-10

## 2025-02-10 RX ORDER — POTASSIUM CHLORIDE 750 MG/1
10 TABLET, EXTENDED RELEASE ORAL 2 TIMES DAILY
Qty: 8 TABLET | Refills: 0 | Status: SHIPPED | OUTPATIENT
Start: 2025-02-10 | End: 2025-02-10

## 2025-02-10 RX ADMIN — POTASSIUM BICARBONATE 20 MEQ: 391 TABLET, EFFERVESCENT ORAL at 02:02

## 2025-02-10 RX ADMIN — SODIUM CHLORIDE 500 ML: 9 INJECTION, SOLUTION INTRAVENOUS at 02:02

## 2025-02-10 RX ADMIN — SODIUM CHLORIDE 500 ML: 9 INJECTION, SOLUTION INTRAVENOUS at 11:02

## 2025-02-10 NOTE — FIRST PROVIDER EVALUATION
Emergency Department TeleTriage Encounter Note      CHIEF COMPLAINT    Chief Complaint   Patient presents with    Abnormal Lab     Pt brought in by daughter after call from PCP with abnormal lab values of potassium 3.2 and creatinine 1.8. Pt states she feels fine       VITAL SIGNS   Initial Vitals [02/10/25 0929]   BP Pulse Resp Temp SpO2   (S) (!) 84/52 73 18 99 °F (37.2 °C) 97 %      MAP       --            ALLERGIES    Review of patient's allergies indicates:   Allergen Reactions    Lotensin [benazepril] Swelling    Penicillins Rash       PROVIDER TRIAGE NOTE  This is a teletriage evaluation of a 74 y.o. female presenting to the ED complaining of multiple complaints. Patient had blood work done a few days ago and she got the results today. She has hypokalemia and elevated creatinine. Patient is hypotensive. She denies any symptoms.    Patient is alert and oriented. She speaks in complete sentences. She is sitting upright in the chair in no distress.     Initial orders will be placed and care will be transferred to an alternate provider when patient is roomed for a full evaluation. Any additional orders and the final disposition will be determined by that provider.         ORDERS  Labs Reviewed   HEPATITIS C ANTIBODY   HIV 1 / 2 ANTIBODY   CBC W/ AUTO DIFFERENTIAL   COMPREHENSIVE METABOLIC PANEL   URINALYSIS, REFLEX TO URINE CULTURE       ED Orders (720h ago, onward)      Start Ordered     Status Ordering Provider    02/10/25 0940 02/10/25 0939  CBC auto differential  STAT         Ordered BENSON BELTRAN    02/10/25 0940 02/10/25 0939  Comprehensive metabolic panel  STAT         Ordered BENSON BELTRAN    02/10/25 0940 02/10/25 0939  Insert Saline lock IV  Once         Ordered BENSON BELTRAN    02/10/25 0940 02/10/25 0939  EKG 12-lead  Once         Ordered BENSON BELTRAN    02/10/25 0940 02/10/25 0939  Urinalysis, Reflex to Urine Culture Urine, Clean Catch  STAT         Ordered BENSON BELTRAN    02/10/25 0932  02/10/25 0931  Hepatitis C Antibody  STAT         Ordered DARRYL HENNING    02/10/25 0932 02/10/25 0931  HIV 1/2 Ag/Ab (4th Gen)  STAT         Ordered DARRYL HENNING.              Virtual Visit Note: The provider triage portion of this emergency department evaluation and documentation was performed via [employer], a HIPAA-compliant telemedicine application, in concert with a tele-presenter in the room. A face to face patient evaluation with one of my colleagues will occur once the patient is placed in an emergency department room.      DISCLAIMER: This note was prepared with [employer] voice recognition transcription software. Garbled syntax, mangled pronouns, and other bizarre constructions may be attributed to that software system.

## 2025-02-10 NOTE — ED PROVIDER NOTES
Encounter Date: 2/10/2025       History     Chief Complaint   Patient presents with    Abnormal Lab     Pt brought in by daughter after call from PCP with abnormal lab values of potassium 3.2 and creatinine 1.8. Pt states she feels fine     This is a pleasant 75 yo woman presenting for evaluation of abnormal labs with poor oral intake and worsening GFR over the last several weeks. She  was seen by primary and had labs with concern that she may need to be evaluated. She has no complaint currently. She denies fevers, chills, or other complaints.     The history is provided by the patient, medical records, a caregiver and a relative.     Review of patient's allergies indicates:   Allergen Reactions    Lotensin [benazepril] Swelling    Penicillins Rash     Past Medical History:   Diagnosis Date    Allergy     Anxiety     Cataract     Colon polyps 2010    Hearing loss     Hypertension     Joint pain     Mental disorder      Past Surgical History:   Procedure Laterality Date    CATARACT EXTRACTION W/  INTRAOCULAR LENS IMPLANT Right 3/12/2024    Procedure: EXTRACTION, CATARACT, WITH IOL INSERTION;  Surgeon: Jax Garza MD;  Location: [facility];  Service: Ophthalmology;  Laterality: Right;    COLONOSCOPY N/A 3/5/2020    Procedure: COLONOSCOPY;  Surgeon: Nathalia Kemp MD;  Location: [facility] (4TH FLR);  Service: Colon and Rectal;  Laterality: N/A;  requests later appt time if available - Pt open to any MD- Pt informed arrival time may be adjusted, Pt verbalized understanding- ERW2/24/20@1409    ESOPHAGOGASTRODUODENOSCOPY N/A 10/1/2020    Procedure: EGD (ESOPHAGOGASTRODUODENOSCOPY);  Surgeon: Casey Guerrero MD;  [facility]  Service: Endoscopy;  Laterality: N/A;  covid test 9/28-[facility]    ESOPHAGOGASTRODUODENOSCOPY N/A 10/29/2021    Procedure: EGD (ESOPHAGOGASTRODUODENOSCOPY);  Surgeon: Billy Vegas MD;  Location: [facility];  Service: Endoscopy;  Laterality: N/A;    HYSTERECTOMY       PERIPARTUM CHITO     Family History   Problem Relation Name Age of Onset    Breast cancer Sister Ingris     Colon cancer Neg Hx      Ovarian cancer Neg Hx      Melanoma Neg Hx       Social History     Tobacco Use    Smoking status: Never     Passive exposure: Never    Smokeless tobacco: Never   Substance Use Topics    Alcohol use: Yes     Comment: beer occasionally    Drug use: No     Review of Systems  Constitutional-no fever  HEENT-no congestion  Eyes-no redness  Respiratory-no shortness of breath  Cardio-no chest pain  GI-no abdominal pain  Endocrine-no cold intolerance  -no difficulty urinating  MSK-no myalgias  Skin-no rashes  Allergy-no environmental allergy  Neurologic-, no headache  Hematology-no swollen nodes  Behavioral-no confusion   Physical Exam     Initial Vitals [02/10/25 0929]   BP Pulse Resp Temp SpO2   (S) (!) 84/52 73 18 99 °F (37.2 °C) 97 %      MAP       --         Physical Exam  Constitutional: Well appearing, no distress.  Eyes: Conjunctivae normal.  ENT       Head: Normocephalic, atraumatic.       Nose: Normal external appearance        Mouth/Throat: no strigulous respirations   Hematological/Lymphatic/Immunilogical: no visible lymphadenopathy   Cardiovascular: Normal rate,   Respiratory: Normal respiratory effort.   Gastrointestinal: non distended   Musculoskeletal: Normal range of motion in all extremities. No obvious deformities or swelling.  Neurologic: Alert, oriented. Normal speech and language. No gross focal neurologic deficits are appreciated.  Skin: Skin is warm, dry. No rash noted.  Psychiatric: Mood and affect are normal.    ED Course   Procedures  Labs Reviewed   CBC W/ AUTO DIFFERENTIAL - Abnormal       Result Value    WBC 8.60      RBC 4.25      Hemoglobin 11.7 (*)     Hematocrit 36.3 (*)     MCV 85      MCH 27.5      MCHC 32.2      RDW 13.2      Platelets 185      MPV 11.2      Immature Granulocytes 0.5      Gran # (ANC) 5.6      Immature Grans (Abs) 0.04      Lymph # 2.3       Mono # 0.5      Eos # 0.2      Baso # 0.02      nRBC 0      Gran % 65.1      Lymph % 26.2      Mono % 6.0      Eosinophil % 2.0      Basophil % 0.2      Differential Method Automated      Narrative:     Release to patient->Immediate   COMPREHENSIVE METABOLIC PANEL - Abnormal    Sodium 140      Potassium 3.3 (*)     Chloride 102      CO2 24      Glucose 92      BUN 27 (*)     Creatinine 2.2 (*)     Calcium 9.4      Total Protein 7.5      Albumin 3.6      Total Bilirubin 0.4      Alkaline Phosphatase 56      AST 28      ALT 20      eGFR 23 (*)     Anion Gap 14     MAGNESIUM   CK   CK    CPK 82     MAGNESIUM    Magnesium 2.3          ECG Results              EKG 12-lead (Final result)        Collection Time Result Time QRS Duration [facility] QTC Calculation    02/10/25 09:57:39 02/10/25 18:13:49 74 414                     Final result by Interface, Lab In [facility] (02/10/25 18:13:52)                   Narrative:    Test Reason : E87.6,    Vent. Rate :  64 BPM     Atrial Rate :  64 BPM     P-R Int : 150 ms          QRS Dur :  74 ms      QT Int : 402 ms       P-R-T Axes :  66  -8  47 degrees    QTcB Int : 414 ms    Normal sinus rhythm  Low voltage QRS  Cannot rule out Anterior infarct ,age undetermined  Abnormal ECG    Confirmed by Valarie Matson ([id number]) on 2/10/2025 6:13:46 PM    Referred By: AAAREFERRAL SELF           Confirmed By: Valarie Matson                      Wet Read by Andry Gonzalez MD (02/10/25 10:03:00, [facility] - Emergency Dept, Emergency Medicine)    My EKG interpretation, sinus rhythm, 64 beats per minute, left axis deviation, poor R-wave progression, when compared to previous EKG February 20, 2023 lower voltage QRS                                  Imaging Results    None          Medications   sodium chloride 0.9% bolus 500 mL 500 mL (0 mLs Intravenous Stopped 2/10/25 1427)   sodium chloride 0.9% bolus 500 mL 500 mL (0 mLs Intravenous Stopped 2/10/25 1625)   potassium bicarbonate disintegrating tablet 20  mEq (20 mEq Oral Given 2/10/25 1438)     Medical Decision Making  Ddx- dehydration, Failure to thrive, early satiety, hypokalemia, da    75 yo with dementia and worsening DA mild hypokalemia  Iv fluids given.  BP improved.  Tolerating orals.  Discussed observation or home with acute care at home  Will plan for acute care at home    Problems Addressed:  Dehydration: acute illness or injury  Early satiety: acute illness or injury  Hypokalemia: acute illness or injury  Weakness: acute illness or injury    Amount and/or Complexity of Data Reviewed  Independent Historian: caregiver     Details: Daughter at bedside relays most of history and physical.   External Data Reviewed: labs and notes.     Details: Hx of dementia  Labs: ordered. Decision-making details documented in ED Course.  ECG/medicine tests: ordered and independent interpretation performed. Decision-making details documented in ED Course.    Risk  OTC drugs.  Prescription drug management.  Parenteral controlled substances.  Decision regarding hospitalization.  Diagnosis or treatment significantly limited by social determinants of health.  Risk Details: Dementia complicates this patients care as a social determinant of health.                                       Clinical Impression:  Final diagnoses:  [E87.6] Hypokalemia  [E86.0] Dehydration (Primary)  [R53.1] Weakness  [R68.81] Early satiety          ED Disposition Condition    Discharge Stable          ED Prescriptions       Medication Sig Dispense Start Date End Date Auth. Provider    potassium chloride (KLOR-CON) 10 MEQ TbSR Take 1 tablet (10 mEq total) by mouth once daily. 30 tablet 2/10/2025 -- Andry Gonzalez MD          Follow-up Information       Follow up With Specialties Details Why Contact Info    Parminder Junior MD Family Medicine Call in 2 days If symptoms worsen, For a follow up visit about today [address]  [id number]  [place] [address]  [phone]                Andry Gonzalez MD  02/11/25 1110

## 2025-02-10 NOTE — PROGRESS NOTES
Spoke to daughter and ED.  Pt will be going to [facility] for DA and low PO intake at home.    jl

## 2025-02-10 NOTE — ED TRIAGE NOTES
Pt states she received a call from her PCP that was drawn on Saturday. She was told that her potassium was low and her kidney function was abnormal. Pts daughter states she is not eating and is extremely fatigued

## 2025-02-10 NOTE — PROGRESS NOTES
Update: called daughter, pt will go to [facility] ED.    Needs to hold blood   Kidney function has gotten much worse.  Please stop the hydrochlorothiazide-valsartan blood pressure medication.  Please no meloxicam, no ibuprofen, no advil.  Needs to drink between 6-8 glasses of water a day.  If feeling poorly, should go to the ED.    Needs potassium as well.  I'll send in.

## 2025-02-10 NOTE — DISCHARGE INSTRUCTIONS
Mrs. Chávez,    Thank you for letting me care for you today! It was nice meeting you, and I hope you feel better soon.   If you would like access to your chart and what was done today please utilize the [facility].   Please come back to [facility] for all of your future medical needs.    Our goal in the emergency department is to always give you outstanding care and exceptional service. You may receive a survey by mail or e-mail in the next week regarding your experience in our ED. We would greatly appreciate you completing and returning the survey. Your feedback provides us with a way to recognize our staff who give very good care and it helps us learn how to improve when your experience was below our aspiration of excellence.     Sincerely,    Andry Gonzalez MD  Board Certified Emergency Physician

## 2025-02-11 ENCOUNTER — PATIENT OUTREACH (OUTPATIENT)
Facility: OTHER | Age: 75
End: 2025-02-11
Payer: MEDICARE

## 2025-02-12 NOTE — PROGRESS NOTES
Patient was seen in the ED on 2/11/25. Phoned patient on 2 separate occasions to assist with Post ED Discharge Navigation. Patient was unavailable. Encounter closed.  Gerber Rudolph

## 2025-02-15 ENCOUNTER — PATIENT MESSAGE (OUTPATIENT)
Dept: INTERNAL MEDICINE | Facility: CLINIC | Age: 75
End: 2025-02-15
Payer: MEDICARE

## 2025-02-15 DIAGNOSIS — N17.9 AKI (ACUTE KIDNEY INJURY): ICD-10-CM

## 2025-02-15 DIAGNOSIS — E87.6 HYPOKALEMIA: Primary | ICD-10-CM

## 2025-02-18 ENCOUNTER — OFFICE VISIT (OUTPATIENT)
Dept: CARDIOLOGY | Facility: CLINIC | Age: 75
End: 2025-02-18
Payer: MEDICARE

## 2025-02-18 VITALS
DIASTOLIC BLOOD PRESSURE: 57 MMHG | BODY MASS INDEX: 27.59 KG/M2 | HEIGHT: 64 IN | WEIGHT: 161.63 LBS | HEART RATE: 62 BPM | SYSTOLIC BLOOD PRESSURE: 85 MMHG

## 2025-02-18 DIAGNOSIS — R00.1 ASYMPTOMATIC BRADYCARDIA: ICD-10-CM

## 2025-02-18 DIAGNOSIS — I10 PRIMARY HYPERTENSION: Primary | ICD-10-CM

## 2025-02-18 DIAGNOSIS — I70.0 AORTIC ATHEROSCLEROSIS: ICD-10-CM

## 2025-02-18 NOTE — PROGRESS NOTES
"[facility]      Chief Complaint   Patient presents with    Asymptomatic bradycardia       Patient ID: Soila Chávez is a 74 y.o. female with HTN, asymptomatic bradycardia, aortic atherosclerosis, overweight, who presents for a follow up appointment.  Pertinent history/events are as follows:     -Pt kindly referred by Dr. Junior for evaluation of bradycardia (per her note on 2/18/2023):  "Bradycardia   Worsening   Prior 50's last year, now 44 in EKG (sinus mary grace), 48 in office   Asymptomatic"    -At our initial clinic visit on 2/23/2023, Mrs. Chávez was accompanied by her daughter.  She reported slow heart rate in the 50's over a year ago.  Reports heart rate in the 40's recently.  She has no chest pain, SOB, palpitations, TIA symptoms or syncope.  Heart rate 53 bpm in clinic today.  EKG on 2/20/2023 shows marked sinus bradycardia (44 bpm) with nonspecific T wave abnormality.  Plan:   Bradycardia- Pt is asymptomatic.  EKG on 2/20/2023 shows marked sinus bradycardia (44 bpm) with nonspecific T wave abnormality.  Check exercise stress echo to evaluate for chronotropic competence.  Check 30 day event monitor.  Amlodipine has been shown to cause bradycardia in rare instances.  Therefore, will discontinue amlodipine and start HCTZ 12.5 mg  daily.       HTN- Changes as per above.  Pt to monitor blood pressure and heart rate on above regimen.  Aortic Atherosclerosis- Increase pravastatin to 40 mg daily.    Overweight- Encourage diet, exercise, and weight loss.     -At follow up clinic visit on 7/17/2023, Mrs. Chávez was accompanied by her daughter.  She reported no chest pain, SOB, LE edema, TIA symptoms or syncope.  Cardiac Event Monitor on 3/15/2023 demonstrated 12 patient triggered transmissions, correlating with sinus rhythm 49-88 bpm.  Unfortunately, pt did not show up for stress test as scheduled.   Plan:   Bradycardia- Pt is asymptomatic.  Cardiac Event Monitor on 3/15/2023 demonstrated 12 " patient triggered transmissions, correlating with sinus rhythm 49-88 bpm.  Unfortunately, pt did not show up for stress test as scheduled.  No further workup indicated at this time.  Continue to monitor.     HTN- Controlled.  Continue current medications.    Aortic Atherosclerosis- Increase pravastatin to 80 mg daily.    Overweight- Encourage diet, exercise, and weight loss.     5/31/2024 clinic visit: Mrs. Chávez is accompanied by her sitter.  She reports doing well with no chest pain, SOB, LE edema, TIA symptoms or syncope.  Staying active by walking in [employer] daily.   Plan:  HTN- Controlled.  Continue current medications.    Aortic Atherosclerosis- Stable.  Continue pravastatin 80 mg daily.    Overweight- Encourage diet, exercise, and weight loss.     HPI:  Mrs. Chávez is accompanied by her family member. She was evaluated in the ED on 2/10/2025 for dehydration thought to be due to poor po intake. She has no chest pain, SOB, LE edema, TIA symptoms or syncope.  LDL 79 on 2/8/2025.     Past Medical History:   Diagnosis Date    Allergy     Anxiety     Cataract     Colon polyps 2010    Hearing loss     Hypertension     Joint pain     Mental disorder      Past Surgical History:   Procedure Laterality Date    CATARACT EXTRACTION W/  INTRAOCULAR LENS IMPLANT Right 3/12/2024    Procedure: EXTRACTION, CATARACT, WITH IOL INSERTION;  Surgeon: Jax Garza MD;  Location: [facility];  Service: Ophthalmology;  Laterality: Right;    COLONOSCOPY N/A 3/5/2020    Procedure: COLONOSCOPY;  Surgeon: Nathalia Kemp MD;  Location: [facility] (4TH FLR);  Service: Colon and Rectal;  Laterality: N/A;  requests later appt time if available - Pt open to any MD- Pt informed arrival time may be adjusted, Pt verbalized understanding- ERW2/24/20@1409    ESOPHAGOGASTRODUODENOSCOPY N/A 10/1/2020    Procedure: EGD (ESOPHAGOGASTRODUODENOSCOPY);  Surgeon: Casey Guerrero MD;  Location: [facility] (4TH FLR);  Service: Endoscopy;   Laterality: N/A;  covid test 9/28-[facility]    ESOPHAGOGASTRODUODENOSCOPY N/A 10/29/2021    Procedure: EGD (ESOPHAGOGASTRODUODENOSCOPY);  Surgeon: Billy Vegas MD;  Location: [facility];  Service: Endoscopy;  Laterality: N/A;    HYSTERECTOMY      PERIPARTUM CHITO     Social History     Socioeconomic History    Marital status:    Tobacco Use    Smoking status: Never     Passive exposure: Never    Smokeless tobacco: Never   Substance and Sexual Activity    Alcohol use: Yes     Comment: beer occasionally    Drug use: No    Sexual activity: Not Currently     Birth control/protection: None   Social History Narrative    June 2016    The patient reports that she lives alone normally, however her daughter recently moved in with her    She has a 43-year-old daughter, Saima    And a 26-year-old daughterChris, who is a schoolteacher for 6 in seventh grade in [facility]    She is retired from working as a  in the school system    She now works about 5-15 hours a week for [employer], which she enjoys.    Enjoys working at [employer] couple days a week     Social Drivers of Health     Financial Resource Strain: Low Risk  (2/18/2025)    Overall Financial Resource Strain (CARDIA)     Difficulty of Paying Living Expenses: Not very hard   Food Insecurity: No Food Insecurity (2/18/2025)    Hunger Vital Sign     Worried About Running Out of Food in the Last Year: Never true     Ran Out of Food in the Last Year: Never true   Transportation Needs: No Transportation Needs (2/18/2025)    PRAPARE - Transportation     Lack of Transportation (Medical): No     Lack of Transportation (Non-Medical): No   Physical Activity: Inactive (2/18/2025)    Exercise Vital Sign     Days of Exercise per Week: 0 days     Minutes of Exercise per Session: 0 min   Stress: No Stress Concern Present (2/18/2025)    [employer] - Occupational Stress Questionnaire     Feeling of Stress : Not at all   Housing  Stability: Low Risk  (2/18/2025)    Housing Stability Vital Sign     Unable to Pay for Housing in the Last Year: No     Number of Times Moved in the Last Year: 0     Homeless in the Last Year: No     Family History   Problem Relation Name Age of Onset    Breast cancer Sister Ingris     Colon cancer Neg Hx      Ovarian cancer Neg Hx      Melanoma Neg Hx         Review of patient's allergies indicates:   Allergen Reactions    Lotensin [benazepril] Swelling    Penicillins Rash       Medication List with Changes/Refills   Current Medications    ASPIRIN (ECOTRIN) 81 MG EC TABLET    Take 81 mg by mouth nightly.     AZELASTINE (ASTELIN) 137 MCG (0.1 %) NASAL SPRAY    instill 1 spray (137 mcg total) by Nasal route 2 (two) times daily.    CETIRIZINE (ZYRTEC) 10 MG TABLET    Take 1 tablet (10 mg total) by mouth daily as needed for Allergies.    DONEPEZIL (ARICEPT) 10 MG TABLET    Take 1 tablet (10 mg total) by mouth every evening.    MELOXICAM (MOBIC) 15 MG TABLET    Take 1 tablet (15 mg total) by mouth once daily.    MEMANTINE (NAMENDA) 10 MG TAB    Take 1 tablet (10 mg total) by mouth 2 (two) times daily.    MIRTAZAPINE (REMERON) 15 MG TABLET    Take 1 tablet (15 mg total) by mouth every evening.    MOMETASONE 0.1% (ELOCON) 0.1 % CREAM    apply to affected area topically daily    OMEPRAZOLE (PRILOSEC) 40 MG CAPSULE    Take 1 capsule (40 mg total) by mouth every morning.    POTASSIUM CHLORIDE (KLOR-CON 10) 10 MEQ TBSR    Take 1 tablet every day by oral route.    PRAVASTATIN (PRAVACHOL) 80 MG TABLET    Take 1 tablet (80 mg total) by mouth once daily.    PREDNISOLONE-MOXIFLOX-BROMFEN 1-0.5-0.075 % DRPS    Place 1 drop into the right eye 3 (three) times daily.    QUETIAPINE (SEROQUEL) 25 MG TAB    Take 1 tablet (25 mg total) by mouth 2 (two) times daily.    VALSARTAN-HYDROCHLOROTHIAZIDE (DIOVAN-HCT) 160-12.5 MG PER TABLET    Take 1 tablet by mouth once daily.   Discontinued Medications    DICLOFENAC SODIUM (VOLTAREN) 1 % GEL     "Apply 2 g topically once daily.    POTASSIUM CHLORIDE (KLOR-CON) 10 MEQ TBSR    Take 1 tablet (10 mEq total) by mouth once daily.       Review of Systems  Constitution: Denies chills, fever, and sweats.  HENT: Denies headaches or blurry vision.  Cardiovascular: Denies chest pain or irregular heart beat.  Respiratory: Denies cough or shortness of breath.  Gastrointestinal: Denies abdominal pain, nausea, or vomiting.  Musculoskeletal: Denies muscle cramps.  Neurological: Denies dizziness or focal weakness.  Psychiatric/Behavioral: Normal mental status.  Hematologic/Lymphatic: Denies bleeding problem or easy bruising/bleeding.  Skin: Denies rash or suspicious lesions    Physical Examination  BP (!) 85/57 (BP Location: Right arm, Patient Position: Sitting)   Pulse 62   Ht 5' 4" (1.626 m)   Wt 73.3 kg (161 lb 9.6 oz)   BMI 27.74 kg/m²     Constitutional: No acute distress, conversant  HEENT: Sclera anicteric, Pupils equal, round and reactive to light, extraocular motions intact, Oropharynx clear  Neck: No JVD, no carotid bruits  Cardiovascular: regular rate and rhythm, no murmur, rubs or gallops, normal S1/S2  Pulmonary: Clear to auscultation bilaterally  Abdominal: Abdomen soft, nontender, nondistended, positive bowel sounds  Extremities: No lower extremity edema,   Pulses:  Carotid pulses are 2+ on the right side, and 2+ on the left side.  Radial pulses are 2+ on the right side, and 2+ on the left side.   Femoral pulses are 2+ on the right side, and 2+ on the left side.  Popliteal pulses are 2+ on the right side, and 2+ on the left side.   Dorsalis pedis pulses are 2+ on the right side, and 2+ on the left side.   Posterior tibial pulses are 2+ on the right side, and 2+ on the left side.    Skin: No ecchymosis, erythema, or ulcers  Psych: Alert and oriented x 3, appropriate affect  Neuro: CNII-XII intact, no focal deficits    Labs:  Most Recent Data  CBC:   Lab Results   Component Value Date    WBC 8.60 02/10/2025 " "   HGB 11.7 (L) 02/10/2025    HCT 36.3 (L) 02/10/2025     02/10/2025    MCV 85 02/10/2025    RDW 13.2 02/10/2025     BMP:   Lab Results   Component Value Date     02/10/2025    K 3.3 (L) 02/10/2025     02/10/2025    CO2 24 02/10/2025    BUN 27 (H) 02/10/2025    CREATININE 2.2 (H) 02/10/2025    GLU 92 02/10/2025    CALCIUM 9.4 02/10/2025    MG 2.3 02/10/2025     LFTS;   Lab Results   Component Value Date    PROT 7.5 02/10/2025    ALBUMIN 3.6 02/10/2025    BILITOT 0.4 02/10/2025    AST 28 02/10/2025    ALKPHOS 56 02/10/2025    ALT 20 02/10/2025     COAGS: No results found for: "INR", "PROTIME", "PTT"  FLP:   Lab Results   Component Value Date    CHOL 129 02/08/2025    HDL 35 (L) 02/08/2025    LDLCALC 78.6 02/08/2025    TRIG 77 02/08/2025    CHOLHDL 27.1 02/08/2025     CARDIAC:   Lab Results   Component Value Date    TROPONINI 0.009 09/23/2021     Imaging:    EKG 2/20/2023:  Marked sinus bradycardia (44 bpm) with nonspecific T wave abnormality.    Cardiac Event Monitor 3/15/2023:  There were 12 patient triggered transmissions, correlating with sinus rhythm 49-88 bpm.    Assessment/Plan:  Soila Chávez is a 74 y.o. female with HTN, asymptomatic bradycardia, aortic atherosclerosis, overweight, who presents for a follow up appointment.    HTN- Blood pressure low today at 85/57. Hold blood pressure medication for now. If systolic goes above 120 systolic, pt to take half pill of valsartan/HCTZ 160-12.5 mg daily and monitor blood pressure daily. Continue current medications.      3. Aortic Atherosclerosis- Stable.  Continue pravastatin 80 mg daily.      4. Overweight- Encourage diet, exercise, and weight loss.     Follow up in 3 months     Total duration of face to face visit time 30 minutes.  Total time spent counseling greater than fifty percent of total visit time.  Counseling included discussion regarding imaging findings, diagnosis, possibilities, treatment options, risks and benefits.  The " patient had many questions regarding the options and long-term effects.    Champ Chapman MD, PhD  Interventional Cardiology

## 2025-02-18 NOTE — PATIENT INSTRUCTIONS
Assessment/Plan:  Soila Chávez is a 74 y.o. female with HTN, asymptomatic bradycardia, aortic atherosclerosis, overweight, who presents for a follow up appointment.    HTN- Blood pressure low today at 85/57. Hold blood pressure medication for now. If systolic goes above 120 systolic, pt to take half pill of valsartan/HCTZ 160-12.5 mg daily and monitor blood pressure daily. Continue current medications.      3. Aortic Atherosclerosis- Stable.  Continue pravastatin 80 mg daily.      4. Overweight- Encourage diet, exercise, and weight loss.     Follow up in 3 months

## 2025-02-19 DIAGNOSIS — K21.9 GASTROESOPHAGEAL REFLUX DISEASE WITHOUT ESOPHAGITIS: ICD-10-CM

## 2025-02-19 NOTE — TELEPHONE ENCOUNTER
No care due was identified.  [facility] Embedded Care Due Messages. Reference number: [id number].   2/19/2025 5:05:26 PM CST

## 2025-02-20 RX ORDER — OMEPRAZOLE 40 MG/1
40 CAPSULE, DELAYED RELEASE ORAL EVERY MORNING
Qty: 90 CAPSULE | Refills: 3 | Status: SHIPPED | OUTPATIENT
Start: 2025-02-20

## 2025-02-20 NOTE — TELEPHONE ENCOUNTER
Refill Decision Note   Soila Chávez  is requesting a refill authorization.  Brief Assessment and Rationale for Refill:  Approve     Medication Therapy Plan:  ED 2/10/25 for dehydration with no admission. Discharged. Ed notes state f/u with PCP if Sx worsened.      Extended chart review required: Yes   Comments:     Note composed:8:25 AM 02/20/2025

## 2025-02-28 RX ORDER — VALSARTAN AND HYDROCHLOROTHIAZIDE 160; 12.5 MG/1; MG/1
1 TABLET, FILM COATED ORAL DAILY
Qty: 30 TABLET | Refills: 6 | Status: SHIPPED | OUTPATIENT
Start: 2025-02-28 | End: 2026-02-28

## 2025-03-07 ENCOUNTER — PATIENT OUTREACH (OUTPATIENT)
Facility: OTHER | Age: 75
End: 2025-03-07
Payer: MEDICARE

## 2025-03-07 ENCOUNTER — PATIENT MESSAGE (OUTPATIENT)
Dept: INTERNAL MEDICINE | Facility: CLINIC | Age: 75
End: 2025-03-07
Payer: MEDICARE

## 2025-03-07 ENCOUNTER — OCHSNER VIRTUAL EMERGENCY DEPARTMENT (OUTPATIENT)
Facility: CLINIC | Age: 75
End: 2025-03-07
Payer: MEDICARE

## 2025-03-07 ENCOUNTER — NURSE TRIAGE (OUTPATIENT)
Dept: ADMINISTRATIVE | Facility: CLINIC | Age: 75
End: 2025-03-07
Payer: MEDICARE

## 2025-03-07 ENCOUNTER — HOSPITAL ENCOUNTER (INPATIENT)
Facility: OTHER | Age: 75
LOS: 5 days | Discharge: HOME HEALTH | DRG: 871 | End: 2025-03-13
Attending: EMERGENCY MEDICINE | Admitting: HOSPITALIST
Payer: MEDICARE

## 2025-03-07 DIAGNOSIS — R56.9 SEIZURE-LIKE ACTIVITY: ICD-10-CM

## 2025-03-07 DIAGNOSIS — G30.9 ALZHEIMER'S DEMENTIA WITH BEHAVIORAL DISTURBANCE: ICD-10-CM

## 2025-03-07 DIAGNOSIS — E87.20 LACTIC ACIDOSIS: ICD-10-CM

## 2025-03-07 DIAGNOSIS — A41.9 SEPSIS: ICD-10-CM

## 2025-03-07 DIAGNOSIS — R07.9 CHEST PAIN: ICD-10-CM

## 2025-03-07 DIAGNOSIS — A41.9 SEPSIS, DUE TO UNSPECIFIED ORGANISM, UNSPECIFIED WHETHER ACUTE ORGAN DYSFUNCTION PRESENT: Primary | ICD-10-CM

## 2025-03-07 DIAGNOSIS — Z13.6 SCREENING FOR CARDIOVASCULAR CONDITION: ICD-10-CM

## 2025-03-07 DIAGNOSIS — I95.9 HYPOTENSION, UNSPECIFIED HYPOTENSION TYPE: ICD-10-CM

## 2025-03-07 DIAGNOSIS — R93.5 ABNORMAL CT OF THE ABDOMEN: ICD-10-CM

## 2025-03-07 DIAGNOSIS — K52.9 ACUTE COLITIS: ICD-10-CM

## 2025-03-07 DIAGNOSIS — F02.818 ALZHEIMER'S DEMENTIA WITH BEHAVIORAL DISTURBANCE: ICD-10-CM

## 2025-03-07 DIAGNOSIS — R10.30 LOWER ABDOMINAL PAIN: ICD-10-CM

## 2025-03-07 DIAGNOSIS — R10.31 RLQ ABDOMINAL PAIN: Primary | ICD-10-CM

## 2025-03-07 DIAGNOSIS — R11.2 NAUSEA AND VOMITING, UNSPECIFIED VOMITING TYPE: ICD-10-CM

## 2025-03-07 DIAGNOSIS — R56.9 SEIZURE: ICD-10-CM

## 2025-03-07 LAB
ALBUMIN SERPL BCP-MCNC: 3.9 G/DL (ref 3.5–5.2)
ALP SERPL-CCNC: 61 U/L (ref 40–150)
ALT SERPL W/O P-5'-P-CCNC: 19 U/L (ref 10–44)
AMMONIA PLAS-SCNC: 35 UMOL/L (ref 10–50)
AMPHET+METHAMPHET UR QL: NEGATIVE
ANION GAP SERPL CALC-SCNC: 14 MMOL/L (ref 8–16)
AST SERPL-CCNC: 31 U/L (ref 10–40)
BARBITURATES UR QL SCN>200 NG/ML: NEGATIVE
BENZODIAZ UR QL SCN>200 NG/ML: NEGATIVE
BILIRUB SERPL-MCNC: 0.5 MG/DL (ref 0.1–1)
BILIRUB UR QL STRIP: NEGATIVE
BNP SERPL-MCNC: <10 PG/ML (ref 0–99)
BUN SERPL-MCNC: 23 MG/DL (ref 8–23)
BZE UR QL SCN: NEGATIVE
CALCIUM SERPL-MCNC: 9.7 MG/DL (ref 8.7–10.5)
CANNABINOIDS UR QL SCN: NEGATIVE
CHLORIDE SERPL-SCNC: 107 MMOL/L (ref 95–110)
CLARITY UR: CLEAR
CO2 SERPL-SCNC: 17 MMOL/L (ref 23–29)
COLOR UR: YELLOW
CREAT SERPL-MCNC: 1.6 MG/DL (ref 0.5–1.4)
CREAT SERPL-MCNC: 1.6 MG/DL (ref 0.5–1.4)
CREAT UR-MCNC: 164.4 MG/DL (ref 15–325)
ERYTHROCYTE [DISTWIDTH] IN BLOOD BY AUTOMATED COUNT: 14.8 % (ref 11.5–14.5)
EST. GFR  (NO RACE VARIABLE): 34 ML/MIN/1.73 M^2
GLUCOSE SERPL-MCNC: 161 MG/DL (ref 70–110)
GLUCOSE UR QL STRIP: NEGATIVE
HCO3 UR-SCNC: 19.8 MMOL/L (ref 24–28)
HCT VFR BLD AUTO: 36.9 % (ref 37–48.5)
HCT VFR BLD CALC: 37 %PCV (ref 36–54)
HGB BLD-MCNC: 11.9 G/DL (ref 12–16)
HGB BLD-MCNC: 13 G/DL
HGB UR QL STRIP: NEGATIVE
KETONES UR QL STRIP: NEGATIVE
LACTATE SERPL-SCNC: 3.3 MMOL/L (ref 0.5–2.2)
LDH SERPL L TO P-CCNC: 3.98 MMOL/L (ref 0.5–2.2)
LDH SERPL L TO P-CCNC: 4.24 MMOL/L (ref 0.5–2.2)
LEUKOCYTE ESTERASE UR QL STRIP: NEGATIVE
LIPASE SERPL-CCNC: 34 U/L (ref 4–60)
MAGNESIUM SERPL-MCNC: 2.9 MG/DL (ref 1.6–2.6)
MCH RBC QN AUTO: 28.1 PG (ref 27–31)
MCHC RBC AUTO-ENTMCNC: 32.2 G/DL (ref 32–36)
MCV RBC AUTO: 87 FL (ref 82–98)
METHADONE UR QL SCN>300 NG/ML: NEGATIVE
NITRITE UR QL STRIP: NEGATIVE
OPIATES UR QL SCN: NEGATIVE
PCO2 BLDA: 34.7 MMHG (ref 35–45)
PCP UR QL SCN>25 NG/ML: NEGATIVE
PH SMN: 7.36 [PH] (ref 7.35–7.45)
PH UR STRIP: 6 [PH] (ref 5–8)
PHOSPHATE SERPL-MCNC: 3.8 MG/DL (ref 2.7–4.5)
PLATELET # BLD AUTO: 227 K/UL (ref 150–450)
PMV BLD AUTO: 11.7 FL (ref 9.2–12.9)
PO2 BLDA: 28 MMHG (ref 40–60)
POC BE: -6 MMOL/L
POC IONIZED CALCIUM: 1.1 MMOL/L (ref 1.06–1.42)
POC SATURATED O2: 51 % (ref 95–100)
POC TCO2: 21 MMOL/L (ref 24–29)
POTASSIUM BLD-SCNC: 3 MMOL/L (ref 3.5–5.1)
POTASSIUM SERPL-SCNC: 3.6 MMOL/L (ref 3.5–5.1)
PROCALCITONIN SERPL IA-MCNC: 0.39 NG/ML
PROT SERPL-MCNC: 8.2 G/DL (ref 6–8.4)
PROT UR QL STRIP: ABNORMAL
RBC # BLD AUTO: 4.23 M/UL (ref 4–5.4)
SAMPLE: ABNORMAL
SODIUM BLD-SCNC: 141 MMOL/L (ref 136–145)
SODIUM SERPL-SCNC: 138 MMOL/L (ref 136–145)
SP GR UR STRIP: 1.02 (ref 1–1.03)
TOXICOLOGY INFORMATION: NORMAL
TROPONIN I SERPL DL<=0.01 NG/ML-MCNC: <0.006 NG/ML (ref 0–0.03)
URN SPEC COLLECT METH UR: ABNORMAL
UROBILINOGEN UR STRIP-ACNC: ABNORMAL EU/DL
WBC # BLD AUTO: 13.29 K/UL (ref 3.9–12.7)

## 2025-03-07 PROCEDURE — 80307 DRUG TEST PRSMV CHEM ANLYZR: CPT | Performed by: EMERGENCY MEDICINE

## 2025-03-07 PROCEDURE — 96365 THER/PROPH/DIAG IV INF INIT: CPT

## 2025-03-07 PROCEDURE — 83690 ASSAY OF LIPASE: CPT | Performed by: EMERGENCY MEDICINE

## 2025-03-07 PROCEDURE — 83605 ASSAY OF LACTIC ACID: CPT | Performed by: EMERGENCY MEDICINE

## 2025-03-07 PROCEDURE — 63600175 PHARM REV CODE 636 W HCPCS: Performed by: EMERGENCY MEDICINE

## 2025-03-07 PROCEDURE — 93010 ELECTROCARDIOGRAM REPORT: CPT | Mod: ,,, | Performed by: INTERNAL MEDICINE

## 2025-03-07 PROCEDURE — 25000003 PHARM REV CODE 250: Performed by: EMERGENCY MEDICINE

## 2025-03-07 PROCEDURE — 85027 COMPLETE CBC AUTOMATED: CPT | Performed by: EMERGENCY MEDICINE

## 2025-03-07 PROCEDURE — G0378 HOSPITAL OBSERVATION PER HR: HCPCS

## 2025-03-07 PROCEDURE — 82140 ASSAY OF AMMONIA: CPT | Performed by: EMERGENCY MEDICINE

## 2025-03-07 PROCEDURE — 96375 TX/PRO/DX INJ NEW DRUG ADDON: CPT

## 2025-03-07 PROCEDURE — 96361 HYDRATE IV INFUSION ADD-ON: CPT

## 2025-03-07 PROCEDURE — 83880 ASSAY OF NATRIURETIC PEPTIDE: CPT | Performed by: EMERGENCY MEDICINE

## 2025-03-07 PROCEDURE — 83735 ASSAY OF MAGNESIUM: CPT | Performed by: EMERGENCY MEDICINE

## 2025-03-07 PROCEDURE — 84484 ASSAY OF TROPONIN QUANT: CPT | Performed by: EMERGENCY MEDICINE

## 2025-03-07 PROCEDURE — 84100 ASSAY OF PHOSPHORUS: CPT | Performed by: EMERGENCY MEDICINE

## 2025-03-07 PROCEDURE — 80053 COMPREHEN METABOLIC PANEL: CPT | Performed by: EMERGENCY MEDICINE

## 2025-03-07 PROCEDURE — 96366 THER/PROPH/DIAG IV INF ADDON: CPT

## 2025-03-07 PROCEDURE — 93005 ELECTROCARDIOGRAM TRACING: CPT

## 2025-03-07 PROCEDURE — 99285 EMERGENCY DEPT VISIT HI MDM: CPT | Mod: 25

## 2025-03-07 PROCEDURE — 96367 TX/PROPH/DG ADDL SEQ IV INF: CPT

## 2025-03-07 PROCEDURE — 81003 URINALYSIS AUTO W/O SCOPE: CPT | Performed by: EMERGENCY MEDICINE

## 2025-03-07 PROCEDURE — 87040 BLOOD CULTURE FOR BACTERIA: CPT | Performed by: EMERGENCY MEDICINE

## 2025-03-07 PROCEDURE — 84145 PROCALCITONIN (PCT): CPT | Performed by: EMERGENCY MEDICINE

## 2025-03-07 RX ORDER — VANCOMYCIN 1.75 GRAM/500 ML IN 0.9 % SODIUM CHLORIDE INTRAVENOUS
25 ONCE
Status: COMPLETED | OUTPATIENT
Start: 2025-03-07 | End: 2025-03-07

## 2025-03-07 RX ORDER — ACETAMINOPHEN 325 MG/1
650 TABLET ORAL EVERY 8 HOURS PRN
Status: DISCONTINUED | OUTPATIENT
Start: 2025-03-08 | End: 2025-03-09

## 2025-03-07 RX ORDER — ONDANSETRON HYDROCHLORIDE 2 MG/ML
4 INJECTION, SOLUTION INTRAVENOUS EVERY 6 HOURS PRN
Status: DISCONTINUED | OUTPATIENT
Start: 2025-03-08 | End: 2025-03-13 | Stop reason: HOSPADM

## 2025-03-07 RX ORDER — HEPARIN SODIUM 5000 [USP'U]/ML
5000 INJECTION, SOLUTION INTRAVENOUS; SUBCUTANEOUS EVERY 8 HOURS
Status: DISCONTINUED | OUTPATIENT
Start: 2025-03-08 | End: 2025-03-13 | Stop reason: HOSPADM

## 2025-03-07 RX ORDER — FENTANYL CITRATE 50 UG/ML
50 INJECTION, SOLUTION INTRAMUSCULAR; INTRAVENOUS
Refills: 0 | Status: COMPLETED | OUTPATIENT
Start: 2025-03-07 | End: 2025-03-07

## 2025-03-07 RX ORDER — SODIUM CHLORIDE, SODIUM LACTATE, POTASSIUM CHLORIDE, CALCIUM CHLORIDE 600; 310; 30; 20 MG/100ML; MG/100ML; MG/100ML; MG/100ML
INJECTION, SOLUTION INTRAVENOUS CONTINUOUS
Status: DISCONTINUED | OUTPATIENT
Start: 2025-03-08 | End: 2025-03-13 | Stop reason: HOSPADM

## 2025-03-07 RX ORDER — ALPRAZOLAM 0.5 MG/1
0.5 TABLET ORAL 2 TIMES DAILY PRN
Status: ON HOLD | COMMUNITY
End: 2025-03-13 | Stop reason: HOSPADM

## 2025-03-07 RX ORDER — ONDANSETRON HYDROCHLORIDE 2 MG/ML
4 INJECTION, SOLUTION INTRAVENOUS
Status: COMPLETED | OUTPATIENT
Start: 2025-03-07 | End: 2025-03-07

## 2025-03-07 RX ORDER — NALOXONE HCL 0.4 MG/ML
0.02 VIAL (ML) INJECTION
Status: DISCONTINUED | OUTPATIENT
Start: 2025-03-08 | End: 2025-03-13 | Stop reason: HOSPADM

## 2025-03-07 RX ORDER — METRONIDAZOLE 500 MG/100ML
500 INJECTION, SOLUTION INTRAVENOUS
Status: COMPLETED | OUTPATIENT
Start: 2025-03-07 | End: 2025-03-08

## 2025-03-07 RX ORDER — AZTREONAM 2 G/1
2000 INJECTION, POWDER, LYOPHILIZED, FOR SOLUTION INTRAMUSCULAR; INTRAVENOUS
Status: COMPLETED | OUTPATIENT
Start: 2025-03-07 | End: 2025-03-08

## 2025-03-07 RX ORDER — PRAVASTATIN SODIUM 20 MG/1
80 TABLET ORAL DAILY
Status: DISCONTINUED | OUTPATIENT
Start: 2025-03-08 | End: 2025-03-13 | Stop reason: HOSPADM

## 2025-03-07 RX ORDER — SODIUM CHLORIDE 0.9 % (FLUSH) 0.9 %
10 SYRINGE (ML) INJECTION EVERY 8 HOURS PRN
Status: DISCONTINUED | OUTPATIENT
Start: 2025-03-08 | End: 2025-03-13 | Stop reason: HOSPADM

## 2025-03-07 RX ORDER — DONEPEZIL HYDROCHLORIDE 5 MG/1
10 TABLET, FILM COATED ORAL NIGHTLY
Status: DISCONTINUED | OUTPATIENT
Start: 2025-03-08 | End: 2025-03-08

## 2025-03-07 RX ORDER — TALC
6 POWDER (GRAM) TOPICAL NIGHTLY PRN
Status: DISCONTINUED | OUTPATIENT
Start: 2025-03-08 | End: 2025-03-13 | Stop reason: HOSPADM

## 2025-03-07 RX ORDER — QUETIAPINE FUMARATE 25 MG/1
25 TABLET, FILM COATED ORAL 2 TIMES DAILY
Status: DISCONTINUED | OUTPATIENT
Start: 2025-03-08 | End: 2025-03-13 | Stop reason: HOSPADM

## 2025-03-07 RX ORDER — PROCHLORPERAZINE EDISYLATE 5 MG/ML
5 INJECTION INTRAMUSCULAR; INTRAVENOUS EVERY 6 HOURS PRN
Status: DISCONTINUED | OUTPATIENT
Start: 2025-03-08 | End: 2025-03-13 | Stop reason: HOSPADM

## 2025-03-07 RX ORDER — NOREPINEPHRINE BITARTRATE/D5W 4MG/250ML
0-3 PLASTIC BAG, INJECTION (ML) INTRAVENOUS CONTINUOUS
Status: DISCONTINUED | OUTPATIENT
Start: 2025-03-07 | End: 2025-03-08

## 2025-03-07 RX ORDER — MEMANTINE HYDROCHLORIDE 10 MG/1
10 TABLET ORAL 2 TIMES DAILY
Status: DISCONTINUED | OUTPATIENT
Start: 2025-03-08 | End: 2025-03-08

## 2025-03-07 RX ORDER — PANTOPRAZOLE SODIUM 40 MG/1
40 TABLET, DELAYED RELEASE ORAL DAILY
Status: DISCONTINUED | OUTPATIENT
Start: 2025-03-08 | End: 2025-03-13 | Stop reason: HOSPADM

## 2025-03-07 RX ORDER — ACETAMINOPHEN 325 MG/1
650 TABLET ORAL
Status: COMPLETED | OUTPATIENT
Start: 2025-03-07 | End: 2025-03-07

## 2025-03-07 RX ADMIN — SODIUM CHLORIDE 2199 ML: 0.9 INJECTION, SOLUTION INTRAVENOUS at 05:03

## 2025-03-07 RX ADMIN — ACETAMINOPHEN 650 MG: 325 TABLET, FILM COATED ORAL at 10:03

## 2025-03-07 RX ADMIN — METRONIDAZOLE 500 MG: 500 INJECTION, SOLUTION INTRAVENOUS at 06:03

## 2025-03-07 RX ADMIN — AZTREONAM 2000 MG: 2 INJECTION, POWDER, LYOPHILIZED, FOR SOLUTION INTRAMUSCULAR; INTRAVENOUS at 05:03

## 2025-03-07 RX ADMIN — VANCOMYCIN HYDROCHLORIDE 1750 MG: 500 INJECTION, POWDER, LYOPHILIZED, FOR SOLUTION INTRAVENOUS at 06:03

## 2025-03-07 RX ADMIN — ONDANSETRON 4 MG: 2 INJECTION INTRAMUSCULAR; INTRAVENOUS at 09:03

## 2025-03-07 RX ADMIN — FENTANYL CITRATE 50 MCG: 50 INJECTION INTRAMUSCULAR; INTRAVENOUS at 09:03

## 2025-03-07 NOTE — ED PROVIDER NOTES
"Emergency Department Encounter  Provider Note    Soila Chávez  [id number]  3/7/2025    Evaluation:    History Acquisition:     Chief Complaint   Patient presents with    Seizures     Per family pt reports seizure in the car today and foaming at the mouth       History of Present Illness:  Soila Chávez is a 74 y.o. female who has a past medical history of Allergy, Anxiety, Cataract, Colon polyps (2010), Hearing loss, Hypertension, Joint pain, Mental disorder, and Seizure-like activity (3/8/2025).    The patient presents to the ED due to N/V.   On arrival, patient is awake but not interactive and unable to follow commands or provide additional history.    Additional historians utilized:  JUNE Phipps MD: "this patient is heading your way with vomiting, weakness and RLQ pain. She was seen 3 weeks ago in ED for worsening DA (up to 2.2 at that time) and was hypotensive (80s/50s) with hypokalemia, she was given IVF and sent home. She was then seen at her cardiologist on 2/18 and was still hypotensive at that time (80s/50s), he held her BP medications. She states today she ate chicken at 11 am and then developed N/V and RLQ abdominal pain. Still has an appendix. s/p hysterectomy."  Family at bedside, states patient has a history of dementia.  She was seen in the ED recently for abdominal pain and vomiting.  She was discharged home and had a nurse checking on her at home.  She gradually improved and was able to eat and drink until earlier today.  She ate some chicken from a restaurant and the family found her in the bathroom on the floor actively vomiting.  They called the patient's PCP and they were told to bring her to the ER for further evaluation.  While on the way to the hospital, family states the patient passed out and began foaming at the mouth.  She denies any history of seizures or epilepsy.  She has never had a syncopal episode.    Prior medical records were reviewed:   Cardiology visit 02/18 for " follow-up hypertension, bradycardia, aortic atherosclerosis  [facility] visit for DA, hypokalemia  Visit 2/10 for dehydration, hypokalemia    The patient's list of active medical history, family/social history, medications, and allergies as documented has been reviewed.     Past Medical History:   Diagnosis Date    Allergy     Anxiety     Cataract     Colon polyps 2010    Hearing loss     Hypertension     Joint pain     Mental disorder     Seizure-like activity 3/8/2025     Past Surgical History:   Procedure Laterality Date    CATARACT EXTRACTION W/  INTRAOCULAR LENS IMPLANT Right 3/12/2024    Procedure: EXTRACTION, CATARACT, WITH IOL INSERTION;  Surgeon: Jax Garza MD;  Location: [facility] OR;  Service: Ophthalmology;  Laterality: Right;    COLONOSCOPY N/A 3/5/2020    Procedure: COLONOSCOPY;  Surgeon: Nathalia Kemp MD;  [facility]  Service: Colon and Rectal;  Laterality: N/A;  requests later appt time if available - Pt open to any MD- Pt informed arrival time may be adjusted, Pt verbalized understanding- ERW2/24/20@1409    ESOPHAGOGASTRODUODENOSCOPY N/A 10/1/2020    Procedure: EGD (ESOPHAGOGASTRODUODENOSCOPY);  Surgeon: Casey Guerrero MD;  [facility]  Service: Endoscopy;  Laterality: N/A;  covid test 9/28-[facility]    ESOPHAGOGASTRODUODENOSCOPY N/A 10/29/2021    Procedure: EGD (ESOPHAGOGASTRODUODENOSCOPY);  Surgeon: Billy Vegas MD;  Location: [facility];  Service: Endoscopy;  Laterality: N/A;    HYSTERECTOMY      PERIPARTUM CHITO     Family History   Problem Relation Name Age of Onset    Breast cancer Sister Ingris     Colon cancer Neg Hx      Ovarian cancer Neg Hx      Melanoma Neg Hx       Social History     Socioeconomic History    Marital status:    Tobacco Use    Smoking status: Never     Passive exposure: Never    Smokeless tobacco: Never   Substance and Sexual Activity    Alcohol use: Yes     Comment: beer occasionally    Drug use: No    Sexual  activity: Not Currently     Birth control/protection: None   Social History Narrative    June 2016    The patient reports that she lives alone normally, however her daughter recently moved in with her    She has a 43-year-old daughter, Saima    And a 26-year-old daughterChris, who is a schoolteacher for 6 in seventh grade in [employer]    She is retired from working as a  in the school system    She now works about 5-15 hours a week for [employer], which she enjoys.    Enjoys working at [employer] couple days a week     Social Drivers of Health     Financial Resource Strain: Low Risk  (2/18/2025)    Overall Financial Resource Strain (CARDIA)     Difficulty of Paying Living Expenses: Not very hard   Food Insecurity: No Food Insecurity (2/18/2025)    Hunger Vital Sign     Worried About Running Out of Food in the Last Year: Never true     Ran Out of Food in the Last Year: Never true   Transportation Needs: No Transportation Needs (2/18/2025)    PRAPARE - Transportation     Lack of Transportation (Medical): No     Lack of Transportation (Non-Medical): No   Physical Activity: Inactive (2/18/2025)    Exercise Vital Sign     Days of Exercise per Week: 0 days     Minutes of Exercise per Session: 0 min   Stress: No Stress Concern Present (2/18/2025)    [place] [employer] - Occupational Stress Questionnaire     Feeling of Stress : Not at all   Housing Stability: Low Risk  (2/18/2025)    Housing Stability Vital Sign     Unable to Pay for Housing in the Last Year: No     Number of Times Moved in the Last Year: 0     Homeless in the Last Year: No       Medications:  Current Discharge Medication List        CONTINUE these medications which have NOT CHANGED    Details   ALPRAZolam (XANAX) 0.5 MG tablet Take 0.5 mg by mouth 2 (two) times daily as needed for Anxiety.      aspirin (ECOTRIN) 81 MG EC tablet Take 81 mg by mouth nightly.       cetirizine (ZYRTEC) 10 MG tablet Take 1 tablet (10  mg total) by mouth daily as needed for Allergies.  Qty: 90 tablet, Refills: 3    Associated Diagnoses: Allergic rhinitis, unspecified seasonality, unspecified trigger      donepeziL (ARICEPT) 10 MG tablet Take 1 tablet (10 mg total) by mouth every evening.  Qty: 90 tablet, Refills: 1      meloxicam (MOBIC) 15 MG tablet Take 1 tablet (15 mg total) by mouth once daily.  Qty: 30 tablet, Refills: 0    Associated Diagnoses: Pain in both knees, unspecified chronicity; Primary osteoarthritis of both knees      memantine (NAMENDA) 10 MG Tab Take 1 tablet (10 mg total) by mouth 2 (two) times daily.  Qty: 180 tablet, Refills: 1      mirtazapine (REMERON) 15 MG tablet Take 1 tablet (15 mg total) by mouth every evening.  Qty: 30 tablet, Refills: 5      omeprazole (PRILOSEC) 40 MG capsule Take 1 capsule (40 mg total) by mouth every morning.  Qty: 90 capsule, Refills: 3    Associated Diagnoses: Gastroesophageal reflux disease without esophagitis      potassium chloride (KLOR-CON 10) 10 MEQ TbSR Take 1 tablet every day by oral route.  Qty: 30 tablet, Refills: 0      pravastatin (PRAVACHOL) 80 MG tablet Take 1 tablet (80 mg total) by mouth once daily.  Qty: 90 tablet, Refills: 3      QUEtiapine (SEROQUEL) 25 MG Tab Take 1 tablet (25 mg total) by mouth 2 (two) times daily.  Qty: 180 tablet, Refills: 1      valsartan-hydrochlorothiazide (DIOVAN-HCT) 160-12.5 mg per tablet Take 1 tablet by mouth once daily.  Qty: 30 tablet, Refills: 6    Comments: .      azelastine (ASTELIN) 137 mcg (0.1 %) nasal spray instill 1 spray (137 mcg total) by Nasal route 2 (two) times daily.  Qty: 30 mL, Refills: 3    Comments: Pt wants delivery and automatic refill  Associated Diagnoses: Rhinorrhea      mometasone 0.1% (ELOCON) 0.1 % cream apply to affected area topically daily  Qty: 45 g, Refills: 3    Associated Diagnoses: Rash and nonspecific skin eruption             Allergies:  Review of patient's allergies indicates:   Allergen Reactions    Lotensin  [benazepril] Swelling    Penicillins Rash         Physical Exam:     Initial Vitals   BP Pulse Resp Temp SpO2   03/07/25 1701 03/07/25 1701 03/07/25 1701 03/07/25 1701 03/07/25 1854   (!) 62/46 71 (!) 22 98.2 °F (36.8 °C) (!) 93 %      MAP       --                Physical Exam    Constitutional: She appears well-developed and well-nourished. She is not diaphoretic. No distress.   Appears elderly, awake but non-verbal.    HENT:   Head: Normocephalic and atraumatic.   Eyes: EOM are normal. Pupils are equal, round, and reactive to light.   Cardiovascular:  Normal rate, regular rhythm and normal pulses.     Exam reveals no decreased pulses.       Pulmonary/Chest: Breath sounds normal. No respiratory distress.   Abdominal: Abdomen is soft. She exhibits no distension.   Musculoskeletal:         General: No edema.     Neurological: She is alert. She is disoriented. GCS eye subscore is 4. GCS verbal subscore is 1. GCS motor subscore is 5.   Largely non-verbal.   Appears diffusely weak.    Skin: Skin is warm and dry.   Psychiatric: She is noncommunicative. She is inattentive.         Differential Diagnoses:   Based on available information and initial assessment, Differential Diagnosis includes, but is not limited to:  Sepsis/bacteremia, infection (UTI, pneumonia, cellulitis/abscess, indwelling line/catheter infection), shock (cardiogenic/neurogenic/hypovolemic), upper/lower GI bleed, anemia, orthostasis/vasovagal episode, viral syndrome, gastroenteritis, vomiting/diarrhea/dehydration, drug/medication reaction, overdose, intoxication/withdrawal syndrome.      ED Management:   Procedures    Orders Placed This Encounter    Blood culture x two cultures. Draw prior to antibiotics.    CT Head Without Contrast    CT Abdomen Pelvis  Without Contrast    X-Ray Chest AP Portable    US Abdomen Limited    Urinalysis Catheterized    Drug screen panel, in-house    CBC Without Differential    Comprehensive metabolic panel    Lipase     Ammonia    Lactic acid, plasma #2    Procalcitonin    Phosphorus    Brain natriuretic peptide    Magnesium    Troponin I    Phosphorus    Magnesium    Troponin I    BNP    Comprehensive Metabolic Panel (CMP)    Magnesium    Phosphorus    CBC with Automated Differential    Lactic acid, plasma    Lactic Acid, Plasma    Vancomycin, random    Vancomycin, random    Lactic acid, plasma    Diet Clear Liquid Thin; Standard Tray    Vital signs    Cardiac Monitoring - Adult    Strict intake and output    ED Preference List Used to Initiate Sepsis Orders    Vital signs    Weigh patient    Bladder scan    Straight Cath    Notify Provider    Place sequential compression device    Delirium Precautions    Nursing communication    Nursing communication    Full code    Inpatient consult to Gastroenterology    Inpatient consult to Neurology Services (Vascular Neurology)    Pharmacy to dose Vancomycin consult    OT evaluate and treat    PT evaluate and treat    POCT Venous Blood Gas (Lactate Only)    POCT Venous Blood Gas - Lactate #1    Pulse Oximetry Continuous    Oxygen PRN    EKG 12-lead    EKG 12-lead    EKG 12-lead    Echo    EEG    Insert peripheral IV    Saline lock IV    Saline lock IV    Possible Hospitalization    Place in Observation    Admit to Inpatient    Seizure precautions    POCT glucose    ISTAT PROCEDURE    ISTAT CREATININE    ISTAT Lactate    ISTAT Lactate    sodium chloride 0.9% bolus 2,199 mL 2,199 mL    aztreonam injection 2,000 mg    metronidazole IVPB 500 mg    vancomycin 1750 mg in 0.9% sodium chloride 500 mL IVPB    fentaNYL 50 mcg/mL injection 50 mcg    ondansetron injection 4 mg    acetaminophen tablet 650 mg    pantoprazole EC tablet 40 mg    pravastatin tablet 80 mg    QUEtiapine tablet 25 mg    sodium chloride 0.9% flush 10 mL    melatonin tablet 6 mg    ondansetron injection 4 mg    prochlorperazine injection Soln 5 mg    acetaminophen tablet 650 mg    naloxone 0.4 mg/mL injection 0.02 mg    lactated  ringers infusion    heparin (porcine) injection 5,000 Units    senna-docusate 8.6-50 mg per tablet 1 tablet    potassium chloride SA CR tablet 40 mEq    polyethylene glycol packet 17 g    mupirocin 2 % ointment    sodium bicarbonate tablet 650 mg    vancomycin - pharmacy to dose    vancomycin 750 mg in 0.9% NaCl 250 mL IVPB (admixture device)    ALPRAZolam tablet 0.25 mg    IP VTE HIGH RISK PATIENT    Progressive Mobility Protocol (mobilize patient to their highest level of functioning at least twice daily)          EKG:   EKG interpretation by ED attending physician:  NSR, rate 71, non-specific ST changes, no ST elevations or acute ischemia, normal intervals.  Compared with prior EKG dated 02/2025, non-specific ST changes are new.       Labs:     Labs Reviewed   URINALYSIS - Abnormal       Result Value    Specimen UA Urine, Catheterized      Color, UA Yellow      Appearance, UA Clear      pH, UA 6.0      Specific Gravity, UA 1.020      Protein, UA Trace (*)     Glucose, UA Negative      Ketones, UA Negative      Bilirubin (UA) Negative      Occult Blood UA Negative      Nitrite, UA Negative      Urobilinogen, UA 4.0-6.0 (*)     Leukocytes, UA Negative      Narrative:     Specimen Source->Urine   CBC WITHOUT DIFFERENTIAL - Abnormal    WBC 13.29 (*)     RBC 4.23      Hemoglobin 11.9 (*)     Hematocrit 36.9 (*)     MCV 87      MCH 28.1      MCHC 32.2      RDW 14.8 (*)     Platelets 227      MPV 11.7     COMPREHENSIVE METABOLIC PANEL - Abnormal    Sodium 138      Potassium 3.6      Chloride 107      CO2 17 (*)     Glucose 161 (*)     BUN 23      Creatinine 1.6 (*)     Calcium 9.7      Total Protein 8.2      Albumin 3.9      Total Bilirubin 0.5      Alkaline Phosphatase 61      AST 31      ALT 19      eGFR 34 (*)     Anion Gap 14     LACTIC ACID, PLASMA - Abnormal    Lactate (Lactic Acid) 3.3 (*)    PROCALCITONIN - Abnormal    Procalcitonin 0.39 (*)    MAGNESIUM - Abnormal    Magnesium 2.9 (*)    ISTAT PROCEDURE -  Abnormal    POC PH 7.364      POC PCO2 34.7 (*)     POC PO2 28 (*)     POC HCO3 19.8 (*)     POC BE -6 (*)     POC SATURATED O2 51      POC Sodium 141      POC Potassium 3.0 (*)     POC TCO2 21 (*)     POC Ionized Calcium 1.10      POC Hematocrit 37      POC HEMOGLOBIN 13      Sample VENOUS     ISTAT CREATININE - Abnormal    POC Creatinine 1.6 (*)     Sample VENOUS     ISTAT LACTATE - Abnormal    POC Lactate 4.24 (*)     Sample VENOUS     ISTAT LACTATE - Abnormal    POC Lactate 3.98 (*)     Sample VENOUS     DRUG SCREEN PANEL, URINE EMERGENCY    Benzodiazepines Negative      Methadone metabolites Negative      Cocaine (Metab.) Negative      Opiate Scrn, Ur Negative      Barbiturate Screen, Ur Negative      Amphetamine Screen, Ur Negative      THC Negative      Phencyclidine Negative      Creatinine, Urine 164.4      Toxicology Information SEE COMMENT      Narrative:     Specimen Source->Urine   LIPASE    Lipase 34     AMMONIA    Ammonia 35     PHOSPHORUS   B-TYPE NATRIURETIC PEPTIDE   MAGNESIUM   TROPONIN I   PHOSPHORUS    Phosphorus 3.8     TROPONIN I    Troponin I <0.006     B-TYPE NATRIURETIC PEPTIDE    BNP <10      Narrative:     BNP, Mag, Phos & Trop Add on's per MD Harry   POCT GLUCOSE MONITORING CONTINUOUS     Independent review of the labs ordered include:   See ED course    Imaging:     Imaging Results              US Abdomen Limited (Final result)  Result time 03/07/25 22:01:32      Final result by Kojo Patel MD (03/07/25 22:01:32)                   Impression:      Cholelithiasis with stones at the gallbladder neck.  Otherwise no ultrasonographic evidence to suggest acute cholecystitis.      Electronically signed by: Kojo Patel MD  Date:    03/07/2025  Time:    22:01               Narrative:    EXAMINATION:  US ABDOMEN LIMITED    CLINICAL HISTORY:  cholecystitis;    TECHNIQUE:  Limited ultrasound of the right upper quadrant of the abdomen was performed.    COMPARISON:  CT abdomen and pelvis  from the same date.    FINDINGS:  Visualized hepatic parenchyma is homogeneous without evidence for masses.  No intra- or extrahepatic biliary ductal dilatation. The common bile duct measures 0.5 cm.  The gallbladder demonstrates stones at the level of the gallbladder neck, the larger of which measures 2.1 cm.  No evidence of gallbladder wall thickening or pericholecystic fluid.  Sonographic Gordon's sign is negative. The visualized portion of the pancreas appears normal.  No ascites.                                       CT Head Without Contrast (Final result)  Result time 03/07/25 21:05:04      Final result by Kojo aPtel MD (03/07/25 21:05:04)                   Impression:      No acute intracranial abnormalities identified.      Electronically signed by: Kojo Patel MD  Date:    03/07/2025  Time:    21:05               Narrative:    EXAMINATION:  CT HEAD WITHOUT CONTRAST    CLINICAL HISTORY:  Seizure, new-onset, no history of trauma;    TECHNIQUE:  Low dose axial images were obtained through the head.  Coronal and sagittal reformations were also performed. Contrast was not administered.    COMPARISON:  MRI brain from August 2021.    FINDINGS:  No evidence of acute/recent major vascular distribution cerebral infarction, intraparenchymal hemorrhage, or intra-axial space occupying lesion. The ventricular system is normal in size and configuration with no evidence of hydrocephalus. No effacement of the skull-base cisterns. No abnormal extra-axial fluid collections or blood products. Visualized paranasal sinuses and mastoid air cells are clear. The calvarium shows no significant abnormality.                                       CT Abdomen Pelvis  Without Contrast (Final result)  Result time 03/07/25 21:36:39      Final result by Kojo Patel MD (03/07/25 21:36:39)                   Impression:      1. Abnormal colonic wall thickening with mild surrounding inflammatory changes seen involving moderate  segment length of mid to distal descending colon and proximal sigmoid colon.  Findings are most suggestive for acute colitis.  Scattered colonic diverticula are seen, however length of colonic segment involvement is felt more likely to represent acute colitis and less likely to represent acute diverticulitis.  2. Moderate volume retained stool seen in the colon with moderate to large retained stool in the rectosigmoid colon with prominent rectal stool ball.  Correlate for constipation and possible fecal impaction.  3. Scattered colonic diverticulosis.  4. Cholelithiasis.  5. Postsurgical changes and additional findings as detailed above.      Electronically signed by: Kojo Patel MD  Date:    03/07/2025  Time:    21:36               Narrative:    EXAMINATION:  CT ABDOMEN PELVIS WITHOUT CONTRAST    CLINICAL HISTORY:  Nausea/vomiting;Abdominal pain, acute, nonlocalized;    TECHNIQUE:  Low dose axial images, sagittal and coronal reformations were obtained from the lung bases to the pubic symphysis.  Oral contrast was not administered.    COMPARISON:  None    FINDINGS:  The visualized portion of the heart is unremarkable.  Minimal bibasilar atelectatic changes are seen.  No evidence of focal consolidation or pleural effusion.    Two small hepatic hypodensities, probable cysts are seen near the junction of the right and left hepatic lobes.  There is no intra-or extrahepatic biliary ductal dilatation.  Two peripheral calcified stones are seen at the level of the gallbladder neck.  Small hiatal hernia is visualized.  Stomach is otherwise normal in appearance.  Pancreas, spleen, and adrenal glands are unremarkable.    Kidneys show no evidence of stones or hydronephrosis. No definite abnormalities or stones are seen along the ureteral courses.  Phleboliths are seen distally.  Urinary bladder is unremarkable.  Uterus has been removed.    Appendix is visualized and is unremarkable.  No evidence of bowel obstruction.   Abnormal wall thickening with surrounding inflammatory changes are seen involving moderate segment length of mid to distal descending and proximal sigmoid colon which is most consistent with acute colitis.  Scattered colonic diverticula are seen.  Moderate stool is seen throughout the colon.  Moderate to large volume retained stool is seen in the rectosigmoid colon with prominent rectal stool ball.  No free air or free fluid.    Aorta tapers normally.    No acute osseous abnormality identified.  Mild degenerative changes and facet arthropathy are seen in the lower lumbar spine.  Subcutaneous soft tissues show no significant abnormalities.                                       X-Ray Chest AP Portable (Final result)  Result time 03/07/25 20:11:59      Final result by Kojo Patel MD (03/07/25 20:11:59)                   Impression:      No acute cardiopulmonary process identified.      Electronically signed by: Kojo Patel MD  Date:    03/07/2025  Time:    20:11               Narrative:    EXAMINATION:  XR CHEST AP PORTABLE    CLINICAL HISTORY:  Sepsis;    TECHNIQUE:  Single frontal view of the chest was performed.    COMPARISON:  October 2021.    FINDINGS:  Cardiac silhouette is normal in size.  Left lung is well expanded.  There is prominent elevation of the right hemidiaphragm resulting in asymmetric volume loss on the right.  No evidence of focal consolidative process, pneumothorax, or significant pleural effusion.  No acute osseous abnormality identified.                                         Medications Given:     Medications   pantoprazole EC tablet 40 mg (40 mg Oral Given 3/8/25 1000)   pravastatin tablet 80 mg (80 mg Oral Given 3/8/25 1000)   QUEtiapine tablet 25 mg (25 mg Oral Given 3/8/25 1000)   sodium chloride 0.9% flush 10 mL (has no administration in time range)   melatonin tablet 6 mg (has no administration in time range)   ondansetron injection 4 mg (has no administration in time range)    prochlorperazine injection Soln 5 mg (has no administration in time range)   acetaminophen tablet 650 mg (has no administration in time range)   naloxone 0.4 mg/mL injection 0.02 mg (has no administration in time range)   lactated ringers infusion ( Intravenous Rate/Dose Change 3/8/25 0754)   heparin (porcine) injection 5,000 Units (5,000 Units Subcutaneous Not Given 3/8/25 1400)   senna-docusate 8.6-50 mg per tablet 1 tablet (1 tablet Oral Given 3/8/25 1000)   polyethylene glycol packet 17 g (17 g Oral Given 3/8/25 1000)   mupirocin 2 % ointment ( Nasal Not Given 3/8/25 0900)   sodium bicarbonate tablet 650 mg (650 mg Oral Not Given 3/8/25 0800)   vancomycin - pharmacy to dose (has no administration in time range)   ALPRAZolam tablet 0.25 mg (0.25 mg Oral Given 3/8/25 1059)   sodium chloride 0.9% bolus 2,199 mL 2,199 mL (0 mLs Intravenous Stopped 3/7/25 2001)   aztreonam injection 2,000 mg (2,000 mg Intravenous Given 3/8/25 0915)   metronidazole IVPB 500 mg (0 mg Intravenous Stopped 3/8/25 1020)   vancomycin 1750 mg in 0.9% sodium chloride 500 mL IVPB (0 mg Intravenous Stopped 3/7/25 2042)   fentaNYL 50 mcg/mL injection 50 mcg (50 mcg Intravenous Given 3/7/25 2129)   ondansetron injection 4 mg (4 mg Intravenous Given 3/7/25 2128)   acetaminophen tablet 650 mg (650 mg Oral Given 3/7/25 2214)   potassium chloride SA CR tablet 40 mEq (40 mEq Oral Given 3/8/25 0434)   vancomycin 750 mg in 0.9% NaCl 250 mL IVPB (admixture device) (0 mg Intravenous Stopped 3/8/25 1251)        Medical Decision Making:    Additional Consideration:   Additional testing considered during clinical course: none    Social determinants of health considered during development of treatment plan include: poor access to care    Current co-morbidities considered which impacted clinical decision making: dementia, HTN, anxiety, cholelithiasis, GERD    Case discussed with additional provider: Windy SQUIRES NP, contacted for admission and further  management    ED Course as of 03/08/25 1400   Fri Mar 07, 2025   1708 Resp(!): 22 [SS]   1708 Pulse: 71 [SS]   1708 Temp Source: Rectal [SS]   1708 Temp: 98.2 °F (36.8 °C) [SS]   1708 BP(!): 62/46  Hypotensive on arrival.   Initial vital signs concerning for SIRS/sepsis.  Sepsis order set utilized. Will obtain infectious evaluation including labs, UA, lactate, blood cultures, CXR.  IVF and broad-spectrum ABX initiated.  Will continue to monitor closely and reassess.     [SS]   1718 BP(!): 90/54  Blood pressure improving with IV fluids [SS]   1718 Point of care lactate 4.3.  Full 30 cc/kg fluid bolus ordered. [SS]   1718 Patient has penicillin allergy, broad-spectrum antibiotics initiated. [SS]   1727 ISTAT CREATININE(!)  Point of care creatinine 1.6, improved from 2.2 at prior visit [SS]   1734 CBC Without Differential(!)  Leukocytosis, chronic anemia but improved from prior [SS]   1756 Ammonia  WNL [SS]   1804 BP(!): 114/56  BP continues to improve with IVF [SS]   1848 Troponin I  Troponin normal [SS]   1848 Lipase  Normal [SS]   1848 Magnesium(!)  Slightly elevated [SS]   1848 Urinalysis Catheterized(!)  UA without infection [SS]   1848 Drug screen panel, in-house  UDS negative [SS]   1848 BNP  Normal [SS]   1848 Phosphorus  Normal [SS]   1848 Comprehensive metabolic panel(!)  Creatinine improving from prior, mildly acidotic, electrolytes otherwise unremarkable [SS]   1849 X-Ray Chest AP Portable  CXR independently interpreted: R hemidiaphragm elevation, suspect poor inspiratory effort. No focal infiltrate, effusion, edema, free air, or other acute process.  Agree with radiologist interpretation.    [SS]   1851 BP: 97/68  Blood pressure stable, IV fluids continued [SS]   1903 Repeat lactate 3.9, patient received 30 cc/kg of IV fluids and MAP remains 70.  Will continue to monitor blood pressure for possible pressor support if needed. [SS]   2022 Procalcitonin(!)  Mildly elevated  [SS]   2034 CT Abdomen Pelvis   Without Contrast  CT A/P independently interpreted: Distended gallbladder with large impacted gallstone. Acute descending and sigmoid colitis. Constipation with rectal fecal impaction. No high-grade bowel obstruction or free air.  Agree with radiologist interpretation.    [SS]   2034 Given CT findings, will add on formal GB US.  [SS]   2113 CT Head Without Contrast  CT head independently interpreted: no intracranial hemorrhage, mass effect, or midline shift.  Agree with radiologist interpretation.    [SS]   2121 BP(!): 107/57 [SS]   2121 MAP (mmHg): 76  Vitals stable, MAP > 75 [SS]   2209 Discussed with HM, who will admit for further management.  [SS]   2226 Lactic acid, plasma #2(!)  Lactate continuing to improve  [SS]      ED Course User Index  [SS] Quinn Mcdonald MD            Medical Decision Making  73 yo F with dementia, HTN, GERD, cholelithiasis presents to ED due to N/V starting earlier today. Recent ED visit for similar, found to have mild DA and discharged. Per family, symptoms improved but worsened again today. En route to ED, patient had seizure vs syncopal episode. On arrival to ED, patient profoundly hypotensive, 60/40. Sepsis evaluation initiated, and BP improved with 30 cc/kg fluids. Initial lactate 4.6 --> 3.9 on repeat after IVF. Broad-spectrum ABX given. CT head without acute process. Labs with leukocytosis. CXR clear. UA without UTI. CT A/P consistent with colitis, no perforation. BP remains stable, MAP 75-80. no pressors needed. Requesting admission for continued IV ABX for SIRS/sepsis, IVF for lactic acidosis, f/u cultures, symptom control.    Problems Addressed:  Acute colitis: acute illness or injury  Hypotension, unspecified hypotension type: complicated acute illness or injury with systemic symptoms that poses a threat to life or bodily functions  Lower abdominal pain: acute illness or injury  Nausea and vomiting, unspecified vomiting type: acute illness or injury  Screening for  "cardiovascular condition: acute illness or injury  Seizure: acute illness or injury  Sepsis: complicated acute illness or injury with systemic symptoms that poses a threat to life or bodily functions  Sepsis, due to unspecified organism, unspecified whether acute organ dysfunction present: complicated acute illness or injury with systemic symptoms that poses a threat to life or bodily functions    Amount and/or Complexity of Data Reviewed  Independent Historian: caregiver  External Data Reviewed: notes.  Labs: ordered. Decision-making details documented in ED Course.  Radiology: ordered and independent interpretation performed. Decision-making details documented in ED Course.  ECG/medicine tests: ordered and independent interpretation performed. Decision-making details documented in ED Course.    Risk  OTC drugs.  Prescription drug management.  Decision regarding hospitalization.    Critical Care  Total time providing critical care: 90 minutes      This patient does have evidence of infective focus  My overall impression is sepsis.  Source: Abdominal  Antibiotics given-   Antibiotics (72h ago, onward)      Start     Stop Route Frequency Ordered    03/08/25 0938  vancomycin - pharmacy to dose  (vancomycin IVPB (PEDS and ADULTS))        Placed in "And" Linked Group    -- IV pharmacy to manage frequency 03/08/25 0838    03/08/25 0900  mupirocin 2 % ointment         03/13/25 0859 Nasl 2 times daily 03/08/25 0552          Latest lactate reviewed-  Recent Labs   Lab 03/07/25  1901 03/07/25  2152 03/08/25  0209   LACTATE  --    < > 3.1*   POCLAC 3.98*  --   --     < > = values in this interval not displayed.     Organ dysfunction indicated by  lactic acidosis    Fluid challenge Actual Body weight- Patient will receive 30ml/kg actual body weight to calculate fluid bolus for treatment of septic shock.     Post- resuscitation assessment Yes - I attest a sepsis perfusion exam was performed within 6 hours of sepsis, severe " sepsis, or septic shock presentation, following fluid resuscitation.      Will Not start Pressors- Levophed for MAP of 65  Source control achieved by: IV ABX        Clinical Impression:       ICD-10-CM ICD-9-CM   1. Sepsis, due to unspecified organism, unspecified whether acute organ dysfunction present  A41.9 038.9     995.91   2. Seizure  R56.9 780.39   3. Screening for cardiovascular condition  Z13.6 V81.2   4. Sepsis  A41.9 038.9     995.91   5. Acute colitis  K52.9 558.9   6. Nausea and vomiting, unspecified vomiting type  R11.2 787.01   7. Lower abdominal pain  R10.30 789.09   8. Hypotension, unspecified hypotension type  I95.9 458.9   9. Chest pain  R07.9 786.50   10. Lactic acidosis  E87.20 276.2         Follow-up Information    None          ED Disposition Condition    Observation Stable              On re-evaluation, the patient's status has remained serious.  At this time, I believe the patient should be admitted to the hospital for further evaluation and management.  The consulting physician/team agrees with plan and will admit under their service.   The patient and family were updated with test results, overall impression, and further plan of care. All questions were answered.       Quinn Mcdonald MD  03/08/25 8437

## 2025-03-07 NOTE — PLAN OF CARE-OVED
[facility] Emergency Department Plan of Care Note  Referral Source: Nurse On-Call                               Chief Complaint   Patient presents with    Abdominal Pain     75 yo female with PMhx of HTN, anxiety, s/p hysterectomy, calls nursing hotline for RLQ abdominal pain, N/V since eating chicken at 11 am. +weakness.     Still has appendix.     Recommendation: Emergency Department            Emergency Department: [facility]             To ED for RLQ abdominal pain, vomiting     Encounter Diagnosis   Name Primary?    RLQ abdominal pain Yes

## 2025-03-07 NOTE — TELEPHONE ENCOUNTER
Soila Calles's daughter reports Soila has vomiting since 1100 today after eating chicken this morning. +RLQ abdominal pain +weakness. Per triage protocol, go to ED/UC now (or to office with PCP approval). Referred to Kristyn. Advised Marli per Kristyn Serna OCP - recommends pt be seen at nearest ED now for physician eval of RLQ abdominal pain. Marli v/u.     Reason for Disposition   SEVERE vomiting (e.g., 6 or more times/day)  (Exception: Patient sounds well, is drinking liquids, does not sound dehydrated, and vomiting has lasted less than 24 hours.)    Additional Information   Negative: Shock suspected (e.g., cold/pale/clammy skin, too weak to stand, low BP, rapid pulse)   Negative: Difficult to awaken or acting confused (e.g., disoriented, slurred speech)   Negative: Sounds like a life-threatening emergency to the triager   Negative: Vomiting occurs only while coughing   Negative: Chest pain   Negative: Headache is main symptom   Negative: Vomiting red blood or black (coffee ground) material   Negative: Vomiting and hernia is more painful or swollen than usual   Negative: Recent head injury (within 3 days)   Negative: Recent abdominal injury (within 7 days)   Negative: Insulin-dependent diabetes and glucose > 240 mg/dL (13 mmol/L)   Negative: Severe pain in one eye    Protocols used: Vomiting-A-OH

## 2025-03-07 NOTE — PROGRESS NOTES
"Patient's daughter called the OOC RN on 3/7/25 for c/o "vomiting since 1100 today after eating chicken this morning. +RLQ abdominal pain +weakness ". OOC RN consult Kristyn.   Kristyn Provider Dr Phipps disposition recommendation pt to go to ED.  Patient currently at [facility] ED.  Follow up call scheduled for 3/8/25.      "

## 2025-03-08 ENCOUNTER — PATIENT OUTREACH (OUTPATIENT)
Facility: OTHER | Age: 75
End: 2025-03-08
Payer: MEDICARE

## 2025-03-08 ENCOUNTER — DOCUMENTATION ONLY (OUTPATIENT)
Dept: NEUROLOGY | Facility: CLINIC | Age: 75
End: 2025-03-08
Payer: MEDICARE

## 2025-03-08 PROBLEM — A41.9 SEPSIS: Status: ACTIVE | Noted: 2025-03-08

## 2025-03-08 PROBLEM — N17.9 AKI (ACUTE KIDNEY INJURY): Status: RESOLVED | Noted: 2025-03-08 | Resolved: 2025-03-08

## 2025-03-08 PROBLEM — E87.20 LACTIC ACIDOSIS: Status: ACTIVE | Noted: 2025-03-08

## 2025-03-08 PROBLEM — R56.9 SEIZURE-LIKE ACTIVITY: Status: ACTIVE | Noted: 2025-03-08

## 2025-03-08 PROBLEM — A09 INFECTIOUS COLITIS: Status: ACTIVE | Noted: 2025-03-08

## 2025-03-08 PROBLEM — E87.20 METABOLIC ACIDOSIS: Status: ACTIVE | Noted: 2025-03-08

## 2025-03-08 PROBLEM — N17.9 AKI (ACUTE KIDNEY INJURY): Status: ACTIVE | Noted: 2025-03-08

## 2025-03-08 LAB
ALBUMIN SERPL BCP-MCNC: 3.3 G/DL (ref 3.5–5.2)
ALP SERPL-CCNC: 51 U/L (ref 40–150)
ALT SERPL W/O P-5'-P-CCNC: 16 U/L (ref 10–44)
ANION GAP SERPL CALC-SCNC: 11 MMOL/L (ref 8–16)
ANISOCYTOSIS BLD QL SMEAR: SLIGHT
AST SERPL-CCNC: 25 U/L (ref 10–40)
AV INDEX (PROSTH): 0.88
AV MEAN GRADIENT: 6 MMHG
AV PEAK GRADIENT: 12 MMHG
AV VALVE AREA BY VELOCITY RATIO: 2.1 CM²
AV VALVE AREA: 2 CM²
AV VELOCITY RATIO: 0.94
BASOPHILS # BLD AUTO: ABNORMAL K/UL (ref 0–0.2)
BASOPHILS NFR BLD: 0 % (ref 0–1.9)
BILIRUB SERPL-MCNC: 0.4 MG/DL (ref 0.1–1)
BSA FOR ECHO PROCEDURE: 1.85 M2
BUN SERPL-MCNC: 24 MG/DL (ref 8–23)
CALCIUM SERPL-MCNC: 8.5 MG/DL (ref 8.7–10.5)
CHLORIDE SERPL-SCNC: 113 MMOL/L (ref 95–110)
CO2 SERPL-SCNC: 16 MMOL/L (ref 23–29)
CREAT SERPL-MCNC: 1.3 MG/DL (ref 0.5–1.4)
CV ECHO LV RWT: 0.47 CM
DIFFERENTIAL METHOD BLD: ABNORMAL
DOP CALC AO PEAK VEL: 1.7 M/S
DOP CALC AO VTI: 32.4 CM
DOP CALC LVOT AREA: 2.3 CM2
DOP CALC LVOT DIAMETER: 1.7 CM
DOP CALC LVOT PEAK VEL: 1.6 M/S
DOP CALC LVOT STROKE VOLUME: 64.7 CM3
DOP CALCLVOT PEAK VEL VTI: 28.5 CM
E WAVE DECELERATION TIME: 140 MSEC
E/A RATIO: 1.06
E/E' RATIO: 6 M/S
ECHO LV POSTERIOR WALL: 1 CM (ref 0.6–1.1)
EJECTION FRACTION: 65 %
EOSINOPHIL # BLD AUTO: ABNORMAL K/UL (ref 0–0.5)
EOSINOPHIL NFR BLD: 0 % (ref 0–8)
ERYTHROCYTE [DISTWIDTH] IN BLOOD BY AUTOMATED COUNT: 14.8 % (ref 11.5–14.5)
EST. GFR  (NO RACE VARIABLE): 43 ML/MIN/1.73 M^2
FRACTIONAL SHORTENING: 34.9 % (ref 28–44)
GLOBAL LONGITUIDAL STRAIN: 16.5 %
GLUCOSE SERPL-MCNC: 131 MG/DL (ref 70–110)
HCT VFR BLD AUTO: 34 % (ref 37–48.5)
HGB BLD-MCNC: 10.6 G/DL (ref 12–16)
IMM GRANULOCYTES # BLD AUTO: ABNORMAL K/UL (ref 0–0.04)
IMM GRANULOCYTES NFR BLD AUTO: ABNORMAL % (ref 0–0.5)
INTERVENTRICULAR SEPTUM: 1 CM (ref 0.6–1.1)
IVC DIAMETER: 1.56 CM
IVRT: 83 MSEC
LA MAJOR: 4.5 CM
LA MINOR: 3.6 CM
LA WIDTH: 3 CM
LACTATE SERPL-SCNC: 2.1 MMOL/L (ref 0.5–2.2)
LACTATE SERPL-SCNC: 3.1 MMOL/L (ref 0.5–2.2)
LEFT ATRIUM SIZE: 3.4 CM
LEFT ATRIUM VOLUME INDEX: 19 ML/M2
LEFT ATRIUM VOLUME: 35 CM3
LEFT INTERNAL DIMENSION IN SYSTOLE: 2.8 CM (ref 2.1–4)
LEFT VENTRICLE DIASTOLIC VOLUME INDEX: 46.15 ML/M2
LEFT VENTRICLE DIASTOLIC VOLUME: 84 ML
LEFT VENTRICLE MASS INDEX: 78.3 G/M2
LEFT VENTRICLE SYSTOLIC VOLUME INDEX: 15.9 ML/M2
LEFT VENTRICLE SYSTOLIC VOLUME: 29 ML
LEFT VENTRICULAR INTERNAL DIMENSION IN DIASTOLE: 4.3 CM (ref 3.5–6)
LEFT VENTRICULAR MASS: 142.5 G
LV LATERAL E/E' RATIO: 6.1 M/S
LV SEPTAL E/E' RATIO: 6.1 M/S
LVED V (TEICH): 84.11 ML
LVES V (TEICH): 28.77 ML
LVOT MG: 5.12 MMHG
LVOT MV: 1.06 CM/S
LYMPHOCYTES # BLD AUTO: ABNORMAL K/UL (ref 1–4.8)
LYMPHOCYTES NFR BLD: 5 % (ref 18–48)
MAGNESIUM SERPL-MCNC: 2.3 MG/DL (ref 1.6–2.6)
MCH RBC QN AUTO: 27.2 PG (ref 27–31)
MCHC RBC AUTO-ENTMCNC: 31.2 G/DL (ref 32–36)
MCV RBC AUTO: 87 FL (ref 82–98)
METAMYELOCYTES NFR BLD MANUAL: 1 %
MONOCYTES # BLD AUTO: ABNORMAL K/UL (ref 0.3–1)
MONOCYTES NFR BLD: 5 % (ref 4–15)
MV PEAK A VEL: 0.52 M/S
MV PEAK E VEL: 0.55 M/S
MV STENOSIS PRESSURE HALF TIME: 40.53 MS
MV VALVE AREA P 1/2 METHOD: 5.43 CM2
NEUTROPHILS NFR BLD: 78 % (ref 38–73)
NEUTS BAND NFR BLD MANUAL: 11 %
NRBC BLD-RTO: 0 /100 WBC
OHS QRS DURATION: 68 MS
OHS QRS DURATION: 72 MS
OHS QTC CALCULATION: 408 MS
OHS QTC CALCULATION: 456 MS
OVALOCYTES BLD QL SMEAR: ABNORMAL
PHOSPHATE SERPL-MCNC: 2.8 MG/DL (ref 2.7–4.5)
PISA MRMAX VEL: 5.14 M/S
PISA TR MAX VEL: 2.6 M/S
PLATELET # BLD AUTO: 185 K/UL (ref 150–450)
PLATELET BLD QL SMEAR: ABNORMAL
PMV BLD AUTO: 10.5 FL (ref 9.2–12.9)
POIKILOCYTOSIS BLD QL SMEAR: SLIGHT
POTASSIUM SERPL-SCNC: 3.4 MMOL/L (ref 3.5–5.1)
PROT SERPL-MCNC: 6.6 G/DL (ref 6–8.4)
PULM VEIN S/D RATIO: 1.67
PV PEAK D VEL: 0.36 M/S
PV PEAK GRADIENT: 3 MMHG
PV PEAK S VEL: 0.6 M/S
PV PEAK VELOCITY: 0.87 M/S
RA MAJOR: 4.08 CM
RA WIDTH: 3.5 CM
RBC # BLD AUTO: 3.89 M/UL (ref 4–5.4)
SODIUM SERPL-SCNC: 140 MMOL/L (ref 136–145)
TDI LATERAL: 0.09 M/S
TDI SEPTAL: 0.09 M/S
TDI: 0.09 M/S
TR MAX PG: 28 MMHG
VANCOMYCIN SERPL-MCNC: 17.9 UG/ML
WBC # BLD AUTO: 15.76 K/UL (ref 3.9–12.7)
Z-SCORE OF LEFT VENTRICULAR DIMENSION IN END DIASTOLE: -1.59
Z-SCORE OF LEFT VENTRICULAR DIMENSION IN END SYSTOLE: -0.84

## 2025-03-08 PROCEDURE — 85027 COMPLETE CBC AUTOMATED: CPT | Performed by: NURSE PRACTITIONER

## 2025-03-08 PROCEDURE — 80053 COMPREHEN METABOLIC PANEL: CPT | Performed by: NURSE PRACTITIONER

## 2025-03-08 PROCEDURE — 36415 COLL VENOUS BLD VENIPUNCTURE: CPT | Performed by: HOSPITALIST

## 2025-03-08 PROCEDURE — 80202 ASSAY OF VANCOMYCIN: CPT | Performed by: HOSPITALIST

## 2025-03-08 PROCEDURE — 20000000 HC ICU ROOM

## 2025-03-08 PROCEDURE — 25000003 PHARM REV CODE 250: Performed by: NURSE PRACTITIONER

## 2025-03-08 PROCEDURE — 63600175 PHARM REV CODE 636 W HCPCS: Performed by: EMERGENCY MEDICINE

## 2025-03-08 PROCEDURE — 63600175 PHARM REV CODE 636 W HCPCS: Performed by: HOSPITALIST

## 2025-03-08 PROCEDURE — 94761 N-INVAS EAR/PLS OXIMETRY MLT: CPT

## 2025-03-08 PROCEDURE — 25000003 PHARM REV CODE 250: Performed by: HOSPITALIST

## 2025-03-08 PROCEDURE — 95816 EEG AWAKE AND DROWSY: CPT | Mod: 26,,, | Performed by: PSYCHIATRY & NEUROLOGY

## 2025-03-08 PROCEDURE — 83735 ASSAY OF MAGNESIUM: CPT | Performed by: NURSE PRACTITIONER

## 2025-03-08 PROCEDURE — 83605 ASSAY OF LACTIC ACID: CPT | Mod: 91 | Performed by: HOSPITALIST

## 2025-03-08 PROCEDURE — 99447 NTRPROF PH1/NTRNET/EHR 11-20: CPT | Mod: ,,, | Performed by: STUDENT IN AN ORGANIZED HEALTH CARE EDUCATION/TRAINING PROGRAM

## 2025-03-08 PROCEDURE — 85007 BL SMEAR W/DIFF WBC COUNT: CPT | Performed by: NURSE PRACTITIONER

## 2025-03-08 PROCEDURE — 36415 COLL VENOUS BLD VENIPUNCTURE: CPT | Performed by: NURSE PRACTITIONER

## 2025-03-08 PROCEDURE — 84100 ASSAY OF PHOSPHORUS: CPT | Performed by: NURSE PRACTITIONER

## 2025-03-08 PROCEDURE — 83605 ASSAY OF LACTIC ACID: CPT | Performed by: NURSE PRACTITIONER

## 2025-03-08 PROCEDURE — 95816 EEG AWAKE AND DROWSY: CPT

## 2025-03-08 PROCEDURE — 63600175 PHARM REV CODE 636 W HCPCS: Performed by: NURSE PRACTITIONER

## 2025-03-08 RX ORDER — MUPIROCIN 20 MG/G
OINTMENT TOPICAL 2 TIMES DAILY
Status: DISPENSED | OUTPATIENT
Start: 2025-03-08 | End: 2025-03-13

## 2025-03-08 RX ORDER — POLYETHYLENE GLYCOL 3350 17 G/17G
17 POWDER, FOR SOLUTION ORAL DAILY
Status: DISCONTINUED | OUTPATIENT
Start: 2025-03-08 | End: 2025-03-10

## 2025-03-08 RX ORDER — AMOXICILLIN 250 MG
1 CAPSULE ORAL 2 TIMES DAILY
Status: DISCONTINUED | OUTPATIENT
Start: 2025-03-08 | End: 2025-03-13 | Stop reason: HOSPADM

## 2025-03-08 RX ORDER — SODIUM BICARBONATE 650 MG/1
650 TABLET ORAL 2 TIMES DAILY
Status: DISCONTINUED | OUTPATIENT
Start: 2025-03-08 | End: 2025-03-13 | Stop reason: HOSPADM

## 2025-03-08 RX ORDER — POTASSIUM CHLORIDE 20 MEQ/1
40 TABLET, EXTENDED RELEASE ORAL ONCE
Status: COMPLETED | OUTPATIENT
Start: 2025-03-08 | End: 2025-03-08

## 2025-03-08 RX ORDER — ALPRAZOLAM 0.25 MG/1
0.25 TABLET ORAL 2 TIMES DAILY PRN
Status: DISCONTINUED | OUTPATIENT
Start: 2025-03-08 | End: 2025-03-12

## 2025-03-08 RX ADMIN — QUETIAPINE FUMARATE 25 MG: 25 TABLET ORAL at 01:03

## 2025-03-08 RX ADMIN — MEMANTINE 10 MG: 10 TABLET ORAL at 01:03

## 2025-03-08 RX ADMIN — VANCOMYCIN HYDROCHLORIDE 750 MG: 750 INJECTION, POWDER, LYOPHILIZED, FOR SOLUTION INTRAVENOUS at 11:03

## 2025-03-08 RX ADMIN — AZTREONAM 2000 MG: 2 INJECTION, POWDER, LYOPHILIZED, FOR SOLUTION INTRAMUSCULAR; INTRAVENOUS at 01:03

## 2025-03-08 RX ADMIN — METRONIDAZOLE 500 MG: 500 INJECTION, SOLUTION INTRAVENOUS at 09:03

## 2025-03-08 RX ADMIN — MUPIROCIN: 20 OINTMENT TOPICAL at 08:03

## 2025-03-08 RX ADMIN — PRAVASTATIN SODIUM 80 MG: 20 TABLET ORAL at 10:03

## 2025-03-08 RX ADMIN — HEPARIN SODIUM 5000 UNITS: 5000 INJECTION INTRAVENOUS; SUBCUTANEOUS at 10:03

## 2025-03-08 RX ADMIN — METRONIDAZOLE 500 MG: 500 INJECTION, SOLUTION INTRAVENOUS at 01:03

## 2025-03-08 RX ADMIN — POLYETHYLENE GLYCOL 3350 17 G: 17 POWDER, FOR SOLUTION ORAL at 10:03

## 2025-03-08 RX ADMIN — QUETIAPINE FUMARATE 25 MG: 25 TABLET ORAL at 10:03

## 2025-03-08 RX ADMIN — PANTOPRAZOLE SODIUM 40 MG: 40 TABLET, DELAYED RELEASE ORAL at 10:03

## 2025-03-08 RX ADMIN — ALPRAZOLAM 0.25 MG: 0.25 TABLET ORAL at 10:03

## 2025-03-08 RX ADMIN — SODIUM BICARBONATE 650 MG: 650 TABLET ORAL at 08:03

## 2025-03-08 RX ADMIN — ACETAMINOPHEN 650 MG: 325 TABLET, FILM COATED ORAL at 08:03

## 2025-03-08 RX ADMIN — POTASSIUM CHLORIDE 40 MEQ: 1500 TABLET, EXTENDED RELEASE ORAL at 04:03

## 2025-03-08 RX ADMIN — QUETIAPINE FUMARATE 25 MG: 25 TABLET ORAL at 08:03

## 2025-03-08 RX ADMIN — SODIUM CHLORIDE, POTASSIUM CHLORIDE, SODIUM LACTATE AND CALCIUM CHLORIDE: 600; 310; 30; 20 INJECTION, SOLUTION INTRAVENOUS at 01:03

## 2025-03-08 RX ADMIN — AZTREONAM 2000 MG: 2 INJECTION, POWDER, LYOPHILIZED, FOR SOLUTION INTRAMUSCULAR; INTRAVENOUS at 09:03

## 2025-03-08 RX ADMIN — SENNOSIDES AND DOCUSATE SODIUM 1 TABLET: 50; 8.6 TABLET ORAL at 10:03

## 2025-03-08 RX ADMIN — SENNOSIDES AND DOCUSATE SODIUM 1 TABLET: 50; 8.6 TABLET ORAL at 08:03

## 2025-03-08 RX ADMIN — DONEPEZIL HYDROCHLORIDE 10 MG: 5 TABLET, FILM COATED ORAL at 01:03

## 2025-03-08 RX ADMIN — HEPARIN SODIUM 5000 UNITS: 5000 INJECTION INTRAVENOUS; SUBCUTANEOUS at 05:03

## 2025-03-08 NOTE — EICU
Brief Note     74 yr old female with dementia. Recent ER visit for hypotension and DA presented with vomiting / seizure-like activity     Acute Colitis   Lactic acidosis   Acute metabolic acidosis   Seizure   Sepsis   Significant stool impaction       On Aztreonam/ flagyl/ vanc   Follow blood c/s   IVF ongoing   Monitor seizure activity   EEG   Follow I/o   Repeat vbg   Patient is having bm, hold disimpaction for now     Hemodynamically stable

## 2025-03-08 NOTE — PLAN OF CARE
Case Management Assessment     PCP: [facility]   Pharmacy: Bedside     Patient Arrived From: Home   Existing Help at Home: Family     Barriers to Discharge: None     Discharge Plan:    A. Home with family & Home Health    B.  Home with family & Home Health     [facility] ([place])  Initial Discharge Assessment       Primary Care Provider: Parminder Junior MD    Admission Diagnosis: Seizure [R56.9]  Screening for cardiovascular condition [Z13.6]  Lower abdominal pain [R10.30]  Acute colitis [K52.9]  Sepsis [A41.9]  Hypotension, unspecified hypotension type [I95.9]  Sepsis, due to unspecified organism, unspecified whether acute organ dysfunction present [A41.9]  Nausea and vomiting, unspecified vomiting type [R11.2]    Admission Date: 3/7/2025  Expected Discharge Date: 3/9/2025    Transition of Care Barriers: None    Payor: [employer] / Plan: [employer] / Product Type: Medicare Advantage /     Extended Emergency Contact Information  Primary Emergency Contact: Mrali Chávez  Address: [address] [place] [address] [place]  Mobile Phone: [phone]  Relation: Daughter  Secondary Emergency Contact: Daniela Chávez   [facility]  Home Phone: [phone]  Work Phone: [phone]  Mobile Phone: [phone]  Relation: Daughter    Discharge Plan A: Home with family, Home Health  Discharge Plan B: Home with family, Home Health      [facility] [address] [place] [address]  Phone: [phone] Fax: [phone]    [facility] [place], [place] - [address]  [address]  [facility] [address]  Phone: [phone] Fax: [phone]      Initial Assessment (most recent)       Adult Discharge Assessment - 03/08/25 1555          Discharge Assessment    Assessment Type Discharge Planning Assessment     Confirmed/corrected address, phone number and insurance  Yes     Confirmed Demographics Correct on Facesheet     Source of Information family;health record     People in Home child(bryant), adult     Do you expect to return to your current living situation? Yes     Do you have help at home or someone to help you manage your care at home? Yes     Who are your caregiver(s) and their phone number(s)? family     Prior to hospitilization cognitive status: Not Oriented to Time     Current cognitive status: Not Oriented to Time     Walking or Climbing Stairs Difficulty no     Dressing/Bathing Difficulty no     Equipment Currently Used at Home none     Readmission within 30 days? No     Patient currently being followed by outpatient case management? No     Do you currently have service(s) that help you manage your care at home? No     Do you take prescription medications? Yes     Do you have prescription coverage? Yes     Do you have any problems affording any of your prescribed medications? No     Is the patient taking medications as prescribed? yes     How do you get to doctors appointments? family or friend will provide     Are you on dialysis? No     Do you take coumadin? No     Discharge Plan A Home with family;Home Health     Discharge Plan B Home with family;Home Health     DME Needed Upon Discharge  none     Discharge Plan discussed with: Adult children     Transition of Care Barriers None        Physical Activity    On average, how many days per week do you engage in moderate to strenuous exercise (like a brisk walk)? 0 days     On average, how many minutes do you engage in exercise at this level? 0 min        Financial Resource Strain    How hard is it for you to pay for the very basics like food, housing, medical care, and heating? Not hard at all        Housing Stability    In the last 12 months, was there a time when you were not able to pay the mortgage or rent on time? No     At any time in the past 12 months, were you homeless or living in a shelter (including now)?  No        Transportation Needs    Has the lack of transportation kept you from medical appointments, meetings, work or from getting things needed for daily living? No        Food Insecurity    Within the past 12 months, you worried that your food would run out before you got the money to buy more. Never true     Within the past 12 months, the food you bought just didn't last and you didn't have money to get more. Never true        Stress    Do you feel stress - tense, restless, nervous, or anxious, or unable to sleep at night because your mind is troubled all the time - these days? Not at all        Social Isolation    How often do you feel lonely or isolated from those around you?  Never        Alcohol Use    Q1: How often do you have a drink containing alcohol? Never     Q2: How many drinks containing alcohol do you have on a typical day when you are drinking? Patient does not drink     Q3: How often do you have six or more drinks on one occasion? Never        Utilities    In the past 12 months has the electric, gas, oil, or water company threatened to shut off services in your home? No        Health Literacy    How often do you need to have someone help you when you read instructions, pamphlets, or other written material from your doctor or pharmacy? Never

## 2025-03-08 NOTE — TELEMEDICINE CONSULT
[facility]  Interprofessional Consult Note      Consult Information  Inpatient consult to Neurology Services (Vascular Neurology)  Consult performed by: Cleveland Yoon MD  Consult ordered by: Nathalia Barnett NP  Reason for consult: Syncopal event  Assessment/Recommendations: Based on the current clinical scenario, I am more suspicious of provoked etiology/convulsive syncope secondary to hypovolemia an underlying infectious/metabolic derangements.  Although, at times epileptogenic etiology may coexist and attempts exacerbated by underlying systemic causes.  Her age appears to be old to her new onset epileptic seizures.    -routine EEG to assess any epileptiform abnormalities in the background.  If positive for epileptiform abnormalities, reach back to neurology to start antiseizure medication management. I am not leaning to start management with antiseizure medications at this point without further investigation.  -continue to correct underlying infectious/metabolic derangements  -Seizure Precautions: Pt must refrain from driving for 6 months after having a seizure before can legally resume driving.  Physician team not required to report pt to DMV.  Inform pt that I would document this conversation in the medical record.  Additionally, advise pt to avoid bathing alone, working in high places, and to not supervise children swimming alone or engage in other activities where losing consciousness or motor control would risk harm to self or others.  -AVOID any substance that could lower seizure threshold including but not limited to: ALCOHOL AND WITHDRAWAL, TRAMADOL, MEPERIDINE (DEMEROL), ALL STIMULANTS-ALL ADHD MEDICATIONS, CLOZAPINE, BUPROPION (WELLBUTRIN), CIPROFLOXACIN, CYCLOSPORINE, METOCLOPRAMIDE (REGLAN), TETRAHYDROCANNABINOL (THC), KRATOM               Consulting Provider:    Current Providers  No providers found    Patient Location:  [facility] ICU IP Unit    Spoke hospital nurse at  "bedside with patient assisting consultant.  Patient information was obtained from primary team.       Neurology Documentation       Blood pressure (!) 147/62, pulse 72, temperature 98.5 °F (36.9 °C), temperature source Axillary, resp. rate 16, height 5' 5" (1.651 m), weight 74.4 kg (164 lb 0.4 oz), SpO2 97%.    Medical Decision Making  HPI:  74 y.o. female 74 year old female with a past medical history of HTN, asymptomatic bradycardia, aortic atherosclerosis, dementia, HTN, chronic back pain, anxiety and GERD who presents with nausea and vomiting that started yesterday afternoon after she ate chicken.  Daughter called PCP who recommended she should be evaluated in the ER and daughter called EMS.  Per ED, patient had a syncopal event on the way to the hospital.  In the ED, patient was hypotensive with leukocytosis and creatinine being 1.6 CT abdomen reveal acute colitis and constipation.  She was diagnosed with sepsis secondary to infectious colitis with hypotension, acute kidney injury and lactic acidosis.  Neurology was consulted for seizure/syncope like event in route to the hospital.  Patient takes memantine and donepezil for dementia.     Images personally reviewed and interpreted:  Study: Head CT without contrast  Study Interpretation:  No acute intracranial abnormalities    Additional studies reviewed:   Study: Cardiac_Neuro: 2D echo  Study Interpretation:  Ordered and pending    Laboratory studies reviewed:  BMP:   Lab Results   Component Value Date     03/08/2025    K 3.4 (L) 03/08/2025     (H) 03/08/2025    CO2 16 (L) 03/08/2025    BUN 24 (H) 03/08/2025    CREATININE 1.3 03/08/2025    CALCIUM 8.5 (L) 03/08/2025     CBC:   Lab Results   Component Value Date    WBC 15.76 (H) 03/08/2025    RBC 3.89 (L) 03/08/2025    HGB 10.6 (L) 03/08/2025    HCT 34.0 (L) 03/08/2025    HCT 37 03/07/2025     03/08/2025    MCV 87 03/08/2025    MCH 27.2 03/08/2025    MCHC 31.2 (L) 03/08/2025     Lipid Panel: " "  Lab Results   Component Value Date    CHOL 129 02/08/2025    LDLCALC 78.6 02/08/2025    HDL 35 (L) 02/08/2025    TRIG 77 02/08/2025     Coagulation: No results found for: "PT", "INR", "APTT"  Hgb A1C:   Lab Results   Component Value Date    HGBA1C 5.5 01/29/2025     TSH:   Lab Results   Component Value Date    TSH 1.500 01/25/2023     ISTAT Lactate         Component  Ref Range & Units (hover) 1 d ago  (3/7/25) 1 d ago  (3/7/25) 1 d ago  (3/7/25) 1 d ago  (3/7/25)   POC Lactate 3.98 High Panic  4.24 High Panic             Documentation personally reviewed:  Notes: Notes: H&P     Post charge discharge plan:  Clinic follow up: General Neurology    Visit Type: in person or virtual  Timeframe: 4 weeks        Additional Physical Exam, History, & ROS  ROS  Physical Exam  Past Medical History:   Diagnosis Date    Allergy     Anxiety     Cataract     Colon polyps 2010    Hearing loss     Hypertension     Joint pain     Mental disorder     Seizure-like activity 3/8/2025     Past Surgical History:   Procedure Laterality Date    CATARACT EXTRACTION W/  INTRAOCULAR LENS IMPLANT Right 3/12/2024    Procedure: EXTRACTION, CATARACT, WITH IOL INSERTION;  Surgeon: Jax Garza MD;  Location: [facility];  Service: Ophthalmology;  Laterality: Right;    COLONOSCOPY N/A 3/5/2020    Procedure: COLONOSCOPY;  Surgeon: Nathalia Kemp MD;  [address]  Service: Colon and Rectal;  Laterality: N/A;  requests later appt time if available - Pt open to any MD- Pt informed arrival time may be adjusted, Pt verbalized understanding- ERW2/24/20@1409    ESOPHAGOGASTRODUODENOSCOPY N/A 10/1/2020    Procedure: EGD (ESOPHAGOGASTRODUODENOSCOPY);  Surgeon: Casey Guerrero MD;  [facility]  Service: Endoscopy;  Laterality: N/A;  covid test 9/28-[facility]    ESOPHAGOGASTRODUODENOSCOPY N/A 10/29/2021    Procedure: EGD (ESOPHAGOGASTRODUODENOSCOPY);  Surgeon: Billy Vegas MD;  Location: [facility];  Service: " Endoscopy;  Laterality: N/A;    HYSTERECTOMY      PERIPARTUM CHITO     Family History   Problem Relation Name Age of Onset    Breast cancer Sister Ingris     Colon cancer Neg Hx      Ovarian cancer Neg Hx      Melanoma Neg Hx         Diagnoses  No problems updated.    Cleveland Yoon MD    Neurology consultation requested by spoke provider. Audiovisual encounter with the patient performed using a secure connection.  Results and impressions from the visit are documented on this note and were communicated to the consulting provider/team via direct communication. The note has been shared for addition to the patients electronic medical record.

## 2025-03-08 NOTE — SUBJECTIVE & OBJECTIVE
Past Medical History:   Diagnosis Date    Allergy     Anxiety     Cataract     Colon polyps 2010    Hearing loss     Hypertension     Joint pain     Mental disorder        Past Surgical History:   Procedure Laterality Date    CATARACT EXTRACTION W/  INTRAOCULAR LENS IMPLANT Right 3/12/2024    Procedure: EXTRACTION, CATARACT, WITH IOL INSERTION;  Surgeon: Jax Garza MD;  Location: On license of [facility] OR;  Service: Ophthalmology;  Laterality: Right;    COLONOSCOPY N/A 3/5/2020    Procedure: COLONOSCOPY;  Surgeon: Nathalia Kemp MD;  Location: [facility] (4TH FLR);  Service: Colon and Rectal;  Laterality: N/A;  requests later appt time if available - Pt open to any MD- Pt informed arrival time may be adjusted, Pt verbalized understanding- ERW2/24/20@1409    ESOPHAGOGASTRODUODENOSCOPY N/A 10/1/2020    Procedure: EGD (ESOPHAGOGASTRODUODENOSCOPY);  Surgeon: Casey Guerrero MD;  [facility]  Service: Endoscopy;  Laterality: N/A;  covid test 9/28-[facility]    ESOPHAGOGASTRODUODENOSCOPY N/A 10/29/2021    Procedure: EGD (ESOPHAGOGASTRODUODENOSCOPY);  Surgeon: Billy Vegas MD;  Location: [facility];  Service: Endoscopy;  Laterality: N/A;    HYSTERECTOMY      PERIPARTUM CHITO       Review of patient's allergies indicates:   Allergen Reactions    Lotensin [benazepril] Swelling    Penicillins Rash       No current facility-administered medications on file prior to encounter.     Current Outpatient Medications on File Prior to Encounter   Medication Sig    ALPRAZolam (XANAX) 0.5 MG tablet Take 0.5 mg by mouth 2 (two) times daily as needed for Anxiety.    aspirin (ECOTRIN) 81 MG EC tablet Take 81 mg by mouth nightly.     cetirizine (ZYRTEC) 10 MG tablet Take 1 tablet (10 mg total) by mouth daily as needed for Allergies.    donepeziL (ARICEPT) 10 MG tablet Take 1 tablet (10 mg total) by mouth every evening.    meloxicam (MOBIC) 15 MG tablet Take 1 tablet (15 mg total) by mouth once daily.    memantine (NAMENDA) 10  MG Tab Take 1 tablet (10 mg total) by mouth 2 (two) times daily.    mirtazapine (REMERON) 15 MG tablet Take 1 tablet (15 mg total) by mouth every evening.    omeprazole (PRILOSEC) 40 MG capsule Take 1 capsule (40 mg total) by mouth every morning.    potassium chloride (KLOR-CON 10) 10 MEQ TbSR Take 1 tablet every day by oral route.    pravastatin (PRAVACHOL) 80 MG tablet Take 1 tablet (80 mg total) by mouth once daily.    QUEtiapine (SEROQUEL) 25 MG Tab Take 1 tablet (25 mg total) by mouth 2 (two) times daily.    valsartan-hydrochlorothiazide (DIOVAN-HCT) 160-12.5 mg per tablet Take 1 tablet by mouth once daily.    azelastine (ASTELIN) 137 mcg (0.1 %) nasal spray instill 1 spray (137 mcg total) by Nasal route 2 (two) times daily.    mometasone 0.1% (ELOCON) 0.1 % cream apply to affected area topically daily    [DISCONTINUED] cimetidine (TAGAMET) 300 MG tablet Take 1 tablet (300 mg total) by mouth 2 (two) times daily.     Family History       Problem Relation (Age of Onset)    Breast cancer Sister          Tobacco Use    Smoking status: Never     Passive exposure: Never    Smokeless tobacco: Never   Substance and Sexual Activity    Alcohol use: Yes     Comment: beer occasionally    Drug use: No    Sexual activity: Not Currently     Birth control/protection: None     Review of Systems   Gastrointestinal:  Positive for abdominal pain, nausea and vomiting.   Neurological:  Positive for syncope and weakness.     Objective:     Vital Signs (Most Recent):  Temp: 97.4 °F (36.3 °C) (03/07/25 2330)  Pulse: 82 (03/08/25 0000)  Resp: 18 (03/08/25 0000)  BP: (!) 97/52 (03/08/25 0000)  SpO2: 97 % (03/08/25 0000) Vital Signs (24h Range):  Temp:  [97.4 °F (36.3 °C)-100.1 °F (37.8 °C)] 97.4 °F (36.3 °C)  Pulse:  [65-91] 82  Resp:  [14-29] 18  SpO2:  [93 %-100 %] 97 %  BP: ()/() 97/52     Weight: 74.4 kg (164 lb 0.4 oz)  Body mass index is 27.29 kg/m².     Physical Exam  Vitals reviewed.   Constitutional:        Appearance: Normal appearance. She is normal weight.   HENT:      Head: Normocephalic.      Mouth/Throat:      Mouth: Mucous membranes are dry.      Pharynx: Oropharynx is clear.   Eyes:      Pupils: Pupils are equal, round, and reactive to light.   Cardiovascular:      Rate and Rhythm: Normal rate.      Pulses: Normal pulses.   Pulmonary:      Effort: Pulmonary effort is normal.      Breath sounds: Normal breath sounds.   Abdominal:      General: Bowel sounds are normal.   Musculoskeletal:         General: Normal range of motion.      Cervical back: Normal range of motion.   Skin:     General: Skin is warm and dry.   Neurological:      Mental Status: Mental status is at baseline. She is lethargic.   Psychiatric:         Mood and Affect: Mood normal.              CRANIAL NERVES     CN III, IV, VI   Pupils are equal, round, and reactive to light.       Significant Labs: All pertinent labs within the past 24 hours have been reviewed.  BMP:   Recent Labs   Lab 03/07/25  1706   *      K 3.6      CO2 17*   BUN 23   CREATININE 1.6*   CALCIUM 9.7   MG 2.9*     CBC:   Recent Labs   Lab 03/07/25  1706 03/07/25  1707   WBC 13.29*  --    HGB 11.9*  --    HCT 36.9* 37     --      CMP:   Recent Labs   Lab 03/07/25  1706      K 3.6      CO2 17*   *   BUN 23   CREATININE 1.6*   CALCIUM 9.7   PROT 8.2   ALBUMIN 3.9   BILITOT 0.5   ALKPHOS 61   AST 31   ALT 19   ANIONGAP 14       Significant Imaging: I have reviewed all pertinent imaging results/findings within the past 24 hours.  Imaging Results              US Abdomen Limited (Final result)  Result time 03/07/25 22:01:32      Final result by Kojo Patel MD (03/07/25 22:01:32)                   Impression:      Cholelithiasis with stones at the gallbladder neck.  Otherwise no ultrasonographic evidence to suggest acute cholecystitis.      Electronically signed by: Kojo Patel MD  Date:    03/07/2025  Time:    22:01                Narrative:    EXAMINATION:  US ABDOMEN LIMITED    CLINICAL HISTORY:  cholecystitis;    TECHNIQUE:  Limited ultrasound of the right upper quadrant of the abdomen was performed.    COMPARISON:  CT abdomen and pelvis from the same date.    FINDINGS:  Visualized hepatic parenchyma is homogeneous without evidence for masses.  No intra- or extrahepatic biliary ductal dilatation. The common bile duct measures 0.5 cm.  The gallbladder demonstrates stones at the level of the gallbladder neck, the larger of which measures 2.1 cm.  No evidence of gallbladder wall thickening or pericholecystic fluid.  Sonographic Gordon's sign is negative. The visualized portion of the pancreas appears normal.  No ascites.                                       CT Head Without Contrast (Final result)  Result time 03/07/25 21:05:04      Final result by Kojo Patel MD (03/07/25 21:05:04)                   Impression:      No acute intracranial abnormalities identified.      Electronically signed by: Kojo Patel MD  Date:    03/07/2025  Time:    21:05               Narrative:    EXAMINATION:  CT HEAD WITHOUT CONTRAST    CLINICAL HISTORY:  Seizure, new-onset, no history of trauma;    TECHNIQUE:  Low dose axial images were obtained through the head.  Coronal and sagittal reformations were also performed. Contrast was not administered.    COMPARISON:  MRI brain from August 2021.    FINDINGS:  No evidence of acute/recent major vascular distribution cerebral infarction, intraparenchymal hemorrhage, or intra-axial space occupying lesion. The ventricular system is normal in size and configuration with no evidence of hydrocephalus. No effacement of the skull-base cisterns. No abnormal extra-axial fluid collections or blood products. Visualized paranasal sinuses and mastoid air cells are clear. The calvarium shows no significant abnormality.                                       CT Abdomen Pelvis  Without Contrast (Final result)  Result time  03/07/25 21:36:39      Final result by Kojo Patel MD (03/07/25 21:36:39)                   Impression:      1. Abnormal colonic wall thickening with mild surrounding inflammatory changes seen involving moderate segment length of mid to distal descending colon and proximal sigmoid colon.  Findings are most suggestive for acute colitis.  Scattered colonic diverticula are seen, however length of colonic segment involvement is felt more likely to represent acute colitis and less likely to represent acute diverticulitis.  2. Moderate volume retained stool seen in the colon with moderate to large retained stool in the rectosigmoid colon with prominent rectal stool ball.  Correlate for constipation and possible fecal impaction.  3. Scattered colonic diverticulosis.  4. Cholelithiasis.  5. Postsurgical changes and additional findings as detailed above.      Electronically signed by: Kojo Patel MD  Date:    03/07/2025  Time:    21:36               Narrative:    EXAMINATION:  CT ABDOMEN PELVIS WITHOUT CONTRAST    CLINICAL HISTORY:  Nausea/vomiting;Abdominal pain, acute, nonlocalized;    TECHNIQUE:  Low dose axial images, sagittal and coronal reformations were obtained from the lung bases to the pubic symphysis.  Oral contrast was not administered.    COMPARISON:  None    FINDINGS:  The visualized portion of the heart is unremarkable.  Minimal bibasilar atelectatic changes are seen.  No evidence of focal consolidation or pleural effusion.    Two small hepatic hypodensities, probable cysts are seen near the junction of the right and left hepatic lobes.  There is no intra-or extrahepatic biliary ductal dilatation.  Two peripheral calcified stones are seen at the level of the gallbladder neck.  Small hiatal hernia is visualized.  Stomach is otherwise normal in appearance.  Pancreas, spleen, and adrenal glands are unremarkable.    Kidneys show no evidence of stones or hydronephrosis. No definite abnormalities or stones  are seen along the ureteral courses.  Phleboliths are seen distally.  Urinary bladder is unremarkable.  Uterus has been removed.    Appendix is visualized and is unremarkable.  No evidence of bowel obstruction.  Abnormal wall thickening with surrounding inflammatory changes are seen involving moderate segment length of mid to distal descending and proximal sigmoid colon which is most consistent with acute colitis.  Scattered colonic diverticula are seen.  Moderate stool is seen throughout the colon.  Moderate to large volume retained stool is seen in the rectosigmoid colon with prominent rectal stool ball.  No free air or free fluid.    Aorta tapers normally.    No acute osseous abnormality identified.  Mild degenerative changes and facet arthropathy are seen in the lower lumbar spine.  Subcutaneous soft tissues show no significant abnormalities.                                       X-Ray Chest AP Portable (Final result)  Result time 03/07/25 20:11:59      Final result by Kojo Patel MD (03/07/25 20:11:59)                   Impression:      No acute cardiopulmonary process identified.      Electronically signed by: Kojo Patel MD  Date:    03/07/2025  Time:    20:11               Narrative:    EXAMINATION:  XR CHEST AP PORTABLE    CLINICAL HISTORY:  Sepsis;    TECHNIQUE:  Single frontal view of the chest was performed.    COMPARISON:  October 2021.    FINDINGS:  Cardiac silhouette is normal in size.  Left lung is well expanded.  There is prominent elevation of the right hemidiaphragm resulting in asymmetric volume loss on the right.  No evidence of focal consolidative process, pneumothorax, or significant pleural effusion.  No acute osseous abnormality identified.

## 2025-03-08 NOTE — PROGRESS NOTES
"[facility]ensive [facility]  Progress Note    Patient Name: Soila Chávez  MRN: [id number]  Patient Class: IP- Inpatient   Admission Date: 3/7/2025  Length of Stay: 0 days  Attending Physician: Placido Moore MD  Primary Care Provider: Parminder Junior MD          Principal Problem:Sepsis        HPI:  Per Nathalia Barnett, NP:    "Soila Chávez is a 74 year old female with a past medical history of HTN, asymptomatic bradycardia, aortic atherosclerosis, dementia, HTN, chronic back pain, anxiety and GERD who presents with nausea and vomiting that started this afternoon after she ate chicken. She reports associated RLQ pain.Patient's daughter found patient on floor actively vomiting and called PCP who instructed her to bring patient to ER for further evaluation.  Per ED note, patient had a syncopal episode en route to the hospital. No history of seizure or past syncopal episodes.  Per chart review, patient was recently seen in the ED three weeks ago with N/V with abdominal pain and found to have DA (2.2) with hypotension 80/50's. She was treated with IV fluids and discharged home. Patient was seen by her cardiologist and found to by hypotensive. She was instructed to hold her blood pressure medications.      Upon arrival to the ED, patient found to be hypotensive ED work up significant for elevated WBC 13.29, UA negative for UTI,  Creatinine 1.6 (appears 1.8-1.0 at baseline) and CO2 17. CT findings revealed acute colitis and constipation.  Chest x-ray showed no acute cardiopulmonary process.  CT head showed no acute abnormality.  Abdominal ultrasound showed cholelithiasis with stones in the gallbladder neck without acute cholecystitis.  Patient received 2 L IV fluids with improvement in her blood pressure.  She was started on IV aztreonam and IV metronidazole.  She was referred to Hospital Medicine and will be admitted for further evaluation and " "management."    Overview/Hospital Course:  Patient is a 74 year woman history of hypertension, gastroesophageal reflux disease, chronic lower back pain, and dementia admitted to the hospital with sepsis secondary to infectious colitis with hypotension and acute kidney injury and lactic acidosis.  Patient volume resuscitated with intravenous fluids started intravenous antibiotic therapy.  Questionable seizure-like activity.  Neurology service consulted.    Ultrasound and CT scan shows evidence of cholelithiasis.  Ultrasound shows stones in the gallbladder neck but no evidence of acute cholecystitis.  CT scan showed "colonic wall thickening with mild surrounding inflammatory changes seen involving moderate segment length of mid to distal descending colon and proximal sigmoid colon."    Interval History: Blood pressure improved.  Serum creatinine trending down.    Review of Systems   Constitutional:  Negative for chills and fever.   Respiratory:  Negative for shortness of breath.    Cardiovascular:  Negative for chest pain.   Gastrointestinal:  Negative for abdominal pain, nausea and vomiting.   Genitourinary:  Negative for dysuria and frequency.   Psychiatric/Behavioral:  Positive for confusion.      Objective:     Vital Signs (Most Recent):  Temp: 98.5 °F (36.9 °C) (03/08/25 0305)  Pulse: 88 (03/08/25 0601)  Resp: 16 (03/08/25 0601)  BP: (!) 121/56 (03/08/25 0601)  SpO2: 97 % (03/08/25 0601) Vital Signs (24h Range):  Temp:  [97.4 °F (36.3 °C)-100.1 °F (37.8 °C)] 98.5 °F (36.9 °C)  Pulse:  [65-91] 88  Resp:  [14-29] 16  SpO2:  [93 %-100 %] 97 %  BP: ()/() 121/56     Weight: 74.4 kg (164 lb 0.4 oz)  Body mass index is 27.29 kg/m².    Intake/Output Summary (Last 24 hours) at 3/8/2025 0741  Last data filed at 3/8/2025 0626  Gross per 24 hour   Intake 928.74 ml   Output 451 ml   Net 477.74 ml         Physical Exam  Constitutional:       General: She is not in acute distress.     Appearance: She is obese. She " "is ill-appearing.   HENT:      Head: Atraumatic.   Eyes:      Conjunctiva/sclera: Conjunctivae normal.   Cardiovascular:      Rate and Rhythm: Normal rate and regular rhythm.      Heart sounds: Normal heart sounds. No murmur heard.  Pulmonary:      Effort: Pulmonary effort is normal.      Breath sounds: Normal breath sounds. No wheezing.   Abdominal:      General: Bowel sounds are normal. There is no distension.      Palpations: Abdomen is soft.      Tenderness: There is abdominal tenderness.   Musculoskeletal:         General: No deformity. Normal range of motion.      Cervical back: Neck supple.   Neurological:      Mental Status: She is alert and oriented to person, place, and time.               Significant Labs: All pertinent labs within the past 24 hours have been reviewed.    Significant Imaging: I have reviewed all pertinent imaging results/findings within the past 24 hours.      Assessment & Plan  Sepsis  Infectious colitis  Lactic acidosis  Patient presented with nausea, vomiting, and abdominal pain.  Ultrasound and CT scan shows evidence of cholelithiasis.  Ultrasound shows stones in the gallbladder neck but no evidence of acute cholecystitis.  CT scan showed "colonic wall thickening with mild surrounding inflammatory changes seen involving moderate segment length of mid to distal descending colon and proximal sigmoid colon."    History of rash with penicillin however previously prescribed cephalosporin without documented reaction.  Suspect would tolerate intravenous cephalosporin.   Started on empiric broad-spectrum coverage intravenous vancomycin, aztreonam, and metronidazole.  Blood pressure seems to be improved however with persistent lactic acidosis.  We will give additional volume resuscitation.  Repeat lactic acid this afternoon.    Echocardiogram ordered.    Cultures remain negative and clinically improving will plan to deescalate to cephalosporin plus metronidazole for empiric gastrointestinal " coverage.  Repeat lactic acid.    Gastroenterology service consulted.  Acute kidney injury  Metabolic acidosis  Suspect due to volume depletion and sepsis.  Improving with volume resuscitation.  Serum creatinine trending down.  Reasonable urine output.  Start sodium bicarbonate.  Continue to monitor.  Seizure-like activity  Family reported seizure-like activity where patient briefly and responsible foaming at the mouth EN route to the hospital.  No known history of seizures.  CT head negative for acute pathology.  Patient has been on chronic alprazolam at home.  Withdrawal seizure? Memantine and donepezil have been associated with seizures.  Will hold.  Neurology consulted.  Seizure precautions.  Hypertension  Patient presented with hypotension.  Holding blood pressure medications.  Norepinephrine infusion ordered however not needed.  Blood pressure recovering with volume resuscitation empiric antibiotic therapy.  Alzheimer's dementia with behavioral disturbance  Appears at baseline.  Hold dementia medications due to concern for possible association with seizures.  VTE Risk Mitigation (From admission, onward)           Ordered     heparin (porcine) injection 5,000 Units  Every 8 hours         03/07/25 2359     IP VTE HIGH RISK PATIENT  Once         03/07/25 2359     Place sequential compression device  Until discontinued         03/07/25 2359                    Placido Moore MD  Department of Hospital Medicine   [facility] ([place])

## 2025-03-08 NOTE — ASSESSMENT & PLAN NOTE
Patient presented with hypotension.  Holding blood pressure medications.  Norepinephrine infusion ordered however not needed.  Blood pressure recovering with volume resuscitation empiric antibiotic therapy.

## 2025-03-08 NOTE — SUBJECTIVE & OBJECTIVE
"HPI:  74 y.o. female 74 year old female with a past medical history of HTN, asymptomatic bradycardia, aortic atherosclerosis, dementia, HTN, chronic back pain, anxiety and GERD who presents with nausea and vomiting that started yesterday afternoon after she ate chicken.  Daughter called PCP who recommended she should be evaluated in the ER and daughter called EMS.  Per ED, patient had a syncopal event on the way to the hospital.  In the ED, patient was hypotensive with leukocytosis and creatinine being 1.6 CT abdomen reveal acute colitis and constipation.  She was diagnosed with sepsis secondary to infectious colitis with hypotension, acute kidney injury and lactic acidosis.  Neurology was consulted for seizure/syncope like event in route to the hospital.  Patient takes memantine and donepezil for dementia.     Images personally reviewed and interpreted:  Study: Head CT without contrast  Study Interpretation:  No acute intracranial abnormalities    Additional studies reviewed:   Study: Cardiac_Neuro: 2D echo  Study Interpretation:  Ordered and pending    Laboratory studies reviewed:  BMP:   Lab Results   Component Value Date     03/08/2025    K 3.4 (L) 03/08/2025     (H) 03/08/2025    CO2 16 (L) 03/08/2025    BUN 24 (H) 03/08/2025    CREATININE 1.3 03/08/2025    CALCIUM 8.5 (L) 03/08/2025     CBC:   Lab Results   Component Value Date    WBC 15.76 (H) 03/08/2025    RBC 3.89 (L) 03/08/2025    HGB 10.6 (L) 03/08/2025    HCT 34.0 (L) 03/08/2025    HCT 37 03/07/2025     03/08/2025    MCV 87 03/08/2025    MCH 27.2 03/08/2025    MCHC 31.2 (L) 03/08/2025     Lipid Panel:   Lab Results   Component Value Date    CHOL 129 02/08/2025    LDLCALC 78.6 02/08/2025    HDL 35 (L) 02/08/2025    TRIG 77 02/08/2025     Coagulation: No results found for: "PT", "INR", "APTT"  Hgb A1C:   Lab Results   Component Value Date    HGBA1C 5.5 01/29/2025     TSH:   Lab Results   Component Value Date    TSH 1.500 01/25/2023 "     ISTAT Lactate         Component  Ref Range & Units (hover) 1 d ago  (3/7/25) 1 d ago  (3/7/25) 1 d ago  (3/7/25) 1 d ago  (3/7/25)   POC Lactate 3.98 High Panic  4.24 High Panic             Documentation personally reviewed:  Notes: Notes: H&P     Post charge discharge plan:  Clinic follow up: General Neurology    Visit Type: in person or virtual  Timeframe: 4 weeks

## 2025-03-08 NOTE — ASSESSMENT & PLAN NOTE
Family reported seizure-like activity where patient briefly and responsible foaming at the mouth EN route to the hospital.  No known history of seizures.  CT head negative for acute pathology.  Patient has been on chronic alprazolam at home.  Withdrawal seizure? Memantine and donepezil have been associated with seizures.  Will hold.  Neurology consulted.  Seizure precautions.

## 2025-03-08 NOTE — ASSESSMENT & PLAN NOTE
Appears at baseline.  Hold dementia medications due to concern for possible association with seizures.

## 2025-03-08 NOTE — ED TRIAGE NOTES
Soila Chávez, a 74 y.o. female presents to the ED w/ complaint of seizures like activity, pt daughter stated that the pt loss a close family member today, after which the pt started to foam by the mouth. Pt presented lethargic and sluggish. Pt also seem confuse.      Triage note:  Chief Complaint   Patient presents with    Seizures     Per family pt reports seizure in the car today and foaming at the mouth     Review of patient's allergies indicates:   Allergen Reactions    Lotensin [benazepril] Swelling    Penicillins Rash     Past Medical History:   Diagnosis Date    Allergy     Anxiety     Cataract     Colon polyps 2010    Hearing loss     Hypertension     Joint pain     Mental disorder

## 2025-03-08 NOTE — HPI
"Per Nathalia Barnett NP:    "Soila Chávez is a 74 year old female with a past medical history of HTN, asymptomatic bradycardia, aortic atherosclerosis, dementia, HTN, chronic back pain, anxiety and GERD who presents with nausea and vomiting that started this afternoon after she ate chicken. She reports associated RLQ pain.Patient's daughter found patient on floor actively vomiting and called PCP who instructed her to bring patient to ER for further evaluation.  Per ED note, patient had a syncopal episode en route to the hospital. No history of seizure or past syncopal episodes.  Per chart review, patient was recently seen in the ED three weeks ago with N/V with abdominal pain and found to have DA (2.2) with hypotension 80/50's. She was treated with IV fluids and discharged home. Patient was seen by her cardiologist and found to by hypotensive. She was instructed to hold her blood pressure medications.      Upon arrival to the ED, patient found to be hypotensive ED work up significant for elevated WBC 13.29, UA negative for UTI,  Creatinine 1.6 (appears 1.8-1.0 at baseline) and CO2 17. CT findings revealed acute colitis and constipation.  Chest x-ray showed no acute cardiopulmonary process.  CT head showed no acute abnormality.  Abdominal ultrasound showed cholelithiasis with stones in the gallbladder neck without acute cholecystitis.  Patient received 2 L IV fluids with improvement in her blood pressure.  She was started on IV aztreonam and IV metronidazole.  She was referred to Hospital Medicine and will be admitted for further evaluation and management."  "

## 2025-03-08 NOTE — ASSESSMENT & PLAN NOTE
Suspect due to volume depletion and sepsis.  Improving with volume resuscitation.  Serum creatinine trending down.  Reasonable urine output.  Start sodium bicarbonate.  Continue to monitor.

## 2025-03-08 NOTE — ASSESSMENT & PLAN NOTE
Hypotension    History noted.  Patient presented with hypotension.  Per chart review, she has had recent history of hypotension and medications were recently stopped per Cardiology.  Family states her blood pressure started to increase and her home medications were resumed 5 days ago.    Current Antihypertensives   Valsartan-hydrochlorothiazide 160-12.5 mg daily    Plan  - currently hold valsartan-hydrochlorothiazide for hypotension  -resume when clinically appropriate

## 2025-03-08 NOTE — ASSESSMENT & PLAN NOTE
"Patient presented with nausea, vomiting, and abdominal pain.  Ultrasound and CT scan shows evidence of cholelithiasis.  Ultrasound shows stones in the gallbladder neck but no evidence of acute cholecystitis.  CT scan showed "colonic wall thickening with mild surrounding inflammatory changes seen involving moderate segment length of mid to distal descending colon and proximal sigmoid colon."    History of rash with penicillin however previously prescribed cephalosporin without documented reaction.  Suspect would tolerate intravenous cephalosporin.   Started on empiric broad-spectrum coverage intravenous vancomycin, aztreonam, and metronidazole.  Blood pressure seems to be improved however with persistent lactic acidosis.  We will give additional volume resuscitation.  Repeat lactic acid this afternoon.    Echocardiogram ordered.    Cultures remain negative and clinically improving will plan to deescalate to cephalosporin plus metronidazole for empiric gastrointestinal coverage.  Repeat lactic acid.    Gastroenterology service consulted.  "

## 2025-03-08 NOTE — PROGRESS NOTES
Pt currently admitted to [facility] [place], follow up call scheduled for 3/10/25

## 2025-03-08 NOTE — ASSESSMENT & PLAN NOTE
History noted.  Patient resides with family at home who provide care for her    -continue Seroquel 25 mg b.i.d.  -continue memantine 10 mg b.i.d.  -continue donepezil 10 mg nightly  - delirium precautions

## 2025-03-08 NOTE — NURSING
Care of patient assumed. Patient in bed with side rails up x3, call bell in reach, no distress noted. Daughter at bedside. IVF infusing, IV patent. VSS. Will cont to monitor.

## 2025-03-08 NOTE — PT/OT/SLP PROGRESS
Physical Therapy  Not Seen    Patient Name:  Soila Chávez   MRN:  [id number]    Patient not seen today secondary to Testing/imaging (xray/CT/MRI) (EEG). Will follow-up tomorrow, 3/9.

## 2025-03-08 NOTE — PROGRESS NOTES
EEG Hook up  No sign of skin breakdown noticed    Skin Integrity: Normal     Keny Agudelo   03/08/2025 2:51 PM

## 2025-03-08 NOTE — ASSESSMENT & PLAN NOTE
Nausea/vomiting  Metabolic acidosis    Patient presenting with nausea and vomiting that started today.  Was seen in the ED 3 weeks ago with similar presentation.Creatinine 1.7 baseline 0.9 and CO2 17     Plan  - IV antiemetics  - Avoid nephrotoxins and renally dose meds for GFR listed above  - Monitor urine output, serial BMP, and adjust therapy as needed  - IV LR

## 2025-03-08 NOTE — SUBJECTIVE & OBJECTIVE
Interval History: Blood pressure improved.  Serum creatinine trending down.    Review of Systems   Constitutional:  Negative for chills and fever.   Respiratory:  Negative for shortness of breath.    Cardiovascular:  Negative for chest pain.   Gastrointestinal:  Negative for abdominal pain, nausea and vomiting.   Genitourinary:  Negative for dysuria and frequency.   Psychiatric/Behavioral:  Positive for confusion.      Objective:     Vital Signs (Most Recent):  Temp: 98.5 °F (36.9 °C) (03/08/25 0305)  Pulse: 88 (03/08/25 0601)  Resp: 16 (03/08/25 0601)  BP: (!) 121/56 (03/08/25 0601)  SpO2: 97 % (03/08/25 0601) Vital Signs (24h Range):  Temp:  [97.4 °F (36.3 °C)-100.1 °F (37.8 °C)] 98.5 °F (36.9 °C)  Pulse:  [65-91] 88  Resp:  [14-29] 16  SpO2:  [93 %-100 %] 97 %  BP: ()/() 121/56     Weight: 74.4 kg (164 lb 0.4 oz)  Body mass index is 27.29 kg/m².    Intake/Output Summary (Last 24 hours) at 3/8/2025 0739  Last data filed at 3/8/2025 0626  Gross per 24 hour   Intake 928.74 ml   Output 451 ml   Net 477.74 ml         Physical Exam  Constitutional:       General: She is not in acute distress.     Appearance: She is obese. She is ill-appearing.   HENT:      Head: Atraumatic.   Eyes:      Conjunctiva/sclera: Conjunctivae normal.   Cardiovascular:      Rate and Rhythm: Normal rate and regular rhythm.      Heart sounds: Normal heart sounds. No murmur heard.  Pulmonary:      Effort: Pulmonary effort is normal.      Breath sounds: Normal breath sounds. No wheezing.   Abdominal:      General: Bowel sounds are normal. There is no distension.      Palpations: Abdomen is soft.      Tenderness: There is abdominal tenderness.   Musculoskeletal:         General: No deformity. Normal range of motion.      Cervical back: Neck supple.   Neurological:      Mental Status: She is alert and oriented to person, place, and time.               Significant Labs: All pertinent labs within the past 24 hours have been  reviewed.    Significant Imaging: I have reviewed all pertinent imaging results/findings within the past 24 hours.

## 2025-03-08 NOTE — HOSPITAL COURSE
"Patient is a 74 year woman history of hypertension, gastroesophageal reflux disease, chronic lower back pain, and dementia admitted to the hospital with sepsis secondary to infectious colitis with hypotension and acute kidney injury and lactic acidosis.  Patient volume resuscitated with intravenous fluids started intravenous antibiotic therapy.  Questionable seizure-like activity.  Neurology service consulted.    Ultrasound and CT scan shows evidence of cholelithiasis.  Ultrasound shows stones in the gallbladder neck but no evidence of acute cholecystitis.  CT scan showed "colonic wall thickening with mild surrounding inflammatory changes seen involving moderate segment length of mid to distal descending colon and proximal sigmoid colon."  "

## 2025-03-08 NOTE — PROGRESS NOTES
Pharmacokinetic Assessment Follow Up: IV Vancomycin    Vancomycin serum concentration assessment(s):  Patient received a loading dose of 1750 mg 3/7 @ 1842  The random level was drawn correctly and can be used to guide therapy at this time. The measurement is within the desired definitive target range of 10 to 20 mcg/mL.    Vancomycin Regimen Plan:    Give one time dose of vancomycin 750 mg. Re-dose when the random level is less than 20 mcg/mL, next level to be drawn at 1000 on 3/9    Drug levels (last 3 results):  Recent Labs   Lab Result Units 03/08/25  0925   Vancomycin, Random ug/mL 17.9       Pharmacy will continue to follow and monitor vancomycin.    Please contact pharmacy at extension [phone] for questions regarding this assessment.    Thank you for the consult,   Shellie Kingston       Patient brief summary:  Soila Chávez is a 74 y.o. female initiated on antimicrobial therapy with IV Vancomycin for treatment of intra-abdominal infection    The patient's current regimen is dose per level    Drug Allergies:   Review of patient's allergies indicates:   Allergen Reactions    Lotensin [benazepril] Swelling    Penicillins Rash       Actual Body Weight:   74.4 kg    Renal Function:   Estimated Creatinine Clearance: 38.9 mL/min (based on SCr of 1.3 mg/dL).,     Dialysis Method (if applicable):  N/A    CBC (last 72 hours):  Recent Labs   Lab Result Units 03/07/25  1706 03/08/25  0209   WBC K/uL 13.29* 15.76*   Hemoglobin g/dL 11.9* 10.6*   Hematocrit % 36.9* 34.0*   Platelets K/uL 227 185   Gran % %  --  78.0*   Lymph % %  --  5.0*   Mono % %  --  5.0   Eosinophil % %  --  0.0   Basophil % %  --  0.0   Differential Method   --  Manual       Metabolic Panel (last 72 hours):  Recent Labs   Lab Result Units 03/07/25  1706 03/07/25  1721 03/07/25  1722 03/08/25  0209   Sodium mmol/L 138  --   --  140   Potassium mmol/L 3.6  --   --  3.4*   Chloride mmol/L 107  --   --  113*   CO2 mmol/L 17*  --   --  16*   Glucose  mg/dL 161*  --   --  131*   Glucose, UA   --  Negative  --   --    BUN mg/dL 23  --   --  24*   Creatinine mg/dL 1.6*  --   --  1.3   Creatinine, Urine mg/dL  --   --  164.4  --    Albumin g/dL 3.9  --   --  3.3*   Total Bilirubin mg/dL 0.5  --   --  0.4   Alkaline Phosphatase U/L 61  --   --  51   AST U/L 31  --   --  25   ALT U/L 19  --   --  16   Magnesium mg/dL 2.9*  --   --  2.3   Phosphorus mg/dL 3.8  --   --  2.8       Vancomycin Administrations:  vancomycin given in the last 96 hours                     vancomycin 1750 mg in 0.9% sodium chloride 500 mL IVPB (mg) 1,750 mg New Bag 03/07/25 1842                    Microbiologic Results:  Microbiology Results (last 7 days)       Procedure Component Value Units Date/Time    Blood culture x two cultures. Draw prior to antibiotics. [[id number]] Collected: 03/07/25 1729    Order Status: Completed Specimen: Blood from Peripheral, Antecubital, Left Updated: 03/08/25 0315     Blood Culture, Routine No Growth to date    Narrative:      Aerobic and anaerobic    Blood culture x two cultures. Draw prior to antibiotics. [[id number]] Collected: 03/07/25 1718    Order Status: Completed Specimen: Blood from Peripheral, Hand, Right Updated: 03/08/25 0315     Blood Culture, Routine No Growth to date    Narrative:      Aerobic and anaerobic

## 2025-03-08 NOTE — PLAN OF CARE
Problem: Adult Inpatient Plan of Care  Goal: Plan of Care Review  Outcome: Progressing  Goal: Patient-Specific Goal (Individualized)  Outcome: Progressing  Goal: Absence of Hospital-Acquired Illness or Injury  Outcome: Progressing  Goal: Optimal Comfort and Wellbeing  Outcome: Progressing  Goal: Readiness for Transition of Care  Outcome: Progressing     Problem: Skin Injury Risk Increased  Goal: Skin Health and Integrity  Outcome: Progressing     Problem: Infection  Goal: Absence of Infection Signs and Symptoms  Outcome: Progressing     Problem: Sepsis/Septic Shock  Goal: Optimal Coping  Outcome: Progressing  Goal: Absence of Bleeding  Outcome: Progressing  Goal: Blood Glucose Level Within Targeted Range  Outcome: Progressing  Goal: Absence of Infection Signs and Symptoms  Outcome: Progressing  Goal: Optimal Nutrition Intake  Outcome: Progressing     Problem: Acute Kidney Injury/Impairment  Goal: Fluid and Electrolyte Balance  Outcome: Progressing  Goal: Improved Oral Intake  Outcome: Progressing  Goal: Effective Renal Function  Outcome: Progressing

## 2025-03-08 NOTE — H&P
[facility] Medicine  History & Physical    Patient Name: Soila Chávez  MRN: [id number]  Patient Class: OP- Observation  Admission Date: 3/7/2025  Attending Physician: Placido Moore MD   Primary Care Provider: Parminder Junior MD         Patient information was obtained from patient, relative(s), caregiver / friend, past medical records, and ER records.     Subjective:     Principal Problem:Sepsis    Chief Complaint:   Chief Complaint   Patient presents with    Seizures     Per family pt reports seizure in the car today and foaming at the mouth        HPI: Soila Chávez is a 74 year old female with a past medical history of HTN, asymptomatic bradycardia, aortic atherosclerosis, dementia, HTN, chronic back pain, anxiety and GERD who presents with nausea and vomiting that started this afternoon after she ate chicken. She reports associated RLQ pain.Patient's daughter found patient on floor actively vomiting and called PCP who instructed her to bring patient to ER for further evaluation.  Per ED note, patient had a syncopal episode en route to the hospital. No history of seizure or past syncopal episodes.  Per chart review, patient was recently seen in the ED three weeks ago with N/V with abdominal pain and found to have DA (2.2) with hypotension 80/50's. She was treated with IV fluids and discharged home. Patient was seen by her cardiologist and found to by hypotensive. She was instructed to hold her blood pressure medications.      Upon arrival to the ED, patient found to be hypotensive ED work up significant for elevated WBC 13.29, UA negative for UTI,  Creatinine 1.6 (appears 1.8-1.0 at baseline) and CO2 17. CT findings revealed acute colitis and constipation.  Chest x-ray showed no acute cardiopulmonary process.  CT head showed no acute abnormality.  Abdominal ultrasound showed cholelithiasis with stones in the gallbladder neck without acute cholecystitis.  Patient  received 2 L IV fluids with improvement in her blood pressure.  She was started on IV aztreonam and IV metronidazole.  She was referred to Hospital Medicine and will be admitted for further evaluation and management.    Past Medical History:   Diagnosis Date    Allergy     Anxiety     Cataract     Colon polyps 2010    Hearing loss     Hypertension     Joint pain     Mental disorder        Past Surgical History:   Procedure Laterality Date    CATARACT EXTRACTION W/  INTRAOCULAR LENS IMPLANT Right 3/12/2024    Procedure: EXTRACTION, CATARACT, WITH IOL INSERTION;  Surgeon: Jax Garza MD;  Location: [facility] OR;  Service: Ophthalmology;  Laterality: Right;    COLONOSCOPY N/A 3/5/2020    Procedure: COLONOSCOPY;  Surgeon: Nathalia Kemp MD;  Location: [facility] (4TH FLR);  Service: Colon and Rectal;  Laterality: N/A;  requests later appt time if available - Pt open to any MD- Pt informed arrival time may be adjusted, Pt verbalized understanding- ERW2/24/20@1409    ESOPHAGOGASTRODUODENOSCOPY N/A 10/1/2020    Procedure: EGD (ESOPHAGOGASTRODUODENOSCOPY);  Surgeon: Casey Guerrero MD;  [facility]  Service: Endoscopy;  Laterality: N/A;  covid test 9/28-[facility]    ESOPHAGOGASTRODUODENOSCOPY N/A 10/29/2021    Procedure: EGD (ESOPHAGOGASTRODUODENOSCOPY);  Surgeon: Billy Vegas MD;  Location: [facility];  Service: Endoscopy;  Laterality: N/A;    HYSTERECTOMY      PERIPARTUM CHITO       Review of patient's allergies indicates:   Allergen Reactions    Lotensin [benazepril] Swelling    Penicillins Rash       No current facility-administered medications on file prior to encounter.     Current Outpatient Medications on File Prior to Encounter   Medication Sig    ALPRAZolam (XANAX) 0.5 MG tablet Take 0.5 mg by mouth 2 (two) times daily as needed for Anxiety.    aspirin (ECOTRIN) 81 MG EC tablet Take 81 mg by mouth nightly.     cetirizine (ZYRTEC) 10 MG tablet Take 1 tablet (10 mg total) by mouth daily as  needed for Allergies.    donepeziL (ARICEPT) 10 MG tablet Take 1 tablet (10 mg total) by mouth every evening.    meloxicam (MOBIC) 15 MG tablet Take 1 tablet (15 mg total) by mouth once daily.    memantine (NAMENDA) 10 MG Tab Take 1 tablet (10 mg total) by mouth 2 (two) times daily.    mirtazapine (REMERON) 15 MG tablet Take 1 tablet (15 mg total) by mouth every evening.    omeprazole (PRILOSEC) 40 MG capsule Take 1 capsule (40 mg total) by mouth every morning.    potassium chloride (KLOR-CON 10) 10 MEQ TbSR Take 1 tablet every day by oral route.    pravastatin (PRAVACHOL) 80 MG tablet Take 1 tablet (80 mg total) by mouth once daily.    QUEtiapine (SEROQUEL) 25 MG Tab Take 1 tablet (25 mg total) by mouth 2 (two) times daily.    valsartan-hydrochlorothiazide (DIOVAN-HCT) 160-12.5 mg per tablet Take 1 tablet by mouth once daily.    azelastine (ASTELIN) 137 mcg (0.1 %) nasal spray instill 1 spray (137 mcg total) by Nasal route 2 (two) times daily.    mometasone 0.1% (ELOCON) 0.1 % cream apply to affected area topically daily    [DISCONTINUED] cimetidine (TAGAMET) 300 MG tablet Take 1 tablet (300 mg total) by mouth 2 (two) times daily.     Family History       Problem Relation (Age of Onset)    Breast cancer Sister          Tobacco Use    Smoking status: Never     Passive exposure: Never    Smokeless tobacco: Never   Substance and Sexual Activity    Alcohol use: Yes     Comment: beer occasionally    Drug use: No    Sexual activity: Not Currently     Birth control/protection: None     Review of Systems   Gastrointestinal:  Positive for abdominal pain, nausea and vomiting.   Neurological:  Positive for syncope and weakness.     Objective:     Vital Signs (Most Recent):  Temp: 97.4 °F (36.3 °C) (03/07/25 2330)  Pulse: 82 (03/08/25 0000)  Resp: 18 (03/08/25 0000)  BP: (!) 97/52 (03/08/25 0000)  SpO2: 97 % (03/08/25 0000) Vital Signs (24h Range):  Temp:  [97.4 °F (36.3 °C)-100.1 °F (37.8 °C)] 97.4 °F (36.3 °C)  Pulse:   [65-91] 82  Resp:  [14-29] 18  SpO2:  [93 %-100 %] 97 %  BP: ()/() 97/52     Weight: 74.4 kg (164 lb 0.4 oz)  Body mass index is 27.29 kg/m².     Physical Exam  Vitals reviewed.   Constitutional:       Appearance: Normal appearance. She is normal weight.   HENT:      Head: Normocephalic.      Mouth/Throat:      Mouth: Mucous membranes are dry.      Pharynx: Oropharynx is clear.   Eyes:      Pupils: Pupils are equal, round, and reactive to light.   Cardiovascular:      Rate and Rhythm: Normal rate.      Pulses: Normal pulses.   Pulmonary:      Effort: Pulmonary effort is normal.      Breath sounds: Normal breath sounds.   Abdominal:      General: Bowel sounds are normal.   Musculoskeletal:         General: Normal range of motion.      Cervical back: Normal range of motion.   Skin:     General: Skin is warm and dry.   Neurological:      Mental Status: Mental status is at baseline. She is lethargic.   Psychiatric:         Mood and Affect: Mood normal.              CRANIAL NERVES     CN III, IV, VI   Pupils are equal, round, and reactive to light.       Significant Labs: All pertinent labs within the past 24 hours have been reviewed.  BMP:   Recent Labs   Lab 03/07/25  1706   *      K 3.6      CO2 17*   BUN 23   CREATININE 1.6*   CALCIUM 9.7   MG 2.9*     CBC:   Recent Labs   Lab 03/07/25  1706 03/07/25  1707   WBC 13.29*  --    HGB 11.9*  --    HCT 36.9* 37     --      CMP:   Recent Labs   Lab 03/07/25  1706      K 3.6      CO2 17*   *   BUN 23   CREATININE 1.6*   CALCIUM 9.7   PROT 8.2   ALBUMIN 3.9   BILITOT 0.5   ALKPHOS 61   AST 31   ALT 19   ANIONGAP 14       Significant Imaging: I have reviewed all pertinent imaging results/findings within the past 24 hours.  Imaging Results              US Abdomen Limited (Final result)  Result time 03/07/25 22:01:32      Final result by Kojo Patel MD (03/07/25 22:01:32)                   Impression:       Cholelithiasis with stones at the gallbladder neck.  Otherwise no ultrasonographic evidence to suggest acute cholecystitis.      Electronically signed by: Kojo Patel MD  Date:    03/07/2025  Time:    22:01               Narrative:    EXAMINATION:  US ABDOMEN LIMITED    CLINICAL HISTORY:  cholecystitis;    TECHNIQUE:  Limited ultrasound of the right upper quadrant of the abdomen was performed.    COMPARISON:  CT abdomen and pelvis from the same date.    FINDINGS:  Visualized hepatic parenchyma is homogeneous without evidence for masses.  No intra- or extrahepatic biliary ductal dilatation. The common bile duct measures 0.5 cm.  The gallbladder demonstrates stones at the level of the gallbladder neck, the larger of which measures 2.1 cm.  No evidence of gallbladder wall thickening or pericholecystic fluid.  Sonographic Gordon's sign is negative. The visualized portion of the pancreas appears normal.  No ascites.                                       CT Head Without Contrast (Final result)  Result time 03/07/25 21:05:04      Final result by Kojo Patel MD (03/07/25 21:05:04)                   Impression:      No acute intracranial abnormalities identified.      Electronically signed by: Kojo Patel MD  Date:    03/07/2025  Time:    21:05               Narrative:    EXAMINATION:  CT HEAD WITHOUT CONTRAST    CLINICAL HISTORY:  Seizure, new-onset, no history of trauma;    TECHNIQUE:  Low dose axial images were obtained through the head.  Coronal and sagittal reformations were also performed. Contrast was not administered.    COMPARISON:  MRI brain from August 2021.    FINDINGS:  No evidence of acute/recent major vascular distribution cerebral infarction, intraparenchymal hemorrhage, or intra-axial space occupying lesion. The ventricular system is normal in size and configuration with no evidence of hydrocephalus. No effacement of the skull-base cisterns. No abnormal extra-axial fluid collections or blood  products. Visualized paranasal sinuses and mastoid air cells are clear. The calvarium shows no significant abnormality.                                       CT Abdomen Pelvis  Without Contrast (Final result)  Result time 03/07/25 21:36:39      Final result by Kojo Patel MD (03/07/25 21:36:39)                   Impression:      1. Abnormal colonic wall thickening with mild surrounding inflammatory changes seen involving moderate segment length of mid to distal descending colon and proximal sigmoid colon.  Findings are most suggestive for acute colitis.  Scattered colonic diverticula are seen, however length of colonic segment involvement is felt more likely to represent acute colitis and less likely to represent acute diverticulitis.  2. Moderate volume retained stool seen in the colon with moderate to large retained stool in the rectosigmoid colon with prominent rectal stool ball.  Correlate for constipation and possible fecal impaction.  3. Scattered colonic diverticulosis.  4. Cholelithiasis.  5. Postsurgical changes and additional findings as detailed above.      Electronically signed by: Kojo Patel MD  Date:    03/07/2025  Time:    21:36               Narrative:    EXAMINATION:  CT ABDOMEN PELVIS WITHOUT CONTRAST    CLINICAL HISTORY:  Nausea/vomiting;Abdominal pain, acute, nonlocalized;    TECHNIQUE:  Low dose axial images, sagittal and coronal reformations were obtained from the lung bases to the pubic symphysis.  Oral contrast was not administered.    COMPARISON:  None    FINDINGS:  The visualized portion of the heart is unremarkable.  Minimal bibasilar atelectatic changes are seen.  No evidence of focal consolidation or pleural effusion.    Two small hepatic hypodensities, probable cysts are seen near the junction of the right and left hepatic lobes.  There is no intra-or extrahepatic biliary ductal dilatation.  Two peripheral calcified stones are seen at the level of the gallbladder neck.  Small  hiatal hernia is visualized.  Stomach is otherwise normal in appearance.  Pancreas, spleen, and adrenal glands are unremarkable.    Kidneys show no evidence of stones or hydronephrosis. No definite abnormalities or stones are seen along the ureteral courses.  Phleboliths are seen distally.  Urinary bladder is unremarkable.  Uterus has been removed.    Appendix is visualized and is unremarkable.  No evidence of bowel obstruction.  Abnormal wall thickening with surrounding inflammatory changes are seen involving moderate segment length of mid to distal descending and proximal sigmoid colon which is most consistent with acute colitis.  Scattered colonic diverticula are seen.  Moderate stool is seen throughout the colon.  Moderate to large volume retained stool is seen in the rectosigmoid colon with prominent rectal stool ball.  No free air or free fluid.    Aorta tapers normally.    No acute osseous abnormality identified.  Mild degenerative changes and facet arthropathy are seen in the lower lumbar spine.  Subcutaneous soft tissues show no significant abnormalities.                                       X-Ray Chest AP Portable (Final result)  Result time 03/07/25 20:11:59      Final result by Kojo Patel MD (03/07/25 20:11:59)                   Impression:      No acute cardiopulmonary process identified.      Electronically signed by: Kojo Patel MD  Date:    03/07/2025  Time:    20:11               Narrative:    EXAMINATION:  XR CHEST AP PORTABLE    CLINICAL HISTORY:  Sepsis;    TECHNIQUE:  Single frontal view of the chest was performed.    COMPARISON:  October 2021.    FINDINGS:  Cardiac silhouette is normal in size.  Left lung is well expanded.  There is prominent elevation of the right hemidiaphragm resulting in asymmetric volume loss on the right.  No evidence of focal consolidative process, pneumothorax, or significant pleural effusion.  No acute osseous abnormality identified.                                       Assessment/Plan:     * Sepsis  Colitis    Patient presenting with abdominal pain, nausea and vomiting.  CT findings revealed acute colitis and constipation.  Chest x-ray showed no acute cardiopulmonary process.  CT head showed no acute abnormality.  Abdominal ultrasound showed cholelithiasis with stones in the gallbladder neck without acute cholecystitis.  WBC 13.2, lactic acid 3.3 and procalcitonin 0.39.  UA negative for UTI.    -GI consult  -continue IV aztreonam and metronidazole-follow cultures deescalate when clinically appropriate  -continue IV fluids  -trend BMP is electrolytes as needed  -IV antiemetics  -see plan for DA    Seizure-like activity  Family reported seizure-like activity where patient briefly and responsible foaming at the mouth EN route to the hospital.  No known history of seizures.  CT head negative    -neurology consult  -seizure precautions        DA (acute kidney injury)  Nausea/vomiting  Metabolic acidosis    Patient presenting with nausea and vomiting that started today.  Was seen in the ED 3 weeks ago with similar presentation.Creatinine 1.7 baseline 0.9 and CO2 17     Plan  - IV antiemetics  - Avoid nephrotoxins and renally dose meds for GFR listed above  - Monitor urine output, serial BMP, and adjust therapy as needed  - IV LR    Alzheimer's dementia with behavioral disturbance  History noted.  Patient resides with family at home who provide care for her    -continue Seroquel 25 mg b.i.d.  -continue memantine 10 mg b.i.d.  -continue donepezil 10 mg nightly  - delirium precautions    Hypertension  Hypotension    History noted.  Patient presented with hypotension.  Per chart review, she has had recent history of hypotension and medications were recently stopped per Cardiology.  Family states her blood pressure started to increase and her home medications were resumed 5 days ago.    Current Antihypertensives   Valsartan-hydrochlorothiazide 160-12.5 mg daily    Plan  -  currently hold valsartan-hydrochlorothiazide for hypotension  -resume when clinically appropriate      VTE Risk Mitigation (From admission, onward)           Ordered     heparin (porcine) injection 5,000 Units  Every 8 hours         03/07/25 2359     IP VTE HIGH RISK PATIENT  Once         03/07/25 2359     Place sequential compression device  Until discontinued         03/07/25 2359                         On 03/08/2025, patient should be placed in hospital observation services          Nathalia Barnett NP  Department of Hospital Medicine  [facility] - Emergency Dept

## 2025-03-08 NOTE — ASSESSMENT & PLAN NOTE
Colitis    Patient presenting with abdominal pain, nausea and vomiting.  CT findings revealed acute colitis and constipation.  Chest x-ray showed no acute cardiopulmonary process.  CT head showed no acute abnormality.  Abdominal ultrasound showed cholelithiasis with stones in the gallbladder neck without acute cholecystitis.  WBC 13.2, lactic acid 3.3 and procalcitonin 0.39.  UA negative for UTI.    -GI consult  -continue IV aztreonam and metronidazole-follow cultures deescalate when clinically appropriate  -continue IV fluids  -trend BMP is electrolytes as needed  -IV antiemetics  -see plan for DA

## 2025-03-09 LAB
ALBUMIN SERPL BCP-MCNC: 3 G/DL (ref 3.5–5.2)
ALP SERPL-CCNC: 57 U/L (ref 40–150)
ALT SERPL W/O P-5'-P-CCNC: 15 U/L (ref 10–44)
ANION GAP SERPL CALC-SCNC: 9 MMOL/L (ref 8–16)
AST SERPL-CCNC: 26 U/L (ref 10–40)
BASOPHILS # BLD AUTO: 0.04 K/UL (ref 0–0.2)
BASOPHILS NFR BLD: 0.3 % (ref 0–1.9)
BILIRUB SERPL-MCNC: 0.6 MG/DL (ref 0.1–1)
BUN SERPL-MCNC: 20 MG/DL (ref 8–23)
CALCIUM SERPL-MCNC: 8.6 MG/DL (ref 8.7–10.5)
CHLORIDE SERPL-SCNC: 111 MMOL/L (ref 95–110)
CO2 SERPL-SCNC: 20 MMOL/L (ref 23–29)
CREAT SERPL-MCNC: 1.1 MG/DL (ref 0.5–1.4)
DIFFERENTIAL METHOD BLD: ABNORMAL
EOSINOPHIL # BLD AUTO: 0.1 K/UL (ref 0–0.5)
EOSINOPHIL NFR BLD: 0.6 % (ref 0–8)
ERYTHROCYTE [DISTWIDTH] IN BLOOD BY AUTOMATED COUNT: 15 % (ref 11.5–14.5)
EST. GFR  (NO RACE VARIABLE): 53 ML/MIN/1.73 M^2
GLUCOSE SERPL-MCNC: 94 MG/DL (ref 70–110)
HCT VFR BLD AUTO: 33.6 % (ref 37–48.5)
HGB BLD-MCNC: 10.8 G/DL (ref 12–16)
IMM GRANULOCYTES # BLD AUTO: 0.09 K/UL (ref 0–0.04)
IMM GRANULOCYTES NFR BLD AUTO: 0.6 % (ref 0–0.5)
LYMPHOCYTES # BLD AUTO: 1.6 K/UL (ref 1–4.8)
LYMPHOCYTES NFR BLD: 10.4 % (ref 18–48)
MAGNESIUM SERPL-MCNC: 2.2 MG/DL (ref 1.6–2.6)
MCH RBC QN AUTO: 27.6 PG (ref 27–31)
MCHC RBC AUTO-ENTMCNC: 32.1 G/DL (ref 32–36)
MCV RBC AUTO: 86 FL (ref 82–98)
MONOCYTES # BLD AUTO: 0.8 K/UL (ref 0.3–1)
MONOCYTES NFR BLD: 4.9 % (ref 4–15)
NEUTROPHILS # BLD AUTO: 12.8 K/UL (ref 1.8–7.7)
NEUTROPHILS NFR BLD: 83.2 % (ref 38–73)
NRBC BLD-RTO: 0 /100 WBC
PHOSPHATE SERPL-MCNC: 2.3 MG/DL (ref 2.7–4.5)
PLATELET # BLD AUTO: 188 K/UL (ref 150–450)
PMV BLD AUTO: 10.5 FL (ref 9.2–12.9)
POTASSIUM SERPL-SCNC: 3.1 MMOL/L (ref 3.5–5.1)
PROT SERPL-MCNC: 6.7 G/DL (ref 6–8.4)
RBC # BLD AUTO: 3.91 M/UL (ref 4–5.4)
SODIUM SERPL-SCNC: 140 MMOL/L (ref 136–145)
VANCOMYCIN SERPL-MCNC: 9.6 UG/ML
WBC # BLD AUTO: 15.4 K/UL (ref 3.9–12.7)

## 2025-03-09 PROCEDURE — 80202 ASSAY OF VANCOMYCIN: CPT | Performed by: HOSPITALIST

## 2025-03-09 PROCEDURE — 25000003 PHARM REV CODE 250: Performed by: NURSE PRACTITIONER

## 2025-03-09 PROCEDURE — 97166 OT EVAL MOD COMPLEX 45 MIN: CPT

## 2025-03-09 PROCEDURE — 25000003 PHARM REV CODE 250: Performed by: HOSPITALIST

## 2025-03-09 PROCEDURE — 94761 N-INVAS EAR/PLS OXIMETRY MLT: CPT

## 2025-03-09 PROCEDURE — 36410 VNPNXR 3YR/> PHY/QHP DX/THER: CPT

## 2025-03-09 PROCEDURE — 97535 SELF CARE MNGMENT TRAINING: CPT

## 2025-03-09 PROCEDURE — 36415 COLL VENOUS BLD VENIPUNCTURE: CPT | Performed by: HOSPITALIST

## 2025-03-09 PROCEDURE — 63600175 PHARM REV CODE 636 W HCPCS: Performed by: HOSPITALIST

## 2025-03-09 PROCEDURE — 97161 PT EVAL LOW COMPLEX 20 MIN: CPT

## 2025-03-09 PROCEDURE — 84100 ASSAY OF PHOSPHORUS: CPT | Performed by: NURSE PRACTITIONER

## 2025-03-09 PROCEDURE — 97530 THERAPEUTIC ACTIVITIES: CPT

## 2025-03-09 PROCEDURE — C1751 CATH, INF, PER/CENT/MIDLINE: HCPCS

## 2025-03-09 PROCEDURE — 97116 GAIT TRAINING THERAPY: CPT

## 2025-03-09 PROCEDURE — 20000000 HC ICU ROOM

## 2025-03-09 PROCEDURE — 63600175 PHARM REV CODE 636 W HCPCS: Performed by: NURSE PRACTITIONER

## 2025-03-09 PROCEDURE — 80053 COMPREHEN METABOLIC PANEL: CPT | Performed by: NURSE PRACTITIONER

## 2025-03-09 PROCEDURE — 85025 COMPLETE CBC W/AUTO DIFF WBC: CPT | Performed by: NURSE PRACTITIONER

## 2025-03-09 PROCEDURE — 36415 COLL VENOUS BLD VENIPUNCTURE: CPT | Performed by: NURSE PRACTITIONER

## 2025-03-09 PROCEDURE — 83735 ASSAY OF MAGNESIUM: CPT | Performed by: NURSE PRACTITIONER

## 2025-03-09 RX ORDER — SODIUM,POTASSIUM PHOSPHATES 280-250MG
1 POWDER IN PACKET (EA) ORAL ONCE
Status: COMPLETED | OUTPATIENT
Start: 2025-03-09 | End: 2025-03-09

## 2025-03-09 RX ORDER — SODIUM CHLORIDE 0.9 % (FLUSH) 0.9 %
10 SYRINGE (ML) INJECTION EVERY 12 HOURS PRN
Status: DISCONTINUED | OUTPATIENT
Start: 2025-03-09 | End: 2025-03-13 | Stop reason: HOSPADM

## 2025-03-09 RX ORDER — POTASSIUM CHLORIDE 20 MEQ/1
40 TABLET, EXTENDED RELEASE ORAL EVERY 4 HOURS
Status: COMPLETED | OUTPATIENT
Start: 2025-03-09 | End: 2025-03-09

## 2025-03-09 RX ORDER — ACETAMINOPHEN 500 MG
1000 TABLET ORAL EVERY 8 HOURS PRN
Status: DISCONTINUED | OUTPATIENT
Start: 2025-03-09 | End: 2025-03-13 | Stop reason: HOSPADM

## 2025-03-09 RX ORDER — POTASSIUM CHLORIDE 20 MEQ/1
40 TABLET, EXTENDED RELEASE ORAL ONCE
Status: COMPLETED | OUTPATIENT
Start: 2025-03-09 | End: 2025-03-09

## 2025-03-09 RX ADMIN — HEPARIN SODIUM 5000 UNITS: 5000 INJECTION INTRAVENOUS; SUBCUTANEOUS at 09:03

## 2025-03-09 RX ADMIN — POTASSIUM CHLORIDE 40 MEQ: 1500 TABLET, EXTENDED RELEASE ORAL at 02:03

## 2025-03-09 RX ADMIN — POTASSIUM CHLORIDE 40 MEQ: 1500 TABLET, EXTENDED RELEASE ORAL at 05:03

## 2025-03-09 RX ADMIN — MUPIROCIN: 20 OINTMENT TOPICAL at 08:03

## 2025-03-09 RX ADMIN — SENNOSIDES AND DOCUSATE SODIUM 1 TABLET: 50; 8.6 TABLET ORAL at 08:03

## 2025-03-09 RX ADMIN — ACETAMINOPHEN 650 MG: 325 TABLET, FILM COATED ORAL at 08:03

## 2025-03-09 RX ADMIN — SODIUM BICARBONATE 650 MG: 650 TABLET ORAL at 08:03

## 2025-03-09 RX ADMIN — POLYETHYLENE GLYCOL 3350 17 G: 17 POWDER, FOR SOLUTION ORAL at 08:03

## 2025-03-09 RX ADMIN — POTASSIUM CHLORIDE 40 MEQ: 1500 TABLET, EXTENDED RELEASE ORAL at 06:03

## 2025-03-09 RX ADMIN — POTASSIUM CHLORIDE 40 MEQ: 1500 TABLET, EXTENDED RELEASE ORAL at 12:03

## 2025-03-09 RX ADMIN — QUETIAPINE FUMARATE 25 MG: 25 TABLET ORAL at 08:03

## 2025-03-09 RX ADMIN — VANCOMYCIN HYDROCHLORIDE 1250 MG: 1.25 INJECTION, POWDER, LYOPHILIZED, FOR SOLUTION INTRAVENOUS at 05:03

## 2025-03-09 RX ADMIN — ACETAMINOPHEN 1000 MG: 500 TABLET, FILM COATED ORAL at 04:03

## 2025-03-09 RX ADMIN — HEPARIN SODIUM 5000 UNITS: 5000 INJECTION INTRAVENOUS; SUBCUTANEOUS at 02:03

## 2025-03-09 RX ADMIN — POTASSIUM & SODIUM PHOSPHATES POWDER PACK 280-160-250 MG 1 PACKET: 280-160-250 PACK at 05:03

## 2025-03-09 RX ADMIN — HEPARIN SODIUM 5000 UNITS: 5000 INJECTION INTRAVENOUS; SUBCUTANEOUS at 05:03

## 2025-03-09 RX ADMIN — SODIUM CHLORIDE, POTASSIUM CHLORIDE, SODIUM LACTATE AND CALCIUM CHLORIDE: 600; 310; 30; 20 INJECTION, SOLUTION INTRAVENOUS at 08:03

## 2025-03-09 RX ADMIN — SODIUM CHLORIDE, POTASSIUM CHLORIDE, SODIUM LACTATE AND CALCIUM CHLORIDE: 600; 310; 30; 20 INJECTION, SOLUTION INTRAVENOUS at 12:03

## 2025-03-09 RX ADMIN — PRAVASTATIN SODIUM 80 MG: 20 TABLET ORAL at 08:03

## 2025-03-09 RX ADMIN — PANTOPRAZOLE SODIUM 40 MG: 40 TABLET, DELAYED RELEASE ORAL at 08:03

## 2025-03-09 NOTE — PLAN OF CARE
Problem: Occupational Therapy  Goal: Occupational Therapy Goal  Description: Goals to be met by: 3/23/2025     Patient will increase functional independence with ADLs by performing:    UE Dressing with Supervision.  LE Dressing with Supervision.  Grooming while standing at sink with Supervision.  Toileting from toilet with Supervision for hygiene and clothing management.   Toilet transfer to toilet with Supervision.    Outcome: Progressing     Initial OT eval/treat complete.  Has shower chair.  Will defer AD needs to PT.  No toileting/bathing DME needs anticipated.  Recommend post acute Low Intensity therapy with continued 24/7 supervision/assist from family.  To benefit from continued acute care OT services to increase independence in self-care/functional transfers.  OT to follow.

## 2025-03-09 NOTE — PLAN OF CARE
Problem: Physical Therapy  Goal: Physical Therapy Goal  Description: Goals to be met by: 2025    Patient will increase functional independence with mobility by performin. Sit<>stand with SBA with least restrictive assistive device.  2. Gait x 100 feet with RW with SBA.  3. Supine<>sit with SBA.      Outcome: Progressing   Orders received and Physical Therapy evaluation completed.    Pt required Min A to transfer supine to sit.  She also required Min A to transfer sit to stand with a RW.  She was able to transfer sit to stand form the toilet with Min A and no AD.  Pt ambulated 10 ft to the toilet with a RW and CGA.  She ambulated 10 ft from toilet to bed with CGA and no AD.  Pt transferred  sit to supine with SBA.    Rec: Low Intensity Therapy  DME: TBD pending progress

## 2025-03-09 NOTE — PROGRESS NOTES
"[facility] Intensive Care [facility] Medicine  Progress Note    Patient Name: Soila Chávez  MRN: [id number]  Patient Class: IP- Inpatient   Admission Date: 3/7/2025  Length of Stay: 1 days  Attending Physician: Placido Moore MD  Primary Care Provider: Parminder Junior MD        Principal Problem:Sepsis        HPI:  Per Nathalia Barnett, NP:    "Soila Chávez is a 74 year old female with a past medical history of HTN, asymptomatic bradycardia, aortic atherosclerosis, dementia, HTN, chronic back pain, anxiety and GERD who presents with nausea and vomiting that started this afternoon after she ate chicken. She reports associated RLQ pain.Patient's daughter found patient on floor actively vomiting and called PCP who instructed her to bring patient to ER for further evaluation.  Per ED note, patient had a syncopal episode en route to the hospital. No history of seizure or past syncopal episodes.  Per chart review, patient was recently seen in the ED three weeks ago with N/V with abdominal pain and found to have DA (2.2) with hypotension 80/50's. She was treated with IV fluids and discharged home. Patient was seen by her cardiologist and found to by hypotensive. She was instructed to hold her blood pressure medications.      Upon arrival to the ED, patient found to be hypotensive ED work up significant for elevated WBC 13.29, UA negative for UTI,  Creatinine 1.6 (appears 1.8-1.0 at baseline) and CO2 17. CT findings revealed acute colitis and constipation.  Chest x-ray showed no acute cardiopulmonary process.  CT head showed no acute abnormality.  Abdominal ultrasound showed cholelithiasis with stones in the gallbladder neck without acute cholecystitis.  Patient received 2 L IV fluids with improvement in her blood pressure.  She was started on IV aztreonam and IV metronidazole.  She was referred to Hospital Medicine and will be admitted for further evaluation and " "management."    Overview/Hospital Course:  Patient is a 74 year woman history of hypertension, gastroesophageal reflux disease, chronic lower back pain, and dementia admitted to the hospital with sepsis secondary to infectious colitis with hypotension and acute kidney injury and lactic acidosis.  Patient volume resuscitated with intravenous fluids started intravenous antibiotic therapy.  Questionable seizure-like activity.  Neurology service consulted.    Ultrasound and CT scan shows evidence of cholelithiasis.  Ultrasound shows stones in the gallbladder neck but no evidence of acute cholecystitis.  CT scan showed "colonic wall thickening with mild surrounding inflammatory changes seen involving moderate segment length of mid to distal descending colon and proximal sigmoid colon."    Interval History:  Overall reports feeling much better today.  Less confused.  Tolerating oral intake    Review of Systems   Constitutional:  Negative for chills and fever.   Respiratory:  Negative for shortness of breath.    Cardiovascular:  Negative for chest pain.   Gastrointestinal:  Negative for abdominal pain, nausea and vomiting.   Genitourinary:  Negative for dysuria and frequency.   Psychiatric/Behavioral:  Positive for confusion.      Objective:     Vital Signs (Most Recent):  Temp: 98.1 °F (36.7 °C) (03/09/25 0930)  Pulse: 65 (03/09/25 1330)  Resp: 18 (03/09/25 1330)  BP: (!) 87/55 (03/09/25 1330)  SpO2: 100 % (03/09/25 1330) Vital Signs (24h Range):  Temp:  [97.8 °F (36.6 °C)-98.5 °F (36.9 °C)] 98.1 °F (36.7 °C)  Pulse:  [] 65  Resp:  [12-31] 18  SpO2:  [89 %-100 %] 100 %  BP: ()/(51-70) 87/55     Weight: 74.4 kg (164 lb 0.4 oz)  Body mass index is 27.29 kg/m².    Intake/Output Summary (Last 24 hours) at 3/9/2025 1635  Last data filed at 3/9/2025 1200  Gross per 24 hour   Intake 2077.37 ml   Output 350 ml   Net 1727.37 ml         Physical Exam  Constitutional:       General: She is not in acute distress.     " "Appearance: She is obese. She is ill-appearing.   HENT:      Head: Atraumatic.   Eyes:      Conjunctiva/sclera: Conjunctivae normal.   Cardiovascular:      Rate and Rhythm: Normal rate and regular rhythm.      Heart sounds: Normal heart sounds. No murmur heard.  Pulmonary:      Effort: Pulmonary effort is normal.      Breath sounds: Normal breath sounds. No wheezing.   Abdominal:      General: Bowel sounds are normal. There is no distension.      Palpations: Abdomen is soft.      Tenderness: There is no abdominal tenderness.   Musculoskeletal:         General: No deformity. Normal range of motion.      Cervical back: Neck supple.   Neurological:      Mental Status: She is alert and oriented to person, place, and time.               Significant Labs: All pertinent labs within the past 24 hours have been reviewed.    Significant Imaging: I have reviewed all pertinent imaging results/findings within the past 24 hours.      Assessment & Plan  Sepsis  Infectious colitis  Lactic acidosis  Patient presented with nausea, vomiting, and abdominal pain.  Ultrasound and CT scan shows evidence of cholelithiasis.  Ultrasound shows stones in the gallbladder neck but no evidence of acute cholecystitis.  CT scan showed "colonic wall thickening with mild surrounding inflammatory changes seen involving moderate segment length of mid to distal descending colon and proximal sigmoid colon."    History of rash with penicillin however previously prescribed cephalosporin without documented reaction.  Suspect would tolerate intravenous cephalosporin.   Started on empiric broad-spectrum coverage intravenous vancomycin, aztreonam, and metronidazole.  Blood pressure improving and lactic acidosis resolved. Clinically feeling much better.    Echocardiogram shows normal systolic and diastolic function.  Acute kidney injury  Metabolic acidosis  Suspect due to volume depletion and sepsis.  Improving with volume resuscitation.  Serum creatinine " trending down.  Reasonable urine output.  Start sodium bicarbonate.  Continue to monitor.  Seizure-like activity  Family reported seizure-like activity where patient briefly and responsible foaming at the mouth EN route to the hospital.  No known history of seizures.  CT head negative for acute pathology.  Patient has been on chronic alprazolam at home.  Withdrawal seizure? Memantine and donepezil have been associated with seizures.  Will hold.  Neurology consulted.  Seizure precautions.  Hypertension  Patient presented with hypotension.  Holding blood pressure medications.  Norepinephrine infusion ordered however not needed.  Blood pressure recovering with volume resuscitation empiric antibiotic therapy.  Alzheimer's dementia with behavioral disturbance  Appears at baseline.  Hold dementia medications due to concern for possible association with seizures.  VTE Risk Mitigation (From admission, onward)           Ordered     heparin (porcine) injection 5,000 Units  Every 8 hours         03/07/25 2359     IP VTE HIGH RISK PATIENT  Once         03/07/25 2359     Place sequential compression device  Until discontinued         03/07/25 2359                      Placido Moore MD  Department of Hospital Medicine   [facility])

## 2025-03-09 NOTE — SUBJECTIVE & OBJECTIVE
Interval History:  Overall reports feeling much better today.  Less confused.  Tolerating oral intake    Review of Systems   Constitutional:  Negative for chills and fever.   Respiratory:  Negative for shortness of breath.    Cardiovascular:  Negative for chest pain.   Gastrointestinal:  Negative for abdominal pain, nausea and vomiting.   Genitourinary:  Negative for dysuria and frequency.   Psychiatric/Behavioral:  Positive for confusion.      Objective:     Vital Signs (Most Recent):  Temp: 98.1 °F (36.7 °C) (03/09/25 0930)  Pulse: 65 (03/09/25 1330)  Resp: 18 (03/09/25 1330)  BP: (!) 87/55 (03/09/25 1330)  SpO2: 100 % (03/09/25 1330) Vital Signs (24h Range):  Temp:  [97.8 °F (36.6 °C)-98.5 °F (36.9 °C)] 98.1 °F (36.7 °C)  Pulse:  [] 65  Resp:  [12-31] 18  SpO2:  [89 %-100 %] 100 %  BP: ()/(51-70) 87/55     Weight: 74.4 kg (164 lb 0.4 oz)  Body mass index is 27.29 kg/m².    Intake/Output Summary (Last 24 hours) at 3/9/2025 1635  Last data filed at 3/9/2025 1200  Gross per 24 hour   Intake 2077.37 ml   Output 350 ml   Net 1727.37 ml         Physical Exam  Constitutional:       General: She is not in acute distress.     Appearance: She is obese. She is ill-appearing.   HENT:      Head: Atraumatic.   Eyes:      Conjunctiva/sclera: Conjunctivae normal.   Cardiovascular:      Rate and Rhythm: Normal rate and regular rhythm.      Heart sounds: Normal heart sounds. No murmur heard.  Pulmonary:      Effort: Pulmonary effort is normal.      Breath sounds: Normal breath sounds. No wheezing.   Abdominal:      General: Bowel sounds are normal. There is no distension.      Palpations: Abdomen is soft.      Tenderness: There is no abdominal tenderness.   Musculoskeletal:         General: No deformity. Normal range of motion.      Cervical back: Neck supple.   Neurological:      Mental Status: She is alert and oriented to person, place, and time.               Significant Labs: All pertinent labs within the past 24  hours have been reviewed.    Significant Imaging: I have reviewed all pertinent imaging results/findings within the past 24 hours.

## 2025-03-09 NOTE — ASSESSMENT & PLAN NOTE
"Patient presented with nausea, vomiting, and abdominal pain.  Ultrasound and CT scan shows evidence of cholelithiasis.  Ultrasound shows stones in the gallbladder neck but no evidence of acute cholecystitis.  CT scan showed "colonic wall thickening with mild surrounding inflammatory changes seen involving moderate segment length of mid to distal descending colon and proximal sigmoid colon."    History of rash with penicillin however previously prescribed cephalosporin without documented reaction.  Suspect would tolerate intravenous cephalosporin.   Started on empiric broad-spectrum coverage intravenous vancomycin, aztreonam, and metronidazole.  Blood pressure improving and lactic acidosis resolved. Clinically feeling much better.    Echocardiogram shows normal systolic and diastolic function.  "

## 2025-03-09 NOTE — PT/OT/SLP EVAL
Physical Therapy Evaluation    Patient Name:  Soila Chávez   MRN:  [id number]    Recommendations:     Discharge Recommendations: Low Intensity Therapy (with 24/7 assist)   Discharge Equipment Recommendations: other (see comments) (TBD pending progress)   Barriers to discharge: None    Assessment:     Soila Chávez is a 74 y.o. female admitted with a medical diagnosis of Sepsis.  She presents with the following impairments/functional limitations: weakness, impaired endurance, impaired self care skills, impaired functional mobility, gait instability, impaired balance, impaired cognition, decreased coordination, decreased lower extremity function, pain, decreased safety awareness. Pt required Min A to transfer supine to sit.  She also required Min A to transfer sit to stand with a RW.  She was able to transfer sit to stand form the toilet with Min A and no AD.  Pt ambulated 10 ft to the toilet with a RW and CGA.  She ambulated 10 ft from toilet to bed with CGA and no AD.  Pt transferred  sit to supine with SBA.    Rec: Low Intensity Therapy  DME: TBD pending progress.    Rehab Prognosis: Good; patient would benefit from acute skilled PT services to address these deficits and reach maximum level of function.    Recent Surgery: * No surgery found *      Plan:     During this hospitalization, patient to be seen 5 x/week to address the identified rehab impairments via gait training, therapeutic activities, therapeutic exercises, neuromuscular re-education and progress toward the following goals:    Plan of Care Expires:  04/09/25    Subjective     Chief Complaint: abdominal and neck pain  Patient/Family Comments/goals: Pt agreeable to the evaluation  Pain/Comfort:  Pain Rating 1: 5/10  Location 1: abdomen  Pain Addressed 1: Distraction, Cessation of Activity, Nurse notified  Pain Rating Post-Intervention 1: 5/10  Pain Rating 2: 6/10 (once seated at EOB)  Location 2: neck  Pain Addressed 2: Distraction, Cessation of  Activity, Nurse notified  Pain Rating Post-Intervention 2: 5/10    Patients cultural, spiritual, Pentec[employer]alism conflicts given the current situation: no    Living Environment:  Pt lives with her daughter in a single story home with no steps to enter.  She has a sitter for 7 am to 4 pm.  Prior to admission, patients level of function was Mod I secondary to dementia.  Equipment used at home: shower chair.  DME owned (not currently used): none.  Upon discharge, patient will have assistance from a sitter and her daughter.    Objective:     Communicated with Nurse prior to session.  Patient found HOB elevated with blood pressure cuff, peripheral IV, pulse ox (continuous), telemetry  upon PT entry to room.    General Precautions: Standard, fall, seizure  Orthopedic Precautions:N/A   Braces: N/A  Respiratory Status: Room air    Exams:  Cognitive Exam:  Patient is oriented to Person and Place  Sensation:    -       Intact  light/touch reported initact  RLE ROM: WFL  RLE Strength: WFL except Hip flexion 3/5  LLE ROM: WFL  LLE Strength: WFL except Hip flexion 3/5    Functional Mobility:  Bed Mobility:     Rolling Right: stand by assistance  Supine to Sit: minimum assistance  Sit to Supine: stand by assistance  Transfers:     Sit to Stand:  minimum assistance and no AD from toilet with rolling walker  Gait: Pt ambulated 10 ft to the toilet with CGA and RW and 10 ft from toilet to bed with CGA and no AD.      AM-PAC 6 CLICK MOBILITY  Total Score:18       Treatment & Education:  Role of Physical Therapy  Plan of Care  Safe transfers with and without a RW  Use of Nurse call button  Pt verbalized understanding     Patient left HOB elevated with all lines intact, call button in reach, and Nurse notified.    GOALS:   Multidisciplinary Problems       Physical Therapy Goals          Problem: Physical Therapy    Goal Priority Disciplines Outcome Interventions   Physical Therapy Goal     PT, PT/OT Progressing    Description: Goals to be  met by: 2025    Patient will increase functional independence with mobility by performin. Sit<>stand with SBA with least restrictive assistive device.  2. Gait x 100 feet with RW with SBA.  3. Supine<>sit with SBA.                           DME Justifications:  No DME recommended requiring DME justifications    History:     Past Medical History:   Diagnosis Date    Allergy     Anxiety     Cataract     Colon polyps     Hearing loss     Hypertension     Joint pain     Mental disorder     Seizure-like activity 3/8/2025       Past Surgical History:   Procedure Laterality Date    CATARACT EXTRACTION W/  INTRAOCULAR LENS IMPLANT Right 3/12/2024    Procedure: EXTRACTION, CATARACT, WITH IOL INSERTION;  Surgeon: Jax Garza MD;  Location: [facility] OR;  Service: Ophthalmology;  Laterality: Right;    COLONOSCOPY N/A 3/5/2020    Procedure: COLONOSCOPY;  Surgeon: Nathalia Kemp MD;  Location: [address]);  Service: Colon and Rectal;  Laterality: N/A;  requests later appt time if available - Pt open to any MD- Pt informed arrival time may be adjusted, Pt verbalized understanding- [id number]    ESOPHAGOGASTRODUODENOSCOPY N/A 10/1/2020    Procedure: EGD (ESOPHAGOGASTRODUODENOSCOPY);  Surgeon: Casey Guerrero MD;  Location: [facility] ([address] [place] [address]);  Service: Endoscopy;  Laterality: N/A;  covid test -[facility]    ESOPHAGOGASTRODUODENOSCOPY N/A 10/29/2021    Procedure: EGD (ESOPHAGOGASTRODUODENOSCOPY);  Surgeon: Billy Vegas MD;  Location: [facility];  Service: Endoscopy;  Laterality: N/A;    HYSTERECTOMY      PERIPARTUM CHITO       Time Tracking:     PT Received On: 25  PT Start Time: 1038     PT Stop Time: 1115  PT Total Time (min): 37 min     Billable Minutes: Evaluation 12, Gait Training 12, and Therapeutic Activity 13      2025

## 2025-03-09 NOTE — PROGRESS NOTES
Pharmacokinetic Assessment Follow Up: IV Vancomycin    Vancomycin serum concentration assessment(s):    The random level was drawn correctly and can be used to guide therapy at this time. The measurement is below the desired definitive target range of 10 to 20 mcg/mL.    Vancomycin Regimen Plan:    Change regimen to Vancomycin 1250 mg IV every 24 hours with next serum trough concentration measured at 1600 prior to the dose on 3/11/25    Drug levels (last 3 results):  Recent Labs   Lab Result Units 03/08/25  0925 03/09/25  1000   Vancomycin, Random ug/mL 17.9 9.6       Pharmacy will continue to follow and monitor vancomycin.    Please contact pharmacy at extension [phone] for questions regarding this assessment.    Thank you for the consult,   Minda Cameron       Patient brief summary:  Soila Chávez is a 74 y.o. female initiated on antimicrobial therapy with IV Vancomycin for treatment of intra-abdominal infection    Drug Allergies:   Review of patient's allergies indicates:   Allergen Reactions    Lotensin [benazepril] Swelling    Penicillins Rash       Actual Body Weight:   74.4 kg    Renal Function:   Estimated Creatinine Clearance: 46 mL/min (based on SCr of 1.1 mg/dL).,     Dialysis Method (if applicable):  N/A    CBC (last 72 hours):  Recent Labs   Lab Result Units 03/07/25  1706 03/08/25  0209 03/09/25  0420   WBC K/uL 13.29* 15.76* 15.40*   Hemoglobin g/dL 11.9* 10.6* 10.8*   Hematocrit % 36.9* 34.0* 33.6*   Platelets K/uL 227 185 188   Gran % %  --  78.0* 83.2*   Lymph % %  --  5.0* 10.4*   Mono % %  --  5.0 4.9   Eosinophil % %  --  0.0 0.6   Basophil % %  --  0.0 0.3   Differential Method   --  Manual Automated       Metabolic Panel (last 72 hours):  Recent Labs   Lab Result Units 03/07/25  1706 03/07/25  1721 03/07/25  1722 03/08/25  0209 03/09/25  0419   Sodium mmol/L 138  --   --  140 140   Potassium mmol/L 3.6  --   --  3.4* 3.1*   Chloride mmol/L 107  --   --  113* 111*   CO2 mmol/L 17*  --   --   16* 20*   Glucose mg/dL 161*  --   --  131* 94   Glucose, UA   --  Negative  --   --   --    BUN mg/dL 23  --   --  24* 20   Creatinine mg/dL 1.6*  --   --  1.3 1.1   Creatinine, Urine mg/dL  --   --  164.4  --   --    Albumin g/dL 3.9  --   --  3.3* 3.0*   Total Bilirubin mg/dL 0.5  --   --  0.4 0.6   Alkaline Phosphatase U/L 61  --   --  51 57   AST U/L 31  --   --  25 26   ALT U/L 19  --   --  16 15   Magnesium mg/dL 2.9*  --   --  2.3 2.2   Phosphorus mg/dL 3.8  --   --  2.8 2.3*       Vancomycin Administrations:  vancomycin given in the last 96 hours                     vancomycin 750 mg in 0.9% NaCl 250 mL IVPB (admixture device) (mg) 750 mg New Bag 03/08/25 1121    vancomycin 1750 mg in 0.9% sodium chloride 500 mL IVPB (mg) 1,750 mg New Bag 03/07/25 1842                    Microbiologic Results:  Microbiology Results (last 7 days)       Procedure Component Value Units Date/Time    Blood culture x two cultures. Draw prior to antibiotics. [[id number]] Collected: 03/07/25 1729    Order Status: Completed Specimen: Blood from Peripheral, Antecubital, Left Updated: 03/08/25 2022     Blood Culture, Routine No Growth to date      No Growth to date    Narrative:      Aerobic and anaerobic    Blood culture x two cultures. Draw prior to antibiotics. [[id number]] Collected: 03/07/25 1718    Order Status: Completed Specimen: Blood from Peripheral, Hand, Right Updated: 03/08/25 2022     Blood Culture, Routine No Growth to date      No Growth to date    Narrative:      Aerobic and anaerobic

## 2025-03-09 NOTE — PROCEDURES
EEG    Date/Time: 3/9/2025 11:11 AM    Performed by: Osman Rudolph MD  Authorized by: Kaykay Montiel MD      ELECTROENCEPHALOGRAM REPORT     DATE OF SERVICE: 3/8/25  EEG NUMBER: OB   REQUESTED BY: Van  LOCATION OF SERVICE: [facility] [place]     METHODOLOGY              Electroencephalographic (EEG) recording is with electrodes placed according to the International 10-20 placement system.  Thirty two (32) channels of digital signal (sampling rate of 512/sec) including T1 and T2 was simultaneously recorded from the scalp and may include  EKG, EMG, and/or eye monitors.  Recording band pass was 0.1 to 512 hz.  Digital video recording of the patient is simultaneously recorded with the EEG.  The patient is instructed report clinical symptoms which may occur during the recording session.  EEG and video recording is stored and archived in digital format. Activation procedures which include photic stimulation, hyperventilation and instructing patients to perform simple task are done in selected patients.               The EEG is displayed on a monitor screen and can be reviewed using different montages.  Computer assisted analysis is employed to detect spike and electrographic seizure activity.   The entire record is submitted for computer analysis.  The entire recording is visually reviewed and the times identified by computer analysis as being spikes or seizures are reviewed again.  Compresses spectral analysis (CSA) is also performed on the activity recorded from each individual channel.  This is displayed as a power display of frequencies from 0 to 30 Hz over time.   The CSA is reviewed looking for asymmetries in power between homologous areas of the scalp and then compared with the original EEG recording.                [employer] software was also utilized in the review of this study.  This software suite analyzes the EEG recording in multiple domains.  Coherence and rhythmicity is computed to identify EEG sections  which may contain organized seizures.  Each channel undergoes analysis to detect presence of spike and sharp waves which have special and morphological characteristic of epileptic activity.  The routine EEG recording is converted from spacial into frequency domain.  This is then displayed comparing homologous areas to identify areas of significant asymmetry.  Algorithm to identify non-cortically generated artifact is used to separate eye movement, EMG and other artifact from the EEG     EEG FINDINGS  The record shows a fair  organization at rest, consisting of 7 Hz posterior dominant rhythm with fair  reactivity. There is mild bilateral beta activity. There is continuous diffuse theta and delta range background slowing.   Drowsiness is characterized by attenuation of the background, vertex waves, and bilateral theta slowing.      Provocative maneuvers including hyperventilation and photic stimulation were performed.      EKG recording shows a regular rhythm.     There is no push button or clinical event.     IMPRESSION:  Abnormal study due to mild to moderate  diffuse background slowing consistent with diffuse cerebral dysfunction and encephalopathy which may be on the basis of toxic, metabolic, or primary neuronal disorder.     Osman Rudolph MD

## 2025-03-09 NOTE — PT/OT/SLP EVAL
Occupational Therapy   Evaluation and Treatment    Name: Soila Chávez  MRN: [id number]  Admitting Diagnosis: Sepsis  Recent Surgery: * No surgery found *      Recommendations:     Discharge Recommendations: Low Intensity Therapy (with 24/7 supervision/assist from family)  Discharge Equipment Recommendations:   (will defer AD needs to PT)  Barriers to discharge:   (current functional level)    Assessment:   Initial OT eval/treat complete.  MIN sequencing cues needed throughout.  Confused though easily directed.  Ambulating CGA for steadying/safety with and without AD.  Requiring assist for back john hygiene in stance with BUE supported.  Able to don mesh underwear with increased time threading seated at toilet and requiring CGA for steadying while pulling clothing to waist.  Has shower chair.  Will defer AD needs to PT.  No toileting/bathing DME needs anticipated.  Recommend post acute Low Intensity therapy with continued 24/7 supervision/assist from family.  Lives with daughter that assists in evenings and nights; has a paid sitter that assists [phone].  To benefit from continued acute care OT services to increase independence in self-care/functional transfers.  OT to follow.      Soila Chávez is a 74 y.o. female with a medical diagnosis of Sepsis.  She presents with below deficits decreasing independence in self-care/functional transfers. Performance deficits affecting function: weakness, impaired endurance, impaired self care skills, impaired functional mobility, gait instability, impaired balance, impaired cognition, decreased coordination, decreased upper extremity function, decreased lower extremity function, decreased safety awareness, pain.      Rehab Prognosis: Good; patient would benefit from acute skilled OT services to address these deficits and reach maximum level of function.       Plan:     Patient to be seen 4 x/week to address the above listed problems via self-care/home management,  "therapeutic activities, therapeutic exercises  Plan of Care Expires: 03/23/25  Plan of Care Reviewed with: patient    Subjective     Chief Complaint: With c/o abdominal and neck pain.   Patient/Family Comments/goals: Pt. Expressed, "I do feel like I'm going to the bathroom."    Occupational Profile:  Lives with daughter   Has a sitter [phone]  Does not use AD to walk  Bathroom setup  Tub/shower combo  Shower chair  Standard toilet  SBA and setup for ADL tasks  Always has someone home  Equipment Used at Home: shower chair  Assistance upon Discharge: Lives with daughter that assists in evenings and nights; has a paid sitter that assists [phone].     Pain/Comfort:  Pain Rating 1: 5/10 (per FACES pain scale)  Location 1: abdomen  Pain Addressed 1: Distraction, Cessation of Activity, Nurse notified  Pain Rating Post-Intervention 1: 5/10  Pain Rating 2: 6/10 (per FACES pain scale; with c/o once seated EOB)  Location 2: neck  Pain Addressed 2: Distraction, Cessation of Activity, Nurse notified  Pain Rating Post-Intervention 2: 5/10    Patients cultural, spiritual, Christian conflicts given the current situation:  (None stated.)    Objective:     Communicated with: John ALCANTARA RN prior to session.  Patient found HOB elevated with peripheral IV (ICU monitroing.) upon OT entry to room.    General Precautions: Standard, fall, seizure (strict I&O's)  Orthopedic Precautions: N/A  Braces: N/A  Respiratory Status: Room air    Occupational Performance:    Bed Mobility:    Supine<>sit SBA  HOB elevated coming into sit  Bridging hips X 2 with MIN tactile cues for task initiation    Functional Mobility/Transfers:  Sit>stand with RW CGA  Ambulating bed>toilet with RW and toilet>bed without AD CGA  For steadying/safety  Step transfer to toilet CGA  For steadying/safety  Grab bar used    Activities of Daily Living:  Toileting voiding and having BM   TOTAL A for thorough back john hygiene   D/t bowel accident   LB dress donning mesh " underwear CGA  Increased time  Seated threading of clothing  Verbal cues for pulling clothing to waist    Cognitive/Visual Perceptual:  Cognitive/Psychosocial Skills:  -       Oriented to: Person and Place; states month is April, Christmas is the recent holiday that passed, and the year is 2010   -       Follows Commands/attention:Follows one-step commands  -       Communication: mumbles and speaks in low voice  -       Memory: Deficits noted  -       Safety awareness/insight to disability: impaired   -       Mood/Affect/Coping skills/emotional control: Cooperative    Physical Exam:  Postural examination/scapula alignment: -       Rounded shoulders  -       Forward head  -       Slight forward flexed trunk  Skin integrity: Visible skin intact  Upper Extremity Range of Motion:  WFL  Fine Motor Coordination: WFL for donning socks and underwear      AMPAC 6 Click ADL:  AMPAC Total Score: 18    Treatment & Education:  Educated on role of OT, POC, functional transfer/ADL safety, review of call light, and importance of calling for assist as needed.        Patient left HOB elevated with all lines intact, call button in reach, and nursing notified    GOALS:   Multidisciplinary Problems       Occupational Therapy Goals          Problem: Occupational Therapy    Goal Priority Disciplines Outcome Interventions   Occupational Therapy Goal     OT, PT/OT Progressing    Description: Goals to be met by: 3/23/2025     Patient will increase functional independence with ADLs by performing:    UE Dressing with Supervision.  LE Dressing with Supervision.  Grooming while standing at sink with Supervision.  Toileting from toilet with Supervision for hygiene and clothing management.   Toilet transfer to toilet with Supervision.                           History:     Past Medical History:   Diagnosis Date    Allergy     Anxiety     Cataract     Colon polyps 2010    Hearing loss     Hypertension     Joint pain     Mental disorder      Seizure-like activity 3/8/2025         Past Surgical History:   Procedure Laterality Date    CATARACT EXTRACTION W/  INTRAOCULAR LENS IMPLANT Right 3/12/2024    Procedure: EXTRACTION, CATARACT, WITH IOL INSERTION;  Surgeon: Jax Garza MD;  Location: [facility] OR;  Service: Ophthalmology;  Laterality: Right;    COLONOSCOPY N/A 3/5/2020    Procedure: COLONOSCOPY;  Surgeon: Nathalia Kemp MD;  Location: [facility] (4TH FLR);  Service: Colon and Rectal;  Laterality: N/A;  requests later appt time if available - Pt open to any MD- Pt informed arrival time may be adjusted, Pt verbalized understanding- ERW2/24/20@1409    ESOPHAGOGASTRODUODENOSCOPY N/A 10/1/2020    Procedure: EGD (ESOPHAGOGASTRODUODENOSCOPY);  Surgeon: Casey Guerrero MD;  Location: [facility] (4TH FLR);  Service: Endoscopy;  Laterality: N/A;  covid test 9/28[facility]    ESOPHAGOGASTRODUODENOSCOPY N/A 10/29/2021    Procedure: EGD (ESOPHAGOGASTRODUODENOSCOPY);  Surgeon: Billy Vegas MD;  Location: [facility];  Service: Endoscopy;  Laterality: N/A;    HYSTERECTOMY      PERIPARTUM CHITO       Time Tracking:     OT Date of Treatment: 03/09/25  OT Start Time: 1039  OT Stop Time: 1115  OT Total Time (min): 36 min    Overlap with PT for portions of session due to complex nature of patient, tolerance for therapeutic modalities, and safety with mobility to decrease fall risk for patient and caregiver injury requiring two skilled therapists to provide interventions.      [phone]:  Contacted daughter via phone for information on PLOF and previous living situation; Daughter given correct 2 patient identifier before discussion.      Billable Minutes:Evaluation 10  Self Care/Home Management 10    3/9/2025

## 2025-03-10 ENCOUNTER — PATIENT OUTREACH (OUTPATIENT)
Facility: OTHER | Age: 75
End: 2025-03-10
Payer: MEDICARE

## 2025-03-10 LAB
ALBUMIN SERPL BCP-MCNC: 2.6 G/DL (ref 3.5–5.2)
ALP SERPL-CCNC: 58 U/L (ref 40–150)
ALT SERPL W/O P-5'-P-CCNC: 11 U/L (ref 10–44)
ANION GAP SERPL CALC-SCNC: 9 MMOL/L (ref 8–16)
ANISOCYTOSIS BLD QL SMEAR: SLIGHT
AST SERPL-CCNC: 26 U/L (ref 10–40)
BASOPHILS # BLD AUTO: ABNORMAL K/UL (ref 0–0.2)
BASOPHILS NFR BLD: 0 % (ref 0–1.9)
BILIRUB SERPL-MCNC: 0.4 MG/DL (ref 0.1–1)
BUN SERPL-MCNC: 14 MG/DL (ref 8–23)
CALCIUM SERPL-MCNC: 8.3 MG/DL (ref 8.7–10.5)
CHLORIDE SERPL-SCNC: 117 MMOL/L (ref 95–110)
CO2 SERPL-SCNC: 17 MMOL/L (ref 23–29)
CREAT SERPL-MCNC: 0.8 MG/DL (ref 0.5–1.4)
DACRYOCYTES BLD QL SMEAR: ABNORMAL
DIFFERENTIAL METHOD BLD: ABNORMAL
EOSINOPHIL # BLD AUTO: ABNORMAL K/UL (ref 0–0.5)
EOSINOPHIL NFR BLD: 4 % (ref 0–8)
ERYTHROCYTE [DISTWIDTH] IN BLOOD BY AUTOMATED COUNT: 14.8 % (ref 11.5–14.5)
EST. GFR  (NO RACE VARIABLE): >60 ML/MIN/1.73 M^2
GLUCOSE SERPL-MCNC: 57 MG/DL (ref 70–110)
HCT VFR BLD AUTO: 28.7 % (ref 37–48.5)
HGB BLD-MCNC: 9.5 G/DL (ref 12–16)
IMM GRANULOCYTES # BLD AUTO: ABNORMAL K/UL (ref 0–0.04)
IMM GRANULOCYTES NFR BLD AUTO: ABNORMAL % (ref 0–0.5)
LYMPHOCYTES # BLD AUTO: ABNORMAL K/UL (ref 1–4.8)
LYMPHOCYTES NFR BLD: 14 % (ref 18–48)
MAGNESIUM SERPL-MCNC: 1.9 MG/DL (ref 1.6–2.6)
MCH RBC QN AUTO: 27.7 PG (ref 27–31)
MCHC RBC AUTO-ENTMCNC: 33.1 G/DL (ref 32–36)
MCV RBC AUTO: 84 FL (ref 82–98)
MONOCYTES # BLD AUTO: ABNORMAL K/UL (ref 0.3–1)
MONOCYTES NFR BLD: 5 % (ref 4–15)
NEUTROPHILS NFR BLD: 73 % (ref 38–73)
NEUTS BAND NFR BLD MANUAL: 4 %
NRBC BLD-RTO: 0 /100 WBC
OVALOCYTES BLD QL SMEAR: ABNORMAL
PHOSPHATE SERPL-MCNC: 2 MG/DL (ref 2.7–4.5)
PLATELET # BLD AUTO: 149 K/UL (ref 150–450)
PLATELET BLD QL SMEAR: ABNORMAL
PMV BLD AUTO: ABNORMAL FL (ref 9.2–12.9)
POIKILOCYTOSIS BLD QL SMEAR: SLIGHT
POTASSIUM SERPL-SCNC: 4.8 MMOL/L (ref 3.5–5.1)
PROT SERPL-MCNC: 5.8 G/DL (ref 6–8.4)
RBC # BLD AUTO: 3.43 M/UL (ref 4–5.4)
SODIUM SERPL-SCNC: 143 MMOL/L (ref 136–145)
WBC # BLD AUTO: 15.42 K/UL (ref 3.9–12.7)

## 2025-03-10 PROCEDURE — 85027 COMPLETE CBC AUTOMATED: CPT | Performed by: HOSPITALIST

## 2025-03-10 PROCEDURE — 85007 BL SMEAR W/DIFF WBC COUNT: CPT | Performed by: HOSPITALIST

## 2025-03-10 PROCEDURE — 36415 COLL VENOUS BLD VENIPUNCTURE: CPT | Performed by: HOSPITALIST

## 2025-03-10 PROCEDURE — 83735 ASSAY OF MAGNESIUM: CPT | Performed by: HOSPITALIST

## 2025-03-10 PROCEDURE — 63600175 PHARM REV CODE 636 W HCPCS: Performed by: HOSPITALIST

## 2025-03-10 PROCEDURE — 25000003 PHARM REV CODE 250: Performed by: HOSPITALIST

## 2025-03-10 PROCEDURE — 63600175 PHARM REV CODE 636 W HCPCS: Performed by: NURSE PRACTITIONER

## 2025-03-10 PROCEDURE — 94761 N-INVAS EAR/PLS OXIMETRY MLT: CPT

## 2025-03-10 PROCEDURE — 84100 ASSAY OF PHOSPHORUS: CPT | Performed by: HOSPITALIST

## 2025-03-10 PROCEDURE — 36410 VNPNXR 3YR/> PHY/QHP DX/THER: CPT

## 2025-03-10 PROCEDURE — 97530 THERAPEUTIC ACTIVITIES: CPT

## 2025-03-10 PROCEDURE — 20000000 HC ICU ROOM

## 2025-03-10 PROCEDURE — 25000003 PHARM REV CODE 250: Performed by: NURSE PRACTITIONER

## 2025-03-10 PROCEDURE — 97110 THERAPEUTIC EXERCISES: CPT

## 2025-03-10 PROCEDURE — 80053 COMPREHEN METABOLIC PANEL: CPT | Performed by: HOSPITALIST

## 2025-03-10 RX ORDER — METRONIDAZOLE 500 MG/100ML
500 INJECTION, SOLUTION INTRAVENOUS
Status: DISCONTINUED | OUTPATIENT
Start: 2025-03-10 | End: 2025-03-12

## 2025-03-10 RX ORDER — CEFTRIAXONE 1 G/1
1 INJECTION, POWDER, FOR SOLUTION INTRAMUSCULAR; INTRAVENOUS
Status: DISCONTINUED | OUTPATIENT
Start: 2025-03-10 | End: 2025-03-13 | Stop reason: HOSPADM

## 2025-03-10 RX ADMIN — SODIUM BICARBONATE 650 MG: 650 TABLET ORAL at 08:03

## 2025-03-10 RX ADMIN — HEPARIN SODIUM 5000 UNITS: 5000 INJECTION INTRAVENOUS; SUBCUTANEOUS at 03:03

## 2025-03-10 RX ADMIN — QUETIAPINE FUMARATE 25 MG: 25 TABLET ORAL at 09:03

## 2025-03-10 RX ADMIN — SODIUM BICARBONATE 650 MG: 650 TABLET ORAL at 09:03

## 2025-03-10 RX ADMIN — QUETIAPINE FUMARATE 25 MG: 25 TABLET ORAL at 08:03

## 2025-03-10 RX ADMIN — HEPARIN SODIUM 5000 UNITS: 5000 INJECTION INTRAVENOUS; SUBCUTANEOUS at 05:03

## 2025-03-10 RX ADMIN — PANTOPRAZOLE SODIUM 40 MG: 40 TABLET, DELAYED RELEASE ORAL at 09:03

## 2025-03-10 RX ADMIN — METRONIDAZOLE 500 MG: 500 INJECTION, SOLUTION INTRAVENOUS at 08:03

## 2025-03-10 RX ADMIN — CEFTRIAXONE 1 G: 1 INJECTION, POWDER, FOR SOLUTION INTRAMUSCULAR; INTRAVENOUS at 12:03

## 2025-03-10 RX ADMIN — MUPIROCIN: 20 OINTMENT TOPICAL at 08:03

## 2025-03-10 RX ADMIN — SODIUM CHLORIDE, POTASSIUM CHLORIDE, SODIUM LACTATE AND CALCIUM CHLORIDE: 600; 310; 30; 20 INJECTION, SOLUTION INTRAVENOUS at 03:03

## 2025-03-10 RX ADMIN — PRAVASTATIN SODIUM 80 MG: 20 TABLET ORAL at 09:03

## 2025-03-10 RX ADMIN — SODIUM CHLORIDE, POTASSIUM CHLORIDE, SODIUM LACTATE AND CALCIUM CHLORIDE: 600; 310; 30; 20 INJECTION, SOLUTION INTRAVENOUS at 05:03

## 2025-03-10 RX ADMIN — MUPIROCIN: 20 OINTMENT TOPICAL at 09:03

## 2025-03-10 RX ADMIN — METRONIDAZOLE 500 MG: 500 INJECTION, SOLUTION INTRAVENOUS at 12:03

## 2025-03-10 NOTE — ASSESSMENT & PLAN NOTE
"Patient presented with nausea, vomiting, and abdominal pain.  Ultrasound and CT scan shows evidence of cholelithiasis.  Ultrasound shows stones in the gallbladder neck but no evidence of acute cholecystitis.  CT scan showed "colonic wall thickening with mild surrounding inflammatory changes seen involving moderate segment length of mid to distal descending colon and proximal sigmoid colon."    Started on empiric broad-spectrum coverage intravenous vancomycin, aztreonam, and metronidazole.  Blood pressure improving and lactic acidosis resolved. History of rash with penicillin however previously prescribed cephalosporin without documented reaction.  Suspect would tolerate intravenous cephalosporin.  Treat with ceftriaxone and metronidazole.  Discontinue vancomycin.    Clinically improving.  Oral intake improving.  Delayed with phlebotomy.  Awaiting morning labs.    Echocardiogram shows normal systolic and diastolic function.  "

## 2025-03-10 NOTE — PLAN OF CARE
Problem: Adult Inpatient Plan of Care  Goal: Plan of Care Review  Outcome: Progressing  Flowsheets (Taken 3/10/2025 0606)  Plan of Care Reviewed With: patient  Goal: Absence of Hospital-Acquired Illness or Injury  Outcome: Progressing  Intervention: Identify and Manage Fall Risk  Flowsheets (Taken 3/10/2025 0606)  Safety Promotion/Fall Prevention:   assistive device/personal item within reach   bed alarm set   commode/urinal/bedpan at bedside   Fall Risk reviewed with patient/family   Fall Risk signage in place   high risk medications identified   medications reviewed   lighting adjusted   pulse ox   room near unit station   side rails raised x 2  Intervention: Prevent Skin Injury  Flowsheets (Taken 3/10/2025 0606)  Body Position:   position changed independently   turned  Skin Protection:   drying agents applied   incontinence pads utilized  Device Skin Pressure Protection:   absorbent pad utilized/changed   adhesive use limited   positioning supports utilized   pressure points protected   skin-to-device areas padded  Intervention: Prevent and Manage VTE (Venous Thromboembolism) Risk  Flowsheets (Taken 3/10/2025 0606)  VTE Prevention/Management:   ambulation promoted   bleeding precautions maintained   bleeding risk assessed   ROM (active) performed   ROM (passive) performed   intravenous hydration  Intervention: Prevent Infection  Flowsheets (Taken 3/10/2025 0606)  Infection Prevention:   cohorting utilized   hand hygiene promoted   equipment surfaces disinfected   rest/sleep promoted   single patient room provided     Problem: Infection  Goal: Absence of Infection Signs and Symptoms  Outcome: Progressing  Intervention: Prevent or Manage Infection  Flowsheets (Taken 3/10/2025 0606)  Fever Reduction/Comfort Measures:   lightweight bedding   lightweight clothing  Infection Management: aseptic technique maintained  Isolation Precautions: precautions maintained

## 2025-03-10 NOTE — ASSESSMENT & PLAN NOTE
Family reported seizure-like activity where patient briefly and responsible foaming at the mouth EN route to the hospital.  No known history of seizures.  CT head negative for acute pathology.  Patient has been on chronic alprazolam at home.  Withdrawal seizure? Memantine and donepezil have been associated with seizures.  Will hold.  Seizure precautions. Neurology consulted.  Recommending holding on seizure medications this time.  EEG shoed mild-to-moderate slowing but no epileptiform activity noted.

## 2025-03-10 NOTE — ASSESSMENT & PLAN NOTE
Suspect due to volume depletion and sepsis.  Improving with volume resuscitation.  Serum creatinine trending down.  Reasonable urine output.  Start sodium bicarbonate.  Continue to monitor.  Patient given potassium replacement.  Morning labs from today pending.  Follow up with morning labs.

## 2025-03-10 NOTE — PLAN OF CARE
Medicare Message     Important Message from Medicare regarding Discharge Appeal Rights Explained to patient/caregiver; Signed/date by patient/caregiver   Date IMM was signed 3/9/2025   Time IMM was signed 0900

## 2025-03-10 NOTE — PROGRESS NOTES
"[facility] Intensive Care [facility] Medicine  Progress Note    Patient Name: Soila Chávez  MRN: [id number]  Patient Class: IP- Inpatient   Admission Date: 3/7/2025  Length of Stay: 2 days  Attending Physician: Placido Moore MD  Primary Care Provider: Parminder Junior MD        Principal Problem:Sepsis        HPI:  Per Nathalia Barnett, NP:    "Soila Chávez is a 74 year old female with a past medical history of HTN, asymptomatic bradycardia, aortic atherosclerosis, dementia, HTN, chronic back pain, anxiety and GERD who presents with nausea and vomiting that started this afternoon after she ate chicken. She reports associated RLQ pain.Patient's daughter found patient on floor actively vomiting and called PCP who instructed her to bring patient to ER for further evaluation.  Per ED note, patient had a syncopal episode en route to the hospital. No history of seizure or past syncopal episodes.  Per chart review, patient was recently seen in the ED three weeks ago with N/V with abdominal pain and found to have DA (2.2) with hypotension 80/50's. She was treated with IV fluids and discharged home. Patient was seen by her cardiologist and found to by hypotensive. She was instructed to hold her blood pressure medications.      Upon arrival to the ED, patient found to be hypotensive ED work up significant for elevated WBC 13.29, UA negative for UTI,  Creatinine 1.6 (appears 1.8-1.0 at baseline) and CO2 17. CT findings revealed acute colitis and constipation.  Chest x-ray showed no acute cardiopulmonary process.  CT head showed no acute abnormality.  Abdominal ultrasound showed cholelithiasis with stones in the gallbladder neck without acute cholecystitis.  Patient received 2 L IV fluids with improvement in her blood pressure.  She was started on IV aztreonam and IV metronidazole.  She was referred to Hospital Medicine and will be admitted for further evaluation and " "management."    Overview/Hospital Course:  Patient is a 74 year woman history of hypertension, gastroesophageal reflux disease, chronic lower back pain, and dementia admitted to the hospital with sepsis secondary to infectious colitis with hypotension and acute kidney injury and lactic acidosis.  Patient volume resuscitated with intravenous fluids started intravenous antibiotic therapy.  Questionable seizure-like activity.  Neurology service consulted.    Ultrasound and CT scan shows evidence of cholelithiasis.  Ultrasound shows stones in the gallbladder neck but no evidence of acute cholecystitis.  CT scan showed "colonic wall thickening with mild surrounding inflammatory changes seen involving moderate segment length of mid to distal descending colon and proximal sigmoid colon."    Interval History:  Overall improving.  Oral intake improving.  Less confused.    Review of Systems   Constitutional:  Negative for chills and fever.   Respiratory:  Negative for shortness of breath.    Cardiovascular:  Negative for chest pain.   Gastrointestinal:  Negative for abdominal pain, nausea and vomiting.   Genitourinary:  Negative for dysuria and frequency.   Psychiatric/Behavioral:  Positive for confusion.      Objective:     Vital Signs (Most Recent):  Temp: 98.5 °F (36.9 °C) (03/10/25 0705)  Pulse: 67 (03/10/25 0805)  Resp: 19 (03/10/25 0805)  BP: (!) 97/54 (03/10/25 0805)  SpO2: 97 % (03/10/25 0805) Vital Signs (24h Range):  Temp:  [98.1 °F (36.7 °C)-98.5 °F (36.9 °C)] 98.5 °F (36.9 °C)  Pulse:  [] 67  Resp:  [12-31] 19  SpO2:  [89 %-100 %] 97 %  BP: ()/(51-68) 97/54     Weight: 74.4 kg (164 lb 0.4 oz)  Body mass index is 27.29 kg/m².    Intake/Output Summary (Last 24 hours) at 3/10/2025 1013  Last data filed at 3/10/2025 0555  Gross per 24 hour   Intake 1728.62 ml   Output --   Net 1728.62 ml         Physical Exam  Constitutional:       General: She is not in acute distress.     Appearance: She is obese. She is " "ill-appearing.   HENT:      Head: Atraumatic.   Eyes:      Conjunctiva/sclera: Conjunctivae normal.   Cardiovascular:      Rate and Rhythm: Normal rate and regular rhythm.      Heart sounds: Normal heart sounds. No murmur heard.  Pulmonary:      Effort: Pulmonary effort is normal.      Breath sounds: Normal breath sounds. No wheezing.   Abdominal:      General: Bowel sounds are normal. There is no distension.      Palpations: Abdomen is soft.      Tenderness: There is abdominal tenderness.   Musculoskeletal:         General: No deformity. Normal range of motion.      Cervical back: Neck supple.   Neurological:      Mental Status: She is alert. Mental status is at baseline.      Comments: Less confused, answer questions appropriately, approaching reported baseline               Significant Labs: All pertinent labs within the past 24 hours have been reviewed.    Significant Imaging: I have reviewed all pertinent imaging results/findings within the past 24 hours.      Assessment & Plan  Sepsis  Infectious colitis  Lactic acidosis  Patient presented with nausea, vomiting, and abdominal pain.  Ultrasound and CT scan shows evidence of cholelithiasis.  Ultrasound shows stones in the gallbladder neck but no evidence of acute cholecystitis.  CT scan showed "colonic wall thickening with mild surrounding inflammatory changes seen involving moderate segment length of mid to distal descending colon and proximal sigmoid colon."    Started on empiric broad-spectrum coverage intravenous vancomycin, aztreonam, and metronidazole.  Blood pressure improving and lactic acidosis resolved. History of rash with penicillin however previously prescribed cephalosporin without documented reaction.  Suspect would tolerate intravenous cephalosporin.  Treat with ceftriaxone and metronidazole.  Discontinue vancomycin.    Clinically improving.  Oral intake improving.  Delayed with phlebotomy.  Awaiting morning labs.    Echocardiogram shows normal " systolic and diastolic function.  Acute kidney injury  Metabolic acidosis  Suspect due to volume depletion and sepsis.  Improving with volume resuscitation.  Serum creatinine trending down.  Reasonable urine output.  Start sodium bicarbonate.  Continue to monitor.  Patient given potassium replacement.  Morning labs from today pending.  Follow up with morning labs.  Seizure-like activity  Family reported seizure-like activity where patient briefly and responsible foaming at the mouth EN route to the hospital.  No known history of seizures.  CT head negative for acute pathology.  Patient has been on chronic alprazolam at home.  Withdrawal seizure? Memantine and donepezil have been associated with seizures.  Will hold.  Seizure precautions. Neurology consulted.  Recommending holding on seizure medications this time.  EEG shoed mild-to-moderate slowing but no epileptiform activity noted.  Hypertension  Patient presented with hypotension.  Holding blood pressure medications.  Norepinephrine infusion ordered however not needed.  Blood pressure recovering with volume resuscitation empiric antibiotic therapy.  Alzheimer's dementia with behavioral disturbance  Appears at baseline.  Hold dementia medications due to concern for possible association with seizures.  VTE Risk Mitigation (From admission, onward)           Ordered     heparin (porcine) injection 5,000 Units  Every 8 hours         03/07/25 2359     IP VTE HIGH RISK PATIENT  Once         03/07/25 2359     Place sequential compression device  Until discontinued         03/07/25 2359                    Placido Moore MD  Department of Hospital Medicine   [facility] Intensive [facility])

## 2025-03-10 NOTE — CONSULTS
[facility] ([place])  Adult Nutrition  Consult Note    SUMMARY     Recommendations  - Boost Breeze TID while on clear liquid diet   - Monitor PO intake and tolerance   - Adv diet as medically able. If unable to adv oral diet consider nutrition support: Clinimix-E 4.25/5 @ 75 mL/hr to provide 77 g AA, 90 g D, 612 kcal; 1112 kcal with IVFE   - Monitor labs/lytes. Correct as indicated   - RD to follow to monitor nutrition status    Goals: Pt will have diet adv to solids and able to tolerate >50% EEN/EPN by RD follow up  Nutrition Goal Status: new  Communication of RD Recs: reviewed with RN (britt)    Nutrition Discharge Planning     Nutrition Discharge Planning: Too early to determine, pending clinical course    Assessment and Plan  Nutrition Problem  Inadequate energy - protein intake    Related to (etiology):   Altered GI function    Signs and Symptoms (as evidenced by):   Infectious colitis, clear liquid diet, Nausea/vomiting prior to admission, abd pain    Interventions  (treatment strategy):  Commercial beverages  Liquid modified diet  Collaboration of care with other providers    Nutrition Diagnosis Status:   New      Malnutrition Assessment       ZAIN at this time. RD to follow up.                                 Reason for Assessment    Reason For Assessment: consult (low ritchie)  Diagnosis: infection/sepsis  General Information Comments: Pt presented to ED with nausea and vomiting that started after she ate chicken. Receiving a clear liquid diet. Did not eat breakfast this morning. Poor appetite and intake noted while on clear liquids. Pt diagnosed with sepsis secondary to infectious colitis with hypotension, acute kidney injury and lactic acidosis. Pt with pain from colitis. Hx of dementia noted. Speaking with other providers at assessment. PIV. Midline. Ritchie 15-skin intact. ZAIN NFPE pt with other providers.    Nutrition Related Social Determinants of Health: SDOH: Adequate food in home  "environment    Nutrition/Diet History    Spiritual, Cultural Beliefs, Zoroastrianism Practices, Values that Affect Care: no  Food Allergies: NKFA  Factors Affecting Nutritional Intake: clear liquid diet, altered gastrointestinal function, nausea/vomiting, impaired cognitive status/motor control, constipation    Anthropometrics    Height: 5' 5" (165.1 cm)  Height (inches): 65 in  Height Method: Stated  Weight: 74.4 kg (164 lb 0.4 oz)  Weight (lb): 164.02 lb  Weight Method: Estimated  Ideal Body Weight (IBW), Female: 125 lb  % Ideal Body Weight, Female (lb): 131.22 %  BMI (Calculated): 27.3  BMI Grade: 25 - 29.9 - overweight       Lab/Procedures/Meds    Pertinent Labs Reviewed: reviewed  Pertinent Labs Comments: P 2(L); Cl 117(H); CO2 17(L); glucose 57(L); Ca 8.3(L); T PRO 5.8(L); albumin 2.6(L)  Pertinent Medications Reviewed: reviewed  Scheduled Meds:   cefTRIAXone (Rocephin) IV (PEDS and ADULTS)  1 g Intravenous Q24H    heparin (porcine)  5,000 Units Subcutaneous Q8H    metroNIDAZOLE IV (PEDS and ADULTS)  500 mg Intravenous Q8H    mupirocin   Nasal BID    pantoprazole  40 mg Oral Daily    polyethylene glycol  17 g Oral Daily    pravastatin  80 mg Oral Daily    QUEtiapine  25 mg Oral BID    senna-docusate 8.6-50 mg  1 tablet Oral BID    sodium bicarbonate  650 mg Oral BID     Continuous Infusions:   lactated ringers   Intravenous Continuous 100 mL/hr at 03/10/25 1530 New Bag at 03/10/25 1530     PRN Meds:.  Current Facility-Administered Medications:     acetaminophen, 1,000 mg, Oral, Q8H PRN    ALPRAZolam, 0.25 mg, Oral, BID PRN    melatonin, 6 mg, Oral, Nightly PRN    naloxone, 0.02 mg, Intravenous, PRN    ondansetron, 4 mg, Intravenous, Q6H PRN    prochlorperazine, 5 mg, Intravenous, Q6H PRN    sodium chloride 0.9%, 10 mL, Intravenous, Q8H PRN    Flushing PICC/Midline Protocol, , , Until Discontinued **AND** sodium chloride 0.9%, 10 mL, Intravenous, Q12H PRN    Estimated/Assessed Needs    Weight Used For Calorie " Calculations: 74.4 kg (164 lb 0.4 oz)  Energy Calorie Requirements (kcal): 1625  Energy Need Method: Bland-St Jeor (x 1.3)  Protein Requirements: 75g (1 g/kg)  Weight Used For Protein Calculations: 74.4 kg (164 lb 0.4 oz)  Fluid Requirements (mL): 1 mL/kcal  Estimated Fluid Requirement Method: RDA Method  RDA Method (mL): 1625       Nutrition Prescription Ordered    Current Diet Order: Clear Liquid Diet    Evaluation of Received Nutrient/Fluid Intake    % Kcal Needs: < 25%  IV Fluid (mL): 2400 (LR)  I/O: 360/-  Energy Calories Required: not meeting needs  Protein Required: not meeting needs  Fluid Required: meeting needs  Comments: LBM: 3/10  Tolerance: not tolerating  % Intake of Estimated Energy Needs: 0 - 25 %  % Meal Intake: 0 - 25 %    Nutrition Risk    Level of Risk/Frequency of Follow-up: moderate - high     Monitor and Evaluation    Monitor and Evaluation: Food and beverage intake, Diet order, Weight, Electrolyte and renal panel, Glucose/endocrine profile, Inflammatory profile, Nutrition focused physical findings     Nutrition Follow-Up    RD Follow-up?: Yes    Leann Milton, NOEMYN, LDN

## 2025-03-10 NOTE — CONSULTS
.[facility]ensive [facility])  Gastroenterology  Consult Note    Patient Name: Soila Chávez  MRN: [id number]  Admission Date: 3/7/2025  Hospital Length of Stay: 2 days  Code Status: Full Code   Primary Care Physician: Parminder Junior MD  Principal Problem:Sepsis    Inpatient consult to Gastroenterology  Consult performed by: Billy Vegas MD  Consult ordered by: Nathalia Barnett NP        Subjective:     Chief complaint:  Abdominal pain, abnormal CT    HPI:  78-year-old woman with a history of dementia who was in her usual state of health until 3 days ago when she had the sudden onset abdominal discomfort and nausea and vomiting.  History obtained from the patient's daughter.  She apparently had a syncopal episode and wrote to the hospital.  She was reported to be dehydrated.  A CT scan showed colonic wall thickening with mild inflammation in the descending and sigmoid colon.  She also had a significant amount of stool in the distal colon.  She has since been given laxatives and passed a significant amount of stool.  No hematochezia noted.  Overall, her pain seems better at this point, but she still reports some lower abdominal pain.    The patient's appetite has been decreased for a few months.    Past medical history:  Past Medical History:   Diagnosis Date    Allergy     Anxiety     Cataract     Colon polyps 2010    Hearing loss     Hypertension     Joint pain     Mental disorder     Seizure-like activity 3/8/2025       Past surgical history:  Past Surgical History:   Procedure Laterality Date    CATARACT EXTRACTION W/  INTRAOCULAR LENS IMPLANT Right 3/12/2024    Procedure: EXTRACTION, CATARACT, WITH IOL INSERTION;  Surgeon: Jax Graza MD;  Location: [facility];  Service: Ophthalmology;  Laterality: Right;    COLONOSCOPY N/A 3/5/2020    Procedure: COLONOSCOPY;  Surgeon: Nathalia Kemp MD;  Location: [facility] ([address] [place] [address]);  Service: Colon and Rectal;  Laterality: N/A;  requests  later appt time if available - Pt open to any MD- Pt informed arrival time may be adjusted, Pt verbalized understanding- ERW2/24/20@1409    ESOPHAGOGASTRODUODENOSCOPY N/A 10/1/2020    Procedure: EGD (ESOPHAGOGASTRODUODENOSCOPY);  Surgeon: Casey Guerrero MD;  Location: [facility] (4TH FLR);  Service: Endoscopy;  Laterality: N/A;  covid test 9/28-[facility]    ESOPHAGOGASTRODUODENOSCOPY N/A 10/29/2021    Procedure: EGD (ESOPHAGOGASTRODUODENOSCOPY);  Surgeon: Billy Vegas MD;  [facility];  Service: Endoscopy;  Laterality: N/A;    HYSTERECTOMY      PERIPARTUM CHITO       Social history:  Social History     Socioeconomic History    Marital status:    Tobacco Use    Smoking status: Never     Passive exposure: Never    Smokeless tobacco: Never   Substance and Sexual Activity    Alcohol use: Yes     Comment: beer occasionally    Drug use: No    Sexual activity: Not Currently     Birth control/protection: None   Social History Narrative    June 2016    The patient reports that she lives alone normally, however her daughter recently moved in with her    She has a 43-year-old daughter, Saima    And a 26-year-old daughterChris, who is a schoolteacher for 6 in seventh grade in [facility]    She is retired from working as a  in the school system    She now works about 5-15 hours a week for [employer], which she enjoys.    Enjoys working at [employer] couple days a week     Social Drivers of Health     Financial Resource Strain: Low Risk  (3/8/2025)    Overall Financial Resource Strain (CARDIA)     Difficulty of Paying Living Expenses: Not hard at all   Food Insecurity: No Food Insecurity (3/8/2025)    Hunger Vital Sign     Worried About Running Out of Food in the Last Year: Never true     Ran Out of Food in the Last Year: Never true   Transportation Needs: No Transportation Needs (2/18/2025)    PRAPARE - Transportation     Lack of Transportation (Medical): No     Lack of Transportation  (Non-Medical): No   Physical Activity: Inactive (3/8/2025)    Exercise Vital Sign     Days of Exercise per Week: 0 days     Minutes of Exercise per Session: 0 min   Stress: No Stress Concern Present (3/8/2025)    [employer] - Occupational Stress Questionnaire     Feeling of Stress : Not at all   Housing Stability: Low Risk  (3/8/2025)    Housing Stability Vital Sign     Unable to Pay for Housing in the Last Year: No     Number of Times Moved in the Last Year: 0     Homeless in the Last Year: No       Family history:  Family History   Problem Relation Name Age of Onset    Breast cancer Sister Ingris     Colon cancer Neg Hx      Ovarian cancer Neg Hx      Melanoma Neg Hx         Medications:  Prescriptions Prior to Admission[1]    Allergies:  Review of patient's allergies indicates:   Allergen Reactions    Lotensin [benazepril] Swelling    Penicillins Rash       Review of systems:  Unable to assess because of dementia    Objective:     Vital Signs (Most Recent):  Temp: 98.3 °F (36.8 °C) (03/10/25 1105)  Pulse: 98 (03/10/25 1705)  Resp: (!) 21 (03/10/25 1705)  BP: (!) 126/91 (03/10/25 1705)  SpO2: 99 % (03/10/25 1705) Vital Signs (24h Range):  Temp:  [98.1 °F (36.7 °C)-98.5 °F (36.9 °C)] 98.3 °F (36.8 °C)  Pulse:  [] 98  Resp:  [14-25] 21  SpO2:  [95 %-100 %] 99 %  BP: ()/(54-91) 126/91     Physical examination:  General: well developed, well nourished, no apparent distress  HENT: NCAT, hearing grossly intact  Eyes:  anicteric sclera  Cardiovascular: Regular rate and rhythm.   Lungs: Non-labored respirations. Breath sounds equal.   Abdomen: soft, NTND, normoactive BS  Neuro:  Alert, cooperative  Psych:  Flat affect     Labs:  CBC:   Recent Labs   Lab 03/09/25  0420 03/10/25  1141   WBC 15.40* 15.42*   HGB 10.8* 9.5*   HCT 33.6* 28.7*    149*     CMP:   Recent Labs   Lab 03/10/25  1141   GLU 57*   CALCIUM 8.3*   ALBUMIN 2.6*   PROT 5.8*      K 4.8   CO2 17*   *    BUN 14   CREATININE 0.8   ALKPHOS 58   ALT 11   AST 26   BILITOT 0.4       Imaging:    CT Impression:     1. Abnormal colonic wall thickening with mild surrounding inflammatory changes seen involving moderate segment length of mid to distal descending colon and proximal sigmoid colon.  Findings are most suggestive for acute colitis.  Scattered colonic diverticula are seen, however length of colonic segment involvement is felt more likely to represent acute colitis and less likely to represent acute diverticulitis.  2. Moderate volume retained stool seen in the colon with moderate to large retained stool in the rectosigmoid colon with prominent rectal stool ball.  Correlate for constipation and possible fecal impaction.  3. Scattered colonic diverticulosis.  4. Cholelithiasis        Ultrasound Impression:     Cholelithiasis with stones at the gallbladder neck.  Otherwise no ultrasonographic evidence to suggest acute cholecystitis.    Assessment:   74-year-old woman with dementia who presented with a sudden onset of abdominal pain, nausea, and vomiting.  There is colonic inflammation in the descending and sigmoid colon.  Ischemic colitis is possible as it will cause sudden onset abdominal pain as well as colitis in this area.  However, it is usually associated with diarrhea.  The patient did not seem to have diarrhea until she was given laxatives.  However, because of colonic abnormality, a sigmoidoscopy will be performed.  Her daughters do not believe she would drink the prep required to do a full colonoscopy.  Risks and benefits of the procedure were discussed.    Plan:   1. Flexible sigmoidoscopy in the morning   2. Enemas prior to procedure  3. After procedure, she can likely be advanced to a regular diet  4. Continue IV hydration       Thank you for your consult.     Billy Vegas MD  Gastroenterology  [facility] ([place])        [1]   Medications Prior to Admission   Medication Sig Dispense  Refill Last Dose/Taking    ALPRAZolam (XANAX) 0.5 MG tablet Take 0.5 mg by mouth 2 (two) times daily as needed for Anxiety.   3/7/2025    aspirin (ECOTRIN) 81 MG EC tablet Take 81 mg by mouth nightly.    3/6/2025    cetirizine (ZYRTEC) 10 MG tablet Take 1 tablet (10 mg total) by mouth daily as needed for Allergies. 90 tablet 3 Past Month    donepeziL (ARICEPT) 10 MG tablet Take 1 tablet (10 mg total) by mouth every evening. 90 tablet 1 3/6/2025    meloxicam (MOBIC) 15 MG tablet Take 1 tablet (15 mg total) by mouth once daily. 30 tablet 0 Past Month    memantine (NAMENDA) 10 MG Tab Take 1 tablet (10 mg total) by mouth 2 (two) times daily. 180 tablet 1 3/7/2025    mirtazapine (REMERON) 15 MG tablet Take 1 tablet (15 mg total) by mouth every evening. 30 tablet 5 3/6/2025    omeprazole (PRILOSEC) 40 MG capsule Take 1 capsule (40 mg total) by mouth every morning. 90 capsule 3 3/7/2025    potassium chloride (KLOR-CON 10) 10 MEQ TbSR Take 1 tablet every day by oral route. 30 tablet 0 3/6/2025    pravastatin (PRAVACHOL) 80 MG tablet Take 1 tablet (80 mg total) by mouth once daily. 90 tablet 3 3/6/2025    QUEtiapine (SEROQUEL) 25 MG Tab Take 1 tablet (25 mg total) by mouth 2 (two) times daily. 180 tablet 1 3/7/2025    valsartan-hydrochlorothiazide (DIOVAN-HCT) 160-12.5 mg per tablet Take 1 tablet by mouth once daily. 30 tablet 6 3/7/2025    azelastine (ASTELIN) 137 mcg (0.1 %) nasal spray instill 1 spray (137 mcg total) by Nasal route 2 (two) times daily. 30 mL 3 More than a month    mometasone 0.1% (ELOCON) 0.1 % cream apply to affected area topically daily 45 g 3 More than a month

## 2025-03-10 NOTE — SUBJECTIVE & OBJECTIVE
Interval History:  Overall improving.  Oral intake improving.  Less confused.    Review of Systems   Constitutional:  Negative for chills and fever.   Respiratory:  Negative for shortness of breath.    Cardiovascular:  Negative for chest pain.   Gastrointestinal:  Negative for abdominal pain, nausea and vomiting.   Genitourinary:  Negative for dysuria and frequency.   Psychiatric/Behavioral:  Positive for confusion.      Objective:     Vital Signs (Most Recent):  Temp: 98.5 °F (36.9 °C) (03/10/25 0705)  Pulse: 67 (03/10/25 0805)  Resp: 19 (03/10/25 0805)  BP: (!) 97/54 (03/10/25 0805)  SpO2: 97 % (03/10/25 0805) Vital Signs (24h Range):  Temp:  [98.1 °F (36.7 °C)-98.5 °F (36.9 °C)] 98.5 °F (36.9 °C)  Pulse:  [] 67  Resp:  [12-31] 19  SpO2:  [89 %-100 %] 97 %  BP: ()/(51-68) 97/54     Weight: 74.4 kg (164 lb 0.4 oz)  Body mass index is 27.29 kg/m².    Intake/Output Summary (Last 24 hours) at 3/10/2025 1013  Last data filed at 3/10/2025 0555  Gross per 24 hour   Intake 1728.62 ml   Output --   Net 1728.62 ml         Physical Exam  Constitutional:       General: She is not in acute distress.     Appearance: She is obese. She is ill-appearing.   HENT:      Head: Atraumatic.   Eyes:      Conjunctiva/sclera: Conjunctivae normal.   Cardiovascular:      Rate and Rhythm: Normal rate and regular rhythm.      Heart sounds: Normal heart sounds. No murmur heard.  Pulmonary:      Effort: Pulmonary effort is normal.      Breath sounds: Normal breath sounds. No wheezing.   Abdominal:      General: Bowel sounds are normal. There is no distension.      Palpations: Abdomen is soft.      Tenderness: There is abdominal tenderness.   Musculoskeletal:         General: No deformity. Normal range of motion.      Cervical back: Neck supple.   Neurological:      Mental Status: She is alert. Mental status is at baseline.      Comments: Less confused, answer questions appropriately, approaching reported baseline                Significant Labs: All pertinent labs within the past 24 hours have been reviewed.    Significant Imaging: I have reviewed all pertinent imaging results/findings within the past 24 hours.

## 2025-03-10 NOTE — PT/OT/SLP PROGRESS
Physical Therapy Treatment    Patient Name:  Soila Chávez   MRN:  [id number]    Recommendations:     Discharge Recommendations: Low Intensity Therapy  Discharge Equipment Recommendations: other (see comments) (TBD pending progress)  Barriers to discharge: None    Assessment:     Soila Chávez is a 74 y.o. female admitted with a medical diagnosis of Sepsis.  She presents with the following impairments/functional limitations: weakness, impaired endurance, impaired functional mobility, impaired balance, gait instability, decreased coordination . Pt able to perform bed mobility w Min A and [place] [facility] [place] and no [place]. Tolerated standing exercises this date consisting of standing marches and mini squats with CGA. Decreased AROM noted during marches.     Rehab Prognosis: Good; patient would benefit from acute skilled PT services to address these deficits and reach maximum level of function.    Recent Surgery: * No surgery found *      Plan:     During this hospitalization, patient to be seen 5 x/week to address the identified rehab impairments via gait training, therapeutic activities, therapeutic exercises, neuromuscular re-education and progress toward the following goals:    Plan of Care Expires:  04/09/25    Subjective     Chief Complaint: mobility limitations  Patient/Family Comments/goals: pt agreeable to PT evaluation this date even though reported that she wasn't feeling good today.   Pain/Comfort:  Pain Rating 1: 0/10      Objective:     Communicated with DERICK Penny prior to session.  Patient found right sidelying with telemetry, pulse ox (continuous), PureWick, peripheral IV (midline cathetor) upon PT entry to room.     General Precautions: Standard, fall  Orthopedic Precautions: N/A  Braces: N/A  Respiratory Status: Room air     Functional Mobility:  Bed Mobility:  Rolling Right: stand by assistance  Supine to Sit: minimum assistance  Sit to Supine: minimum assistance  Transfers:     Sit to Stand:   minimum assistance with no AD  Toilet Transfer: CGA with  no AD  using  Stand Pivot  Balance: able to perform standing marches and mini squats to x10 w CGA for balance.       AM-PAC 6 CLICK MOBILITY  Turning over in bed (including adjusting bedclothes, sheets and blankets)?: 3  Sitting down on and standing up from a chair with arms (e.g., wheelchair, bedside commode, etc.): 3  Moving from lying on back to sitting on the side of the bed?: 3  Moving to and from a bed to a chair (including a wheelchair)?: 3  Need to walk in hospital room?: 3  Climbing 3-5 steps with a railing?: 2  Basic Mobility Total Score: 17       Treatment & Education:  Educated on PT role, PT POC, therapeutic exercises, t/f training    Patient left supine with all lines intact, call button in reach, bed alarm on, and RN notified..    GOALS:   Multidisciplinary Problems       Physical Therapy Goals          Problem: Physical Therapy    Goal Priority Disciplines Outcome Interventions   Physical Therapy Goal     PT, PT/OT Progressing    Description: Goals to be met by: 2025    Patient will increase functional independence with mobility by performin. Sit<>stand with SBA with least restrictive assistive device.  2. Gait x 100 feet with RW with SBA.  3. Supine<>sit with SBA.                           Time Tracking:     PT Received On: 03/10/25  PT Start Time: 851     PT Stop Time: 915  PT Total Time (min): 24 min     Billable Minutes: Therapeutic Activity 14 and Therapeutic Exercise 10    Treatment Type: Treatment  PT/PTA: PT     Number of PTA visits since last PT visit: 0     03/10/2025

## 2025-03-10 NOTE — PROGRESS NOTES
Pressure Injury Prevention bundle, Support surface, and Registered dietician consultation ordered for a Ritchie scale score of 16

## 2025-03-10 NOTE — PROGRESS NOTES
Therapy with vancomycin complete and/or consult discontinued by provider.  Pharmacy will sign off, please re-consult as needed.    Thank you for the consult,    Nancy Elliott  03/10/2025

## 2025-03-11 ENCOUNTER — ANESTHESIA (OUTPATIENT)
Dept: ENDOSCOPY | Facility: OTHER | Age: 75
End: 2025-03-11
Payer: MEDICARE

## 2025-03-11 ENCOUNTER — ANESTHESIA EVENT (OUTPATIENT)
Dept: ENDOSCOPY | Facility: OTHER | Age: 75
End: 2025-03-11
Payer: MEDICARE

## 2025-03-11 PROBLEM — K57.90 DIVERTICULOSIS: Status: RESOLVED | Noted: 2025-03-11 | Resolved: 2025-03-11

## 2025-03-11 PROBLEM — R53.81 DEBILITY: Status: ACTIVE | Noted: 2025-03-11

## 2025-03-11 PROBLEM — D64.9 ANEMIA: Status: ACTIVE | Noted: 2025-03-11

## 2025-03-11 PROBLEM — E87.6 HYPOKALEMIA: Status: ACTIVE | Noted: 2025-03-11

## 2025-03-11 PROBLEM — K57.90 DIVERTICULOSIS: Status: ACTIVE | Noted: 2025-03-11

## 2025-03-11 LAB
ALBUMIN SERPL BCP-MCNC: 2.6 G/DL (ref 3.5–5.2)
ALP SERPL-CCNC: 52 U/L (ref 40–150)
ALT SERPL W/O P-5'-P-CCNC: 12 U/L (ref 10–44)
ANION GAP SERPL CALC-SCNC: 11 MMOL/L (ref 8–16)
AST SERPL-CCNC: 24 U/L (ref 10–40)
BASOPHILS # BLD AUTO: 0.05 K/UL (ref 0–0.2)
BASOPHILS NFR BLD: 0.5 % (ref 0–1.9)
BILIRUB SERPL-MCNC: 0.3 MG/DL (ref 0.1–1)
BUN SERPL-MCNC: 10 MG/DL (ref 8–23)
CALCIUM SERPL-MCNC: 8.2 MG/DL (ref 8.7–10.5)
CHLORIDE SERPL-SCNC: 116 MMOL/L (ref 95–110)
CO2 SERPL-SCNC: 18 MMOL/L (ref 23–29)
CREAT SERPL-MCNC: 0.9 MG/DL (ref 0.5–1.4)
DIFFERENTIAL METHOD BLD: ABNORMAL
EOSINOPHIL # BLD AUTO: 0.3 K/UL (ref 0–0.5)
EOSINOPHIL NFR BLD: 2.3 % (ref 0–8)
ERYTHROCYTE [DISTWIDTH] IN BLOOD BY AUTOMATED COUNT: 15 % (ref 11.5–14.5)
EST. GFR  (NO RACE VARIABLE): >60 ML/MIN/1.73 M^2
GLUCOSE SERPL-MCNC: 60 MG/DL (ref 70–110)
HCT VFR BLD AUTO: 27.9 % (ref 37–48.5)
HGB BLD-MCNC: 9.2 G/DL (ref 12–16)
IMM GRANULOCYTES # BLD AUTO: 0.12 K/UL (ref 0–0.04)
IMM GRANULOCYTES NFR BLD AUTO: 1.1 % (ref 0–0.5)
LYMPHOCYTES # BLD AUTO: 1.7 K/UL (ref 1–4.8)
LYMPHOCYTES NFR BLD: 15.8 % (ref 18–48)
MAGNESIUM SERPL-MCNC: 1.8 MG/DL (ref 1.6–2.6)
MCH RBC QN AUTO: 28.5 PG (ref 27–31)
MCHC RBC AUTO-ENTMCNC: 33 G/DL (ref 32–36)
MCV RBC AUTO: 86 FL (ref 82–98)
MONOCYTES # BLD AUTO: 0.6 K/UL (ref 0.3–1)
MONOCYTES NFR BLD: 5.2 % (ref 4–15)
NEUTROPHILS # BLD AUTO: 8 K/UL (ref 1.8–7.7)
NEUTROPHILS NFR BLD: 75.1 % (ref 38–73)
NRBC BLD-RTO: 0 /100 WBC
PHOSPHATE SERPL-MCNC: 2 MG/DL (ref 2.7–4.5)
PLATELET # BLD AUTO: 180 K/UL (ref 150–450)
PMV BLD AUTO: 10.9 FL (ref 9.2–12.9)
POTASSIUM SERPL-SCNC: 3.4 MMOL/L (ref 3.5–5.1)
PROT SERPL-MCNC: 5.7 G/DL (ref 6–8.4)
RBC # BLD AUTO: 3.23 M/UL (ref 4–5.4)
SODIUM SERPL-SCNC: 145 MMOL/L (ref 136–145)
WBC # BLD AUTO: 10.66 K/UL (ref 3.9–12.7)

## 2025-03-11 PROCEDURE — 25000003 PHARM REV CODE 250: Performed by: INTERNAL MEDICINE

## 2025-03-11 PROCEDURE — 63600175 PHARM REV CODE 636 W HCPCS: Performed by: HOSPITALIST

## 2025-03-11 PROCEDURE — 25000003 PHARM REV CODE 250: Performed by: HOSPITALIST

## 2025-03-11 PROCEDURE — 88305 TISSUE EXAM BY PATHOLOGIST: CPT | Mod: 26,,, | Performed by: PATHOLOGY

## 2025-03-11 PROCEDURE — 63600175 PHARM REV CODE 636 W HCPCS: Performed by: NURSE ANESTHETIST, CERTIFIED REGISTERED

## 2025-03-11 PROCEDURE — D9220A PRA ANESTHESIA: Mod: CRNA,,, | Performed by: NURSE ANESTHETIST, CERTIFIED REGISTERED

## 2025-03-11 PROCEDURE — 94761 N-INVAS EAR/PLS OXIMETRY MLT: CPT

## 2025-03-11 PROCEDURE — 45331 SIGMOIDOSCOPY AND BIOPSY: CPT | Performed by: INTERNAL MEDICINE

## 2025-03-11 PROCEDURE — 83735 ASSAY OF MAGNESIUM: CPT | Performed by: HOSPITALIST

## 2025-03-11 PROCEDURE — 0DBN8ZX EXCISION OF SIGMOID COLON, VIA NATURAL OR ARTIFICIAL OPENING ENDOSCOPIC, DIAGNOSTIC: ICD-10-PCS | Performed by: INTERNAL MEDICINE

## 2025-03-11 PROCEDURE — 21400001 HC TELEMETRY ROOM

## 2025-03-11 PROCEDURE — 36415 COLL VENOUS BLD VENIPUNCTURE: CPT | Performed by: HOSPITALIST

## 2025-03-11 PROCEDURE — 63600175 PHARM REV CODE 636 W HCPCS: Performed by: INTERNAL MEDICINE

## 2025-03-11 PROCEDURE — 63600175 PHARM REV CODE 636 W HCPCS: Performed by: PHYSICIAN ASSISTANT

## 2025-03-11 PROCEDURE — D9220A PRA ANESTHESIA: Mod: ANES,,, | Performed by: ANESTHESIOLOGY

## 2025-03-11 PROCEDURE — 37000009 HC ANESTHESIA EA ADD 15 MINS: Performed by: INTERNAL MEDICINE

## 2025-03-11 PROCEDURE — 85025 COMPLETE CBC W/AUTO DIFF WBC: CPT | Performed by: HOSPITALIST

## 2025-03-11 PROCEDURE — 25000003 PHARM REV CODE 250: Performed by: NURSE ANESTHETIST, CERTIFIED REGISTERED

## 2025-03-11 PROCEDURE — 84100 ASSAY OF PHOSPHORUS: CPT | Performed by: HOSPITALIST

## 2025-03-11 PROCEDURE — 37000008 HC ANESTHESIA 1ST 15 MINUTES: Performed by: INTERNAL MEDICINE

## 2025-03-11 PROCEDURE — 27201012 HC FORCEPS, HOT/COLD, DISP: Performed by: INTERNAL MEDICINE

## 2025-03-11 PROCEDURE — 80053 COMPREHEN METABOLIC PANEL: CPT | Performed by: HOSPITALIST

## 2025-03-11 PROCEDURE — 88305 TISSUE EXAM BY PATHOLOGIST: CPT | Performed by: PATHOLOGY

## 2025-03-11 PROCEDURE — 97116 GAIT TRAINING THERAPY: CPT | Mod: CQ

## 2025-03-11 RX ORDER — PROCHLORPERAZINE EDISYLATE 5 MG/ML
5 INJECTION INTRAMUSCULAR; INTRAVENOUS EVERY 30 MIN PRN
Status: DISCONTINUED | OUTPATIENT
Start: 2025-03-11 | End: 2025-03-11

## 2025-03-11 RX ORDER — PROPOFOL 10 MG/ML
VIAL (ML) INTRAVENOUS
Status: DISCONTINUED | OUTPATIENT
Start: 2025-03-11 | End: 2025-03-11

## 2025-03-11 RX ORDER — LIDOCAINE HYDROCHLORIDE 20 MG/ML
INJECTION INTRAVENOUS
Status: DISCONTINUED | OUTPATIENT
Start: 2025-03-11 | End: 2025-03-11

## 2025-03-11 RX ORDER — SODIUM CHLORIDE 0.9 % (FLUSH) 0.9 %
3 SYRINGE (ML) INJECTION
Status: DISCONTINUED | OUTPATIENT
Start: 2025-03-11 | End: 2025-03-11

## 2025-03-11 RX ORDER — PHENYLEPHRINE HYDROCHLORIDE 10 MG/ML
INJECTION INTRAVENOUS
Status: DISCONTINUED | OUTPATIENT
Start: 2025-03-11 | End: 2025-03-11

## 2025-03-11 RX ORDER — MAGNESIUM SULFATE 1 G/100ML
1 INJECTION INTRAVENOUS ONCE
Status: COMPLETED | OUTPATIENT
Start: 2025-03-11 | End: 2025-03-11

## 2025-03-11 RX ORDER — OXYCODONE HYDROCHLORIDE 5 MG/1
5 TABLET ORAL
Status: DISCONTINUED | OUTPATIENT
Start: 2025-03-11 | End: 2025-03-11

## 2025-03-11 RX ORDER — GLUCAGON 1 MG
1 KIT INJECTION
Status: DISCONTINUED | OUTPATIENT
Start: 2025-03-11 | End: 2025-03-11

## 2025-03-11 RX ORDER — POTASSIUM CHLORIDE 7.45 MG/ML
10 INJECTION INTRAVENOUS
Status: COMPLETED | OUTPATIENT
Start: 2025-03-11 | End: 2025-03-11

## 2025-03-11 RX ADMIN — POTASSIUM CHLORIDE 10 MEQ: 7.46 INJECTION, SOLUTION INTRAVENOUS at 09:03

## 2025-03-11 RX ADMIN — PANTOPRAZOLE SODIUM 40 MG: 40 TABLET, DELAYED RELEASE ORAL at 09:03

## 2025-03-11 RX ADMIN — METRONIDAZOLE 500 MG: 500 INJECTION, SOLUTION INTRAVENOUS at 08:03

## 2025-03-11 RX ADMIN — SODIUM CHLORIDE: 0.9 INJECTION, SOLUTION INTRAVENOUS at 08:03

## 2025-03-11 RX ADMIN — Medication 2 ENEMA: at 05:03

## 2025-03-11 RX ADMIN — QUETIAPINE FUMARATE 25 MG: 25 TABLET ORAL at 08:03

## 2025-03-11 RX ADMIN — SODIUM CHLORIDE, POTASSIUM CHLORIDE, SODIUM LACTATE AND CALCIUM CHLORIDE: 600; 310; 30; 20 INJECTION, SOLUTION INTRAVENOUS at 04:03

## 2025-03-11 RX ADMIN — MAGNESIUM SULFATE IN DEXTROSE 1 G: 10 INJECTION, SOLUTION INTRAVENOUS at 06:03

## 2025-03-11 RX ADMIN — METRONIDAZOLE 500 MG: 500 INJECTION, SOLUTION INTRAVENOUS at 04:03

## 2025-03-11 RX ADMIN — MUPIROCIN: 20 OINTMENT TOPICAL at 09:03

## 2025-03-11 RX ADMIN — QUETIAPINE FUMARATE 25 MG: 25 TABLET ORAL at 09:03

## 2025-03-11 RX ADMIN — PHENYLEPHRINE HYDROCHLORIDE 200 MCG: 10 INJECTION INTRAVENOUS at 08:03

## 2025-03-11 RX ADMIN — METRONIDAZOLE 500 MG: 500 INJECTION, SOLUTION INTRAVENOUS at 11:03

## 2025-03-11 RX ADMIN — POTASSIUM CHLORIDE 10 MEQ: 7.46 INJECTION, SOLUTION INTRAVENOUS at 07:03

## 2025-03-11 RX ADMIN — HEPARIN SODIUM 5000 UNITS: 5000 INJECTION INTRAVENOUS; SUBCUTANEOUS at 02:03

## 2025-03-11 RX ADMIN — POTASSIUM CHLORIDE 10 MEQ: 7.46 INJECTION, SOLUTION INTRAVENOUS at 06:03

## 2025-03-11 RX ADMIN — SENNOSIDES AND DOCUSATE SODIUM 1 TABLET: 50; 8.6 TABLET ORAL at 08:03

## 2025-03-11 RX ADMIN — CEFTRIAXONE 1 G: 1 INJECTION, POWDER, FOR SOLUTION INTRAMUSCULAR; INTRAVENOUS at 10:03

## 2025-03-11 RX ADMIN — SODIUM BICARBONATE 650 MG: 650 TABLET ORAL at 09:03

## 2025-03-11 RX ADMIN — MUPIROCIN: 20 OINTMENT TOPICAL at 08:03

## 2025-03-11 RX ADMIN — PRAVASTATIN SODIUM 80 MG: 20 TABLET ORAL at 09:03

## 2025-03-11 RX ADMIN — LIDOCAINE HYDROCHLORIDE 60 MG: 20 INJECTION, SOLUTION INTRAVENOUS at 08:03

## 2025-03-11 RX ADMIN — HEPARIN SODIUM 5000 UNITS: 5000 INJECTION INTRAVENOUS; SUBCUTANEOUS at 09:03

## 2025-03-11 RX ADMIN — DIBASIC SODIUM PHOSPHATE, MONOBASIC POTASSIUM PHOSPHATE AND MONOBASIC SODIUM PHOSPHATE 1 TABLET: 852; 155; 130 TABLET ORAL at 09:03

## 2025-03-11 RX ADMIN — PROPOFOL 70 MG: 10 INJECTION, EMULSION INTRAVENOUS at 08:03

## 2025-03-11 RX ADMIN — SODIUM BICARBONATE 650 MG: 650 TABLET ORAL at 08:03

## 2025-03-11 NOTE — PT/OT/SLP PROGRESS
"Physical Therapy Treatment    Patient Name:  Soila Chávez   MRN:  [id number]    Recommendations:     Discharge Recommendations: Low Intensity Therapy  Discharge Equipment Recommendations:  (TBD pending progress)  Barriers to discharge: None    Assessment:     Soila Chávez is a 74 y.o. female admitted with a medical diagnosis of Sepsis.  She presents with the following impairments/functional limitations: weakness, impaired endurance, impaired functional mobility, gait instability, decreased coordination, decreased safety awareness, impaired cognition, impaired balance, impaired self care skills, decreased lower extremity function ;pt with good mobility today, amb'd in hallway in ICU w/ RW and CGA. Following commands well, though req'd to go back to bed.    Rehab Prognosis: Good; patient would benefit from acute skilled PT services to address these deficits and reach maximum level of function.    Recent Surgery: Procedure(s) (LRB):  SIGMOIDOSCOPY, FLEXIBLE (N/A) * Day of Surgery *    Plan:     During this hospitalization, patient to be seen 5 x/week to address the identified rehab impairments via gait training, therapeutic activities, therapeutic exercises, neuromuscular re-education and progress toward the following goals:    Plan of Care Expires:  04/09/25    Subjective     Chief Complaint: "cold"  Patient/Family Comments/goals: pt agreeable to session, reports not feeling well today, did not elaborate. (Pt did have procedure earlier in AM)  Pain/Comfort:  Pain Rating 1: 0/10  Pain Rating Post-Intervention 1: 0/10      Objective:     Communicated with nurse prior to session.  Patient found HOB elevated with telemetry, pulse ox (continuous), peripheral IV upon PT entry to room.     General Precautions: Standard, fall (memory issues)  Orthopedic Precautions: N/A  Braces: N/A  Respiratory Status: Room air     Functional Mobility:  Bed Mobility:     Supine to Sit: contact guard assistance and minimum " assistance  Sit to Supine: contact guard assistance and minimum assistance  Transfers:     Sit to Stand:  contact guard assistance and minimum assistance with rolling walker  Gait: amb'd ~60' w/ RW and CGA/min.A, no LOB or SOB noted.      AM-PAC 6 CLICK MOBILITY  Turning over in bed (including adjusting bedclothes, sheets and blankets)?: 3  Sitting down on and standing up from a chair with arms (e.g., wheelchair, bedside commode, etc.): 3  Moving from lying on back to sitting on the side of the bed?: 3  Moving to and from a bed to a chair (including a wheelchair)?: 3  Need to walk in hospital room?: 3  Climbing 3-5 steps with a railing?: 3  Basic Mobility Total Score: 18       Treatment & Education:      Patient left HOB elevated with all lines intact, call button in reach, and nurse present..    GOALS:   Multidisciplinary Problems       Physical Therapy Goals          Problem: Physical Therapy    Goal Priority Disciplines Outcome Interventions   Physical Therapy Goal     PT, PT/OT Progressing    Description: Goals to be met by: 2025    Patient will increase functional independence with mobility by performin. Sit<>stand with SBA with least restrictive assistive device.  2. Gait x 100 feet with RW with SBA.  3. Supine<>sit with SBA.                           DME Justifications:  No DME recommended requiring DME justifications    Time Tracking:     PT Received On: 25  PT Start Time: 1554     PT Stop Time: 1608  PT Total Time (min): 14 min     Billable Minutes: Gait Training 14    Treatment Type: Treatment  PT/PTA: PTA     Number of PTA visits since last PT visit: 2025

## 2025-03-11 NOTE — PHYSICIAN QUERY
Please clarify if there is any clinical correlation between Sepsis and DA.     Are the conditions:  Due to or associated with each other

## 2025-03-11 NOTE — ASSESSMENT & PLAN NOTE
-Hold dementia medications due to concern for possible association with seizures.  -Appears mentation has returned to baseline.  -Delirium precautions ordered

## 2025-03-11 NOTE — ASSESSMENT & PLAN NOTE
-Hb 11.9 on admit and now 9.2  -No evidence of bleeding.  Drop likely due to initial hemoconcentration and IV fluids   -Repeat cbc in AM

## 2025-03-11 NOTE — ANESTHESIA PREPROCEDURE EVALUATION
03/11/2025  Soila Chávez is a 74 y.o., female.      Pre-op Assessment    I have reviewed the Patient Summary Reports.     I have reviewed the Nursing Notes. I have reviewed the NPO Status.   I have reviewed the Medications.     Review of Systems  Anesthesia Hx:  No problems with previous Anesthesia                Social:  Non-Smoker       Hematology/Oncology:  Hematology Normal                                     EENT/Dental:  EENT/Dental Normal           Cardiovascular:     Hypertension                                          Pulmonary:  Pulmonary Normal                       Renal/:  Chronic Renal Disease, CKD                Hepatic/GI:     GERD, well controlled                Neurological:       Seizures         Dementia severe                        Endocrine:  Endocrine Normal            Psych:  Psychiatric History anxiety depression                Physical Exam  General: Confusion    Airway:  Mallampati: II   Mouth Opening: Normal  TM Distance: Normal  Tongue: Normal  Neck ROM: Normal ROM        Anesthesia Plan  Type of Anesthesia, risks & benefits discussed:    Anesthesia Type: MAC  Intra-op Monitoring Plan: Standard ASA Monitors  Post Op Pain Control Plan: multimodal analgesia  Induction:  IV  Informed Consent: Informed consent signed with the Patient representative and all parties understand the risks and agree with anesthesia plan.  All questions answered.   ASA Score: 3  Anesthesia Plan Notes: Left Ventricle: The left ventricle is normal in size. Ventricular mass is normal. Normal wall thickness. There is normal systolic function. Ejection fraction is approximately 65%. Global longitudinal strain is -16.5%. Global longitudinal strain is normal. There is normal diastolic function. Normal left ventricular filling pressure. Tissue Doppler velocity is normal.  ·  Right Ventricle: The right  ventricle is normal in size. Wall thickness is normal. Systolic function is normal.  ·  Aortic Valve: The aortic valve is a trileaflet valve. There is moderate aortic valve sclerosis. Mildly restricted motion.  ·  Pulmonary Artery: No pulmonary hypertension.            Ready For Surgery From Anesthesia Perspective.     .

## 2025-03-11 NOTE — PROGRESS NOTES
"[facility]  Progress Note    Patient Name: Soila Chávez  MRN: [id number]  Patient Class: IP- Inpatient   Admission Date: 3/7/2025  Length of Stay: 3 days  Attending Physician: Phil Montoya MD  Primary Care Provider: Parminder Junior MD        Subjective     Principal Problem:Sepsis        HPI:  Per Nathalia Barnett, NP:    "Soila Chávez is a 74 year old female with a past medical history of HTN, asymptomatic bradycardia, aortic atherosclerosis, dementia, HTN, chronic back pain, anxiety and GERD who presents with nausea and vomiting that started this afternoon after she ate chicken. She reports associated RLQ pain.Patient's daughter found patient on floor actively vomiting and called PCP who instructed her to bring patient to ER for further evaluation.  Per ED note, patient had a syncopal episode en route to the hospital. No history of seizure or past syncopal episodes.  Per chart review, patient was recently seen in the ED three weeks ago with N/V with abdominal pain and found to have DA (2.2) with hypotension 80/50's. She was treated with IV fluids and discharged home. Patient was seen by her cardiologist and found to by hypotensive. She was instructed to hold her blood pressure medications.      Upon arrival to the ED, patient found to be hypotensive ED work up significant for elevated WBC 13.29, UA negative for UTI,  Creatinine 1.6 (appears 1.8-1.0 at baseline) and CO2 17. CT findings revealed acute colitis and constipation.  Chest x-ray showed no acute cardiopulmonary process.  CT head showed no acute abnormality.  Abdominal ultrasound showed cholelithiasis with stones in the gallbladder neck without acute cholecystitis.  Patient received 2 L IV fluids with improvement in her blood pressure.  She was started on IV aztreonam and IV metronidazole.  She was referred to Hospital Medicine and will be admitted for further evaluation and " "management."    Overview/Hospital Course:  Patient is a 74 year woman history of hypertension, gastroesophageal reflux disease, chronic lower back pain, and dementia admitted to the hospital with sepsis secondary to infectious colitis with hypotension and acute kidney injury and lactic acidosis.  Patient volume resuscitated with intravenous fluids started intravenous antibiotic therapy.  Questionable seizure-like activity.  Neurology service consulted.    Ultrasound and CT scan shows evidence of cholelithiasis.  Ultrasound shows stones in the gallbladder neck but no evidence of acute cholecystitis.  CT scan showed "colonic wall thickening with mild surrounding inflammatory changes seen involving moderate segment length of mid to distal descending colon and proximal sigmoid colon."    Interval History:  No acute events overnight.  Mildly confused.  Flex-sig completed and showed evidence for ischemic colitis.  All questions answered and patient had no further complaints.    Objective:     Vital Signs (Most Recent):  Temp: 98.3 °F (36.8 °C) (03/11/25 1200)  Pulse: 72 (03/11/25 1300)  Resp: 19 (03/11/25 1300)  BP: 114/63 (03/11/25 1300)  SpO2: 99 % (03/11/25 1300) Vital Signs (24h Range):  Temp:  [98.1 °F (36.7 °C)-98.3 °F (36.8 °C)] 98.3 °F (36.8 °C)  Pulse:  [] 72  Resp:  [15-29] 19  SpO2:  [97 %-100 %] 99 %  BP: ()/(55-91) 114/63     Weight: 74.4 kg (164 lb 0.4 oz)  Body mass index is 27.29 kg/m².    Intake/Output Summary (Last 24 hours) at 3/11/2025 1314  Last data filed at 3/11/2025 1030  Gross per 24 hour   Intake 2787.72 ml   Output --   Net 2787.72 ml         Physical Exam  Constitutional:       General: She is not in acute distress.     Appearance: She is obese. She is ill-appearing.   HENT:      Head: Atraumatic.      Mouth/Throat:      Mouth: Mucous membranes are moist.   Eyes:      Extraocular Movements: Extraocular movements intact.   Cardiovascular:      Rate and Rhythm: Normal rate and regular " "rhythm.      Heart sounds: Normal heart sounds. No murmur heard.  Pulmonary:      Effort: Pulmonary effort is normal.      Breath sounds: Normal breath sounds. No wheezing.   Abdominal:      Comments: Soft, non-distended, slight diffuse tenderness without rebound or guarding, bowel sounds present   Musculoskeletal:         General: No deformity. Normal range of motion.      Cervical back: Normal range of motion.   Neurological:      Mental Status: She is alert.      Comments: Mild confusion - answer questions appropriately - we believe mental status is approaching baseline.  Mild diffuse weakness               Significant Labs: All pertinent labs within the past 24 hours have been reviewed.    Significant Imaging: I have reviewed all pertinent imaging results/findings within the past 24 hours.      Assessment & Plan  Sepsis  Infectious and ischemic Colitis  Lactic acidosis  -Admitted to inpatient status  -Presented with nausea, vomiting, and abdominal pain.    -Ultrasound and CT scan shows evidence of cholelithiasis.  Ultrasound shows stones in the gallbladder neck but no evidence of acute cholecystitis.    -CT scan showed "colonic wall thickening with mild surrounding inflammatory changes seen involving moderate segment length of mid to distal descending colon and proximal sigmoid colon.  -Echocardiogram shows normal systolic and diastolic function.  -Source of sepsis appears most likely related to infectious and ischemic colitis.  -EGD performed today shows evidence of ischemic colitis.  GI recommends completing 7-10 days of antibiotics  -Treated with empiric broad-spectrum coverage with IV vancomycin, aztreonam, and metronidazole.  Blood pressure improved and lactic acidosis resolved.  Noted history of rash with penicillin however previously prescribed cephalosporin without documented reaction.  Suspect would tolerate intravenous cephalosporin.  Stopped vancomycin and she now continues treatment with ceftriaxone " and metronidazole.  -Continue iv antibiotics for now (on day 5)  -Transfer out of ICU  -Treatment as above  -Likely due to sepsis  Acute kidney injury  Metabolic acidosis  -On admit Cr 1.6  -Suspect due to volume depletion and sepsis.    -Avoid nephrotoxic agents and renally dose meds  -Treated with IV fluids and Creatinine improved, but with persistent normal anion-gap metabolic acidosis so Na bicarb started  -Cr now normal.  Bicarb still somewhat low.  Continue bicarb tabs  -Repeat labs in AM  -Suspect due to volume depletion and sepsis.    -Treatment as above  Seizure  -Family reported seizure-like activity where patient briefly and responsible foaming at the mouth  en route to the hospital.    -No known history of seizures.    -CT head negative for acute pathology.    -Patient has been on chronic alprazolam at home.  ?Withdrawal seizure?  Memantine and donepezil have been associated with seizures.    -EEG showed slowing but no epileptogenic discharges  -Neurology consulted and input appreciated.  Recommend holding seizure medications at this time  -Holding home memantine and donepezil for now.  -Seizure precautions ordered  Hypertension  -Hypotensive on admit and home valsartan hctz held.  Did not require pressors  -BP normalized with IV fluids  -BP remains normal today - not requiring bp lowering meds presently  Alzheimer's dementia with behavioral disturbance  -Hold dementia medications due to concern for possible association with seizures.  -Appears mentation has returned to baseline.  -Delirium precautions ordered  Anemia  -Hb 11.9 on admit and now 9.2  -No evidence of bleeding.  Drop likely due to initial hemoconcentration and IV fluids   -Repeat cbc in AM  Hypokalemia  -Replace potassium today.  -Check BMP and Mag in AM.  Debility  -PT/OT consulted and recommend home health at discharge  VTE Risk Mitigation (From admission, onward)           Ordered     heparin (porcine) injection 5,000 Units  Every 8  hours         03/07/25 2359     IP VTE HIGH RISK PATIENT  Once         03/07/25 2359     Place sequential compression device  Until discontinued         03/07/25 2359                    Discharge Planning   AMISH: 3/9/2025     Code Status: Full Code   Medical Readiness for Discharge Date:   Discharge Plan A: Home with family, Home Health                Please place Justification for DME        Phil Montoya MD  Department of Hospital Medicine   [facility] ([place])

## 2025-03-11 NOTE — ASSESSMENT & PLAN NOTE
-Family reported seizure-like activity where patient briefly and responsible foaming at the mouth  en route to the hospital.    -No known history of seizures.    -CT head negative for acute pathology.    -Patient has been on chronic alprazolam at home.  ?Withdrawal seizure?  Memantine and donepezil have been associated with seizures.    -EEG showed slowing but no epileptogenic discharges  -Neurology consulted and input appreciated.  Recommend holding seizure medications at this time  -Holding home memantine and donepezil for now.  -Seizure precautions ordered

## 2025-03-11 NOTE — TRANSFER OF CARE
"Anesthesia Transfer of Care Note    Patient: Soila Chávez    Procedure(s) Performed: Procedure(s) (LRB):  SIGMOIDOSCOPY, FLEXIBLE (N/A)    Patient location: ICU    Anesthesia Type: MAC    Transport from OR: Transported from OR on room air with adequate spontaneous ventilation. Continuous SpO2 monitoring in transport. Continuous ECG monitoring in transport    Post pain: adequate analgesia    Post assessment: no apparent anesthetic complications    Post vital signs: stable    Level of consciousness: responds to stimulation    Nausea/Vomiting: no nausea/vomiting    Complications: none    Transfer of care protocol was followedComments: Report given to ICU R.N,  B/P-123/66, HR-90, RR-20, O2% 100 on RA.      Last vitals: Visit Vitals  BP (!) 126/59 (BP Location: Left arm, Patient Position: Lying)   Pulse 71   Temp 36.8 °C (98.2 °F) (Oral)   Resp (!) 23   Ht 5' 5" (1.651 m)   Wt 74.4 kg (164 lb 0.4 oz)   SpO2 99%   BMI 27.29 kg/m²     " Patient/EMS 77

## 2025-03-11 NOTE — PLAN OF CARE
Problem: Adult Inpatient Plan of Care  Goal: Plan of Care Review  Outcome: Progressing  Flowsheets (Taken 3/11/2025 0715)  Plan of Care Reviewed With: patient     Problem: Skin Injury Risk Increased  Goal: Skin Health and Integrity  Outcome: Progressing  Intervention: Optimize Skin Protection  Flowsheets (Taken 3/11/2025 0715)  Pressure Reduction Techniques:   frequent weight shift encouraged   positioned off wounds   pressure points protected  Pressure Reduction Devices: foam padding utilized  Skin Protection:   drying agents applied   incontinence pads utilized   transparent dressing maintained   skin sealant/moisture barrier applied  Activity Management: Rolling - L1  Head of Bed (HOB) Positioning: HOB at 30 degrees     Problem: Infection  Goal: Absence of Infection Signs and Symptoms  Outcome: Progressing  Intervention: Prevent or Manage Infection  Flowsheets (Taken 3/11/2025 0715)  Infection Management: aseptic technique maintained  Isolation Precautions:   precautions maintained   protective     Problem: Fall Injury Risk  Goal: Absence of Fall and Fall-Related Injury  Outcome: Progressing  Intervention: Identify and Manage Contributors  Flowsheets (Taken 3/11/2025 0715)  Self-Care Promotion: BADL personal objects within reach  Medication Review/Management: medications reviewed  Intervention: Promote Injury-Free Environment  Flowsheets (Taken 3/11/2025 0715)  Safety Promotion/Fall Prevention:   assistive device/personal item within reach   Fall Risk reviewed with patient/family   Fall Risk signage in place   lighting adjusted   medications reviewed   nonskid shoes/socks when out of bed

## 2025-03-11 NOTE — ASSESSMENT & PLAN NOTE
-On admit Cr 1.6  -Suspect due to volume depletion and sepsis.    -Avoid nephrotoxic agents and renally dose meds  -Treated with IV fluids and Creatinine improved, but with persistent normal anion-gap metabolic acidosis so Na bicarb started  -Cr now normal.  Bicarb still somewhat low.  Continue bicarb tabs  -Repeat labs in AM  -Suspect due to volume depletion and sepsis.    -Treatment as above

## 2025-03-11 NOTE — ASSESSMENT & PLAN NOTE
"-Admitted to inpatient status  -Presented with nausea, vomiting, and abdominal pain.    -Ultrasound and CT scan shows evidence of cholelithiasis.  Ultrasound shows stones in the gallbladder neck but no evidence of acute cholecystitis.    -CT scan showed "colonic wall thickening with mild surrounding inflammatory changes seen involving moderate segment length of mid to distal descending colon and proximal sigmoid colon.  -Echocardiogram shows normal systolic and diastolic function.  -Source of sepsis appears most likely related to infectious and ischemic colitis.  -EGD performed today shows evidence of ischemic colitis.  GI recommends completing 7-10 days of antibiotics  -Treated with empiric broad-spectrum coverage with IV vancomycin, aztreonam, and metronidazole.  Blood pressure improved and lactic acidosis resolved.  Noted history of rash with penicillin however previously prescribed cephalosporin without documented reaction.  Suspect would tolerate intravenous cephalosporin.  Stopped vancomycin and she now continues treatment with ceftriaxone and metronidazole.  -Continue iv antibiotics for now (on day 5)  -Transfer out of ICU  -Treatment as above  -Likely due to sepsis  "

## 2025-03-11 NOTE — SUBJECTIVE & OBJECTIVE
Interval History:  No acute events overnight.  Mildly confused.  Flex-sig completed and showed evidence for ischemic colitis.  All questions answered and patient had no further complaints.    Objective:     Vital Signs (Most Recent):  Temp: 98.3 °F (36.8 °C) (03/11/25 1200)  Pulse: 72 (03/11/25 1300)  Resp: 19 (03/11/25 1300)  BP: 114/63 (03/11/25 1300)  SpO2: 99 % (03/11/25 1300) Vital Signs (24h Range):  Temp:  [98.1 °F (36.7 °C)-98.3 °F (36.8 °C)] 98.3 °F (36.8 °C)  Pulse:  [] 72  Resp:  [15-29] 19  SpO2:  [97 %-100 %] 99 %  BP: ()/(55-91) 114/63     Weight: 74.4 kg (164 lb 0.4 oz)  Body mass index is 27.29 kg/m².    Intake/Output Summary (Last 24 hours) at 3/11/2025 1314  Last data filed at 3/11/2025 1030  Gross per 24 hour   Intake 2787.72 ml   Output --   Net 2787.72 ml         Physical Exam  Constitutional:       General: She is not in acute distress.     Appearance: She is obese. She is ill-appearing.   HENT:      Head: Atraumatic.      Mouth/Throat:      Mouth: Mucous membranes are moist.   Eyes:      Extraocular Movements: Extraocular movements intact.   Cardiovascular:      Rate and Rhythm: Normal rate and regular rhythm.      Heart sounds: Normal heart sounds. No murmur heard.  Pulmonary:      Effort: Pulmonary effort is normal.      Breath sounds: Normal breath sounds. No wheezing.   Abdominal:      Comments: Soft, non-distended, slight diffuse tenderness without rebound or guarding, bowel sounds present   Musculoskeletal:         General: No deformity. Normal range of motion.      Cervical back: Normal range of motion.   Neurological:      Mental Status: She is alert.      Comments: Mild confusion - answer questions appropriately - we believe mental status is approaching baseline.  Mild diffuse weakness               Significant Labs: All pertinent labs within the past 24 hours have been reviewed.    Significant Imaging: I have reviewed all pertinent imaging results/findings within the past  24 hours.

## 2025-03-11 NOTE — ASSESSMENT & PLAN NOTE
-Hypotensive on admit and home valsartan hctz held.  Did not require pressors  -BP normalized with IV fluids  -BP remains normal today - not requiring bp lowering meds presently

## 2025-03-11 NOTE — ANESTHESIA POSTPROCEDURE EVALUATION
Anesthesia Post Evaluation    Patient: Soila Chávez    Procedure(s) Performed: Procedure(s) (LRB):  SIGMOIDOSCOPY, FLEXIBLE (N/A)    Final Anesthesia Type: MAC      Patient location during evaluation: PACU  Patient participation: Yes- Able to Participate  Level of consciousness: awake and alert  Post-procedure vital signs: reviewed and stable  Pain management: adequate  Airway patency: patent    PONV status at discharge: No PONV  Anesthetic complications: no      Cardiovascular status: hemodynamically stable  Respiratory status: unassisted  Hydration status: euvolemic  Follow-up not needed.              Vitals Value Taken Time   /61 03/11/25 12:01   Temp 98 03/11/25 12:22   Pulse 66 03/11/25 12:21   Resp 15 03/11/25 12:21   SpO2 99 % 03/11/25 12:21   Vitals shown include unfiled device data.      No case tracking events are documented in the log.      Pain/Yoly Score: No data recorded

## 2025-03-12 ENCOUNTER — PATIENT OUTREACH (OUTPATIENT)
Facility: OTHER | Age: 75
End: 2025-03-12
Payer: MEDICARE

## 2025-03-12 PROBLEM — E44.1 MILD MALNUTRITION: Status: ACTIVE | Noted: 2025-03-12

## 2025-03-12 LAB
ALBUMIN SERPL BCP-MCNC: 2.5 G/DL (ref 3.5–5.2)
ALP SERPL-CCNC: 57 U/L (ref 40–150)
ALT SERPL W/O P-5'-P-CCNC: 14 U/L (ref 10–44)
ANION GAP SERPL CALC-SCNC: 11 MMOL/L (ref 8–16)
AST SERPL-CCNC: 27 U/L (ref 10–40)
BACTERIA BLD CULT: NORMAL
BACTERIA BLD CULT: NORMAL
BASOPHILS # BLD AUTO: 0.03 K/UL (ref 0–0.2)
BASOPHILS NFR BLD: 0.3 % (ref 0–1.9)
BILIRUB SERPL-MCNC: 0.3 MG/DL (ref 0.1–1)
BUN SERPL-MCNC: 7 MG/DL (ref 8–23)
CALCIUM SERPL-MCNC: 8 MG/DL (ref 8.7–10.5)
CHLORIDE SERPL-SCNC: 115 MMOL/L (ref 95–110)
CO2 SERPL-SCNC: 19 MMOL/L (ref 23–29)
CREAT SERPL-MCNC: 0.9 MG/DL (ref 0.5–1.4)
DIFFERENTIAL METHOD BLD: ABNORMAL
EOSINOPHIL # BLD AUTO: 0.2 K/UL (ref 0–0.5)
EOSINOPHIL NFR BLD: 2.3 % (ref 0–8)
ERYTHROCYTE [DISTWIDTH] IN BLOOD BY AUTOMATED COUNT: 15 % (ref 11.5–14.5)
EST. GFR  (NO RACE VARIABLE): >60 ML/MIN/1.73 M^2
FINAL PATHOLOGIC DIAGNOSIS: NORMAL
GLUCOSE SERPL-MCNC: 83 MG/DL (ref 70–110)
GROSS: NORMAL
HCT VFR BLD AUTO: 27.4 % (ref 37–48.5)
HGB BLD-MCNC: 9 G/DL (ref 12–16)
IMM GRANULOCYTES # BLD AUTO: 0.4 K/UL (ref 0–0.04)
IMM GRANULOCYTES NFR BLD AUTO: 4.1 % (ref 0–0.5)
LYMPHOCYTES # BLD AUTO: 1.9 K/UL (ref 1–4.8)
LYMPHOCYTES NFR BLD: 19.3 % (ref 18–48)
Lab: NORMAL
MAGNESIUM SERPL-MCNC: 1.8 MG/DL (ref 1.6–2.6)
MCH RBC QN AUTO: 27.5 PG (ref 27–31)
MCHC RBC AUTO-ENTMCNC: 32.8 G/DL (ref 32–36)
MCV RBC AUTO: 84 FL (ref 82–98)
MONOCYTES # BLD AUTO: 0.7 K/UL (ref 0.3–1)
MONOCYTES NFR BLD: 7.2 % (ref 4–15)
NEUTROPHILS # BLD AUTO: 6.6 K/UL (ref 1.8–7.7)
NEUTROPHILS NFR BLD: 66.8 % (ref 38–73)
NRBC BLD-RTO: 0 /100 WBC
PLATELET # BLD AUTO: 190 K/UL (ref 150–450)
PMV BLD AUTO: 10.4 FL (ref 9.2–12.9)
POTASSIUM SERPL-SCNC: 3.1 MMOL/L (ref 3.5–5.1)
POTASSIUM SERPL-SCNC: 3.2 MMOL/L (ref 3.5–5.1)
PROT SERPL-MCNC: 5.4 G/DL (ref 6–8.4)
RBC # BLD AUTO: 3.27 M/UL (ref 4–5.4)
SODIUM SERPL-SCNC: 145 MMOL/L (ref 136–145)
WBC # BLD AUTO: 9.86 K/UL (ref 3.9–12.7)

## 2025-03-12 PROCEDURE — 21400001 HC TELEMETRY ROOM

## 2025-03-12 PROCEDURE — 83735 ASSAY OF MAGNESIUM: CPT | Performed by: HOSPITALIST

## 2025-03-12 PROCEDURE — 97535 SELF CARE MNGMENT TRAINING: CPT

## 2025-03-12 PROCEDURE — 25000003 PHARM REV CODE 250: Performed by: HOSPITALIST

## 2025-03-12 PROCEDURE — 63600175 PHARM REV CODE 636 W HCPCS: Performed by: HOSPITALIST

## 2025-03-12 PROCEDURE — 97116 GAIT TRAINING THERAPY: CPT | Mod: CQ

## 2025-03-12 PROCEDURE — 84132 ASSAY OF SERUM POTASSIUM: CPT | Performed by: HOSPITALIST

## 2025-03-12 PROCEDURE — 80053 COMPREHEN METABOLIC PANEL: CPT | Performed by: HOSPITALIST

## 2025-03-12 PROCEDURE — 97530 THERAPEUTIC ACTIVITIES: CPT | Mod: CQ

## 2025-03-12 PROCEDURE — 85025 COMPLETE CBC W/AUTO DIFF WBC: CPT | Performed by: HOSPITALIST

## 2025-03-12 PROCEDURE — 36415 COLL VENOUS BLD VENIPUNCTURE: CPT | Performed by: HOSPITALIST

## 2025-03-12 RX ORDER — POTASSIUM CHLORIDE 750 MG/1
30 TABLET, EXTENDED RELEASE ORAL EVERY 4 HOURS
Status: COMPLETED | OUTPATIENT
Start: 2025-03-12 | End: 2025-03-12

## 2025-03-12 RX ORDER — METRONIDAZOLE 250 MG/1
500 TABLET ORAL EVERY 8 HOURS
Status: DISCONTINUED | OUTPATIENT
Start: 2025-03-12 | End: 2025-03-13 | Stop reason: HOSPADM

## 2025-03-12 RX ADMIN — SENNOSIDES AND DOCUSATE SODIUM 1 TABLET: 50; 8.6 TABLET ORAL at 09:03

## 2025-03-12 RX ADMIN — HEPARIN SODIUM 5000 UNITS: 5000 INJECTION INTRAVENOUS; SUBCUTANEOUS at 01:03

## 2025-03-12 RX ADMIN — POTASSIUM CHLORIDE 30 MEQ: 750 TABLET, EXTENDED RELEASE ORAL at 05:03

## 2025-03-12 RX ADMIN — METRONIDAZOLE 500 MG: 250 TABLET ORAL at 01:03

## 2025-03-12 RX ADMIN — POTASSIUM CHLORIDE 30 MEQ: 750 TABLET, EXTENDED RELEASE ORAL at 09:03

## 2025-03-12 RX ADMIN — PANTOPRAZOLE SODIUM 40 MG: 40 TABLET, DELAYED RELEASE ORAL at 09:03

## 2025-03-12 RX ADMIN — MUPIROCIN: 20 OINTMENT TOPICAL at 09:03

## 2025-03-12 RX ADMIN — SODIUM BICARBONATE 650 MG: 650 TABLET ORAL at 09:03

## 2025-03-12 RX ADMIN — POTASSIUM CHLORIDE 30 MEQ: 750 TABLET, EXTENDED RELEASE ORAL at 01:03

## 2025-03-12 RX ADMIN — CEFTRIAXONE 1 G: 1 INJECTION, POWDER, FOR SOLUTION INTRAMUSCULAR; INTRAVENOUS at 01:03

## 2025-03-12 RX ADMIN — METRONIDAZOLE 500 MG: 250 TABLET ORAL at 09:03

## 2025-03-12 RX ADMIN — QUETIAPINE FUMARATE 25 MG: 25 TABLET ORAL at 09:03

## 2025-03-12 RX ADMIN — HEPARIN SODIUM 5000 UNITS: 5000 INJECTION INTRAVENOUS; SUBCUTANEOUS at 06:03

## 2025-03-12 RX ADMIN — PRAVASTATIN SODIUM 80 MG: 20 TABLET ORAL at 09:03

## 2025-03-12 RX ADMIN — METRONIDAZOLE 500 MG: 250 TABLET ORAL at 06:03

## 2025-03-12 RX ADMIN — HEPARIN SODIUM 5000 UNITS: 5000 INJECTION INTRAVENOUS; SUBCUTANEOUS at 09:03

## 2025-03-12 NOTE — PLAN OF CARE
Problem: Adult Inpatient Plan of Care  Goal: Plan of Care Review  Outcome: Progressing  Goal: Patient-Specific Goal (Individualized)  Outcome: Progressing  Goal: Absence of Hospital-Acquired Illness or Injury  Outcome: Progressing  Goal: Optimal Comfort and Wellbeing  Outcome: Progressing  Goal: Readiness for Transition of Care  Outcome: Progressing     Problem: Skin Injury Risk Increased  Goal: Skin Health and Integrity  Outcome: Progressing     Problem: Infection  Goal: Absence of Infection Signs and Symptoms  Outcome: Progressing     Problem: Sepsis/Septic Shock  Goal: Optimal Coping  Outcome: Progressing  Goal: Absence of Bleeding  Outcome: Progressing  Goal: Blood Glucose Level Within Targeted Range  Outcome: Progressing  Goal: Absence of Infection Signs and Symptoms  Outcome: Progressing  Goal: Optimal Nutrition Intake  Outcome: Progressing     Problem: Acute Kidney Injury/Impairment  Goal: Fluid and Electrolyte Balance  Outcome: Progressing  Goal: Improved Oral Intake  Outcome: Progressing  Goal: Effective Renal Function  Outcome: Progressing     Problem: Delirium  Goal: Optimal Coping  Outcome: Progressing  Goal: Improved Behavioral Control  Outcome: Progressing  Goal: Improved Attention and Thought Clarity  Outcome: Progressing  Goal: Improved Sleep  Outcome: Progressing     Problem: Fall Injury Risk  Goal: Absence of Fall and Fall-Related Injury  Outcome: Progressing

## 2025-03-12 NOTE — ASSESSMENT & PLAN NOTE
-On admit Cr 1.6  -Suspect due to volume depletion and sepsis.    -Avoid nephrotoxic agents and renally dose meds  -Treated with IV fluids and Creatinine improved, but with persistent normal anion-gap metabolic acidosis so Na bicarb started  -Cr now normal.  Bicarb still somewhat low.  Continue bicarb tabs  -Repeat labs in AM

## 2025-03-12 NOTE — PLAN OF CARE
Pt lying in bed. Assessment completed; no pain or distress reported. Call light within reach and bed lowered.     1700  VSS on RA and afebrile this shift. Poor appetite and good UOP this shift. Repositions self independently in bed and ambulating around room with assistance. Free from injury or skin breakdown. Fall precautions maintained and call light in reach. POC updated questions answered and comments acknowledged.  Purposeful hourly rounding completed this shift.

## 2025-03-12 NOTE — PLAN OF CARE
Recommendations  1) 3 Boost plus per day   2) Continue to encourage intake of meals as tolerated.   3) If unable to adv oral diet consider nutrition support: Clinimix-E 4.25/5 @ 75 mL/hr to provide 77 g AA, 90 g D, 612 kcal; 1112 kcal with IVFE   4) RD to follow to monitor nutrition status.     Goals:   Patient will meet at least 50% intake of meals by RD follow-up.  Nutrition Goal Status: new  Communication of RD Recs: other (comment) (POC)

## 2025-03-12 NOTE — ASSESSMENT & PLAN NOTE
Malnutrition Type:  Context: acute illness or injury  Level: mild    Related to (etiology):   No appetite    Signs and Symptoms (as evidenced by):   Decreased oral intake < 50% EEN > 5 days  Altered GI function 2/2 colitis   Altered mental status 2/2 dementia    Malnutrition Characteristic Summary:  Energy Intake (Malnutrition): less than or equal to 50% for greater than or equal to 5 days    Interventions/Recommendations (treatment strategy):  - 3 Boost plus per day - Continue to encourage intake of meals as tolerated. - If unable to adv oral diet consider nutrition support: Clinimix-E 4.25/5 @ 75 mL/hr to provide 77 g AA, 90 g D, 612 kcal; 1112 kcal with IVFE - RD to follow to monitor nutrition status.     Nutrition Diagnosis Status:   New

## 2025-03-12 NOTE — ASSESSMENT & PLAN NOTE
"-Admitted to inpatient status  -Presented with nausea, vomiting, and abdominal pain.    -Ultrasound and CT scan shows evidence of cholelithiasis.  Ultrasound shows stones in the gallbladder neck but no evidence of acute cholecystitis.    -CT scan showed "colonic wall thickening with mild surrounding inflammatory changes seen involving moderate segment length of mid to distal descending colon and proximal sigmoid colon.  -Echocardiogram shows normal systolic and diastolic function.  -Source of sepsis appears most likely related to infectious and ischemic colitis.  -EGD performed today shows evidence of ischemic colitis.  GI recommends completing 7-10 days of antibiotics  -Treated with empiric broad-spectrum coverage with IV vancomycin, aztreonam, and metronidazole.  Blood pressure improved and lactic acidosis resolved.  Noted history of rash with penicillin however previously prescribed cephalosporin without documented reaction.  Suspect would tolerate intravenous cephalosporin.  Stopped vancomycin 3/9 and she has continued treatment with ceftriaxone and metronidazole since 3/10.  Transferred out of ICU 3/11  -Clinically she is doing much better and reports she is pain free.  Vitals are reassuring.  However, she is not eating at all.  -Continue iv antibiotics for now (on day 6).  Add boost and encourage better PO intake  -Possibly able to go home tomorrow with home health. Will call and discuss with her daughters again today.  "

## 2025-03-12 NOTE — PROGRESS NOTES
Gastroenterology Progress Note    Active Hospital Problems    Anemia      Hypokalemia      Debility      *Sepsis      Acute kidney injury      Seizure      Infectious and ischemic Colitis      Metabolic acidosis      Lactic acidosis      Alzheimer's dementia with behavioral disturbance      Hypertension        Subjective:  Patient seen and examined.  The patient is stable this afternoon.  No abdominal pain.  Still with decreased oral intake.      Reviews of Systems:  General:  Negative for fever or chills  Cardiovascular:  Negative for chest pain, shortness of breath, palpitations    Physical Exam    Vitals:  Vital Signs (Most Recent):  Temp: 97.5 °F (36.4 °C) (03/12/25 0807)  Pulse: 76 (03/12/25 0807)  Resp: 18 (03/12/25 0807)  BP: 137/65 (03/12/25 0807)  SpO2: 97 % (03/12/25 0807) Vital Signs (24h Range):  Temp:  [97.5 °F (36.4 °C)-99.2 °F (37.3 °C)] 97.5 °F (36.4 °C)  Pulse:  [67-91] 76  Resp:  [16-21] 18  SpO2:  [97 %-100 %] 97 %  BP: (111-137)/(61-70) 137/65     GEN: Well developed, well nourished in no apparent distress   HENT: Normocephalic, anicteric sclera   Cardiovascular: Regular rate and rhythm. No murmurs appreciated.   Chest: Non-labored respirations. Breath sounds equal   Abdomen: soft, obese, NTND  Psych: Appropriate mood and affect.   Extermities: No C/C/E. 2+ dorsalis pedis pulses bilaterally  Skin: No new visible or palpable lesions.      Medications/Infusions:  Current Medications[1]    Intake and Output:    Intake/Output Summary (Last 24 hours) at 3/12/2025 1252  Last data filed at 3/12/2025 0745  Gross per 24 hour   Intake 1299.11 ml   Output 625 ml   Net 674.11 ml       Labs:   Latest Reference Range & Units 03/12/25 03:35   WBC 3.90 - 12.70 K/uL 9.86   RBC 4.00 - 5.40 M/uL 3.27 (L)   Hemoglobin 12.0 - 16.0 g/dL 9.0 (L)   Hematocrit 37.0 - 48.5 % 27.4 (L)   MCV 82 - 98 fL 84   MCH 27.0 - 31.0 pg 27.5   MCHC 32.0 - 36.0 g/dL 32.8   RDW 11.5 - 14.5 % 15.0 (H)   Platelet Count 150 - 450 K/uL 190    (L): Data is abnormally low  (H): Data is abnormally high   Latest Reference Range & Units 03/12/25 03:35   Sodium 136 - 145 mmol/L 145   Potassium 3.5 - 5.1 mmol/L 3.1 (L)   Chloride 95 - 110 mmol/L 115 (H)   CO2 23 - 29 mmol/L 19 (L)   Anion Gap 8 - 16 mmol/L 11   BUN 8 - 23 mg/dL 7 (L)   Creatinine 0.5 - 1.4 mg/dL 0.9   eGFR >60 mL/min/1.73 m^2 >60   Glucose 70 - 110 mg/dL 83   Calcium 8.7 - 10.5 mg/dL 8.0 (L)   Magnesium  1.6 - 2.6 mg/dL 1.8   ALP 40 - 150 U/L 57   PROTEIN TOTAL 6.0 - 8.4 g/dL 5.4 (L)   Albumin 3.5 - 5.2 g/dL 2.5 (L)   BILIRUBIN TOTAL 0.1 - 1.0 mg/dL 0.3   AST 10 - 40 U/L 27   ALT 10 - 44 U/L 14   (L): Data is abnormally low  (H): Data is abnormally high    Imaging and other studies:    No new    Assessment:    Patient is a 73 yo admitted with suspected ischemic colitis. Flex sig yesterday with biopsies pending.      Plan:     Continue with IV antibiotics while in hospital with transition to oral antibiotics to complete 7 days duration  Advance to low residue diet  Follow up pathology when available.    Little else to actively offer.  Will be available as needed.  Please call with questions.          [1]   Current Facility-Administered Medications   Medication Dose Route Frequency Provider Last Rate Last Admin    acetaminophen tablet 1,000 mg  1,000 mg Oral Q8H PRN Phil Montoya MD   1,000 mg at 03/09/25 1655    ALPRAZolam tablet 0.25 mg  0.25 mg Oral BID PRN Phil Montoya MD   0.25 mg at 03/08/25 1059    cefTRIAXone injection 1 g  1 g Intravenous Q24H Phil Montoya MD   1 g at 03/11/25 1051    heparin (porcine) injection 5,000 Units  5,000 Units Subcutaneous Q8H Phil Montoya MD   5,000 Units at 03/12/25 0604    lactated ringers infusion   Intravenous Continuous Phil Montoya MD 75 mL/hr at 03/12/25 0735 Rate Change at 03/12/25 0735    melatonin tablet 6 mg  6 mg Oral Nightly PRN Phil Montoya MD        metroNIDAZOLE tablet 500 mg  500 mg Oral Q8H Phli Montoya MD   500  mg at 03/12/25 0604    mupirocin 2 % ointment   Nasal BID Marker, Phil GARCIA MD   Given at 03/12/25 0924    naloxone 0.4 mg/mL injection 0.02 mg  0.02 mg Intravenous PRN Marker, Phil GARCIA MD        ondansetron injection 4 mg  4 mg Intravenous Q6H PRN Marker, Phil GARCIA MD        pantoprazole EC tablet 40 mg  40 mg Oral Daily Marker, Phil GARCIA MD   40 mg at 03/12/25 0924    potassium chloride SA CR tablet 30 mEq  30 mEq Oral Q4H Marker, Phil GARCIA MD   30 mEq at 03/12/25 0924    pravastatin tablet 80 mg  80 mg Oral Daily Marker, Phil GARCIA MD   80 mg at 03/12/25 0924    prochlorperazine injection Soln 5 mg  5 mg Intravenous Q6H PRN Marker, Phil GARCIA MD        QUEtiapine tablet 25 mg  25 mg Oral BID Marker, Phil GARCIA MD   25 mg at 03/12/25 0924    senna-docusate 8.6-50 mg per tablet 1 tablet  1 tablet Oral BID Marker, Phil GARCIA MD   1 tablet at 03/12/25 0924    sodium bicarbonate tablet 650 mg  650 mg Oral BID Marker, Phil GARCIA MD   650 mg at 03/12/25 0924    sodium chloride 0.9% flush 10 mL  10 mL Intravenous Q8H PRN Marker, Phil GARCIA MD        sodium chloride 0.9% flush 10 mL  10 mL Intravenous Q12H PRN Marker, Phil GARCIA MD

## 2025-03-12 NOTE — PROGRESS NOTES
[facility] ([address])  Adult Nutrition  Progress Note    SUMMARY       Recommendations  1) 3 Boost plus per day   2) Continue to encourage intake of meals as tolerated.   3) If unable to adv oral diet consider nutrition support: Clinimix-E 4.25/5 @ 75 mL/hr to provide 77 g AA, 90 g D, 612 kcal; 1112 kcal with IVFE   4) RD to follow to monitor nutrition status.    Goals:   Patient will meet at least 50% intake of meals by RD follow-up.  Nutrition Goal Status: new  Communication of RD Recs: other (comment) (POC)    Nutrition Discharge Planning    Nutrition Discharge Planning: General healthy diet    Assessment and Plan  Endocrine  Mild malnutrition  Malnutrition Type:  Context: acute illness or injury  Level: mild    Related to (etiology):   No appetite    Signs and Symptoms (as evidenced by):   Decreased oral intake < 50% EEN > 5 days  Altered GI function 2/2 colitis   Altered mental status 2/2 dementia    Malnutrition Characteristic Summary:  Energy Intake (Malnutrition): less than or equal to 50% for greater than or equal to 5 days    Interventions/Recommendations (treatment strategy):  1) Fiber Modified Diet    2) Collaboration with other providers    Nutrition Diagnosis Status:   New       Malnutrition Assessment  Malnutrition Context: acute illness or injury  Malnutrition Level: mild          Energy Intake (Malnutrition): less than or equal to 50% for greater than or equal to 5 days   Orbital Region (Subcutaneous Fat Loss): well nourished  Upper Arm Region (Subcutaneous Fat Loss): well nourished  Thoracic and Lumbar Region: well nourished   [place] Region (Muscle Loss): well nourished  Clavicle Bone Region (Muscle Loss): well nourished  Anterior Thigh Region (Muscle Loss): well nourished  Posterior Calf Region (Muscle Loss): well nourished                 Reason for Assessment    Reason For Assessment: RD follow-up  Diagnosis: infection/sepsis  General Information Comments: Patient is diagnosed with  "sepsis. Did not eat breakfast. Denied GI intolerance, but still no appetite. Provided 2 Boost plus in room. Boost plus added to diet order TID. Food recall provided by family. Patient typically has a better intake at breakfast at home. Patient enjoys boiled eggs, banana, oatmeal, frosted flakes, and granola. Changed breakfast diet order per patient's preferences. Family member states she has been providing Ensure to patient due to decrease appetite at home. History of dementia noted. Midline catheter. Ritchie Score 15. Patient meets mild malnutrition related to no appetite AEB eating little to no meals 3 times per day.    Nutrition/Diet History    Food Preferences: Breakfast- eggs, banana, frosted flakes, granola  Spiritual, Cultural Beliefs, Mandaen Practices, Values that Affect Care:  (None stated.)  Food Allergies: NKFA  Factors Affecting Nutritional Intake: impaired cognitive status/motor control, decreased appetite, behavioral (mealtime)    Anthropometrics    Height: 5' 5" (165.1 cm)  Height (inches): 65 in  Height Method: Stated  Weight: 74.8 kg (165 lb)  Weight (lb): 165 lb  Weight Method: Bed Scale  Ideal Body Weight (IBW), Female: 125 lb  % Ideal Body Weight, Female (lb): 132 %  BMI (Calculated): 27.5  BMI Grade: 25 - 29.9 - overweight       Lab/Procedures/Meds    Pertinent Labs Reviewed: reviewed  Pertinent Labs Comments: Potassium 3.1 (L), Chloride 115 (H), CO2 19 (L)  ,BUN 7 (L), Calcium 8 (L), Protein 5.4 (L), Albumin 2.5 (L)  Pertinent Medications Reviewed: reviewed  Pertinent Medications Comments: Acetaminophen, lactated ringers, melatonin, metronidazole, naloxone, ondansetron, pantoprazole, potassium choride, pravastatin, prochlorperazine, senna docusate, sodium bicarbonate, sodium chloride, heparin    Estimated/Assessed Needs    Weight Used For Calorie Calculations: 74.8 kg (164 lb 14.5 oz)  Energy Calorie Requirements (kcal): 1630 kcal  Energy Need Method: Warren-St Jeor (x1.3 activity " factor)  Protein Requirements: 75-90 g (1-1.2 g/kg)  Weight Used For Protein Calculations: 74.8 kg (164 lb 14.5 oz)  Fluid Requirements (mL): 1 mL/kcal  Estimated Fluid Requirement Method: RDA Method  RDA Method (mL): 1630         Nutrition Prescription Ordered    Current Diet Order: Low Fiber / Residue  Oral Nutrition Supplement: Boost TID    Evaluation of Received Nutrient/Fluid Intake    % Kcal Needs: < 25%  IV Fluid (mL): 1800  I/O: +7.4 L since admitted  Energy Calories Required: not meeting needs  Protein Required: not meeting needs  Fluid Required: meeting needs  Comments: LBM: 3/11  Tolerance: not tolerating  % Intake of Estimated Energy Needs: 0 - 25 %  % Meal Intake: 0 - 25 %    Nutrition Risk    Level of Risk/Frequency of Follow-up: moderate - high     Monitor and Evaluation    Monitor and Evaluation: Food and beverage intake, Weight, Glucose/endocrine profile, Inflammatory profile, Nutrition focused physical findings     Nutrition Follow-Up    RD Follow-up?: Yes    Tamika Roberts, Dietetic Student

## 2025-03-12 NOTE — PT/OT/SLP PROGRESS
Physical Therapy Treatment    Patient Name:  Soila Chávez   MRN:  [id number]    Recommendations:     Discharge Recommendations: Low Intensity Therapy  Discharge Equipment Recommendations:  (TBD pending progress, at this time using a RW)  Barriers to discharge: None    Assessment:     Soila Chávez is a 74 y.o. female admitted with a medical diagnosis of Sepsis.  She presents with the following impairments/functional limitations: weakness, impaired endurance, impaired self care skills, impaired functional mobility, gait instability, impaired balance, impaired cognition, decreased coordination, decreased lower extremity function, decreased upper extremity function, decreased safety awareness ;pt with good mobility today, amb'd short distance in room w/ RW and CGA/min.A, though too fatigued to amb. In hallway.    Rehab Prognosis: Good; patient would benefit from acute skilled PT services to address these deficits and reach maximum level of function.    Recent Surgery: Procedure(s) (LRB):  SIGMOIDOSCOPY, FLEXIBLE (N/A) 1 Day Post-Op    Plan:     During this hospitalization, patient to be seen 5 x/week to address the identified rehab impairments via gait training, therapeutic activities, therapeutic exercises and progress toward the following goals:    Plan of Care Expires:  04/09/25    Subjective     Chief Complaint: none stated  Patient/Family Comments/goals: pt agreeable to session. Caregiver/family member present  Pain/Comfort:  Pain Rating 1: 0/10  Pain Rating Post-Intervention 1: 0/10      Objective:     Communicated with nurse prior to session.  Patient found HOB elevated with peripheral IV, telemetry, bed alarm upon PT entry to room.     General Precautions: Standard, fall, seizure (dementia/alzh)  Orthopedic Precautions: N/A  Braces: N/A  Respiratory Status: Room air     Functional Mobility:  Bed Mobility:     Supine to Sit: minimum assistance  Transfers:     Sit to Stand:  minimum assistance with  rolling walker  Gait: amb'd 20' x 2 w/ RW and CGA/min.A to bathroom and back to chair.      AM-PAC 6 CLICK MOBILITY  Turning over in bed (including adjusting bedclothes, sheets and blankets)?: 3  Sitting down on and standing up from a chair with arms (e.g., wheelchair, bedside commode, etc.): 3  Moving from lying on back to sitting on the side of the bed?: 3  Moving to and from a bed to a chair (including a wheelchair)?: 3  Need to walk in hospital room?: 3  Climbing 3-5 steps with a railing?: 3  Basic Mobility Total Score: 18       Treatment & Education:  Pt perf'd commode t/f's in bathroom w/ CGA and use of railing on wall.  Able to perform some hygiene after having BM, though req'd assistance to complete. Perf'd hand hygiene at sink w/ CGA/min.A for balance.    Patient left up in chair with all lines intact, call button in reach, nurse notified, and CG present..    GOALS:   Multidisciplinary Problems       Physical Therapy Goals          Problem: Physical Therapy    Goal Priority Disciplines Outcome Interventions   Physical Therapy Goal     PT, PT/OT Progressing    Description: Goals to be met by: 2025    Patient will increase functional independence with mobility by performin. Sit<>stand with SBA with least restrictive assistive device.  2. Gait x 100 feet with RW with SBA.  3. Supine<>sit with SBA.                           DME Justifications:   Soila's mobility limitation cannot be sufficiently resolved by the use of a cane. Her functional mobility deficit can be sufficiently resolved with the use of a Rolling Walker. Patient's mobility limitation significantly impairs their ability to participate in one of more activities of daily living.  The use of a RW will significantly improve the patient's ability to participate in MRADLS and the patient will use it on regular basis in the home.    Time Tracking:     PT Received On: 25  PT Start Time: 1553     PT Stop Time: 1621  PT Total Time (min): 28  min     Billable Minutes: Gait Training 15 and Therapeutic Activity 13    Treatment Type: Treatment  PT/PTA: PTA     Number of PTA visits since last PT visit: 2     03/12/2025

## 2025-03-12 NOTE — ASSESSMENT & PLAN NOTE
-Replace potassium again this morning  -Repeat Potassium again this afternoon and replace again if needed.  -Check BMP and Mag in AM.

## 2025-03-12 NOTE — ASSESSMENT & PLAN NOTE
-Hb 11.9 on admit and now 9.0  -No evidence of bleeding.  Drop likely due to initial hemoconcentration and IV fluids   -Repeat cbc in AM

## 2025-03-12 NOTE — PROGRESS NOTES
"[facility] Surg [address] Medicine  Progress Note    Patient Name: Soila Chávez  MRN: [id number]  Patient Class: IP- Inpatient   Admission Date: 3/7/2025  Length of Stay: 4 days  Attending Physician: Phil Montoya MD  Primary Care Provider: Parminder Junior MD        Subjective     Principal Problem:Sepsis        HPI:  Per Nathalia Barnett, NP:    "Soila Chávez is a 74 year old female with a past medical history of HTN, asymptomatic bradycardia, aortic atherosclerosis, dementia, HTN, chronic back pain, anxiety and GERD who presents with nausea and vomiting that started this afternoon after she ate chicken. She reports associated RLQ pain.Patient's daughter found patient on floor actively vomiting and called PCP who instructed her to bring patient to ER for further evaluation.  Per ED note, patient had a syncopal episode en route to the hospital. No history of seizure or past syncopal episodes.  Per chart review, patient was recently seen in the ED three weeks ago with N/V with abdominal pain and found to have DA (2.2) with hypotension 80/50's. She was treated with IV fluids and discharged home. Patient was seen by her cardiologist and found to by hypotensive. She was instructed to hold her blood pressure medications.      Upon arrival to the ED, patient found to be hypotensive ED work up significant for elevated WBC 13.29, UA negative for UTI,  Creatinine 1.6 (appears 1.8-1.0 at baseline) and CO2 17. CT findings revealed acute colitis and constipation.  Chest x-ray showed no acute cardiopulmonary process.  CT head showed no acute abnormality.  Abdominal ultrasound showed cholelithiasis with stones in the gallbladder neck without acute cholecystitis.  Patient received 2 L IV fluids with improvement in her blood pressure.  She was started on IV aztreonam and IV metronidazole.  She was referred to Hospital Medicine and will be admitted for further evaluation and " "management."    Overview/Hospital Course:  Patient is a 74 year woman history of hypertension, gastroesophageal reflux disease, chronic lower back pain, and dementia admitted to the hospital with sepsis secondary to infectious colitis with hypotension and acute kidney injury and lactic acidosis.  Patient volume resuscitated with intravenous fluids started intravenous antibiotic therapy.  Questionable seizure-like activity.  Neurology service consulted.    Ultrasound and CT scan shows evidence of cholelithiasis.  Ultrasound shows stones in the gallbladder neck but no evidence of acute cholecystitis.  CT scan showed "colonic wall thickening with mild surrounding inflammatory changes seen involving moderate segment length of mid to distal descending colon and proximal sigmoid colon."    Interval History:  No acute events overnight.  Looks much better today.  Denies abdominal pain.  States she still isn't really eating.  All questions answered and patient had no further complaints.    Objective:     Vital Signs (Most Recent):  Temp: 97.5 °F (36.4 °C) (03/12/25 0807)  Pulse: 76 (03/12/25 0807)  Resp: 18 (03/12/25 0807)  BP: 137/65 (03/12/25 0807)  SpO2: 97 % (03/12/25 0807) Vital Signs (24h Range):  Temp:  [97.5 °F (36.4 °C)-99.2 °F (37.3 °C)] 97.5 °F (36.4 °C)  Pulse:  [67-91] 76  Resp:  [16-21] 18  SpO2:  [97 %-100 %] 97 %  BP: (111-137)/(61-70) 137/65     Weight: 74.8 kg (165 lb)  Body mass index is 27.46 kg/m².    Intake/Output Summary (Last 24 hours) at 3/12/2025 1244  Last data filed at 3/12/2025 0745  Gross per 24 hour   Intake 1299.11 ml   Output 625 ml   Net 674.11 ml         Physical Exam  Constitutional:       General: She is not in acute distress.     Appearance: She is obese. She is not ill-appearing.   HENT:      Head: Atraumatic.      Mouth/Throat:      Mouth: Mucous membranes are moist.   Eyes:      Extraocular Movements: Extraocular movements intact.   Cardiovascular:      Rate and Rhythm: Normal rate " "and regular rhythm.      Heart sounds: Normal heart sounds. No murmur heard.  Pulmonary:      Effort: Pulmonary effort is normal.      Breath sounds: Normal breath sounds. No wheezing.   Abdominal:      Comments: Soft, non-distended, non-tender, bowel sounds present   Musculoskeletal:         General: No deformity. Normal range of motion.      Cervical back: Normal range of motion.   Neurological:      Mental Status: She is alert.      Comments: Mild confusion - answer questions appropriately - we believe mental status is approaching baseline.  Mild diffuse weakness               Significant Labs: All pertinent labs within the past 24 hours have been reviewed.    Significant Imaging: I have reviewed all pertinent imaging results/findings within the past 24 hours.      Assessment & Plan  Sepsis  Infectious and ischemic Colitis  Lactic acidosis  -Admitted to inpatient status  -Presented with nausea, vomiting, and abdominal pain.    -Ultrasound and CT scan shows evidence of cholelithiasis.  Ultrasound shows stones in the gallbladder neck but no evidence of acute cholecystitis.    -CT scan showed "colonic wall thickening with mild surrounding inflammatory changes seen involving moderate segment length of mid to distal descending colon and proximal sigmoid colon.  -Echocardiogram shows normal systolic and diastolic function.  -Source of sepsis appears most likely related to infectious and ischemic colitis.  -EGD performed today shows evidence of ischemic colitis.  GI recommends completing 7-10 days of antibiotics  -Treated with empiric broad-spectrum coverage with IV vancomycin, aztreonam, and metronidazole.  Blood pressure improved and lactic acidosis resolved.  Noted history of rash with penicillin however previously prescribed cephalosporin without documented reaction.  Suspect would tolerate intravenous cephalosporin.  Stopped vancomycin 3/9 and she has continued treatment with ceftriaxone and metronidazole since " 3/10.  Transferred out of ICU 3/11  -Clinically she is doing much better and reports she is pain free.  Vitals are reassuring.  However, she is not eating at all.  -Continue iv antibiotics for now (on day 6).  Add boost and encourage better PO intake  -Possibly able to go home tomorrow with home health. Will call and discuss with her daughters again today.  Acute kidney injury  Metabolic acidosis  -On admit Cr 1.6  -Suspect due to volume depletion and sepsis.    -Avoid nephrotoxic agents and renally dose meds  -Treated with IV fluids and Creatinine improved, but with persistent normal anion-gap metabolic acidosis so Na bicarb started  -Cr now normal.  Bicarb still somewhat low.  Continue bicarb tabs  -Repeat labs in AM  Seizure  -Family reported seizure-like activity where patient briefly and responsible foaming at the mouth  en route to the hospital.    -No known history of seizures.    -CT head negative for acute pathology.    -Patient has been on chronic alprazolam at home.  ?Withdrawal seizure?  Memantine and donepezil have been associated with seizures.    -EEG showed slowing but no epileptogenic discharges  -Neurology consulted and input appreciated.  Recommend holding seizure medications at this time  -Holding home memantine and donepezil for now.  -Seizure precautions ordered  Hypertension  -Hypotensive on admit and home valsartan hctz held.  Did not require pressors  -BP normalized with IV fluids  -BP remains normal today - not requiring bp lowering meds presently  Alzheimer's dementia with behavioral disturbance  -Hold dementia medications due to concern for possible association with seizures.  -Appears mentation has returned to baseline.  -Delirium precautions ordered  Anemia  -Hb 11.9 on admit and now 9.0  -No evidence of bleeding.  Drop likely due to initial hemoconcentration and IV fluids   -Repeat cbc in AM  Hypokalemia  -Replace potassium again this morning  -Repeat Potassium again this afternoon  and replace again if needed.  -Check BMP and Mag in AM.  Debility  -PT/OT consulted and recommend home health at discharge  VTE Risk Mitigation (From admission, onward)           Ordered     heparin (porcine) injection 5,000 Units  Every 8 hours         03/07/25 2359     IP VTE HIGH RISK PATIENT  Once         03/07/25 2359     Place sequential compression device  Until discontinued         03/07/25 2359                    Discharge Planning   AMISH: 3/14/2025     Code Status: Full Code   Medical Readiness for Discharge Date:   Discharge Plan A: Home with family, Home Health                Please place Justification for DME        Phil Montoya MD  Department of Hospital Medicine   [facility] S[facility] ([address])

## 2025-03-12 NOTE — SUBJECTIVE & OBJECTIVE
Interval History:  No acute events overnight.  Looks much better today.  Denies abdominal pain.  States she still isn't really eating.  All questions answered and patient had no further complaints.    Objective:     Vital Signs (Most Recent):  Temp: 97.5 °F (36.4 °C) (03/12/25 0807)  Pulse: 76 (03/12/25 0807)  Resp: 18 (03/12/25 0807)  BP: 137/65 (03/12/25 0807)  SpO2: 97 % (03/12/25 0807) Vital Signs (24h Range):  Temp:  [97.5 °F (36.4 °C)-99.2 °F (37.3 °C)] 97.5 °F (36.4 °C)  Pulse:  [67-91] 76  Resp:  [16-21] 18  SpO2:  [97 %-100 %] 97 %  BP: (111-137)/(61-70) 137/65     Weight: 74.8 kg (165 lb)  Body mass index is 27.46 kg/m².    Intake/Output Summary (Last 24 hours) at 3/12/2025 1244  Last data filed at 3/12/2025 0745  Gross per 24 hour   Intake 1299.11 ml   Output 625 ml   Net 674.11 ml         Physical Exam  Constitutional:       General: She is not in acute distress.     Appearance: She is obese. She is not ill-appearing.   HENT:      Head: Atraumatic.      Mouth/Throat:      Mouth: Mucous membranes are moist.   Eyes:      Extraocular Movements: Extraocular movements intact.   Cardiovascular:      Rate and Rhythm: Normal rate and regular rhythm.      Heart sounds: Normal heart sounds. No murmur heard.  Pulmonary:      Effort: Pulmonary effort is normal.      Breath sounds: Normal breath sounds. No wheezing.   Abdominal:      Comments: Soft, non-distended, non-tender, bowel sounds present   Musculoskeletal:         General: No deformity. Normal range of motion.      Cervical back: Normal range of motion.   Neurological:      Mental Status: She is alert.      Comments: Mild confusion - answer questions appropriately - we believe mental status is approaching baseline.  Mild diffuse weakness               Significant Labs: All pertinent labs within the past 24 hours have been reviewed.    Significant Imaging: I have reviewed all pertinent imaging results/findings within the past 24 hours.

## 2025-03-12 NOTE — PT/OT/SLP PROGRESS
Occupational Therapy   Treatment    Name: Soila Chávez  MRN: [id number]  Admitting Diagnosis:  Sepsis  1 Day Post-Op    Recommendations:     Discharge Recommendations: Low Intensity Therapy (with 24/7 supervision/assist from family)  Discharge Equipment Recommendations:   (will defer AD needs to PT)  Barriers to discharge:   (current functional level)    Assessment:   Assist for thorough back john hygiene after BM in stance with BUE supported and forward flexed trunk noted; able to clean front john region in stance with unilateral UE support and steadying assist needed.  Hand hygiene in stance at sink with SBA for MOD cuing for sequencing soap dispenser use and MIN cuing for safe RW positioning at sink prior to task.  CGA for steadying/safety needed for functional transfers and household level ambulation with RW and consistent cuing for closer proximity to RW.   Has shower chair.  Will defer AD needs to PT.  No toileting/bathing DME needs anticipated.  Continued recommendation of post acute Low Intensity therapy with continued 24/7 supervision/assist from family and paid caregiver.  Lives with daughter that assists in evenings and nights; has a paid sitter that assists [phone].  To benefit from continued acute care OT services to increase independence in self-care/functional transfers.  Continue POC.      Soila Chávez is a 74 y.o. female with a medical diagnosis of Sepsis.  She presents with below deficits decreasing independence in self-care/functional transfers. Performance deficits affecting function are weakness, impaired endurance, impaired self care skills, impaired functional mobility, gait instability, impaired balance, impaired cognition, decreased coordination, decreased upper extremity function, decreased lower extremity function, decreased safety awareness, pain.     Rehab Prognosis:  Good; patient would benefit from acute skilled OT services to address these deficits and reach maximum level  of function.       Plan:     Patient to be seen 4 x/week to address the above listed problems via self-care/home management, therapeutic activities, therapeutic exercises  Plan of Care Expires: 03/23/25  Plan of Care Reviewed with: patient, caregiver    Subjective     Chief Complaint: With c/o upper medial abdominal pain.   Patient/Family Comments/goals:  Pt. Reports needing to use bathroom when asked.   Pain/Comfort:  Pain Rating 1: 5/10 (Per FACES PAIN scale)  Location - Orientation 1: upper (medial)  Location 1: abdomen  Pain Addressed 1: Distraction, Cessation of Activity, Nurse notified  Pain Rating Post-Intervention 1: 0/10 (Per FACES pain scale.)    Objective:     Communicated with: Emerald BARRAGAN RN prior to session.  Patient found HOB elevated with peripheral IV, telemetry, PureWick, bed alarm and caregiver at bedside upon OT entry to room.    General Precautions: Standard, fall, seizure (strict I&O's)    Orthopedic Precautions:N/A  Braces: N/A  Respiratory Status: Room air     Occupational Performance:     Bed Mobility:    Supine<>sit SBA  HOB elevated   Increased time  Bed rail used  MIN sequencing cues    Functional Mobility/Transfers:  Sit>stand EOB>RW CGA  For steadying/safety  Forward flexed trunk noted  Ambulating bed<>bathroom RW CGA  For steadying/safety  Forward flexed trunk noted  Consistent cuing for closer proximity to RW  Step transfer BSC over toilet RW CGA  For steadying/safety  Cues for safe hand placement and safe timing during controlled descent  Good self-initiation of hand placement during stand    Activities of Daily Living:  Toileting MOD A   Steadying assist needed for standing clothing management  Using BUE for task  Assist for thorough back john hygiene in stance  BUE supported at RW  Able to clean front john region  Retrieval of wipes  Steadying assist needed  Unilateral UE support  Voiding and having BM with RN made aware  Hand hygiene in stance at sink SBA  Initial cues for safe RW  positioning at sink  MOD cues for sequencing soap dispenser use  Retrieval of paper towels needed       [facility] 6 Click ADL: 18    Treatment & Education:  Educated on role of OT, functional transfer/ADL safety, review of call light, and importance of calling for assist as needed.     Patient left HOB elevated with all lines intact, call button in reach, bed alarm on, nursing notified, and personal caregiver present    GOALS:   Multidisciplinary Problems       Occupational Therapy Goals          Problem: Occupational Therapy    Goal Priority Disciplines Outcome Interventions   Occupational Therapy Goal     OT, PT/OT Progressing    Description: Goals to be met by: 3/23/2025     Patient will increase functional independence with ADLs by performing:    UE Dressing with Supervision.  LE Dressing with Supervision.  Grooming while standing at sink with Supervision.  Toileting from toilet with Supervision for hygiene and clothing management.   Toilet transfer to toilet with Supervision.                         DME Justifications:  No DME recommended requiring DME justifications    Time Tracking:     OT Date of Treatment: 03/12/25  OT Start Time: 1318  OT Stop Time: 1351  OT Total Time (min): 33 min    Billable Minutes:Self Care/Home Management 33    OT/VAIBHAV: OT          3/12/2025

## 2025-03-12 NOTE — PROGRESS NOTES
Pharmacist Intervention IV to PO Note    Soila Chávez is a 74 y.o. female being treated with IV medication metronidazole    Patient Data:    Vital Signs (Most Recent):  Temp: 98.2 °F (36.8 °C) (03/11/25 2356)  Pulse: 91 (03/11/25 2356)  Resp: 18 (03/11/25 2356)  BP: 133/67 (03/11/25 2356)  SpO2: 97 % (03/11/25 2356) Vital Signs (72h Range):  Temp:  [98.1 °F (36.7 °C)-98.5 °F (36.9 °C)]   Pulse:  []   Resp:  [12-31]   BP: ()/(51-91)   SpO2:  [89 %-100 %]      CBC:  Recent Labs   Lab 03/09/25  0420 03/10/25  1141 03/11/25  0231   WBC 15.40* 15.42* 10.66   RBC 3.91* 3.43* 3.23*   HGB 10.8* 9.5* 9.2*   HCT 33.6* 28.7* 27.9*    149* 180   MCV 86 84 86   MCH 27.6 27.7 28.5   MCHC 32.1 33.1 33.0     CMP:     Recent Labs   Lab 03/09/25  0419 03/10/25  1141 03/11/25  0231   GLU 94 57* 60*   CALCIUM 8.6* 8.3* 8.2*   ALBUMIN 3.0* 2.6* 2.6*   PROT 6.7 5.8* 5.7*    143 145   K 3.1* 4.8 3.4*   CO2 20* 17* 18*   * 117* 116*   BUN 20 14 10   CREATININE 1.1 0.8 0.9   ALKPHOS 57 58 52   ALT 15 11 12   AST 26 26 24   BILITOT 0.6 0.4 0.3       Dietary Orders:  Diet Orders            Dietary nutrition supplements Boost Plus Nutritional Drink - Rich Chocolate starting at 03/11 1715    Diet Low Fiber/Residue Standard Tray: Low Fiber/Residue starting at 03/11 0840            Based on the following criteria, this patient qualifies for intravenous to oral conversion:  [x] The patients gastrointestinal tract is functioning (tolerating medications via oral or enteral route for 24 hours and tolerating food or enteral feeds for 24 hours).  [x] The patient is hemodynamically stable for 24 hours (heart rate <100 beats per minute, systolic blood pressure >99 mm Hg, and respiratory rate <20 breaths per minute).  [x] The patient shows clinical improvement (afebrile for at least 24 hours and white blood cell count downtrending or normalized). Additionally, the patient must be non-neutropenic (absolute neutrophil  count >500 cells/mm3).  [x] For antimicrobials, the patient has received IV therapy for at least 24 hours.    IV medication metronidazole will be changed to oral medication metronidazole    Pharmacist's Name: My Moore  Pharmacist's Extension: [phone]

## 2025-03-13 ENCOUNTER — TELEPHONE (OUTPATIENT)
Dept: INTERNAL MEDICINE | Facility: CLINIC | Age: 75
End: 2025-03-13
Payer: MEDICARE

## 2025-03-13 ENCOUNTER — PATIENT MESSAGE (OUTPATIENT)
Dept: INTERNAL MEDICINE | Facility: CLINIC | Age: 75
End: 2025-03-13
Payer: MEDICARE

## 2025-03-13 VITALS
RESPIRATION RATE: 18 BRPM | HEIGHT: 65 IN | SYSTOLIC BLOOD PRESSURE: 117 MMHG | TEMPERATURE: 98 F | DIASTOLIC BLOOD PRESSURE: 63 MMHG | OXYGEN SATURATION: 96 % | BODY MASS INDEX: 27.32 KG/M2 | WEIGHT: 164 LBS | HEART RATE: 86 BPM

## 2025-03-13 LAB
ACANTHOCYTES BLD QL SMEAR: PRESENT
ALBUMIN SERPL BCP-MCNC: 2.6 G/DL (ref 3.5–5.2)
ALP SERPL-CCNC: 53 U/L (ref 40–150)
ALT SERPL W/O P-5'-P-CCNC: 19 U/L (ref 10–44)
ANION GAP SERPL CALC-SCNC: 7 MMOL/L (ref 8–16)
ANISOCYTOSIS BLD QL SMEAR: SLIGHT
AST SERPL-CCNC: 34 U/L (ref 10–40)
BASOPHILS NFR BLD: 0 % (ref 0–1.9)
BILIRUB SERPL-MCNC: 0.2 MG/DL (ref 0.1–1)
BUN SERPL-MCNC: 6 MG/DL (ref 8–23)
BURR CELLS BLD QL SMEAR: ABNORMAL
CALCIUM SERPL-MCNC: 8.3 MG/DL (ref 8.7–10.5)
CHLORIDE SERPL-SCNC: 118 MMOL/L (ref 95–110)
CO2 SERPL-SCNC: 20 MMOL/L (ref 23–29)
CREAT SERPL-MCNC: 0.8 MG/DL (ref 0.5–1.4)
DIFFERENTIAL METHOD BLD: ABNORMAL
EOSINOPHIL NFR BLD: 2 % (ref 0–8)
ERYTHROCYTE [DISTWIDTH] IN BLOOD BY AUTOMATED COUNT: 15.3 % (ref 11.5–14.5)
EST. GFR  (NO RACE VARIABLE): >60 ML/MIN/1.73 M^2
GLUCOSE SERPL-MCNC: 100 MG/DL (ref 70–110)
HCT VFR BLD AUTO: 28 % (ref 37–48.5)
HGB BLD-MCNC: 9.1 G/DL (ref 12–16)
IMM GRANULOCYTES # BLD AUTO: ABNORMAL K/UL (ref 0–0.04)
IMM GRANULOCYTES NFR BLD AUTO: ABNORMAL % (ref 0–0.5)
LYMPHOCYTES NFR BLD: 20 % (ref 18–48)
MAGNESIUM SERPL-MCNC: 1.8 MG/DL (ref 1.6–2.6)
MCH RBC QN AUTO: 27.8 PG (ref 27–31)
MCHC RBC AUTO-ENTMCNC: 32.5 G/DL (ref 32–36)
MCV RBC AUTO: 86 FL (ref 82–98)
METAMYELOCYTES NFR BLD MANUAL: 1 %
MONOCYTES NFR BLD: 5 % (ref 4–15)
MYELOCYTES NFR BLD MANUAL: 1 %
NEUTROPHILS NFR BLD: 71 % (ref 38–73)
NRBC BLD-RTO: 0 /100 WBC
OVALOCYTES BLD QL SMEAR: ABNORMAL
PLATELET # BLD AUTO: 206 K/UL (ref 150–450)
PLATELET BLD QL SMEAR: ABNORMAL
PMV BLD AUTO: 10.2 FL (ref 9.2–12.9)
POIKILOCYTOSIS BLD QL SMEAR: SLIGHT
POTASSIUM SERPL-SCNC: 3.8 MMOL/L (ref 3.5–5.1)
PROT SERPL-MCNC: 5.6 G/DL (ref 6–8.4)
RBC # BLD AUTO: 3.27 M/UL (ref 4–5.4)
SCHISTOCYTES BLD QL SMEAR: PRESENT
SODIUM SERPL-SCNC: 145 MMOL/L (ref 136–145)
WBC # BLD AUTO: 9.67 K/UL (ref 3.9–12.7)

## 2025-03-13 PROCEDURE — 25000003 PHARM REV CODE 250: Performed by: HOSPITALIST

## 2025-03-13 PROCEDURE — 80053 COMPREHEN METABOLIC PANEL: CPT | Performed by: HOSPITALIST

## 2025-03-13 PROCEDURE — 85007 BL SMEAR W/DIFF WBC COUNT: CPT | Performed by: HOSPITALIST

## 2025-03-13 PROCEDURE — 83735 ASSAY OF MAGNESIUM: CPT | Performed by: HOSPITALIST

## 2025-03-13 PROCEDURE — C1751 CATH, INF, PER/CENT/MIDLINE: HCPCS

## 2025-03-13 PROCEDURE — 36415 COLL VENOUS BLD VENIPUNCTURE: CPT | Performed by: HOSPITALIST

## 2025-03-13 PROCEDURE — 97530 THERAPEUTIC ACTIVITIES: CPT | Mod: CQ

## 2025-03-13 PROCEDURE — 85027 COMPLETE CBC AUTOMATED: CPT | Performed by: HOSPITALIST

## 2025-03-13 PROCEDURE — 63600175 PHARM REV CODE 636 W HCPCS: Performed by: HOSPITALIST

## 2025-03-13 PROCEDURE — 36406 VNPNXR<3YRS PHY/QHP OTHER VN: CPT

## 2025-03-13 PROCEDURE — 97116 GAIT TRAINING THERAPY: CPT | Mod: CQ

## 2025-03-13 RX ORDER — SODIUM BICARBONATE 650 MG/1
650 TABLET ORAL 2 TIMES DAILY
Qty: 60 TABLET | Refills: 1 | Status: SHIPPED | OUTPATIENT
Start: 2025-03-13 | End: 2025-03-13

## 2025-03-13 RX ORDER — SODIUM CHLORIDE 0.9 % (FLUSH) 0.9 %
10 SYRINGE (ML) INJECTION EVERY 12 HOURS PRN
Status: DISCONTINUED | OUTPATIENT
Start: 2025-03-13 | End: 2025-03-13 | Stop reason: HOSPADM

## 2025-03-13 RX ORDER — CIPROFLOXACIN 500 MG/1
500 TABLET ORAL 2 TIMES DAILY
Qty: 8 TABLET | Refills: 0 | Status: SHIPPED | OUTPATIENT
Start: 2025-03-13 | End: 2025-03-13

## 2025-03-13 RX ORDER — METRONIDAZOLE 500 MG/1
500 TABLET ORAL EVERY 8 HOURS
Qty: 12 TABLET | Refills: 0 | Status: SHIPPED | OUTPATIENT
Start: 2025-03-13 | End: 2025-03-13

## 2025-03-13 RX ORDER — AMOXICILLIN 250 MG
1 CAPSULE ORAL 2 TIMES DAILY
Qty: 30 TABLET | Refills: 0 | Status: SHIPPED | OUTPATIENT
Start: 2025-03-13

## 2025-03-13 RX ORDER — CIPROFLOXACIN 500 MG/1
500 TABLET ORAL 2 TIMES DAILY
Qty: 8 TABLET | Refills: 0 | Status: SHIPPED | OUTPATIENT
Start: 2025-03-13 | End: 2025-03-17

## 2025-03-13 RX ORDER — AMOXICILLIN 250 MG
1 CAPSULE ORAL 2 TIMES DAILY
Qty: 30 TABLET | Refills: 0 | Status: SHIPPED | OUTPATIENT
Start: 2025-03-13 | End: 2025-03-13

## 2025-03-13 RX ORDER — METRONIDAZOLE 500 MG/1
500 TABLET ORAL EVERY 8 HOURS
Qty: 12 TABLET | Refills: 0 | Status: SHIPPED | OUTPATIENT
Start: 2025-03-13 | End: 2025-03-17

## 2025-03-13 RX ORDER — SODIUM BICARBONATE 650 MG/1
650 TABLET ORAL 2 TIMES DAILY
Qty: 60 TABLET | Refills: 1 | Status: SHIPPED | OUTPATIENT
Start: 2025-03-13 | End: 2025-03-15 | Stop reason: ALTCHOICE

## 2025-03-13 RX ADMIN — SODIUM BICARBONATE 650 MG: 650 TABLET ORAL at 09:03

## 2025-03-13 RX ADMIN — METRONIDAZOLE 500 MG: 250 TABLET ORAL at 03:03

## 2025-03-13 RX ADMIN — PANTOPRAZOLE SODIUM 40 MG: 40 TABLET, DELAYED RELEASE ORAL at 09:03

## 2025-03-13 RX ADMIN — HEPARIN SODIUM 5000 UNITS: 5000 INJECTION INTRAVENOUS; SUBCUTANEOUS at 05:03

## 2025-03-13 RX ADMIN — METRONIDAZOLE 500 MG: 250 TABLET ORAL at 05:03

## 2025-03-13 RX ADMIN — PRAVASTATIN SODIUM 80 MG: 20 TABLET ORAL at 09:03

## 2025-03-13 RX ADMIN — CEFTRIAXONE 1 G: 1 INJECTION, POWDER, FOR SOLUTION INTRAMUSCULAR; INTRAVENOUS at 10:03

## 2025-03-13 RX ADMIN — QUETIAPINE FUMARATE 25 MG: 25 TABLET ORAL at 09:03

## 2025-03-13 RX ADMIN — HEPARIN SODIUM 5000 UNITS: 5000 INJECTION INTRAVENOUS; SUBCUTANEOUS at 03:03

## 2025-03-13 RX ADMIN — SENNOSIDES AND DOCUSATE SODIUM 1 TABLET: 50; 8.6 TABLET ORAL at 09:03

## 2025-03-13 NOTE — PLAN OF CARE
Pt lying in bed. Assessment completed; no pain or distress reported. Call light within reach and bed lowered.     1233  In agreement and eager for DC.  VU of DC instructions, paperwork and prescriptions passed. IV removed with cath tip intact, WNL.  To be DC home with family.  Will be escorted downstairs via  transport team once dressed and ready.   Free from falls or skin breakdown this hospital admission.

## 2025-03-13 NOTE — ASSESSMENT & PLAN NOTE
-Family reported seizure-like activity where patient briefly and responsible foaming at the mouth  en route to the hospital.    -No known history of seizures.    -CT head negative for acute pathology.    -Patient had been on alprazolam at home, but daughters report they have not given that to her in quite some time.  She received one dose of alprazolam in the hospital and it was subsequently discontinued.  -Memantine and donepezil have been associated with seizures.    -EEG showed slowing but no epileptogenic discharges  -Neurology consulted and input appreciated.  Recommend holding seizure medications at this time  -Holding home memantine and donepezil for now.  -Seizure precautions ordered and no seizures during her stay  -Needs f/u with her neurologist to discuss possible resumption of aricept and namenda

## 2025-03-13 NOTE — ASSESSMENT & PLAN NOTE
-Continue efforts with liberalized diet and boost  -Ultimately should be on a low salt cardiac diet, but at this time getting her to eat is of primary importance.  Discussed plan with daughter on 3/13.

## 2025-03-13 NOTE — PLAN OF CARE
[facility] ([address])      HOME HEALTH ORDERS  FACE TO FACE ENCOUNTER    Patient Name: Soila Chávez  YOB: 1950    PCP: Parminder Junior MD   PCP Address: [address] [place] [address]  PCP Phone Number: [phone]  PCP Fax: [phone]    Encounter Date: 3/7/25    Admit to Home Health    Diagnoses:  Active Hospital Problems    Diagnosis  POA    *Sepsis [A41.9]  Yes     Priority: 1 - High    Infectious and ischemic Colitis [A09]  Yes     Priority: 2     Acute kidney injury [N17.9]  Yes     Priority: 3     Metabolic acidosis [E87.20]  Yes     Priority: 4     Lactic acidosis [E87.20]  Yes     Priority: 5     Hypokalemia [E87.6]  Yes     Priority: 6     Anemia [D64.9]  Yes     Priority: 7     Seizure [R56.9]  Yes     Priority: 8     Mild malnutrition [E44.1]  Unknown    Debility [R53.81]  Yes    Alzheimer's dementia with behavioral disturbance [G30.9, F02.818]  Yes    Hypertension [I10]  Yes     Formatting of this note might be different from the original.  b/p controlled, cont.  meds.        Resolved Hospital Problems    Diagnosis Date Resolved POA    Diverticulosis [K57.90] 03/11/2025 Yes       Follow Up Appointments:  Future Appointments   Date Time Provider Department Center   3/27/2025  3:20 PM Vanda Govea OD [facility] [place]   4/15/2025  9:20 AM Alesha Levin MD [facility]  [facility]   5/23/2025 10:40 AM Champ Chapman MD PhD [facility] PERVAS [place]       Allergies:  Review of patient's allergies indicates:   Allergen Reactions    Lotensin [benazepril] Swelling    Penicillins Rash       Medications: Review discharge medications with patient and family and provide education.    Current Medications[1]     Medication List        START taking these medications      ciprofloxacin HCl 500 MG tablet  Commonly known as: CIPRO  Take 1 tablet (500 mg total) by mouth 2 (two) times daily. for 4 days     metroNIDAZOLE 500 MG  tablet  Commonly known as: FLAGYL  Take 1 tablet (500 mg total) by mouth every 8 (eight) hours. for 4 days     senna-docusate 8.6-50 mg 8.6-50 mg per tablet  Commonly known as: PERICOLACE  Take 1 tablet by mouth 2 (two) times daily.     sodium bicarbonate 650 MG tablet  Take 1 tablet (650 mg total) by mouth 2 (two) times daily.            CONTINUE taking these medications      cetirizine 10 MG tablet  Commonly known as: ZYRTEC  Take 1 tablet (10 mg total) by mouth daily as needed for Allergies.     mirtazapine 15 MG tablet  Commonly known as: REMERON  Take 1 tablet (15 mg total) by mouth every evening.     mometasone 0.1% 0.1 % cream  Commonly known as: ELOCON  apply to affected area topically daily     omeprazole 40 MG capsule  Commonly known as: PRILOSEC  Take 1 capsule (40 mg total) by mouth every morning.     pravastatin 80 MG tablet  Commonly known as: PRAVACHOL  Take 1 tablet (80 mg total) by mouth once daily.     QUEtiapine 25 MG Tab  Commonly known as: SEROQUEL  Take 1 tablet (25 mg total) by mouth 2 (two) times daily.            STOP taking these medications      ALPRAZolam 0.5 MG tablet  Commonly known as: XANAX     aspirin 81 MG EC tablet  Commonly known as: ECOTRIN     azelastine 137 mcg (0.1 %) nasal spray  Commonly known as: ASTELIN     donepeziL 10 MG tablet  Commonly known as: ARICEPT     meloxicam 15 MG tablet  Commonly known as: MOBIC     memantine 10 MG Tab  Commonly known as: NAMENDA     potassium chloride 10 MEQ Tbsr  Commonly known as: KLOR-CON 10     valsartan-hydrochlorothiazide 160-12.5 mg per tablet  Commonly known as: DIOVAN-HCT                I have seen and examined this patient within the last 30 days. My clinical findings that support the need for the home health skilled services and home bound status are the following:no   Weakness/numbness causing balance and gait disturbance due to Infection and Weakness/Debility making it taxing to leave home.  Mental confusion making it unsafe for  patient to leave home alone due to  Dementia.     Diet:  soft bland diet.      Referrals/ Consults  Physical Therapy to evaluate and treat. Evaluate for home safety and equipment needs; Establish/upgrade home exercise program. Perform / instruct on therapeutic exercises, gait training, transfer training, and Range of Motion.  Occupational Therapy to evaluate and treat. Evaluate home environment for safety and equipment needs. Perform/Instruct on transfers, ADL training, ROM, and therapeutic exercises.  Aide to provide assistance with personal care, ADLs, and vital signs.    Activities:   ambulate in house with assistance    Nursing:   Agency to admit patient within 24 hours of hospital discharge unless specified on physician order or at patient request    SN to complete comprehensive assessment including routine vital signs. Instruct on disease process and s/s of complications to report to MD. Review/verify medication list sent home with the patient at time of discharge  and instruct patient/caregiver as needed. Frequency may be adjusted depending on start of care date.     Skilled nurse to perform up to 3 visits PRN for symptoms related to diagnosis    Notify MD if SBP > 160 or < 90; DBP > 90 or < 50; HR > 120 or < 50; Temp > 101; O2 < 88%; Other:       Ok to schedule additional visits based on staff availability and patient request on consecutive days within the home health episode.    When multiple disciplines ordered:    Start of Care occurs on Sunday - Wednesday schedule remaining discipline evaluations as ordered on separate consecutive days following the start of care.    Thursday SOC -schedule subsequent evaluations Friday and Monday the following week.     Friday - Saturday SOC - schedule subsequent discipline evaluations on consecutive days starting Monday of the following week.    For all post-discharge communication and subsequent orders please contact patient's primary care physician.     Home Health  Aide:  Nursing Weekly, Physical Therapy Three times weekly, Occupational Therapy Three times weekly, and Home Health Aide Twice weekly    I certify that this patient is confined to her home and needs intermittent skilled nursing care, physical therapy, and occupational therapy.               [1]   Current Facility-Administered Medications   Medication Dose Route Frequency Provider Last Rate Last Admin    acetaminophen tablet 1,000 mg  1,000 mg Oral Q8H PRN Jan, Phil GARCIA MD   1,000 mg at 03/09/25 1655    cefTRIAXone injection 1 g  1 g Intravenous Q24H Marker, Phil GARCIA MD   1 g at 03/13/25 1043    heparin (porcine) injection 5,000 Units  5,000 Units Subcutaneous Q8H Marker, Phil GARCIA MD   5,000 Units at 03/13/25 0544    lactated ringers infusion   Intravenous Continuous Marker, Phil GARCIA MD 75 mL/hr at 03/12/25 0735 Rate Change at 03/12/25 0735    melatonin tablet 6 mg  6 mg Oral Nightly PRN Marker, Phil GARCIA MD        metroNIDAZOLE tablet 500 mg  500 mg Oral Q8H Marker, Phil GARCIA MD   500 mg at 03/13/25 0544    naloxone 0.4 mg/mL injection 0.02 mg  0.02 mg Intravenous PRN Marker, Phil GARCIA MD        ondansetron injection 4 mg  4 mg Intravenous Q6H PRN Marker, Phil GARCIA MD        pantoprazole EC tablet 40 mg  40 mg Oral Daily Marker, Phil GARCIA MD   40 mg at 03/13/25 0918    pravastatin tablet 80 mg  80 mg Oral Daily Marker, Phil GARCIA MD   80 mg at 03/13/25 0918    prochlorperazine injection Soln 5 mg  5 mg Intravenous Q6H PRN Marker, Phil GARCIA MD        QUEtiapine tablet 25 mg  25 mg Oral BID Marker, Phil GARCIA MD   25 mg at 03/13/25 0918    senna-docusate 8.6-50 mg per tablet 1 tablet  1 tablet Oral BID Marker, Phil GARCIA MD   1 tablet at 03/13/25 0918    sodium bicarbonate tablet 650 mg  650 mg Oral BID Marker, Phil GARCIA MD   650 mg at 03/13/25 0918    sodium chloride 0.9% flush 10 mL  10 mL Intravenous Q8H PRN Jan, Phil GARCIA MD        sodium chloride 0.9% flush 10 mL  10 mL Intravenous Q12H PRN Jan, Phil GARCIA MD         sodium chloride 0.9% flush 10 mL  10 mL Intravenous Q12H PRN Elma Parham, EDWAR

## 2025-03-13 NOTE — PLAN OF CARE
CM called and spoke with Marli, daughter, who is unable to transport patient post discharge d/t having the flu. According to daughter, patient caregiver will be picking up and transporting.

## 2025-03-13 NOTE — PLAN OF CARE
Case Management Final Discharge Note    Discharge Disposition: Home    New DME ordered / company name: None    Relevant SDOH / Transition of Care Barriers:  None    Person available to provide assistance at home when needed and their contact information: Independent    Scheduled followup appointment: PCP, Neurology, [facility], home health    Referrals placed: Home health accepted by [facility]    Transportation: Family to provide transportation at discharge    Patient educated on discharge services and updated on DC plan. Bedside RN notified, patient clear to discharge from Case Management Perspective.       [facility] ([address])  Discharge Final Note    Primary Care Provider: Parminder Junior MD    Expected Discharge Date: 3/13/2025    Final Discharge Note (most recent)       Final Note - 03/13/25 1135          Final Note    Assessment Type Final Discharge Note (P)      Anticipated Discharge Disposition Home-Health Care Svc (P)      Hospital Resources/Appts/Education Provided Provided patient/caregiver with written discharge plan information;Appointments scheduled and added to AVS (P)         Post-Acute Status    Post-Acute Authorization Home Health (P)      Home Health Status Set-up Complete/Auth obtained (P)      Patient choice form signed by patient/caregiver List with quality metrics by geographic area provided (P)      Discharge Delays None known at this time (P)                      Important Message from Medicare  Important Message from Medicare regarding Discharge Appeal Rights: Explained to patient/caregiver, Signed/date by patient/caregiver     Date IMM was signed: 03/09/25  Time IMM was signed: 0900     Follow-up providers       Parminder Junior MD   Specialty: Family Medicine   Relationship: PCP - General    [address] [address]   Phone: [phone]       Next Steps: Follow up    Instructions: MD office will call to schedule an  appointment    Alesha Levin MD   Specialty: Neurology    [address]  [address]  [facility] [address]   Phone: [phone]       Next Steps: Follow up    Instructions: MD office will call you to schedule a hospital f/u appointment    [facility]   Specialty: Gastroenterology    [address]  [address]  [facility] [address]   Phone: [phone]       Next Steps: Follow up on 3/14/2025    Instructions: MD office will call to schedule a hospital f/u appointment              After-discharge care                Home Medical Care       [facility]   Service: Home Rehabilitation    [address] [place] [address]   Phone: [phone]

## 2025-03-13 NOTE — PLAN OF CARE
Problem: Adult Inpatient Plan of Care  Goal: Plan of Care Review  3/13/2025 0448 by Eneida Ulloa LPN  Outcome: Not Progressing  3/13/2025 0259 by Eneida Ulloa LPN  Outcome: Progressing  Goal: Patient-Specific Goal (Individualized)  3/13/2025 0448 by Eneida Ulloa LPN  Outcome: Not Progressing  3/13/2025 0259 by Eneida Ulloa LPN  Outcome: Progressing  Goal: Absence of Hospital-Acquired Illness or Injury  3/13/2025 0448 by Eneida Ulloa LPN  Outcome: Not Progressing  3/13/2025 0259 by Eneida Ulloa LPN  Outcome: Progressing  Goal: Optimal Comfort and Wellbeing  3/13/2025 0448 by Eneida Ulloa LPN  Outcome: Not Progressing  3/13/2025 0259 by Eneida Ulloa LPN  Outcome: Progressing  Goal: Readiness for Transition of Care  3/13/2025 0448 by Eneida Ulloa LPN  Outcome: Not Progressing  3/13/2025 0259 by Eneida Ulloa LPN  Outcome: Progressing     Problem: Skin Injury Risk Increased  Goal: Skin Health and Integrity  3/13/2025 0448 by Eneida Ulloa LPN  Outcome: Not Progressing  3/13/2025 0259 by Eneida Ulloa LPN  Outcome: Progressing     Problem: Infection  Goal: Absence of Infection Signs and Symptoms  3/13/2025 0448 by Eneida Ulloa LPN  Outcome: Not Progressing  3/13/2025 0259 by Eneida Ulloa LPN  Outcome: Progressing     Problem: Sepsis/Septic Shock  Goal: Optimal Coping  3/13/2025 0448 by Eneida Ulloa LPN  Outcome: Not Progressing  3/13/2025 0259 by Eneida Ulloa LPN  Outcome: Progressing  Goal: Absence of Bleeding  3/13/2025 0448 by Eneida Ulloa LPN  Outcome: Not Progressing  3/13/2025 0259 by Eneida Ulloa LPN  Outcome: Progressing  Goal: Blood Glucose Level Within Targeted Range  3/13/2025 0448 by Eneida Ulloa LPN  Outcome: Not Progressing  3/13/2025 0259 by Eneida Ulloa LPN  Outcome: Progressing  Goal: Absence of Infection Signs and Symptoms  3/13/2025 0448 by Eneida Ulloa LPN  Outcome: Not  Progressing  3/13/2025 0259 by Eneida Ulloa LPN  Outcome: Progressing  Goal: Optimal Nutrition Intake  3/13/2025 0448 by Eneida Ulloa LPN  Outcome: Not Progressing  3/13/2025 0259 by Eneida Ulloa LPN  Outcome: Progressing     Problem: Acute Kidney Injury/Impairment  Goal: Fluid and Electrolyte Balance  3/13/2025 0448 by Eneida Ulloa LPN  Outcome: Not Progressing  3/13/2025 0259 by Eneida Ulloa LPN  Outcome: Progressing  Goal: Improved Oral Intake  3/13/2025 0448 by Eneida Ulloa LPN  Outcome: Not Progressing  3/13/2025 0259 by Eneida Ulloa LPN  Outcome: Progressing  Goal: Effective Renal Function  3/13/2025 0448 by Eneida Ulloa LPN  Outcome: Not Progressing  3/13/2025 0259 by Eneida Ulloa LPN  Outcome: Progressing     Problem: Delirium  Goal: Optimal Coping  3/13/2025 0448 by Eneida Ulloa LPN  Outcome: Not Progressing  3/13/2025 0259 by Eneida Ulloa LPN  Outcome: Progressing  Goal: Improved Behavioral Control  3/13/2025 0448 by Eneida Ulloa LPN  Outcome: Not Progressing  3/13/2025 0259 by Eneida Ulloa LPN  Outcome: Progressing  Goal: Improved Attention and Thought Clarity  3/13/2025 0448 by Eneida Ulloa LPN  Outcome: Not Progressing  3/13/2025 0259 by Eneida Ulloa LPN  Outcome: Progressing  Goal: Improved Sleep  3/13/2025 0448 by Eneida Ulloa LPN  Outcome: Not Progressing  3/13/2025 0259 by Eneida Ulloa LPN  Outcome: Progressing     Problem: Fall Injury Risk  Goal: Absence of Fall and Fall-Related Injury  3/13/2025 0448 by Eneida Ulloa LPN  Outcome: Not Progressing  3/13/2025 0259 by Eneida Ulloa LPN  Outcome: Progressing

## 2025-03-13 NOTE — DISCHARGE SUMMARY
"[facility] Surg [address] Medicine  Discharge Summary      Patient Name: Soila Chávez  MRN: [id number]  RAHUL: [id number]  Patient Class: IP- Inpatient  Admission Date: 3/7/2025  Hospital Length of Stay: 5 days  Discharge Date and Time: No discharge date for patient encounter.  Attending Physician: Phil Montoya MD   Discharging Provider: Phil Montoya MD  Primary Care Provider: Parminder Junior MD    Primary Care Team: Networked reference to record PCT     HPI:   Per Nathalia Barnett, NP:    "Soila Chávez is a 74 year old female with a past medical history of HTN, asymptomatic bradycardia, aortic atherosclerosis, dementia, HTN, chronic back pain, anxiety and GERD who presents with nausea and vomiting that started this afternoon after she ate chicken. She reports associated RLQ pain.Patient's daughter found patient on floor actively vomiting and called PCP who instructed her to bring patient to ER for further evaluation.  Per ED note, patient had a syncopal episode en route to the hospital. No history of seizure or past syncopal episodes.  Per chart review, patient was recently seen in the ED three weeks ago with N/V with abdominal pain and found to have DA (2.2) with hypotension 80/50's. She was treated with IV fluids and discharged home. Patient was seen by her cardiologist and found to by hypotensive. She was instructed to hold her blood pressure medications.      Upon arrival to the ED, patient found to be hypotensive ED work up significant for elevated WBC 13.29, UA negative for UTI,  Creatinine 1.6 (appears 1.8-1.0 at baseline) and CO2 17. CT findings revealed acute colitis and constipation.  Chest x-ray showed no acute cardiopulmonary process.  CT head showed no acute abnormality.  Abdominal ultrasound showed cholelithiasis with stones in the gallbladder neck without acute cholecystitis.  Patient received 2 L IV fluids with improvement in her blood pressure.  She was " "started on IV aztreonam and IV metronidazole.  She was referred to Hospital Medicine and will be admitted for further evaluation and management."    Procedure(s) (LRB):  SIGMOIDOSCOPY, FLEXIBLE (N/A)      Goals of Care Treatment Preferences:  Code Status: Full Code      SDOH Screening:  The patient was screened for utility difficulties, food insecurity, transport difficulties, housing insecurity, and interpersonal safety and there were no concerns identified this admission.     Consults:   Consults (From admission, onward)          Status Ordering Provider     Inpatient consult to Midline team  Once        Provider:  (Not yet assigned)    Acknowledged ALISON SALCIDO     Inpatient consult to Registered Dietitian/Nutritionist  Once        Provider:  (Not yet assigned)    Completed HERIBERTO ARIAS     Inpatient consult to Midline team  Once        Provider:  (Not yet assigned)    Completed JOE BENTLEY     Inpatient consult to Neurology Services (Vascular Neurology)  Once        Provider:  Osman Rudolph MD    Completed RADHA FULTON     Inpatient consult to Gastroenterology  Once        Provider:  Billy Vegas MD    Completed RADHA FULTON          Hospital Course By Problem:   Assessment & Plan  Sepsis  Infectious and ischemic Colitis  Lactic acidosis  -Admitted to inpatient status  -Presented with nausea, vomiting, and abdominal pain.    -Ultrasound and CT scan shows evidence of cholelithiasis.  Ultrasound shows stones in the gallbladder neck but no evidence of acute cholecystitis.    -CT scan showed "colonic wall thickening with mild surrounding inflammatory changes seen involving moderate segment length of mid to distal descending colon and proximal sigmoid colon.  -Echocardiogram shows normal systolic and diastolic function.  -Source of sepsis appears most likely related to infectious and ischemic colitis.  -EGD performed today shows evidence of ischemic colitis.  GI recommends " completing 7-10 days of antibiotics  -Treated with empiric broad-spectrum coverage with IV vancomycin, aztreonam, and metronidazole.  Blood pressure improved and lactic acidosis resolved.  Noted history of rash with penicillin however previously prescribed cephalosporin without documented reaction.  Suspect would tolerate intravenous cephalosporin.  Stopped vancomycin 3/9 and she has continued treatment with ceftriaxone and metronidazole since 3/10.  Transferred out of ICU 3/11.    -She was seen and examined and is eating a bit of her food.  She denies pain and is overall doing well.  She is medically stable to go home with home health today.  Will complete course of antibiotics with cipro and flagyl.  She will need to follow up with pcp within 1 week, her gastroenterologist within 2 weeks and her neurologist within 1 month.  Called and discussed with her daughter who was in agreement with plan.  Acute kidney injury  Metabolic acidosis  -On admit Cr 1.6  -Suspect due to volume depletion and sepsis.    -Avoid nephrotoxic agents and renally dose meds  -Treated with IV fluids and Creatinine improved, but with persistent normal anion-gap metabolic acidosis so Na bicarb started  -Cr now normal.  Bicarb still somewhat low.  Continue bicarb tabs  Seizure  -Family reported seizure-like activity where patient briefly and responsible foaming at the mouth  en route to the hospital.    -No known history of seizures.    -CT head negative for acute pathology.    -Patient had been on alprazolam at home, but daughters report they have not given that to her in quite some time.  She received one dose of alprazolam in the hospital and it was subsequently discontinued.  -Memantine and donepezil have been associated with seizures.    -EEG showed slowing but no epileptogenic discharges  -Neurology consulted and input appreciated.  Recommend holding seizure medications at this time  -Holding home memantine and donepezil for  now.  -Seizure precautions ordered and no seizures during her stay  -Needs f/u with her neurologist to discuss possible resumption of aricept and namenda  Hypertension  -Hypotensive on admit and home valsartan hctz held.  Did not require pressors  -BP normalized with IV fluids  -BP remains normal today - not requiring bp lowering meds presently.  -Not resuming diovan at discharge.  Follow-up with pcp within 1 week for bp check.  Alzheimer's dementia with behavioral disturbance  -Hold dementia medications due to concern for possible association with seizures.  -Appears mentation has returned to baseline.  -Delirium precautions ordered  Anemia  -Hb 11.9 on admit and now 9.1  -No evidence of bleeding.  Drop likely due to initial hemoconcentration and IV fluids   Hypokalemia  -K normal today  Debility  -PT/OT consulted and recommend home health at discharge  Mild malnutrition  -Continue efforts with liberalized diet and boost  -Ultimately should be on a low salt cardiac diet, but at this time getting her to eat is of primary importance.  Discussed plan with daughter on 3/13.    Final Active Diagnoses:    Diagnosis Date Noted POA    PRINCIPAL PROBLEM:  Sepsis [A41.9] 03/08/2025 Yes    Infectious and ischemic Colitis [A09] 03/08/2025 Yes    Acute kidney injury [N17.9] 03/08/2025 Yes    Metabolic acidosis [E87.20] 03/08/2025 Yes    Lactic acidosis [E87.20] 03/08/2025 Yes    Hypokalemia [E87.6] 03/11/2025 Yes    Anemia [D64.9] 03/11/2025 Yes    Seizure [R56.9] 03/08/2025 Yes    Mild malnutrition [E44.1] 03/12/2025 Unknown    Debility [R53.81] 03/11/2025 Yes    Alzheimer's dementia with behavioral disturbance [G30.9, F02.818] 03/22/2024 Yes    Hypertension [I10] 01/14/2013 Yes      Problems Resolved During this Admission:    Diagnosis Date Noted Date Resolved POA    Diverticulosis [K57.90] 03/11/2025 03/11/2025 Yes       Discharged Condition: stable    Disposition: Home-Health Care [facility]    Follow Up:   Follow-up Information        Parminder Junior MD Follow up.    Specialty: Family Medicine  Why: MD office will call to schedule an appointment  Contact information:  [address]  [address]  [facility] [address]  [phone]               Alesha Levin MD Follow up.    Specialty: Neurology  Why: MD office will call you to schedule a hospital f/u appointment  Contact information:  [address]  [address]  [facility] [address]  [phone]               [facility] Follow up on 3/14/2025.    Specialty: Gastroenterology  Why: MD office will call to schedule a hospital f/u appointment  Contact information:  [address]  [address]  [facility] [address]  [phone]                           Patient Instructions:      Notify your health care provider if you experience any of the following:  increased confusion or weakness     Notify your health care provider if you experience any of the following:  persistent dizziness, light-headedness, or visual disturbances     Notify your health care provider if you experience any of the following:  worsening rash     Notify your health care provider if you experience any of the following:  severe persistent headache     Notify your health care provider if you experience any of the following:  difficulty breathing or increased cough     Notify your health care provider if you experience any of the following:  severe uncontrolled pain     Notify your health care provider if you experience any of the following:  persistent nausea and vomiting or diarrhea     Notify your health care provider if you experience any of the following:  temperature >100.4     Activity as tolerated       Significant Diagnostic Studies: Labs: BMP:   Recent Labs   Lab 03/12/25  0335 03/12/25  1731 03/13/25  0435   GLU 83  --  100     --  145   K 3.1* 3.2* 3.8   *  --  118*   CO2 19*  --  20*   BUN 7*  --  6*   CREATININE 0.9  --  0.8   CALCIUM  "8.0*  --  8.3*   MG 1.8  --  1.8   , CMP   Recent Labs   Lab 03/12/25  0335 03/12/25  1731 03/13/25 0435     --  145   K 3.1* 3.2* 3.8   *  --  118*   CO2 19*  --  20*   GLU 83  --  100   BUN 7*  --  6*   CREATININE 0.9  --  0.8   CALCIUM 8.0*  --  8.3*   PROT 5.4*  --  5.6*   ALBUMIN 2.5*  --  2.6*   BILITOT 0.3  --  0.2   ALKPHOS 57  --  53   AST 27  --  34   ALT 14  --  19   ANIONGAP 11  --  7*   , CBC   Recent Labs   Lab 03/12/25 0335 03/13/25 0435   WBC 9.86 9.67   HGB 9.0* 9.1*   HCT 27.4* 28.0*    206   , INR No results found for: "INR", "PROTIME", Lipid Panel   Lab Results   Component Value Date    CHOL 129 02/08/2025    HDL 35 (L) 02/08/2025    LDLCALC 78.6 02/08/2025    TRIG 77 02/08/2025    CHOLHDL 27.1 02/08/2025   , Troponin   Recent Labs   Lab 03/07/25  1706   TROPONINI <0.006   , and A1C:   Recent Labs   Lab 01/29/25  1050   HGBA1C 5.5       Pending Diagnostic Studies:       None           Medications:  Reconciled Home Medications:      Medication List        START taking these medications      ciprofloxacin HCl 500 MG tablet  Commonly known as: CIPRO  Take 1 tablet (500 mg total) by mouth 2 (two) times daily. for 4 days     metroNIDAZOLE 500 MG tablet  Commonly known as: FLAGYL  Take 1 tablet (500 mg total) by mouth every 8 (eight) hours. for 4 days     senna-docusate 8.6-50 mg 8.6-50 mg per tablet  Commonly known as: PERICOLACE  Take 1 tablet by mouth 2 (two) times daily.     sodium bicarbonate 650 MG tablet  Take 1 tablet (650 mg total) by mouth 2 (two) times daily.            CONTINUE taking these medications      cetirizine 10 MG tablet  Commonly known as: ZYRTEC  Take 1 tablet (10 mg total) by mouth daily as needed for Allergies.     mirtazapine 15 MG tablet  Commonly known as: REMERON  Take 1 tablet (15 mg total) by mouth every evening.     mometasone 0.1% 0.1 % cream  Commonly known as: ELOCON  apply to affected area topically daily     omeprazole 40 MG capsule  Commonly " known as: PRILOSEC  Take 1 capsule (40 mg total) by mouth every morning.     pravastatin 80 MG tablet  Commonly known as: PRAVACHOL  Take 1 tablet (80 mg total) by mouth once daily.     QUEtiapine 25 MG Tab  Commonly known as: SEROQUEL  Take 1 tablet (25 mg total) by mouth 2 (two) times daily.            STOP taking these medications      ALPRAZolam 0.5 MG tablet  Commonly known as: XANAX     aspirin 81 MG EC tablet  Commonly known as: ECOTRIN     azelastine 137 mcg (0.1 %) nasal spray  Commonly known as: ASTELIN     donepeziL 10 MG tablet  Commonly known as: ARICEPT     meloxicam 15 MG tablet  Commonly known as: MOBIC     memantine 10 MG Tab  Commonly known as: NAMENDA     potassium chloride 10 MEQ Tbsr  Commonly known as: KLOR-CON 10     valsartan-hydrochlorothiazide 160-12.5 mg per tablet  Commonly known as: DIOVAN-HCT              Indwelling Lines/Drains at time of discharge:   Lines/Drains/Airways       None                   Time spent on the discharge of patient: 35 minutes         Phil Montoya MD  Department of Hospital Medicine  [facility] S[facility] ([address])

## 2025-03-13 NOTE — ASSESSMENT & PLAN NOTE
-Hb 11.9 on admit and now 9.1  -No evidence of bleeding.  Drop likely due to initial hemoconcentration and IV fluids

## 2025-03-13 NOTE — ASSESSMENT & PLAN NOTE
-On admit Cr 1.6  -Suspect due to volume depletion and sepsis.    -Avoid nephrotoxic agents and renally dose meds  -Treated with IV fluids and Creatinine improved, but with persistent normal anion-gap metabolic acidosis so Na bicarb started  -Cr now normal.  Bicarb still somewhat low.  Continue bicarb tabs

## 2025-03-13 NOTE — PT/OT/SLP PROGRESS
"Physical Therapy Treatment    Patient Name:  Soila Chávez   MRN:  [id number]    Recommendations:     Discharge Recommendations: Low Intensity Therapy (HHPT w/ family assistance )  Discharge Equipment Recommendations:  (TBD pending progress, at this time using a RW)  Barriers to discharge: None    Assessment:     Soila Chávez is a 74 y.o. female admitted with a medical diagnosis of Sepsis.  She presents with the following impairments/functional limitations: weakness, impaired endurance, impaired self care skills, impaired functional mobility, gait instability, impaired balance, impaired cognition, decreased coordination, decreased safety awareness ;pt with good mobility today, pleasant and agreeable in session, amb'd in room to bathroom and back, limited by pt being "cold".    Rehab Prognosis: Good; patient would benefit from acute skilled PT services to address these deficits and reach maximum level of function.    Recent Surgery: Procedure(s) (LRB):  SIGMOIDOSCOPY, FLEXIBLE (N/A) 2 Days Post-Op    Plan:     During this hospitalization, patient to be seen 5 x/week to address the identified rehab impairments via gait training, therapeutic activities, therapeutic exercises and progress toward the following goals:    Plan of Care Expires:  04/09/25    Subjective     Chief Complaint: being cold  Patient/Family Comments/goals: pt agreeable to session, reports being "cold" (of note: room temp set very low and room feels cold. Adjusted temp)  Pain/Comfort:  Pain Rating 1: 0/10  Pain Rating Post-Intervention 1: 0/10      Objective:     Communicated with nurse prior to session.  Patient found HOB elevated with bed alarm, PureWick (midline) upon PT entry to room.     General Precautions: Standard, fall, seizure (dementia)  Orthopedic Precautions: N/A  Braces: N/A  Respiratory Status: Room air     Functional Mobility:  Bed Mobility:     Supine to Sit: contact guard assistance  Sit to Supine: contact guard " assistance  Transfers:     Sit to Stand:  contact guard assistance and minimum assistance with rolling walker  Gait: amb'd 20' x 2 w/ RW and CGA/min.A, cueing for using RW properly, pt pushing it to side at times.      AM-PAC 6 CLICK MOBILITY  Turning over in bed (including adjusting bedclothes, sheets and blankets)?: 3  Sitting down on and standing up from a chair with arms (e.g., wheelchair, bedside commode, etc.): 3  Moving from lying on back to sitting on the side of the bed?: 3  Moving to and from a bed to a chair (including a wheelchair)?: 3  Need to walk in hospital room?: 3  Climbing 3-5 steps with a railing?: 3  Basic Mobility Total Score: 18       Treatment & Education:  Pt perf'd commode t/f's in bathroom to elevated chair w/ CGA/min.A, req'd assistance for hygiene following BM (was able to do at home on her own per sitter yesterday).     Patient left HOB elevated with all lines intact, call button in reach, bed alarm on, and nurse present..    GOALS:   Multidisciplinary Problems       Physical Therapy Goals          Problem: Physical Therapy    Goal Priority Disciplines Outcome Interventions   Physical Therapy Goal     PT, PT/OT Progressing    Description: Goals to be met by: 2025    Patient will increase functional independence with mobility by performin. Sit<>stand with SBA with least restrictive assistive device.  2. Gait x 100 feet with RW with SBA.  3. Supine<>sit with SBA.                           DME Justifications:  No DME recommended requiring DME justifications    Time Tracking:     PT Received On: 25  PT Start Time: 900     PT Stop Time: 925  PT Total Time (min): 25 min     Billable Minutes: Gait Training 13 and Therapeutic Activity 12    Treatment Type: Treatment  PT/PTA: PTA     Number of PTA visits since last PT visit: 3     2025

## 2025-03-13 NOTE — PLAN OF CARE
I provided the patient a choice of post acute providers and offered a list of CMS rated home health agencies.     Patient has declined to select a preferred provider and elects placement with the first accepting in network provider that is available to provide services as ordered by the physician.      03/13/25 1115   Post-Acute Status   Post-Acute Authorization Home Health   Home Health Status Referrals Sent   Hospital Resources/Appts/Education Provided Provided patient/caregiver with written discharge plan information   Patient choice form signed by patient/caregiver List with quality metrics by geographic area provided   Discharge Delays None known at this time   Discharge Plan   Discharge Plan A Home Health

## 2025-03-13 NOTE — TELEPHONE ENCOUNTER
----- Message from Med Assistant Lr sent at 3/13/2025 10:36 AM CDT -----  Name of Who is Calling: Quinn with [facility] calling for CARY LINDSAY [[id number]]  What is the request in detail: Pt is getting discharged from the hospital today and needs a hosp f/u. No schedule coming up for that..  Can the clinic reply by [facility]: No  What Number to Call Back if not in [facility]: [phone]

## 2025-03-13 NOTE — ASSESSMENT & PLAN NOTE
"-Admitted to inpatient status  -Presented with nausea, vomiting, and abdominal pain.    -Ultrasound and CT scan shows evidence of cholelithiasis.  Ultrasound shows stones in the gallbladder neck but no evidence of acute cholecystitis.    -CT scan showed "colonic wall thickening with mild surrounding inflammatory changes seen involving moderate segment length of mid to distal descending colon and proximal sigmoid colon.  -Echocardiogram shows normal systolic and diastolic function.  -Source of sepsis appears most likely related to infectious and ischemic colitis.  -EGD performed today shows evidence of ischemic colitis.  GI recommends completing 7-10 days of antibiotics  -Treated with empiric broad-spectrum coverage with IV vancomycin, aztreonam, and metronidazole.  Blood pressure improved and lactic acidosis resolved.  Noted history of rash with penicillin however previously prescribed cephalosporin without documented reaction.  Suspect would tolerate intravenous cephalosporin.  Stopped vancomycin 3/9 and she has continued treatment with ceftriaxone and metronidazole since 3/10.  Transferred out of ICU 3/11.    -She was seen and examined and is eating a bit of her food.  She denies pain and is overall doing well.  She is medically stable to go home with home health today.  Will complete course of antibiotics with cipro and flagyl.  She will need to follow up with pcp within 1 week, her gastroenterologist within 2 weeks and her neurologist within 1 month.  Called and discussed with her daughter who was in agreement with plan.  "

## 2025-03-13 NOTE — ASSESSMENT & PLAN NOTE
-Hypotensive on admit and home valsartan hctz held.  Did not require pressors  -BP normalized with IV fluids  -BP remains normal today - not requiring bp lowering meds presently.  -Not resuming diovan at discharge.  Follow-up with pcp within 1 week for bp check.

## 2025-03-13 NOTE — DISCHARGE INSTRUCTIONS
Take all medications as prescribed.  Eat a soft bland diet  Follow up with your physicians as scheduled - pcp within 1 week your gastroenterologist on 3/14 and your neurologist within 1 month.  We ordered a home health service for you as well.  Thank you for trusting [facility] and Dr. Montoya with your care.  We are honored that you entrusted us with your healthcare needs. Your satisfaction is very important to us and we hope you have been very pleased with your experience at [facility]. After your discharge you may receive a survey asking you to rate your hospital experience. We encourage you to take the time to complete the survey as your feedback allows us to identify areas for improvement as well as recognize our staff.   We hope that you have received the very best care possible during your hospitalization at [facility], as your satisfaction is our top priority.

## 2025-03-14 ENCOUNTER — PATIENT OUTREACH (OUTPATIENT)
Facility: OTHER | Age: 75
End: 2025-03-14
Payer: MEDICARE

## 2025-03-14 ENCOUNTER — PATIENT OUTREACH (OUTPATIENT)
Dept: ADMINISTRATIVE | Facility: CLINIC | Age: 75
End: 2025-03-14
Payer: MEDICARE

## 2025-03-14 NOTE — TELEPHONE ENCOUNTER
Marli (daughter)     [facility] pharmacy [phone]  @ [phone] call to pharmacy, spoke with staff.  Confirmed both missing medications are OTC options. Staff advised daughter's card was not charged d/t  card on file. Advised Marli, phuong buckner.

## 2025-03-14 NOTE — PROGRESS NOTES
C3 nurse spoke with Soila Chávez's daughter, Marli for a TCC post hospital discharge follow up call. The patient has a scheduled HOSFU appointment with Doretha Zpeeda NP on 03/20/25 @ 1000.

## 2025-03-14 NOTE — PROGRESS NOTES
Patient discharged from [facility] on 3/13/25. Discharge summary instructs pt to follow up with Primary care (scheduled for 3/20/2025 at 10:00 AM with Doretha Zepeda, NP at [address] [facility] [place] [address]), Neurology (scheduled for 4/15/2025 at 9:20 AM with Alesha Levin MD, Neurology at [address] [facility] [place] [address]) and [facility] (non[facility]). Called patient to follow up, no answer, LVM asking for return call. Appointment reminder scheduled for 3/18/25 and 4/13/25. Follow up call scheduled for 3/21/25 to assess for additional needs.

## 2025-03-15 ENCOUNTER — NURSE TRIAGE (OUTPATIENT)
Dept: ADMINISTRATIVE | Facility: CLINIC | Age: 75
End: 2025-03-15
Payer: MEDICARE

## 2025-03-15 ENCOUNTER — PATIENT OUTREACH (OUTPATIENT)
Facility: OTHER | Age: 75
End: 2025-03-15
Payer: MEDICARE

## 2025-03-15 ENCOUNTER — HOSPITAL ENCOUNTER (EMERGENCY)
Facility: OTHER | Age: 75
Discharge: HOME | End: 2025-03-15
Attending: EMERGENCY MEDICINE
Payer: MEDICARE

## 2025-03-15 ENCOUNTER — OCHSNER VIRTUAL EMERGENCY DEPARTMENT (OUTPATIENT)
Facility: CLINIC | Age: 75
End: 2025-03-15
Payer: MEDICARE

## 2025-03-15 VITALS
TEMPERATURE: 99 F | SYSTOLIC BLOOD PRESSURE: 124 MMHG | BODY MASS INDEX: 27.32 KG/M2 | HEART RATE: 86 BPM | WEIGHT: 164 LBS | OXYGEN SATURATION: 98 % | HEIGHT: 65 IN | RESPIRATION RATE: 18 BRPM | DIASTOLIC BLOOD PRESSURE: 82 MMHG

## 2025-03-15 DIAGNOSIS — R60.9 EDEMA, UNSPECIFIED TYPE: Primary | ICD-10-CM

## 2025-03-15 DIAGNOSIS — R22.0 FACIAL SWELLING: Primary | ICD-10-CM

## 2025-03-15 LAB
ANION GAP SERPL CALC-SCNC: 10 MMOL/L (ref 8–16)
BASOPHILS # BLD AUTO: 0.04 K/UL (ref 0–0.2)
BASOPHILS NFR BLD: 0.4 % (ref 0–1.9)
BUN SERPL-MCNC: 5 MG/DL (ref 8–23)
CALCIUM SERPL-MCNC: 8.6 MG/DL (ref 8.7–10.5)
CHLORIDE SERPL-SCNC: 115 MMOL/L (ref 95–110)
CO2 SERPL-SCNC: 20 MMOL/L (ref 23–29)
CREAT SERPL-MCNC: 0.9 MG/DL (ref 0.5–1.4)
DIFFERENTIAL METHOD BLD: ABNORMAL
EOSINOPHIL # BLD AUTO: 0.3 K/UL (ref 0–0.5)
EOSINOPHIL NFR BLD: 2.6 % (ref 0–8)
ERYTHROCYTE [DISTWIDTH] IN BLOOD BY AUTOMATED COUNT: 15.9 % (ref 11.5–14.5)
EST. GFR  (NO RACE VARIABLE): >60 ML/MIN/1.73 M^2
GLUCOSE SERPL-MCNC: 93 MG/DL (ref 70–110)
HCT VFR BLD AUTO: 29.5 % (ref 37–48.5)
HGB BLD-MCNC: 9.7 G/DL (ref 12–16)
IMM GRANULOCYTES # BLD AUTO: 0.46 K/UL (ref 0–0.04)
IMM GRANULOCYTES NFR BLD AUTO: 4.2 % (ref 0–0.5)
LYMPHOCYTES # BLD AUTO: 2.4 K/UL (ref 1–4.8)
LYMPHOCYTES NFR BLD: 21.4 % (ref 18–48)
MCH RBC QN AUTO: 27.8 PG (ref 27–31)
MCHC RBC AUTO-ENTMCNC: 32.9 G/DL (ref 32–36)
MCV RBC AUTO: 85 FL (ref 82–98)
MONOCYTES # BLD AUTO: 0.8 K/UL (ref 0.3–1)
MONOCYTES NFR BLD: 7.5 % (ref 4–15)
NEUTROPHILS # BLD AUTO: 7 K/UL (ref 1.8–7.7)
NEUTROPHILS NFR BLD: 63.9 % (ref 38–73)
NRBC BLD-RTO: 0 /100 WBC
PLATELET # BLD AUTO: 229 K/UL (ref 150–450)
PMV BLD AUTO: 9.6 FL (ref 9.2–12.9)
POTASSIUM SERPL-SCNC: 3.4 MMOL/L (ref 3.5–5.1)
RBC # BLD AUTO: 3.49 M/UL (ref 4–5.4)
SODIUM SERPL-SCNC: 145 MMOL/L (ref 136–145)
WBC # BLD AUTO: 10.98 K/UL (ref 3.9–12.7)

## 2025-03-15 PROCEDURE — 85025 COMPLETE CBC W/AUTO DIFF WBC: CPT | Performed by: NURSE PRACTITIONER

## 2025-03-15 PROCEDURE — 99283 EMERGENCY DEPT VISIT LOW MDM: CPT

## 2025-03-15 PROCEDURE — 80048 BASIC METABOLIC PNL TOTAL CA: CPT | Performed by: NURSE PRACTITIONER

## 2025-03-15 NOTE — PROGRESS NOTES
"Patient's daughter called the OOC RN on 3/15/25 for c/o "Speaking with daughter of pt who reports pt was recently discharged from hospital. States that pt was prescribed sodium bicarb, and first time taking it at home was last night. Was given sodium bicarb in hospital as well. Pt woke up this morning with swelling to cheeks under eyes, swelling to left hand, and ankle. Denies SOB, and swollen tongue ". OOC RN consult Kristyn.   Kristyn Provider Dr Goldstein disposition recommendation pt to go to ED.  Follow up scheduled for 3/16/25 to assess for additional needs.      "

## 2025-03-15 NOTE — TELEPHONE ENCOUNTER
Speaking with daughter of pt who reports pt was recently discharged from hospital. States that pt was prescribed sodium bicarb, and first time taking it at home was last night. Was given sodium bicarb in hospital as well. Pt woke up this morning with swelling to cheeks under eyes, swelling to left hand, and ankle. Denies SOB, and swollen tongue. Dispo to be seen by provider within 4 hours./PCP triage.    10:16 secure chat sent to JUNE    Dr. Bernarda Lopes for pt to be seen in ER    Daughter of pt aware of advisement, and will take to ER    Reason for Disposition   SEVERE swelling (e.g., entire face)    Additional Information   Negative: [1] Life-threatening reaction (anaphylaxis) in the past to similar substance (e.g., food, insect bite/sting, chemical, etc.) AND [2] < 2 hours since exposure   Negative: Unresponsive, passed out or very weak   Negative: Swollen tongue   Negative: Difficulty breathing or wheezing   Negative: Sounds like a life-threatening emergency to the triager   Negative: [1] SEVERE swelling (e.g., entire face) AND [2] < 2 hours since exposure to high-risk allergen (e.g., peanuts, tree nuts, fish, shellfish or 1st dose of drug) AND [3] no serious symptoms AND [4] no serious allergic reaction in the past   Negative: Fever   Negative: Taking an ACE Inhibitor medicine (e.g., benazepril / LOTENSIN, captopril / CAPOTEN, enalapril / VASOTEC, lisinopril / ZESTRIL)   Negative: Patient sounds very sick or weak to the triager   Negative: Pregnant 20 or more weeks   Negative: Postpartum (from 0 to 6 weeks after delivery)    Protocols used: Face Swelling-A-AH

## 2025-03-15 NOTE — FIRST PROVIDER EVALUATION
Emergency Department TeleTriage Encounter Note      CHIEF COMPLAINT    Chief Complaint   Patient presents with    Allergic Reaction     Was given sulfa after discharge and now has swelling to face and hands per daughter. No obvious swelling noted to face hands or legs but daughter states that she knows her and she swollen. No respiratory distress no difficulty swallowing no hives no itching.       VITAL SIGNS   Initial Vitals [03/15/25 1241]   BP Pulse Resp Temp SpO2   (!) 142/68 98 18 99 °F (37.2 °C) 99 %      MAP       --            ALLERGIES    Review of patient's allergies indicates:   Allergen Reactions    Lotensin [benazepril] Swelling    Penicillins Rash       PROVIDER TRIAGE NOTE  This is a teletriage evaluation of a 74 y.o. female presenting to the ED complaining of swelling in feet, face, and hands today. Daughter believes pt is having an allergic reaction to medicine. No rash or dyspnea.     Pt is alert, no distress. Well-appearing. No obvious facial swelling. Pt's daughter speaks for patient.     Initial orders will be placed and care will be transferred to an alternate provider when patient is roomed for a full evaluation. Any additional orders and the final disposition will be determined by that provider.         ORDERS  Labs Reviewed   CBC W/ AUTO DIFFERENTIAL   BASIC METABOLIC PANEL       ED Orders (720h ago, onward)      Start Ordered     Status Ordering Provider    03/15/25 1300 03/15/25 1259  CBC auto differential  STAT         Ordered SANDRA VELAZQUEZ N.    03/15/25 1300 03/15/25 1259  Basic metabolic panel  STAT         Ordered SANDRA VELAZQUEZ N.    03/15/25 1300 03/15/25 1259  Insert Saline lock IV  Once         Ordered SANDRA VELAZQUEZ.    03/15/25 1300 03/15/25 1259  Pulse Oximetry Continuous  Continuous         Ordered SANDRA VELAZQUEZ              Virtual Visit Note: The provider triage portion of this emergency department evaluation and documentation was  performed via [employer], a HIPAA-compliant telemedicine application, in concert with a tele-presenter in the room. A face to face patient evaluation with one of my colleagues will occur once the patient is placed in an emergency department room.      DISCLAIMER: This note was prepared with [employer] voice recognition transcription software. Garbled syntax, mangled pronouns, and other bizarre constructions may be attributed to that software system.

## 2025-03-15 NOTE — ED TRIAGE NOTES
Pt presents to ED with complaint of possible allergic reaction. States she was given sodium bicarb, cipro and flagyl at discharge recently and daughter reports swelling to face and hands. No known allergy to these meds. No obvious swelling noted. Denies respiratory distress, difficulty swallowing, hives nor itching. AAOx4. NAD. VSS

## 2025-03-15 NOTE — PLAN OF CARE-OVED
[facility] Emergency Department Plan of Care Note  Referral Source: Nurse On-Call                               Chief Complaint   Patient presents with    Facial Swelling     Patient recently hospitalized with sepsis, DA, ischemic colitis. She was treated with vancomycin, rocephin, and Flagyl inpatinet, discharged 3/13 with cipro and flagyl, also on sodium bicarbonate. Daughter called this am reporting patient with swelling to cheeks, left hand, left ankle, no oropharyngeal swelling or itching.        Recommendation: Emergency Department  Question allergic reaction v. Renal issue.           Emergency Department: [facility]               Encounter Diagnosis   Name Primary?    Facial swelling Yes      Penny Goldstein MD   10:38 AM

## 2025-03-15 NOTE — ED PROVIDER NOTES
Encounter Date: 3/15/2025    SCRIBE #1 NOTE: I, Brenda Ayala, am scribing for, and in the presence of,  Andry Gonzalez MD. I have scribed the following portions of the note - Other sections scribed: HPI, ROS, and PE.       History     Chief Complaint   Patient presents with    Allergic Reaction     Was given sulfa after discharge and now has swelling to face and hands per daughter. No obvious swelling noted to face hands or legs but daughter states that she knows her and she swollen. No respiratory distress no difficulty swallowing no hives no itching.     This is a very pleasant 74 y.o. female who presents with complaint of an allergic reaction, onset two days ago. Patient's daughter reports that the patient was recently hospitalized in the ICU from 3/7/25 to 3/12/25 for sepsis. She was discharged with a prescription for ciprofloxacin, metronidazole, and sodium bicarbonate. The patient took her prescribed medications at home, but her daughter notes that she has since developed swelling in her face, hands, and ankles. The patient denies any respiratory distress, difficulty swallowing, hives, itching, or abdominal pain. After consulting with her healthcare provider, she was advised to come to the ED for evaluation. Since her discharge, the patient has been stable, able to dress herself this morning, and has not experienced any significant worsening of symptoms. However, she reports a decreased appetite, consuming only a banana and a Boost drink today. This is the extent of the patient's complaints at this time.          The history is provided by the patient, a relative and medical records. No  was used.     Review of patient's allergies indicates:   Allergen Reactions    Lotensin [benazepril] Swelling    Penicillins Rash     Past Medical History:   Diagnosis Date    Allergy     Anxiety     Cataract     Colon polyps 2010    Hearing loss     Hypertension     Joint pain     Mental disorder      Seizure-like activity 3/8/2025     Past Surgical History:   Procedure Laterality Date    CATARACT EXTRACTION W/  INTRAOCULAR LENS IMPLANT Right 3/12/2024    Procedure: EXTRACTION, CATARACT, WITH IOL INSERTION;  Surgeon: Jax Garza MD;  Location: [facility] OR;  Service: Ophthalmology;  Laterality: Right;    COLONOSCOPY N/A 3/5/2020    Procedure: COLONOSCOPY;  Surgeon: Nathalia Kemp MD;  Location: [facility] (4TH FLR);  Service: Colon and Rectal;  Laterality: N/A;  requests later appt time if available - Pt open to any MD- Pt informed arrival time may be adjusted, Pt verbalized understanding- ERW2/24/20@1409    ESOPHAGOGASTRODUODENOSCOPY N/A 10/1/2020    Procedure: EGD (ESOPHAGOGASTRODUODENOSCOPY);  Surgeon: Casey Guerrero MD;  Location: [facility] (4TH FLR);  Service: Endoscopy;  Laterality: N/A;  covid test 9/28-[facility]    ESOPHAGOGASTRODUODENOSCOPY N/A 10/29/2021    Procedure: EGD (ESOPHAGOGASTRODUODENOSCOPY);  Surgeon: Billy Vegas MD;  Location: [facility];  Service: Endoscopy;  Laterality: N/A;    FLEXIBLE SIGMOIDOSCOPY N/A 3/11/2025    Procedure: SIGMOIDOSCOPY, FLEXIBLE;  Surgeon: Stan Arteaga MD;  Location: [facility];  Service: Endoscopy;  Laterality: N/A;    HYSTERECTOMY      PERIPARTUM CHITO     Family History   Problem Relation Name Age of Onset    Breast cancer Sister Ingris     Colon cancer Neg Hx      Ovarian cancer Neg Hx      Melanoma Neg Hx       Social History[1]  Review of Systems  Constitutional- Positive for appetite change. no fever  HEENT- Positive for facial swelling. no congestion  Eyes-no redness  Respiratory-no shortness of breath  Cardio-no chest pain  GI-no abdominal pain  Endocrine-no cold intolerance  -no difficulty urinating  MSK- Positive for joint swelling. no myalgias  Skin-no rashes  Allergy-no environmental allergy  Neurologic-, no headache  Hematology-no swollen nodes  Behavioral-no confusion    Physical Exam     Initial Vitals [03/15/25 1241]   BP Pulse Resp  Temp SpO2   (!) 142/68 98 18 99 °F (37.2 °C) 99 %      MAP       --         Physical Exam  Constitutional:  Chronically ill-appearing 74-year-old female in no obvious distress  Eyes: Conjunctivae normal.  Mild periorbital edema  ENT       Head: Normocephalic, atraumatic.       Nose: No congestion.       Mouth/Throat: Mucous membranes are moist.  Hematological/Lymphatic/Immunilogical: No cervical lymphadenopathy.  Cardiovascular: Normal rate, regular rhythm. Normal and symmetric distal pulses.  Respiratory: Normal respiratory effort. Breath sounds are normal.  Gastrointestinal: Soft, nontender.   Musculoskeletal: Normal range of motion in all extremities.  Plus one edema in the bilateral lower extremities most prominent around the feet  Neurologic: Alert, oriented. Normal speech and language. No gross focal neurologic deficits are appreciated.  Skin: Skin is warm, dry. No rash noted.  Psychiatric: Mood and affect are normal.     ED Course   Procedures  Labs Reviewed   CBC W/ AUTO DIFFERENTIAL - Abnormal       Result Value    WBC 10.98      RBC 3.49 (*)     Hemoglobin 9.7 (*)     Hematocrit 29.5 (*)     MCV 85      MCH 27.8      MCHC 32.9      RDW 15.9 (*)     Platelets 229      MPV 9.6      Immature Granulocytes 4.2 (*)     Gran # (ANC) 7.0      Immature Grans (Abs) 0.46 (*)     Lymph # 2.4      Mono # 0.8      Eos # 0.3      Baso # 0.04      nRBC 0      Gran % 63.9      Lymph % 21.4      Mono % 7.5      Eosinophil % 2.6      Basophil % 0.4      Differential Method Automated     BASIC METABOLIC PANEL - Abnormal    Sodium 145      Potassium 3.4 (*)     Chloride 115 (*)     CO2 20 (*)     Glucose 93      BUN 5 (*)     Creatinine 0.9      Calcium 8.6 (*)     Anion Gap 10      eGFR >60            Imaging Results    None          Medications - No data to display  Medical Decision Making  Differential diagnosis-edema, allergic reaction, anaphylaxis   Family and caregiver note patient began to demonstrate edema post use of  sodium bicarb.    Chemistry is relatively stable, renal function is normal.  Tolerating oral antibiotics well, will plan for discharge, outpatient follow-up and returning case of worsening with cessation of sodium bicarb.  Patient denies any shortness of breath or injuries elsewhere low suspicion for more serious underlying allergic reaction or heart failure.    Problems Addressed:  Edema, unspecified type: acute illness or injury    Amount and/or Complexity of Data Reviewed  Independent Historian: caregiver     Details: Daughter at the bedside notes significant hospital course for colitis discharge on antibiotics and sodium bicarb  External Data Reviewed: labs, radiology and notes.     Details: Recent admission for colitis and sigmoidoscopy  Labs: ordered. Decision-making details documented in ED Course.    Risk  OTC drugs.  Prescription drug management.  Diagnosis or treatment significantly limited by social determinants of health.  Risk Details: Previous stroke complicates this patient's care is a social determinants of health            Scribe Attestation:   Scribe #1: I performed the above scribed service and the documentation accurately describes the services I performed. I attest to the accuracy of the note.    Physician Attestation for Scribe: I, elly brown, reviewed documentation as scribed in my presence, which is both accurate and complete.                             Clinical Impression:  Final diagnoses:  [R60.9] Edema, unspecified type (Primary)          ED Disposition Condition    Discharge Stable          ED Prescriptions    None       Follow-up Information       Follow up With Specialties Details Why Contact Info    Parminder Junior MD Family Medicine Call in 2 days If symptoms worsen, For a follow up visit about today [address]  [address]  [place] [address]  [phone]                 [1]   Social History  Tobacco Use    Smoking status: Never     Passive exposure: Never     Smokeless tobacco: Never   Substance Use Topics    Alcohol use: Yes     Comment: beer occasionally    Drug use: No        Andry Gonzalez MD  03/16/25 5019

## 2025-03-16 ENCOUNTER — PATIENT OUTREACH (OUTPATIENT)
Facility: OTHER | Age: 75
End: 2025-03-16
Payer: MEDICARE

## 2025-03-16 NOTE — PROGRESS NOTES
Patient's daughter spoke to OOC/Kristyn on 3/15/25 and was instructed to go to the ED. Called and spoke to patient's daughter. States patient is doing better today, sodium bicarb was discontinued at the ED due to allergy, patient's face still swollen but she is eating and drinking okay and taking her medications. Reviewed Discharge summary with patient's daughter and discussed medications patient was supposed to stop taking, sent to patient via portal. Appointment reminder scheduled for 3/18/25 and follow up call for 3/21 to assess for additional needs.

## 2025-03-18 ENCOUNTER — PATIENT OUTREACH (OUTPATIENT)
Facility: OTHER | Age: 75
End: 2025-03-18
Payer: MEDICARE

## 2025-03-18 NOTE — PROGRESS NOTES
Successfully contacted patient to confirm appointment for 3/20/2025 at 10:00 AM with Doretha Zepeda NP at [address] [facility] [place] [address].  Patient's daughter also states that patient is scheduled to see [facility] on 3/20/25.  Follow up scheduled for 3/21/25 to assess for additional needs.

## 2025-03-20 ENCOUNTER — OFFICE VISIT (OUTPATIENT)
Dept: INTERNAL MEDICINE | Facility: CLINIC | Age: 75
End: 2025-03-20
Payer: MEDICARE

## 2025-03-20 ENCOUNTER — LAB VISIT (OUTPATIENT)
Dept: LAB | Facility: OTHER | Age: 75
End: 2025-03-20
Payer: MEDICARE

## 2025-03-20 VITALS
WEIGHT: 162.06 LBS | BODY MASS INDEX: 27 KG/M2 | HEART RATE: 82 BPM | HEIGHT: 65 IN | DIASTOLIC BLOOD PRESSURE: 66 MMHG | OXYGEN SATURATION: 99 % | SYSTOLIC BLOOD PRESSURE: 110 MMHG

## 2025-03-20 DIAGNOSIS — A09 INFECTIOUS COLITIS: ICD-10-CM

## 2025-03-20 DIAGNOSIS — E44.1 MILD MALNUTRITION: ICD-10-CM

## 2025-03-20 DIAGNOSIS — Z09 HOSPITAL DISCHARGE FOLLOW-UP: Primary | ICD-10-CM

## 2025-03-20 DIAGNOSIS — E87.6 HYPOKALEMIA: ICD-10-CM

## 2025-03-20 DIAGNOSIS — R53.81 DEBILITY: ICD-10-CM

## 2025-03-20 DIAGNOSIS — F03.90 DEMENTIA WITHOUT BEHAVIORAL DISTURBANCE: ICD-10-CM

## 2025-03-20 DIAGNOSIS — N17.9 ACUTE KIDNEY INJURY: ICD-10-CM

## 2025-03-20 DIAGNOSIS — D64.9 ANEMIA, UNSPECIFIED TYPE: ICD-10-CM

## 2025-03-20 LAB
ANION GAP SERPL CALC-SCNC: 11 MMOL/L (ref 8–16)
BASOPHILS # BLD AUTO: 0.03 K/UL (ref 0–0.2)
BASOPHILS NFR BLD: 0.3 % (ref 0–1.9)
BUN SERPL-MCNC: 9 MG/DL (ref 8–23)
CALCIUM SERPL-MCNC: 8.7 MG/DL (ref 8.7–10.5)
CHLORIDE SERPL-SCNC: 114 MMOL/L (ref 95–110)
CO2 SERPL-SCNC: 22 MMOL/L (ref 23–29)
CREAT SERPL-MCNC: 0.8 MG/DL (ref 0.5–1.4)
DIFFERENTIAL METHOD BLD: ABNORMAL
EOSINOPHIL # BLD AUTO: 0.2 K/UL (ref 0–0.5)
EOSINOPHIL NFR BLD: 2.1 % (ref 0–8)
ERYTHROCYTE [DISTWIDTH] IN BLOOD BY AUTOMATED COUNT: 17.4 % (ref 11.5–14.5)
EST. GFR  (NO RACE VARIABLE): >60 ML/MIN/1.73 M^2
GLUCOSE SERPL-MCNC: 98 MG/DL (ref 70–110)
HCT VFR BLD AUTO: 30 % (ref 37–48.5)
HGB BLD-MCNC: 9.7 G/DL (ref 12–16)
IMM GRANULOCYTES # BLD AUTO: 0.12 K/UL (ref 0–0.04)
IMM GRANULOCYTES NFR BLD AUTO: 1.2 % (ref 0–0.5)
LYMPHOCYTES # BLD AUTO: 1.9 K/UL (ref 1–4.8)
LYMPHOCYTES NFR BLD: 18.6 % (ref 18–48)
MCH RBC QN AUTO: 28 PG (ref 27–31)
MCHC RBC AUTO-ENTMCNC: 32.3 G/DL (ref 32–36)
MCV RBC AUTO: 87 FL (ref 82–98)
MONOCYTES # BLD AUTO: 0.9 K/UL (ref 0.3–1)
MONOCYTES NFR BLD: 8.5 % (ref 4–15)
NEUTROPHILS # BLD AUTO: 6.9 K/UL (ref 1.8–7.7)
NEUTROPHILS NFR BLD: 69.3 % (ref 38–73)
NRBC BLD-RTO: 0 /100 WBC
PLATELET # BLD AUTO: 369 K/UL (ref 150–450)
PMV BLD AUTO: 9.6 FL (ref 9.2–12.9)
POTASSIUM SERPL-SCNC: 3.6 MMOL/L (ref 3.5–5.1)
RBC # BLD AUTO: 3.47 M/UL (ref 4–5.4)
SODIUM SERPL-SCNC: 147 MMOL/L (ref 136–145)
WBC # BLD AUTO: 9.98 K/UL (ref 3.9–12.7)

## 2025-03-20 PROCEDURE — 80048 BASIC METABOLIC PNL TOTAL CA: CPT

## 2025-03-20 PROCEDURE — 36415 COLL VENOUS BLD VENIPUNCTURE: CPT

## 2025-03-20 PROCEDURE — 85025 COMPLETE CBC W/AUTO DIFF WBC: CPT

## 2025-03-20 PROCEDURE — 99999 PR PBB SHADOW E&M-EST. PATIENT-LVL IV: CPT | Mod: PBBFAC,,,

## 2025-03-20 NOTE — PROGRESS NOTES
HPI     Chief Complaint:  Chief Complaint   Patient presents with    Abdominal Pain    Follow-up     Hospital      Fatigue       Soila Chávez is a 74 y.o. female with multiple medical diagnoses as listed in the medical history and problem list that presents for   Chief Complaint   Patient presents with    Abdominal Pain    Follow-up     Hospital      Fatigue   .   Patient is not known to me with her last appointment in this department on 1/22/2025.      HPI    Today:  Home Health ordered but never admitted.   Ambulatory without assistance  Normal urine output.   Sleeping well. No skin break.   On phone - daughter  (Miguel).     Family's concerns: continued poor appetite, has noticed memory changes since stopped her medication.     HH - never scheduled. I will have our staff call to check on admit but encouraged family to also call.     [facility]  Services: Home Rehabilitation  Address: [address] [place] [address]  Phone: [phone]  Website: [email or web address]    Infectious colitis - poor appetite, continues to have diarrhea after eating. Has been taking pericolace. Approx 3 times per day, will also supplement with Boost. Poor water intake. Lily in room with pt, helping with history (she is with pt during the day and daughter with pt at night). Lily states pt told her had blood in stool one day that was bright red. Pt has reporting itching to rectum with BMs. No history of hemorrhoids. Poor history. Daughter on phone states has been taking stool softeners and no reported diarrhea.     Abx - today is last day of cipro/flagyl (has been taking flagyl only twice daily)    Vomiting resolved.   Poor appetite and still complaining of abd pain. Today at 11am [facility]     DA - admit Cr 1.6, resolved prior to discharge. Will recheck.     Seizure like activity - no history but reported by family. EEG normal. neurology consult in hospital held seizure meds    HTN -  hypotensive on admit. Home valsartan, hctz held. Continues to hold and BP stable at home. 110/66 in clinic.     Face swelling - still present some per family. Stopped sulfa 3/16 and admits swelling has improved. No difficulty swllowing or oral swelling. No appreciating in clinic. No rash.     Memory - memantine, donezepil. Held because of concerns for seizures. Has continued to hold, until f/u appt with Dr. Levin. Delirium precautions.     Anemia - H/H dropped after IV fluids, will recheck today. No s/s of bleeding? (Unclear)    Hypok - will recheck    Debility - PT/OT ordered but not admitted yet    Transitional Care Note    Family and/or Caretaker present at visit?  Yes. Lily and daughter on phone  Diagnostic tests reviewed/disposition: I have reviewed all completed as well as pending diagnostic tests at the time of discharge.  Disease/illness education: continue hydration, monitor worsening symptoms  Home health/community services discussion/referrals: Patient has home health established at [facility] .   Establishment or re-establishment of referral orders for community resources: No other necessary community resources.   Discussion with other health care providers: No discussion with other health care providers necessary.         Hospital encounter notes, objective/subjective data, diagnostics, and plan of care from 3/15 reviewed.   Allergic Reaction       Was given sulfa after discharge and now has swelling to face and hands per daughter. No obvious swelling noted to face hands or legs but daughter states that she knows her and she swollen. No respiratory distress no difficulty swallowing no hives no itching.      This is a very pleasant 74 y.o. female who presents with complaint of an allergic reaction, onset two days ago. Patient's daughter reports that the patient was recently hospitalized in the ICU from 3/7/25 to 3/12/25 for sepsis. She was discharged with a prescription for ciprofloxacin, metronidazole, and  sodium bicarbonate. The patient took her prescribed medications at home, but her daughter notes that she has since developed swelling in her face, hands, and ankles. The patient denies any respiratory distress, difficulty swallowing, hives, itching, or abdominal pain. After consulting with her healthcare provider, she was advised to come to the ED for evaluation. Since her discharge, the patient has been stable, able to dress herself this morning, and has not experienced any significant worsening of symptoms. However, she reports a decreased appetite, consuming only a banana and a Boost drink today. This is the extent of the patient's complaints at this time.              The history is provided by the patient, a relative and medical records. No  was used.           Review of patient's allergies indicates:   Allergen Reactions    Lotensin [benazepril] Swelling    Penicillins Rash           Past Medical History:   Diagnosis Date    Allergy      Anxiety      Cataract      Colon polyps 2010    Hearing loss      Hypertension      Joint pain      Mental disorder      Seizure-like activity 3/8/2025            Past Surgical History:   Procedure Laterality Date    CATARACT EXTRACTION W/  INTRAOCULAR LENS IMPLANT Right 3/12/2024     Procedure: EXTRACTION, CATARACT, WITH IOL INSERTION;  Surgeon: Jax Garza MD;  [facility];  Service: Ophthalmology;  Laterality: Right;    COLONOSCOPY N/A 3/5/2020     Procedure: COLONOSCOPY;  Surgeon: Nathalia Kemp MD;  [facility]  Service: Colon and Rectal;  Laterality: N/A;  requests later appt time if available - Pt open to any MD- Pt informed arrival time may be adjusted, Pt verbalized understanding- ERW2/24/20@1409    ESOPHAGOGASTRODUODENOSCOPY N/A 10/1/2020     Procedure: EGD (ESOPHAGOGASTRODUODENOSCOPY);  Surgeon: Casey Guerrero MD;  Location: [facility] (4TH FLR);  Service: Endoscopy;  Laterality: N/A;  covid test 9/28-main  [place]    ESOPHAGOGASTRODUODENOSCOPY N/A 10/29/2021     Procedure: EGD (ESOPHAGOGASTRODUODENOSCOPY);  Surgeon: Billy Vegas MD;  Location: [facility];  Service: Endoscopy;  Laterality: N/A;    FLEXIBLE SIGMOIDOSCOPY N/A 3/11/2025     Procedure: SIGMOIDOSCOPY, FLEXIBLE;  Surgeon: Stan Arteaga MD;  Location: [facility];  Service: Endoscopy;  Laterality: N/A;    HYSTERECTOMY         PERIPARTUM CHITO             Family History   Problem Relation Name Age of Onset    Breast cancer Sister Ingris      Colon cancer Neg Hx        Ovarian cancer Neg Hx        Melanoma Neg Hx          [Social History]    [Social History]        Tobacco Use    Smoking status: Never       Passive exposure: Never    Smokeless tobacco: Never   Substance Use Topics    Alcohol use: Yes       Comment: beer occasionally    Drug use: No     Review of Systems  Constitutional- Positive for appetite change. no fever  HEENT- Positive for facial swelling. no congestion  Eyes-no redness  Respiratory-no shortness of breath  Cardio-no chest pain  GI-no abdominal pain  Endocrine-no cold intolerance  -no difficulty urinating  MSK- Positive for joint swelling. no myalgias  Skin-no rashes  Allergy-no environmental allergy  Neurologic-, no headache  Hematology-no swollen nodes  Behavioral-no confusion     Physical Exam             Initial Vitals [03/15/25 1241]   BP Pulse Resp Temp SpO2   (!) 142/68 98 18 99 °F (37.2 °C) 99 %       MAP           --              Physical Exam  Constitutional:  Chronically ill-appearing 74-year-old female in no obvious distress  Eyes: Conjunctivae normal.  Mild periorbital edema  ENT       Head: Normocephalic, atraumatic.       Nose: No congestion.       Mouth/Throat: Mucous membranes are moist.  Hematological/Lymphatic/Immunilogical: No cervical lymphadenopathy.  Cardiovascular: Normal rate, regular rhythm. Normal and symmetric distal pulses.  Respiratory: Normal respiratory effort. Breath sounds are normal.  Gastrointestinal:  "Soft, nontender.   Musculoskeletal: Normal range of motion in all extremities.  Plus one edema in the bilateral lower extremities most prominent around the feet  Neurologic: Alert, oriented. Normal speech and language. No gross focal neurologic deficits are appreciated.  Skin: Skin is warm, dry. No rash noted.  Psychiatric: Mood and affect are normal.      ED Course   Procedures        Labs Reviewed   CBC W/ AUTO DIFFERENTIAL - Abnormal       Result Value      WBC 10.98        RBC 3.49 (*)       Hemoglobin 9.7 (*)       Hematocrit 29.5 (*)       MCV 85        MCH 27.8        MCHC 32.9        RDW 15.9 (*)       Platelets 229        MPV 9.6        Immature Granulocytes 4.2 (*)       Gran # (ANC) 7.0        Immature Grans (Abs) 0.46 (*)       Lymph # 2.4        Mono # 0.8        Eos # 0.3        Baso # 0.04        nRBC 0        Gran % 63.9        Lymph % 21.4        Mono % 7.5        Eosinophil % 2.6        Basophil % 0.4        Differential Method Automated      BASIC METABOLIC PANEL - Abnormal     Sodium 145        Potassium 3.4 (*)       Chloride 115 (*)       CO2 20 (*)       Glucose 93        BUN 5 (*)       Creatinine 0.9        Calcium 8.6 (*)       Anion Gap 10        eGFR >60               Imaging Results    None        Hospital encounter notes, objective/subjective data, diagnostics, and plan of care from 3/13 reviewed.    [facility] [address] Medicine  Discharge Summary        Patient Name: Soila Chávez  MRN: [id number]  RAHUL: [id number]  Patient Class: IP- Inpatient  Admission Date: 3/7/2025  Hospital Length of Stay: 5 days  Discharge Date and Time: No discharge date for patient encounter.  Attending Physician: Phil Montoya MD   Discharging Provider: Phil Montoya MD  Primary Care Provider: Parminder Junior MD     Primary Care Team: Networked reference to record PCT      HPI:   Per Nathalia Barnett NP:     "Soila Chávez is a 74 year old female with a past medical " "history of HTN, asymptomatic bradycardia, aortic atherosclerosis, dementia, HTN, chronic back pain, anxiety and GERD who presents with nausea and vomiting that started this afternoon after she ate chicken. She reports associated RLQ pain.Patient's daughter found patient on floor actively vomiting and called PCP who instructed her to bring patient to ER for further evaluation.  Per ED note, patient had a syncopal episode en route to the hospital. No history of seizure or past syncopal episodes.  Per chart review, patient was recently seen in the ED three weeks ago with N/V with abdominal pain and found to have DA (2.2) with hypotension 80/50's. She was treated with IV fluids and discharged home. Patient was seen by her cardiologist and found to by hypotensive. She was instructed to hold her blood pressure medications.       Upon arrival to the ED, patient found to be hypotensive ED work up significant for elevated WBC 13.29, UA negative for UTI,  Creatinine 1.6 (appears 1.8-1.0 at baseline) and CO2 17. CT findings revealed acute colitis and constipation.  Chest x-ray showed no acute cardiopulmonary process.  CT head showed no acute abnormality.  Abdominal ultrasound showed cholelithiasis with stones in the gallbladder neck without acute cholecystitis.  Patient received 2 L IV fluids with improvement in her blood pressure.  She was started on IV aztreonam and IV metronidazole.  She was referred to Hospital Medicine and will be admitted for further evaluation and management."     Procedure(s) (LRB):  SIGMOIDOSCOPY, FLEXIBLE (N/A)       Goals of Care Treatment Preferences:  Code Status: Full Code        SDOH Screening:  The patient was screened for utility difficulties, food insecurity, transport difficulties, housing insecurity, and interpersonal safety and there were no concerns identified this admission.     Consults:   Consults (From admission, onward)            Status Ordering Provider       Inpatient consult to " "Midline team  Once        Provider:  (Not yet assigned)    Acknowledged ALISON SALCIDO       Inpatient consult to Registered Dietitian/Nutritionist  Once        Provider:  (Not yet assigned)    Completed HERIBERTO ARIAS       Inpatient consult to Midline team  Once        Provider:  (Not yet assigned)    Completed JOE BENTLEY       Inpatient consult to Neurology Services (Vascular Neurology)  Once        Provider:  Osman Rudolph MD    [facility]       Inpatient consult to Gastroenterology  Once        Provider:  Billy Vegas MD    Completed [facility]             Hospital Course By Problem:   Assessment & Plan  Sepsis  Infectious and ischemic Colitis  Lactic acidosis  -Admitted to inpatient status  -Presented with nausea, vomiting, and abdominal pain.    -Ultrasound and CT scan shows evidence of cholelithiasis.  Ultrasound shows stones in the gallbladder neck but no evidence of acute cholecystitis.    -CT scan showed "colonic wall thickening with mild surrounding inflammatory changes seen involving moderate segment length of mid to distal descending colon and proximal sigmoid colon.  -Echocardiogram shows normal systolic and diastolic function.  -Source of sepsis appears most likely related to infectious and ischemic colitis.  -EGD performed today shows evidence of ischemic colitis.  GI recommends completing 7-10 days of antibiotics  -Treated with empiric broad-spectrum coverage with IV vancomycin, aztreonam, and metronidazole.  Blood pressure improved and lactic acidosis resolved.  Noted history of rash with penicillin however previously prescribed cephalosporin without documented reaction.  Suspect would tolerate intravenous cephalosporin.  Stopped vancomycin 3/9 and she has continued treatment with ceftriaxone and metronidazole since 3/10.  Transferred out of ICU 3/11.    -She was seen and examined and is eating a bit of her food.  She denies pain and is overall doing " well.  She is medically stable to go home with home health today.  Will complete course of antibiotics with cipro and flagyl.  She will need to follow up with pcp within 1 week, her gastroenterologist within 2 weeks and her neurologist within 1 month.  Called and discussed with her daughter who was in agreement with plan.  Acute kidney injury  Metabolic acidosis  -On admit Cr 1.6  -Suspect due to volume depletion and sepsis.    -Avoid nephrotoxic agents and renally dose meds  -Treated with IV fluids and Creatinine improved, but with persistent normal anion-gap metabolic acidosis so Na bicarb started  -Cr now normal.  Bicarb still somewhat low.  Continue bicarb tabs  Seizure  -Family reported seizure-like activity where patient briefly and responsible foaming at the mouth  en route to the hospital.    -No known history of seizures.    -CT head negative for acute pathology.    -Patient had been on alprazolam at home, but daughters report they have not given that to her in quite some time.  She received one dose of alprazolam in the hospital and it was subsequently discontinued.  -Memantine and donepezil have been associated with seizures.    -EEG showed slowing but no epileptogenic discharges  -Neurology consulted and input appreciated.  Recommend holding seizure medications at this time  -Holding home memantine and donepezil for now.  -Seizure precautions ordered and no seizures during her stay  -Needs f/u with her neurologist to discuss possible resumption of aricept and namenda  Hypertension  -Hypotensive on admit and home valsartan hctz held.  Did not require pressors  -BP normalized with IV fluids  -BP remains normal today - not requiring bp lowering meds presently.  -Not resuming diovan at discharge.  Follow-up with pcp within 1 week for bp check.  Alzheimer's dementia with behavioral disturbance  -Hold dementia medications due to concern for possible association with seizures.  -Appears mentation has returned  to baseline.  -Delirium precautions ordered  Anemia  -Hb 11.9 on admit and now 9.1  -No evidence of bleeding.  Drop likely due to initial hemoconcentration and IV fluids   Hypokalemia  -K normal today  Debility  -PT/OT consulted and recommend home health at discharge  Mild malnutrition  -Continue efforts with liberalized diet and boost  -Ultimately should be on a low salt cardiac diet, but at this time getting her to eat is of primary importance.  Discussed plan with daughter on 3/13.            Final Active Diagnoses:     Diagnosis Date Noted POA    PRINCIPAL PROBLEM:  Sepsis [A41.9] 03/08/2025 Yes    Infectious and ischemic Colitis [A09] 03/08/2025 Yes    Acute kidney injury [N17.9] 03/08/2025 Yes    Metabolic acidosis [E87.20] 03/08/2025 Yes    Lactic acidosis [E87.20] 03/08/2025 Yes    Hypokalemia [E87.6] 03/11/2025 Yes    Anemia [D64.9] 03/11/2025 Yes    Seizure [R56.9] 03/08/2025 Yes    Mild malnutrition [E44.1] 03/12/2025 Unknown    Debility [R53.81] 03/11/2025 Yes    Alzheimer's dementia with behavioral disturbance [G30.9, F02.818] 03/22/2024 Yes    Hypertension [I10] 01/14/2013 Yes       Problems Resolved During this Admission:     Diagnosis Date Noted Date Resolved POA    Diverticulosis [K57.90] 03/11/2025 03/11/2025 Yes         Discharged Condition: stable     Disposition: Home-Health Care [facility]     Follow Up:    Follow-up Information         Parminder Junior MD Follow up.    Specialty: Family Medicine  Why: MD office will call to schedule an appointment  Contact information:  [address]  [address]  [facility] [id number] [phone]                    Alesha Levin MD Follow up.    Specialty: Neurology  Why: MD office will call you to schedule a hospital f/u appointment  Contact information:  [address]  [address]  [facility] [address] [phone]                    [facility] Follow up on 3/14/2025.    Specialty: Gastroenterology  Why: MD  office will call to schedule a hospital f/u appointment  Contact information:  [address]  [address]  [facility] [address]  [phone]                                   Patient Instructions:       Notify your health care provider if you experience any of the following:  increased confusion or weakness      Notify your health care provider if you experience any of the following:  persistent dizziness, light-headedness, or visual disturbances      Notify your health care provider if you experience any of the following:  worsening rash      Notify your health care provider if you experience any of the following:  severe persistent headache      Notify your health care provider if you experience any of the following:  difficulty breathing or increased cough      Notify your health care provider if you experience any of the following:  severe uncontrolled pain      Notify your health care provider if you experience any of the following:  persistent nausea and vomiting or diarrhea      Notify your health care provider if you experience any of the following:  temperature >100.4      Activity as tolerated         Significant Diagnostic Studies: Labs: BMP:         Recent Labs   Lab 03/12/25 0335 03/12/25 1731 03/13/25 0435   GLU 83  --  100     --  145   K 3.1* 3.2* 3.8   *  --  118*   CO2 19*  --  20*   BUN 7*  --  6*   CREATININE 0.9  --  0.8   CALCIUM 8.0*  --  8.3*   MG 1.8  --  1.8   , CMP         Recent Labs   Lab 03/12/25 0335 03/12/25 1731 03/13/25  0435     --  145   K 3.1* 3.2* 3.8   *  --  118*   CO2 19*  --  20*   GLU 83  --  100   BUN 7*  --  6*   CREATININE 0.9  --  0.8   CALCIUM 8.0*  --  8.3*   PROT 5.4*  --  5.6*   ALBUMIN 2.5*  --  2.6*   BILITOT 0.3  --  0.2   ALKPHOS 57  --  53   AST 27  --  34   ALT 14  --  19   ANIONGAP 11  --  7*   , CBC        Recent Labs   Lab 03/12/25 0335 03/13/25  0435   WBC 9.86 9.67   HGB 9.0* 9.1*   HCT 27.4* 28.0*    206  "  , INR No results found for: "INR", "PROTIME", Lipid Panel         Lab Results   Component Value Date     CHOL 129 02/08/2025     HDL 35 (L) 02/08/2025     LDLCALC 78.6 02/08/2025     TRIG 77 02/08/2025     CHOLHDL 27.1 02/08/2025   , Troponin       Recent Labs   Lab 03/07/25  1706   TROPONINI <0.006   , and A1C:       Recent Labs   Lab 01/29/25  1050   HGBA1C 5.5         Pending Diagnostic Studies:         None             Medications:  Reconciled Home Medications:       Medication List          START taking these medications       ciprofloxacin HCl 500 MG tablet  Commonly known as: CIPRO  Take 1 tablet (500 mg total) by mouth 2 (two) times daily. for 4 days      metroNIDAZOLE 500 MG tablet  Commonly known as: FLAGYL  Take 1 tablet (500 mg total) by mouth every 8 (eight) hours. for 4 days      senna-docusate 8.6-50 mg 8.6-50 mg per tablet  Commonly known as: PERICOLACE  Take 1 tablet by mouth 2 (two) times daily.      sodium bicarbonate 650 MG tablet  Take 1 tablet (650 mg total) by mouth 2 (two) times daily.                CONTINUE taking these medications       cetirizine 10 MG tablet  Commonly known as: ZYRTEC  Take 1 tablet (10 mg total) by mouth daily as needed for Allergies.      mirtazapine 15 MG tablet  Commonly known as: REMERON  Take 1 tablet (15 mg total) by mouth every evening.      mometasone 0.1% 0.1 % cream  Commonly known as: ELOCON  apply to affected area topically daily      omeprazole 40 MG capsule  Commonly known as: PRILOSEC  Take 1 capsule (40 mg total) by mouth every morning.      pravastatin 80 MG tablet  Commonly known as: PRAVACHOL  Take 1 tablet (80 mg total) by mouth once daily.      QUEtiapine 25 MG Tab  Commonly known as: SEROQUEL  Take 1 tablet (25 mg total) by mouth 2 (two) times daily.                STOP taking these medications       ALPRAZolam 0.5 MG tablet  Commonly known as: XANAX      aspirin 81 MG EC tablet  Commonly known as: ECOTRIN      azelastine 137 mcg (0.1 %) nasal " spray  Commonly known as: ASTELIN      donepeziL 10 MG tablet  Commonly known as: ARICEPT      meloxicam 15 MG tablet  Commonly known as: MOBIC      memantine 10 MG Tab  Commonly known as: NAMENDA      potassium chloride 10 MEQ Tbsr  Commonly known as: KLOR-CON 10      valsartan-hydrochlorothiazide 160-12.5 mg per tablet  Commonly known as: DIOVAN-HCT               Assessment & Plan       1. Hospital discharge follow-up  See TCC note above    Labs today  [facility].   Keep f/u appt with neuro    Call home home for admit  Consider holding pericolace if diarrhea occurs.   Ensure hydration      2. Acute kidney injury  BMP  Lab Results   Component Value Date     03/15/2025    K 3.4 (L) 03/15/2025     (H) 03/15/2025    CO2 20 (L) 03/15/2025    BUN 5 (L) 03/15/2025    CREATININE 0.9 03/15/2025    CALCIUM 8.6 (L) 03/15/2025    ANIONGAP 10 03/15/2025    EGFRNORACEVR >60 03/15/2025     Labs today. Ensure hydration.   -     Basic Metabolic Panel; Future; Expected date: 03/20/2025  -     CBC Auto Differential; Future; Expected date: 03/20/2025    3. Infectious and ischemic Colitis  [facility]u with [facility]. VSS, no acute concerns in clinic    4. Hypokalemia  Lab Results   Component Value Date    K 3.4 (L) 03/15/2025     Recheck today. If pt having diarrhea, hold pericolace.     5. Debility  Call  to admit to PT/OT.     6. Mild malnutrition  Continue to supplement and encourage diet    7. Anemia, unspecified type  Lab Results   Component Value Date    WBC 10.98 03/15/2025    HGB 9.7 (L) 03/15/2025    HCT 29.5 (L) 03/15/2025    MCV 85 03/15/2025     03/15/2025       Recheck labs today. Monitor for blood in stool    8. Dementia without behavioral disturbance  F/u with neurology. Continue to hold medication as advised at discharge.         --------------------------------------------      Health Maintenance:  Health Maintenance         Date Due Completion Date    RSV Vaccine (Age 60+ and Pregnant  patients) (1 - Risk 60-74 years 1-dose series) Never done ---    COVID-19 Vaccine (4 - 2024-25 season) 09/01/2024 11/29/2021    Colorectal Cancer Screening 03/12/2025 3/11/2025    Override on 4/28/2010: Done (RESULTS IN LEGACY)    Mammogram 12/14/2025 12/14/2024    DEXA Scan 04/02/2027 4/2/2024    Lipid Panel 02/08/2030 2/8/2025            Health maintenance reviewed    Follow Up:  No follow-ups on file.    Exam     Review of Systems:  (as noted above)  Review of Systems    Physical Exam:   Physical Exam  Vitals reviewed.   Constitutional:       General: She is not in acute distress.     Appearance: Normal appearance. She is not toxic-appearing.   HENT:      Head: Normocephalic and atraumatic.      Mouth/Throat:      Mouth: Mucous membranes are moist.   Eyes:      General: No scleral icterus.     Conjunctiva/sclera: Conjunctivae normal.   Cardiovascular:      Rate and Rhythm: Normal rate and regular rhythm.      Pulses: Normal pulses.      Heart sounds: Normal heart sounds. No murmur heard.  Pulmonary:      Effort: Pulmonary effort is normal. No respiratory distress.      Breath sounds: Normal breath sounds.   Abdominal:      General: Bowel sounds are normal. There is no distension.      Palpations: Abdomen is soft. There is no mass.      Tenderness: There is abdominal tenderness. There is no right CVA tenderness or guarding.          Comments: Mild TTP   Musculoskeletal:      Cervical back: Normal range of motion. No rigidity.      Right lower leg: No edema.      Left lower leg: No edema.   Lymphadenopathy:      Cervical: No cervical adenopathy.   Skin:     General: Skin is warm and dry.      Capillary Refill: Capillary refill takes less than 2 seconds.      Coloration: Skin is not jaundiced or pale.      Findings: No rash.   Neurological:      General: No focal deficit present.      Mental Status: She is alert. Mental status is at baseline.      Motor: No weakness.      Gait: Gait normal.   Psychiatric:          "Mood and Affect: Mood normal.       Vitals:    03/20/25 0956   BP: 110/66   BP Location: Right arm   Patient Position: Sitting   Pulse: 82   SpO2: 99%   Weight: 73.5 kg (162 lb 0.6 oz)   Height: 5' 5" (1.651 m)      Body mass index is 26.96 kg/m².    Lab Results   Component Value Date    WBC 10.98 03/15/2025    HGB 9.7 (L) 03/15/2025    HCT 29.5 (L) 03/15/2025     03/15/2025    CHOL 129 02/08/2025    TRIG 77 02/08/2025    HDL 35 (L) 02/08/2025    ALT 19 03/13/2025    AST 34 03/13/2025     03/15/2025    K 3.4 (L) 03/15/2025     (H) 03/15/2025    CREATININE 0.9 03/15/2025    BUN 5 (L) 03/15/2025    CO2 20 (L) 03/15/2025    TSH 1.500 01/25/2023    HGBA1C 5.5 01/29/2025       The ASCVD Risk score (Marie DK, et al., 2019) failed to calculate for the following reasons:    The valid total cholesterol range is 130 to 320 mg/dL    (Imaging have been independently reviewed)    History     Past Medical History:  Past Medical History:   Diagnosis Date    Allergy     Anxiety     Cataract     Colon polyps 2010    Hearing loss     Hypertension     Joint pain     Mental disorder     Seizure-like activity 3/8/2025       Past Surgical History:  Past Surgical History:   Procedure Laterality Date    CATARACT EXTRACTION W/  INTRAOCULAR LENS IMPLANT Right 3/12/2024    Procedure: EXTRACTION, CATARACT, WITH IOL INSERTION;  Surgeon: Jax Garza MD;  Location: [facility];  Service: Ophthalmology;  Laterality: Right;    COLONOSCOPY N/A 3/5/2020    Procedure: COLONOSCOPY;  Surgeon: Nathalia Kemp MD;  Location: [address]);  Service: Colon and Rectal;  Laterality: N/A;  requests later appt time if available - Pt open to any MD- Pt informed arrival time may be adjusted, Pt verbalized understanding- ERW2/24/20@1402    ESOPHAGOGASTRODUODENOSCOPY N/A 10/1/2020    Procedure: EGD (ESOPHAGOGASTRODUODENOSCOPY);  Surgeon: Casey Guerrero MD;  Location: [facility] ([address] [place] [address]);  Service: Endoscopy;  Laterality: N/A;  " [facility]28[facility]    ESOPHAGOGASTRODUODENOSCOPY N/A 10/29/2021    Procedure: EGD (ESOPHAGOGASTRODUODENOSCOPY);  Surgeon: Billy Vegas MD;  Location: [facility];  Service: Endoscopy;  Laterality: N/A;    FLEXIBLE SIGMOIDOSCOPY N/A 3/11/2025    Procedure: SIGMOIDOSCOPY, FLEXIBLE;  Surgeon: Stan Arteaga MD;  Location: [facility];  Service: Endoscopy;  Laterality: N/A;    HYSTERECTOMY      PERIPARTUM CHITO       Social History:  Social History[1]    Family History:  Family History   Problem Relation Name Age of Onset    Breast cancer Sister Ingris     Colon cancer Neg Hx      Ovarian cancer Neg Hx      Melanoma Neg Hx         Allergies and Medications: (updated and reviewed)  Review of patient's allergies indicates:   Allergen Reactions    Lotensin [benazepril] Swelling    Penicillins Rash     Current Medications[2]    Patient Care Team:  Parminder Junior MD as PCP - General (Family Medicine)  Lee Tilley LPN as Licensed Practical Nurse         - The patient is given an After Visit Summary that lists all medications with directions, allergies, education, orders placed during this encounter and follow-up instructions.      - I have reviewed the patient's medical information including past medical, family, and social history sections including the medications and allergies.      - We discussed the patient's current medications.     This note was created by combination of typed  and MModal dictation.  Transcription errors may be present.  If there are any questions, please contact me.                 [1]   Social History  Socioeconomic History    Marital status:    Tobacco Use    Smoking status: Never     Passive exposure: Never    Smokeless tobacco: Never   Substance and Sexual Activity    Alcohol use: Yes     Comment: beer occasionally    Drug use: No    Sexual activity: Not Currently     Birth control/protection: None   Social History Narrative    June 2016    The patient  reports that she lives alone normally, however her daughter recently moved in with her    She has a 43-year-old daughter, Saima    And a 26-year-old daughterChris, who is a schoolteacher for 6 in seventh grade in [facility] [employer]    She is retired from working as a  in the school system    She now works about 5-15 hours a week for [employer], which she enjoys.    Enjoys working at [employer] couple days a week     Social Drivers of Health     Financial Resource Strain: Low Risk  (3/8/2025)    Overall Financial Resource Strain (CARDIA)     Difficulty of Paying Living Expenses: Not hard at all   Food Insecurity: No Food Insecurity (3/8/2025)    Hunger Vital Sign     Worried About Running Out of Food in the Last Year: Never true     Ran Out of Food in the Last Year: Never true   Transportation Needs: No Transportation Needs (2/18/2025)    PRAPARE - Transportation     Lack of Transportation (Medical): No     Lack of Transportation (Non-Medical): No   Physical Activity: Inactive (3/8/2025)    Exercise Vital Sign     Days of Exercise per Week: 0 days     Minutes of Exercise per Session: 0 min   Stress: No Stress Concern Present (3/8/2025)    [employer] - Occupational Stress Questionnaire     Feeling of Stress : Not at all   Housing Stability: Low Risk  (3/8/2025)    Housing Stability Vital Sign     Unable to Pay for Housing in the Last Year: No     Number of Times Moved in the Last Year: 0     Homeless in the Last Year: No   [2]   Current Outpatient Medications   Medication Sig Dispense Refill    cetirizine (ZYRTEC) 10 MG tablet Take 1 tablet (10 mg total) by mouth daily as needed for Allergies. 90 tablet 3    mirtazapine (REMERON) 15 MG tablet Take 1 tablet (15 mg total) by mouth every evening. 30 tablet 5    mometasone 0.1% (ELOCON) 0.1 % cream apply to affected area topically daily 45 g 3    omeprazole (PRILOSEC) 40 MG capsule Take 1 capsule (40 mg total) by mouth every  morning. 90 capsule 3    pravastatin (PRAVACHOL) 80 MG tablet Take 1 tablet (80 mg total) by mouth once daily. 90 tablet 3    QUEtiapine (SEROQUEL) 25 MG Tab Take 1 tablet (25 mg total) by mouth 2 (two) times daily. 180 tablet 1    senna-docusate 8.6-50 mg (PERICOLACE) 8.6-50 mg per tablet Take 1 tablet by mouth 2 (two) times daily. 30 tablet 0     No current facility-administered medications for this visit.

## 2025-03-20 NOTE — PATIENT INSTRUCTIONS
[facility]  Services: Home Rehabilitation  Address: [address] [place] [address]  Phone: [phone]  Website: [email or web address]    Continue to drink water and monitor urine out put and stools.     Call home health    Labs today

## 2025-03-21 ENCOUNTER — TELEPHONE (OUTPATIENT)
Dept: INTERNAL MEDICINE | Facility: CLINIC | Age: 75
End: 2025-03-21
Payer: MEDICARE

## 2025-03-21 ENCOUNTER — PATIENT OUTREACH (OUTPATIENT)
Facility: OTHER | Age: 75
End: 2025-03-21
Payer: MEDICARE

## 2025-03-21 ENCOUNTER — RESULTS FOLLOW-UP (OUTPATIENT)
Dept: INTERNAL MEDICINE | Facility: CLINIC | Age: 75
End: 2025-03-21
Payer: MEDICARE

## 2025-03-21 DIAGNOSIS — R53.81 DEBILITY: ICD-10-CM

## 2025-03-21 DIAGNOSIS — A09 INFECTIOUS COLITIS: Primary | ICD-10-CM

## 2025-03-21 DIAGNOSIS — E44.1 MILD MALNUTRITION: ICD-10-CM

## 2025-03-21 DIAGNOSIS — F03.90 DEMENTIA WITHOUT BEHAVIORAL DISTURBANCE: ICD-10-CM

## 2025-03-21 DIAGNOSIS — E87.6 HYPOKALEMIA: ICD-10-CM

## 2025-03-21 NOTE — TELEPHONE ENCOUNTER
S/w daughter about stable lab results.     HH orders from hospital were not approved so I put in new HH orders, unsure if they will be approved.     Saw [facility] yesterday and daughter states appt went well.     Will be in touch next week with any changes.

## 2025-03-21 NOTE — PROGRESS NOTES
Per chart review, patient attended primary care appointment on 3/20/25. Called patient's daughter to follow up and assess additional needs, LVM asking for a return call.

## 2025-03-24 ENCOUNTER — PATIENT OUTREACH (OUTPATIENT)
Dept: ADMINISTRATIVE | Facility: HOSPITAL | Age: 75
End: 2025-03-24
Payer: MEDICARE

## 2025-03-24 ENCOUNTER — PATIENT MESSAGE (OUTPATIENT)
Dept: INTERNAL MEDICINE | Facility: CLINIC | Age: 75
End: 2025-03-24
Payer: MEDICARE

## 2025-03-25 ENCOUNTER — PATIENT MESSAGE (OUTPATIENT)
Dept: INTERNAL MEDICINE | Facility: CLINIC | Age: 75
End: 2025-03-25
Payer: MEDICARE

## 2025-03-25 NOTE — TELEPHONE ENCOUNTER
We don't see [facility] noted, the pt [facility] on 3/20 after appt with me. In-person visit is probably best, or virtual if that's all pt can do. See if pt can see me tomorrow. Thanks!

## 2025-03-27 ENCOUNTER — TELEPHONE (OUTPATIENT)
Dept: OPTOMETRY | Facility: CLINIC | Age: 75
End: 2025-03-27
Payer: MEDICARE

## 2025-03-27 ENCOUNTER — OFFICE VISIT (OUTPATIENT)
Dept: OPTOMETRY | Facility: CLINIC | Age: 75
End: 2025-03-27
Payer: MEDICARE

## 2025-03-27 DIAGNOSIS — Z96.1 PSEUDOPHAKIA OF RIGHT EYE: ICD-10-CM

## 2025-03-27 DIAGNOSIS — H25.12 NUCLEAR SCLEROSIS OF LEFT EYE: Primary | ICD-10-CM

## 2025-03-27 DIAGNOSIS — H52.4 BILATERAL PRESBYOPIA: ICD-10-CM

## 2025-03-27 PROCEDURE — 99999 PR PBB SHADOW E&M-EST. PATIENT-LVL II: CPT | Mod: PBBFAC,,, | Performed by: OPTOMETRIST

## 2025-03-27 PROCEDURE — 1159F MED LIST DOCD IN RCRD: CPT | Mod: CPTII,S$GLB,, | Performed by: OPTOMETRIST

## 2025-03-27 PROCEDURE — 92014 COMPRE OPH EXAM EST PT 1/>: CPT | Mod: S$GLB,,, | Performed by: OPTOMETRIST

## 2025-03-27 PROCEDURE — 1160F RVW MEDS BY RX/DR IN RCRD: CPT | Mod: CPTII,S$GLB,, | Performed by: OPTOMETRIST

## 2025-03-27 PROCEDURE — 3044F HG A1C LEVEL LT 7.0%: CPT | Mod: CPTII,S$GLB,, | Performed by: OPTOMETRIST

## 2025-03-27 NOTE — TELEPHONE ENCOUNTER
[facility] cataract evaluation OS scheduled with Dr. Garza 04/28/2025 at 9:00 am; patient referred by Dr. Govea.

## 2025-03-28 NOTE — PROGRESS NOTES
HPI    PARRISH: 03/24 with Dr. Garza  Chief complaint (CC): Patient is here for annual eye exam today. Patient   was due to come in 3 weeks for  PO appointment but didn't return.  Patient   has dementia and is here with her caregiver (Lily) today. Patient has   noticed more trouble with reading.  Patient has glasses but didn't bring   them. Caregiver and daughter have noticed she has more trouble getting   around and sometimes runs into things.  Glasses? +  Contacts? -  H/o eye surgery, injections or laser: PC IOL OD  H/o eye injury: -  Known eye conditions? See above  Family h/o eye conditions? -  Eye gtts? -      (-) Flashes (-)  Floaters (-) Mucous   (-)  Tearing (-) Itching (-) Burning   (-) Headaches (-) Eye Pain/discomfort (-) Irritation   (-)  Redness (-) Double vision (-) Blurry vision    Diabetic? -  A1c? -      Last edited by Elma Mckenna on 3/27/2025  3:41 PM.            Assessment /Plan     For exam results, see Encounter Report.    Nuclear sclerosis of left eye    Pseudophakia of right eye    Bilateral presbyopia    Spoke to pt's daughter via phone during exam-- Miguel Chávez  Visually significant cataract OS.   Pt's family notices pt is still having issues with seeing and would like cataract OS removed.   Referral to Dr Garza.   RTC 1 year Routine.

## 2025-04-02 ENCOUNTER — PATIENT MESSAGE (OUTPATIENT)
Dept: NEUROLOGY | Facility: CLINIC | Age: 75
End: 2025-04-02
Payer: MEDICARE

## 2025-04-03 ENCOUNTER — PATIENT MESSAGE (OUTPATIENT)
Dept: INTERNAL MEDICINE | Facility: CLINIC | Age: 75
End: 2025-04-03
Payer: MEDICARE

## 2025-04-08 RX ORDER — MIRTAZAPINE 15 MG/1
15 TABLET, FILM COATED ORAL NIGHTLY
Qty: 30 TABLET | Refills: 0 | Status: SHIPPED | OUTPATIENT
Start: 2025-04-08 | End: 2025-05-09

## 2025-04-13 ENCOUNTER — PATIENT OUTREACH (OUTPATIENT)
Facility: OTHER | Age: 75
End: 2025-04-13
Payer: MEDICARE

## 2025-04-13 NOTE — PROGRESS NOTES
Contacted patient to confirm appointment for 4/15/2025 at 9:20 AM with Alesha Levin MD, Neurology at [address], [place] [address] [place] [address] , no answer, voicemail box is full, portal message sent. Follow up scheduled for 4/16/25 to assess for additional needs.

## 2025-04-15 ENCOUNTER — OFFICE VISIT (OUTPATIENT)
Dept: NEUROLOGY | Facility: CLINIC | Age: 75
End: 2025-04-15
Payer: MEDICARE

## 2025-04-15 VITALS
WEIGHT: 149 LBS | DIASTOLIC BLOOD PRESSURE: 80 MMHG | HEART RATE: 73 BPM | SYSTOLIC BLOOD PRESSURE: 135 MMHG | BODY MASS INDEX: 24.79 KG/M2

## 2025-04-15 DIAGNOSIS — G30.9 ALZHEIMER'S DEMENTIA WITH BEHAVIORAL DISTURBANCE: Primary | ICD-10-CM

## 2025-04-15 DIAGNOSIS — F02.818 ALZHEIMER'S DEMENTIA WITH BEHAVIORAL DISTURBANCE: Primary | ICD-10-CM

## 2025-04-15 PROCEDURE — 3079F DIAST BP 80-89 MM HG: CPT | Mod: CPTII,S$GLB,, | Performed by: PSYCHIATRY & NEUROLOGY

## 2025-04-15 PROCEDURE — 1101F PT FALLS ASSESS-DOCD LE1/YR: CPT | Mod: CPTII,S$GLB,, | Performed by: PSYCHIATRY & NEUROLOGY

## 2025-04-15 PROCEDURE — 3044F HG A1C LEVEL LT 7.0%: CPT | Mod: CPTII,S$GLB,, | Performed by: PSYCHIATRY & NEUROLOGY

## 2025-04-15 PROCEDURE — 3075F SYST BP GE 130 - 139MM HG: CPT | Mod: CPTII,S$GLB,, | Performed by: PSYCHIATRY & NEUROLOGY

## 2025-04-15 PROCEDURE — 3008F BODY MASS INDEX DOCD: CPT | Mod: CPTII,S$GLB,, | Performed by: PSYCHIATRY & NEUROLOGY

## 2025-04-15 PROCEDURE — 1160F RVW MEDS BY RX/DR IN RCRD: CPT | Mod: CPTII,S$GLB,, | Performed by: PSYCHIATRY & NEUROLOGY

## 2025-04-15 PROCEDURE — 99999 PR PBB SHADOW E&M-EST. PATIENT-LVL III: CPT | Mod: PBBFAC,,, | Performed by: PSYCHIATRY & NEUROLOGY

## 2025-04-15 PROCEDURE — 1126F AMNT PAIN NOTED NONE PRSNT: CPT | Mod: CPTII,S$GLB,, | Performed by: PSYCHIATRY & NEUROLOGY

## 2025-04-15 PROCEDURE — 3288F FALL RISK ASSESSMENT DOCD: CPT | Mod: CPTII,S$GLB,, | Performed by: PSYCHIATRY & NEUROLOGY

## 2025-04-15 PROCEDURE — 1159F MED LIST DOCD IN RCRD: CPT | Mod: CPTII,S$GLB,, | Performed by: PSYCHIATRY & NEUROLOGY

## 2025-04-15 PROCEDURE — 99215 OFFICE O/P EST HI 40 MIN: CPT | Mod: S$GLB,,, | Performed by: PSYCHIATRY & NEUROLOGY

## 2025-04-15 RX ORDER — QUETIAPINE FUMARATE 25 MG/1
25 TABLET, FILM COATED ORAL 2 TIMES DAILY
Qty: 180 TABLET | Refills: 3 | Status: SHIPPED | OUTPATIENT
Start: 2025-04-15

## 2025-04-15 RX ORDER — MEMANTINE HYDROCHLORIDE 10 MG/1
10 TABLET ORAL 2 TIMES DAILY
Qty: 180 TABLET | Refills: 3 | Status: SHIPPED | OUTPATIENT
Start: 2025-04-15 | End: 2026-04-15

## 2025-04-15 RX ORDER — DONEPEZIL HYDROCHLORIDE 10 MG/1
TABLET, FILM COATED ORAL
Qty: 90 TABLET | Refills: 3 | Status: SHIPPED | OUTPATIENT
Start: 2025-04-15

## 2025-04-15 RX ORDER — MIRTAZAPINE 15 MG/1
15 TABLET, FILM COATED ORAL NIGHTLY
Qty: 90 TABLET | Refills: 3 | Status: SHIPPED | OUTPATIENT
Start: 2025-04-15 | End: 2026-04-15

## 2025-04-15 NOTE — PROGRESS NOTES
"Subjective:       Patient ID: Soila Chávez is a 75 y.o. female.    Chief Complaint: Follow-up      Here with her dtr in [facility].  Called her dtr Terri on the phone  Lives with her dtr.  Chief concern today is sundowning, begins around 6 pm. Gives all her bedtime meds after that starts, which does include seroquel 25mg ( takes this am and qhs..Admitted to ICU early march with sepsis. Likely had a brief szr on day of admit, in setting of hypotension. Meds were reduced including dc of BP meds and also dc of donepezil and memantine. Her dtr contacted me and I added back the memantine.   She does see a cardiologist for bradycardia, high 50's to 60's.   No longer hallucinates since taking seroquel. Sundowning manifested by increased confusion. Even told her dtr that she wasn't her dtr.   LOF--pt needs assistance with dressing and bathing.  No falls.                 FROM DC SUMMARY  [facility] - Med Surg [address] Medicine  Discharge Summary        Patient Name: Soila Chávez  MRN: [id number]  RAHUL: [id number]  Patient Class: IP- Inpatient  Admission Date: 3/7/2025  Hospital Length of Stay: 5 days  Discharge Date and Time: No discharge date for patient encounter.  Attending Physician: Phil Montoya MD   Discharging Provider: Phil Montoya MD  Primary Care Provider: Parminder Junior MD     Primary Care Team: Networked reference to record PCT      HPI:   Per Nathaila Barnett NP:     "Soila Chávez is a 74 year old female with a past medical history of HTN, asymptomatic bradycardia, aortic atherosclerosis, dementia, HTN, chronic back pain, anxiety and GERD who presents with nausea and vomiting that started this afternoon after she ate chicken. She reports associated RLQ pain.Patient's daughter found patient on floor actively vomiting and called PCP who instructed her to bring patient to ER for further evaluation.  Per ED note, patient had a syncopal episode en route to the hospital. No " "history of seizure or past syncopal episodes.  Per chart review, patient was recently seen in the ED three weeks ago with N/V with abdominal pain and found to have DA (2.2) with hypotension 80/50's. She was treated with IV fluids and discharged home. Patient was seen by her cardiologist and found to by hypotensive. She was instructed to hold her blood pressure medications.       Upon arrival to the ED, patient found to be hypotensive ED work up significant for elevated WBC 13.29, UA negative for UTI,  Creatinine 1.6 (appears 1.8-1.0 at baseline) and CO2 17. CT findings revealed acute colitis and constipation.  Chest x-ray showed no acute cardiopulmonary process.  CT head showed no acute abnormality.  Abdominal ultrasound showed cholelithiasis with stones in the gallbladder neck without acute cholecystitis.  Patient received 2 L IV fluids with improvement in her blood pressure.  She was started on IV aztreonam and IV metronidazole.  She was referred to Hospital Medicine and will be admitted for further evaluation and management."             Past Medical History:   Diagnosis Date    Allergy     Anxiety     Cataract     Colon polyps 2010    Hearing loss     Hypertension     Joint pain     Mental disorder     Seizure-like activity 3/8/2025      Past Surgical History:   Procedure Laterality Date    CATARACT EXTRACTION W/  INTRAOCULAR LENS IMPLANT Right 3/12/2024    Procedure: EXTRACTION, CATARACT, WITH IOL INSERTION;  Surgeon: Jax Garza MD;  Location: [facility];  Service: Ophthalmology;  Laterality: Right;    COLONOSCOPY N/A 3/5/2020    Procedure: COLONOSCOPY;  Surgeon: Nathalia Kemp MD;  Location: [address]);  Service: Colon and Rectal;  Laterality: N/A;  requests later appt time if available - Pt open to any MD- Pt informed arrival time may be adjusted, Pt verbalized understanding- ERW2/24/20@1409    ESOPHAGOGASTRODUODENOSCOPY N/A 10/1/2020    Procedure: EGD (ESOPHAGOGASTRODUODENOSCOPY);  " Surgeon: Casey Guerrero MD;  Location: [facility] ENDO (4TH FLR);  Service: Endoscopy;  Laterality: N/A;  covid test 9/28-[facility]    ESOPHAGOGASTRODUODENOSCOPY N/A 10/29/2021    Procedure: EGD (ESOPHAGOGASTRODUODENOSCOPY);  Surgeon: Billy Vegas MD;  Location: [facility];  Service: Endoscopy;  Laterality: N/A;    FLEXIBLE SIGMOIDOSCOPY N/A 3/11/2025    Procedure: SIGMOIDOSCOPY, FLEXIBLE;  Surgeon: Stan Arteaga MD;  Location: [facility];  Service: Endoscopy;  Laterality: N/A;    HYSTERECTOMY      PERIPARTUM CHITO      Current Medications[1]   Review of patient's allergies indicates:   Allergen Reactions    Lotensin [benazepril] Swelling    Penicillins Rash        Review of Systems   Constitutional:  Positive for appetite change (not eating well.) and unexpected weight change (losing wt.).   HENT:  Negative for trouble swallowing.    Psychiatric/Behavioral:  Negative for agitation, hallucinations and sleep disturbance. Hyperactive: resolved with the seroquel.            Objective:      Physical Exam  Constitutional:       Appearance: She is not ill-appearing.   Neurological:      Mental Status: She is alert.      Cranial Nerves: No dysarthria.      Motor: No tremor.      Gait: Gait (walks unassisted) normal.      Comments: Alert and attentive.   Timid affect. Quiet.   Able to provide her birthday.    Psychiatric:      Comments: Sits quietly and does not speak unless spoken to.            Assessment:       1. Alzheimer's dementia with behavioral disturbance        Plan:            Moderate to severe LOAD-  Admitted for 5 days with sepsis one month ago. Had a brief szr in setting of severe hypotension on the day of admission. No ASM added and no further events have occurred since dc. Likely a brief post syncopal convulsion due to hypotension. Memantine and donepezil dc'd at [place]; I added back memantine by laverne since then and will add back donepezil today. Her family notes LOF and confusion better on the combination of  these 2 meds. No longer hallucinates since taking Seroquel 25 BID ( Never was agitated except due to hallucinations. No SE from seroquel).   Has mild intermittent bradycardia, asymptomatic, no heart block.     Other issues discussed,... She has not been eating well for several mos; this waxed and waned but worse since dc from the hospital. Seems disinterested in eating, never seems hungry, and spits the food out. I suspect part of this is behavioral/apracticl. Encouraged family to provide anything she likes w/ boost afterwards and encouraged family to feed her, ie pt feeds self currently.           Alesha Levin MD   04/15/2025   9:22 AM          [1]   Current Outpatient Medications:     cetirizine (ZYRTEC) 10 MG tablet, Take 1 tablet (10 mg total) by mouth daily as needed for Allergies., Disp: 90 tablet, Rfl: 3    hydrocortisone 1 % ointment, Apply a small amount to skin twice a day as needed for perianal itching, Disp: 28.35 g, Rfl: 1    mometasone 0.1% (ELOCON) 0.1 % cream, apply to affected area topically daily, Disp: 45 g, Rfl: 3    omeprazole (PRILOSEC) 40 MG capsule, Take 1 capsule (40 mg total) by mouth every morning., Disp: 90 capsule, Rfl: 3    pravastatin (PRAVACHOL) 80 MG tablet, Take 1 tablet (80 mg total) by mouth once daily., Disp: 90 tablet, Rfl: 3    senna-docusate 8.6-50 mg (PERICOLACE) 8.6-50 mg per tablet, Take 1 tablet by mouth 2 (two) times daily., Disp: 30 tablet, Rfl: 0    donepeziL (ARICEPT) 10 MG tablet, Take a 1/2 of tablet by mouth nightly at bedtime for one month and then increase to 1 nightly at bedtime., Disp: 90 tablet, Rfl: 3    memantine (NAMENDA) 10 MG Tab, Take 1 tablet (10 mg total) by mouth 2 (two) times daily., Disp: 180 tablet, Rfl: 3    mirtazapine (REMERON) 15 MG tablet, Take 1 tablet (15 mg total) by mouth every evening., Disp: 90 tablet, Rfl: 3    QUEtiapine (SEROQUEL) 25 MG Tab, Take 1 tablet (25 mg total) by mouth 2 (two) times daily., Disp: 180 tablet, Rfl: 3

## 2025-04-16 ENCOUNTER — PATIENT OUTREACH (OUTPATIENT)
Facility: OTHER | Age: 75
End: 2025-04-16
Payer: MEDICARE

## 2025-04-28 ENCOUNTER — OFFICE VISIT (OUTPATIENT)
Dept: OPHTHALMOLOGY | Facility: CLINIC | Age: 75
End: 2025-04-28
Payer: MEDICARE

## 2025-04-28 ENCOUNTER — TELEPHONE (OUTPATIENT)
Dept: OPHTHALMOLOGY | Facility: CLINIC | Age: 75
End: 2025-04-28
Payer: MEDICARE

## 2025-04-28 ENCOUNTER — PATIENT MESSAGE (OUTPATIENT)
Dept: OPHTHALMOLOGY | Facility: CLINIC | Age: 75
End: 2025-04-28
Payer: MEDICARE

## 2025-04-28 DIAGNOSIS — Z96.1 PSEUDOPHAKIA: ICD-10-CM

## 2025-04-28 DIAGNOSIS — H25.12 NS (NUCLEAR SCLEROSIS), LEFT: Primary | ICD-10-CM

## 2025-04-28 DIAGNOSIS — H26.9 CORTICAL CATARACT OF LEFT EYE: ICD-10-CM

## 2025-04-28 DIAGNOSIS — I10 PRIMARY HYPERTENSION: ICD-10-CM

## 2025-04-28 DIAGNOSIS — H25.12 NUCLEAR SCLEROSIS OF LEFT EYE: Primary | ICD-10-CM

## 2025-04-28 DIAGNOSIS — G30.9: ICD-10-CM

## 2025-04-28 DIAGNOSIS — F02.818: ICD-10-CM

## 2025-04-28 DIAGNOSIS — H52.7 REFRACTIVE ERROR: ICD-10-CM

## 2025-04-28 PROCEDURE — 1159F MED LIST DOCD IN RCRD: CPT | Mod: CPTII,S$GLB,, | Performed by: OPHTHALMOLOGY

## 2025-04-28 PROCEDURE — 1160F RVW MEDS BY RX/DR IN RCRD: CPT | Mod: CPTII,S$GLB,, | Performed by: OPHTHALMOLOGY

## 2025-04-28 PROCEDURE — 99999 PR PBB SHADOW E&M-EST. PATIENT-LVL II: CPT | Mod: PBBFAC,,, | Performed by: OPHTHALMOLOGY

## 2025-04-28 PROCEDURE — 92136 OPHTHALMIC BIOMETRY: CPT | Mod: LT,S$GLB,, | Performed by: OPHTHALMOLOGY

## 2025-04-28 PROCEDURE — 3044F HG A1C LEVEL LT 7.0%: CPT | Mod: CPTII,S$GLB,, | Performed by: OPHTHALMOLOGY

## 2025-04-28 PROCEDURE — 99214 OFFICE O/P EST MOD 30 MIN: CPT | Mod: S$GLB,,, | Performed by: OPHTHALMOLOGY

## 2025-04-28 RX ORDER — PREDNISOLONE/MOXIFLOX/BROMFEN 1 %-0.5 %
1 SUSPENSION, DROPS(FINAL DOSAGE FORM)(ML) OPHTHALMIC (EYE) 3 TIMES DAILY
Qty: 8 ML | Refills: 2 | Status: SHIPPED | OUTPATIENT
Start: 2025-05-31

## 2025-04-28 NOTE — PROGRESS NOTES
Subjective:       Patient ID: Soila Chávez is a 75 y.o. female.    Chief Complaint: Cataract    HPI    Here for cataract evaluation per Dr Govea     Eye meds: none    75 year old female states she had PCIOL OD in the past  but still having   bluirry vision and would like to have OS taken out. Denies flashes,   floater sor diplopia. Pt says vision is getting worse over time, yet   denies wearing distance glasses. Pt says Dr govea told her to hold off   on glasses until she has OS removed. HX of dementia   Last edited by Madelin Zabala on 4/28/2025  9:37 AM.             Assessment:       1. Nuclear sclerosis of left eye    2. Cortical cataract of left eye    3. Primary degenerative dementia of Alzheimer type with behavioral disturbance    4. Primary hypertension    5. Refractive error    6. Pseudophakia        Plan:       Visually significant cataract OS -Pt. Wants Sx.    Alzheimer's Dementia-Stable.  HTN-No retinopathy OU.  RE      Cataract Surgery Consent: Patient with a visually significant cataract with difficulties of ADLs, reading, driving, night vision, glare (any and all).  Discussed with Patient/Family/Caregiver: options, risks and benefits, expectations of cataract surgery, utilized an eye model with questions and answers to facilitate discussion.  Discussed lens options and patient understands that glasses may be required for optimal vision for distance and/or near vision after cataract surgery.  The Patient/Family/Caregiver  voice good understanding and patient wishes to proceed with surgery.  The patient will likely benefit from surgery and patient signed consent for Left Eye.  CE OS CNAOTO 22.0.  Control HTN.

## 2025-05-06 ENCOUNTER — PATIENT MESSAGE (OUTPATIENT)
Dept: NEUROLOGY | Facility: CLINIC | Age: 75
End: 2025-05-06
Payer: MEDICARE

## 2025-05-07 ENCOUNTER — PATIENT MESSAGE (OUTPATIENT)
Dept: NEUROLOGY | Facility: CLINIC | Age: 75
End: 2025-05-07
Payer: MEDICARE

## 2025-05-23 ENCOUNTER — OFFICE VISIT (OUTPATIENT)
Dept: CARDIOLOGY | Facility: CLINIC | Age: 75
End: 2025-05-23
Payer: MEDICARE

## 2025-05-23 ENCOUNTER — TELEPHONE (OUTPATIENT)
Dept: OPHTHALMOLOGY | Facility: CLINIC | Age: 75
End: 2025-05-23
Payer: MEDICARE

## 2025-05-23 VITALS
HEART RATE: 56 BPM | WEIGHT: 151.44 LBS | HEIGHT: 64 IN | SYSTOLIC BLOOD PRESSURE: 132 MMHG | BODY MASS INDEX: 25.85 KG/M2 | DIASTOLIC BLOOD PRESSURE: 84 MMHG

## 2025-05-23 DIAGNOSIS — G30.9 ALZHEIMER'S DEMENTIA WITH BEHAVIORAL DISTURBANCE: ICD-10-CM

## 2025-05-23 DIAGNOSIS — I70.0 AORTIC ATHEROSCLEROSIS: ICD-10-CM

## 2025-05-23 DIAGNOSIS — R00.1 ASYMPTOMATIC BRADYCARDIA: ICD-10-CM

## 2025-05-23 DIAGNOSIS — F03.90 DEMENTIA WITHOUT BEHAVIORAL DISTURBANCE: Primary | ICD-10-CM

## 2025-05-23 DIAGNOSIS — I10 PRIMARY HYPERTENSION: ICD-10-CM

## 2025-05-23 DIAGNOSIS — F02.818 ALZHEIMER'S DEMENTIA WITH BEHAVIORAL DISTURBANCE: ICD-10-CM

## 2025-05-23 DIAGNOSIS — R53.81 DEBILITY: ICD-10-CM

## 2025-05-23 PROCEDURE — 99999 PR PBB SHADOW E&M-EST. PATIENT-LVL III: CPT | Mod: PBBFAC,,, | Performed by: INTERNAL MEDICINE

## 2025-05-23 NOTE — PATIENT INSTRUCTIONS
Assessment/Plan:  Soila Chávez is a 75 y.o. female with HTN, asymptomatic bradycardia, aortic atherosclerosis, overweight, who presents for a follow up appointment.    HTN- Blood pressure is currently controlled. On 3/7-3/13/2025, pt was admitted with acute colitis and constipation. Valsartan/HCTZ 160-12.5 mg daily was discontinued due to hypotension. Blood Pressure today is 132/84. Continue to monitor off antihypertenisive medications.       2. Bradycardia- Pt is asymptomatic.  Cardiac Event Monitor on 3/15/2023 demonstrated 12 patient triggered transmissions, correlating with sinus rhythm 49-88 bpm.  Unfortunately, pt did not show up for stress test as scheduled.  No further workup indicated at this time.  Continue to monitor.       3. Aortic Atherosclerosis- Stable.  Continue pravastatin 80 mg daily.      4. Overweight- Encourage diet, exercise, and weight loss.     Follow up in 6 months

## 2025-05-23 NOTE — PROGRESS NOTES
"[facility]      Chief Complaint   Patient presents with    Hypertension    aortic atherosclerosis       Patient ID: Soila Chávez is a 75 y.o. female with HTN, asymptomatic bradycardia, aortic atherosclerosis, overweight, who presents for a follow up appointment.  Pertinent history/events are as follows:     -Pt kindly referred by Dr. Junior for evaluation of bradycardia (per her note on 2/18/2023):  "Bradycardia   Worsening   Prior 50's last year, now 44 in EKG (sinus mary grace), 48 in office   Asymptomatic"    -At our initial clinic visit on 2/23/2023, Mrs. Chávez was accompanied by her daughter.  She reported slow heart rate in the 50's over a year ago.  Reports heart rate in the 40's recently.  She has no chest pain, SOB, palpitations, TIA symptoms or syncope.  Heart rate 53 bpm in clinic today.  EKG on 2/20/2023 shows marked sinus bradycardia (44 bpm) with nonspecific T wave abnormality.  Plan:   Bradycardia- Pt is asymptomatic.  EKG on 2/20/2023 shows marked sinus bradycardia (44 bpm) with nonspecific T wave abnormality.  Check exercise stress echo to evaluate for chronotropic competence.  Check 30 day event monitor.  Amlodipine has been shown to cause bradycardia in rare instances.  Therefore, will discontinue amlodipine and start HCTZ 12.5 mg  daily.       HTN- Changes as per above.  Pt to monitor blood pressure and heart rate on above regimen.  Aortic Atherosclerosis- Increase pravastatin to 40 mg daily.    Overweight- Encourage diet, exercise, and weight loss.     -At follow up clinic visit on 7/17/2023, Mrs. Chávez was accompanied by her daughter.  She reported no chest pain, SOB, LE edema, TIA symptoms or syncope.  Cardiac Event Monitor on 3/15/2023 demonstrated 12 patient triggered transmissions, correlating with sinus rhythm 49-88 bpm.  Unfortunately, pt did not show up for stress test as scheduled.   Plan:   Bradycardia- Pt is asymptomatic.  Cardiac Event Monitor on 3/15/2023 " demonstrated 12 patient triggered transmissions, correlating with sinus rhythm 49-88 bpm.  Unfortunately, pt did not show up for stress test as scheduled.  No further workup indicated at this time.  Continue to monitor.     HTN- Controlled.  Continue current medications.    Aortic Atherosclerosis- Increase pravastatin to 80 mg daily.    Overweight- Encourage diet, exercise, and weight loss.     5/31/2024 clinic visit: Mrs. Chávez is accompanied by her sitter.  She reports doing well with no chest pain, SOB, LE edema, TIA symptoms or syncope.  Staying active by walking in [employer] daily.   Plan:  HTN- Controlled.  Continue current medications.    Aortic Atherosclerosis- Stable.  Continue pravastatin 80 mg daily.    Overweight- Encourage diet, exercise, and weight loss.     2/18/2025 clinic visit: Mrs. Chávez is accompanied by her family member. She was evaluated in the ED on 2/10/2025 for dehydration thought to be due to poor po intake. She has no chest pain, SOB, LE edema, TIA symptoms or syncope.  LDL 79 on 2/8/2025.   Plan:  HTN- Blood pressure low today at 85/57. Hold blood pressure medication for now. If systolic goes above 120 systolic, pt to take half pill of valsartan/HCTZ 160-12.5 mg daily and monitor blood pressure daily. Continue current medications.    Aortic Atherosclerosis- Stable.  Continue pravastatin 80 mg daily.    Overweight- Encourage diet, exercise, and weight loss.     -On 3/7-3/13/2025, pt was admitted with acute colitis and constipation. Valsartan/HCTZ 160-12.5 mg daily was discontinued due to hypotension.     HPI:  Mrs. Chávez is accompanied by her family member/care taker. She has no chest pain, SOB, LE edema, TIA symptoms or syncope. Family member reports pt 's dementia is getting worse. Her appetite has improved. On 3/7-3/13/2025, pt was admitted with acute colitis and constipation. Valsartan/HCTZ 160-12.5 mg daily was discontinued due to hypotension. Blood Pressure today is 132/84.      Past Medical History:   Diagnosis Date    Allergy     Anxiety     Cataract     Colon polyps 2010    Hearing loss     Hypertension     Joint pain     Mental disorder     Seizure-like activity 3/8/2025     Past Surgical History:   Procedure Laterality Date    CATARACT EXTRACTION W/  INTRAOCULAR LENS IMPLANT Right 3/12/2024    Procedure: EXTRACTION, CATARACT, WITH IOL INSERTION;  Surgeon: Jax Garza MD;  Location: [facility] OR;  Service: Ophthalmology;  Laterality: Right;    COLONOSCOPY N/A 3/5/2020    Procedure: COLONOSCOPY;  Surgeon: Nathalia Kemp MD;  Location: [facility] (4TH FLR);  Service: Colon and Rectal;  Laterality: N/A;  requests later appt time if available - Pt open to any MD- Pt informed arrival time may be adjusted, Pt verbalized understanding- ERW2/24/20@1409    ESOPHAGOGASTRODUODENOSCOPY N/A 10/1/2020    Procedure: EGD (ESOPHAGOGASTRODUODENOSCOPY);  Surgeon: Casey Guerrero MD;  [facility]  Service: Endoscopy;  Laterality: N/A;  covid test 9/28-[facility]    ESOPHAGOGASTRODUODENOSCOPY N/A 10/29/2021    Procedure: EGD (ESOPHAGOGASTRODUODENOSCOPY);  Surgeon: Billy Vegas MD;  Location: [facility];  Service: Endoscopy;  Laterality: N/A;    FLEXIBLE SIGMOIDOSCOPY N/A 3/11/2025    Procedure: SIGMOIDOSCOPY, FLEXIBLE;  Surgeon: Stan Arteaga MD;  Location: [facility];  Service: Endoscopy;  Laterality: N/A;    HYSTERECTOMY      PERIPARTUM CHITO     Social History     Socioeconomic History    Marital status:    Tobacco Use    Smoking status: Never     Passive exposure: Never    Smokeless tobacco: Never   Substance and Sexual Activity    Alcohol use: Yes     Comment: beer occasionally    Drug use: No    Sexual activity: Not Currently     Birth control/protection: None   Social History Narrative    June 2016    The patient reports that she lives alone normally, however her daughter recently moved in with her    She has a 43-year-old daughter, Saima    And a  26-year-old daughterChris, who is a schoolteacher for 6 in seventh grade in [employer]    She is retired from working as a  in the school system    She now works about 5-15 hours a week for [employer], which she enjoys.    Enjoys working at [employer] couple days a week     Social Drivers of Health     Financial Resource Strain: Low Risk  (3/8/2025)    Overall Financial Resource Strain (CARDIA)     Difficulty of Paying Living Expenses: Not hard at all   Food Insecurity: No Food Insecurity (3/8/2025)    Hunger Vital Sign     Worried About Running Out of Food in the Last Year: Never true     Ran Out of Food in the Last Year: Never true   Transportation Needs: No Transportation Needs (2/18/2025)    PRAPARE - Transportation     Lack of Transportation (Medical): No     Lack of Transportation (Non-Medical): No   Physical Activity: Inactive (3/8/2025)    Exercise Vital Sign     Days of Exercise per Week: 0 days     Minutes of Exercise per Session: 0 min   Stress: No Stress Concern Present (3/8/2025)    [employer] - Occupational Stress Questionnaire     Feeling of Stress : Not at all   Housing Stability: Low Risk  (3/8/2025)    Housing Stability Vital Sign     Unable to Pay for Housing in the Last Year: No     Number of Times Moved in the Last Year: 0     Homeless in the Last Year: No     Family History   Problem Relation Name Age of Onset    Breast cancer Sister Ingris     Colon cancer Neg Hx      Ovarian cancer Neg Hx      Melanoma Neg Hx         Review of patient's allergies indicates:   Allergen Reactions    Lotensin [benazepril] Swelling    Penicillins Rash       Medication List with Changes/Refills   Current Medications    CETIRIZINE (ZYRTEC) 10 MG TABLET    Take 1 tablet (10 mg total) by mouth daily as needed for Allergies.    DONEPEZIL (ARICEPT) 10 MG TABLET    Take a 1/2 of tablet by mouth nightly at bedtime for one month and then increase to 1 nightly at bedtime.     "HYDROCORTISONE 1 % OINTMENT    Apply a small amount to skin twice a day as needed for perianal itching    MEMANTINE (NAMENDA) 10 MG TAB    Take 1 tablet (10 mg total) by mouth 2 (two) times daily.    MIRTAZAPINE (REMERON) 15 MG TABLET    Take 1 tablet (15 mg total) by mouth every evening.    MOMETASONE 0.1% (ELOCON) 0.1 % CREAM    apply to affected area topically daily    OMEPRAZOLE (PRILOSEC) 40 MG CAPSULE    Take 1 capsule (40 mg total) by mouth every morning.    PRAVASTATIN (PRAVACHOL) 80 MG TABLET    Take 1 tablet (80 mg total) by mouth once daily.    PREDNISOLONE-MOXIFLOX-BROMFEN 1-0.5-0.075 % DRPS    Place 1 drop into the left eye 3 (three) times daily.    QUETIAPINE (SEROQUEL) 25 MG TAB    Take 1 tablet (25 mg total) by mouth 2 (two) times daily.    SENNA-DOCUSATE 8.6-50 MG (PERICOLACE) 8.6-50 MG PER TABLET    Take 1 tablet by mouth 2 (two) times daily.       Review of Systems  Constitution: Denies chills, fever, and sweats.  HENT: Denies headaches or blurry vision.  Cardiovascular: Denies chest pain or irregular heart beat.  Respiratory: Denies cough or shortness of breath.  Gastrointestinal: Denies abdominal pain, nausea, or vomiting.  Musculoskeletal: Denies muscle cramps.  Neurological: Denies dizziness or focal weakness.  Psychiatric/Behavioral: Normal mental status.  Hematologic/Lymphatic: Denies bleeding problem or easy bruising/bleeding.  Skin: Denies rash or suspicious lesions    Physical Examination  /84   Pulse (!) 56   Ht 5' 4" (1.626 m)   Wt 68.7 kg (151 lb 7.3 oz)   BMI 26.00 kg/m²     Constitutional: No acute distress, conversant  HEENT: Sclera anicteric, Pupils equal, round and reactive to light, extraocular motions intact, Oropharynx clear  Neck: No JVD, no carotid bruits  Cardiovascular: regular rate and rhythm, no murmur, rubs or gallops, normal S1/S2  Pulmonary: Clear to auscultation bilaterally  Abdominal: Abdomen soft, nontender, nondistended, positive bowel " "sounds  Extremities: No lower extremity edema,   Pulses:  Carotid pulses are 2+ on the right side, and 2+ on the left side.  Radial pulses are 2+ on the right side, and 2+ on the left side.   Femoral pulses are 2+ on the right side, and 2+ on the left side.  Popliteal pulses are 2+ on the right side, and 2+ on the left side.   Dorsalis pedis pulses are 2+ on the right side, and 2+ on the left side.   Posterior tibial pulses are 2+ on the right side, and 2+ on the left side.    Skin: No ecchymosis, erythema, or ulcers  Psych: Alert and oriented x 3, appropriate affect  Neuro: CNII-XII intact, no focal deficits    Labs:  Most Recent Data  CBC:   Lab Results   Component Value Date    WBC 9.98 03/20/2025    HGB 9.7 (L) 03/20/2025    HCT 30.0 (L) 03/20/2025     03/20/2025    MCV 87 03/20/2025    RDW 17.4 (H) 03/20/2025     BMP:   Lab Results   Component Value Date     (H) 03/20/2025    K 3.6 03/20/2025     (H) 03/20/2025    CO2 22 (L) 03/20/2025    BUN 9 03/20/2025    CREATININE 0.8 03/20/2025    GLU 98 03/20/2025    CALCIUM 8.7 03/20/2025    MG 1.8 03/13/2025    PHOS 2.0 (L) 03/11/2025     LFTS;   Lab Results   Component Value Date    PROT 5.6 (L) 03/13/2025    ALBUMIN 2.6 (L) 03/13/2025    BILITOT 0.2 03/13/2025    AST 34 03/13/2025    ALKPHOS 53 03/13/2025    ALT 19 03/13/2025     COAGS: No results found for: "INR", "PROTIME", "PTT"  FLP:   Lab Results   Component Value Date    CHOL 129 02/08/2025    HDL 35 (L) 02/08/2025    LDLCALC 78.6 02/08/2025    TRIG 77 02/08/2025    CHOLHDL 27.1 02/08/2025     CARDIAC:   Lab Results   Component Value Date    TROPONINI <0.006 03/07/2025    BNP <10 03/07/2025     Imaging:    EKG 2/20/2023:  Marked sinus bradycardia (44 bpm) with nonspecific T wave abnormality.    Cardiac Event Monitor 3/15/2023:  There were 12 patient triggered transmissions, correlating with sinus rhythm 49-88 bpm.    Assessment/Plan:  Soila Chávez is a 75 y.o. female with HTN, " asymptomatic bradycardia, aortic atherosclerosis, overweight, who presents for a follow up appointment.    HTN- Blood pressure is currently controlled. On 3/7-3/13/2025, pt was admitted with acute colitis and constipation. Valsartan/HCTZ 160-12.5 mg daily was discontinued due to hypotension. Blood Pressure today is 132/84. Continue to monitor off antihypertenisive medications.       2. Bradycardia- Pt is asymptomatic.  Cardiac Event Monitor on 3/15/2023 demonstrated 12 patient triggered transmissions, correlating with sinus rhythm 49-88 bpm.  Unfortunately, pt did not show up for stress test as scheduled.  No further workup indicated at this time.  Continue to monitor.       3. Aortic Atherosclerosis- Stable.  Continue pravastatin 80 mg daily.      4. Overweight- Encourage diet, exercise, and weight loss.     Follow up in 6 months     Total duration of face to face visit time 20 minutes.  Total time spent counseling greater than fifty percent of total visit time.  Counseling included discussion regarding imaging findings, diagnosis, possibilities, treatment options, risks and benefits.  The patient had many questions regarding the options and long-term effects.    Champ Chapman MD, PhD  Interventional Cardiology

## 2025-05-26 ENCOUNTER — TELEPHONE (OUTPATIENT)
Dept: OPHTHALMOLOGY | Facility: CLINIC | Age: 75
End: 2025-05-26
Payer: MEDICARE

## 2025-05-26 NOTE — TELEPHONE ENCOUNTER
----- Message from Champ Chapman MD PhD sent at 5/26/2025 10:36 AM CDT -----  Regarding: RE: Medical Clearance Needed for Cataract Surgery                        Surgery Date: 6/3/25  Hi Lisa,OK to proceed with cataract surgery.  ----- Message -----  From: Lisa Ware  Sent: 5/23/2025   1:41 PM CDT  To: Champ Chapman MD PhD  Subject: RE: Medical Clearance Needed for Cataract Chandler#    Good afternoon, Dr. Chapman, Just following up to ask if based on Ms. Bolanos's appointment this morning if you were going to be able to clear her for her upcoming Cataract Surgery on Tuesday, Monica 3, 2025?  Thank you, Lisa Yungurgery CoordinatorDr. Garza's Office  ----- Message -----  From: Champ Chapman MD PhD  Sent: 4/28/2025   6:09 PM CDT  To: Lisa Ware  Subject: RE: Medical Clearance Needed for Cataract Chandler#    Will do.  Dr. Chapman  ----- Message -----  From: Lisa Ware  Sent: 4/28/2025   4:22 PM CDT  To: Champ Chapman MD PhD  Subject: Medical Clearance Needed for Cataract Surger#    Hello, Dr. Chapman, Ms. Chávez is a mutual patient of both kem and Dr. Garza.  She is due for a follow up with you coming up on May 23, 2025. Dr. Garza will need clearance from you before proceeding with cataract surgery on Tuesday, Monica 3, 2025.  When performing cataract surgery, the following will be used:LOCAL Mac with Versed via RN IV assistance and Lidocaine ophthalmic eye drops.  Patient will also use a compounded combination drop including Prednisolone, Moxifloxacin, and Bromfenac one (1) drop three (3) times a day prior to surgery and four (4) weeks post surgery.The only medications patients are restricted from taking the morning of surgery are: Diabetic and Diuretics.  Any GLP-1 agonist drug class medications must be discontinued seven (7) days prior to surgery.Would you mind providing us with a medical clearance once the follow up appointment with Ms. Chávez has been completed?  Thank you in  advance for assisting with the care of Ms. Chávez. Please contact me if you have any questions.Cordially,Lisa GrowSurgery CoordinatorOphthalmology ClinicDr. Jax [phone]

## 2025-05-27 ENCOUNTER — TELEPHONE (OUTPATIENT)
Dept: OPHTHALMOLOGY | Facility: CLINIC | Age: 75
End: 2025-05-27
Payer: MEDICARE

## 2025-05-27 RX ORDER — QUETIAPINE FUMARATE 50 MG/1
50 TABLET, FILM COATED ORAL 3 TIMES DAILY
Qty: 90 TABLET | Refills: 5 | Status: SHIPPED | OUTPATIENT
Start: 2025-05-27 | End: 2026-05-27

## 2025-05-28 ENCOUNTER — PATIENT MESSAGE (OUTPATIENT)
Dept: NEUROLOGY | Facility: CLINIC | Age: 75
End: 2025-05-28
Payer: MEDICARE

## 2025-05-31 NOTE — H&P
[facility]  History & Physical    Subjective:      Chief Complaint/Reason for Admission:     Soila Chávez is a 75 y.o. female.    Past Medical History:   Diagnosis Date    Allergy     Anxiety     Cataract     Colon polyps 2010    Hearing loss     Hypertension     Joint pain     Mental disorder     Seizure-like activity 3/8/2025     Past Surgical History:   Procedure Laterality Date    CATARACT EXTRACTION W/  INTRAOCULAR LENS IMPLANT Right 3/12/2024    Procedure: EXTRACTION, CATARACT, WITH IOL INSERTION;  Surgeon: Jax Garza MD;  Location: [facility] OR;  Service: Ophthalmology;  Laterality: Right;    COLONOSCOPY N/A 3/5/2020    Procedure: COLONOSCOPY;  Surgeon: Nathalia Kemp MD;  Location: [facility] (4TH FLR);  Service: Colon and Rectal;  Laterality: N/A;  requests later appt time if available - Pt open to any MD- Pt informed arrival time may be adjusted, Pt verbalized understanding- ERW2/24/20@1409    ESOPHAGOGASTRODUODENOSCOPY N/A 10/1/2020    Procedure: EGD (ESOPHAGOGASTRODUODENOSCOPY);  Surgeon: Casey Guerrero MD;  [facility]  Service: Endoscopy;  Laterality: N/A;  [facility] test 9/28-[facility]    ESOPHAGOGASTRODUODENOSCOPY N/A 10/29/2021    Procedure: EGD (ESOPHAGOGASTRODUODENOSCOPY);  Surgeon: Billy Vegas MD;  Location: [facility];  Service: Endoscopy;  Laterality: N/A;    FLEXIBLE SIGMOIDOSCOPY N/A 3/11/2025    Procedure: SIGMOIDOSCOPY, FLEXIBLE;  Surgeon: Stan Arteaga MD;  Location: [facility];  Service: Endoscopy;  Laterality: N/A;    HYSTERECTOMY      PERIPARTUM [facility]     Social History[1]    No medications prior to admission.     Review of patient's allergies indicates:   Allergen Reactions    Lotensin [benazepril] Swelling    Penicillins Rash        Review of Systems   Eyes:  Positive for blurred vision.   All other systems reviewed and are negative.        Objective:      Vital Signs (Most Recent)       Vital Signs Range (Last  24H):       Physical Exam  Constitutional:       Appearance: She is well-developed.   HENT:      Head: Normocephalic.   Eyes:      Conjunctiva/sclera: Conjunctivae normal.      Pupils: Pupils are equal, round, and reactive to light.   Cardiovascular:      Rate and Rhythm: Normal rate and regular rhythm.      Heart sounds: Normal heart sounds.   Pulmonary:      Effort: Pulmonary effort is normal.      Breath sounds: Normal breath sounds.   Abdominal:      General: Bowel sounds are normal.      Palpations: Abdomen is soft.   Musculoskeletal:         General: Normal range of motion.      Cervical back: Normal range of motion and neck supple.   Skin:     General: Skin is warm.   Neurological:      Mental Status: She is alert and oriented to person, place, and time.         ASA Score: II    Mallampati Score: II    Plan for Sedation: Moderate    Patient or Family History of Anesthesia problems : No    Any changes affecting the anesthesia assessment which may have changed since the initial assessment and H and P:  No       Data Review:    ECG:     Assessment:      Cataract OS.    Plan:    CE OS.         [1]   Social History  Tobacco Use    Smoking status: Never     Passive exposure: Never    Smokeless tobacco: Never   Substance Use Topics    Alcohol use: Yes     Comment: beer occasionally    Drug use: No

## 2025-06-02 ENCOUNTER — PATIENT MESSAGE (OUTPATIENT)
Dept: NEUROLOGY | Facility: CLINIC | Age: 75
End: 2025-06-02
Payer: MEDICARE

## 2025-06-02 ENCOUNTER — PATIENT MESSAGE (OUTPATIENT)
Dept: INTERNAL MEDICINE | Facility: CLINIC | Age: 75
End: 2025-06-02
Payer: MEDICARE

## 2025-06-02 NOTE — PRE-PROCEDURE INSTRUCTIONS
Spoke with daughterGuillaume who was busy at the time of call. PAT nurse will call back in an hour and also send portal message with pre procedure instructions.       Dear Soila,        Below you will find basic pre-procedure instructions in preparation for your procedure on 6/3/25 with Dr. Garza.     You should receive your arrival time within 24-48 hours of your scheduled procedure date from the  at your surgeon's office. Please check in at the following address.      [facility]  [address], [place] [address]  2nd Floor Registration     Nothing to eat after midnight the night before your surgery. STOP drinking clear liquids 2 hours prior to your arrival time. Anesthesia encourages this to ensure that you are adequately hydrated. Clear liquids includes water, clear juices, Gatorade, black coffee (no milk, no cream), or soda.  Clear liquids do NOT include anything with pulp or food particles (chicken broth, ice cream, yogurt, Jello, etc.)      - HOLD all oral Diabetic medications night before and morning of procedure  - HOLD all Insulin morning of procedure  - HOLD all Fluid pills morning of procedure  - HOLD all non-insulin shots until after surgery (Ozempic, Mounjaro, Trulicity, Victoza, Byetta, Wegovy and Adlyxin) (7 days prior)  - HOLD all vitamins, minerals and herbal supplements morning of procedure        - USE inhalers as needed and bring AM of surgery  - USE EYE DROPS as directed  -TAKE blood thinner meds AM of surgery unless otherwise instructed by your provider not to take     - Shower with antibacterial soap (ex. Dial) PM prior and AM of surgery  - No powder, lotions, creams, oils, gels, ointments, makeup,  or jewelry    - Wear comfortable clothing (button up shirt)     (Patient is required to have a responsible ride to transport home, ride may not leave while patient is in surgery)         If you have any questions or concerns please feel  free to contact your surgeon's office.     In the event that you are running late or need to reschedule on the day of your procedure, please contact the pre-op desk at [phone].         *Please confirm receipt of this message.

## 2025-06-02 NOTE — PRE-PROCEDURE INSTRUCTIONS
Patient was given pre-procedure instructions. Patient verb understanding that a ride home is required. Patient received arrival time from the clinic.

## 2025-06-03 ENCOUNTER — HOSPITAL ENCOUNTER (OUTPATIENT)
Facility: HOSPITAL | Age: 75
Discharge: HOME | End: 2025-06-03
Attending: OPHTHALMOLOGY | Admitting: OPHTHALMOLOGY
Payer: MEDICARE

## 2025-06-03 ENCOUNTER — TELEPHONE (OUTPATIENT)
Dept: INTERNAL MEDICINE | Facility: CLINIC | Age: 75
End: 2025-06-03
Payer: MEDICARE

## 2025-06-03 ENCOUNTER — PATIENT MESSAGE (OUTPATIENT)
Dept: INTERNAL MEDICINE | Facility: CLINIC | Age: 75
End: 2025-06-03
Payer: MEDICARE

## 2025-06-03 ENCOUNTER — OFFICE VISIT (OUTPATIENT)
Dept: INTERNAL MEDICINE | Facility: CLINIC | Age: 75
End: 2025-06-03
Payer: MEDICARE

## 2025-06-03 VITALS
SYSTOLIC BLOOD PRESSURE: 159 MMHG | DIASTOLIC BLOOD PRESSURE: 77 MMHG | OXYGEN SATURATION: 99 % | HEART RATE: 60 BPM | RESPIRATION RATE: 29 BRPM | TEMPERATURE: 99 F

## 2025-06-03 DIAGNOSIS — F02.818 ALZHEIMER'S DEMENTIA WITH BEHAVIORAL DISTURBANCE: Primary | ICD-10-CM

## 2025-06-03 DIAGNOSIS — H25.12 NUCLEAR SCLEROTIC CATARACT OF LEFT EYE: Primary | ICD-10-CM

## 2025-06-03 DIAGNOSIS — F03.90 DEMENTIA WITHOUT BEHAVIORAL DISTURBANCE: Primary | ICD-10-CM

## 2025-06-03 DIAGNOSIS — G30.9 ALZHEIMER'S DEMENTIA WITH BEHAVIORAL DISTURBANCE: Primary | ICD-10-CM

## 2025-06-03 DIAGNOSIS — R39.89 SUSPECTED UTI: ICD-10-CM

## 2025-06-03 DIAGNOSIS — Z86.69 HISTORY OF IMPACTED EAR WAX: ICD-10-CM

## 2025-06-03 PROCEDURE — 3044F HG A1C LEVEL LT 7.0%: CPT | Mod: CPTII,95,,

## 2025-06-03 PROCEDURE — 99153 MOD SED SAME PHYS/QHP EA: CPT | Performed by: OPHTHALMOLOGY

## 2025-06-03 PROCEDURE — 71000015 HC POSTOP RECOV 1ST HR: Performed by: OPHTHALMOLOGY

## 2025-06-03 PROCEDURE — V2632 POST CHMBR INTRAOCULAR LENS: HCPCS | Performed by: OPHTHALMOLOGY

## 2025-06-03 PROCEDURE — 94761 N-INVAS EAR/PLS OXIMETRY MLT: CPT

## 2025-06-03 PROCEDURE — 99900035 HC TECH TIME PER 15 MIN (STAT)

## 2025-06-03 PROCEDURE — 66984 XCAPSL CTRC RMVL W/O ECP: CPT | Mod: LT,,, | Performed by: OPHTHALMOLOGY

## 2025-06-03 PROCEDURE — 25000003 PHARM REV CODE 250: Performed by: OPHTHALMOLOGY

## 2025-06-03 PROCEDURE — 63600175 PHARM REV CODE 636 W HCPCS: Performed by: OPHTHALMOLOGY

## 2025-06-03 PROCEDURE — 98006 SYNCH AUDIO-VIDEO EST MOD 30: CPT | Mod: 95,,,

## 2025-06-03 PROCEDURE — 99152 MOD SED SAME PHYS/QHP 5/>YRS: CPT | Performed by: OPHTHALMOLOGY

## 2025-06-03 PROCEDURE — 36000706: Performed by: OPHTHALMOLOGY

## 2025-06-03 PROCEDURE — 36000707: Performed by: OPHTHALMOLOGY

## 2025-06-03 PROCEDURE — 27201423 OPTIME MED/SURG SUP & DEVICES STERILE SUPPLY: Performed by: OPHTHALMOLOGY

## 2025-06-03 DEVICE — CLAREON ASPHERIC HYDROPHOBIC ACRYLIC IOL WITH THE AUTONOMEAUTOMATED PRE-LOADED DELIVERY SYSTEM
Type: IMPLANTABLE DEVICE | Site: EYE | Status: FUNCTIONAL
Brand: CLAREON™

## 2025-06-03 RX ORDER — MOXIFLOXACIN 5 MG/ML
SOLUTION/ DROPS OPHTHALMIC
Status: DISCONTINUED | OUTPATIENT
Start: 2025-06-03 | End: 2025-06-03 | Stop reason: HOSPADM

## 2025-06-03 RX ORDER — LIDOCAINE HYDROCHLORIDE 40 MG/ML
INJECTION, SOLUTION RETROBULBAR
Status: DISCONTINUED | OUTPATIENT
Start: 2025-06-03 | End: 2025-06-03 | Stop reason: HOSPADM

## 2025-06-03 RX ORDER — EPINEPHRINE 1 MG/ML
INJECTION, SOLUTION, CONCENTRATE INTRAVENOUS
Status: DISCONTINUED | OUTPATIENT
Start: 2025-06-03 | End: 2025-06-03 | Stop reason: HOSPADM

## 2025-06-03 RX ORDER — FENTANYL CITRATE 50 UG/ML
25 INJECTION, SOLUTION INTRAMUSCULAR; INTRAVENOUS
Status: DISCONTINUED | OUTPATIENT
Start: 2025-06-03 | End: 2025-06-03 | Stop reason: HOSPADM

## 2025-06-03 RX ORDER — ACETAMINOPHEN 325 MG/1
650 TABLET ORAL EVERY 4 HOURS PRN
Status: DISCONTINUED | OUTPATIENT
Start: 2025-06-03 | End: 2025-06-03 | Stop reason: HOSPADM

## 2025-06-03 RX ORDER — LIDOCAIN/PHENYLEPH/BSS NO.2/PF 1 %-1.5 %
SYRINGE (ML) INTRAOCULAR
Status: DISCONTINUED | OUTPATIENT
Start: 2025-06-03 | End: 2025-06-03 | Stop reason: HOSPADM

## 2025-06-03 RX ORDER — MIDAZOLAM HYDROCHLORIDE 1 MG/ML
1 INJECTION, SOLUTION INTRAMUSCULAR; INTRAVENOUS
Status: DISCONTINUED | OUTPATIENT
Start: 2025-06-03 | End: 2025-06-03 | Stop reason: HOSPADM

## 2025-06-03 RX ORDER — CYCLOP/TROP/PROPA/PHEN/KET/WAT 1-1-0.1%
1 DROPS (EA) OPHTHALMIC (EYE)
Status: COMPLETED | OUTPATIENT
Start: 2025-06-03 | End: 2025-06-03

## 2025-06-03 RX ORDER — PREDNISOLONE ACETATE 10 MG/ML
SUSPENSION/ DROPS OPHTHALMIC
Status: DISCONTINUED | OUTPATIENT
Start: 2025-06-03 | End: 2025-06-03 | Stop reason: HOSPADM

## 2025-06-03 RX ORDER — HYDROCODONE BITARTRATE AND ACETAMINOPHEN 5; 325 MG/1; MG/1
1 TABLET ORAL EVERY 4 HOURS PRN
Status: DISCONTINUED | OUTPATIENT
Start: 2025-06-03 | End: 2025-06-03 | Stop reason: HOSPADM

## 2025-06-03 RX ORDER — PROPARACAINE HYDROCHLORIDE 5 MG/ML
SOLUTION/ DROPS OPHTHALMIC
Status: DISCONTINUED | OUTPATIENT
Start: 2025-06-03 | End: 2025-06-03 | Stop reason: HOSPADM

## 2025-06-03 RX ORDER — MOXIFLOXACIN 5 MG/ML
1 SOLUTION/ DROPS OPHTHALMIC EVERY 5 MIN PRN
Status: COMPLETED | OUTPATIENT
Start: 2025-06-03 | End: 2025-06-03

## 2025-06-03 RX ORDER — SODIUM CHLORIDE 9 MG/ML
INJECTION, SOLUTION INTRAVENOUS CONTINUOUS
Status: DISCONTINUED | OUTPATIENT
Start: 2025-06-03 | End: 2025-06-03 | Stop reason: HOSPADM

## 2025-06-03 RX ORDER — TETRACAINE HYDROCHLORIDE 5 MG/ML
1 SOLUTION OPHTHALMIC
Status: DISCONTINUED | OUTPATIENT
Start: 2025-06-03 | End: 2025-06-03

## 2025-06-03 RX ADMIN — MOXIFLOXACIN OPHTHALMIC 1 DROP: 5 SOLUTION/ DROPS OPHTHALMIC at 08:06

## 2025-06-03 RX ADMIN — FENTANYL CITRATE 25 MCG: 50 INJECTION, SOLUTION INTRAMUSCULAR; INTRAVENOUS at 10:06

## 2025-06-03 RX ADMIN — Medication 1 DROP: at 08:06

## 2025-06-03 RX ADMIN — MIDAZOLAM HYDROCHLORIDE 1 MG: 1 INJECTION, SOLUTION INTRAMUSCULAR; INTRAVENOUS at 10:06

## 2025-06-03 NOTE — DISCHARGE INSTRUCTIONS
WHEN YOU ARRIVE HOME FROM SURGERY:  - Resume using your PMB COMBO drops; one (1) drop twice in operative eye (Afternoon and Nighttime).  PLEASE NOTE:  - Keep your eye shield on for the remainder of the day. You may pull it down to instill drops. This will help prevent inflammation and allow the eye to rest.  - You may take over the counter pain medication such as Tylenol or Ibuprofen as directed, if needed for pain.  - Continue ALL normally prescribed eye drops and artificial tears (as needed)    1 DAY POST OPERATIVE APPOINTMENT:  Date: _________________________/ Time: __________________________  Location: [facility]- [address], [place] [address]  The day after surgey, please resume PMB COMBO drops in the operative eye three (3) times a day (morning, noon, and night).    RESTRICTIONS FOR SEVEN (7) DAYS FOLLOWING SURGERY:  - DO NOT lift anything over 10 pounds.  - DO NOT bend at the waist, only at the knees (keep head in upright position).  - DO NOT rub your eye.  - DO NOT get any water into the eye.  - DO NOT wear any makeup, lotions, or creams on/around the eye.  - Wear the protective eye shield you were given after surgery anytime you go to sleep. You may remove the shield while awake.  - Wear sunglasses when outdoors to protect eyes from UV rays.    **If you experience severe pain, loss of vision, sudden onset of flashes and/or floaters. IMMEDIATELY CALL Dr. Wolfe Office: [phone], AFTER HOUR [phone] OR proceed to the EMERGENCY ROOM.  DONT FORGET! ORDER YOUR DROP REFILL IF YOURE RUNNING LOW OR HAVING A SECOND EYE SURGERY SOON!!   [facility] PHONE #[phone]

## 2025-06-03 NOTE — OP NOTE
Operative Date:  06/03/2025    Discharge Date:  06/03/2025    Discharge Patient Home    Report Title: Operative Note      SURGEON: Jax Garza MD     ASSISTANT:     PREOPERATIVE DIAGNOSIS: Visually significant NSC cataract,  Left Eye.    POSTOPERATIVE DIAGNOSIS: Visually-significant NSC cataract,  Left Eye.    PROCEDURE PERFORMED: Phacoemulsification of the cataract with posterior chamber intraocular lens Left Eye.    ANESTHESIA:  Moderate Sedation with Local    The team confirmed the patient ID and re-evaluated the patient and sedation plan confirming it is suitable for the patient's condition and procedure prior to administering sedation.    COMPLICATIONS: None    ESTIMATED BLOOD LOSS: Minimal    INDICATIONS FOR PROCEDURE:   The patient is a pleasant 75 year old woman with increasing difficulties with activities of daily living secondary to a dense visually significant cataract in the Left Eye.  Discussions have been carried out with this patient concerning the options to surgery, risks, benefits and expectations.  The patient voiced good understanding and wished to proceed with the above procedure.    PROCEDURE IN DETAIL: The patient was brought to the operating room and received topical anesthetic to the eye and then was prepped and draped in the usual sterile fashion.  Using the George ring and the guarded cl blade set at 0.37 mm, a partial thickness clear cornea incision was made temporally.  The paracentesis site was made at the six o'clock position.  Omidria was injected into the anterior chamber through the paracentesis.  Viscoat was then injected into the anterior chamber.  The eye was then reentered at the primary surgical site with a 2.4 mm keratome followed by continuous capsulotomy, hydrodissection, hydrodelineation and phacoemulsification of the cataract.  Residual cortical material was removed using automated irrigation-aspiration technique.  Healon was injected into the posterior  chamber and a CNAOTO 22.0 diopter lens was placed in the bag without difficulty. Residual viscoelastic was removed using automated irrigation-aspiration technique. The eye was re-pressurized using BSS solution and both the paracentesis site and the primary surgical site were demonstrated to be watertight at the end of the case with Weck--Rose Mary manipulation.  One drop of Ofloxacin and one drop of Pred acetate 1% was applied to the Left Eye .The eye was closed, patched and a Andrews shield placed.  The patient was taken to the recovery room in good and stable condition.  The patient tolerated the procedure well.  The patient was instructed to refrain from any heavy lifting, bending, stooping or straining activities, discharged home  and to follow-up in the morning for routine postoperative care with Jax Garza MD.

## 2025-06-03 NOTE — BRIEF OP NOTE
[facility]  Brief Operative Note    Surgery Date: 6/3/2025     Surgeons and Role:     * Jax Garza MD - Primary    Assisting Surgeon: None    Pre-op Diagnosis:  NS (nuclear sclerosis), left [H25.12]    Post-op Diagnosis:  Post-Op Diagnosis Codes:     * NS (nuclear sclerosis), left [H25.12]    Procedure(s) (LRB):  EXTRACTION, CATARACT, WITH IOL INSERTION (Left)    Anesthesia: RN IV Sedation    Operative Findings:     Estimated Blood Loss: * No values recorded between 6/3/2025 12:00 AM and 6/3/2025 10:39 AM *         Specimens:   Specimen (24h ago, onward)      None            * No specimens in log *        Discharge Note    OUTCOME: Patient tolerated treatment/procedure well without complication and is now ready for discharge.    DISPOSITION: Home or Self Care    FINAL DIAGNOSIS:  Nuclear sclerotic cataract of left eye    FOLLOWUP: In clinic    DISCHARGE INSTRUCTIONS:    Discharge Procedure Orders   Other restrictions (specify):   Order Comments: No heavy lifting or bending for 1 week.

## 2025-06-04 ENCOUNTER — LAB VISIT (OUTPATIENT)
Dept: LAB | Facility: HOSPITAL | Age: 75
End: 2025-06-04
Attending: STUDENT IN AN ORGANIZED HEALTH CARE EDUCATION/TRAINING PROGRAM
Payer: MEDICARE

## 2025-06-04 ENCOUNTER — OFFICE VISIT (OUTPATIENT)
Dept: OPHTHALMOLOGY | Facility: CLINIC | Age: 75
End: 2025-06-04
Payer: MEDICARE

## 2025-06-04 DIAGNOSIS — F02.818 ALZHEIMER'S DEMENTIA WITH BEHAVIORAL DISTURBANCE: ICD-10-CM

## 2025-06-04 DIAGNOSIS — R39.89 SUSPECTED UTI: ICD-10-CM

## 2025-06-04 DIAGNOSIS — H18.49 CORNEAL DELLEN OF LEFT EYE: ICD-10-CM

## 2025-06-04 DIAGNOSIS — Z96.1 PSEUDOPHAKIA: ICD-10-CM

## 2025-06-04 DIAGNOSIS — Z98.890 POST-OPERATIVE STATE: Primary | ICD-10-CM

## 2025-06-04 DIAGNOSIS — G30.9 ALZHEIMER'S DEMENTIA WITH BEHAVIORAL DISTURBANCE: ICD-10-CM

## 2025-06-04 LAB
ABSOLUTE EOSINOPHIL (OHS): 0.16 K/UL
ABSOLUTE MONOCYTE (OHS): 0.62 K/UL (ref 0.3–1)
ABSOLUTE NEUTROPHIL COUNT (OHS): 6.48 K/UL (ref 1.8–7.7)
ALBUMIN SERPL BCP-MCNC: 4.1 G/DL (ref 3.5–5.2)
ALP SERPL-CCNC: 68 UNIT/L (ref 40–150)
ALT SERPL W/O P-5'-P-CCNC: 24 UNIT/L (ref 10–44)
ANION GAP (OHS): 12 MMOL/L (ref 8–16)
AST SERPL-CCNC: 27 UNIT/L (ref 11–45)
BASOPHILS # BLD AUTO: 0.03 K/UL
BASOPHILS NFR BLD AUTO: 0.3 %
BILIRUB SERPL-MCNC: 0.1 MG/DL (ref 0.1–1)
BILIRUB UR QL STRIP.AUTO: NEGATIVE
BUN SERPL-MCNC: 23 MG/DL (ref 8–23)
CALCIUM SERPL-MCNC: 9.2 MG/DL (ref 8.7–10.5)
CHLORIDE SERPL-SCNC: 109 MMOL/L (ref 95–110)
CLARITY UR: ABNORMAL
CO2 SERPL-SCNC: 26 MMOL/L (ref 23–29)
COLOR UR AUTO: YELLOW
CREAT SERPL-MCNC: 1.2 MG/DL (ref 0.5–1.4)
ERYTHROCYTE [DISTWIDTH] IN BLOOD BY AUTOMATED COUNT: 13.2 % (ref 11.5–14.5)
GFR SERPLBLD CREATININE-BSD FMLA CKD-EPI: 48 ML/MIN/1.73/M2
GLUCOSE SERPL-MCNC: 100 MG/DL (ref 70–110)
GLUCOSE UR QL STRIP: NEGATIVE
HCT VFR BLD AUTO: 37.3 % (ref 37–48.5)
HGB BLD-MCNC: 11.2 GM/DL (ref 12–16)
HGB UR QL STRIP: NEGATIVE
IMM GRANULOCYTES # BLD AUTO: 0.02 K/UL (ref 0–0.04)
IMM GRANULOCYTES NFR BLD AUTO: 0.2 % (ref 0–0.5)
KETONES UR QL STRIP: NEGATIVE
LEUKOCYTE ESTERASE UR QL STRIP: NEGATIVE
LYMPHOCYTES # BLD AUTO: 2.17 K/UL (ref 1–4.8)
MAGNESIUM SERPL-MCNC: 2.1 MG/DL (ref 1.6–2.6)
MCH RBC QN AUTO: 27.3 PG (ref 27–31)
MCHC RBC AUTO-ENTMCNC: 30 G/DL (ref 32–36)
MCV RBC AUTO: 91 FL (ref 82–98)
NITRITE UR QL STRIP: NEGATIVE
NUCLEATED RBC (/100WBC) (OHS): 0 /100 WBC
PH UR STRIP: 6 [PH]
PHOSPHATE SERPL-MCNC: 3.4 MG/DL (ref 2.7–4.5)
PLATELET # BLD AUTO: 248 K/UL (ref 150–450)
PMV BLD AUTO: 11.4 FL (ref 9.2–12.9)
POTASSIUM SERPL-SCNC: 3.8 MMOL/L (ref 3.5–5.1)
PROT SERPL-MCNC: 7.6 GM/DL (ref 6–8.4)
PROT UR QL STRIP: ABNORMAL
RBC # BLD AUTO: 4.1 M/UL (ref 4–5.4)
RELATIVE EOSINOPHIL (OHS): 1.7 %
RELATIVE LYMPHOCYTE (OHS): 22.9 % (ref 18–48)
RELATIVE MONOCYTE (OHS): 6.5 % (ref 4–15)
RELATIVE NEUTROPHIL (OHS): 68.4 % (ref 38–73)
SODIUM SERPL-SCNC: 147 MMOL/L (ref 136–145)
SP GR UR STRIP: >=1.03
UROBILINOGEN UR STRIP-ACNC: NEGATIVE EU/DL
WBC # BLD AUTO: 9.48 K/UL (ref 3.9–12.7)

## 2025-06-04 PROCEDURE — 36415 COLL VENOUS BLD VENIPUNCTURE: CPT

## 2025-06-04 PROCEDURE — 84100 ASSAY OF PHOSPHORUS: CPT

## 2025-06-04 PROCEDURE — 83735 ASSAY OF MAGNESIUM: CPT

## 2025-06-04 PROCEDURE — 87086 URINE CULTURE/COLONY COUNT: CPT

## 2025-06-04 PROCEDURE — 99999 PR PBB SHADOW E&M-EST. PATIENT-LVL III: CPT | Mod: PBBFAC,,, | Performed by: OPHTHALMOLOGY

## 2025-06-04 PROCEDURE — 80053 COMPREHEN METABOLIC PANEL: CPT

## 2025-06-04 PROCEDURE — 3044F HG A1C LEVEL LT 7.0%: CPT | Mod: CPTII,S$GLB,, | Performed by: OPHTHALMOLOGY

## 2025-06-04 PROCEDURE — 81003 URINALYSIS AUTO W/O SCOPE: CPT

## 2025-06-04 PROCEDURE — 3288F FALL RISK ASSESSMENT DOCD: CPT | Mod: CPTII,S$GLB,, | Performed by: OPHTHALMOLOGY

## 2025-06-04 PROCEDURE — 1159F MED LIST DOCD IN RCRD: CPT | Mod: CPTII,S$GLB,, | Performed by: OPHTHALMOLOGY

## 2025-06-04 PROCEDURE — 1160F RVW MEDS BY RX/DR IN RCRD: CPT | Mod: CPTII,S$GLB,, | Performed by: OPHTHALMOLOGY

## 2025-06-04 PROCEDURE — 85025 COMPLETE CBC W/AUTO DIFF WBC: CPT

## 2025-06-04 PROCEDURE — 1126F AMNT PAIN NOTED NONE PRSNT: CPT | Mod: CPTII,S$GLB,, | Performed by: OPHTHALMOLOGY

## 2025-06-04 PROCEDURE — 1101F PT FALLS ASSESS-DOCD LE1/YR: CPT | Mod: CPTII,S$GLB,, | Performed by: OPHTHALMOLOGY

## 2025-06-04 PROCEDURE — 99024 POSTOP FOLLOW-UP VISIT: CPT | Mod: S$GLB,,, | Performed by: OPHTHALMOLOGY

## 2025-06-05 LAB — BACTERIA UR CULT: NORMAL

## 2025-06-09 ENCOUNTER — RESULTS FOLLOW-UP (OUTPATIENT)
Dept: INTERNAL MEDICINE | Facility: CLINIC | Age: 75
End: 2025-06-09

## 2025-06-09 ENCOUNTER — TELEPHONE (OUTPATIENT)
Dept: INTERNAL MEDICINE | Facility: CLINIC | Age: 75
End: 2025-06-09
Payer: MEDICARE

## 2025-06-09 NOTE — TELEPHONE ENCOUNTER
S/w daughter:     Everyday is different    No UTI    Was able to speak with Dr. Levin and has f/u appt tomorrow    Appt with Dr. Winkler in August    Today is a stable today, daughter did not give seroquel, feels like she does better without. Eating and feeling fine. Pt continues to worry about different things.     Trying to get into adult day-care program and pt will need medical consent. Will reach out as needed

## 2025-06-10 ENCOUNTER — TELEPHONE (OUTPATIENT)
Dept: NEUROLOGY | Facility: CLINIC | Age: 75
End: 2025-06-10
Payer: MEDICARE

## 2025-06-10 ENCOUNTER — OFFICE VISIT (OUTPATIENT)
Dept: NEUROLOGY | Facility: CLINIC | Age: 75
End: 2025-06-10
Payer: MEDICARE

## 2025-06-10 DIAGNOSIS — R44.3 HALLUCINATIONS: ICD-10-CM

## 2025-06-10 DIAGNOSIS — G30.9 ALZHEIMER'S DEMENTIA WITH BEHAVIORAL DISTURBANCE: Primary | ICD-10-CM

## 2025-06-10 DIAGNOSIS — F02.818 ALZHEIMER'S DEMENTIA WITH BEHAVIORAL DISTURBANCE: Primary | ICD-10-CM

## 2025-06-10 PROCEDURE — 3044F HG A1C LEVEL LT 7.0%: CPT | Mod: CPTII,95,, | Performed by: PSYCHIATRY & NEUROLOGY

## 2025-06-10 PROCEDURE — 1159F MED LIST DOCD IN RCRD: CPT | Mod: CPTII,95,, | Performed by: PSYCHIATRY & NEUROLOGY

## 2025-06-10 PROCEDURE — 98004 SYNCH AUDIO-VIDEO EST SF 10: CPT | Mod: 95,,, | Performed by: PSYCHIATRY & NEUROLOGY

## 2025-06-10 PROCEDURE — 1160F RVW MEDS BY RX/DR IN RCRD: CPT | Mod: CPTII,95,, | Performed by: PSYCHIATRY & NEUROLOGY

## 2025-06-10 RX ORDER — BREXPIPRAZOLE 2 MG/1
2 TABLET ORAL DAILY
Qty: 30 TABLET | Refills: 3 | Status: SHIPPED | OUTPATIENT
Start: 2025-06-10

## 2025-06-10 NOTE — PROGRESS NOTES
The patient location is: [place]  The chief complaint leading to consultation is: hallucinations and agitation    Visit type: audiovisual    Face to Face time with patient: 12 minutes  18 minutes of total time spent on the encounter, which includes face to face time and non-face to face time preparing to see the patient (eg, review of tests), Obtaining and/or reviewing separately obtained history, Documenting clinical information in the electronic or other health record, Independently interpreting results (not separately reported) and communicating results to the patient/family/caregiver, or Care coordination (not separately reported).         Each patient to whom he or she provides medical services by telemedicine is:  (1) informed of the relationship between the physician and patient and the respective role of any other health care provider with respect to management of the patient; and (2) notified that he or she may decline to receive medical services by telemedicine and may withdraw from such care at any time.    Notes:   The patient has been agitated recently and even tried to climb through the window (see prior documented phone calls).  I had prescribed Seroquel and her allowed dosage was 50 mg 3 times a day but her daughter discontinued it after a couple of days thinking that the Seroquel might actually be the cause agitation.  Her behavior has improved over the past few days but based on previous conversation I am not at all convinced that the Seroquel was the issue.  I did however offered to change to Rexulti and her daughter did  samples.  Unfortunately she was not provided with the starter kit as intended and instead was given 2 mg dosage only; today her dtr administered  a single 2 mg dosage (1st and only dosage).  I advised her to wait until the day after tomorrow and then to start with 0.5 mg q.a.m. (she is going to come and  the starter kit tomorrow) she will take 0.5 for a week then  1 mg for a week and then 2 mg thereafter.  She will notify me if she has any problems along the way. Tonight and tomorrow she will need to be monitored for gait instability low blood pressure and confusion.  Plan return to clinic in 4 months (future appointments will be at the [facility] location at her request)

## 2025-06-10 NOTE — TELEPHONE ENCOUNTER
Spoke with pt daughter about the appt and stated to her that  is still with a pt and she will call in five mins to please stay on the call

## 2025-06-11 ENCOUNTER — OFFICE VISIT (OUTPATIENT)
Dept: OPHTHALMOLOGY | Facility: CLINIC | Age: 75
End: 2025-06-11
Payer: MEDICARE

## 2025-06-11 ENCOUNTER — PATIENT MESSAGE (OUTPATIENT)
Dept: INTERNAL MEDICINE | Facility: CLINIC | Age: 75
End: 2025-06-11
Payer: MEDICARE

## 2025-06-11 DIAGNOSIS — Z96.1 PSEUDOPHAKIA: ICD-10-CM

## 2025-06-11 DIAGNOSIS — H18.49 CORNEAL DELLEN OF LEFT EYE: ICD-10-CM

## 2025-06-11 DIAGNOSIS — Z98.890 POST-OPERATIVE STATE: Primary | ICD-10-CM

## 2025-06-11 PROCEDURE — 99024 POSTOP FOLLOW-UP VISIT: CPT | Mod: S$GLB,,, | Performed by: OPHTHALMOLOGY

## 2025-06-11 PROCEDURE — 1160F RVW MEDS BY RX/DR IN RCRD: CPT | Mod: CPTII,S$GLB,, | Performed by: OPHTHALMOLOGY

## 2025-06-11 PROCEDURE — 99999 PR PBB SHADOW E&M-EST. PATIENT-LVL III: CPT | Mod: PBBFAC,,, | Performed by: OPHTHALMOLOGY

## 2025-06-11 PROCEDURE — 3044F HG A1C LEVEL LT 7.0%: CPT | Mod: CPTII,S$GLB,, | Performed by: OPHTHALMOLOGY

## 2025-06-11 PROCEDURE — 1126F AMNT PAIN NOTED NONE PRSNT: CPT | Mod: CPTII,S$GLB,, | Performed by: OPHTHALMOLOGY

## 2025-06-11 PROCEDURE — 1159F MED LIST DOCD IN RCRD: CPT | Mod: CPTII,S$GLB,, | Performed by: OPHTHALMOLOGY

## 2025-06-11 NOTE — PROGRESS NOTES
Subjective:       Patient ID: Soila Chávez is a 75 y.o. female.    Chief Complaint: Post-op Evaluation    HPI    DLS: 06/04/2025    Pt here for 1 week post phaco w/IOL OS- 6/03/2025  S/P phaco w/IOL OD- 3/12/2024  VA OS okay  Eye meds: PMD TID OS    Pt states no eye pain or discomfort.     Meds;  PMB TID OS  Emycin magaly TID OS  Last edited by Jennifer Ayala on 6/11/2025  1:46 PM.             Assessment:       1. Post-operative state    2. Corneal dellen of left eye    3. Pseudophakia        Plan:       S/p CE OS- Doing well.    K dellen OS-Resolving on EES magaly.        CPM OS.  Decrease EES magaly to qhs OS.  RTC 3 wks.

## 2025-06-11 NOTE — TELEPHONE ENCOUNTER
Staff spoke with patients daughter and got her scheduled for a virtual appointment to complete forms on 6/16. Patient is in agreement to the above and verbalized understanding.

## 2025-06-11 NOTE — TELEPHONE ENCOUNTER
Please schedule virtual with me for form completion. Ask daughter if she can send link of pdf version of form?

## 2025-06-16 ENCOUNTER — OFFICE VISIT (OUTPATIENT)
Dept: INTERNAL MEDICINE | Facility: CLINIC | Age: 75
End: 2025-06-16
Payer: MEDICARE

## 2025-06-16 ENCOUNTER — PATIENT MESSAGE (OUTPATIENT)
Dept: INTERNAL MEDICINE | Facility: CLINIC | Age: 75
End: 2025-06-16

## 2025-06-16 DIAGNOSIS — Z02.89 ENCOUNTER FOR COMPLETION OF FORM WITH PATIENT: Primary | ICD-10-CM

## 2025-06-16 DIAGNOSIS — K59.01 SLOW TRANSIT CONSTIPATION: ICD-10-CM

## 2025-06-16 PROCEDURE — 98005 SYNCH AUDIO-VIDEO EST LOW 20: CPT | Mod: 95,,,

## 2025-06-16 PROCEDURE — 3044F HG A1C LEVEL LT 7.0%: CPT | Mod: CPTII,95,,

## 2025-06-16 RX ORDER — AMOXICILLIN 250 MG
1 CAPSULE ORAL 2 TIMES DAILY PRN
Qty: 30 TABLET | Refills: 1 | Status: SHIPPED | OUTPATIENT
Start: 2025-06-16

## 2025-06-16 NOTE — PROGRESS NOTES
The patient location is: [place]  The chief complaint leading to consultation is: Form completion to enter into adult facility  Pt not present during visit    Visit type: audiovisual    Face to Face time with patient: 20 minutes with daughter  20 minutes of total time spent on the encounter, which includes face to face time and non-face to face time preparing to see the patient (eg, review of tests), Obtaining and/or reviewing separately obtained history, Documenting clinical information in the electronic or other health record, Independently interpreting results (not separately reported) and communicating results to the patient/family/caregiver, or Care coordination (not separately reported).         Each patient to whom he or she provides medical services by telemedicine is:  (1) informed of the relationship between the physician and patient and the respective role of any other health care provider with respect to management of the patient; and (2) notified that he or she may decline to receive medical services by telemedicine and may withdraw from such care at any time.    Notes:                           Answers submitted by the patient for this visit:  Review of Systems Questionnaire (Submitted on 6/16/2025)  activity change: No  unexpected weight change: No  neck pain: No  hearing loss: No  rhinorrhea: No  trouble swallowing: No  eye discharge: No  visual disturbance: No  chest tightness: No  wheezing: No  chest pain: No  palpitations: No  blood in stool: No  constipation: No  vomiting: No  diarrhea: No  polydipsia: No  polyuria: No  difficulty urinating: No  hematuria: No  menstrual problem: No  dysuria: No  joint swelling: No  arthralgias: No  headaches: No  weakness: No  confusion: No  dysphoric mood: No

## 2025-06-19 ENCOUNTER — PATIENT MESSAGE (OUTPATIENT)
Dept: OTOLARYNGOLOGY | Facility: CLINIC | Age: 75
End: 2025-06-19
Payer: MEDICARE

## 2025-06-26 ENCOUNTER — OFFICE VISIT (OUTPATIENT)
Dept: OTOLARYNGOLOGY | Facility: CLINIC | Age: 75
End: 2025-06-26
Payer: MEDICARE

## 2025-06-26 VITALS
HEART RATE: 72 BPM | BODY MASS INDEX: 25.2 KG/M2 | WEIGHT: 146.81 LBS | DIASTOLIC BLOOD PRESSURE: 79 MMHG | SYSTOLIC BLOOD PRESSURE: 133 MMHG

## 2025-06-26 DIAGNOSIS — H61.22 IMPACTED CERUMEN OF LEFT EAR: Primary | ICD-10-CM

## 2025-06-26 DIAGNOSIS — H91.90 HEARING LOSS, UNSPECIFIED HEARING LOSS TYPE, UNSPECIFIED LATERALITY: ICD-10-CM

## 2025-06-26 PROCEDURE — 99499 UNLISTED E&M SERVICE: CPT | Mod: S$GLB,,,

## 2025-06-26 PROCEDURE — 69210 REMOVE IMPACTED EAR WAX UNI: CPT | Mod: S$GLB,,,

## 2025-06-26 NOTE — PROCEDURES
Ear Cerumen Removal    Date/Time: 6/26/2025 1:40 PM    Performed by: Ibis Galvez PA-C  Authorized by: Ibis Galvez PA-C    Consent Done?:  Yes (Verbal)    Local anesthetic:  None  Medication Used:  Other  Location details:  Left ear  Procedure type: curette    Procedure type comment:  Suction  Cerumen  Removal Results:  Cerumen completely removed  Patient tolerance:  Patient tolerated the procedure well with no immediate complications

## 2025-06-30 ENCOUNTER — OFFICE VISIT (OUTPATIENT)
Dept: OPHTHALMOLOGY | Facility: CLINIC | Age: 75
End: 2025-06-30
Payer: MEDICARE

## 2025-06-30 DIAGNOSIS — Z96.1 PSEUDOPHAKIA: ICD-10-CM

## 2025-06-30 DIAGNOSIS — H04.123 DRY EYE SYNDROME OF BOTH EYES: ICD-10-CM

## 2025-06-30 DIAGNOSIS — H52.7 REFRACTIVE ERROR: ICD-10-CM

## 2025-06-30 DIAGNOSIS — Z98.890 POST-OPERATIVE STATE: Primary | ICD-10-CM

## 2025-06-30 DIAGNOSIS — H18.49 CORNEAL DELLEN OF LEFT EYE: ICD-10-CM

## 2025-06-30 PROCEDURE — 3044F HG A1C LEVEL LT 7.0%: CPT | Mod: CPTII,S$GLB,, | Performed by: OPHTHALMOLOGY

## 2025-06-30 PROCEDURE — 1159F MED LIST DOCD IN RCRD: CPT | Mod: CPTII,S$GLB,, | Performed by: OPHTHALMOLOGY

## 2025-06-30 PROCEDURE — 99024 POSTOP FOLLOW-UP VISIT: CPT | Mod: S$GLB,,, | Performed by: OPHTHALMOLOGY

## 2025-06-30 PROCEDURE — 99999 PR PBB SHADOW E&M-EST. PATIENT-LVL II: CPT | Mod: PBBFAC,,, | Performed by: OPHTHALMOLOGY

## 2025-06-30 PROCEDURE — 3288F FALL RISK ASSESSMENT DOCD: CPT | Mod: CPTII,S$GLB,, | Performed by: OPHTHALMOLOGY

## 2025-06-30 PROCEDURE — 1101F PT FALLS ASSESS-DOCD LE1/YR: CPT | Mod: CPTII,S$GLB,, | Performed by: OPHTHALMOLOGY

## 2025-06-30 PROCEDURE — 1160F RVW MEDS BY RX/DR IN RCRD: CPT | Mod: CPTII,S$GLB,, | Performed by: OPHTHALMOLOGY

## 2025-06-30 NOTE — PROGRESS NOTES
Subjective:       Patient ID: Soila Chávez is a 75 y.o. female.    Chief Complaint: Post-op Evaluation    HPI    Here for 3 week S/p Phaco w/IOL OS 06-06-25    Eye meds: Emycin magaly OS    75 year old female presents today with her adult daughter for the visit.   States she can see better since having PCIOL OU. Denies ocular pain.   Daughter says she watches TV without any problems. Denies flashes,   floaters or diplopia. Daughter says she thinks her mom is tired this   morning which is why its hard for her to communicate and why its a   struggle to get her to recognize letters and numbers. HX of dementia   Last edited by Madelin Zabala on 6/30/2025  9:02 AM.             Assessment:       1. Post-operative state    2. Corneal dellen of left eye    3. Dry eye syndrome of both eyes    4. Refractive error    5. Pseudophakia        Plan:       S/p CE OS- Doing well.    K dellen OS-Resolved on EES magaly.  [facility]LIN OU-Needs AT's.  RE-Pt does not want MRx.      D/c EES magaly OS.  AT's OU.  Readers.  RTC Dr Govea in 6 mos.

## 2025-07-04 ENCOUNTER — ON-DEMAND VIRTUAL (OUTPATIENT)
Dept: URGENT CARE | Facility: CLINIC | Age: 75
End: 2025-07-04
Payer: MEDICARE

## 2025-07-04 DIAGNOSIS — R59.0 LYMPHADENOPATHY OF RIGHT CERVICAL REGION: Primary | ICD-10-CM

## 2025-07-04 DIAGNOSIS — J30.9 ALLERGIC RHINITIS, UNSPECIFIED SEASONALITY, UNSPECIFIED TRIGGER: ICD-10-CM

## 2025-07-04 RX ORDER — CETIRIZINE HYDROCHLORIDE 10 MG/1
10 TABLET ORAL DAILY PRN
Qty: 90 TABLET | Refills: 3 | Status: SHIPPED | OUTPATIENT
Start: 2025-07-04

## 2025-07-04 NOTE — PROGRESS NOTES
Subjective:      Patient ID: Soila Chávez is a 75 y.o. female.    Vitals:  vitals were not taken for this visit.     Chief Complaint: Swollen Neck       Visit Type: TELE AUDIOVISUAL    Patient Location: [place] , [place]     Present with the patient at the time of consultation: present with daughter   Two patient identifiers obtained before virtual visit including name and     Past Medical History:   Diagnosis Date    Allergy     Anxiety     Cataract     Colon polyps     Hearing loss     Hypertension     Joint pain     Mental disorder     Seizure-like activity 3/8/2025     Past Surgical History:   Procedure Laterality Date    CATARACT EXTRACTION W/  INTRAOCULAR LENS IMPLANT Right 3/12/2024    Procedure: EXTRACTION, CATARACT, WITH IOL INSERTION;  Surgeon: Jax Garza MD;  Location: [facility] OR;  Service: Ophthalmology;  Laterality: Right;    CATARACT EXTRACTION W/  INTRAOCULAR LENS IMPLANT Left 6/3/2025    Procedure: EXTRACTION, CATARACT, WITH IOL INSERTION;  Surgeon: Jax Garza MD;  Location: [facility] OR;  Service: Ophthalmology;  Laterality: Left;    COLONOSCOPY N/A 3/5/2020    Procedure: COLONOSCOPY;  Surgeon: Nathalia Kemp MD;  Location: [facility] (4TH FLR);  Service: Colon and Rectal;  Laterality: N/A;  requests later appt time if available - Pt open to any MD- Pt informed arrival time may be adjusted, Pt verbalized understanding- [id number]    ESOPHAGOGASTRODUODENOSCOPY N/A 10/1/2020    Procedure: EGD (ESOPHAGOGASTRODUODENOSCOPY);  Surgeon: Casey Guerrero MD;  Location: [facility] (4TH FLR);  Service: Endoscopy;  Laterality: N/A;  covid test [facility]    ESOPHAGOGASTRODUODENOSCOPY N/A 10/29/2021    Procedure: EGD (ESOPHAGOGASTRODUODENOSCOPY);  Surgeon: Billy Vegas MD;  [facility]: [facility];  Service: Endoscopy;  Laterality: N/A;    FLEXIBLE SIGMOIDOSCOPY N/A 3/11/2025    Procedure: SIGMOIDOSCOPY, FLEXIBLE;  Surgeon: Stan Arteaga MD;  [facility];   Service: Endoscopy;  Laterality: N/A;    HYSTERECTOMY      PERIPARTUM CHITO     Review of patient's allergies indicates:   Allergen Reactions    Lotensin [benazepril] Swelling    Sodium bicarbonate Swelling     Face Swelling    Penicillins Rash     Medications Ordered Prior to Encounter[1]  Family History   Problem Relation Name Age of Onset    Breast cancer Sister Ingris     Colon cancer Neg Hx      Ovarian cancer Neg Hx      Melanoma Neg Hx         Medications Ordered                [id number] - [place], [place] - [address]   [address] [facility], [place] [address]    Telephone: [phone]   Fax: [phone]   Hours: Not open 24 hours                         Unspecified Transmission Method (1 of 1)              cetirizine (ZYRTEC) 10 MG tablet    Sig: Take 1 tablet (10 mg total) by mouth daily as needed for Allergies.       Start: 7/4/25     Quantity: 90 tablet Refills: 3                           Ohs Peq Odvv Intake    7/4/2025 10:39 AM CDT - Filed by Marli Torres (Proxy)   What is your current physical address in the event of a medical emergency? [facility] [address] [facility]   Are you able to take your vital signs? No   Please attach any relevant images or files    Is your employer contracted with [facility]? No         Patient presents virtually with her daughter     Swelling started yesterday under her right side neck , tender to touch  Denies new foods   New medication Rexulti for 3 weeks now   New eye drop for dry eye   Denies pain, fever , cough, or sore throat  Hasn't tried anything for swelling       Review of Systems   Constitutional:  Negative for fever.   HENT:  Negative for sore throat.         Right side lymphadenopathy    Respiratory:  Negative for cough.         Objective:   The physical exam was conducted virtually.  Physical Exam  Constitutional:       General: She is not in acute distress.     Appearance: Normal appearance. She is not ill-appearing.   HENT:      Head:  Normocephalic and atraumatic.   Eyes:      General:         Right eye: No discharge.         Left eye: No discharge.      Conjunctiva/sclera: Conjunctivae normal.   Pulmonary:      Effort: Pulmonary effort is normal.   Musculoskeletal:         General: No deformity.   Lymphadenopathy:      Cervical: Cervical adenopathy (tender to touch) present.   Skin:     Coloration: Skin is not jaundiced or pale.   Neurological:      General: No focal deficit present.      Mental Status: She is alert and oriented to person, place, and time.   Psychiatric:         Mood and Affect: Mood normal.         Behavior: Behavior normal.          Assessment:     1. Lymphadenopathy of right cervical region    2. Allergic rhinitis, unspecified seasonality, unspecified trigger      Monitor s/s of infection   Zyrtec for antihistamine property   F/u in person pcp and/or strict ER precautions for worsening of s/s   Plan:       Lymphadenopathy of right cervical region    Allergic rhinitis, unspecified seasonality, unspecified trigger  -     cetirizine (ZYRTEC) 10 MG tablet; Take 1 tablet (10 mg total) by mouth daily as needed for Allergies.  Dispense: 90 tablet; Refill: 3                          [1]   Current Outpatient Medications on File Prior to Visit   Medication Sig Dispense Refill    brexpiprazole (REXULTI) 2 mg Tab Take 1 tablet (2 mg total) by mouth once daily. 30 tablet 3    donepeziL (ARICEPT) 10 MG tablet Take a 1/2 of tablet by mouth nightly at bedtime for one month and then increase to 1 nightly at bedtime. 90 tablet 3    hydrocortisone 1 % ointment Apply a small amount to skin twice a day as needed for perianal itching 28.35 g 1    memantine (NAMENDA) 10 MG Tab Take 1 tablet (10 mg total) by mouth 2 (two) times daily. 180 tablet 3    mirtazapine (REMERON) 15 MG tablet Take 1 tablet (15 mg total) by mouth every evening. 90 tablet 3    mometasone 0.1% (ELOCON) 0.1 % cream apply to affected area topically daily 45 g 3    omeprazole  (PRILOSEC) 40 MG capsule Take 1 capsule (40 mg total) by mouth every morning. 90 capsule 3    pravastatin (PRAVACHOL) 80 MG tablet Take 1 tablet (80 mg total) by mouth once daily. 90 tablet 3    prednisoLONE-moxiflox-bromfen 1-0.5-0.075 % DrpS Place 1 drop into the left eye 3 (three) times daily. 8 mL 2    senna-docusate (PERICOLACE) 8.6-50 mg per tablet Take 1 tablet by mouth 2 (two) times daily as needed for Constipation. 30 tablet 1    [DISCONTINUED] cetirizine (ZYRTEC) 10 MG tablet Take 1 tablet (10 mg total) by mouth daily as needed for Allergies. 90 tablet 3    [DISCONTINUED] cimetidine (TAGAMET) 300 MG tablet Take 1 tablet (300 mg total) by mouth 2 (two) times daily. 180 tablet 1     No current facility-administered medications on file prior to visit.

## 2025-07-07 DIAGNOSIS — J30.9 ALLERGIC RHINITIS, UNSPECIFIED SEASONALITY, UNSPECIFIED TRIGGER: ICD-10-CM

## 2025-07-07 RX ORDER — CETIRIZINE HYDROCHLORIDE 10 MG/1
10 TABLET ORAL DAILY PRN
Qty: 90 TABLET | Refills: 3 | Status: CANCELLED | OUTPATIENT
Start: 2025-07-07

## 2025-07-08 ENCOUNTER — EXTERNAL HOME HEALTH (OUTPATIENT)
Dept: HOME HEALTH SERVICES | Facility: HOSPITAL | Age: 75
End: 2025-07-08
Payer: MEDICARE

## 2025-07-14 ENCOUNTER — PATIENT MESSAGE (OUTPATIENT)
Dept: INTERNAL MEDICINE | Facility: CLINIC | Age: 75
End: 2025-07-14
Payer: MEDICARE

## 2025-07-14 ENCOUNTER — PATIENT MESSAGE (OUTPATIENT)
Dept: NEUROLOGY | Facility: CLINIC | Age: 75
End: 2025-07-14
Payer: MEDICARE

## 2025-07-14 ENCOUNTER — HOSPITAL ENCOUNTER (EMERGENCY)
Facility: OTHER | Age: 75
Discharge: HOME | End: 2025-07-15
Payer: MEDICARE

## 2025-07-14 DIAGNOSIS — K59.00 CONSTIPATION, UNSPECIFIED CONSTIPATION TYPE: Primary | ICD-10-CM

## 2025-07-14 DIAGNOSIS — R41.82 ALTERED MENTAL STATUS, UNSPECIFIED ALTERED MENTAL STATUS TYPE: ICD-10-CM

## 2025-07-14 DIAGNOSIS — R53.83 FATIGUE: ICD-10-CM

## 2025-07-14 DIAGNOSIS — F03.90 DEMENTIA, UNSPECIFIED DEMENTIA SEVERITY, UNSPECIFIED DEMENTIA TYPE, UNSPECIFIED WHETHER BEHAVIORAL, PSYCHOTIC, OR MOOD DISTURBANCE OR ANXIETY: ICD-10-CM

## 2025-07-14 LAB
ABSOLUTE EOSINOPHIL (OHS): 0.14 K/UL
ABSOLUTE MONOCYTE (OHS): 0.93 K/UL (ref 0.3–1)
ABSOLUTE NEUTROPHIL COUNT (OHS): 4.9 K/UL (ref 1.8–7.7)
ALBUMIN SERPL BCP-MCNC: 3.8 G/DL (ref 3.5–5.2)
ALP SERPL-CCNC: 56 UNIT/L (ref 40–150)
ALT SERPL W/O P-5'-P-CCNC: 17 UNIT/L (ref 10–44)
ANION GAP (OHS): 13 MMOL/L (ref 8–16)
AST SERPL-CCNC: 20 UNIT/L (ref 11–45)
BACTERIA #/AREA URNS AUTO: NORMAL /HPF
BASOPHILS # BLD AUTO: 0.02 K/UL
BASOPHILS NFR BLD AUTO: 0.3 %
BILIRUB SERPL-MCNC: 0.2 MG/DL (ref 0.1–1)
BILIRUB UR QL STRIP.AUTO: NEGATIVE
BUN SERPL-MCNC: 23 MG/DL (ref 8–23)
CALCIUM SERPL-MCNC: 9.5 MG/DL (ref 8.7–10.5)
CAOX CRY UR QL COMP ASSIST: NORMAL
CHLORIDE SERPL-SCNC: 109 MMOL/L (ref 95–110)
CLARITY UR: CLEAR
CO2 SERPL-SCNC: 22 MMOL/L (ref 23–29)
COLOR UR AUTO: YELLOW
CREAT SERPL-MCNC: 1.1 MG/DL (ref 0.5–1.4)
CTP QC/QA: YES
CTP QC/QA: YES
ERYTHROCYTE [DISTWIDTH] IN BLOOD BY AUTOMATED COUNT: 14.1 % (ref 11.5–14.5)
GFR SERPLBLD CREATININE-BSD FMLA CKD-EPI: 53 ML/MIN/1.73/M2
GLUCOSE SERPL-MCNC: 102 MG/DL (ref 70–110)
GLUCOSE UR QL STRIP: NEGATIVE
HCT VFR BLD AUTO: 35.8 % (ref 37–48.5)
HCV AB SERPL QL IA: NEGATIVE
HGB BLD-MCNC: 11.4 GM/DL (ref 12–16)
HGB UR QL STRIP: NEGATIVE
HIV 1+2 AB+HIV1 P24 AG SERPL QL IA: NEGATIVE
HOLD SPECIMEN: 1
HOLD SPECIMEN: NORMAL
HYALINE CASTS UR QL AUTO: 0 /LPF (ref 0–1)
IMM GRANULOCYTES # BLD AUTO: 0.03 K/UL (ref 0–0.04)
IMM GRANULOCYTES NFR BLD AUTO: 0.4 % (ref 0–0.5)
KETONES UR QL STRIP: NEGATIVE
LDH SERPL L TO P-CCNC: 0.99 MMOL/L (ref 0.5–2.2)
LEUKOCYTE ESTERASE UR QL STRIP: NEGATIVE
LYMPHOCYTES # BLD AUTO: 1.27 K/UL (ref 1–4.8)
MCH RBC QN AUTO: 28 PG (ref 27–31)
MCHC RBC AUTO-ENTMCNC: 31.8 G/DL (ref 32–36)
MCV RBC AUTO: 88 FL (ref 82–98)
MICROSCOPIC COMMENT: NORMAL
NITRITE UR QL STRIP: NEGATIVE
NUCLEATED RBC (/100WBC) (OHS): 0 /100 WBC
PH UR STRIP: 6 [PH]
PLATELET # BLD AUTO: 220 K/UL (ref 150–450)
PMV BLD AUTO: 10.2 FL (ref 9.2–12.9)
POC MOLECULAR INFLUENZA A AGN: NEGATIVE
POC MOLECULAR INFLUENZA B AGN: NEGATIVE
POTASSIUM SERPL-SCNC: 4 MMOL/L (ref 3.5–5.1)
PROT SERPL-MCNC: 7.8 GM/DL (ref 6–8.4)
PROT UR QL STRIP: ABNORMAL
RBC # BLD AUTO: 4.07 M/UL (ref 4–5.4)
RBC #/AREA URNS AUTO: 2 /HPF (ref 0–4)
RELATIVE EOSINOPHIL (OHS): 1.9 %
RELATIVE LYMPHOCYTE (OHS): 17.4 % (ref 18–48)
RELATIVE MONOCYTE (OHS): 12.8 % (ref 4–15)
RELATIVE NEUTROPHIL (OHS): 67.2 % (ref 38–73)
SAMPLE: NORMAL
SARS-COV-2 RDRP RESP QL NAA+PROBE: NEGATIVE
SODIUM SERPL-SCNC: 144 MMOL/L (ref 136–145)
SP GR UR STRIP: >=1.03
SQUAMOUS #/AREA URNS AUTO: 3 /HPF
UROBILINOGEN UR STRIP-ACNC: ABNORMAL EU/DL
WBC # BLD AUTO: 7.29 K/UL (ref 3.9–12.7)
WBC #/AREA URNS AUTO: 3 /HPF (ref 0–5)

## 2025-07-14 PROCEDURE — 93010 ELECTROCARDIOGRAM REPORT: CPT | Mod: ,,, | Performed by: INTERNAL MEDICINE

## 2025-07-14 PROCEDURE — 80053 COMPREHEN METABOLIC PANEL: CPT | Performed by: PHYSICIAN ASSISTANT

## 2025-07-14 PROCEDURE — 87635 SARS-COV-2 COVID-19 AMP PRB: CPT | Performed by: PHYSICIAN ASSISTANT

## 2025-07-14 PROCEDURE — 99285 EMERGENCY DEPT VISIT HI MDM: CPT | Mod: 25

## 2025-07-14 PROCEDURE — 85025 COMPLETE CBC W/AUTO DIFF WBC: CPT | Performed by: PHYSICIAN ASSISTANT

## 2025-07-14 PROCEDURE — 87040 BLOOD CULTURE FOR BACTERIA: CPT | Performed by: PHYSICIAN ASSISTANT

## 2025-07-14 PROCEDURE — 25500020 PHARM REV CODE 255

## 2025-07-14 PROCEDURE — 93005 ELECTROCARDIOGRAM TRACING: CPT

## 2025-07-14 PROCEDURE — 81003 URINALYSIS AUTO W/O SCOPE: CPT | Performed by: PHYSICIAN ASSISTANT

## 2025-07-14 PROCEDURE — 86803 HEPATITIS C AB TEST: CPT

## 2025-07-14 PROCEDURE — 87389 HIV-1 AG W/HIV-1&-2 AB AG IA: CPT

## 2025-07-14 RX ADMIN — IOHEXOL 75 ML: 350 INJECTION, SOLUTION INTRAVENOUS at 11:07

## 2025-07-15 ENCOUNTER — TELEPHONE (OUTPATIENT)
Dept: NEUROLOGY | Facility: CLINIC | Age: 75
End: 2025-07-15
Payer: MEDICARE

## 2025-07-15 VITALS
HEIGHT: 65 IN | TEMPERATURE: 100 F | DIASTOLIC BLOOD PRESSURE: 75 MMHG | OXYGEN SATURATION: 100 % | WEIGHT: 169 LBS | HEART RATE: 73 BPM | SYSTOLIC BLOOD PRESSURE: 160 MMHG | RESPIRATION RATE: 16 BRPM | BODY MASS INDEX: 28.16 KG/M2

## 2025-07-15 LAB — HOLD SPECIMEN: NORMAL

## 2025-07-15 RX ORDER — GUAIFENESIN AND DEXTROMETHORPHAN HYDROBROMIDE 1200; 60 MG/1; MG/1
1 TABLET, EXTENDED RELEASE ORAL 2 TIMES DAILY PRN
Qty: 20 TABLET | Refills: 0 | Status: SHIPPED | OUTPATIENT
Start: 2025-07-15 | End: 2025-07-25

## 2025-07-15 RX ORDER — POLYETHYLENE GLYCOL 3350 17 G/17G
17 POWDER, FOR SOLUTION ORAL DAILY
Qty: 119 G | Refills: 0 | Status: SHIPPED | OUTPATIENT
Start: 2025-07-15 | End: 2025-07-22

## 2025-07-15 RX ORDER — CETIRIZINE HYDROCHLORIDE 10 MG/1
10 TABLET ORAL DAILY
Qty: 90 TABLET | Refills: 3 | Status: SHIPPED | OUTPATIENT
Start: 2025-07-15 | End: 2026-07-15

## 2025-07-15 RX ORDER — SODIUM PHOSPHATE,MONO-DIBASIC 19G-7G/197
1 ENEMA (ML) RECTAL DAILY
Qty: 2 ENEMA | Refills: 2 | Status: SHIPPED | OUTPATIENT
Start: 2025-07-15 | End: 2025-07-25

## 2025-07-15 RX ORDER — BENZONATATE 100 MG/1
100 CAPSULE ORAL 3 TIMES DAILY PRN
Qty: 20 CAPSULE | Refills: 0 | Status: SHIPPED | OUTPATIENT
Start: 2025-07-15 | End: 2025-07-25

## 2025-07-15 RX ORDER — AMOXICILLIN 250 MG
1 CAPSULE ORAL DAILY
Qty: 7 TABLET | Refills: 0 | Status: SHIPPED | OUTPATIENT
Start: 2025-07-15 | End: 2025-07-22

## 2025-07-15 NOTE — DISCHARGE INSTRUCTIONS
The CT scan shows severe constipation.    You can attempt the senna docusate and Glycolax.  Try the Glycolax 1st if there was no bowel movement after 4-6 hours than also try this senna docusate.  Continue this senna docusate daily for 1 week.  There is still no bowel movement tomorrow than attempt to Fleet enema per package instructions.    If she develops fevers, worsening symptoms then return to the emergency department for further evaluation.

## 2025-07-15 NOTE — TELEPHONE ENCOUNTER
This is just to file...I spoke with Merry. Pt home. Was constipated. Tolerating rexulti and it is helping a lot. Future visits will be at [facility], has f/u with Dr Winkler.

## 2025-07-15 NOTE — TELEPHONE ENCOUNTER
Staff spoke with patients daughter, patients daughter  states she took her to the ER and the problem was resolved. Staff asked did she want a message forwarded to Doretha or Dr. Junior. Patient states no.

## 2025-07-15 NOTE — TELEPHONE ENCOUNTER
Staff contacted patient regarding her symptoms, patient had been seen in er for constipation, patient declined appointment and said she will reach out to clinic if needed.

## 2025-07-15 NOTE — ED NOTES
LOC: The patient is lethargic; currently oriented to self only. Pt is calm and cooperative. Affect is flat. Speech is appropriate and clear but whispered.     APPEARANCE: Patient resting on stretcher in no acute distress. Patient is clean and well groomed.    SKIN: The skin is warm and dry; color consistent with ethnicity. Patient has normal skin turgor and moist mucus membranes. Skin intact; no breakdown or bruising noted.     MUSCULOSKELETAL: Patient moving upper and lower extremities without difficulty; denies pain in the extremities or back. Pt appears fatigued.     RESPIRATORY: Airway is open and patent. Respirations spontaneous, even, easy, and non-labored. Patient has a normal effort and rate.  No accessory muscle use noted. No cough noted.     CARDIAC:  Normal heart rate noted. No peripheral edema noted. No complaints of chest pain.     ABDOMEN: Soft and non tender to palpation. No distention noted. Pt denies abdominal pain; denies nausea, vomiting, diarrhea, or constipation.    NEUROLOGIC: Eyes to verbal stimuli. Follows commands; facial expression symmetrical. Purposeful motor response noted. Pt denies headache; denies lightheadedness or dizziness; denies visual disturbances; recent loss of balance; denies unilateral weakness.

## 2025-07-15 NOTE — FIRST PROVIDER EVALUATION
"Medical screening examination initiated.  I have conducted a focused provider triage encounter, findings are as follows:    Brief history of present illness:  Increased lethargy and fatigue with slight confusion over the past few days.  Daughter at bedside reports some URI symptoms and maybe slight complain of pain with urination.  Reports rigors at home.  Tylenol given with with some improvement symptoms.  Did ground level fall witnessed.  With no reports of head trauma    Vitals:    07/14/25 2018   BP: (!) 140/71   BP Location: Left arm   Pulse: 78   Resp: 16   Temp: 99.6 °F (37.6 °C)   TempSrc: Oral   SpO2: 98%   Weight: 76.7 kg (169 lb)   Height: 5' 5" (1.651 m)       Pertinent physical exam:  Lethargic appearing answering questions appropriately.  Noted low-grade temp    Brief workup plan:  Given need worsened with contusion with low-grade temperature here with Tylenol given at home will initiate sepsis order set.    Preliminary workup initiated; this workup will be continued and followed by the physician or advanced practice provider that is assigned to the patient when roomed.  "

## 2025-07-15 NOTE — TELEPHONE ENCOUNTER
This is just to file...I spoke with Merry. Pt home. Was constipated. Tolerating rexulti and it is helping a lot. Future visits will be at [facility], has f/u with Dr Winkler.

## 2025-07-15 NOTE — ED PROVIDER NOTES
Encounter Date: 7/14/2025       History     Chief Complaint   Patient presents with    Altered Mental Status     Daughter states pt has been lethargic, weak, mumbling and fell today     75-year-old female with a history of dementia, hypertension is presenting to the emergency department for multiple complaints.  Patient arrives with daughter who provides most of history.  At baseline patient knows where she is, knows family members.  States that she went to her adult  today and upon returning home was more lethargic and speaking very soft.  She was found on her bedroom floor this evening.  States she fell from bed onto her rug.  Denies any injury or pain since the fall, but he is currently complaining of lower abdominal pain.  Daughter states she often complains of this.  She is on a bowel regimen of stool softener every other day.  However, daughter notes that several pieces of stool were found on the floor next to her earlier today.  Patient in the past has self disimpacted, and daughter thought she had stopped doing this but there was evidence of more of this behavior today. She was reporting frequency and had chills recently.  Yesterday was seen at urgent care for inflamed lymph node and had recent cough that she has been taking cetirizine for.  History is somewhat limited due to the patient's dementia.    The history is provided by the patient and a caregiver. The history is limited by the condition of the patient.     Review of patient's allergies indicates:   Allergen Reactions    Lotensin [benazepril] Swelling    Sodium bicarbonate Swelling     Face Swelling    Penicillins Rash     Past Medical History:   Diagnosis Date    Allergy     Anxiety     Cataract     Colon polyps 2010    Hearing loss     Hypertension     Joint pain     Mental disorder     Seizure-like activity 3/8/2025     Past Surgical History:   Procedure Laterality Date    CATARACT EXTRACTION W/  INTRAOCULAR LENS IMPLANT Right 3/12/2024     Procedure: EXTRACTION, CATARACT, WITH IOL INSERTION;  Surgeon: Jax Garza MD;  Location: [facility] OR;  Service: Ophthalmology;  Laterality: Right;    CATARACT EXTRACTION W/  INTRAOCULAR LENS IMPLANT Left 6/3/2025    Procedure: EXTRACTION, CATARACT, WITH IOL INSERTION;  Surgeon: Jax Garza MD;  Location: [facility] OR;  Service: Ophthalmology;  Laterality: Left;    COLONOSCOPY N/A 3/5/2020    Procedure: COLONOSCOPY;  Surgeon: Nathalia Kemp MD;  Location: [facility] (4TH FLR);  Service: Colon and Rectal;  Laterality: N/A;  requests later appt time if available - Pt open to any MD- Pt informed arrival time may be adjusted, Pt verbalized understanding- ERW2/24/20@1409    ESOPHAGOGASTRODUODENOSCOPY N/A 10/1/2020    Procedure: EGD (ESOPHAGOGASTRODUODENOSCOPY);  Surgeon: Casey Guerrero MD;  Location: [facility] (4TH FLR);  Service: Endoscopy;  Laterality: N/A;  covid test 9/28-[facility]    ESOPHAGOGASTRODUODENOSCOPY N/A 10/29/2021    Procedure: EGD (ESOPHAGOGASTRODUODENOSCOPY);  Surgeon: Billy Vegas MD;  Location: [facility];  Service: Endoscopy;  Laterality: N/A;    FLEXIBLE SIGMOIDOSCOPY N/A 3/11/2025    Procedure: SIGMOIDOSCOPY, FLEXIBLE;  Surgeon: Stan Arteaga MD;  Location: [facility];  Service: Endoscopy;  Laterality: N/A;    HYSTERECTOMY      PERIPARTUM CHITO     Family History   Problem Relation Name Age of Onset    Breast cancer Sister Ingris     Colon cancer Neg Hx      Ovarian cancer Neg Hx      Melanoma Neg Hx       Social History[1]  Review of Systems    Physical Exam     Initial Vitals [07/14/25 2018]   BP Pulse Resp Temp SpO2   (!) 140/71 78 16 99.6 °F (37.6 °C) 98 %      MAP       --         Physical Exam    Nursing note and vitals reviewed.  Constitutional: She is not diaphoretic. No distress.   HENT:   Head: Normocephalic and atraumatic.   Eyes: Conjunctivae are normal.   Cardiovascular:  Normal rate and regular rhythm.           Pulmonary/Chest: No respiratory distress. She has no  wheezes. She has no rhonchi. She has no rales.   Abdominal: Abdomen is soft. She exhibits no distension. There is no abdominal tenderness. There is no rebound and no guarding.   Musculoskeletal:         General: No edema. Normal range of motion.     Neurological: She is alert.   Soft-spoken, awake and alert.  Answers questions appropriately.  Knows she is in the hospital, not oriented to time.   Skin: Skin is warm and dry.   Psychiatric: She has a normal mood and affect. Thought content normal.         ED Course   Procedures  Labs Reviewed   COMPREHENSIVE METABOLIC PANEL - Abnormal       Result Value    Sodium 144      Potassium 4.0      Chloride 109      CO2 22 (*)     Glucose 102      BUN 23      Creatinine 1.1      Calcium 9.5      Protein Total 7.8      Albumin 3.8      Bilirubin Total 0.2      ALP 56      AST 20      ALT 17      Anion Gap 13      eGFR 53 (*)    URINALYSIS, REFLEX TO URINE CULTURE - Abnormal    Color, UA Yellow      Appearance, UA Clear      pH, UA 6.0      Spec Grav UA >=1.030 (*)     Protein, UA 1+ (*)     Glucose, UA Negative      Ketones, UA Negative      Bilirubin, UA Negative      Blood, UA Negative      Nitrites, UA Negative      Urobilinogen, UA 2.0-3.0 (*)     Leukocyte Esterase, UA Negative     CBC WITH DIFFERENTIAL - Abnormal    WBC 7.29      RBC 4.07      HGB 11.4 (*)     HCT 35.8 (*)     MCV 88      MCH 28.0      MCHC 31.8 (*)     RDW 14.1      Platelet Count 220      MPV 10.2      Nucleated RBC 0      Neut % 67.2      Lymph % 17.4 (*)     Mono % 12.8      Eos % 1.9      Basophil % 0.3      Imm Grans % 0.4      Neut # 4.90      Lymph # 1.27      Mono # 0.93      Eos # 0.14      Baso # 0.02      Imm Grans # 0.03     CULTURE, BLOOD - Normal    Blood Culture No Growth After 6 Hours     CULTURE, BLOOD - Normal    Blood Culture No Growth After 6 Hours     HEPATITIS C ANTIBODY - Normal    Hep C Ab Interp Negative     HIV 1 / 2 ANTIBODY - Normal    HIV 1/2 Ag/Ab Negative     HEP C VIRUS  HOLD SPECIMEN    Extra Tube 1     CBC W/ AUTO DIFFERENTIAL    Narrative:     The following orders were created for panel order CBC auto differential.  Procedure                               Abnormality         Status                     ---------                               -----------         ------                     CBC with Differential[[id number]]       Abnormal            Final result                 Please view results for these tests on the individual orders.   URINALYSIS MICROSCOPIC    RBC, UA 2      WBC, UA 3      Bacteria, UA Rare      Squamous Epithelial Cells, UA 3      Hyaline Casts, UA 0      Calcium Oxalate Crystals, UA Rare      Microscopic Comment       EXTRA TUBES    Narrative:     The following orders were created for panel order EXTRA TUBES.  Procedure                               Abnormality         Status                     ---------                               -----------         ------                     Light Green Top Hold[[id number]]                            Final result                 Please view results for these tests on the individual orders.   LIGHT GREEN TOP HOLD    Extra Tube x     GREY TOP URINE HOLD   SARS-COV-2 RDRP GENE    POC Rapid COVID Negative       Acceptable Yes     POCT INFLUENZA A/B MOLECULAR    POC Molecular Influenza A Ag Negative      POC Molecular Influenza B Ag Negative       Acceptable Yes     ISTAT LACTATE    POC Lactate 0.99      Sample VENOUS       EKG Readings: (Independently Interpreted)   Initial Reading: No STEMI. Previous EKG: Compared with most recent EKG Rhythm: Normal Sinus Rhythm. Heart Rate: 66. ST Segments: Normal ST Segments. T Waves: Normal.       Imaging Results              X-Ray Chest AP Portable (Final result)  Result time 07/14/25 23:44:34      Final result by Kojo Patel MD (07/14/25 23:44:34)                   Impression:      No acute cardiopulmonary process identified.      Electronically  signed by: Kojo Patel MD  Date:    07/14/2025  Time:    23:44               Narrative:    EXAMINATION:  XR CHEST AP PORTABLE    CLINICAL HISTORY:  Other fatigue    TECHNIQUE:  Single frontal view of the chest was performed.    COMPARISON:  03/07/2025.    FINDINGS:  Cardiac silhouette is normal in size.  Lungs are symmetrically expanded.  No evidence of focal consolidative process, pneumothorax, or significant pleural effusion.  No acute osseous abnormality identified.                                       CT Abdomen Pelvis With IV Contrast NO Oral Contrast (Final result)  Result time 07/15/25 00:37:55      Final result by Christian Cook MD (07/15/25 00:37:55)                   Impression:    IMPRESSION:  1.  Large amount of stool within the rectum with moderate rectal wall thickening and perirectal inflammatory changes, concerning for stercoral colitis.  2.  Moderate diffuse thickening of the gastric wall, which may be due to gastritis.  3.  Mild thickening of the bladder wall with mild pericystic inflammatory changes. Correlate with urinalysis to exclude cystitis.  4.  Cholelithiasis with mild gallbladder wall thickening and enhancement. No pericholecystic inflammatory changes.  5.  Severe central canal stenosis at L4-5.    -Electronically Signed By: Christian Cook MD   -Electronically Signed On:  7/15/2025 12:37 AM      Report Ends               Narrative:    EXAM: CT ABDOMEN PELVIS WITH IV CONTRAST    HISTORY: Sepsis.     TECHNIQUE: CT images acquired through the abdomen and pelvis after intravenous contrast. Multiplanar reconstructions were performed. CT scan performed using appropriate/available dose optimization/reduction techniques.    COMPARISON: Gallbladder ultrasound earlier today. CT abdomen pelvis 05/29/2023    FINDINGS:    Lung bases: Mild bibasilar atelectasis    Heart: Mild cardiomegaly. Mild coronary calcifications.    Liver: Stable small left hepatic lobe cyst or hemangioma, 12  mm.    Spleen: Unremarkable.    Pancreas: Unremarkable.    Gallbladder/Biliary: Gallbladder is mildly distended with fluid with mild wall thickening enhancement multiple gallstones with no pericholecystic inflammatory changes    Adrenals: Unremarkable.    Kidneys/Urinary Bladder: No hydronephrosis. No urinary stone. Mild thickening of bladder wall mild pericystic inflammatory changes. Small left renal cyst    Vascular: No abdominal aortic dissection or AAA. Moderate atherosclerotic disease Appendix: Normal caliber. No right lower quadrant inflammatory changes.    Small bowel and colon: No bowel obstruction. Large amount stool within the rectum is dilated centimeters moderate rectal wall thickening moderate perirectal laboratory changes Moderate diffuse thickening of the gastric wall    Lymph nodes: No lymphadenopathy.    Peritoneum: No free air. No free fluid.    Reproductive: Unremarkable.    Soft tissues: Unremarkable.    Bones: No acute bony abnormality. Severe central canal stenosis at L4-5                                       Medications   iohexoL (OMNIPAQUE 350) injection 75 mL (75 mLs Intravenous Given 7/14/25 7194)     Medical Decision Making  75-year-old female with a history of dementia, hypertension is presenting to the emergency department for multiple complaints.  She is complaining of lower abdominal pain but abdomen is benign on exam.  Given age will check CT of the abdomen pelvis.  Sepsis orders were placed due to low grade temperature and complaints concerning for possible urinary tract infection.  Lungs are clear to auscultation.  Daughter also reports recent cough.  Will check x-ray for pneumonia.  Exam is nonfocal.  See ED course.    Amount and/or Complexity of Data Reviewed  Labs: ordered. Decision-making details documented in ED Course.  Radiology: ordered. Decision-making details documented in ED Course.  ECG/medicine tests: ordered and independent interpretation performed. Decision-making  details documented in ED Course.    Risk  OTC drugs.  Prescription drug management.               ED Course as of 07/15/25 1801   Mon Jul 14, 2025 2229 Comprehensive metabolic panel(!)  CMP grossly unremarkable.  No significant acidosis.  Electrolytes within normal limits.  Normal kidney function. Normal LFTs. [KL]   2229 CBC auto differential(!)  CBC is grossly unremarkable.  No leukocytosis. Chronic, stable anemia.  [KL]   2229 POC Lactate: 0.99  Lactate within normal limits [KL]   2230 Urinalysis Microscopic  Urinalysis not consistent with a UTI. [KL]   Tue Jul 15, 2025   0058 Offered enema in the emergency department and hospital admission but patient daughter declines.  Daughter said she will attempt more aggressive bowel regimen at home. Discussed strict return precautions and daughter expressed understanding. [KL]   1759 Echo from March 2025:  Summary  Show Result Comparison   ·  Left Ventricle: The left ventricle is normal in size. Ventricular mass is normal. Normal wall thickness. There is normal systolic function. Ejection fraction is approximately 65%. Global longitudinal strain is -16.5%. Global longitudinal strain is normal. There is normal diastolic function. Normal left ventricular filling pressure. Tissue Doppler velocity is normal.  ·  Right Ventricle: The right ventricle is normal in size. Wall thickness is normal. Systolic function is normal.  ·  Aortic Valve: The aortic valve is a trileaflet valve. There is moderate aortic valve sclerosis. Mildly restricted motion.  ·  Pulmonary Artery: No pulmonary hypertension.   [KL]   1800 Soila Chávez presents with not sepsis secondary to not sepsis.    Interventions include:    Antibiotics:         Fluid Resuscitation:   Fluid Not Needed - Patient is not hypotensive and/or lactate is less than 4.0.     Labs and Imaging:   @[id number](poclac:3,lactate:3)@  @LASTLAB(CULTBLD:4)@   Additional cultures were collected as indicated and imaging reviewed  to identify infection source.    Hemodynamic Support and Monitoring:  Vasopressors were not needed     The patient was re-evaluated at Admission Date and Time: [unfilled] [unfilled] [unfilled] and patient and/or surrogate was updated on plan of care.    The following services were consulted:None   [KL]      ED Course User Index  [KL] Moon Albrecht MD                               Clinical Impression:  Final diagnoses:  [R53.83] Fatigue  [K59.00] Constipation, unspecified constipation type (Primary)  [F03.90] Dementia, unspecified dementia severity, unspecified dementia type, unspecified whether behavioral, psychotic, or mood disturbance or anxiety  [R41.82] Altered mental status, unspecified altered mental status type          ED Disposition Condition    Discharge Stable          ED Prescriptions       Medication Sig Dispense Start Date End Date Auth. Provider    polyethylene glycol (GLYCOLAX) 17 gram/dose powder Take 17 g by mouth once daily. for 7 days 119 g 7/15/2025 7/22/2025 Moon Albrecht MD    senna-docusate (SENNA WITH DOCUSATE SODIUM) 8.6-50 mg per tablet Take 1 tablet by mouth once daily. for 7 days 7 tablet 7/15/2025 7/22/2025 Moon Albrecht MD    sodium phosphates (FLEET ENEMA EXTRA) 19-7 gram/197 mL Enem Place 1 enema rectally once daily. for 10 days 2 enema 7/15/2025 7/25/2025 Moon Albrecht MD    dextromethorphan-guaiFENesin (MUCINEX DM) 60-1,200 mg per 12 hr tablet Take 1 tablet by mouth 2 (two) times daily as needed (cough, congestion). 20 tablet 7/15/2025 7/25/2025 Moon Albrecht MD    benzonatate (TESSALON) 100 MG capsule Take 1 capsule (100 mg total) by mouth 3 (three) times daily as needed for Cough. 20 capsule 7/15/2025 7/25/2025 Moon Albrecht MD    cetirizine (ZYRTEC) 10 MG tablet Take 1 tablet (10 mg total) by mouth once daily. 90 tablet 7/15/2025 7/15/2026 Moon Albrecht MD          Follow-up Information       Follow up With Specialties Details Why Contact Info    Sisi Snowden  Emergency Dept Emergency Medicine Go to  As needed, If symptoms worsen [address]  [facility] [address]  [phone]    Parminder Junior MD Family Medicine Schedule an appointment as soon as possible for a visit in 2 days  [address] [facility]  [address]  [facility] [address]  [phone]            Launch MDCalc MDM  MDCalc MDM Module  Jul 15 2025 6:01 PM [Moon Albrecht]  Data:  - Independent interpretation: I independently reviewed the XR port Chest AP 1 view. It showed no acute abnormality. See MDM section and/or ED Course for my interpretation. [Moon Albrecht]  - Test/documents/historian: 3+ tests ordered  3 tests reviewed  3 external notes reviewed   daughter  Problems: Altered mental status, unspecified altered mental status type  Additional encounter diagnoses: Fatigue, Constipation, unspecified constipation type, Dementia, unspecified dementia severity, unspecified dementia type, unspecified whether behavioral, psychotic, or mood disturbance or anxiety  Risk: CT Abd+Pelvis W contr IV (Iodinated IV contrast in patient w/ elevated risk)             [1]   Social History  Tobacco Use    Smoking status: Never     Passive exposure: Never    Smokeless tobacco: Never   Substance Use Topics    Alcohol use: Yes     Comment: beer occasionally    Drug use: No        Moon Albrecht MD  07/15/25 3524

## 2025-07-16 LAB
OHS QRS DURATION: 70 MS
OHS QTC CALCULATION: 385 MS

## 2025-07-17 ENCOUNTER — PATIENT OUTREACH (OUTPATIENT)
Facility: OTHER | Age: 75
End: 2025-07-17
Payer: MEDICARE

## 2025-07-17 ENCOUNTER — PATIENT MESSAGE (OUTPATIENT)
Dept: INTERNAL MEDICINE | Facility: CLINIC | Age: 75
End: 2025-07-17
Payer: MEDICARE

## 2025-07-17 NOTE — PROGRESS NOTES
Ayala Bonds, Patient Care Assistant  ED Navigator  Emergency Department    [place] [facility]  Role: Community Health Worker    Date: 07/17/2025  Patient Name: Ms. Soila Chávez  MRN: [id number]  PCP: Parminder Junior MD    Assessment:     Soila Chávez is a 75 y.o. female who has presented to ED for Fatigue, Constipation, unspecified constipation type, Dementia, unspecified dementia severity, unspecified dementia type, unspecified whether behavioral, psychotic, or mood disturbance or anxiety, Altered mental status, unspecified. Patient has visited the ED 1 times in the past 3 months. Patient did contact PCP.     ED Navigator Initial Assessment    ED Navigator Enrollment Documentation  Consent to Services  Does patient consent to completing the assessment?: Yes  Contact  Method of Initial Contact: Phone  Transportation  Insurance Coverage  Do you have coverage/adequate coverage?: Yes  Specialist Appointment  Did the patient come to the ED to see a specialist?: Yes  Does the patient have a pending specialist referral?: No  Does the patient have a specialist appointment made?: No  PCP Follow Up Appointment  Medications  Is patient able to afford medication?: Yes  Psychological  Food  Communication/Education  Other Financial Concerns  Other Social Barriers/Concerns  Primary Barrier  Plan: Provided information for [facility] 24/7 Nurse triage line, [phone] or [phone] ([phone])         Social History     Socioeconomic History    Marital status:    Tobacco Use    Smoking status: Never     Passive exposure: Never    Smokeless tobacco: Never   Substance and Sexual Activity    Alcohol use: Yes     Comment: beer occasionally    Drug use: No    Sexual activity: Not Currently     Birth control/protection: None   Social History Narrative    June 2016    The patient reports that she lives alone normally, however her daughter recently moved in with her    She has a 43-year-old daughter,  Saima    And a 26-year-old daughterChris, who is a schoolteacher for 6 in seventh grade in [employer]    She is retired from working as a  in the school system    She now works about 5-15 hours a week for [employer], which she enjoys.    Enjoys working at [email or web address] couple days a week     Social Drivers of Health     Financial Resource Strain: Low Risk  (7/17/2025)    Overall Financial Resource Strain (CARDIA)     Difficulty of Paying Living Expenses: Not hard at all   Food Insecurity: No Food Insecurity (7/17/2025)    Hunger Vital Sign     Worried About Running Out of Food in the Last Year: Never true     Ran Out of Food in the Last Year: Never true   Transportation Needs: No Transportation Needs (7/17/2025)    PRAPARE - Transportation     Lack of Transportation (Medical): No     Lack of Transportation (Non-Medical): No   Physical Activity: Insufficiently Active (7/17/2025)    Exercise Vital Sign     Days of Exercise per Week: 7 days     Minutes of Exercise per Session: 10 min   Stress: No Stress Concern Present (7/17/2025)    Citizen of [place] [employer] Occupational [employer] - Occupational Stress Questionnaire     Feeling of Stress : Not at all   Housing Stability: Low Risk  (7/17/2025)    Housing Stability Vital Sign     Unable to Pay for Housing in the Last Year: No     Number of Times Moved in the Last Year: 0     Homeless in the Last Year: No       Plan:   Pt was seen in the ED on  07/15/2025  for  Fatigue, Constipation, unspecified constipation type, Dementia, unspecified dementia severity, unspecified dementia type, unspecified whether behavioral, psychotic, or mood disturbance or anxiety, Altered mental status, unspecified  ED Navigator spoke with pt for Post ED visit follow up navigation to assist with scheduling a 7-day Post ED visit follow up appt. pt has scheduled with PCP .  Pt does not have transportation issues and has no additional needs at this time. Closing Encounter. Ayala  Vamshi      Appointment made with: Parminder Junior MD

## 2025-07-19 LAB
BACTERIA BLD CULT: NORMAL
BACTERIA BLD CULT: NORMAL

## 2025-07-20 LAB
BACTERIA BLD CULT: NORMAL
BACTERIA BLD CULT: NORMAL

## 2025-07-21 ENCOUNTER — PATIENT OUTREACH (OUTPATIENT)
Facility: OTHER | Age: 75
End: 2025-07-21
Payer: MEDICARE

## 2025-07-21 NOTE — PROGRESS NOTES
spoke with pt and gave pt an appt reminder for 07/22 /2025 w/ Dr. Junior. Close encounter. Ayala Bonds

## 2025-07-22 ENCOUNTER — OFFICE VISIT (OUTPATIENT)
Dept: INTERNAL MEDICINE | Facility: CLINIC | Age: 75
End: 2025-07-22
Payer: MEDICARE

## 2025-07-22 ENCOUNTER — PATIENT MESSAGE (OUTPATIENT)
Dept: INTERNAL MEDICINE | Facility: CLINIC | Age: 75
End: 2025-07-22

## 2025-07-22 DIAGNOSIS — I10 PRIMARY HYPERTENSION: ICD-10-CM

## 2025-07-22 DIAGNOSIS — F03.90 DEMENTIA WITHOUT BEHAVIORAL DISTURBANCE: ICD-10-CM

## 2025-07-22 DIAGNOSIS — G30.9 ALZHEIMER'S DEMENTIA WITH BEHAVIORAL DISTURBANCE: ICD-10-CM

## 2025-07-22 DIAGNOSIS — K59.00 CONSTIPATION, UNSPECIFIED CONSTIPATION TYPE: Primary | ICD-10-CM

## 2025-07-22 DIAGNOSIS — F02.818 ALZHEIMER'S DEMENTIA WITH BEHAVIORAL DISTURBANCE: ICD-10-CM

## 2025-07-22 PROBLEM — A09 INFECTIOUS COLITIS: Status: RESOLVED | Noted: 2025-03-08 | Resolved: 2025-07-22

## 2025-07-22 PROBLEM — E87.6 HYPOKALEMIA: Status: RESOLVED | Noted: 2025-03-11 | Resolved: 2025-07-22

## 2025-07-22 PROBLEM — N17.9 ACUTE KIDNEY INJURY: Status: RESOLVED | Noted: 2025-03-08 | Resolved: 2025-07-22

## 2025-07-22 PROCEDURE — 3288F FALL RISK ASSESSMENT DOCD: CPT | Mod: CPTII,95,, | Performed by: STUDENT IN AN ORGANIZED HEALTH CARE EDUCATION/TRAINING PROGRAM

## 2025-07-22 PROCEDURE — 1101F PT FALLS ASSESS-DOCD LE1/YR: CPT | Mod: CPTII,95,, | Performed by: STUDENT IN AN ORGANIZED HEALTH CARE EDUCATION/TRAINING PROGRAM

## 2025-07-22 PROCEDURE — 3044F HG A1C LEVEL LT 7.0%: CPT | Mod: CPTII,95,, | Performed by: STUDENT IN AN ORGANIZED HEALTH CARE EDUCATION/TRAINING PROGRAM

## 2025-07-22 PROCEDURE — 98006 SYNCH AUDIO-VIDEO EST MOD 30: CPT | Mod: 95,,, | Performed by: STUDENT IN AN ORGANIZED HEALTH CARE EDUCATION/TRAINING PROGRAM

## 2025-07-22 NOTE — PROGRESS NOTES
The patient location is: [place]  The chief complaint leading to consultation is: constipation     Visit type: audiovisual    Face to Face time with patient: 14  19 minutes of total time spent on the encounter, which includes face to face time and non-face to face time preparing to see the patient (eg, review of tests), Obtaining and/or reviewing separately obtained history, Documenting clinical information in the electronic or other health record, Independently interpreting results (not separately reported) and communicating results to the patient/family/caregiver, or Care coordination (not separately reported).         Each patient to whom he or she provides medical services by telemedicine is:  (1) informed of the relationship between the physician and patient and the respective role of any other health care provider with respect to management of the patient; and (2) notified that he or she may decline to receive medical services by telemedicine and may withdraw from such care at any time.    Notes:           Transitional Care Note    Family and/or Caretaker present at visit?  Yes.  Diagnostic tests reviewed/disposition: I have reviewed all completed as well as pending diagnostic tests at the time of discharge.  Disease/illness education: yes  Home health/community services discussion/referrals: Patient does not have home health established from hospital visit.  They do not need home health.  If needed, we will set up home health for the patient.   Establishment or re-establishment of referral orders for community resources: No other necessary community resources.   Discussion with other health care providers: No discussion with other health care providers necessary.           [facility]    Subjective:       Patient ID: Soila Chávez is a 75 y.o. female.    Chief Complaint: No chief complaint on file.      History was obtained from the patient and supplemented through chart review.  This  patient is known to me.    HPI:    Patient is a 75 y.o. female w/   Past Medical History:   Diagnosis Date    Allergy     Anxiety     Cataract     Colon polyps 2010    Hearing loss     Hypertension     Joint pain     Mental disorder     Seizure-like activity 3/8/2025           History of Present Illness    CHIEF COMPLAINT:  Ms. Chávez presents today for follow up after emergency room visit for constipation.    Daughter stated she was going to cancel visit because pt had bowel movement, but kept virtual version of appt. Daughter was getting ready for work during conversation. Patient held phone but was difficult to understand and daughter helped communication.    HISTORY OF PRESENT ILLNESS:  She recently presented to the emergency room with significant functional decline. She was found weak, mumbling, and very tired on her bedroom floor with stool nearby. She experienced a fall out of bed on the day of ER visit, attributed to significant weakness. She also reports experiencing body-wide tremors, particularly noted during shower. The caregiver is uncertain if shaking is related to cold or underlying condition. Fall and associated weakness appear to be causing functional impairment and potential safety concerns.    MEDICAL HISTORY:  She is a 75-year-old with late-stage Alzheimer's dementia with behavioral disturbance, currently transitioning care between neurology providers.    BOWEL MANAGEMENT:  She is currently taking Senna laxative in the morning and at night. Initially started with Miralax but now uses it intermittently. She is able to toilet independently. Caregiver reports she has been using the restroom regularly.    Encouraged daughter to give her miralax daily for maintenance.    MEDICATIONS:  Her daughter reports self-increasing donepezil to 10 mg twice daily. She is also taking risperidone (Rezalty), which she reports has helped resolve hallucinations. She is tolerating medications without reported adverse  effects.    Recommended returning to the prescribed dose of donepezil per uptodate information, also operating outside the recs of specialist.    HTN  Elevated in ED, sent message for numbers at home.    ROS:  General: positive weakness  Neurological: positive tremors             Lab Results   Component Value Date    HGBA1C 5.5 01/29/2025    HGBA1C 5.2 03/22/2024    HGBA1C 5.2 11/04/2021     Lab Results   Component Value Date    LDLCALC 78.6 02/08/2025    CREATININE 1.1 07/14/2025         Wt Readings from Last 15 Encounters:   07/14/25 76.7 kg (169 lb)   06/26/25 66.6 kg (146 lb 12.8 oz)   05/23/25 68.7 kg (151 lb 7.3 oz)   04/15/25 67.6 kg (149 lb)   03/20/25 73.5 kg (162 lb 0.6 oz)   03/15/25 74.4 kg (164 lb)   03/13/25 74.4 kg (164 lb)   03/08/25 74.4 kg (164 lb)   02/18/25 73.3 kg (161 lb 9.6 oz)   02/10/25 78.9 kg (174 lb)   09/03/24 79 kg (174 lb 3.2 oz)   07/17/24 76.7 kg (169 lb)   05/31/24 75.3 kg (166 lb 0.1 oz)   04/02/24 70.9 kg (156 lb 4.9 oz)   03/22/24 70.9 kg (156 lb 4.9 oz)       Medical History  Past Medical History:   Diagnosis Date    Allergy     Anxiety     Cataract     Colon polyps 2010    Hearing loss     Hypertension     Joint pain     Mental disorder     Seizure-like activity 3/8/2025       Review of Systems   Constitutional:  Negative for activity change and unexpected weight change.   HENT:  Negative for hearing loss, rhinorrhea and trouble swallowing.    Eyes:  Negative for discharge and visual disturbance.   Respiratory:  Negative for chest tightness and wheezing.    Cardiovascular:  Negative for chest pain and palpitations.   Gastrointestinal:  Positive for constipation. Negative for blood in stool, diarrhea and vomiting.   Endocrine: Negative for polydipsia and polyuria.   Genitourinary:  Negative for difficulty urinating, dysuria, hematuria and menstrual problem.   Musculoskeletal:  Negative for arthralgias, joint swelling and neck pain.   Neurological:  Positive for tremors. Negative  for weakness and headaches.   Psychiatric/Behavioral:  Positive for hallucinations (improved). Negative for confusion and dysphoric mood.          Surgical hx, family hx, social hx   Have been reviewed    Current Medications[1]    Objective:        There is no height or weight on file to calculate BMI.There were no vitals filed for this visit.  Physical Exam  Vitals and nursing note reviewed.   Constitutional:       General: She is not in acute distress.     Appearance: She is not ill-appearing.   HENT:      Head: Normocephalic and atraumatic.   Eyes:      General: No scleral icterus.  Pulmonary:      Effort: Pulmonary effort is normal.   Musculoskeletal:         General: No deformity.   Neurological:      Mental Status: She is alert.               Lab Results   Component Value Date    WBC 7.29 07/14/2025    HGB 11.4 (L) 07/14/2025    HCT 35.8 (L) 07/14/2025     07/14/2025    CHOL 129 02/08/2025    TRIG 77 02/08/2025    HDL 35 (L) 02/08/2025    ALT 17 07/14/2025    AST 20 07/14/2025     07/14/2025    K 4.0 07/14/2025     07/14/2025    CREATININE 1.1 07/14/2025    BUN 23 07/14/2025    CO2 22 (L) 07/14/2025    TSH 1.500 01/25/2023    HGBA1C 5.5 01/29/2025       The ASCVD Risk score (Marie DK, et al., 2019) failed to calculate for the following reasons:    The valid total cholesterol range is 130 to 320 mg/dL    (Imaging have been independently reviewed)  Ct abdom pelvis    Assessment:         1. Constipation, unspecified constipation type    2. Alzheimer's dementia with behavioral disturbance    3. Dementia without behavioral disturbance    4. Primary hypertension          Plan:     Diagnoses and all orders for this visit:    Constipation, unspecified constipation type    Alzheimer's dementia with behavioral disturbance    Dementia without behavioral disturbance    Primary hypertension        Assessment & Plan    IMPRESSION:  1. Assessed recent ER visit for constipation and current bowel  management.  2. Noted fall on ER visit day and ongoing weakness/shaking, advising to monitor.  3. Acknowledged change in neurologist from Dr. Christensen to Dr. Winkler at [facility].  4. Noted improvement in hallucinations with Rezalty.  5. Considered potential side effects of increased donepezil dosage, particularly slow heart rate.  6. Reviewed ER vital signs, noting elevated BP (160/75) attributed to ER setting.  7. Recognized limitations in providing neurological expertise, deferring to upcoming appointment with Dr. Winkler.    MAJOR DEPRESSIVE DISORDER, RECURRENT EPISODE, MODERATE:   Ms. Chávez presented in a weak, minimally communicative state during both emergency room and virtual visits, requiring significant assistance from her daughter.   Currently followed by neurology with split care between Dr. Hernandez ([facility]) and Dr. Winkler ([facility]).   Medication regimen includes donepezil (self-increased to 10 mg twice daily) and Rezalty for hallucinations.    ALZHEIMER'S DISEASE WITH LATE ONSET AND SEVERE BEHAVIORAL DISTURBANCE:   Monitored the patient, a 75-year-old with late-stage Alzheimer's dementia with behavioral disturbance.   Ms. Chávez's daughter has self-increased donepezil to 10 mg twice daily.   Ms. Chávez is on medication prescribed by Dr. Hernandez, including Rezalty which has successfully stopped hallucinations.    CONSTIPATION:   Ms. Chávez was seen in the emergency room for constipation.   Currently using the restroom and taking Senna in the morning and at night, which will be continued.   Recommend Miralax daily in the morning for maintenance.   Explained that Miralax is a subtle, non-dramatic medication suitable for daily use, even in children, and clarified the difference between maintenance dosing and clean-out dosing.   Instructed to contact the office if constipation worsens.    TREMOR:   Noted that the patient shakes a lot, especially in the shower, possibly due to cold.   Advised to monitor for ongoing  weakness or shaking and report if it worsens.   Will investigate further if tremors continue or intensify.    FALL AND MOBILITY ISSUES:   Documented that the patient was weak and fell out of bed the day she was in the emergency room.    FOLLOW-UP:   Ms. Chávez to respond with vital signs information within the next couple of days.   Will send a portal message requesting vital signs check.             All of your core healthy metrics are met.      No follow-ups on file. or sooner prn            This note was generated with the assistance of ambient listening technology. Verbal consent was obtained by the patient and accompanying visitor(s) for the recording of patient appointment to facilitate this note. I attest to having reviewed and edited the generated note for accuracy, though some syntax or spelling errors may persist. Please contact the author of this note for any clarification.      Visit today is associated with current or anticipated ongoing medical care related to this patient's single serious condition/complex condition of alzheimer's dementia with behavioral disturbance. The patient will return to see me as these issues will be followed longitudinally.      All medications were reviewed including potential side effects and risks/benefits.  Pt was counseled to call back if anything worsens or if questions arise.        Parminder Junior MD  Family Medicine  [facility]  [address] [place] [address]  Phone [phone]  Fax [phone]                   [1]   Current Outpatient Medications:     benzonatate (TESSALON) 100 MG capsule, Take 1 capsule (100 mg total) by mouth 3 (three) times daily as needed for Cough., Disp: 20 capsule, Rfl: 0    brexpiprazole (REXULTI) 2 mg Tab, Take 1 tablet (2 mg total) by mouth once daily., Disp: 30 tablet, Rfl: 3    cetirizine (ZYRTEC) 10 MG tablet, Take 1 tablet (10 mg total) by mouth once daily., Disp: 90 tablet, Rfl: 3     dextromethorphan-guaiFENesin (MUCINEX DM) 60-1,200 mg per 12 hr tablet, Take 1 tablet by mouth 2 (two) times daily as needed (cough, congestion)., Disp: 20 tablet, Rfl: 0    donepeziL (ARICEPT) 10 MG tablet, Take a 1/2 of tablet by mouth nightly at bedtime for one month and then increase to 1 nightly at bedtime., Disp: 90 tablet, Rfl: 3    hydrocortisone 1 % ointment, Apply a small amount to skin twice a day as needed for perianal itching, Disp: 28.35 g, Rfl: 1    memantine (NAMENDA) 10 MG Tab, Take 1 tablet (10 mg total) by mouth 2 (two) times daily., Disp: 180 tablet, Rfl: 3    mirtazapine (REMERON) 15 MG tablet, Take 1 tablet (15 mg total) by mouth every evening., Disp: 90 tablet, Rfl: 3    mometasone 0.1% (ELOCON) 0.1 % cream, apply to affected area topically daily, Disp: 45 g, Rfl: 3    omeprazole (PRILOSEC) 40 MG capsule, Take 1 capsule (40 mg total) by mouth every morning., Disp: 90 capsule, Rfl: 3    polyethylene glycol (GLYCOLAX) 17 gram/dose powder, Take 17 g by mouth once daily. for 7 days, Disp: 119 g, Rfl: 0    pravastatin (PRAVACHOL) 80 MG tablet, Take 1 tablet (80 mg total) by mouth once daily., Disp: 90 tablet, Rfl: 3    prednisoLONE-moxiflox-bromfen 1-0.5-0.075 % DrpS, Place 1 drop into the left eye 3 (three) times daily., Disp: 8 mL, Rfl: 2    senna-docusate (PERICOLACE) 8.6-50 mg per tablet, Take 1 tablet by mouth 2 (two) times daily as needed for Constipation., Disp: 30 tablet, Rfl: 1    senna-docusate (SENNA WITH DOCUSATE SODIUM) 8.6-50 mg per tablet, Take 1 tablet by mouth once daily. for 7 days, Disp: 7 tablet, Rfl: 0    sodium phosphates (FLEET ENEMA EXTRA) 19-7 gram/197 mL Enem, Place 1 enema rectally once daily. for 10 days, Disp: 2 enema, Rfl: 2

## 2025-07-24 ENCOUNTER — TELEPHONE (OUTPATIENT)
Dept: INTERNAL MEDICINE | Facility: CLINIC | Age: 75
End: 2025-07-24
Payer: MEDICARE

## 2025-07-24 NOTE — TELEPHONE ENCOUNTER
Copied from CRM #[id number]. Topic: General Inquiry - Patient Advice  >> Jul 24, 2025  3:01 PM Tri wrote:  Name of Who is Calling:Angella Ram        What is the request in detail:Angella Ram called in regards to getting providers signature on pts order in epic.Please advise thank you       Can the clinic reply by [facility]:NO         [phone]

## 2025-08-21 RX ORDER — PRAVASTATIN SODIUM 80 MG/1
80 TABLET ORAL DAILY
Qty: 90 TABLET | Refills: 1 | Status: SHIPPED | OUTPATIENT
Start: 2025-08-21 | End: 2026-02-17

## 2025-08-22 ENCOUNTER — PATIENT MESSAGE (OUTPATIENT)
Dept: INTERNAL MEDICINE | Facility: CLINIC | Age: 75
End: 2025-08-22
Payer: MEDICARE

## 2025-08-22 DIAGNOSIS — G89.29 CHRONIC MIDLINE LOW BACK PAIN WITH SCIATICA, SCIATICA LATERALITY UNSPECIFIED: ICD-10-CM

## 2025-08-22 DIAGNOSIS — M54.40 CHRONIC MIDLINE LOW BACK PAIN WITH SCIATICA, SCIATICA LATERALITY UNSPECIFIED: ICD-10-CM

## 2025-08-22 RX ORDER — DICLOFENAC SODIUM 10 MG/G
2 GEL TOPICAL DAILY
Qty: 100 G | Refills: 2 | Status: SHIPPED | OUTPATIENT
Start: 2025-08-22

## 2025-09-02 ENCOUNTER — PATIENT MESSAGE (OUTPATIENT)
Dept: INTERNAL MEDICINE | Facility: CLINIC | Age: 75
End: 2025-09-02
Payer: MEDICARE

## 2025-09-02 ENCOUNTER — OFFICE VISIT (OUTPATIENT)
Dept: SPORTS MEDICINE | Facility: CLINIC | Age: 75
End: 2025-09-02
Payer: MEDICARE

## 2025-09-02 VITALS
DIASTOLIC BLOOD PRESSURE: 84 MMHG | WEIGHT: 157.63 LBS | HEIGHT: 65 IN | HEART RATE: 69 BPM | SYSTOLIC BLOOD PRESSURE: 167 MMHG | BODY MASS INDEX: 26.26 KG/M2

## 2025-09-02 DIAGNOSIS — M25.562 CHRONIC PAIN OF LEFT KNEE: ICD-10-CM

## 2025-09-02 DIAGNOSIS — M17.12 PRIMARY OSTEOARTHRITIS OF LEFT KNEE: Primary | ICD-10-CM

## 2025-09-02 DIAGNOSIS — G89.29 CHRONIC PAIN OF LEFT KNEE: ICD-10-CM

## 2025-09-02 PROCEDURE — 99999 PR PBB SHADOW E&M-EST. PATIENT-LVL IV: CPT | Mod: PBBFAC,,, | Performed by: PHYSICIAN ASSISTANT

## 2025-09-02 RX ORDER — TRIAMCINOLONE ACETONIDE 40 MG/ML
40 INJECTION, SUSPENSION INTRA-ARTICULAR; INTRAMUSCULAR
Status: SHIPPED | OUTPATIENT
Start: 2025-09-02

## 2025-09-03 DIAGNOSIS — R39.89 SUSPECTED UTI: Primary | ICD-10-CM

## 2025-09-05 ENCOUNTER — TELEPHONE (OUTPATIENT)
Dept: INTERNAL MEDICINE | Facility: CLINIC | Age: 75
End: 2025-09-05
Payer: MEDICARE

## 2025-09-05 ENCOUNTER — HOSPITAL ENCOUNTER (OUTPATIENT)
Dept: RADIOLOGY | Facility: OTHER | Age: 75
Discharge: HOME | End: 2025-09-05
Payer: MEDICARE

## 2025-09-05 DIAGNOSIS — T14.8XXA BRUISING: ICD-10-CM

## 2025-09-05 DIAGNOSIS — Z91.81 HISTORY OF RECENT FALL: ICD-10-CM

## 2025-09-05 PROCEDURE — 73502 X-RAY EXAM HIP UNI 2-3 VIEWS: CPT | Mod: 26,LT,, | Performed by: RADIOLOGY

## 2025-09-05 PROCEDURE — 73502 X-RAY EXAM HIP UNI 2-3 VIEWS: CPT | Mod: TC,LT

## (undated) DEVICE — SOL BETADINE 5%

## (undated) DEVICE — SHEILD & GARTERS FOX METAL EYE

## (undated) DEVICE — SYR LUER LOCK 1CC

## (undated) DEVICE — SOL IRR STRL WATER 500ML

## (undated) DEVICE — DUOVISC

## (undated) DEVICE — GLOVE SENSICARE PI MICRO 7.5

## (undated) DEVICE — Device

## (undated) DEVICE — SYR SLIP TIP 1CC

## (undated) DEVICE — BLADE SURG BVL ANG COAX 2.4MM